# Patient Record
Sex: FEMALE | Race: WHITE | NOT HISPANIC OR LATINO | Employment: OTHER | URBAN - METROPOLITAN AREA
[De-identification: names, ages, dates, MRNs, and addresses within clinical notes are randomized per-mention and may not be internally consistent; named-entity substitution may affect disease eponyms.]

---

## 2017-01-06 ENCOUNTER — ALLSCRIPTS OFFICE VISIT (OUTPATIENT)
Dept: OTHER | Facility: OTHER | Age: 62
End: 2017-01-06

## 2017-01-07 ENCOUNTER — LAB CONVERSION - ENCOUNTER (OUTPATIENT)
Dept: OTHER | Facility: OTHER | Age: 62
End: 2017-01-07

## 2017-01-07 LAB — ERYTHROCYTE SEDIMENTATION RATE (HISTORICAL): 17 MM/H

## 2017-02-08 ENCOUNTER — APPOINTMENT (OUTPATIENT)
Dept: LAB | Facility: MEDICAL CENTER | Age: 62
End: 2017-02-08
Payer: COMMERCIAL

## 2017-02-08 DIAGNOSIS — M54.42 LOW BACK PAIN WITH LEFT-SIDED SCIATICA: ICD-10-CM

## 2017-02-08 DIAGNOSIS — R29.2 ABNORMAL REFLEX: ICD-10-CM

## 2017-02-08 DIAGNOSIS — M54.17 RADICULOPATHY OF LUMBOSACRAL REGION: ICD-10-CM

## 2017-02-08 DIAGNOSIS — M79.10 MYALGIA: ICD-10-CM

## 2017-02-08 DIAGNOSIS — K14.0 GLOSSITIS: ICD-10-CM

## 2017-02-08 DIAGNOSIS — J44.9 CHRONIC OBSTRUCTIVE PULMONARY DISEASE (HCC): ICD-10-CM

## 2017-02-08 DIAGNOSIS — I10 ESSENTIAL (PRIMARY) HYPERTENSION: ICD-10-CM

## 2017-02-08 DIAGNOSIS — M25.50 PAIN IN JOINT: ICD-10-CM

## 2017-02-08 DIAGNOSIS — R00.2 PALPITATIONS: ICD-10-CM

## 2017-02-08 DIAGNOSIS — R30.0 DYSURIA: ICD-10-CM

## 2017-02-08 LAB
ALBUMIN SERPL BCP-MCNC: 3.5 G/DL (ref 3.5–5)
ALP SERPL-CCNC: 80 U/L (ref 46–116)
ALT SERPL W P-5'-P-CCNC: 27 U/L (ref 12–78)
ANION GAP SERPL CALCULATED.3IONS-SCNC: 8 MMOL/L (ref 4–13)
AST SERPL W P-5'-P-CCNC: 16 U/L (ref 5–45)
BASOPHILS # BLD AUTO: 0.04 THOUSANDS/ΜL (ref 0–0.1)
BASOPHILS NFR BLD AUTO: 0 % (ref 0–1)
BILIRUB SERPL-MCNC: 0.27 MG/DL (ref 0.2–1)
BUN SERPL-MCNC: 11 MG/DL (ref 5–25)
CALCIUM SERPL-MCNC: 9.1 MG/DL (ref 8.3–10.1)
CHLORIDE SERPL-SCNC: 103 MMOL/L (ref 100–108)
CO2 SERPL-SCNC: 29 MMOL/L (ref 21–32)
CREAT SERPL-MCNC: 0.62 MG/DL (ref 0.6–1.3)
CRP SERPL QL: 4.8 MG/L
EOSINOPHIL # BLD AUTO: 0.26 THOUSAND/ΜL (ref 0–0.61)
EOSINOPHIL NFR BLD AUTO: 3 % (ref 0–6)
ERYTHROCYTE [DISTWIDTH] IN BLOOD BY AUTOMATED COUNT: 13.3 % (ref 11.6–15.1)
ERYTHROCYTE [SEDIMENTATION RATE] IN BLOOD: 38 MM/HOUR (ref 0–20)
GFR SERPL CREATININE-BSD FRML MDRD: >60 ML/MIN/1.73SQ M
GLUCOSE SERPL-MCNC: 93 MG/DL (ref 65–140)
HCT VFR BLD AUTO: 43.6 % (ref 34.8–46.1)
HGB BLD-MCNC: 14.5 G/DL (ref 11.5–15.4)
LYMPHOCYTES # BLD AUTO: 2.12 THOUSANDS/ΜL (ref 0.6–4.47)
LYMPHOCYTES NFR BLD AUTO: 23 % (ref 14–44)
MCH RBC QN AUTO: 30.5 PG (ref 26.8–34.3)
MCHC RBC AUTO-ENTMCNC: 33.3 G/DL (ref 31.4–37.4)
MCV RBC AUTO: 92 FL (ref 82–98)
MONOCYTES # BLD AUTO: 0.88 THOUSAND/ΜL (ref 0.17–1.22)
MONOCYTES NFR BLD AUTO: 10 % (ref 4–12)
NEUTROPHILS # BLD AUTO: 5.8 THOUSANDS/ΜL (ref 1.85–7.62)
NEUTS SEG NFR BLD AUTO: 64 % (ref 43–75)
NRBC BLD AUTO-RTO: 0 /100 WBCS
PLATELET # BLD AUTO: 286 THOUSANDS/UL (ref 149–390)
PMV BLD AUTO: 10 FL (ref 8.9–12.7)
POTASSIUM SERPL-SCNC: 4 MMOL/L (ref 3.5–5.3)
PROT SERPL-MCNC: 7.4 G/DL (ref 6.4–8.2)
RBC # BLD AUTO: 4.76 MILLION/UL (ref 3.81–5.12)
SODIUM SERPL-SCNC: 140 MMOL/L (ref 136–145)
TSH SERPL DL<=0.05 MIU/L-ACNC: 3.06 UIU/ML (ref 0.36–3.74)
WBC # BLD AUTO: 9.14 THOUSAND/UL (ref 4.31–10.16)

## 2017-02-08 PROCEDURE — 85652 RBC SED RATE AUTOMATED: CPT

## 2017-02-08 PROCEDURE — 80053 COMPREHEN METABOLIC PANEL: CPT

## 2017-02-08 PROCEDURE — 84443 ASSAY THYROID STIM HORMONE: CPT

## 2017-02-08 PROCEDURE — 86140 C-REACTIVE PROTEIN: CPT

## 2017-02-08 PROCEDURE — 85025 COMPLETE CBC W/AUTO DIFF WBC: CPT

## 2017-02-08 PROCEDURE — 36415 COLL VENOUS BLD VENIPUNCTURE: CPT

## 2017-02-08 PROCEDURE — 87086 URINE CULTURE/COLONY COUNT: CPT

## 2017-02-09 LAB — BACTERIA UR CULT: NORMAL

## 2017-03-21 ENCOUNTER — ALLSCRIPTS OFFICE VISIT (OUTPATIENT)
Dept: OTHER | Facility: OTHER | Age: 62
End: 2017-03-21

## 2017-03-29 ENCOUNTER — ALLSCRIPTS OFFICE VISIT (OUTPATIENT)
Dept: OTHER | Facility: OTHER | Age: 62
End: 2017-03-29

## 2017-05-08 ENCOUNTER — ANESTHESIA EVENT (OUTPATIENT)
Dept: GASTROENTEROLOGY | Facility: HOSPITAL | Age: 62
End: 2017-05-08
Payer: COMMERCIAL

## 2017-05-09 ENCOUNTER — HOSPITAL ENCOUNTER (OUTPATIENT)
Facility: HOSPITAL | Age: 62
Setting detail: OUTPATIENT SURGERY
Discharge: HOME | End: 2017-05-09
Attending: INTERNAL MEDICINE | Admitting: INTERNAL MEDICINE
Payer: COMMERCIAL

## 2017-05-09 ENCOUNTER — ANESTHESIA (OUTPATIENT)
Dept: GASTROENTEROLOGY | Facility: HOSPITAL | Age: 62
End: 2017-05-09
Payer: COMMERCIAL

## 2017-05-09 ENCOUNTER — GENERIC CONVERSION - ENCOUNTER (OUTPATIENT)
Dept: OTHER | Facility: OTHER | Age: 62
End: 2017-05-09

## 2017-05-09 VITALS
TEMPERATURE: 97.8 F | WEIGHT: 200.62 LBS | HEART RATE: 82 BPM | SYSTOLIC BLOOD PRESSURE: 130 MMHG | BODY MASS INDEX: 34.25 KG/M2 | RESPIRATION RATE: 20 BRPM | OXYGEN SATURATION: 96 % | DIASTOLIC BLOOD PRESSURE: 61 MMHG | HEIGHT: 64 IN

## 2017-05-09 DIAGNOSIS — R19.4 CHANGE IN BOWEL HABITS: ICD-10-CM

## 2017-05-09 DIAGNOSIS — R13.10 TROUBLE SWALLOWING: ICD-10-CM

## 2017-05-09 DIAGNOSIS — K21.9 ESOPHAGEAL REFLUX: ICD-10-CM

## 2017-05-09 DIAGNOSIS — K59.00 CONSTIPATION: ICD-10-CM

## 2017-05-09 PROCEDURE — 88305 TISSUE EXAM BY PATHOLOGIST: CPT | Performed by: INTERNAL MEDICINE

## 2017-05-09 PROCEDURE — 88313 SPECIAL STAINS GROUP 2: CPT | Performed by: INTERNAL MEDICINE

## 2017-05-09 PROCEDURE — 88342 IMHCHEM/IMCYTCHM 1ST ANTB: CPT | Performed by: INTERNAL MEDICINE

## 2017-05-09 RX ORDER — CETIRIZINE HYDROCHLORIDE 10 MG/1
10 TABLET, CHEWABLE ORAL DAILY
COMMUNITY
End: 2018-02-16

## 2017-05-09 RX ORDER — LISINOPRIL 20 MG/1
20 TABLET ORAL DAILY
COMMUNITY
End: 2018-02-16

## 2017-05-09 RX ORDER — PROPOFOL 10 MG/ML
INJECTION, EMULSION INTRAVENOUS AS NEEDED
Status: DISCONTINUED | OUTPATIENT
Start: 2017-05-09 | End: 2017-05-09 | Stop reason: SURG

## 2017-05-09 RX ORDER — SODIUM CHLORIDE, SODIUM LACTATE, POTASSIUM CHLORIDE, CALCIUM CHLORIDE 600; 310; 30; 20 MG/100ML; MG/100ML; MG/100ML; MG/100ML
100 INJECTION, SOLUTION INTRAVENOUS CONTINUOUS
Status: DISCONTINUED | OUTPATIENT
Start: 2017-05-09 | End: 2017-05-09 | Stop reason: HOSPADM

## 2017-05-09 RX ORDER — ESCITALOPRAM OXALATE 10 MG/1
10 TABLET ORAL DAILY
COMMUNITY
End: 2018-02-15 | Stop reason: SDUPTHER

## 2017-05-09 RX ORDER — GLYCOPYRROLATE 0.2 MG/ML
INJECTION INTRAMUSCULAR; INTRAVENOUS AS NEEDED
Status: DISCONTINUED | OUTPATIENT
Start: 2017-05-09 | End: 2017-05-09 | Stop reason: SURG

## 2017-05-09 RX ORDER — LIDOCAINE HYDROCHLORIDE 10 MG/ML
INJECTION, SOLUTION INFILTRATION; PERINEURAL AS NEEDED
Status: DISCONTINUED | OUTPATIENT
Start: 2017-05-09 | End: 2017-05-09 | Stop reason: SURG

## 2017-05-09 RX ORDER — PANTOPRAZOLE SODIUM 40 MG/1
40 TABLET, DELAYED RELEASE ORAL
Status: DISCONTINUED | OUTPATIENT
Start: 2017-05-09 | End: 2017-05-09 | Stop reason: HOSPADM

## 2017-05-09 RX ADMIN — PROPOFOL 100 MG: 10 INJECTION, EMULSION INTRAVENOUS at 08:25

## 2017-05-09 RX ADMIN — GLYCOPYRROLATE 0.2 MG: 0.2 INJECTION INTRAMUSCULAR; INTRAVENOUS at 08:23

## 2017-05-09 RX ADMIN — LIDOCAINE HYDROCHLORIDE 30 MG: 10 INJECTION, SOLUTION INFILTRATION; PERINEURAL at 08:25

## 2017-05-09 RX ADMIN — PROPOFOL 50 MG: 10 INJECTION, EMULSION INTRAVENOUS at 08:39

## 2017-05-09 RX ADMIN — PROPOFOL 30 MG: 10 INJECTION, EMULSION INTRAVENOUS at 08:28

## 2017-05-09 RX ADMIN — SODIUM CHLORIDE, SODIUM LACTATE, POTASSIUM CHLORIDE, AND CALCIUM CHLORIDE 100 ML/HR: .6; .31; .03; .02 INJECTION, SOLUTION INTRAVENOUS at 07:33

## 2017-05-09 RX ADMIN — PROPOFOL 30 MG: 10 INJECTION, EMULSION INTRAVENOUS at 08:33

## 2017-05-15 ENCOUNTER — GENERIC CONVERSION - ENCOUNTER (OUTPATIENT)
Dept: OTHER | Facility: OTHER | Age: 62
End: 2017-05-15

## 2017-06-08 ENCOUNTER — ALLSCRIPTS OFFICE VISIT (OUTPATIENT)
Dept: OTHER | Facility: OTHER | Age: 62
End: 2017-06-08

## 2017-07-11 ENCOUNTER — ALLSCRIPTS OFFICE VISIT (OUTPATIENT)
Dept: OTHER | Facility: OTHER | Age: 62
End: 2017-07-11

## 2017-07-21 DIAGNOSIS — R30.0 DYSURIA: ICD-10-CM

## 2017-07-27 ENCOUNTER — APPOINTMENT (OUTPATIENT)
Dept: LAB | Facility: MEDICAL CENTER | Age: 62
End: 2017-07-27
Payer: COMMERCIAL

## 2017-07-27 DIAGNOSIS — R30.0 DYSURIA: ICD-10-CM

## 2017-07-27 LAB
BILIRUB UR QL STRIP: NEGATIVE
CLARITY UR: CLEAR
COLOR UR: YELLOW
GLUCOSE UR STRIP-MCNC: NEGATIVE MG/DL
HGB UR QL STRIP.AUTO: NEGATIVE
KETONES UR STRIP-MCNC: NEGATIVE MG/DL
LEUKOCYTE ESTERASE UR QL STRIP: NEGATIVE
NITRITE UR QL STRIP: NEGATIVE
PH UR STRIP.AUTO: 7 [PH] (ref 4.5–8)
PROT UR STRIP-MCNC: NEGATIVE MG/DL
SP GR UR STRIP.AUTO: 1.01 (ref 1–1.03)
UROBILINOGEN UR QL STRIP.AUTO: 0.2 E.U./DL

## 2017-07-27 PROCEDURE — 81003 URINALYSIS AUTO W/O SCOPE: CPT

## 2017-08-18 ENCOUNTER — HOSPITAL ENCOUNTER (EMERGENCY)
Facility: HOSPITAL | Age: 62
Discharge: HOME | End: 2017-08-18
Attending: EMERGENCY MEDICINE
Payer: COMMERCIAL

## 2017-08-18 VITALS
DIASTOLIC BLOOD PRESSURE: 95 MMHG | WEIGHT: 206.1 LBS | HEART RATE: 87 BPM | OXYGEN SATURATION: 93 % | BODY MASS INDEX: 35.38 KG/M2 | SYSTOLIC BLOOD PRESSURE: 199 MMHG | TEMPERATURE: 98.1 F | RESPIRATION RATE: 20 BRPM

## 2017-08-18 DIAGNOSIS — S46.312A TRICEPS STRAIN, LEFT, INITIAL ENCOUNTER: Primary | ICD-10-CM

## 2017-08-18 LAB
ATRIAL RATE: 78 BPM
P AXIS: 76 DEGREES
PR INTERVAL: 158 MS
QRS AXIS: 79 DEGREES
QRSD INTERVAL: 82 MS
QT INTERVAL: 370 MS
QTC INTERVAL: 421 MS
T WAVE AXIS: 75 DEGREES
VENTRICULAR RATE: 78 BPM

## 2017-08-18 PROCEDURE — 99283 EMERGENCY DEPT VISIT LOW MDM: CPT

## 2017-08-18 PROCEDURE — 93005 ELECTROCARDIOGRAM TRACING: CPT | Performed by: PHYSICIAN ASSISTANT

## 2017-08-18 RX ORDER — DIAZEPAM 5 MG/1
5 TABLET ORAL ONCE
Status: COMPLETED | OUTPATIENT
Start: 2017-08-18 | End: 2017-08-18

## 2017-08-18 RX ORDER — KETOROLAC TROMETHAMINE 30 MG/ML
30 INJECTION, SOLUTION INTRAMUSCULAR; INTRAVENOUS ONCE
Status: DISCONTINUED | OUTPATIENT
Start: 2017-08-18 | End: 2017-08-18

## 2017-08-18 RX ORDER — PANTOPRAZOLE SODIUM 40 MG/1
40 TABLET, DELAYED RELEASE ORAL DAILY
COMMUNITY
End: 2018-02-16

## 2017-08-18 RX ORDER — METHOCARBAMOL 750 MG/1
750 TABLET, FILM COATED ORAL EVERY 6 HOURS PRN
Qty: 20 TABLET | Refills: 0 | Status: SHIPPED | OUTPATIENT
Start: 2017-08-18 | End: 2018-02-16

## 2017-08-18 RX ADMIN — DIAZEPAM 5 MG: 5 TABLET ORAL at 09:03

## 2017-08-22 ENCOUNTER — ALLSCRIPTS OFFICE VISIT (OUTPATIENT)
Dept: OTHER | Facility: OTHER | Age: 62
End: 2017-08-22

## 2017-09-25 ENCOUNTER — ALLSCRIPTS OFFICE VISIT (OUTPATIENT)
Dept: OTHER | Facility: OTHER | Age: 62
End: 2017-09-25

## 2017-09-26 DIAGNOSIS — Z12.31 ENCOUNTER FOR SCREENING MAMMOGRAM FOR MALIGNANT NEOPLASM OF BREAST: ICD-10-CM

## 2017-09-27 ENCOUNTER — LAB REQUISITION (OUTPATIENT)
Dept: LAB | Facility: HOSPITAL | Age: 62
End: 2017-09-27
Payer: COMMERCIAL

## 2017-09-27 DIAGNOSIS — L02.92 FURUNCLE: ICD-10-CM

## 2017-09-27 PROCEDURE — 87077 CULTURE AEROBIC IDENTIFY: CPT | Performed by: FAMILY MEDICINE

## 2017-09-27 PROCEDURE — 87205 SMEAR GRAM STAIN: CPT | Performed by: FAMILY MEDICINE

## 2017-09-27 PROCEDURE — 87186 SC STD MICRODIL/AGAR DIL: CPT | Performed by: FAMILY MEDICINE

## 2017-09-27 PROCEDURE — 87070 CULTURE OTHR SPECIMN AEROBIC: CPT | Performed by: FAMILY MEDICINE

## 2017-09-29 LAB
BACTERIA WND AEROBE CULT: NORMAL
GRAM STN SPEC: NORMAL
GRAM STN SPEC: NORMAL

## 2017-09-30 ENCOUNTER — GENERIC CONVERSION - ENCOUNTER (OUTPATIENT)
Dept: OTHER | Facility: OTHER | Age: 62
End: 2017-09-30

## 2017-10-24 ENCOUNTER — ALLSCRIPTS OFFICE VISIT (OUTPATIENT)
Dept: OTHER | Facility: OTHER | Age: 62
End: 2017-10-24

## 2017-10-24 LAB
BILIRUB UR QL STRIP: NEGATIVE
CLARITY UR: NORMAL
COLOR UR: YELLOW
GLUCOSE (HISTORICAL): NEGATIVE
HGB UR QL STRIP.AUTO: NEGATIVE
KETONES UR STRIP-MCNC: NEGATIVE MG/DL
LEUKOCYTE ESTERASE UR QL STRIP: NORMAL
NITRITE UR QL STRIP: NEGATIVE
PH UR STRIP.AUTO: 6.5 [PH]
PROT UR STRIP-MCNC: NEGATIVE MG/DL
SP GR UR STRIP.AUTO: 1
UROBILINOGEN UR QL STRIP.AUTO: 0.2

## 2017-10-24 NOTE — PROGRESS NOTES
Assessment  1  Rotator cuff tendinitis, left (463 10) (L71 94)    Plan  Anxiety    · ALPRAZolam 0 25 MG Oral Tablet; TAKE 1 TABLET 3 times daily  PMH: Otalgia, unspecified laterality    · From  Vicodin 5-300 MG Oral Tablet  To Vicodin 5-300 MG Oral Tablet TAKE 1  TABLET Every 6 hours    Discussion/Summary    #1 L shoulder pain - secondary to RTC tendonitis  I reviewed with pt and   Pt doing better nw and would like to watch for now  REcheck 2 weeks if not resolved  Consider PT if not resolvedelevated BP - in ER  ?secondary to pain? Now improved  Cont to monitor  as above Pt to call for problems or concerns in the interim  The patient, patient's family was counseled regarding instructions for management,-- risk factor reductions,-- prognosis,-- patient and family education,-- impressions,-- risks and benefits of treatment options,-- importance of compliance with treatment  Possible side effects of new medications were reviewed with the patient/guardian today  The treatment plan was reviewed with the patient/guardian  The patient/guardian understands and agrees with the treatment plan      Chief Complaint  ER follow up - pain and htn      History of Present Illness  as abovept had 5 day hx of L shoulder pain  It was horrible  Pt seen at [facility] ED and dx'd with tricep strain? Pt also with increased BP  Here for f/u pt states that her arm feels much beter today  No CP, palp,lightheadedness or other CV symptoms associated with arm pain  No distal weaknesspt denies increased NSAID use, stimulant use etc         Review of Systems    Constitutional: as noted in HPI  Eyes: No complaints of eye pain, no red eyes, no eyesight problems, no discharge, no dry eyes, no itching of eyes  ENT: no complaints of earache, no loss of hearing, no nose bleeds, no nasal discharge, no sore throat, no hoarseness     Cardiovascular: No complaints of slow heart rate, no fast heart rate, no chest pain, no palpitations, no leg claudication, no lower extremity edema  Respiratory: No complaints of shortness of breath, no wheezing, no cough, no SOB on exertion, no orthopnea, no PND  Musculoskeletal: as noted in HPI  Integumentary: No complaints of skin rash or lesions, no itching, no skin wounds, no breast pain or lump  Neurological: No complaints of headache, no confusion, no convulsions, no numbness, no dizziness or fainting, no tingling, no limb weakness, no difficulty walking  Active Problems  1  Abdominal pain (789 00) (R10 9)   2  Abnormal finding on mammography (793 80) (R92 8)   3  Abnormal weight loss (783 21) (R63 4)   4  Acute frontal sinusitis (461 1) (J01 10)   5  Acute left-sided low back pain with left-sided sciatica (724 2,724 3) (M54 42)   6  Acute maxillary sinusitis, recurrence not specified (461 0) (J01 00)   7  Acute nonintractable headache, unspecified headache type (784 0) (R51)   8  Acute URI (465 9) (J06 9)   9  Allergic rhinitis (477 9) (J30 9)   10  Anxiety (300 00) (F41 9)   11  Arthritis (716 90) (M19 90)   12  Breast cyst (610 0) (N60 09)   13  Candidiasis of mouth (112 0) (B37 0)   14  Cataract (366 9) (H26 9)   15  Cervical dysphagia (787 29) (R13 19)   16  Change in bowel habits (787 99) (R19 4)   17  Chest pain (786 50) (R07 9)   18  Chest pressure (786 59) (R07 89)   19  Chronic obstructive pulmonary disease (496) (J44 9)   20  Compression fracture of thoracic vertebra, non-traumatic (733 13) (M48 54XA)   21  Constipation (564 00) (K59 00)   22  Cough (786 2) (R05)   23  Depression (311) (F32 9)   24  Dermatitis (692 9) (L30 9)   25  Diarrhea (787 91) (R19 7)   26  Dysuria (788 1) (R30 0)   27  Edema (782 3) (R60 9)   28  Encounter for screening colonoscopy (V76 51) (Z12 11)   29  Encounter for screening mammogram for malignant neoplasm of breast (V76 12)    (Z12 31)   30  Epigastric pain (789 06) (R10 13)   31  Esophageal reflux (530 81) (K21 9)   32  Fatigue (780 79) (R53 83)   33   Fever (780 60) (R50 9)   34  Fibromyalgia (729 1) (M79 7)   35  Furuncle (680 9) (L02 92)   36  Glossitis (529 0) (K14 0)   37  Hernia (553 9) (K46 9)   38  Hyperlipidemia (272 4) (E78 5)   39  Hyperreflexic (796 1) (R29 2)   40  Hypertension (401 9) (I10)   41  Intervertebral Disc Degeneration (722 6)   42  Left-sided low back pain with left-sided sciatica (724 3) (M54 42)   43  Lumbosacral radiculopathy at L5 (724 4) (M54 17)   44  Malaise (780 79) (R53 81)   45  Myalgia (729 1) (M79 1)   46  Nausea (787 02) (R11 0)   47  Pain in joint of right shoulder region (719 41) (M25 511)   48  Pain in the joints (719 40) (M25 50)   49  Pain of upper extremity, unspecified laterality (729 5) (M79 603)   50  Palpitations (785 1) (R00 2)   51  Pelvic pain (R10 2)   52  Perineal irritation (709 9) (L98 9)   53  Pes anserine bursitis (726 61) (M70 50)   54  Postmenopausal atrophic vaginitis (627 3) (N95 2)   55  Preoperative clearance (V72 84) (Z01 818)   56  Pulmonary nodule seen on imaging study (793 11) (R91 1)   57  Quit smoking (V15 82) (Z87 891)   58  Right knee pain (719 46) (M25 561)   59  Screening for colon cancer (V76 51) (Z12 11)   60  Skin neoplasm (239 2) (D49 2)   61  Tobacco abuse counseling (V65 42,305 1) (Z71 6)   62  Trouble swallowing (787 20) (R13 10)   63  Ulnar neuropathy of left upper extremity (354 2) (G56 22)   64  Urinary tract infection (599 0) (N39 0)   65  Vaginal discharge (623 5) (N89 8)   66  Vaginitis (616 10) (N76 0)   67  Vision changes (368 9) (H53 9)   68  Weakness of right hand (728 87) (G45 940)    Past Medical History  1  History of Acid reflux (530 81) (K21 9)    The active problems and past medical history were reviewed and updated today  Surgical History  1  History of Eye Surgery   2  History of Knee Surgery   3  History of Knee Surgery    Family History  Mother    1  Denied: Family history of Colon cancer   2  Denied: Family history of colonic polyps   3   Denied: Family history of Crohn's disease   4  Denied: Family history of liver disease   5  Family history of Pancreatic Neoplasm  Father    6  Denied: Family history of Colon cancer   7  Family history of Coronary Artery Disease (V17 49)   8  Family history of Diabetes Mellitus (V18 0)   9  Family history of Essential Hypertension   10  Denied: Family history of colonic polyps   11  Denied: Family history of Crohn's disease   12  Denied: Family history of liver disease   15  Family history of Heart Disease (V17 49)   14  Family history of Hypertension (V17 49)  Sister    13  Family history of Lung Cancer (V16 1)  Brother    12  Family history of Pancreatic Neoplasm   17  Family history of Pancreatic Neoplasm  Paternal Grandmother    25  Family history of Diabetes Mellitus (V18 0)  Paternal Grandfather    23  Family history of Cancer    Social History   · Denied: History of Alcohol Use (History)   · Always uses seat belt   · Current Every Day Smoker (305 1)   · Daily Coffee Consumption (___ Cups/Day)   · Daily Tea Consumption (___ Cups/Day)   · Dental care, regularly   · Education Level: Less than high school   · Exercising Regularly   · Marital History - Currently    · Denied: History of Multiple organ donor   · No guns in the home   · No living will   · Personal Protective Equipment Seatbelts   · Denied: History of Pets / animals   · Denied: History of Power of  in existence   · Quit smoking (V15 82) (Z87 891)   · Three children   · Unemployed (V62 0) (Z56 0)  The social history was reviewed and updated today  Current Meds   1  ALPRAZolam 0 25 MG Oral Tablet; TAKE 1 TABLET 3 times daily; Last Rx:15Apr2016   Ordered   2  Cetirizine HCl - 10 MG Oral Tablet; take 1 tablet by mouth every day; Therapy: 01QOJ3000 to (Evaluate:30Nov2017)  Requested for: 81Aog0932; Last   Rx:50Lqw4610 Ordered   3  Escitalopram Oxalate 10 MG Oral Tablet; TAKE 1 TABLET BY MOUTH EVERY DAY;    Therapy: 78VXA5009 to (Evaluate:12Oct2016)  Requested for: 48YTJ6057; Last   Rx:66Dou7506 Ordered   4  Fluticasone Propionate 50 MCG/ACT Nasal Suspension; use 2 sprays in each nostril   once daily; Therapy: 21EUO7773 to (Evaluate:10Oct2017)  Requested for: 11Aug2017; Last   Rx:11Aug2017 Ordered   5  Lisinopril 20 MG Oral Tablet; take 1 tablet by mouth every day; Therapy: 87MZZ6804 to (21 )  Requested for: 24QTR5411; Last   Rx:55Ewb4977 Ordered   6  Nystatin 427813 UNIT/ML Mouth/Throat Suspension; SWISH, GARGLE THEN SPIT 1   TEASPOOONFUL 3-4 X A DAY; Therapy: 37CSK1394 to (Luke Navarrete)  Requested for: 21Aug2017; Last   Rx:21Aug2017 Ordered   7  Protonix 40 MG Oral Tablet Delayed Release; Take 1 tablet daily Recorded   8  Rogaine Womens 5 % External Foam; 1qd Recorded   9  Tylenol 8 Hour Arthritis Pain 650 MG Oral Tablet Extended Release Recorded    The medication list was reviewed and updated today  Allergies  1  Monistat 3 CREA    Vitals  Vital Signs    Recorded: 22Aug2017 03:42PM   Temperature 97 8 F   Heart Rate 74   Systolic 426   Diastolic 74   Height 5 ft 4 in   Weight 200 lb 4 oz   BMI Calculated 34 37   BSA Calculated 1 96     Physical Exam    Constitutional   General appearance: No acute distress, well appearing and well nourished  Eyes   Conjunctiva and lids: No swelling, erythema or discharge  Pupils and irises: Equal, round, reactive to light  Neck   Neck: Abnormal  -- mild discomfort with posterior flexion but no radiation to shoulder  Pulmonary   Respiratory effort: No increased work of breathing or signs of respiratory distress  Auscultation of lungs: Clear to auscultation  Cardiovascular   Auscultation of heart: Normal rate and rhythm, normal S1 and S2, no murmurs  Peripheral vascular exam: Normal     Examination of extremities for edema and/or varicosities: Normal     Lymphatic   Palpation of lymph nodes in neck: No lymphadenopathy  Palpation of lymph nodes in other areas: No lymphadenopathy  Musculoskeletal   Gait and station: Normal     Digits and nails: Normal without clubbing or cyanosis  Joints, bones, and muscles: Abnormal  -- L shoulder sl TTP over the supraspinatous insertion  Mild discomfort with abduction >90d (though pt has good ROM  Neg sulcus  Mild discomfort with empty can test    Muscle strength/tone: Normal  -- in hands  Skin   Skin and subcutaneous tissue: Normal without rashes or lesions  Neurologic   Reflexes: 2+ and symmetric  Sensation: No sensory loss  Future Appointments    Date/Time Provider Specialty Site   11/15/2017 10:15 AM AYLEEN Nicole   [address] [facility]     Signatures   Electronically signed by : AYLEEN Gale ; Aug 24 2017  6:19AM EST                       (Author)

## 2017-10-27 NOTE — PROGRESS NOTES
Assessment  1  Boil (680 9) (L02 92)   2  Acute gastroenteritis (558 9) (K52 9)   3  Sinus headache (784 0) (R51)    Plan  Boil, Sinus headache    · Cephalexin 500 MG Oral Capsule; TAKE 1 CAPSULE 3 TIMES DAILY    Discussion/Summary    #1 boil - I reviewed with patient  I labial area now draining  We'll have her continue her conservative care including warm compresses  We discussed proper hygiene to avoid infecting other members of the family  Start cephalexin 500 mg 3 times a day  Recheck Thursday if not improved?earlier if worseacute gastroenteritis ? I reviewed with patient  Suspect viral syndrome  Dark urine suggests mild dehydration though exam is unremarkable  Continue Imodium as directed  Increase fluids and other conservative measures advised  Avoid lactose and fructose  Recheck Thursday if not improving?earlier if worsesinus headache - probable viral syndrome though cannot rule out underlying sinusitis  Start cephalexin as noted above  Continue conservative measures  Recheck as above  follow-up as above  Patient to call for problems or concerns in the interim  The patient was counseled regarding instructions for management,-- risk factor reductions,-- prognosis,-- patient and family education,-- impressions,-- risks and benefits of treatment options,-- importance of compliance with treatment  Possible side effects of new medications were reviewed with the patient/guardian today  The treatment plan was reviewed with the patient/guardian  The patient/guardian understands and agrees with the treatment plan      Chief Complaint  boil on stomach and right side of groin      History of Present Illness  HPI: as abovept developed a cyst on R abd 5 days ago, and R groin x 2 days  Abd cyst broke Friday and has been healing since  R groin lesion just opened while she was on her way to the office  Patient has a history of boils but no history of MRSA4 day hx of HA and diarrhea with mild nausea   No melena or hematochezia  Patient states that she is trying to stay hydrated but notes that her urine has been a little dark lately  There is no dysuria or increased frequency  She denies any increased back or CVA area pain patient denies any fever or chills  No one else is sick at home  Review of Systems    Constitutional: as noted in HPI    ENT: as noted in HPI  Cardiovascular: no complaints of slow or fast heart rate, no chest pain, no palpitations, no leg claudication or lower extremity edema  Respiratory: no complaints of shortness of breath, no wheezing, no dyspnea on exertion, no orthopnea or PND  Gastrointestinal: as noted in HPI  Genitourinary: as noted in HPI  Musculoskeletal: as noted in HPI  Integumentary: no complaints of skin rash or lesion, no itching or dry skin, no skin wounds  Neurological: no complaints of headache, no confusion, no numbness or tingling, no dizziness or fainting  Active Problems  1  Abdominal pain (789 00) (R10 9)   2  Abnormal finding on mammography (793 80) (R92 8)   3  Abnormal weight loss (783 21) (R63 4)   4  Acute frontal sinusitis (461 1) (J01 10)   5  Acute left-sided low back pain with left-sided sciatica (724 2,724 3) (M54 42)   6  Acute maxillary sinusitis, recurrence not specified (461 0) (J01 00)   7  Acute nonintractable headache, unspecified headache type (784 0) (R51)   8  Acute URI (465 9) (J06 9)   9  Allergic rhinitis (477 9) (J30 9)   10  Anxiety (300 00) (F41 9)   11  Arthritis (716 90) (M19 90)   12  Breast cyst (610 0) (N60 09)   13  Candidiasis of mouth (112 0) (B37 0)   14  Cataract (366 9) (H26 9)   15  Cervical dysphagia (787 29) (R13 19)   16  Change in bowel habits (787 99) (R19 4)   17  Chest pain (786 50) (R07 9)   18  Chest pressure (786 59) (R07 89)   19  Chronic obstructive pulmonary disease (496) (J44 9)   20  Compression fracture of thoracic vertebra, non-traumatic (733 13) (M48 54XA)   21  Constipation (564 00) (K59 00)   22   Cough (786  2) (R05)   23  Depression (311) (F32 9)   24  Dermatitis (692 9) (L30 9)   25  Diarrhea (787 91) (R19 7)   26  Dysuria (788 1) (R30 0)   27  Edema (782 3) (R60 9)   28  Encounter for screening colonoscopy (V76 51) (Z12 11)   29  Encounter for screening mammogram for malignant neoplasm of breast (V76 12)    (Z12 31)   30  Epigastric pain (789 06) (R10 13)   31  Esophageal reflux (530 81) (K21 9)   32  Fatigue (780 79) (R53 83)   33  Fever (780 60) (R50 9)   34  Fibromyalgia (729 1) (M79 7)   35  Furuncle (680 9) (L02 92)   36  Glossitis (529 0) (K14 0)   37  Hernia (553 9) (K46 9)   38  Hyperlipidemia (272 4) (E78 5)   39  Hyperreflexic (796 1) (R29 2)   40  Hypertension (401 9) (I10)   41  Intervertebral Disc Degeneration (722 6)   42  Left-sided low back pain with left-sided sciatica (724 3) (M54 42)   43  Lumbosacral radiculopathy at L5 (724 4) (M54 17)   44  Malaise (780 79) (R53 81)   45  Myalgia (729 1) (M79 1)   46  Nausea (787 02) (R11 0)   47  Pain in joint of right shoulder region (719 41) (M25 511)   48  Pain in the joints (719 40) (M25 50)   49  Pain of upper extremity, unspecified laterality (729 5) (M79 603)   50  Palpitations (785 1) (R00 2)   51  Pelvic pain (R10 2)   52  Perineal irritation (709 9) (L98 9)   53  Pes anserine bursitis (726 61) (M70 50)   54  Postmenopausal atrophic vaginitis (627 3) (N95 2)   55  Preoperative clearance (V72 84) (Z01 818)   56  Pulmonary nodule seen on imaging study (793 11) (R91 1)   57  Quit smoking (V15 82) (Z87 891)   58  Right knee pain (719 46) (M25 561)   59  Rotator cuff tendinitis, left (726 10) (M75 82)   60  Screening for colon cancer (V76 51) (Z12 11)   61  Skin neoplasm (239 2) (D49 2)   62  Tobacco abuse counseling (V65 42,305 1) (Z71 6)   63  Trouble swallowing (787 20) (R13 10)   64  Ulnar neuropathy of left upper extremity (354 2) (G56 22)   65  Urinary tract infection (599 0) (N39 0)   66  Vaginal discharge (623 5) (N89 8)   67   Vaginitis (616 10) (N76 0)   68  Vision changes (368 9) (H53 9)   69  Weakness of right hand (728 87) (J20 527)    Past Medical History  1  History of Acid reflux (530 81) (K21 9)  Active Problems And Past Medical History Reviewed: The active problems and past medical history were reviewed and updated today  Family History  Mother    1  Denied: Family history of Colon cancer   2  Denied: Family history of colonic polyps   3  Denied: Family history of Crohn's disease   4  Denied: Family history of liver disease   5  Family history of Pancreatic Neoplasm  Father    6  Denied: Family history of Colon cancer   7  Family history of Coronary Artery Disease (V17 49)   8  Family history of Diabetes Mellitus (V18 0)   9  Family history of Essential Hypertension   10  Denied: Family history of colonic polyps   11  Denied: Family history of Crohn's disease   12  Denied: Family history of liver disease   15  Family history of Heart Disease (V17 49)   14  Family history of Hypertension (V17 49)  Sister    13  Family history of Lung Cancer (V16 1)  Brother    12  Family history of Pancreatic Neoplasm   17  Family history of Pancreatic Neoplasm  Paternal Grandmother    25  Family history of Diabetes Mellitus (V18 0)  Paternal Grandfather    23   Family history of Cancer    Social History   · Denied: History of Alcohol Use (History)   · Always uses seat belt   · Current Every Day Smoker (305 1)   · Daily Coffee Consumption (___ Cups/Day)   · Daily Tea Consumption (___ Cups/Day)   · Dental care, regularly   · Education Level: Less than high school   · Exercising Regularly   · Marital History - Currently    · Denied: History of Multiple organ donor   · No guns in the home   · No living will   · Personal Protective Equipment Seatbelts   · Denied: History of Pets / animals   · Denied: History of Power of  in existence   · Quit smoking (V15 82) (Z87 891)   · Three children   · Unemployed (V62 0) (Z56 0)  The social history was reviewed and updated today  Surgical History  1  History of Eye Surgery   2  History of Knee Surgery   3  History of Knee Surgery    Current Meds   1  ALPRAZolam 0 25 MG Oral Tablet; TAKE 1 TABLET 3 times daily; Last Rx:[id number]   Ordered   2  Cetirizine HCl - 10 MG Oral Tablet; take 1 tablet by mouth every day; Therapy: 66BSW7143 to (Evaluate:30Nov2017)  Requested for: 25Zky6415; Last   Rx:10Uek7725 Ordered   3  Escitalopram Oxalate 10 MG Oral Tablet; TAKE 1 TABLET BY MOUTH EVERY DAY; Therapy: 82PMA8909 to (Evaluate:12Oct2016)  Requested for: 83Hsm2507; Last   Rx:98Qdg3798 Ordered   4  Fluticasone Propionate 50 MCG/ACT Nasal Suspension; use 2 sprays in each nostril   once daily; Therapy: 53WTM8768 to (Evaluate:10Oct2017)  Requested for: 11Aug2017; Last   Rx:11Aug2017 Ordered   5  Lisinopril 20 MG Oral Tablet; take 1 tablet by mouth every day; Therapy: 82WYD3755 to (77 873 135)  Requested for: 98GTQ1835; Last   Rx:81Jkb4834 Ordered   6  Nystatin 633574 UNIT/ML Mouth/Throat Suspension; SWISH, GARGLE THEN SPIT 1   TEASPOOONFUL 3-4 X A DAY; Therapy: 78XRS9232 to (Simeon Murray)  Requested for: 84Zrl6514; Last   Rx:17Guf6842 Ordered   7  Promethazine-DM 6 25-15 MG/5ML Oral Syrup; Take 1 teaspoonful by mouth every 6   hours as needed for cough; Therapy: 06YJK2118 to (Evaluate:23Sep2017)  Requested for: 84Cye8639; Last   Rx:09Dvb1518 Ordered   8  Protonix 40 MG Oral Tablet Delayed Release; Take 1 tablet daily Recorded   9  Rogaine Womens 5 % External Foam; 1qd Recorded   10  Tylenol 8 Hour Arthritis Pain 650 MG Oral Tablet Extended Release Recorded   11  Vicodin 5-300 MG Oral Tablet; TAKE 1 TABLET Every 6 hours; Therapy: 56WMV8594 to (Evaluate:01Sep2017); Last Rx:90Qtc4646 Ordered    The medication list was reviewed and updated today  Allergies  1   Monistat 3 CREA    Vitals   Recorded: 71UZV8617 03:27PM   Temperature 98 1 F   Heart Rate 80   Systolic 812   Diastolic 80   Height 5 ft 4 in   Weight 202 lb    BMI Calculated 34 67   BSA Calculated 1 96     Physical Exam    Constitutional   General appearance: Abnormal  -- Mildly ill-appearing female in no apparent distress  Head and Face   Head and face: Normal     Palpation of the face and sinuses: Abnormal  -- Sinuses are slightly tender to palpation with negative head tilt  Eyes   Conjunctiva and lids: No swelling, erythema or discharge  Pupils and irises: Equal, round, reactive to light  Ophthalmoscopic examination: Normal fundi and optic discs  Ears, Nose, Mouth, and Throat   External inspection of ears and nose: Normal     Otoscopic examination: Tympanic membranes translucent with normal light reflex  Canals patent without erythema  Nasal mucosa, septum, and turbinates: Abnormal  -- Minimal congestion  Lips, teeth, and gums: Normal, good dentition  Oropharynx: Normal with no erythema, edema, exudate or lesions  -- Moist mucous membranes  Neck   Neck: Supple, symmetric, trachea midline, no masses  -- No rigidity  Pulmonary   Respiratory effort: No increased work of breathing or signs of respiratory distress  Auscultation of lungs: Clear to auscultation  Cardiovascular   Auscultation of heart: Normal rate and rhythm, normal S1 and S2, no murmurs  Peripheral vascular exam: Normal  -- Breasts capillary refill  Abdomen   Abdomen: Abnormal  -- Right lower abdomen with healing cyst area with crusting  No surrounding erythema or discharge  Liver and spleen: No hepatomegaly or splenomegaly  Genitourinary   External genitalia and vagina: Abnormal  -- Right  area with indurated area draining purulent material (culture taken) mild surrounding cellulitis but does not extend to the perineum or suprapubic area  Lymphatic   Palpation of lymph nodes in neck: No lymphadenopathy  Palpation of lymph nodes in groin: No lymphadenopathy  Palpation of lymph nodes in other areas: No lymphadenopathy      Skin   Skin and subcutaneous tissue: Abnormal  -- As above  Neurologic   Cranial nerves: Cranial nerves II-XII intact  Cortical function: Normal mental status  Future Appointments    Date/Time Provider Specialty Site   11/15/2017 10:15 AM AYLEEN Hammond   [address] [facility]     Signatures   Electronically signed by : AYLEEN Quiroz ; Sep 25 2017  8:09PM EST                       (Author)

## 2017-10-30 ENCOUNTER — GENERIC CONVERSION - ENCOUNTER (OUTPATIENT)
Dept: OTHER | Facility: OTHER | Age: 62
End: 2017-10-30

## 2017-10-30 ENCOUNTER — APPOINTMENT (OUTPATIENT)
Dept: LAB | Facility: CLINIC | Age: 62
End: 2017-10-30
Payer: COMMERCIAL

## 2017-10-30 DIAGNOSIS — R91.1 SOLITARY PULMONARY NODULE: ICD-10-CM

## 2017-10-30 DIAGNOSIS — R30.0 DYSURIA: ICD-10-CM

## 2017-10-30 DIAGNOSIS — M79.7 FIBROMYALGIA: ICD-10-CM

## 2017-10-30 DIAGNOSIS — J44.9 CHRONIC OBSTRUCTIVE PULMONARY DISEASE (HCC): ICD-10-CM

## 2017-10-30 DIAGNOSIS — N39.0 URINARY TRACT INFECTION: ICD-10-CM

## 2017-10-30 DIAGNOSIS — R13.19 OTHER DYSPHAGIA: ICD-10-CM

## 2017-10-30 DIAGNOSIS — E78.5 HYPERLIPIDEMIA: ICD-10-CM

## 2017-10-30 DIAGNOSIS — I10 ESSENTIAL (PRIMARY) HYPERTENSION: ICD-10-CM

## 2017-10-30 LAB
BILIRUB UR QL STRIP: NEGATIVE
CLARITY UR: CLEAR
COLOR UR: YELLOW
GLUCOSE UR STRIP-MCNC: NEGATIVE MG/DL
HGB UR QL STRIP.AUTO: NEGATIVE
KETONES UR STRIP-MCNC: NEGATIVE MG/DL
LEUKOCYTE ESTERASE UR QL STRIP: NEGATIVE
NITRITE UR QL STRIP: NEGATIVE
PH UR STRIP.AUTO: 6.5 [PH] (ref 4.5–8)
PROT UR STRIP-MCNC: NEGATIVE MG/DL
SP GR UR STRIP.AUTO: 1.01 (ref 1–1.03)
UROBILINOGEN UR QL STRIP.AUTO: 0.2 E.U./DL

## 2017-10-30 PROCEDURE — 81003 URINALYSIS AUTO W/O SCOPE: CPT

## 2017-11-15 ENCOUNTER — ALLSCRIPTS OFFICE VISIT (OUTPATIENT)
Dept: OTHER | Facility: OTHER | Age: 62
End: 2017-11-15

## 2017-11-15 ENCOUNTER — TRANSCRIBE ORDERS (OUTPATIENT)
Dept: ADMINISTRATIVE | Facility: HOSPITAL | Age: 62
End: 2017-11-15

## 2017-11-15 DIAGNOSIS — R91.1 PULMONARY NODULE: Primary | ICD-10-CM

## 2017-11-16 ENCOUNTER — APPOINTMENT (OUTPATIENT)
Dept: LAB | Facility: MEDICAL CENTER | Age: 62
End: 2017-11-16
Payer: COMMERCIAL

## 2017-11-16 DIAGNOSIS — E78.5 HYPERLIPIDEMIA: ICD-10-CM

## 2017-11-16 DIAGNOSIS — M79.7 FIBROMYALGIA: ICD-10-CM

## 2017-11-16 DIAGNOSIS — R30.0 DYSURIA: ICD-10-CM

## 2017-11-16 DIAGNOSIS — J44.9 CHRONIC OBSTRUCTIVE PULMONARY DISEASE (HCC): ICD-10-CM

## 2017-11-16 DIAGNOSIS — I10 ESSENTIAL (PRIMARY) HYPERTENSION: ICD-10-CM

## 2017-11-16 DIAGNOSIS — R13.19 OTHER DYSPHAGIA: ICD-10-CM

## 2017-11-16 LAB
ALBUMIN SERPL BCP-MCNC: 3.7 G/DL (ref 3.5–5)
ALP SERPL-CCNC: 74 U/L (ref 46–116)
ALT SERPL W P-5'-P-CCNC: 20 U/L (ref 12–78)
ANION GAP SERPL CALCULATED.3IONS-SCNC: 5 MMOL/L (ref 4–13)
AST SERPL W P-5'-P-CCNC: 13 U/L (ref 5–45)
BASOPHILS # BLD AUTO: 0.02 THOUSANDS/ΜL (ref 0–0.1)
BASOPHILS NFR BLD AUTO: 0 % (ref 0–1)
BILIRUB SERPL-MCNC: 0.34 MG/DL (ref 0.2–1)
BUN SERPL-MCNC: 13 MG/DL (ref 5–25)
CALCIUM SERPL-MCNC: 9.3 MG/DL (ref 8.3–10.1)
CHLORIDE SERPL-SCNC: 101 MMOL/L (ref 100–108)
CHOLEST SERPL-MCNC: 215 MG/DL (ref 50–200)
CO2 SERPL-SCNC: 29 MMOL/L (ref 21–32)
CREAT SERPL-MCNC: 0.57 MG/DL (ref 0.6–1.3)
EOSINOPHIL # BLD AUTO: 0.16 THOUSAND/ΜL (ref 0–0.61)
EOSINOPHIL NFR BLD AUTO: 2 % (ref 0–6)
ERYTHROCYTE [DISTWIDTH] IN BLOOD BY AUTOMATED COUNT: 13.1 % (ref 11.6–15.1)
GFR SERPL CREATININE-BSD FRML MDRD: 100 ML/MIN/1.73SQ M
GLUCOSE P FAST SERPL-MCNC: 88 MG/DL (ref 65–99)
HCT VFR BLD AUTO: 43.3 % (ref 34.8–46.1)
HDLC SERPL-MCNC: 53 MG/DL (ref 40–60)
HGB BLD-MCNC: 14.4 G/DL (ref 11.5–15.4)
LDLC SERPL CALC-MCNC: 128 MG/DL (ref 0–100)
LYMPHOCYTES # BLD AUTO: 2.11 THOUSANDS/ΜL (ref 0.6–4.47)
LYMPHOCYTES NFR BLD AUTO: 21 % (ref 14–44)
MCH RBC QN AUTO: 29.6 PG (ref 26.8–34.3)
MCHC RBC AUTO-ENTMCNC: 33.3 G/DL (ref 31.4–37.4)
MCV RBC AUTO: 89 FL (ref 82–98)
MONOCYTES # BLD AUTO: 0.93 THOUSAND/ΜL (ref 0.17–1.22)
MONOCYTES NFR BLD AUTO: 9 % (ref 4–12)
NEUTROPHILS # BLD AUTO: 6.64 THOUSANDS/ΜL (ref 1.85–7.62)
NEUTS SEG NFR BLD AUTO: 68 % (ref 43–75)
NRBC BLD AUTO-RTO: 0 /100 WBCS
NT-PROBNP SERPL-MCNC: 54 PG/ML
PLATELET # BLD AUTO: 283 THOUSANDS/UL (ref 149–390)
PMV BLD AUTO: 10.2 FL (ref 8.9–12.7)
POTASSIUM SERPL-SCNC: 4.4 MMOL/L (ref 3.5–5.3)
PROT SERPL-MCNC: 7.4 G/DL (ref 6.4–8.2)
RBC # BLD AUTO: 4.86 MILLION/UL (ref 3.81–5.12)
SODIUM SERPL-SCNC: 135 MMOL/L (ref 136–145)
TRIGL SERPL-MCNC: 171 MG/DL
TSH SERPL DL<=0.05 MIU/L-ACNC: 3.18 UIU/ML (ref 0.36–3.74)
WBC # BLD AUTO: 9.91 THOUSAND/UL (ref 4.31–10.16)

## 2017-11-16 PROCEDURE — 80053 COMPREHEN METABOLIC PANEL: CPT

## 2017-11-16 PROCEDURE — 80061 LIPID PANEL: CPT

## 2017-11-16 PROCEDURE — 85025 COMPLETE CBC W/AUTO DIFF WBC: CPT

## 2017-11-16 PROCEDURE — 36415 COLL VENOUS BLD VENIPUNCTURE: CPT

## 2017-11-16 PROCEDURE — 84443 ASSAY THYROID STIM HORMONE: CPT

## 2017-11-16 PROCEDURE — 83880 ASSAY OF NATRIURETIC PEPTIDE: CPT

## 2017-11-17 NOTE — PROGRESS NOTES
Assessment    1  Cervical dysphagia (787 29) (R13 19)   2  Chronic obstructive pulmonary disease (496) (J44 9)   3  Fibromyalgia (729 1) (M79 7)   4  Hyperlipidemia (272 4) (E78 5)   5  Hypertension (401 9) (I10)   6  Dysuria (788 1) (R30 0)    Plan  Cervical dysphagia, Chronic obstructive pulmonary disease, Dysuria, Fibromyalgia,Hyperlipidemia, Hypertension    · (1) CBC/PLT/DIFF; Status:Active; Requested for:66Chf2514;    · (1) COMPREHENSIVE METABOLIC PANEL; Status:Active; Requested for:21Uce1446;    · (1) LIPID PANEL, FASTING; Status:Active; Requested for:04Idj6996;    · (1) NT- BNP (PRO BRAIN NATRIURETIC PEPTIDE); Status:Active; Requestedfor:05Qog9406;    · (1) TSH; Status:Active; Requested for:27Mek3477;   Pulmonary nodule seen on imaging study    · * CT CHEST WO CONTRAST; Status:Need Information - Financial Authorization; Requested for:30Wwl5249;     Discussion/Summary    #1 R shoulder pain - ssuspect RTC tendonitis  I reviewed with pt  Consider PT  Heat-> ROM -> Ice as directed  Recheck 2-3 weeks if no changeelevated BP - persistent  I reviewed with pt  Cont lisinopral for now  Monitor home BPs  Recheck 2 weeks if not improved  COPD -I urged smoking cessation - reiewed  Check CT of Lunfs  Recheck as above - earlier if neededdysuria - ?secondary to peroneal irritation  I discussed barrier therapy  Recheck 1-2 weeks if no changefibromyalgia - I reviewed withpt  Cont present care  Check labscervical dysphagia - I reviewed with pt REC: avoid exacerbating positions and activities  Consider PT eval - MRI if symptoms worsen  Recheck 6mhyperlipidemia - check labsas above  Pt to call for problems or concerns in the interim  The patient, patient's family was counseled regarding diagnostic results,-- instructions for management,-- risk factor reductions,-- prognosis,-- patient and family education,-- impressions,-- risks and benefits of treatment options,-- importance of compliance with treatment     Possible side effects of new medications were reviewed with the patient/guardian today  The treatment plan was reviewed with the patient/guardian  The patient/guardian understands and agrees with the treatment plan      Chief Complaint  ER follow up - pain and htn      History of Present Illness  as abovept states that she is still with some urinary symptoms  UCx as negative  pt has some leakage and irritation at the external urethra  Has occ chillspt notes that her L shoulder feels better but now her R shoulder hurts  has some lateral upper arm pain radiating to her neck as well as lateral thigh pain  no CP, palp,lightheadedness or other CV symptoms associated with arm pain  No distal weakness  (+) increased edema over the last few days  choked on a piece of hamburger 2 weeks ago  Could not breath for a moment but it resolved  pt with several day hx of throat irritation after  No SOB or other symptoms now still smoking  Now increased SOB or wheezemother and 2 brothers passed away from pancreatic CA  Pt is unsure how to screen  Pt has been seen by GI and had neg EGD earlier this year  No weight loss or increased abd pain      Review of Systems   Constitutional: as noted in HPI  Eyes: No complaints of eye pain, no red eyes, no eyesight problems, no discharge, no dry eyes, no itching of eyes  ENT: as noted in HPI  Cardiovascular: No complaints of slow heart rate, no fast heart rate, no chest pain, no palpitations, no leg claudication, no lower extremity edema  Respiratory: No complaints of shortness of breath, no wheezing, no cough, no SOB on exertion, no orthopnea, no PND  Gastrointestinal: No complaints of abdominal pain, no constipation, no nausea or vomiting, no diarrhea, no bloody stools  Genitourinary: as noted in HPI  Musculoskeletal: as noted in HPI  Integumentary: No complaints of skin rash or lesions, no itching, no skin wounds, no breast pain or lump  Neurological: as noted in HPI    Psychiatric: Not suicidal, no sleep disturbance, no anxiety or depression, no change in personality, no emotional problems  Endocrine: No complaints of proptosis, no hot flashes, no muscle weakness, no deepening of the voice, no feelings of weakness  Hematologic/Lymphatic: No complaints of swollen glands, no swollen glands in the neck, does not bleed easily, does not bruise easily  Active Problems    1  Allergic rhinitis (477 9) (J30 9)   2  Anxiety (300 00) (F41 9)   3  Candidiasis of mouth (112 0) (B37 0)   4  Cataract (366 9) (H26 9)   5  Cervical dysphagia (787 29) (R13 19)   6  Chest pain (786 50) (R07 9)   7  Chronic obstructive pulmonary disease (496) (J44 9)   8  Compression fracture of thoracic vertebra, non-traumatic (733 13) (M48 54XA)   9  Constipation (564 00) (K59 00)   10  Depression (311) (F32 9)   11  Dermatitis (692 9) (L30 9)   12  Dysuria (788 1) (R30 0)   13  Edema (782 3) (R60 9)   14  Encounter for screening colonoscopy (V76 51) (Z12 11)   15  Encounter for screening mammogram for malignant neoplasm of breast (V76 12)  (Z12 31)   16  Epigastric pain (789 06) (R10 13)   17  Esophageal reflux (530 81) (K21 9)   18  Fibromyalgia (729 1) (M79 7)   19  Hernia (553 9) (K46 9)   20  Hyperlipidemia (272 4) (E78 5)   21  Hyperreflexic (796 1) (R29 2)   22  Hypertension (401 9) (I10)   23  Intervertebral Disc Degeneration (722 6)   24  Left-sided low back pain with left-sided sciatica (724 3) (M54 42)   25  Lumbosacral radiculopathy at L5 (724 4) (M54 17)   26  Palpitations (785 1) (R00 2)   27  Perineal irritation (709 9) (L98 9)   28  Postmenopausal atrophic vaginitis (627 3) (N95 2)   29  Preoperative clearance (V72 84) (Z01 818)   30  Pulmonary nodule seen on imaging study (793 11) (R91 1)   31  Quit smoking (V15 82) (Z87 891)   32  Right knee pain (719 46) (M25 561)   33  Rotator cuff tendinitis, left (726 10) (M76 82)   34  Screening for colon cancer (V76 51) (Z12 11)   35   Tobacco abuse counseling (V65 42,305 1) (Z71 6) 36  Ulnar neuropathy of left upper extremity (354 2) (G56 22)   37  Urinary tract infection (599 0) (N39 0)   38  Vision changes (368 9) (H53 9)    Past Medical History  1  History of Acid reflux (530 81) (K21 9)    The active problems and past medical history were reviewed and updated today  Surgical History  1  History of Eye Surgery   2  History of Knee Surgery   3  History of Knee Surgery    The surgical history was reviewed and updated today  Family History  Mother    1  Denied: Family history of Colon cancer   2  Denied: Family history of colonic polyps   3  Denied: Family history of Crohn's disease   4  Denied: Family history of liver disease   5  Family history of Pancreatic Neoplasm  Father    6  Denied: Family history of Colon cancer   7  Family history of Coronary Artery Disease (V17 49)   8  Family history of Diabetes Mellitus (V18 0)   9  Family history of Essential Hypertension   10  Denied: Family history of colonic polyps   11  Denied: Family history of Crohn's disease   12  Denied: Family history of liver disease   15  Family history of Heart Disease (V17 49)   14  Family history of Hypertension (V17 49)  Sister    13  Family history of Lung Cancer (V16 1)  Brother    12  Family history of Pancreatic Neoplasm   17  Family history of Pancreatic Neoplasm  Paternal Grandmother    25  Family history of Diabetes Mellitus (V18 0)  Paternal Grandfather    23   Family history of Cancer    Social History     · Denied: History of Alcohol Use (History)   · Always uses seat belt   · Current Every Day Smoker (305 1)   · Daily Coffee Consumption (___ Cups/Day)   · Daily Tea Consumption (___ Cups/Day)   · Dental care, regularly   · Education Level: Less than high school   · Exercising Regularly   · Marital History - Currently    · Denied: History of Multiple organ donor   · No guns in the home   · No living will   · Personal Protective Equipment Seatbelts   · Denied: History of Pets / animals   · Denied: History of Power of  in existence   · Quit smoking (V15 82) (V75 324)   · Three children   · Unemployed (V62 0) (Z56 0)  The social history was reviewed and updated today  Current Meds   1  ALPRAZolam 0 25 MG Oral Tablet; TAKE 1 TABLET 3 times daily; Last Rx:59Xqt6817 Ordered   2  Cetirizine HCl - 10 MG Oral Tablet; take 1 tablet by mouth every day; Therapy: 52ALA4542 to (Evaluate:30Nov2017)  Requested for: 02Aug2017; Last Rx:02Aug2017 Ordered   3  Escitalopram Oxalate 10 MG Oral Tablet; TAKE 1 TABLET BY MOUTH EVERY DAY; Therapy: 11MLB5731 to (Evaluate:12Oct2016)  Requested for: 15Apr2016; Last Rx:15Apr2016 Ordered   4  Fluticasone Propionate 50 MCG/ACT Nasal Suspension; use 2 sprays in each nostril once daily; Therapy: 19UQY6580 to (Evaluate:10Oct2017)  Requested for: 11Aug2017; Last Rx:11Aug2017 Ordered   5  Lisinopril 20 MG Oral Tablet; take 1 tablet by mouth every day; Therapy: 00RVK0444 to (Melchor Mauro)  Requested for: 29Oct2017; Last SR:51VIV7153 Ordered   6  Nystatin 157502 UNIT/ML Mouth/Throat Suspension; SWISH, GARGLE THEN SPIT 1 TEASPOOONFUL 3-4 X A DAY; Therapy: 15QVN2289 to (Diane Frost)  Requested for: 21Aug2017; Last Rx:65Woq8419 Ordered   7  Protonix 40 MG Oral Tablet Delayed Release; Take 1 tablet daily Recorded   8  Rogaine Womens 5 % External Foam; 1qd Recorded   9  Tylenol 8 Hour Arthritis Pain 650 MG Oral Tablet Extended Release Recorded   10  Vicodin 5-300 MG Oral Tablet; TAKE 1 TABLET Every 6 hours; Therapy: 90OMA6785 to (Evaluate:01Sep2017); Last Rx:52Gse6654 Ordered    The medication list was reviewed and updated today  Allergies  1   Monistat 3 CREA    Vitals  Vital Signs    Recorded: 61OTB4418 11:06AM Recorded: 26JNW9864 10:15AM   Temperature  97 1 F   Heart Rate  80   Systolic 597, LUE, Sitting 552   Diastolic 80, LUE, Sitting 90   Height  5 ft 4 in   Weight  204 lb    BMI Calculated  35 02   BSA Calculated  1 97       Physical Exam   Constitutional General appearance: No acute distress, well appearing and well nourished  Eyes  Conjunctiva and lids: No swelling, erythema or discharge  Pupils and irises: Equal, round, reactive to light  Ears, Nose, Mouth, and Throat  External inspection of ears and nose: Normal    Otoscopic examination: Tympanic membranes translucent with normal light reflex  Canals patent without erythema  Nasal mucosa, septum, and turbinates: Normal without edema or erythema  Lips, teeth, and gums: Normal, good dentition  Oropharynx: Normal with no erythema, edema, exudate or lesions  Neck  Neck: Abnormal  -- mild discomfort with posterior flexion but no radiation to shoulder  Good ROM on rotation  Thyroid: Normal, no thyromegaly  Pulmonary  Respiratory effort: No increased work of breathing or signs of respiratory distress  Auscultation of lungs: Clear to auscultation  Cardiovascular  Auscultation of heart: Normal rate and rhythm, normal S1 and S2, no murmurs  Carotid pulses: 2+ bilaterally  Abdominal aorta: Normal    Pedal pulses: 2+ bilaterally  Peripheral vascular exam: Normal    Examination of extremities for edema and/or varicosities: Normal    Abdomen  Abdomen: Non-tender, no masses  Liver and spleen: No hepatomegaly or splenomegaly  Lymphatic  Palpation of lymph nodes in neck: No lymphadenopathy  Palpation of lymph nodes in other areas: No lymphadenopathy  Musculoskeletal  Gait and station: Normal    Digits and nails: Normal without clubbing or cyanosis  Joints, bones, and muscles: Abnormal  -- R shoulder with good ROM  (+) sl TTP over the supraspinatous insertion  AC NT  Bicep tendons NT  Muscle strength/tone: Normal  -- in hands  Skin  Skin and subcutaneous tissue: Normal without rashes or lesions  Neurologic  Cranial nerves: Cranial nerves II-XII intact  Cortical function: Normal mental status  Reflexes: 2+ and symmetric  Sensation: No sensory loss     Psychiatric  Mood and affect: Normal        Future Appointments    Date/Time Provider Specialty Site   12/07/2017 02:15 PM AYLEEN Rueda   [address] [facility]       Signatures   Electronically signed by : AYLEEN Yuan ; Nov 16 2017 11:25AM EST                       (Author)

## 2017-11-22 ENCOUNTER — HOSPITAL ENCOUNTER (OUTPATIENT)
Dept: RADIOLOGY | Facility: MEDICAL CENTER | Age: 62
Discharge: HOME | End: 2017-11-22
Payer: COMMERCIAL

## 2017-11-22 DIAGNOSIS — R91.1 PULMONARY NODULE: ICD-10-CM

## 2017-11-22 PROCEDURE — 71250 CT THORAX DX C-: CPT

## 2017-11-28 ENCOUNTER — GENERIC CONVERSION - ENCOUNTER (OUTPATIENT)
Dept: OTHER | Facility: OTHER | Age: 62
End: 2017-11-28

## 2017-12-07 ENCOUNTER — GENERIC CONVERSION - ENCOUNTER (OUTPATIENT)
Dept: OTHER | Facility: OTHER | Age: 62
End: 2017-12-07

## 2017-12-10 ENCOUNTER — OFFICE VISIT (OUTPATIENT)
Dept: URGENT CARE | Facility: MEDICAL CENTER | Age: 62
End: 2017-12-10
Payer: COMMERCIAL

## 2017-12-10 PROCEDURE — G0382 LEV 3 HOSP TYPE B ED VISIT: HCPCS

## 2017-12-10 PROCEDURE — 99283 EMERGENCY DEPT VISIT LOW MDM: CPT

## 2017-12-12 NOTE — PROGRESS NOTES
Assessment    1  Viral syndrome (079 99) (B34 9)    Plan  Viral syndrome    · Benzonatate 200 MG Oral Capsule; TAKE 1 CAPSULE 2-3 TIMES DAILY    Discussion/Summary  Discussion Summary:   1  Push fluidsTylenol as needed fro feverUse Tessalon 1 every 8 hours as needed for cough/congestion4  Follow-up with PCP if symptoms persist    Medication Side Effects Reviewed: Possible side effects of new medications were reviewed with the patient/guardian today  Understands and agrees with treatment plan: The treatment plan was reviewed with the patient/guardian  The patient/guardian understands and agrees with the treatment plan      Chief Complaint    1  Cough  Chief Complaint Free Text Note Form: Pt with cough for one day and laryngitis      History of Present Illness  HPI: The patient is a 57-year-old female presents with a 1 day history of nasal congestion cough and laryngitis  She denies any fever or chills since the onset of her symptoms she denies any chest discomfort or difficulty breathing  Hospital Based Practices Required Assessment:  Pain Assessment  the patient states they do not have pain  Review of Systems  Focused-Female:  Constitutional: No fever, no chills, feels well, no tiredness, no recent weight gain or loss  ENT: nasal discharge, but-- no sore throat  Respiratory: cough-- and-- PND  Active Problems    1  Allergic rhinitis (477 9) (J30 9)   2  Anxiety (300 00) (F41 9)   3  Candidiasis of mouth (112 0) (B37 0)   4  Cataract (366 9) (H26 9)   5  Cervical dysphagia (787 29) (R13 19)   6  Chest pain (786 50) (R07 9)   7  Chronic obstructive pulmonary disease (496) (J44 9)   8  Compression fracture of thoracic vertebra, non-traumatic (733 13) (M48 54XA)   9  Constipation (564 00) (K59 00)   10  Depression (311) (F32 9)   11  Dermatitis (692 9) (L30 9)   12  Dysuria (788 1) (R30 0)   13  Edema (782 3) (R60 9)   14  Encounter for screening colonoscopy (V76 51) (Z12 11)   15   Encounter for screening mammogram for malignant neoplasm of breast (V76 12)  (Z12 31)   16  Epigastric pain (789 06) (R10 13)   17  Esophageal reflux (530 81) (K21 9)   18  Fibromyalgia (729 1) (M79 7)   19  Hernia (553 9) (K46 9)   20  Hyperlipidemia (272 4) (E78 5)   21  Hyperreflexic (796 1) (R29 2)   22  Hypertension (401 9) (I10)   23  Intervertebral Disc Degeneration (722 6)   24  Left-sided low back pain with left-sided sciatica (724 3) (M54 42)   25  Lumbosacral radiculopathy at L5 (724 4) (M54 17)   26  Palpitations (785 1) (R00 2)   27  Perineal irritation (709 9) (L98 9)   28  Postmenopausal atrophic vaginitis (627 3) (N95 2)   29  Preoperative clearance (V72 84) (Z01 818)   30  Pulmonary nodule seen on imaging study (793 11) (R91 1)   31  Quit smoking (V15 82) (Z87 891)   32  Right knee pain (719 46) (M25 561)   33  Screening for colon cancer (V76 51) (Z12 11)   34  Tendinitis of both rotator cuffs (726 10) (M75 81,M75 82)   35  Tobacco abuse counseling (V65 42,305 1) (Z71 6)   36  Ulnar neuropathy of left upper extremity (354 2) (G56 22)   37  Urinary tract infection (599 0) (N39 0)   38  Vaginitis (616 10) (N76 0)   39  Vision changes (368 9) (H53 9)    Past Medical History  1  History of Acid reflux (530 81) (K21 9)  Active Problems And Past Medical History Reviewed: The active problems and past medical history were reviewed and updated today  Family History  Mother    1  Denied: Family history of Colon cancer   2  Denied: Family history of colonic polyps   3  Denied: Family history of Crohn's disease   4  Denied: Family history of liver disease   5  Family history of Pancreatic Neoplasm  Father    6  Denied: Family history of Colon cancer   7  Family history of Coronary Artery Disease (V17 49)   8  Family history of Diabetes Mellitus (V18 0)   9  Family history of Essential Hypertension   10  Denied: Family history of colonic polyps   11  Denied: Family history of Crohn's disease   12   Denied: Family history of liver disease   13  Family history of Heart Disease (V17 49)   14  Family history of Hypertension (V17 49)  Sister    13  Family history of Lung Cancer (V16 1)  Brother    12  Family history of Pancreatic Neoplasm   17  Family history of Pancreatic Neoplasm  Paternal Grandmother    25  Family history of Diabetes Mellitus (V18 0)  Paternal Grandfather    23  Family history of Cancer  Family History Reviewed: The family history was reviewed and updated today  Social History   · Denied: History of Alcohol Use (History)   · Always uses seat belt   · Current Every Day Smoker (305 1)   · Daily Coffee Consumption (___ Cups/Day)   · Daily Tea Consumption (___ Cups/Day)   · Dental care, regularly   · Education Level: Less than high school   · Exercising Regularly   · Marital History - Currently    · Denied: History of Multiple organ donor   · No guns in the home   · No living will   · Personal Protective Equipment Seatbelts   · Denied: History of Pets / animals   · Denied: History of Power of  in existence   · Quit smoking (V15 82) (Z87 891)   · Three children   · Unemployed (V62 0) (Z56 0)  Social History Reviewed: The social history was reviewed and updated today  Surgical History    1  History of Eye Surgery   2  History of Knee Surgery   3  History of Knee Surgery  Surgical History Reviewed: The surgical history was reviewed and updated today  Current Meds   1  Aleve CAPS; Therapy: (Recorded:63Esy4713) to Recorded   2  ALPRAZolam 0 25 MG Oral Tablet; TAKE 1 TABLET 3 times daily; Last Rx:75Nro6842 Ordered   3  Cetirizine HCl - 10 MG Oral Tablet; take 1 tablet by mouth every day; Therapy: 58ANR5948 to (Evaluate:30Nov2017)  Requested for: 04Zln2326; Last Rx:02Aug2017 Ordered   4  Escitalopram Oxalate 10 MG Oral Tablet; TAKE 1 TABLET BY MOUTH EVERY DAY; Therapy: 06IIF2072 to (Evaluate:12Oct2016)  Requested for: 15Apr2016; Last Rx:15Apr2016 Ordered   5   Fluticasone Propionate 50 MCG/ACT Nasal Suspension; use 2 sprays in each nostril once daily; Therapy: 23ZVY6167 to (Evaluate:10Oct2017)  Requested for: 11Aug2017; Last Rx:11Aug2017 Ordered   6  Lisinopril 20 MG Oral Tablet; take 1 tablet by mouth every day; Therapy: 98NZS6175 to ([id number])  Requested for: 29Oct2017; Last LJ:65WHU0530 Ordered   7  MetroNIDAZOLE 0 75 % Vaginal Gel; INSERT 1 APPLICATORFUL INTRAVAGINALLY AT BEDTIME NIGHTLY  Requested for: 19YEO2663; Last Rx:50Oxu1197 Ordered   8  Nystatin 768360 UNIT/ML Mouth/Throat Suspension; SWISH, GARGLE THEN SPIT 1 TEASPOOONFUL 3-4 X A DAY; Therapy: 92UFO2939 to (Kimberley Moreno)  Requested for: 47Ysj2358; Last Rx:40Zji2469 Ordered   9  Protonix 40 MG Oral Tablet Delayed Release; Take 1 tablet daily Recorded   10  Rogaine Womens 5 % External Foam; 1qd Recorded  Medication List Reviewed: The medication list was reviewed and updated today  Allergies    1  Monistat 3 CREA    Vitals  Signs   Recorded: 72ZAN9034 02:51PM   Temperature: 97 8 F  Heart Rate: 98  Respiration: 20  Systolic: 735  Diastolic: 84  O2 Saturation: 96  Pain Scale: 0    Physical Exam   Constitutional  General appearance: No acute distress, well appearing and well nourished  Ears, Nose, Mouth, and Throat  External inspection of ears and nose: Normal    Otoscopic examination: Tympanic membranes translucent with normal light reflex  Canals patent without erythema  Nasal mucosa, septum, and turbinates: Abnormal  -- Thin, clear nasal drainage bilateral   Oropharynx: Normal with no erythema, edema, exudate or lesions  Pulmonary  Respiratory effort: No increased work of breathing or signs of respiratory distress  Auscultation of lungs: Clear to auscultation  Cardiovascular  Auscultation of heart: Normal rate and rhythm, normal S1 and S2, without murmurs  Future Appointments    Date/Time Provider Specialty Site   04/17/2018 09:00 AM AYLEEN Sherman   [address] [facility]       Signatures   Electronically signed by : Spring Montyoa, [facility]; Dec 10 2017  3:00PM EST                       (Author)    Electronically signed by : MIKEL Agee ; Dec 11 2017  5:31PM EST                       (Co-author)

## 2018-01-11 NOTE — RESULT NOTES
Verified Results  (1) WOUND CULTURE 60Obh8811 09:36AM Dale Grider [facility]     Test Name Result Flag Reference   CLINICAL REPORT (Report)     Test:        Wound culture and Gram stain  Specimen Type:    Wound  Specimen Date:   9/27/2017 9:36 AM  Result Date:    9/29/2017 11:30 AM  Result Status:   Final result  Resulting Lab:   [facility] [address] [address]            Tel: [phone]      CULTURE                                       ------------------                                   2+ Growth of Proteus mirabilis      STAIN                                        ------------------                                   No polys seen    2+ Gram negative rods      SUSCEPTIBILITY                                   ------------------                                                       Proteus mirabilis  METHOD                 MAREK  -------------------------------------  -------------------------  AMPICILLIN ($$)             <=8 00 ug/ml Susceptible  AZTREONAM ($$$)             <=8 ug/ml   Susceptible  CEFAZOLIN ($)              <=8 00 ug/ml Susceptible  CIPROFLOXACIN ($)            <=1 00 ug/ml Susceptible  GENTAMICIN ($$)             <=4 ug/ml   Susceptible  LEVOFLOXACIN ($)            <=2 00 ug/ml Susceptible  PIPERACILLIN + TAZOBACTAM ($$$)     <=16 ug/ml  Susceptible  TETRACYCLINE              >8 ug/ml   Resistant  TOBRAMYCIN ($)             <=4 ug/ml   Susceptible  TRIMETHOPRIM + SULFAMETHOXAZOLE ($$$)  <=2/38 ug/ml Susceptible

## 2018-01-11 NOTE — PROGRESS NOTES
Chief Complaint  PT IS HERE FOR A URINE CHECK  PT HAS COMPLAINTS OF BURNING, ODOR, FREQUENCY, AND LOWER BACK PAIN X 1 WEEK  PER DR PANIAGUA HE WILL CALL IN CIPRO 250MG BID # 10 - [facility]      Active Problems   1  Abdominal pain (789 00) (R10 9)  2  Abnormal finding on mammography (793 80) (R92 8)  3  Abnormal weight loss (783 21) (R63 4)  4  Acute frontal sinusitis (461 1) (J01 10)  5  Acute gastroenteritis (558 9) (K52 9)  6  Acute left-sided low back pain with left-sided sciatica (724 2,724 3) (M54 42)  7  Acute maxillary sinusitis, recurrence not specified (461 0) (J01 00)  8  Acute nonintractable headache, unspecified headache type (784 0) (R51)  9  Acute URI (465 9) (J06 9)  10  Allergic rhinitis (477 9) (J30 9)  11  Anxiety (300 00) (F41 9)  12  Arthritis (716 90) (M19 90)  13  Boil (680 9) (L02 92)  14  Breast cyst (610 0) (N60 09)  15  Candidiasis of mouth (112 0) (B37 0)  16  Cataract (366 9) (H26 9)  17  Cervical dysphagia (787 29) (R13 19)  18  Change in bowel habits (787 99) (R19 4)  19  Chest pain (786 50) (R07 9)  20  Chest pressure (786 59) (R07 89)  21  Chronic obstructive pulmonary disease (496) (J44 9)  22  Compression fracture of thoracic vertebra, non-traumatic (733 13) (M48 54XA)  23  Constipation (564 00) (K59 00)  24  Cough (786 2) (R05)  25  Depression (311) (F32 9)  26  Dermatitis (692 9) (L30 9)  27  Diarrhea (787 91) (R19 7)  28  Dysuria (788 1) (R30 0)  29  Edema (782 3) (R60 9)  30  Encounter for screening colonoscopy (V76 51) (Z12 11)  31  Encounter for screening mammogram for malignant neoplasm of breast (V76 12)    (Z12 31)  32  Epigastric pain (789 06) (R10 13)  33  Esophageal reflux (530 81) (K21 9)  34  Fatigue (780 79) (R53 83)  35  Fever (780 60) (R50 9)  36  Fibromyalgia (729 1) (M79 7)  37  Furuncle (680 9) (L02 92)  38  Glossitis (529 0) (K14 0)  39  Hernia (553 9) (K46 9)  40  Hyperlipidemia (272 4) (E78 5)  41  Hyperreflexic (796 1) (R29 2)  42   Hypertension (401 9) (I10)  43  Intervertebral Disc Degeneration (722 6)  44  Left-sided low back pain with left-sided sciatica (724 3) (M54 42)  45  Lumbosacral radiculopathy at L5 (724 4) (M54 17)  46  Malaise (780 79) (R53 81)  47  Myalgia (729 1) (M79 1)  48  Nausea (787 02) (R11 0)  49  Pain in joint of right shoulder region (719 41) (M25 511)  50  Pain in the joints (719 40) (M25 50)  51  Pain of upper extremity, unspecified laterality (729 5) (M79 603)  52  Palpitations (785 1) (R00 2)  53  Pelvic pain (R10 2)  54  Perineal irritation (709 9) (L98 9)  55  Pes anserine bursitis (726 61) (M70 50)  56  Postmenopausal atrophic vaginitis (627 3) (N95 2)  57  Preoperative clearance (V72 84) (Z01 818)  58  Pulmonary nodule seen on imaging study (793 11) (R91 1)  59  Quit smoking (V15 82) (Z87 891)  60  Right knee pain (719 46) (M25 561)  61  Rotator cuff tendinitis, left (726 10) (M75 82)  62  Screening for colon cancer (V76 51) (Z12 11)  63  Sinus headache (784 0) (R51)  64  Skin neoplasm (239 2) (D49 2)  65  Tobacco abuse counseling (V65 42,305 1) (Z71 6)  66  Trouble swallowing (787 20) (R13 10)  67  Ulnar neuropathy of left upper extremity (354 2) (G56 22)  68  Urinary tract infection (599 0) (N39 0)  69  Vaginal discharge (623 5) (N89 8)  70  Vaginitis (616 10) (N76 0)  71  Vision changes (368 9) (H53 9)  72  Weakness of right hand (728 87) (R29 898)    Current Meds  1  ALPRAZolam 0 25 MG Oral Tablet; TAKE 1 TABLET 3 times daily; Last Rx:98Qgn4051   Ordered  2  Cetirizine HCl - 10 MG Oral Tablet; take 1 tablet by mouth every day; Therapy: 77BCS4820 to (Evaluate:30Nov2017)  Requested for: 49Htt6449; Last   Rx:02Aug2017 Ordered  3  Escitalopram Oxalate 10 MG Oral Tablet; TAKE 1 TABLET BY MOUTH EVERY DAY; Therapy: 51ZOT3443 to (Evaluate:12Oct2016)  Requested for: 15Apr2016; Last   Rx:15Apr2016 Ordered  4  Fluticasone Propionate 50 MCG/ACT Nasal Suspension; use 2 sprays in each nostril   once daily;    Therapy: 91ZXR3530 to (Evaluate:10Oct2017)  Requested for: 11Aug2017; Last   Rx:11Aug2017 Ordered  5  Lisinopril 20 MG Oral Tablet; take 1 tablet by mouth every day; Therapy: 25DXB4876 to (77 873 135)  Requested for: 39YWU6536; Last   Rx:76Rld2457 Ordered  6  Nystatin 026962 UNIT/ML Mouth/Throat Suspension; SWISH, GARGLE THEN SPIT 1   TEASPOOONFUL 3-4 X A DAY; Therapy: 88SZP0233 to (Kamilah Click)  Requested for: 97Lvf9452; Last   Rx:30Iuv0063 Ordered  7  Promethazine-DM 6 25-15 MG/5ML Oral Syrup; Take 1 teaspoonful by mouth every 6   hours as needed for cough; Therapy: 41RBM3048 to (Evaluate:74Zlm7210)  Requested for: 98Pjk5172; Last   Rx:23Dfz0982 Ordered  8  Protonix 40 MG Oral Tablet Delayed Release; Take 1 tablet daily Recorded  9  Rogaine Womens 5 % External Foam; 1qd Recorded  10  Tylenol 8 Hour Arthritis Pain 650 MG Oral Tablet Extended Release Recorded  11  Vicodin 5-300 MG Oral Tablet; TAKE 1 TABLET Every 6 hours; Therapy: 21QVW6894 to (Evaluate:28Xrb7378); Last Rx:86Zpe0074 Ordered    Allergies   1  Monistat 3 CREA    Results/Data  Urine Dip Non-Automated- POC 50IYA0857 03:51PM Phil Grider     Test Name Result Flag Reference   Color Yellow     Clarity Cloudy     Leukocytes TRACE     Nitrite NEGATIVE     Blood NEGATIVE     Bilirubin NEGATIVE     Urobilinogen 0 2     Protein NEGATIVE     Ph 6 5     Specific Gravity 1 005     Ketone NEGATIVE     Glucose NEGATIVE         Plan  Urinary tract infection    · Start: Ciprofloxacin HCl - 250 MG Oral Tablet; TAKE 1 TABLET EVERY 12 HOURS DAILY   · Urine Dip Non-Automated- POC; Status:Complete;   Done: 87XEV2795 03:51PM    Future Appointments    Date/Time Provider Specialty Site   11/15/2017 10:15 AM AYLEEN Brown   [address]     Signatures   Electronically signed by : Robert Ribeiro, ; Oct 24 2017  3:52PM EST                       (Author)    Electronically signed by : AYLEEN Vides ; Oct 24 2017  4:50PM EST                       (Author)

## 2018-01-11 NOTE — RESULT NOTES
Verified Results  (1) URINALYSIS w URINE C/S REFLEX (will reflex a microscopy if leukocytes, occult blood, or nitrites are not within normal limits) 04Apr2016 02:01PM [employer] Order Number: [id number]     Order Number: [id number]     Test Name Result Flag Reference   COLOR Yellow     CLARITY Clear     PH UA 6 5  4 5-8 0   LEUKOCYTE ESTERASE UA Negative  Negative   NITRITE UA Negative  Negative   PROTEIN UA Negative mg/dl  Negative   GLUCOSE UA Negative mg/dl  Negative   KETONES UA Negative mg/dl  Negative   UROBILINOGEN UA 0 2 E U /dl  0 2, 1 0 E U /dl   BILIRUBIN UA Negative  Negative   BLOOD UA Negative  Negative   SPECIFIC GRAVITY UA 1 007  1 003-1 030

## 2018-01-12 VITALS
BODY MASS INDEX: 33.97 KG/M2 | WEIGHT: 199 LBS | HEART RATE: 76 BPM | TEMPERATURE: 97.1 F | DIASTOLIC BLOOD PRESSURE: 82 MMHG | SYSTOLIC BLOOD PRESSURE: 124 MMHG | HEIGHT: 64 IN

## 2018-01-12 NOTE — RESULT NOTES
Discussion/Summary    Gastric biopsies are benign and negative for H  pylori  Colon polyps removed came back as benign hyperplastic  Repeat colonoscopy in 10 years    DISCUSSED WITH PATIENT, REMINDER SET  CS         Verified Results  (1) TISSUE EXAM 23XNT1712 08:29AM Lequita Art     Test Name Result Flag Reference   LAB AP CASE REPORT (Report)     Surgical Pathology Report             Case: [id number]                   Authorizing Provider: Valentino Almodovar MD     Collected:      05/09/2017 0829        Ordering Location:   [facility]    Received:      05/09/2017 [facility]                               Pathologist:      Adriana Riggins MD                                 Specimens:  A) - Stomach, Cold Bx Gastric Body R/O H Pylori                            B) - Rectum, Cold Bx Polypectomy Rectum x2   LAB AP FINAL DIAGNOSIS (Report)     A  Stomach, body (biopsy):  - Oxyntic mucosa with no diagnostic abnormality  - No H pylori identified on immunohistochemistry stain  - No intestinal metaplasia (Alcian blue/PAS)    B  Rectum, polyp x2 (polypectomy):  - Fragments of hyperplastic polyp  - Negative for dysplasia     Interpretation performed at [facility], [facility]    Electronically signed by Adriana Riggins MD on 5/11/2017 at 2:39 PM   LAB AP SURGICAL ADDITIONAL INFORMATION (Report)     These tests were developed and their performance characteristics   determined by [facility] or [facility]  They may not be cleared or approved by the [place]  Food and   Drug Administration  The FDA has determined that such clearance or   approval is not necessary  These tests are used for clinical purposes  They should not be regarded as investigational or for research  This   laboratory has been approved by [facility], designated as a high-complexity   laboratory and is qualified to perform these tests     LAB AP GROSS DESCRIPTION (Report)     A  The specimen is received in formalin, labeled with the patient's name   and medical record number, and is designated gastric body biopsy  The   specimen consists of 2 tan-pink soft tissue fragments measuring 0 3 cm and   0 5 cm in greatest dimension  Entirely submitted  One cassette  B  The specimen is received in formalin, labeled with the patient's name   and medical record number, and is designated rectum polyp biopsy ? ?2  The specimen consists of 2 tan-pink soft tissue fragments measuring 0 2   cm, 0 3 cm, and 0 5 cm in greatest dimension  Entirely submitted  One   cassette  Note: The estimated total formalin fixation time based upon information   provided by the submitting clinician and the standard processing schedule   is 17 75 hours  MAS   LAB AP CLINICAL INFORMATION      EGD:  FINDINGS: Esophagus: Small sliding hiatal hernia with an erosion at the   GEJ  Stomach: Gastritis was found in the body of the stomach  A biopsy   was taken  The specimen was collected for pathology   Duodenum: The   duodenum appeared to be normal

## 2018-01-12 NOTE — RESULT NOTES
Verified Results  * CT CHEST WO CONTRAST 22Nov2017 10:22AM Isreal Sharonda Order Number: [id number]    - Patient Instructions: To schedule this appointment, please contact Central Scheduling at [phone]  Test Name Result Flag Reference   CT CHEST WO CONTRAST (Report)     CT CHEST WITHOUT IV CONTRAST     INDICATION: f/u nodule, epigastric pain,anterior upper lt side chest pain     COMPARISON: CT chest included with CT chest abdomen pelvis from 8/16/2016  CT lung screening chest 3/21/2013  TECHNIQUE: CT examination of the chest was performed without intravenous contrast  Reformatted images were created in axial, sagittal, and coronal planes  Radiation dose length product (DLP) for this visit: 178 mGy-cm   This examination, like all CT scans performed in the [facility], was performed utilizing techniques to minimize radiation dose exposure, including the use of iterative    reconstruction and automated exposure control  FINDINGS:     LUNGS: Stable 2 mm right upper lobe nodule now seen on series 3 image 28  Stable medial left lower lobe nodular scarring series 3 image 42  These are unchanged over 4 years and therefore benign  No new pulmonary nodules  No acute pulmonary findings  No endotracheal or endobronchial lesion  PLEURA: Unremarkable  HEART/GREAT VESSELS: Unremarkable for patient's age  MEDIASTINUM AND ISABEL: Unremarkable  CHEST WALL AND LOWER NECK:    Unremarkable  VISUALIZED STRUCTURES IN THE UPPER ABDOMEN: Stable partially imaged hepatic hypodensities are likely simple cysts  Please see the prior report for the dedicated imaging of the abdomen  OSSEOUS STRUCTURES: No acute fracture  No destructive osseous lesion  Mild chronic T11 compression deformity stable in appearance  IMPRESSION:     Known pulmonary nodules are stable over 4 years and therefore benign  No acute pulmonary findings               f/u nodule, epigastric pain,anterior upper lt side chest pain       Workstation performed: [id number]     Signed by:   Whit Hurst MD   11/28/17

## 2018-01-13 VITALS
DIASTOLIC BLOOD PRESSURE: 74 MMHG | HEART RATE: 74 BPM | WEIGHT: 200.25 LBS | BODY MASS INDEX: 34.19 KG/M2 | HEIGHT: 64 IN | TEMPERATURE: 97.8 F | SYSTOLIC BLOOD PRESSURE: 132 MMHG

## 2018-01-13 VITALS
SYSTOLIC BLOOD PRESSURE: 142 MMHG | WEIGHT: 202.13 LBS | HEART RATE: 78 BPM | TEMPERATURE: 97.7 F | DIASTOLIC BLOOD PRESSURE: 90 MMHG | BODY MASS INDEX: 34.51 KG/M2 | HEIGHT: 64 IN

## 2018-01-13 VITALS
BODY MASS INDEX: 34.57 KG/M2 | RESPIRATION RATE: 16 BRPM | HEART RATE: 76 BPM | WEIGHT: 202.5 LBS | TEMPERATURE: 98.2 F | HEIGHT: 64 IN | DIASTOLIC BLOOD PRESSURE: 84 MMHG | SYSTOLIC BLOOD PRESSURE: 138 MMHG

## 2018-01-13 NOTE — RESULT NOTES
Verified Results  * DXA BONE DENSITY SPINE HIP AND PELVIS 09Wbn1652 08:22AM Debbie Jennifer Order Number: [id number]    - Patient Instructions: To schedule this appointment, please contact Central Scheduling at [phone]  Test Name Result Flag Reference   DXA BONE DENSITY SPINE HIP AND PELVIS (Report)     CENTRAL DXA SCAN     CLINICAL HISTORY:  61year old post-menopausal  female  Family history of osteoporosis  TECHNIQUE: Bone densitometry was performed using a Horizon A bone densitometer  Regions of interest appear properly placed  There are no obvious fractures or other confounding variables which could limit the study  COMPARISON: None  RESULTS:    LUMBAR SPINE: L1-L4:   BMD 0 874 gm/cm2   T-score -1 6   Z-score -0 1     LEFT TOTAL HIP:   BMD 0 891 gm/cm2   T-score -0 4   Z-score 0 6     LEFT FEMORAL NECK:   BMD 0 748 gm/cm2   T-score -0 9   Z-score 0 4             IMPRESSION:   1  Based on the [facility] classification, the T-score of -1 6 is consistent with low bone mineral density  2  The 10 year risk of hip fracture is 1 0 %, with the 10 year risk of major osteoporotic fracture being 11 %, as calculated by the WHO fracture risk assessment tool (FRAX)  The current NOF guidelines recommend treating patients with FRAX 10 year risk    score of >3% for hip fracture and >20% for major osteoporotic fracture  3  Any secondary causes of low bone mineral density should be excluded prior to treatment, if clinically indicated  4  A daily intake of at least 1200 mg calcium and 800 - 1000 IU of Vitamin D, as well as weight bearing and muscle strengthening exercise, fall prevention and avoidance of tobacco and excessive alcohol intake as basic preventive measures are suggested  5  Repeat DXA scan in 18-24 months as clinically indicated           WHO CLASSIFICATION:   Normal (a T-score of -1 0 or higher)   Low bone mineral density (a T-score of less than -1 0 but higher than -2 5)   Osteoporosis (a T-score of -2 5 or less)   Severe osteoporosis (a T-score of -2 5 or less with a fragility fracture)             Workstation performed: [id number]     Signed by:   Juan Carlos Walker MD   9/23/16

## 2018-01-14 VITALS
DIASTOLIC BLOOD PRESSURE: 80 MMHG | TEMPERATURE: 97.1 F | WEIGHT: 204 LBS | HEART RATE: 80 BPM | HEIGHT: 64 IN | SYSTOLIC BLOOD PRESSURE: 160 MMHG | BODY MASS INDEX: 34.83 KG/M2

## 2018-01-14 VITALS
BODY MASS INDEX: 34.49 KG/M2 | DIASTOLIC BLOOD PRESSURE: 80 MMHG | SYSTOLIC BLOOD PRESSURE: 142 MMHG | TEMPERATURE: 98.1 F | HEART RATE: 80 BPM | HEIGHT: 64 IN | WEIGHT: 202 LBS

## 2018-01-14 VITALS
BODY MASS INDEX: 32.95 KG/M2 | TEMPERATURE: 97 F | WEIGHT: 193 LBS | HEIGHT: 64 IN | DIASTOLIC BLOOD PRESSURE: 84 MMHG | HEART RATE: 76 BPM | SYSTOLIC BLOOD PRESSURE: 138 MMHG

## 2018-01-15 VITALS
BODY MASS INDEX: 34.5 KG/M2 | DIASTOLIC BLOOD PRESSURE: 78 MMHG | HEART RATE: 106 BPM | OXYGEN SATURATION: 96 % | WEIGHT: 201 LBS | SYSTOLIC BLOOD PRESSURE: 162 MMHG | TEMPERATURE: 98.4 F

## 2018-01-15 NOTE — RESULT NOTES
Verified Results  (1) URINALYSIS w URINE C/S REFLEX (will reflex a microscopy if leukocytes, occult blood, or nitrites are not within normal limits) 30Oct2017 12:55PM Radha Prasad Order Number: [id number]     Test Name Result Flag Reference   COLOR Yellow     CLARITY Clear     PH UA 6 5  4 5-8 0   LEUKOCYTE ESTERASE UA Negative  Negative   NITRITE UA Negative  Negative   PROTEIN UA Negative mg/dl  Negative   GLUCOSE UA Negative mg/dl  Negative   KETONES UA Negative mg/dl  Negative   UROBILINOGEN UA 0 2 E U /dl  0 2, 1 0 E U /dl   BILIRUBIN UA Negative  Negative   BLOOD UA Negative  Negative   SPECIFIC GRAVITY UA 1 007  1 003-1 030

## 2018-01-16 NOTE — PROGRESS NOTES
Assessment    1  Fibromyalgia (729 1) (M79 7)   2  Compression fracture of thoracic vertebra, non-traumatic (733 13) (M48 54XA)    Plan  Compression fracture of thoracic vertebra, non-traumatic    · * DXA BONE DENSITY SPINE HIP AND PELVIS; Status:Active; Requested for:[id number];      Discussion/Summary    #1 fibromyalgia - we discussed treatment options  REC: cont stretching and keep active  Avoid overuse  Recheck prn  #2 T11 compression fx - I reviewed old studies and could not find documentation of previous T11 fx's  Pt with multiple trigger points but is not TTP over T11 spinous process  REC: refer for dexa scan  Recheck 3-4m  Pt to call for problems or concerns in the interim  The treatment plan was reviewed with the patient/guardian  The patient/guardian understands and agrees with the treatment plan   The patient was counseled regarding diagnostic results, instructions for management, risk factor reductions, prognosis, patient and family education, impressions, risks and benefits of treatment options, importance of compliance with treatment  Chief Complaint  3WK F/U OV      History of Present Illness  as above - f/u multiple med issues  - pt states that she still has diffuse body aches secondary to fibromyalgia  No joint swelling or rashes  Pt compliant with meds  "I try to work through it"  - pt notes that weight has stabilized since last visit  CT of chest/abd/pelvis showed chronic lung nodule that is unchanged as well as T11 compression fx (that radiology reports as unchanged though we do not have documentation of a prior compression fx)  Pt notes persistent mid/low back pain that has been going on "for years"  - no CP, palp, lightheadedness or other CV symptoms      Review of Systems    Constitutional: as noted in HPI  Eyes: No complaints of eye pain, no red eyes, no eyesight problems, no discharge, no dry eyes, no itching of eyes     ENT: no complaints of earache, no loss of hearing, no nose bleeds, no nasal discharge, no sore throat, no hoarseness  Cardiovascular: No complaints of slow heart rate, no fast heart rate, no chest pain, no palpitations, no leg claudication, no lower extremity edema  Respiratory: No complaints of shortness of breath, no wheezing, no cough, no SOB on exertion, no orthopnea, no PND  Gastrointestinal: No complaints of abdominal pain, no constipation, no nausea or vomiting, no diarrhea, no bloody stools  Genitourinary: No complaints of dysuria, no incontinence, no pelvic pain, no dysmenorrhea, no vaginal discharge or bleeding  Musculoskeletal: as noted in HPI  Integumentary: No complaints of skin rash or lesions, no itching, no skin wounds, no breast pain or lump  Neurological: as noted in HPI  Endocrine: No complaints of proptosis, no hot flashes, no muscle weakness, no deepening of the voice, no feelings of weakness  Hematologic/Lymphatic: No complaints of swollen glands, no swollen glands in the neck, does not bleed easily, does not bruise easily  Active Problems    1  Abdominal pain (789 00) (R10 9)   2  Abnormal finding on mammography (793 80) (R92 8)   3  Abnormal weight loss (783 21) (R63 4)   4  Acute frontal sinusitis (461 1) (J01 10)   5  Acute nonintractable headache, unspecified headache type (784 0) (R51)   6  Acute URI (465 9) (J06 9)   7  Allergic rhinitis (477 9) (J30 9)   8  Anxiety (300 00) (F41 9)   9  Arthritis (716 90) (M19 90)   10  Breast cyst (610 0) (N60 09)   11  Candidiasis of mouth (112 0) (B37 0)   12  Cataract (366 9) (H26 9)   13  Cervical dysphagia (787 29) (R13 19)   14  Change in bowel habits (787 99) (R19 4)   15  Chest pain (786 50) (R07 9)   16  Chest pressure (786 59) (R07 89)   17  Chronic obstructive pulmonary disease (496) (J44 9)   18  Cough (786 2) (R05)   19  Depression (311) (F32 9)   20  Dermatitis (692 9) (L30 9)   21  Dysuria (788 1) (R30 0)   22   Encounter for screening colonoscopy (V76 51) (Z12 11) 23  Encounter for screening mammogram for malignant neoplasm of breast (V76 12)    (Z12 31)   24  Epigastric pain (789 06) (R10 13)   25  Esophageal reflux (530 81) (K21 9)   26  Fatigue (780 79) (R53 83)   27  Fever (780 60) (R50 9)   28  Fibromyalgia (729 1) (M79 7)   29  Furuncle (680 9) (L02 92)   30  Hernia (553 9) (K46 9)   31  Hyperlipidemia (272 4) (E78 5)   32  Hyperreflexic (796 1) (R29 2)   33  Hypertension (401 9) (I10)   34  Intervertebral Disc Degeneration (722 6)   35  Malaise (780 79) (R53 81)   36  Multiple joint pain (719 49) (M25 50)   37  Pain in joint of right shoulder region (719 41) (M25 511)   38  Pain of upper extremity, unspecified laterality (729 5) (M79 603)   39  Palpitations (785 1) (R00 2)   40  Pelvic pain (R10 2)   41  Perineal irritation (709 9) (L98 9)   42  Pes anserine bursitis (726 61) (M70 50)   43  Postmenopausal atrophic vaginitis (627 3) (N95 2)   44  Preoperative clearance (V72 84) (Z01 818)   45  Pulmonary nodule seen on imaging study (793 11) (R91 1)   46  Quit smoking (V15 82) (Z87 898)   47  Right knee pain (719 46) (M25 561)   48  Skin neoplasm (239 2) (D49 2)   49  Tobacco abuse counseling (V65 42,305 1) (Z71 6)   50  Urinary tract infection (599 0) (N39 0)   51  Vision changes (368 9) (H53 9)   52  Weakness of right hand (728 87) (M62 81)    Past Medical History    1  History of Acid reflux (530 81) (K21 9)    The active problems and past medical history were reviewed and updated today  Surgical History    1  History of Eye Surgery   2  History of Knee Surgery   3  History of Knee Surgery    Family History  Mother    1  Family history of Pancreatic Neoplasm  Father    2  Family history of Coronary Artery Disease (V17 49)   3  Family history of Diabetes Mellitus (V18 0)   4  Family history of Essential Hypertension   5  Family history of Heart Disease (V17 49)   6  Family history of Hypertension (V17 49)  Sister    7   Family history of Lung Cancer (V16 1)  Brother    8  Family history of Pancreatic Neoplasm   9  Family history of Pancreatic Neoplasm  Paternal Grandmother    8  Family history of Diabetes Mellitus (V18 0)  Paternal Grandfather    6  Family history of Cancer    Social History    · Denied: History of Alcohol Use (History)   · Current Every Day Smoker (305 1)   · Daily Coffee Consumption (___ Cups/Day)   · Daily Tea Consumption (___ Cups/Day)   · Exercising Regularly   · Marital History - Currently    · Personal Protective Equipment Seatbelts   · Quit smoking (V15 82) (Z87 898)  The social history was reviewed and updated today  Current Meds   1  ALPRAZolam 0 25 MG Oral Tablet; TAKE 1 TABLET 3 times daily; Last Rx:[id number]   Ordered   2  Benadryl 25 MG Oral Tablet; TAKE 1 TABLET AT BEDTIME Recorded   3  Cetirizine HCl - 10 MG Oral Tablet; Take 1 tablet daily; Therapy: 40YIU5930 to (Evaluate:76Xcb3128)  Requested for: 59DLH4997; Last   Rx:[id number] Ordered   4  Escitalopram Oxalate 10 MG Oral Tablet; TAKE 1 TABLET BY MOUTH EVERY DAY; Therapy: 83OIS5188 to (Evaluate:12Oct2016)  Requested for: 74Man6731; Last   Rx:33Gma3413 Ordered   5  Lisinopril 20 MG Oral Tablet; take 1 tablet by mouth every day; Therapy: 50FEY9685 to (Andrew Torres)  Requested for: 84LXV1576; Last   Rx:76Ndg6140 Ordered   6  Multivitamins Oral Capsule; TAKE 1 CAPSULE Daily Recorded   7  Nystatin 028283 UNIT/ML Mouth/Throat Suspension; Swish, gargle then spit 1 tsp 3-4 x a   day; Therapy: 90FAG7505 to (Last Rx:15Apr2016)  Requested for: 21Gea6299 Ordered   8  Ranitidine HCl - 150 MG Oral Capsule; TAKE 1 CAPSULE BY MOUTH 2 TIMES DAILY; Therapy: 64NOK7969 to (Evaluate:24Jan2017)  Requested for: 46Usf8289; Last   Rx:[id number] Ordered   9  Rogaine Womens 5 % External Foam; 1qd Recorded   10  Tylenol Arthritis Pain 650 MG Oral Tablet Extended Release; 2qhs Recorded    The medication list was reviewed and updated today  Allergies    1   Monistat 3 CREA    Vitals  Vital Signs    Recorded: 67JDG5432 14:85XW   Systolic 703   Diastolic 82   Heart Rate 88   Temperature 98 4 F   Height 5 ft 4 in   Weight 190 lb    BMI Calculated 32 61   BSA Calculated 1 93     Physical Exam    Constitutional   General appearance: No acute distress, well appearing and well nourished  Head and Face   Head and face: Normal     Eyes   Conjunctiva and lids: No swelling, erythema or discharge  Pupils and irises: Equal, round, reactive to light  Ophthalmoscopic examination: Normal fundi and optic discs  Ears, Nose, Mouth, and Throat   External inspection of ears and nose: Normal     Otoscopic examination: Tympanic membranes translucent with normal light reflex  Canals patent without erythema  Nasal mucosa, septum, and turbinates: Normal without edema or erythema  Lips, teeth, and gums: Normal, good dentition  Oropharynx: Normal with no erythema, edema, exudate or lesions  Neck   Neck: Supple, symmetric, trachea midline, no masses  no arm pain with posterior flexion with or without rotation  Thyroid: Normal, no thyromegaly  Pulmonary   Respiratory effort: No increased work of breathing or signs of respiratory distress  Auscultation of lungs: Clear to auscultation  Cardiovascular   Auscultation of heart: Normal rate and rhythm, normal S1 and S2, no murmurs  Carotid pulses: 2+ bilaterally  Abdominal aorta: Normal     Pedal pulses: 2+ bilaterally  Peripheral vascular exam: Normal     Examination of extremities for edema and/or varicosities: Normal     Abdomen   Abdomen: Non-tender, no masses  Liver and spleen: No hepatomegaly or splenomegaly  Lymphatic   Palpation of lymph nodes in neck: No lymphadenopathy  Palpation of lymph nodes in other areas: No lymphadenopathy  Musculoskeletal   Gait and station: Normal     Digits and nails: Normal without clubbing or cyanosis      Joints, bones, and muscles: Abnormal   pt TTP over multiple trigger points  No palpable spasm T11 area NT over the spinous processes  Muscle strength/tone: Normal     Skin   Skin and subcutaneous tissue: Normal without rashes or lesions  Neurologic   Cranial nerves: Cranial nerves II-XII intact  Cortical function: Normal mental status  Sensation: No sensory loss  Psychiatric   Judgment and insight: Normal     Orientation to person, place, and time: Normal     Recent and remote memory: Intact  Mood and affect: Abnormal   as above  Future Appointments    Date/Time Provider Specialty Site   12/07/2016 09:45 AM AYLEEN Swanson   [address] "[employer]     Signatures   Electronically signed by : AYLEEN [place] ; Aug 31 2016  3:33PM EST                       (Author)

## 2018-01-18 NOTE — RESULT NOTES
Verified Results  ECHO STRESS TEST W CONTRAST IF INDICATED [id number] 05:38PM Zulay Pickett     Test Name Result Flag Reference   ECHO STRESS TEST W CONTRAST IF INDICATED (Report)     [address]   [phone]     Dobutamine Stress Echocardiography     Study date: 2016     Patient: Beryle London   MR number: [id number]   Account number: [de-identified]   : 1955   Age: 61 years   Gender: Female   Study date: 2016   Status: Outpatient   Location: Stress lab   Height: 64 in   Weight: 196 lb   BP: 166// 76 mmHg     Indications: Detection of coronary artery disease  Diagnosis: R07 89 - Other chest pain     Sonographer: MARTINA Alicea, CS   Interpreting Physician: Vianca Vargas MD   Primary Physician: Eliezer Bravo MD   Referring Physician: Eliezer Bravo MD   Group: [facility]   RN: Kristina Cherry RN BSN   Report Prepared By: Kristina Cherry RN BSN     IMPRESSIONS:   Normal study after pharmacologic stress  Left ventricular systolic function was   normal      SUMMARY     STRESS RESULTS:   Maximal heart rate during stress was 137 bpm ( 85 % of maximal predicted heart   rate)  Target heart rate was achieved  There was no chest pain during stress  ECG CONCLUSIONS:   The stress ECG was normal    Arrhythmia during stress: isolated premature ventricular beats  BASELINE:   There were no regional wall motion abnormalities  Estimated left ventricular ejection fraction was in the range of 55 % to 65 %  LOW STRESS:   There were no regional wall motion abnormalities  There was an appropriate augmentation in LV function  PEAK STRESS:   There were no regional wall motion abnormalities  There was an appropriate reduction in left ventricular size  There was an appropriate augmentation in LV function  RECOVERY:   There were no regional wall motion abnormalities     LV systolic function returned to baseline  HISTORY: The patient is a 61year old  female  Chest pain status:   chest pain  Coronary artery disease risk factors: dyslipidemia, hypertension,   smoking(1 1/2 ppd), family history of coronary artery disease, and   post-menopausal state  Cardiovascular history: none significant  Co-morbidity:   history of lung disease  Medications: an ACE inhibitor/ARB, aspirin, and a   lipid lowering agent  PHYSICAL EXAM: Baseline physical exam screening: no wheezes audible  REST ECG: Normal sinus rhythm  Poor R wave progression in the precordial leads  PROCEDURE: The study was performed in the stress lab  A dobutamine infusion   pharmacologic stress test was performed  Stress and rest echocardiographic   evaluation was performed from multiple acoustic windows for evaluation of   ventricular function  DOBUTAMINE PROTOCOL:   HR bpm SBP mmHg DBP mmHg Symptoms   Baseline 77 166 76 none   10 mcg/kg/min 83 219 95 none   20 mcg/kg/min 113 197 78 none   30 mcg/kg/min 134 160 72 palpitations   40 mcg/kg/min 137 159 65 same as above   Recovery I 121 159 71 subsiding   Recovery  170 70 subsiding   Recovery III 96 149 67 none     The patient also performed low level exercise with hand   MEDICATIONS GIVEN: IV normal saline ( 100 ml) was administered  STRESS RESULTS: 02 sat at rest 99%, at peak stress 96%, and post stress 96%   Duration of pharmacologic stress was 12 min  Functional capacity was normal    Maximal heart rate during stress was 137 bpm ( 85 % of maximal predicted heart   rate)  Target heart rate was achieved  The heart rate response to stress was   normal  Maximal systolic blood pressure during stress was 219 mmHg  There was   normal resting blood pressure with an appropriate response to stress  The   rate-pressure product for the peak heart rate and blood pressure was 94786  There was no chest pain during stress   The stress test was terminated due to   protocol completion and achievement of target heart rate  After the procedure,   the patient was discharged to home  ECG CONCLUSIONS: The stress ECG was normal  Arrhythmia during stress: isolated   premature ventricular beats  STRESS 2D ECHO RESULTS:     BASELINE: There were no regional wall motion abnormalities  Left ventricular   size was normal  Overall left ventricular systolic function was normal    Estimated left ventricular ejection fraction was in the range of 55 % to 65 %  LOW STRESS: There were no regional wall motion abnormalities  There was an   appropriate reduction in left ventricular size  There was an appropriate   augmentation in LV function  PEAK STRESS: There were no regional wall motion abnormalities  There was an   appropriate reduction in left ventricular size  There was an appropriate   augmentation in LV function  RECOVERY: There were no regional wall motion abnormalities  LV size returned to   baseline  LV systolic function returned to baseline       Prepared and electronically signed by     Teresa Epstein MD   Signed 18-Jan-2016 17:11:23

## 2018-01-22 ENCOUNTER — HOSPITAL ENCOUNTER (OUTPATIENT)
Dept: RADIOLOGY | Facility: MEDICAL CENTER | Age: 63
Discharge: HOME | End: 2018-01-22
Payer: COMMERCIAL

## 2018-01-22 DIAGNOSIS — Z12.31 ENCOUNTER FOR SCREENING MAMMOGRAM FOR MALIGNANT NEOPLASM OF BREAST: ICD-10-CM

## 2018-01-22 PROCEDURE — 77067 SCR MAMMO BI INCL CAD: CPT

## 2018-01-23 VITALS
RESPIRATION RATE: 20 BRPM | DIASTOLIC BLOOD PRESSURE: 84 MMHG | SYSTOLIC BLOOD PRESSURE: 142 MMHG | OXYGEN SATURATION: 96 % | TEMPERATURE: 97.8 F | HEART RATE: 98 BPM

## 2018-01-24 VITALS
DIASTOLIC BLOOD PRESSURE: 80 MMHG | SYSTOLIC BLOOD PRESSURE: 118 MMHG | TEMPERATURE: 98.5 F | HEIGHT: 64 IN | WEIGHT: 205.5 LBS | BODY MASS INDEX: 35.08 KG/M2 | HEART RATE: 80 BPM

## 2018-02-15 DIAGNOSIS — F32.A DEPRESSION, UNSPECIFIED DEPRESSION TYPE: Primary | ICD-10-CM

## 2018-02-15 RX ORDER — ESCITALOPRAM OXALATE 10 MG/1
10 TABLET ORAL DAILY
Qty: 90 TABLET | Refills: 0 | Status: SHIPPED | OUTPATIENT
Start: 2018-02-15 | End: 2018-02-16

## 2018-02-16 ENCOUNTER — OFFICE VISIT (OUTPATIENT)
Dept: FAMILY MEDICINE CLINIC | Facility: CLINIC | Age: 63
End: 2018-02-16
Payer: COMMERCIAL

## 2018-02-16 ENCOUNTER — TELEPHONE (OUTPATIENT)
Dept: FAMILY MEDICINE CLINIC | Facility: CLINIC | Age: 63
End: 2018-02-16

## 2018-02-16 VITALS
DIASTOLIC BLOOD PRESSURE: 74 MMHG | TEMPERATURE: 98 F | HEART RATE: 74 BPM | WEIGHT: 206 LBS | HEIGHT: 64 IN | SYSTOLIC BLOOD PRESSURE: 138 MMHG | BODY MASS INDEX: 35.17 KG/M2

## 2018-02-16 DIAGNOSIS — R53.83 FATIGUE, UNSPECIFIED TYPE: ICD-10-CM

## 2018-02-16 DIAGNOSIS — R94.31 ABNORMAL EKG: ICD-10-CM

## 2018-02-16 DIAGNOSIS — R06.00 DYSPNEA ON EXERTION: Primary | ICD-10-CM

## 2018-02-16 DIAGNOSIS — F33.0 DEPRESSION, MAJOR, RECURRENT, MILD (HCC): ICD-10-CM

## 2018-02-16 DIAGNOSIS — K21.9 GASTROESOPHAGEAL REFLUX DISEASE, ESOPHAGITIS PRESENCE NOT SPECIFIED: ICD-10-CM

## 2018-02-16 DIAGNOSIS — R00.2 HEART PALPITATIONS: ICD-10-CM

## 2018-02-16 DIAGNOSIS — R60.9 EDEMA, UNSPECIFIED TYPE: ICD-10-CM

## 2018-02-16 PROBLEM — M54.17 LUMBOSACRAL RADICULOPATHY AT L5: Status: ACTIVE | Noted: 2017-01-06

## 2018-02-16 PROCEDURE — 93000 ELECTROCARDIOGRAM COMPLETE: CPT | Performed by: FAMILY MEDICINE

## 2018-02-16 PROCEDURE — 99215 OFFICE O/P EST HI 40 MIN: CPT | Performed by: FAMILY MEDICINE

## 2018-02-16 RX ORDER — ALPRAZOLAM 0.25 MG/1
1 TABLET ORAL 3 TIMES DAILY
COMMUNITY
End: 2019-01-08 | Stop reason: ALTCHOICE

## 2018-02-16 RX ORDER — ESCITALOPRAM OXALATE 20 MG/1
20 TABLET ORAL DAILY
Qty: 30 TABLET | Refills: 5 | Status: SHIPPED | OUTPATIENT
Start: 2018-02-16 | End: 2018-03-30 | Stop reason: SDUPTHER

## 2018-02-16 RX ORDER — SENNOSIDES 8.6 MG
CAPSULE ORAL EVERY 8 HOURS PRN
COMMUNITY

## 2018-02-16 RX ORDER — RANITIDINE 150 MG/1
150 TABLET ORAL 2 TIMES DAILY
Refills: 5 | COMMUNITY
Start: 2018-01-08 | End: 2019-10-11

## 2018-02-16 RX ORDER — LISINOPRIL 20 MG/1
1 TABLET ORAL DAILY
COMMUNITY
Start: 2014-10-03 | End: 2018-05-17 | Stop reason: SDUPTHER

## 2018-02-16 RX ORDER — FLUTICASONE PROPIONATE 50 MCG
2 SPRAY, SUSPENSION (ML) NASAL DAILY
COMMUNITY
Start: 2017-06-08 | End: 2018-03-08 | Stop reason: SDUPTHER

## 2018-02-16 RX ORDER — PANTOPRAZOLE SODIUM 40 MG/1
40 TABLET, DELAYED RELEASE ORAL DAILY
Qty: 30 TABLET | Refills: 5 | Status: SHIPPED | OUTPATIENT
Start: 2018-02-16 | End: 2018-08-09 | Stop reason: SDUPTHER

## 2018-02-16 RX ORDER — ESCITALOPRAM OXALATE 10 MG/1
1 TABLET ORAL DAILY
COMMUNITY
Start: 2016-02-08 | End: 2018-02-16 | Stop reason: SDUPTHER

## 2018-02-16 RX ORDER — CETIRIZINE HYDROCHLORIDE 10 MG/1
10 TABLET ORAL DAILY
Refills: 3 | COMMUNITY
Start: 2018-01-12 | End: 2018-05-17 | Stop reason: SDUPTHER

## 2018-02-16 RX ORDER — PANTOPRAZOLE SODIUM 40 MG/1
1 TABLET, DELAYED RELEASE ORAL DAILY
COMMUNITY
End: 2018-02-16 | Stop reason: SDUPTHER

## 2018-02-16 NOTE — TELEPHONE ENCOUNTER
4 lb weight gain, Retaining a lot of fluid all over, cough up clear phlegm, tired, not feeling right    Wants appt

## 2018-02-16 NOTE — PROGRESS NOTES
Assessment/Plan:     Diagnoses and all orders for this visit:    Dyspnea on exertion  Comments:  Check chest x-ray  Check stress echo as well as labs  Start baby aspirin  Follow-up after stress echo  To ER if symptoms worsen  Orders:  -     Echo stress test w contrast if indicated; Future  -     XR chest pa & lateral; Future  -     CBC and differential; Future  -     Comprehensive metabolic panel; Future  -     Lipid panel; Future  -     TSH, 3rd generation with T4 reflex; Future  -     NT-BNP PRO; Future  -     POCT ECG    Heart palpitations  Comments:  Abnormal EKG  Check Holter monitor as well as labs  Recheck as above  Orders:  -     Holter monitor - 24 hour; Future  -     POCT ECG    Abnormal EKG  Comments:  as above  Orders:  -     Echo stress test w contrast if indicated; Future  -     NT-BNP PRO; Future    Edema, unspecified type  Comments:  Question cause  Check labs as above  Orders:  -     Comprehensive metabolic panel; Future  -     TSH, 3rd generation with T4 reflex; Future  -     NT-BNP PRO; Future    Fatigue, unspecified type  Comments:  Check labs  Increase Lexapro  Recheck 3-4 weeks    Depression, major, recurrent, mild (HCC)  Comments:  Increase Lexapro to 20 mg a day  Recheck 3-4 weeks  Orders:  -     escitalopram (LEXAPRO) 20 mg tablet; Take 1 tablet (20 mg total) by mouth daily    Gastroesophageal reflux disease, esophagitis presence not specified  Comments:  Refilled medication  Urged dietary control  Recheck as above  Orders:  -     pantoprazole (PROTONIX) 40 mg tablet; Take 1 tablet (40 mg total) by mouth daily    Other orders  -     ALPRAZolam (XANAX) 0 25 mg tablet; Take 1 tablet by mouth 3 (three) times a day  -     cetirizine (ZyrTEC) 10 mg tablet; Take 10 mg by mouth daily  -     fluticasone (FLONASE) 50 mcg/act nasal spray; 2 sprays into each nostril daily  -     minoxidil (ROGAINE) 2 % external solution;  Apply 1 application topically  -     nystatin (MYCOSTATIN) 100,000 units/mL suspension; Apply to the mouth or throat  -     acetaminophen (TYLENOL 8 HOUR ARTHRITIS PAIN) 650 mg CR tablet; Take by mouth  -     ranitidine (ZANTAC) 150 mg tablet; Take 150 mg by mouth 2 (two) times a day  -     Discontinue: escitalopram (LEXAPRO) 10 mg tablet; Take 1 tablet by mouth daily  -     lisinopril (ZESTRIL) 20 mg tablet; Take 1 tablet by mouth daily  -     Discontinue: pantoprazole (PROTONIX) 40 mg tablet; Take 1 tablet by mouth daily          Subjective:      Patient ID: Mahin Ricks is a 58 y o  female  - pt with increased fatigue since her  passed but now seems to be worse  Pt recently had her teeth removed but has been having issues with her dentures  Had lost some weight b/c of difficulty eating due to persistent mouth pain s/p extraction, but now has increased weight with "increased body swelling"  Pt was having some increased palpitations but no Cp  Some SOB with exertion but feels ok at rest  Still smoking 1ppd  - Pt also with persistent R shoulder pain that radiates to right upper arm  No change with tylenol and ibuprofen  - mood is labile since she lost her   Pt also notes increased family stress  PHQ-9 moseley  Pt is compliant with meds  Does not take xanax on a daily basis  - no GI or  complaints at present      Shortness of Breath   Associated symptoms include leg swelling and wheezing  Pertinent negatives include no abdominal pain, chest pain, ear pain, fever, headaches, rhinorrhea or vomiting  Fatigue   Associated symptoms include arthralgias, coughing, fatigue, myalgias and nausea  Pertinent negatives include no abdominal pain, chest pain, chills, congestion, fever, headaches, vomiting or weakness  The following portions of the patient's history were reviewed and updated as appropriate:   She  has a past medical history of Fibromyalgia; Hypertension; and Seasonal allergies  She  does not have a problem list on file    She  has a past surgical history that includes Knee surgery and pr colonoscopy flx dx w/collj spec when pfrmd (N/A, 5/9/2017)  She  reports that she has been smoking  She has a 100 00 pack-year smoking history  She does not have any smokeless tobacco history on file  She reports that she does not drink alcohol or use drugs  Current Outpatient Prescriptions   Medication Sig Dispense Refill    acetaminophen (TYLENOL 8 HOUR ARTHRITIS PAIN) 650 mg CR tablet Take by mouth      ALPRAZolam (XANAX) 0 25 mg tablet Take 1 tablet by mouth 3 (three) times a day      cetirizine (ZyrTEC) 10 mg tablet Take 10 mg by mouth daily  3    escitalopram (LEXAPRO) 20 mg tablet Take 1 tablet (20 mg total) by mouth daily 30 tablet 5    fluticasone (FLONASE) 50 mcg/act nasal spray 2 sprays into each nostril daily      lisinopril (ZESTRIL) 20 mg tablet Take 1 tablet by mouth daily      minoxidil (ROGAINE) 2 % external solution Apply 1 application topically      nystatin (MYCOSTATIN) 100,000 units/mL suspension Apply to the mouth or throat      pantoprazole (PROTONIX) 40 mg tablet Take 1 tablet (40 mg total) by mouth daily 30 tablet 5    ranitidine (ZANTAC) 150 mg tablet Take 150 mg by mouth 2 (two) times a day  5     No current facility-administered medications for this visit  She is allergic to miconazole       Review of Systems   Constitutional: Positive for activity change and fatigue  Negative for appetite change, chills and fever  HENT: Positive for dental problem and mouth sores  Negative for congestion, drooling, ear discharge, ear pain, nosebleeds, postnasal drip, rhinorrhea, sinus pressure, sneezing, trouble swallowing and voice change  Respiratory: Positive for cough, shortness of breath and wheezing  Negative for chest tightness  Cardiovascular: Positive for palpitations and leg swelling  Negative for chest pain  Gastrointestinal: Positive for abdominal distention, constipation (on/off), diarrhea (on/off) and nausea   Negative for abdominal pain and vomiting  Endocrine: Negative for cold intolerance, heat intolerance, polydipsia and polyuria  Genitourinary: Negative  Musculoskeletal: Positive for arthralgias and myalgias  Skin: Negative  Neurological: Positive for light-headedness  Negative for dizziness, tremors, syncope, facial asymmetry, speech difficulty, weakness and headaches  Psychiatric/Behavioral: Positive for dysphoric mood  Negative for agitation, behavioral problems, hallucinations, self-injury, sleep disturbance and suicidal ideas  The patient is nervous/anxious  The patient is not hyperactive  Objective:      /74   Pulse 74   Temp 98 °F (36 7 °C)   Ht 5' 4" (1 626 m)   Wt 93 4 kg (206 lb)   BMI 35 36 kg/m²          Physical Exam   Constitutional: She is oriented to person, place, and time  She appears well-developed and well-nourished  No distress  HENT:   Head: Normocephalic and atraumatic  Right Ear: External ear normal    Left Ear: External ear normal    Nose: Nose normal    Mouth/Throat: Oropharynx is clear and moist    Eyes: Conjunctivae and EOM are normal  Pupils are equal, round, and reactive to light  Neck: Normal range of motion  Neck supple  No JVD present  Cardiovascular: Normal rate, regular rhythm, normal heart sounds and intact distal pulses  Exam reveals no gallop and no friction rub  No murmur heard  Pulmonary/Chest: Effort normal  No respiratory distress  She has no wheezes  She has no rales  Abdominal: Soft  Bowel sounds are normal  She exhibits no distension and no mass  There is no guarding  Musculoskeletal: She exhibits edema (trace in lower extremities)  She exhibits no deformity  Lymphadenopathy:     She has no cervical adenopathy  Neurological: She is alert and oriented to person, place, and time  No cranial nerve deficit  She exhibits normal muscle tone  Coordination normal    Skin: Skin is warm     Psychiatric: Her behavior is normal  Judgment and thought content normal    PHQ-9 = 8 (mild depression)   Vitals reviewed

## 2018-02-17 ENCOUNTER — APPOINTMENT (OUTPATIENT)
Dept: LAB | Facility: MEDICAL CENTER | Age: 63
End: 2018-02-17
Payer: COMMERCIAL

## 2018-02-17 ENCOUNTER — APPOINTMENT (OUTPATIENT)
Dept: RADIOLOGY | Facility: MEDICAL CENTER | Age: 63
End: 2018-02-17
Payer: COMMERCIAL

## 2018-02-17 DIAGNOSIS — R06.00 DYSPNEA ON EXERTION: ICD-10-CM

## 2018-02-17 DIAGNOSIS — R94.31 ABNORMAL EKG: ICD-10-CM

## 2018-02-17 DIAGNOSIS — R60.9 EDEMA, UNSPECIFIED TYPE: ICD-10-CM

## 2018-02-17 LAB
ALBUMIN SERPL BCP-MCNC: 3.6 G/DL (ref 3.5–5)
ALP SERPL-CCNC: 72 U/L (ref 46–116)
ALT SERPL W P-5'-P-CCNC: 21 U/L (ref 12–78)
ANION GAP SERPL CALCULATED.3IONS-SCNC: 7 MMOL/L (ref 4–13)
AST SERPL W P-5'-P-CCNC: 17 U/L (ref 5–45)
BASOPHILS # BLD AUTO: 0.03 THOUSANDS/ΜL (ref 0–0.1)
BASOPHILS NFR BLD AUTO: 0 % (ref 0–1)
BILIRUB SERPL-MCNC: 0.23 MG/DL (ref 0.2–1)
BUN SERPL-MCNC: 17 MG/DL (ref 5–25)
CALCIUM SERPL-MCNC: 9.2 MG/DL (ref 8.3–10.1)
CHLORIDE SERPL-SCNC: 103 MMOL/L (ref 100–108)
CHOLEST SERPL-MCNC: 225 MG/DL (ref 50–200)
CO2 SERPL-SCNC: 29 MMOL/L (ref 21–32)
CREAT SERPL-MCNC: 0.62 MG/DL (ref 0.6–1.3)
EOSINOPHIL # BLD AUTO: 0.19 THOUSAND/ΜL (ref 0–0.61)
EOSINOPHIL NFR BLD AUTO: 2 % (ref 0–6)
ERYTHROCYTE [DISTWIDTH] IN BLOOD BY AUTOMATED COUNT: 12.9 % (ref 11.6–15.1)
GFR SERPL CREATININE-BSD FRML MDRD: 97 ML/MIN/1.73SQ M
GLUCOSE P FAST SERPL-MCNC: 92 MG/DL (ref 65–99)
HCT VFR BLD AUTO: 42.4 % (ref 34.8–46.1)
HDLC SERPL-MCNC: 51 MG/DL (ref 40–60)
HGB BLD-MCNC: 13.9 G/DL (ref 11.5–15.4)
LDLC SERPL CALC-MCNC: 135 MG/DL (ref 0–100)
LYMPHOCYTES # BLD AUTO: 2.11 THOUSANDS/ΜL (ref 0.6–4.47)
LYMPHOCYTES NFR BLD AUTO: 24 % (ref 14–44)
MCH RBC QN AUTO: 29.1 PG (ref 26.8–34.3)
MCHC RBC AUTO-ENTMCNC: 32.8 G/DL (ref 31.4–37.4)
MCV RBC AUTO: 89 FL (ref 82–98)
MONOCYTES # BLD AUTO: 0.82 THOUSAND/ΜL (ref 0.17–1.22)
MONOCYTES NFR BLD AUTO: 9 % (ref 4–12)
NEUTROPHILS # BLD AUTO: 5.53 THOUSANDS/ΜL (ref 1.85–7.62)
NEUTS SEG NFR BLD AUTO: 65 % (ref 43–75)
NRBC BLD AUTO-RTO: 0 /100 WBCS
NT-PROBNP SERPL-MCNC: 54 PG/ML
PLATELET # BLD AUTO: 286 THOUSANDS/UL (ref 149–390)
PMV BLD AUTO: 9.8 FL (ref 8.9–12.7)
POTASSIUM SERPL-SCNC: 4.3 MMOL/L (ref 3.5–5.3)
PROT SERPL-MCNC: 7.5 G/DL (ref 6.4–8.2)
RBC # BLD AUTO: 4.77 MILLION/UL (ref 3.81–5.12)
SODIUM SERPL-SCNC: 139 MMOL/L (ref 136–145)
TRIGL SERPL-MCNC: 197 MG/DL
TSH SERPL DL<=0.05 MIU/L-ACNC: 3.17 UIU/ML (ref 0.36–3.74)
WBC # BLD AUTO: 8.71 THOUSAND/UL (ref 4.31–10.16)

## 2018-02-17 PROCEDURE — 80053 COMPREHEN METABOLIC PANEL: CPT

## 2018-02-17 PROCEDURE — 83880 ASSAY OF NATRIURETIC PEPTIDE: CPT

## 2018-02-17 PROCEDURE — 85025 COMPLETE CBC W/AUTO DIFF WBC: CPT

## 2018-02-17 PROCEDURE — 36415 COLL VENOUS BLD VENIPUNCTURE: CPT

## 2018-02-17 PROCEDURE — 71046 X-RAY EXAM CHEST 2 VIEWS: CPT

## 2018-02-17 PROCEDURE — 80061 LIPID PANEL: CPT

## 2018-02-17 PROCEDURE — 84443 ASSAY THYROID STIM HORMONE: CPT

## 2018-02-19 DIAGNOSIS — I15.9 SECONDARY HYPERTENSION: ICD-10-CM

## 2018-02-19 DIAGNOSIS — E78.5 HYPERLIPIDEMIA, UNSPECIFIED HYPERLIPIDEMIA TYPE: Primary | ICD-10-CM

## 2018-02-22 ENCOUNTER — TELEPHONE (OUTPATIENT)
Dept: FAMILY MEDICINE CLINIC | Facility: CLINIC | Age: 63
End: 2018-02-22

## 2018-02-23 ENCOUNTER — HOSPITAL ENCOUNTER (OUTPATIENT)
Dept: NON INVASIVE DIAGNOSTICS | Facility: HOSPITAL | Age: 63
Discharge: HOME | End: 2018-02-23
Payer: COMMERCIAL

## 2018-02-23 DIAGNOSIS — R94.31 ABNORMAL EKG: ICD-10-CM

## 2018-02-23 DIAGNOSIS — R06.00 DYSPNEA ON EXERTION: ICD-10-CM

## 2018-02-23 DIAGNOSIS — R00.2 HEART PALPITATIONS: ICD-10-CM

## 2018-02-23 PROCEDURE — 93350 STRESS TTE ONLY: CPT

## 2018-02-23 PROCEDURE — 93018 CV STRESS TEST I&R ONLY: CPT | Performed by: INTERNAL MEDICINE

## 2018-02-23 PROCEDURE — 93016 CV STRESS TEST SUPVJ ONLY: CPT | Performed by: INTERNAL MEDICINE

## 2018-02-23 PROCEDURE — 93225 XTRNL ECG REC<48 HRS REC: CPT

## 2018-02-23 PROCEDURE — 93226 XTRNL ECG REC<48 HR SCAN A/R: CPT

## 2018-02-23 RX ADMIN — PERFLUTREN 1.2 ML/MIN: 6.52 INJECTION, SUSPENSION INTRAVENOUS at 10:17

## 2018-02-26 LAB
MAX DIASTOLIC BP: 80 MMHG
MAX HEART RATE: 137 BPM
MAX PREDICTED HEART RATE: 158 BPM
MAX. SYSTOLIC BP: 196 MMHG
PROTOCOL NAME: NORMAL
TARGET HR FORMULA: NORMAL
TIME IN EXERCISE PHASE: NORMAL

## 2018-03-02 PROCEDURE — 93227 XTRNL ECG REC<48 HR R&I: CPT | Performed by: INTERNAL MEDICINE

## 2018-03-08 DIAGNOSIS — J30.9 ALLERGIC RHINITIS, UNSPECIFIED CHRONICITY, UNSPECIFIED SEASONALITY, UNSPECIFIED TRIGGER: Primary | ICD-10-CM

## 2018-03-08 RX ORDER — FLUTICASONE PROPIONATE 50 MCG
2 SPRAY, SUSPENSION (ML) NASAL DAILY
Qty: 16 G | Refills: 3 | Status: SHIPPED | OUTPATIENT
Start: 2018-03-08 | End: 2019-07-09 | Stop reason: SDUPTHER

## 2018-03-16 ENCOUNTER — OFFICE VISIT (OUTPATIENT)
Dept: FAMILY MEDICINE CLINIC | Facility: CLINIC | Age: 63
End: 2018-03-16
Payer: COMMERCIAL

## 2018-03-16 VITALS
SYSTOLIC BLOOD PRESSURE: 132 MMHG | HEIGHT: 64 IN | DIASTOLIC BLOOD PRESSURE: 82 MMHG | RESPIRATION RATE: 16 BRPM | WEIGHT: 209.4 LBS | TEMPERATURE: 98.1 F | BODY MASS INDEX: 35.75 KG/M2 | HEART RATE: 76 BPM

## 2018-03-16 DIAGNOSIS — I10 ESSENTIAL HYPERTENSION: ICD-10-CM

## 2018-03-16 DIAGNOSIS — J30.9 CHRONIC ALLERGIC RHINITIS, UNSPECIFIED SEASONALITY, UNSPECIFIED TRIGGER: ICD-10-CM

## 2018-03-16 DIAGNOSIS — F33.0 MILD EPISODE OF RECURRENT MAJOR DEPRESSIVE DISORDER (HCC): ICD-10-CM

## 2018-03-16 DIAGNOSIS — R07.9 CHEST PAIN, UNSPECIFIED TYPE: Primary | ICD-10-CM

## 2018-03-16 DIAGNOSIS — K21.9 GASTROESOPHAGEAL REFLUX DISEASE, ESOPHAGITIS PRESENCE NOT SPECIFIED: ICD-10-CM

## 2018-03-16 DIAGNOSIS — R00.2 PALPITATIONS: ICD-10-CM

## 2018-03-16 PROCEDURE — 99214 OFFICE O/P EST MOD 30 MIN: CPT | Performed by: FAMILY MEDICINE

## 2018-03-16 NOTE — ASSESSMENT & PLAN NOTE
Possible cause of chest discomfort as well as nighttime cough  Continue pantoprazole  Recommend raising the head of the bed 5-6 inches    Recheck 3-4 weeks if not improved

## 2018-03-16 NOTE — ASSESSMENT & PLAN NOTE
No significant dysrhythmias appreciated on Holter  Continue to observe    Recheck 3 months-earlier if needed

## 2018-03-16 NOTE — ASSESSMENT & PLAN NOTE
Some worsening springtime symptoms  Change Zyrtec dosing to q h s     Patient will call in 2-3 weeks with follow-up

## 2018-03-16 NOTE — ASSESSMENT & PLAN NOTE
Persistent symptoms of slightly improved compared to previous exam   Continue present treatment  Continue to monitor    Recheck 3 months

## 2018-03-30 ENCOUNTER — TELEPHONE (OUTPATIENT)
Dept: FAMILY MEDICINE CLINIC | Facility: CLINIC | Age: 63
End: 2018-03-30

## 2018-03-30 DIAGNOSIS — F33.0 DEPRESSION, MAJOR, RECURRENT, MILD (HCC): ICD-10-CM

## 2018-03-30 RX ORDER — ESCITALOPRAM OXALATE 10 MG/1
10 TABLET ORAL DAILY
Qty: 30 TABLET | Refills: 5 | Status: SHIPPED | OUTPATIENT
Start: 2018-03-30 | End: 2018-06-25 | Stop reason: SDUPTHER

## 2018-03-30 NOTE — TELEPHONE ENCOUNTER
You increased her Lexapro to 20 mg last month, at first not bad, now having strange dreams, very sleepy and more depressed than before  No self harm thoughts  Should she go back to the 10mg ?

## 2018-05-03 ENCOUNTER — TELEPHONE (OUTPATIENT)
Dept: FAMILY MEDICINE CLINIC | Facility: CLINIC | Age: 63
End: 2018-05-03

## 2018-05-03 NOTE — TELEPHONE ENCOUNTER
SHES BEEN HAVING SOME SHORTNESS OF BREATH OVER THE LAST FEW WEEKS     SHE HAS BREO 100/25 AT HOME FROM [employer]     CAN YOU USE THIS??     OR DO YOU WANT TO SEE HER     HER O

## 2018-05-04 ENCOUNTER — OFFICE VISIT (OUTPATIENT)
Dept: FAMILY MEDICINE CLINIC | Facility: CLINIC | Age: 63
End: 2018-05-04
Payer: COMMERCIAL

## 2018-05-04 VITALS
TEMPERATURE: 98.5 F | DIASTOLIC BLOOD PRESSURE: 84 MMHG | WEIGHT: 205.4 LBS | OXYGEN SATURATION: 93 % | BODY MASS INDEX: 35.07 KG/M2 | HEIGHT: 64 IN | HEART RATE: 76 BPM | SYSTOLIC BLOOD PRESSURE: 132 MMHG

## 2018-05-04 DIAGNOSIS — J30.2 SEASONAL ALLERGIC RHINITIS, UNSPECIFIED TRIGGER: ICD-10-CM

## 2018-05-04 DIAGNOSIS — R06.00 DYSPNEA ON EXERTION: Primary | ICD-10-CM

## 2018-05-04 DIAGNOSIS — J42 CHRONIC BRONCHITIS, UNSPECIFIED CHRONIC BRONCHITIS TYPE (HCC): ICD-10-CM

## 2018-05-04 PROBLEM — R06.09 DYSPNEA ON EXERTION: Status: ACTIVE | Noted: 2018-05-04

## 2018-05-04 PROCEDURE — 99214 OFFICE O/P EST MOD 30 MIN: CPT | Performed by: FAMILY MEDICINE

## 2018-05-04 PROCEDURE — 94010 BREATHING CAPACITY TEST: CPT | Performed by: FAMILY MEDICINE

## 2018-05-04 RX ORDER — MONTELUKAST SODIUM 10 MG/1
10 TABLET ORAL
Qty: 30 TABLET | Refills: 5 | Status: SHIPPED | OUTPATIENT
Start: 2018-05-04 | End: 2018-10-21 | Stop reason: SDUPTHER

## 2018-05-04 RX ORDER — FLUTICASONE FUROATE AND VILANTEROL 200; 25 UG/1; UG/1
1 POWDER RESPIRATORY (INHALATION) DAILY
Qty: 1 EACH | Refills: 0 | Status: SHIPPED | COMMUNITY
Start: 2018-05-04 | End: 2018-12-18 | Stop reason: SDUPTHER

## 2018-05-04 NOTE — ASSESSMENT & PLAN NOTE
Possibly making COPD worse  Start Singulair 10 mg a day  Follow-up 1 week by phone    Recheck 1 month

## 2018-05-04 NOTE — PROGRESS NOTES
Assessment/Plan:    Chronic obstructive pulmonary disease (HCC)  FVC 50% predicted (1 63 L), FEV1 1 1 L (44% predicted) and FEV1/FVC is 68%  -shows monitor to severe COPD with low vital capacity  Patient will be started on Breo, and Incruse, 1 inhalation each daily  Urged smoking cessation  Patient will call 1 week with [id number] if worse    Seasonal allergic rhinitis  Possibly making COPD worse  Start Singulair 10 mg a day  Follow-up 1 week by phone  Recheck 1 month    Dyspnea on exertion  Secondary to COPD  Treat as above  Diagnoses and all orders for this visit:    Dyspnea on exertion  -     POCT spirometry  -     fluticasone furoate-vilanterol (BREO ELLIPTA) 200-25 MCG/INH inhaler; Inhale 1 puff daily  -     Umeclidinium Bromide (INCRUSE ELLIPTA) 62 5 MCG/INH AEPB; Inhale 1 puff daily    Seasonal allergic rhinitis, unspecified trigger  -     montelukast (SINGULAIR) 10 mg tablet; Take 1 tablet (10 mg total) by mouth daily at bedtime    Chronic bronchitis, unspecified chronic bronchitis type ([facility] )  -     POCT spirometry  -     fluticasone furoate-vilanterol (BREO ELLIPTA) 200-25 MCG/INH inhaler; Inhale 1 puff daily  -     Umeclidinium Bromide (INCRUSE ELLIPTA) 62 5 MCG/INH AEPB; Inhale 1 puff daily  -     montelukast (SINGULAIR) 10 mg tablet; Take 1 tablet (10 mg total) by mouth daily at bedtime    Other orders  -     Naproxen Sodium (ALEVE PO); Take 2 tablets by mouth daily          Subjective:      Patient ID: Yayo John is a 58 y o  female  Pt with hx of COPD here for worsening SOB over the last month or so  Pt notes that her breathing worsened when the weather got warmer and she started working more outdoors (though she notes some worsening since have her dental implants in January)  Still smoking 1 1/2 ppd  Recent stress test was negative  CT of the lungs in November showed unchanged pulmonary nodules and no other significant findings  Pt does not use any inhalers    She does have slightly increased congestion   - pt felt worse on the higher dose of Lexapro ("I did not care about anything")  Doing better since restarting the lower dose  The following portions of the patient's history were reviewed and updated as appropriate:   She  has a past medical history of Acid reflux; Fibromyalgia; Hypertension; and Seasonal allergies  She   Patient Active Problem List    Diagnosis Date Noted    Dyspnea on exertion 05/04/2018    Edema 07/13/2017    Lumbosacral radiculopathy at L5 01/06/2017    Compression fracture of thoracic vertebra, non-traumatic ([facility] ) 08/30/2016    Hyperlipidemia 12/08/2015    Cataract 08/22/2014    Palpitations 08/05/2014    Chronic obstructive pulmonary disease ([facility] ) 04/17/2013    Esophageal reflux 04/17/2013    Pulmonary nodule seen on imaging study 04/17/2013    Hypertension 03/22/2013    Mild episode of recurrent major depressive disorder ([facility] ) 03/14/2013    Fibromyalgia 03/14/2013    Degeneration of intervertebral disc 03/14/2013    Anxiety 02/21/2013    Seasonal allergic rhinitis 12/20/2012     She  has a past surgical history that includes Knee surgery (1998); pr colonoscopy flx dx w/collj spec when pfrmd (N/A, 5/9/2017); and Eye surgery  She  reports that she has been smoking  She has a 100 00 pack-year smoking history  She does not have any smokeless tobacco history on file  She reports that she does not drink alcohol or use drugs    Current Outpatient Prescriptions   Medication Sig Dispense Refill    acetaminophen (TYLENOL 8 HOUR ARTHRITIS PAIN) 650 mg CR tablet Take by mouth      ALPRAZolam (XANAX) 0 25 mg tablet Take 1 tablet by mouth 3 (three) times a day      cetirizine (ZyrTEC) 10 mg tablet Take 10 mg by mouth daily  3    escitalopram (LEXAPRO) 10 mg tablet Take 1 tablet (10 mg total) by mouth daily 30 tablet 5    fluticasone (FLONASE) 50 mcg/act nasal spray 2 sprays into each nostril daily 16 g 3    lisinopril (ZESTRIL) 20 mg tablet Take 1 tablet by mouth daily      minoxidil (ROGAINE) 2 % external solution Apply 1 application topically      Naproxen Sodium (ALEVE PO) Take 2 tablets by mouth daily      pantoprazole (PROTONIX) 40 mg tablet Take 1 tablet (40 mg total) by mouth daily 30 tablet 5    fluticasone furoate-vilanterol (BREO ELLIPTA) 200-25 MCG/INH inhaler Inhale 1 puff daily 1 each 0    montelukast (SINGULAIR) 10 mg tablet Take 1 tablet (10 mg total) by mouth daily at bedtime 30 tablet 5    nystatin (MYCOSTATIN) 100,000 units/mL suspension Apply to the mouth or throat      ranitidine (ZANTAC) 150 mg tablet Take 150 mg by mouth 2 (two) times a day  5    Umeclidinium Bromide (INCRUSE ELLIPTA) 62 5 MCG/INH AEPB Inhale 1 puff daily 2 each 0     No current facility-administered medications for this visit  She is allergic to miconazole       Review of Systems   Constitutional: Negative for chills, fatigue and fever  HENT: Positive for congestion  Negative for ear discharge, ear pain, postnasal drip, rhinorrhea, sinus pain, sinus pressure and sore throat  Eyes: Negative  Respiratory: Positive for cough and shortness of breath  Negative for choking and wheezing  Cardiovascular: Negative  Gastrointestinal: Negative  Endocrine: Negative  Psychiatric/Behavioral: Negative  Negative for dysphoric mood, self-injury, sleep disturbance and suicidal ideas  The patient is not nervous/anxious  Objective:      /84   Pulse 76   Temp 98 5 °F (36 9 °C)   Ht 5' 4" (1 626 m)   Wt 93 2 kg (205 lb 6 4 oz)   SpO2 93%   BMI 35 26 kg/m²          Physical Exam   Constitutional: She is oriented to person, place, and time  She appears well-developed and well-nourished  HENT:   Head: Normocephalic and atraumatic  Mouth/Throat: Oropharynx is clear and moist    Sl nasal congestion   Eyes: Conjunctivae and EOM are normal  Pupils are equal, round, and reactive to light  Neck: Normal range of motion  Neck supple   No JVD present  No thyromegaly present  Cardiovascular: Normal rate, regular rhythm and intact distal pulses  Pulmonary/Chest: Effort normal  She has no wheezes  She has no rales  Poor air movement throughout   Abdominal: Soft  Lymphadenopathy:     She has no cervical adenopathy  Neurological: She is alert and oriented to person, place, and time  Psychiatric: She has a normal mood and affect   Her behavior is normal  Judgment and thought content normal

## 2018-05-04 NOTE — ASSESSMENT & PLAN NOTE
FVC 50% predicted (1 63 L), FEV1 1 1 L (44% predicted) and FEV1/FVC is 68%  -shows monitor to severe COPD with low vital capacity  Patient will be started on Breo, and Incruse, 1 inhalation each daily  Urged smoking cessation    Patient will call 1 week with [id number] if worse

## 2018-05-14 ENCOUNTER — TELEPHONE (OUTPATIENT)
Dept: FAMILY MEDICINE CLINIC | Facility: CLINIC | Age: 63
End: 2018-05-14

## 2018-05-14 DIAGNOSIS — J42 CHRONIC BRONCHITIS, UNSPECIFIED CHRONIC BRONCHITIS TYPE (HCC): ICD-10-CM

## 2018-05-14 DIAGNOSIS — R06.00 DYSPNEA ON EXERTION: ICD-10-CM

## 2018-05-14 NOTE — TELEPHONE ENCOUNTER
Pt called and aware of result/information  -pt instructed to call back later this wk if HA/ pain behind eye continues

## 2018-05-16 ENCOUNTER — TELEPHONE (OUTPATIENT)
Dept: FAMILY MEDICINE CLINIC | Facility: CLINIC | Age: 63
End: 2018-05-16

## 2018-05-17 ENCOUNTER — TELEPHONE (OUTPATIENT)
Dept: FAMILY MEDICINE CLINIC | Facility: CLINIC | Age: 63
End: 2018-05-17

## 2018-05-17 DIAGNOSIS — J30.9 ALLERGIC RHINITIS, UNSPECIFIED SEASONALITY, UNSPECIFIED TRIGGER: Primary | ICD-10-CM

## 2018-05-17 DIAGNOSIS — I10 ESSENTIAL HYPERTENSION: ICD-10-CM

## 2018-05-17 RX ORDER — CETIRIZINE HYDROCHLORIDE 10 MG/1
TABLET ORAL
Qty: 30 TABLET | Refills: 3 | Status: SHIPPED | OUTPATIENT
Start: 2018-05-17 | End: 2018-09-08 | Stop reason: SDUPTHER

## 2018-05-17 RX ORDER — LISINOPRIL 20 MG/1
TABLET ORAL
Qty: 30 TABLET | Refills: 5 | Status: SHIPPED | OUTPATIENT
Start: 2018-05-17 | End: 2018-11-05 | Stop reason: SDUPTHER

## 2018-05-17 NOTE — TELEPHONE ENCOUNTER
Incruse is an anticholinergic without long acting beta agonist  Will her insurance cover Spiriva or Tudora?

## 2018-05-17 NOTE — TELEPHONE ENCOUNTER
THERE IS ANOTHER SIMILAR MESSAGE ON THIS PT AND WE WILL BE GIVING HER SPIRIVA RESPIMAT ,AND SHE IS AWARE

## 2018-05-17 NOTE — TELEPHONE ENCOUNTER
HER INSURANCE WILL COVER THE FOLLOWING INHALERS     ANORO   GURDEEPO   DULERA   COMBIVENT   SEREVENT   STIOLTO   FLUTICASONE     PLEASE PICK ONE AND LET HER KNOW

## 2018-05-24 ENCOUNTER — TELEPHONE (OUTPATIENT)
Dept: FAMILY MEDICINE CLINIC | Facility: CLINIC | Age: 63
End: 2018-05-24

## 2018-05-24 NOTE — TELEPHONE ENCOUNTER
PT CALLED HAS HAD CHEST PAIN X'S 2 DAYS ON AND OFF ,SEEMS TO HAVE STARTED WHEN SHE STARTED USING THE SPIRIVA ,SHE'S BEEN COUGHING A LOT ,AND WORKING IN THE YARD ,NO SOB OR RADIATING PAIN ,ALSO QUESTIONING IF IT MAY BE A PULLED MUSCLE ,AS PER POGO STANDARD PROCEDURE WITH CHEST PAIN IS FOR PT TO GO TO ER, PT AWARE BUT SHE IS NOT GOING TO GO TO ER ,SHE WAS JUST WONDERING IF IT COULD BE THE SPIRIVA ,IF SHE FELT IT WAS HER HEART SHE WOULD GO

## 2018-05-27 DIAGNOSIS — F32.A DEPRESSION, UNSPECIFIED DEPRESSION TYPE: ICD-10-CM

## 2018-05-28 RX ORDER — ESCITALOPRAM OXALATE 10 MG/1
10 TABLET ORAL DAILY
Qty: 90 TABLET | Refills: 0 | Status: SHIPPED | OUTPATIENT
Start: 2018-05-28 | End: 2018-08-24 | Stop reason: SDUPTHER

## 2018-06-06 ENCOUNTER — TELEPHONE (OUTPATIENT)
Dept: FAMILY MEDICINE CLINIC | Facility: CLINIC | Age: 63
End: 2018-06-06

## 2018-06-06 NOTE — TELEPHONE ENCOUNTER
You put on Singular and Breo about 1 mo ago,    For 1 wk now her tongue is a little swollen and irritated,    No distress, no troub  Breathing    Pl adv

## 2018-06-25 ENCOUNTER — OFFICE VISIT (OUTPATIENT)
Dept: FAMILY MEDICINE CLINIC | Facility: CLINIC | Age: 63
End: 2018-06-25
Payer: COMMERCIAL

## 2018-06-25 VITALS
TEMPERATURE: 98.2 F | WEIGHT: 204.8 LBS | BODY MASS INDEX: 34.97 KG/M2 | SYSTOLIC BLOOD PRESSURE: 124 MMHG | HEART RATE: 76 BPM | HEIGHT: 64 IN | DIASTOLIC BLOOD PRESSURE: 80 MMHG

## 2018-06-25 DIAGNOSIS — F41.9 ANXIETY: ICD-10-CM

## 2018-06-25 DIAGNOSIS — K21.9 GASTROESOPHAGEAL REFLUX DISEASE, ESOPHAGITIS PRESENCE NOT SPECIFIED: ICD-10-CM

## 2018-06-25 DIAGNOSIS — J42 CHRONIC BRONCHITIS, UNSPECIFIED CHRONIC BRONCHITIS TYPE (HCC): ICD-10-CM

## 2018-06-25 DIAGNOSIS — I10 ESSENTIAL HYPERTENSION: ICD-10-CM

## 2018-06-25 DIAGNOSIS — Z12.4 SCREENING FOR CERVICAL CANCER: Primary | ICD-10-CM

## 2018-06-25 DIAGNOSIS — E78.2 MIXED HYPERLIPIDEMIA: ICD-10-CM

## 2018-06-25 PROCEDURE — 99214 OFFICE O/P EST MOD 30 MIN: CPT | Performed by: FAMILY MEDICINE

## 2018-06-25 PROCEDURE — 3079F DIAST BP 80-89 MM HG: CPT | Performed by: FAMILY MEDICINE

## 2018-06-25 PROCEDURE — 3074F SYST BP LT 130 MM HG: CPT | Performed by: FAMILY MEDICINE

## 2018-06-25 NOTE — PROGRESS NOTES
Assessment/Plan:    Esophageal reflux  Improved since stopping Spiriva  Continue pantoprazole  Continue to monitor  Recheck 6 months    Chronic obstructive pulmonary disease ([address] )  Was doing well with Breo and Incruse but insurance will not cover the latter  Sample of [place] given to be used 1 inhalation b i d   Patient will call in 2 weeks with follow-up  Continue all other present medications  Hypertension  Stable  Cont present meds  Recheck 6m    Anxiety  Stable  Cont present meds       Diagnoses and all orders for this visit:    Screening for cervical cancer  -     Ambulatory referral to Gynecology; Future    Gastroesophageal reflux disease, esophagitis presence not specified  -     CBC and differential; Future    Chronic bronchitis, unspecified chronic bronchitis type (HCC)  -     aclidinium (TUDORZA PRESSAIR) 400 MCG/ACT inhaler; Inhale 1 puff (400 mcg total) 2 (two) times a day    Essential hypertension  -     Lipid panel; Future  -     Comprehensive metabolic panel; Future    Anxiety  -     TSH, 3rd generation with Free T4 reflex; Future    Mixed hyperlipidemia          Subjective:      Patient ID: Tyler Rodgers is a 58 y o  female  f/u multiple med issues  - pt states that her breathing was better on Breo but her tongue improved once she stopped it  Singulair has helped  Home pulse ox 95-96% on Breo, 90-92% off  Did well on Incruse but when we changed her to [address] [facility] [address], her GERD worsened and she developed some stomach upset  - still with occasional palpitations  No CP, SOB or other symptoms with palpitations  Did have CP a month ago that resolved since stopping Spiriva  - no new GI or  complaints  - still smoking  1 1/2 - 2ppd  Not ready to stop  The following portions of the patient's history were reviewed and updated as appropriate:   She  has a past medical history of Acid reflux; Fibromyalgia; Hypertension; and Seasonal allergies    She   Patient Active Problem List Diagnosis Date Noted    Dyspnea on exertion 05/04/2018    Edema 07/13/2017    Lumbosacral radiculopathy at L5 01/06/2017    Compression fracture of thoracic vertebra, non-traumatic (HCC) 08/30/2016    Hyperlipidemia 12/08/2015    Cataract 08/22/2014    Palpitations 08/05/2014    Chronic obstructive pulmonary disease ([facility] ) 04/17/2013    Esophageal reflux 04/17/2013    Pulmonary nodule seen on imaging study 04/17/2013    Hypertension 03/22/2013    Mild episode of recurrent major depressive disorder ([facility] ) 03/14/2013    Fibromyalgia 03/14/2013    Degeneration of intervertebral disc 03/14/2013    Anxiety 02/21/2013    Seasonal allergic rhinitis 12/20/2012     She  has a past surgical history that includes Knee surgery (1998); pr colonoscopy flx dx w/collj spec when pfrmd (N/A, 5/9/2017); and Eye surgery  She  reports that she has been smoking  She has a 100 00 pack-year smoking history  She does not have any smokeless tobacco history on file  She reports that she does not drink alcohol or use drugs    Current Outpatient Prescriptions   Medication Sig Dispense Refill    acetaminophen (TYLENOL 8 HOUR ARTHRITIS PAIN) 650 mg CR tablet Take by mouth      ALPRAZolam (XANAX) 0 25 mg tablet Take 1 tablet by mouth 3 (three) times a day      cetirizine (ZyrTEC) 10 mg tablet TAKE 1 TABLET BY MOUTH EVERY DAY 30 tablet 3    escitalopram (LEXAPRO) 10 mg tablet TAKE 1 TABLET (10 MG TOTAL) BY MOUTH DAILY 90 tablet 0    fluticasone (FLONASE) 50 mcg/act nasal spray 2 sprays into each nostril daily 16 g 3    lisinopril (ZESTRIL) 20 mg tablet TAKE 1 TABLET BY MOUTH EVERY DAY 30 tablet 5    minoxidil (ROGAINE) 2 % external solution Apply 1 application topically      montelukast (SINGULAIR) 10 mg tablet Take 1 tablet (10 mg total) by mouth daily at bedtime 30 tablet 5    Naproxen Sodium (ALEVE PO) Take 2 tablets by mouth daily      nystatin (MYCOSTATIN) 100,000 units/mL suspension Apply to the mouth or throat      pantoprazole (PROTONIX) 40 mg tablet Take 1 tablet (40 mg total) by mouth daily 30 tablet 5    ranitidine (ZANTAC) 150 mg tablet Take 150 mg by mouth 2 (two) times a day  5    aclidinium (TUDORZA PRESSAIR) 400 MCG/ACT inhaler Inhale 1 puff (400 mcg total) 2 (two) times a day 2 Inhaler 0    fluticasone furoate-vilanterol (BREO ELLIPTA) 200-25 MCG/INH inhaler Inhale 1 puff daily 1 each 0     No current facility-administered medications for this visit  She is allergic to miconazole       Review of Systems   Constitutional: Negative  HENT: Positive for congestion  Eyes: Negative  Respiratory: Positive for cough, shortness of breath and wheezing  Cardiovascular: Negative for chest pain, palpitations and leg swelling  Gastrointestinal: Negative  Endocrine: Negative  Genitourinary: Negative  Musculoskeletal: Positive for arthralgias, back pain and myalgias  Neurological: Negative  Psychiatric/Behavioral: Negative  Objective:      /80   Pulse 76   Temp 98 2 °F (36 8 °C)   Ht 5' 4" (1 626 m)   Wt 92 9 kg (204 lb 12 8 oz)   BMI 35 15 kg/m²          Physical Exam   Constitutional: She is oriented to person, place, and time  She appears well-developed and well-nourished  HENT:   Head: Normocephalic and atraumatic  Right Ear: External ear normal    Left Ear: External ear normal    Nose: Nose normal    Mouth/Throat: Oropharynx is clear and moist  No oropharyngeal exudate  Eyes: Conjunctivae and EOM are normal  Pupils are equal, round, and reactive to light  Neck: Normal range of motion  Neck supple  No thyromegaly present  Cardiovascular: Normal rate, regular rhythm and intact distal pulses  No murmur heard  Pulmonary/Chest: Effort normal and breath sounds normal    Abdominal: Soft  She exhibits no distension  There is no tenderness  Musculoskeletal: She exhibits no edema  Lymphadenopathy:     She has no cervical adenopathy     Neurological: She is alert and oriented to person, place, and time  She has normal reflexes  She exhibits normal muscle tone  Coordination normal    Skin: Skin is warm and dry  She is not diaphoretic  Psychiatric: She has a normal mood and affect  Vitals reviewed

## 2018-06-26 NOTE — ASSESSMENT & PLAN NOTE
Was doing well with Breo and Incruse but insurance will not cover the latter  Sample of [place] given to be used 1 inhalation b i d   Patient will call in 2 weeks with follow-up  Continue all other present medications

## 2018-07-25 ENCOUNTER — TELEPHONE (OUTPATIENT)
Dept: FAMILY MEDICINE CLINIC | Facility: CLINIC | Age: 63
End: 2018-07-25

## 2018-07-25 DIAGNOSIS — M75.81 TENDINITIS OF RIGHT ROTATOR CUFF: Primary | ICD-10-CM

## 2018-07-25 RX ORDER — DICLOFENAC SODIUM 75 MG/1
75 TABLET, DELAYED RELEASE ORAL 2 TIMES DAILY
Qty: 60 TABLET | Refills: 1 | Status: SHIPPED | OUTPATIENT
Start: 2018-07-25 | End: 2018-09-24 | Stop reason: SDUPTHER

## 2018-07-25 NOTE — TELEPHONE ENCOUNTER
Her rotator cuff acting up again, her daughter gave her Diclofenac 75 mg BID, she also takes Tylenol Arthritis and these are helping, can you prescribe this for her  [facility] [place] [facility]  [place]    She will visit if need be, she also has diarrhea on and off

## 2018-07-31 ENCOUNTER — TELEPHONE (OUTPATIENT)
Dept: FAMILY MEDICINE CLINIC | Facility: CLINIC | Age: 63
End: 2018-07-31

## 2018-07-31 DIAGNOSIS — J01.90 ACUTE NON-RECURRENT SINUSITIS, UNSPECIFIED LOCATION: Primary | ICD-10-CM

## 2018-07-31 RX ORDER — AMOXICILLIN 875 MG/1
875 TABLET, COATED ORAL 2 TIMES DAILY
Qty: 20 TABLET | Refills: 0 | Status: SHIPPED | OUTPATIENT
Start: 2018-07-31 | End: 2018-08-10

## 2018-07-31 NOTE — TELEPHONE ENCOUNTER
What can she take? Has pnd, sinus infect, uri,  blowing clear through nose, coughing up thick yellow mucus, watery eyes, runny nose, sorethroat,    She thinks its from walking through 2 houses on Sunday, one with mold, one with cat urine  [facility], will [facility]k with pharm   If no cb

## 2018-08-06 ENCOUNTER — TELEPHONE (OUTPATIENT)
Dept: FAMILY MEDICINE CLINIC | Facility: CLINIC | Age: 63
End: 2018-08-06

## 2018-08-07 DIAGNOSIS — R05.9 COUGH: Primary | ICD-10-CM

## 2018-08-07 RX ORDER — DEXTROMETHORPHAN HYDROBROMIDE AND PROMETHAZINE HYDROCHLORIDE 15; 6.25 MG/5ML; MG/5ML
5 SYRUP ORAL 4 TIMES DAILY PRN
Qty: 120 ML | Refills: 3 | Status: SHIPPED | OUTPATIENT
Start: 2018-08-07 | End: 2019-07-11

## 2018-08-09 DIAGNOSIS — K21.9 GASTROESOPHAGEAL REFLUX DISEASE, ESOPHAGITIS PRESENCE NOT SPECIFIED: ICD-10-CM

## 2018-08-09 RX ORDER — PANTOPRAZOLE SODIUM 40 MG/1
TABLET, DELAYED RELEASE ORAL
Qty: 30 TABLET | Refills: 5 | Status: SHIPPED | OUTPATIENT
Start: 2018-08-09 | End: 2019-02-04 | Stop reason: SDUPTHER

## 2018-08-17 ENCOUNTER — APPOINTMENT (OUTPATIENT)
Dept: LAB | Facility: MEDICAL CENTER | Age: 63
End: 2018-08-17
Payer: COMMERCIAL

## 2018-08-17 DIAGNOSIS — K21.9 GASTROESOPHAGEAL REFLUX DISEASE, ESOPHAGITIS PRESENCE NOT SPECIFIED: ICD-10-CM

## 2018-08-17 DIAGNOSIS — F41.9 ANXIETY: ICD-10-CM

## 2018-08-17 DIAGNOSIS — I10 ESSENTIAL HYPERTENSION: ICD-10-CM

## 2018-08-17 LAB
ALBUMIN SERPL BCP-MCNC: 3.5 G/DL (ref 3.5–5)
ALP SERPL-CCNC: 71 U/L (ref 46–116)
ALT SERPL W P-5'-P-CCNC: 29 U/L (ref 12–78)
ANION GAP SERPL CALCULATED.3IONS-SCNC: 11 MMOL/L (ref 4–13)
AST SERPL W P-5'-P-CCNC: 24 U/L (ref 5–45)
BASOPHILS # BLD AUTO: 0.07 THOUSANDS/ΜL (ref 0–0.1)
BASOPHILS NFR BLD AUTO: 1 % (ref 0–1)
BILIRUB SERPL-MCNC: 0.29 MG/DL (ref 0.2–1)
BUN SERPL-MCNC: 10 MG/DL (ref 5–25)
CALCIUM SERPL-MCNC: 9.4 MG/DL (ref 8.3–10.1)
CHLORIDE SERPL-SCNC: 101 MMOL/L (ref 100–108)
CHOLEST SERPL-MCNC: 218 MG/DL (ref 50–200)
CO2 SERPL-SCNC: 26 MMOL/L (ref 21–32)
CREAT SERPL-MCNC: 0.62 MG/DL (ref 0.6–1.3)
EOSINOPHIL # BLD AUTO: 0.26 THOUSAND/ΜL (ref 0–0.61)
EOSINOPHIL NFR BLD AUTO: 3 % (ref 0–6)
ERYTHROCYTE [DISTWIDTH] IN BLOOD BY AUTOMATED COUNT: 13.2 % (ref 11.6–15.1)
GFR SERPL CREATININE-BSD FRML MDRD: 97 ML/MIN/1.73SQ M
GLUCOSE P FAST SERPL-MCNC: 91 MG/DL (ref 65–99)
HCT VFR BLD AUTO: 45.7 % (ref 34.8–46.1)
HDLC SERPL-MCNC: 49 MG/DL (ref 40–60)
HGB BLD-MCNC: 14.1 G/DL (ref 11.5–15.4)
IMM GRANULOCYTES # BLD AUTO: 0.04 THOUSAND/UL (ref 0–0.2)
IMM GRANULOCYTES NFR BLD AUTO: 1 % (ref 0–2)
LDLC SERPL CALC-MCNC: 123 MG/DL (ref 0–100)
LYMPHOCYTES # BLD AUTO: 2.11 THOUSANDS/ΜL (ref 0.6–4.47)
LYMPHOCYTES NFR BLD AUTO: 25 % (ref 14–44)
MCH RBC QN AUTO: 27.6 PG (ref 26.8–34.3)
MCHC RBC AUTO-ENTMCNC: 30.9 G/DL (ref 31.4–37.4)
MCV RBC AUTO: 89 FL (ref 82–98)
MONOCYTES # BLD AUTO: 0.81 THOUSAND/ΜL (ref 0.17–1.22)
MONOCYTES NFR BLD AUTO: 10 % (ref 4–12)
NEUTROPHILS # BLD AUTO: 5.11 THOUSANDS/ΜL (ref 1.85–7.62)
NEUTS SEG NFR BLD AUTO: 60 % (ref 43–75)
NONHDLC SERPL-MCNC: 169 MG/DL
NRBC BLD AUTO-RTO: 0 /100 WBCS
PLATELET # BLD AUTO: 294 THOUSANDS/UL (ref 149–390)
PMV BLD AUTO: 9.9 FL (ref 8.9–12.7)
POTASSIUM SERPL-SCNC: 4.7 MMOL/L (ref 3.5–5.3)
PROT SERPL-MCNC: 7.7 G/DL (ref 6.4–8.2)
RBC # BLD AUTO: 5.11 MILLION/UL (ref 3.81–5.12)
SODIUM SERPL-SCNC: 138 MMOL/L (ref 136–145)
TRIGL SERPL-MCNC: 230 MG/DL
TSH SERPL DL<=0.05 MIU/L-ACNC: 2.75 UIU/ML (ref 0.36–3.74)
WBC # BLD AUTO: 8.4 THOUSAND/UL (ref 4.31–10.16)

## 2018-08-17 PROCEDURE — 80061 LIPID PANEL: CPT

## 2018-08-17 PROCEDURE — 84443 ASSAY THYROID STIM HORMONE: CPT

## 2018-08-17 PROCEDURE — 80053 COMPREHEN METABOLIC PANEL: CPT

## 2018-08-17 PROCEDURE — 85025 COMPLETE CBC W/AUTO DIFF WBC: CPT

## 2018-08-17 PROCEDURE — 36415 COLL VENOUS BLD VENIPUNCTURE: CPT

## 2018-08-22 ENCOUNTER — TELEPHONE (OUTPATIENT)
Dept: FAMILY MEDICINE CLINIC | Facility: CLINIC | Age: 63
End: 2018-08-22

## 2018-08-24 DIAGNOSIS — F32.A DEPRESSION, UNSPECIFIED DEPRESSION TYPE: ICD-10-CM

## 2018-08-24 RX ORDER — ESCITALOPRAM OXALATE 10 MG/1
TABLET ORAL
Qty: 90 TABLET | Refills: 0 | Status: SHIPPED | OUTPATIENT
Start: 2018-08-24 | End: 2019-07-11

## 2018-08-28 ENCOUNTER — TELEPHONE (OUTPATIENT)
Dept: GASTROENTEROLOGY | Facility: AMBULARY SURGERY CENTER | Age: 63
End: 2018-08-28

## 2018-08-28 ENCOUNTER — OFFICE VISIT (OUTPATIENT)
Dept: GASTROENTEROLOGY | Facility: AMBULARY SURGERY CENTER | Age: 63
End: 2018-08-28
Payer: COMMERCIAL

## 2018-08-28 VITALS
HEART RATE: 98 BPM | DIASTOLIC BLOOD PRESSURE: 80 MMHG | BODY MASS INDEX: 34.86 KG/M2 | TEMPERATURE: 98.1 F | SYSTOLIC BLOOD PRESSURE: 160 MMHG | WEIGHT: 204.2 LBS | RESPIRATION RATE: 18 BRPM | HEIGHT: 64 IN

## 2018-08-28 DIAGNOSIS — K21.9 GASTROESOPHAGEAL REFLUX DISEASE, ESOPHAGITIS PRESENCE NOT SPECIFIED: ICD-10-CM

## 2018-08-28 DIAGNOSIS — R10.10 PAIN OF UPPER ABDOMEN: ICD-10-CM

## 2018-08-28 DIAGNOSIS — Z80.0 FAMILY HISTORY OF PANCREATIC CANCER: ICD-10-CM

## 2018-08-28 DIAGNOSIS — R68.81 EARLY SATIETY: ICD-10-CM

## 2018-08-28 DIAGNOSIS — R14.0 ABDOMINAL DISTENSION: Primary | ICD-10-CM

## 2018-08-28 PROCEDURE — 99214 OFFICE O/P EST MOD 30 MIN: CPT | Performed by: PHYSICIAN ASSISTANT

## 2018-08-28 RX ORDER — FLUTICASONE FUROATE AND VILANTEROL 100; 25 UG/1; UG/1
1 POWDER RESPIRATORY (INHALATION) DAILY
COMMUNITY
End: 2018-12-18 | Stop reason: SDUPTHER

## 2018-08-28 NOTE — LETTER
August 28, 2018     Paige Hand MD  [address]    Patient: Albina Araujo   YOB: 1955   Date of Visit: 8/28/2018       Dear Dr Gretchen Coyne:    Thank you for referring Albina Araujo to me for evaluation  Below are my notes for this consultation  If you have questions, please do not hesitate to call me  I look forward to following your patient along with you  Sincerely,        Darius Roblero PA-C        CC: No Recipients  Darius Roblero PA-C  8/28/2018 10:49 AM  Sign at close encounter  [facility] - Outpatient Follow-up Note  Albina Araujo 58 y o  female MRN: [id number]  Encounter: [id number]    ASSESSMENT AND PLAN:      Abdominal distension  Early satiety  -Symptoms may be secondary to delayed gastric emptying, gastritis, likely exacerbated by constipation  -recent CBC CMP TSH unremarkable  -recommend gastric emptying scan to evaluate for gastroparesis  -Will order CT scan given family history of pancreatic cancer in mother and 2 brothers  -she will continue pantoprazole  -she will eat small meals  -bowel regimen as below    Chronic Constipation  -She will trial miralax starting with once daily, can increase to twice daily if needed  -She will call us if this is ineffective  -colonoscopy last year was normal    GERD  -continue pantoprazole  -continue antireflux diet  ______________________________________________________________________    SUBJECTIVE:  Albina Araujo is a 57 y/o female with a history of GERD and constipation who presents for followup  She states that over the last couple of years but getting progressively worse she has had abdominal distension following eating along with epigastric tightness without pain  She at times gets nauseated and has had a few incidences of vomiting  She does state she is chronically constipated but generally has a bm every day though it is typically small and she has straining   She states her GERD symptoms are well controlled on pantoprazole  She had an EGD 5/2017 that showed an erosion at the GEJ and mild gastritis pathology negative for hpylori  She had a colonoscopy at that time which was generally unremarkable with exception hyperplastic rectal polyps  She denies black or bloody stools  She denies a history of diabetes, no chronic narcotic use  She has a strong family history of pancreatic cancer in her mother and 2 brothers  She denies unintentional weight loss, she has actually gained weight  REVIEW OF SYSTEMS IS OTHERWISE NEGATIVE  Historical Information   Past Medical History:   Diagnosis Date    Acid reflux     Fibromyalgia     Hypertension     Seasonal allergies      Past Surgical History:   Procedure Laterality Date    EYE SURGERY      KNEE SURGERY  1998    FL COLONOSCOPY FLX DX W/COLLJ SPEC WHEN PFRMD N/A 5/9/2017    Procedure: EGD AND COLONOSCOPY;  Surgeon: Fabio Lynn MD;  Location: [facility]; Service: Gastroenterology     Social History   History   Alcohol Use No     History   Drug Use No     History   Smoking Status    Current Every Day Smoker    Packs/day: 2 00    Years: 50 00   Smokeless Tobacco    Never Used     Comment: Per [employer] pt quit smoking       Family History   Problem Relation Age of Onset    Cancer Mother         pancreatic    Lung cancer Sister     Cancer Brother         pancreatic    Coronary artery disease Father     Diabetes Father     Hypertension Father     Heart disease Father     Diabetes Paternal Grandmother     Cancer Paternal Grandfather      Meds/Allergies     Current Outpatient Prescriptions:     acetaminophen (TYLENOL 8 HOUR ARTHRITIS PAIN) 650 mg CR tablet    ALPRAZolam (XANAX) 0 25 mg tablet    cetirizine (ZyrTEC) 10 mg tablet    diclofenac (VOLTAREN) 75 mg EC tablet    escitalopram (LEXAPRO) 10 mg tablet    fluticasone-vilanterol (BREO ELLIPTA) 100-25 mcg/inh inhaler    lisinopril (ZESTRIL) 20 mg tablet    montelukast (SINGULAIR) 10 mg tablet    pantoprazole (PROTONIX) 40 mg tablet    ranitidine (ZANTAC) 150 mg tablet    aclidinium (TUDORZA PRESSAIR) 400 MCG/ACT inhaler    fluticasone (FLONASE) 50 mcg/act nasal spray    fluticasone furoate-vilanterol (BREO ELLIPTA) 200-25 MCG/INH inhaler    minoxidil (ROGAINE) 2 % external solution    Naproxen Sodium (ALEVE PO)    nystatin (MYCOSTATIN) 100,000 units/mL suspension    promethazine-dextromethorphan (PHENERGAN-DM) 6 25-15 mg/5 mL oral syrup    Allergies   Allergen Reactions    Miconazole Itching and Swelling           Objective     Blood pressure 160/80, pulse 98, temperature 98 1 °F (36 7 °C), temperature source Tympanic, resp  rate 18, height 5' 4" (1 626 m), weight 92 6 kg (204 lb 3 2 oz)  Body mass index is 35 05 kg/m²  PHYSICAL EXAM:    General Appearance:   Obese, Alert, cooperative, no distress   HEENT:   Normocephalic, atraumatic, anicteric      Neck:  Supple, symmetrical, trachea midline   Lungs:   Clear to auscultation bilaterally   Heart[de-identified]   Regular rate and rhythm; no murmur, rub, or gallop  Abdomen:   Soft, non-tender, non-distended; normal bowel sounds   Genitalia:   Deferred    Rectal:   Deferred    Extremities:  No cyanosis, clubbing or edema    Pulses:  2+ and symmetric    Skin:  No jaundice, rashes, or lesions    Lymph nodes:  No palpable cervical lymphadenopathy        Lab Results:   No visits with results within 1 Day(s) from this visit     Latest known visit with results is:   Appointment on 08/17/2018   Component Date Value    WBC 08/17/2018 8 40     RBC 08/17/2018 5 11     Hemoglobin 08/17/2018 14 1     Hematocrit 08/17/2018 45 7     MCV 08/17/2018 89     MCH 08/17/2018 27 6     MCHC 08/17/2018 30 9*    RDW 08/17/2018 13 2     MPV 08/17/2018 9 9     Platelets 72/70/3480 294     nRBC 08/17/2018 0     Neutrophils Relative 08/17/2018 60     Immat GRANS % 08/17/2018 1     Lymphocytes Relative 08/17/2018 25     Monocytes Relative 08/17/2018 10     Eosinophils Relative 08/17/2018 3     Basophils Relative 08/17/2018 1     Neutrophils Absolute 08/17/2018 5 11     Immature Grans Absolute 08/17/2018 0 04     Lymphocytes Absolute 08/17/2018 2 11     Monocytes Absolute 08/17/2018 0 81     Eosinophils Absolute 08/17/2018 0 26     Basophils Absolute 08/17/2018 0 07     Cholesterol 08/17/2018 218*    Triglycerides 08/17/2018 230*    HDL, Direct 08/17/2018 49     LDL Calculated 08/17/2018 123*    Non-HDL-Chol (CHOL-HDL) 08/17/2018 169     Sodium 08/17/2018 138     Potassium 08/17/2018 4 7     Chloride 08/17/2018 101     CO2 08/17/2018 26     ANION GAP 08/17/2018 11     BUN 08/17/2018 10     Creatinine 08/17/2018 0 62     Glucose, Fasting 08/17/2018 91     Calcium 08/17/2018 9 4     AST 08/17/2018 24     ALT 08/17/2018 29     Alkaline Phosphatase 08/17/2018 71     Total Protein 08/17/2018 7 7     Albumin 08/17/2018 3 5     Total Bilirubin 08/17/2018 0 29     eGFR 08/17/2018 97     TSH 3RD GENERATON 08/17/2018 2 750          Radiology Results:   No results found

## 2018-08-28 NOTE — TELEPHONE ENCOUNTER
Patient is scheduled for her gastric empty study on 10/1/2018, patient may need an [address]

## 2018-08-28 NOTE — PROGRESS NOTES
[facility] - Outpatient Follow-up Note  Albina Araujo 58 y o  female MRN: [id number]  Encounter: [id number]    ASSESSMENT AND PLAN:      Abdominal distension  Early satiety  -Symptoms may be secondary to delayed gastric emptying, gastritis, likely exacerbated by constipation  -recent CBC CMP TSH unremarkable  -recommend gastric emptying scan to evaluate for gastroparesis  -Will order CT scan given family history of pancreatic cancer in mother and 2 brothers  -she will continue pantoprazole  -she will eat small meals  -bowel regimen as below    Chronic Constipation  -She will trial miralax starting with once daily, can increase to twice daily if needed  -She will call us if this is ineffective  -colonoscopy last year was normal    GERD  -continue pantoprazole  -continue antireflux diet  ______________________________________________________________________    SUBJECTIVE:  Albina Araujo is a 59 y/o female with a history of GERD and constipation who presents for followup  She states that over the last couple of years but getting progressively worse she has had abdominal distension following eating along with epigastric tightness without pain  She at times gets nauseated and has had a few incidences of vomiting  She does state she is chronically constipated but generally has a bm every day though it is typically small and she has straining  She states her GERD symptoms are well controlled on pantoprazole  She had an EGD 5/2017 that showed an erosion at the GEJ and mild gastritis pathology negative for hpylori  She had a colonoscopy at that time which was generally unremarkable with exception hyperplastic rectal polyps  She denies black or bloody stools  She denies a history of diabetes, no chronic narcotic use  She has a strong family history of pancreatic cancer in her mother and 2 brothers  She denies unintentional weight loss, she has actually gained weight         REVIEW OF SYSTEMS IS OTHERWISE NEGATIVE  Historical Information   Past Medical History:   Diagnosis Date    Acid reflux     Fibromyalgia     Hypertension     Seasonal allergies      Past Surgical History:   Procedure Laterality Date    EYE SURGERY      KNEE SURGERY  1998    OH COLONOSCOPY FLX DX W/COLLJ SPEC WHEN PFRMD N/A 5/9/2017    Procedure: EGD AND COLONOSCOPY;  Surgeon: Franky Champagne MD;  [facility]; Service: Gastroenterology     Social History   History   Alcohol Use No     History   Drug Use No     History   Smoking Status    Current Every Day Smoker    Packs/day: 2 00    Years: 50 00   Smokeless Tobacco    Never Used     Comment: Per [employer] pt quit smoking       Family History   Problem Relation Age of Onset    Cancer Mother         pancreatic    Lung cancer Sister     Cancer Brother         pancreatic    Coronary artery disease Father     Diabetes Father     Hypertension Father     Heart disease Father     Diabetes Paternal Grandmother     Cancer Paternal Grandfather      Meds/Allergies     Current Outpatient Prescriptions:     acetaminophen (TYLENOL 8 HOUR ARTHRITIS PAIN) 650 mg CR tablet    ALPRAZolam (XANAX) 0 25 mg tablet    cetirizine (ZyrTEC) 10 mg tablet    diclofenac (VOLTAREN) 75 mg EC tablet    escitalopram (LEXAPRO) 10 mg tablet    fluticasone-vilanterol (BREO ELLIPTA) 100-25 mcg/inh inhaler    lisinopril (ZESTRIL) 20 mg tablet    montelukast (SINGULAIR) 10 mg tablet    pantoprazole (PROTONIX) 40 mg tablet    ranitidine (ZANTAC) 150 mg tablet    aclidinium (TUDORZA PRESSAIR) 400 MCG/ACT inhaler    fluticasone (FLONASE) 50 mcg/act nasal spray    fluticasone furoate-vilanterol (BREO ELLIPTA) 200-25 MCG/INH inhaler    minoxidil (ROGAINE) 2 % external solution    Naproxen Sodium (ALEVE PO)    nystatin (MYCOSTATIN) 100,000 units/mL suspension    promethazine-dextromethorphan (PHENERGAN-DM) 6 25-15 mg/5 mL oral syrup    Allergies   Allergen Reactions    Miconazole Itching and Swelling           Objective     Blood pressure 160/80, pulse 98, temperature 98 1 °F (36 7 °C), temperature source Tympanic, resp  rate 18, height 5' 4" (1 626 m), weight 92 6 kg (204 lb 3 2 oz)  Body mass index is 35 05 kg/m²  PHYSICAL EXAM:    General Appearance:   Obese, Alert, cooperative, no distress   HEENT:   Normocephalic, atraumatic, anicteric      Neck:  Supple, symmetrical, trachea midline   Lungs:   Clear to auscultation bilaterally   Heart[de-identified]   Regular rate and rhythm; no murmur, rub, or gallop  Abdomen:   Soft, non-tender, non-distended; normal bowel sounds   Genitalia:   Deferred    Rectal:   Deferred    Extremities:  No cyanosis, clubbing or edema    Pulses:  2+ and symmetric    Skin:  No jaundice, rashes, or lesions    Lymph nodes:  No palpable cervical lymphadenopathy        Lab Results:   No visits with results within 1 Day(s) from this visit     Latest known visit with results is:   Appointment on 08/17/2018   Component Date Value    WBC 08/17/2018 8 40     RBC 08/17/2018 5 11     Hemoglobin 08/17/2018 14 1     Hematocrit 08/17/2018 45 7     MCV 08/17/2018 89     MCH 08/17/2018 27 6     MCHC 08/17/2018 30 9*    RDW 08/17/2018 13 2     MPV 08/17/2018 9 9     Platelets 85/81/1834 294     nRBC 08/17/2018 0     Neutrophils Relative 08/17/2018 60     Immat GRANS % 08/17/2018 1     Lymphocytes Relative 08/17/2018 25     Monocytes Relative 08/17/2018 10     Eosinophils Relative 08/17/2018 3     Basophils Relative 08/17/2018 1     Neutrophils Absolute 08/17/2018 5 11     Immature Grans Absolute 08/17/2018 0 04     Lymphocytes Absolute 08/17/2018 2 11     Monocytes Absolute 08/17/2018 0 81     Eosinophils Absolute 08/17/2018 0 26     Basophils Absolute 08/17/2018 0 07     Cholesterol 08/17/2018 218*    Triglycerides 08/17/2018 230*    HDL, Direct 08/17/2018 49     LDL Calculated 08/17/2018 123*    Non-HDL-Chol (CHOL-HDL) 08/17/2018 169     Sodium 08/17/2018 138     Potassium 08/17/2018 4 7     Chloride 08/17/2018 101     CO2 08/17/2018 26     ANION GAP 08/17/2018 11     BUN 08/17/2018 10     Creatinine 08/17/2018 0 62     Glucose, Fasting 08/17/2018 91     Calcium 08/17/2018 9 4     AST 08/17/2018 24     ALT 08/17/2018 29     Alkaline Phosphatase 08/17/2018 71     Total Protein 08/17/2018 7 7     Albumin 08/17/2018 3 5     Total Bilirubin 08/17/2018 0 29     eGFR 08/17/2018 97     TSH 3RD GENERATON 08/17/2018 2 750          Radiology Results:   No results found

## 2018-08-30 ENCOUNTER — TELEPHONE (OUTPATIENT)
Dept: FAMILY MEDICINE CLINIC | Facility: CLINIC | Age: 63
End: 2018-08-30

## 2018-08-30 NOTE — TELEPHONE ENCOUNTER
She is on her second refill of Diclofenac sodium, she is feeling good on it  She is reading the side effects, about being on bp med, depression meds, can cause heart failure, stroke, etc   Should she cont it? Pl adv

## 2018-08-30 NOTE — TELEPHONE ENCOUNTER
These are warnings - all NSAIDs could increase sodium resorption and make BP go up or increase risk for CHF in a person with a weak heart  But-  They are warnings  As long as she takes it with food, and continues to have her BP and labs monitored, she can continue the med   There are risks but the benefits are pretty good

## 2018-08-31 NOTE — TELEPHONE ENCOUNTER
Spoke with Sofia Real at [facility], since provider and facility are participating and OP, no Kwadwo Castillo is required

## 2018-09-08 DIAGNOSIS — J30.9 ALLERGIC RHINITIS, UNSPECIFIED SEASONALITY, UNSPECIFIED TRIGGER: ICD-10-CM

## 2018-09-09 RX ORDER — LORATADINE 10 MG
TABLET ORAL
Qty: 30 TABLET | Refills: 3 | Status: SHIPPED | OUTPATIENT
Start: 2018-09-09 | End: 2019-08-01 | Stop reason: SDUPTHER

## 2018-09-12 DIAGNOSIS — B37.0 THRUSH: Primary | ICD-10-CM

## 2018-09-24 DIAGNOSIS — M75.81 TENDINITIS OF RIGHT ROTATOR CUFF: ICD-10-CM

## 2018-09-24 RX ORDER — DICLOFENAC SODIUM 75 MG/1
75 TABLET, DELAYED RELEASE ORAL 2 TIMES DAILY
Qty: 60 TABLET | Refills: 1 | Status: SHIPPED | OUTPATIENT
Start: 2018-09-24 | End: 2019-04-29 | Stop reason: SDUPTHER

## 2018-10-01 ENCOUNTER — HOSPITAL ENCOUNTER (OUTPATIENT)
Dept: NUCLEAR MEDICINE | Facility: HOSPITAL | Age: 63
Discharge: HOME | End: 2018-10-01
Payer: COMMERCIAL

## 2018-10-01 DIAGNOSIS — R14.0 ABDOMINAL DISTENSION: ICD-10-CM

## 2018-10-01 PROCEDURE — A9541 TC99M SULFUR COLLOID: HCPCS

## 2018-10-01 PROCEDURE — 78264 GASTRIC EMPTYING IMG STUDY: CPT

## 2018-10-02 ENCOUNTER — DOCUMENTATION (OUTPATIENT)
Dept: GASTROENTEROLOGY | Facility: MEDICAL CENTER | Age: 63
End: 2018-10-02

## 2018-10-02 ENCOUNTER — TELEPHONE (OUTPATIENT)
Dept: GASTROENTEROLOGY | Facility: CLINIC | Age: 63
End: 2018-10-02

## 2018-10-02 DIAGNOSIS — R68.81 EARLY SATIETY: ICD-10-CM

## 2018-10-02 DIAGNOSIS — R14.0 ABDOMINAL DISTENSION: Primary | ICD-10-CM

## 2018-10-02 NOTE — TELEPHONE ENCOUNTER
Dr Yasmeen Serrano pt    Pt will be having a CT on 10/12   she will need lab work for bun/creatinine ordered before her test  Pts call back # is [phone]

## 2018-10-05 ENCOUNTER — APPOINTMENT (OUTPATIENT)
Dept: LAB | Facility: MEDICAL CENTER | Age: 63
End: 2018-10-05
Payer: COMMERCIAL

## 2018-10-05 DIAGNOSIS — R68.81 EARLY SATIETY: ICD-10-CM

## 2018-10-05 DIAGNOSIS — R14.0 ABDOMINAL DISTENSION: ICD-10-CM

## 2018-10-05 LAB
BUN SERPL-MCNC: 11 MG/DL (ref 5–25)
CREAT SERPL-MCNC: 0.61 MG/DL (ref 0.6–1.3)
GFR SERPL CREATININE-BSD FRML MDRD: 97 ML/MIN/1.73SQ M

## 2018-10-05 PROCEDURE — 82565 ASSAY OF CREATININE: CPT

## 2018-10-05 PROCEDURE — 36415 COLL VENOUS BLD VENIPUNCTURE: CPT

## 2018-10-05 PROCEDURE — 84520 ASSAY OF UREA NITROGEN: CPT

## 2018-10-10 ENCOUNTER — TELEPHONE (OUTPATIENT)
Dept: GASTROENTEROLOGY | Facility: CLINIC | Age: 63
End: 2018-10-10

## 2018-10-10 DIAGNOSIS — R68.81 EARLY SATIETY: Primary | ICD-10-CM

## 2018-10-10 NOTE — TELEPHONE ENCOUNTER
called pt to inform herabout denied CT and a ulrasound was order instead  Also central scheduling and canceled CT     ----- Message from Mónica Shetty PA-C sent at 10/10/2018 10:25 AM EDT -----  Regarding: RE: denied radiology study  Order placed for RUQ US  Please let patient know and mail script  If this is normal, we will then attempt to do CT again  ----- Message -----  From: Daisha Ruiz MA  Sent: 10/10/2018   7:24 AM  To: [facility] Provider  Subject: denied radiology study                           Case has been denied, ultrasound must be performed prior to CT study   Denial letter is in chart under media for reference

## 2018-10-16 ENCOUNTER — HOSPITAL ENCOUNTER (OUTPATIENT)
Dept: ULTRASOUND IMAGING | Facility: HOSPITAL | Age: 63
Discharge: HOME | End: 2018-10-16
Payer: COMMERCIAL

## 2018-10-16 DIAGNOSIS — R68.81 EARLY SATIETY: ICD-10-CM

## 2018-10-16 PROCEDURE — 76705 ECHO EXAM OF ABDOMEN: CPT

## 2018-10-19 ENCOUNTER — TELEPHONE (OUTPATIENT)
Dept: GASTROENTEROLOGY | Facility: AMBULARY SURGERY CENTER | Age: 63
End: 2018-10-19

## 2018-10-19 NOTE — TELEPHONE ENCOUNTER
----- Message from Caleb Cohen PA-C sent at 10/17/2018  1:19 PM EDT -----  Please inform patient that the US was normal aside from fatty liver which is when fat deposits in the liver  The treatment for this is a low fat diet, exercise, and weight loss to reduce risk of developing scarring  Her gallbladder is normal  The pancreas was only partially visualized  The US did not show any reason for her symptoms and only partially visualized her pancreas  Can we see if her insurance will cover the CT scan now that the [address] was done?

## 2018-10-19 NOTE — TELEPHONE ENCOUNTER
Called [facility]; they transferred me to [address] with Amanda Barnard Back is pending per clin doc  Tracking number [id number]

## 2018-10-21 DIAGNOSIS — J42 CHRONIC BRONCHITIS, UNSPECIFIED CHRONIC BRONCHITIS TYPE (HCC): ICD-10-CM

## 2018-10-21 DIAGNOSIS — J30.2 SEASONAL ALLERGIC RHINITIS, UNSPECIFIED TRIGGER: ICD-10-CM

## 2018-10-21 RX ORDER — MONTELUKAST SODIUM 10 MG/1
TABLET ORAL
Qty: 30 TABLET | Refills: 5 | Status: SHIPPED | OUTPATIENT
Start: 2018-10-21 | End: 2019-04-28 | Stop reason: SDUPTHER

## 2018-10-25 ENCOUNTER — TELEPHONE (OUTPATIENT)
Dept: GASTROENTEROLOGY | Facility: AMBULARY SURGERY CENTER | Age: 63
End: 2018-10-25

## 2018-10-25 NOTE — TELEPHONE ENCOUNTER
After discussion with radiologist, he states he can approve a CT of the abdomen only but he needs the ultrasound result in order to do this  Please fax over the ultrasound results that was performed on 10/16 to [facility]  Once a received this, they should be able to approved the CT scan  He reports that we should receive an approval once they have the ultrasound and/or 1 of the staff members there will reach out if they need any more information

## 2018-10-29 ENCOUNTER — TELEPHONE (OUTPATIENT)
Dept: GASTROENTEROLOGY | Facility: CLINIC | Age: 63
End: 2018-10-29

## 2018-10-29 DIAGNOSIS — Z80.0 FAMILY HISTORY OF PANCREATIC CANCER: ICD-10-CM

## 2018-10-29 DIAGNOSIS — R68.81 EARLY SATIETY: Primary | ICD-10-CM

## 2018-10-29 NOTE — TELEPHONE ENCOUNTER
Order for ct abdomen and pelvis was denied    I started a new order for a ct abdomen    Can you please put in a new order for     CT abdomen    Tracking number for this new order is [id number]    Clinicals were pulled from the previous ct abdomen and pelvis

## 2018-10-29 NOTE — TELEPHONE ENCOUNTER
Thank you emily,     however I only needed an order for a CT abdomen  Per the conversation on 10/25/2018 between david and griselda only the abdomen is going to be approved  griselda faxed over the ultrasound as requested      I called to follow up today and they told me to start a new Prior auth for the ct abdomen since that was the only image that they would consider

## 2018-11-05 DIAGNOSIS — I10 ESSENTIAL HYPERTENSION: ICD-10-CM

## 2018-11-05 RX ORDER — LISINOPRIL 20 MG/1
TABLET ORAL
Qty: 30 TABLET | Refills: 5 | Status: SHIPPED | OUTPATIENT
Start: 2018-11-05 | End: 2019-05-07 | Stop reason: SDUPTHER

## 2018-11-08 ENCOUNTER — HOSPITAL ENCOUNTER (OUTPATIENT)
Dept: CT IMAGING | Facility: HOSPITAL | Age: 63
Discharge: HOME | End: 2018-11-08
Payer: COMMERCIAL

## 2018-11-08 DIAGNOSIS — Z80.0 FAMILY HISTORY OF PANCREATIC CANCER: ICD-10-CM

## 2018-11-08 DIAGNOSIS — R68.81 EARLY SATIETY: ICD-10-CM

## 2018-11-08 PROCEDURE — 74160 CT ABDOMEN W/CONTRAST: CPT

## 2018-11-08 RX ADMIN — IOHEXOL 100 ML: 350 INJECTION, SOLUTION INTRAVENOUS at 08:20

## 2018-11-13 ENCOUNTER — TELEPHONE (OUTPATIENT)
Dept: GASTROENTEROLOGY | Facility: MEDICAL CENTER | Age: 63
End: 2018-11-13

## 2018-11-13 ENCOUNTER — TELEPHONE (OUTPATIENT)
Dept: FAMILY MEDICINE CLINIC | Facility: CLINIC | Age: 63
End: 2018-11-13

## 2018-11-13 DIAGNOSIS — B37.3 CANDIDAL VAGINITIS: Primary | ICD-10-CM

## 2018-11-13 RX ORDER — FLUCONAZOLE 150 MG/1
150 TABLET ORAL ONCE
Qty: 1 TABLET | Refills: 0 | Status: SHIPPED | OUTPATIENT
Start: 2018-11-13 | End: 2018-11-13

## 2018-11-13 NOTE — TELEPHONE ENCOUNTER
Dr Melita Smith pt called looking for her results from her CT ABD/Pelvis of 11/08/18   Pt can be reached at [phone]

## 2018-11-13 NOTE — TELEPHONE ENCOUNTER
She has burning with urination  She feels its reoccurring yeast infectiion  Can you call script to [facility]  See Allergies  Will ck pharmacy later

## 2018-11-13 NOTE — TELEPHONE ENCOUNTER
I see an allergy for miconazole - what was the reaction? Has she ever taken Diflucan (fluconazole) pills?

## 2018-12-18 DIAGNOSIS — F41.9 ANXIETY: ICD-10-CM

## 2018-12-18 DIAGNOSIS — J44.9 CHRONIC OBSTRUCTIVE PULMONARY DISEASE, UNSPECIFIED COPD TYPE (HCC): Primary | ICD-10-CM

## 2018-12-18 RX ORDER — ALPRAZOLAM 0.25 MG/1
0.25 TABLET ORAL 3 TIMES DAILY PRN
Qty: 90 TABLET | Refills: 0 | Status: SHIPPED | OUTPATIENT
Start: 2018-12-18 | End: 2019-04-26 | Stop reason: SDUPTHER

## 2018-12-18 RX ORDER — FLUTICASONE FUROATE AND VILANTEROL 100; 25 UG/1; UG/1
1 POWDER RESPIRATORY (INHALATION) DAILY
Qty: 28 EACH | Refills: 3 | Status: SHIPPED | OUTPATIENT
Start: 2018-12-18 | End: 2019-07-06 | Stop reason: SDUPTHER

## 2019-01-08 ENCOUNTER — OFFICE VISIT (OUTPATIENT)
Dept: FAMILY MEDICINE CLINIC | Facility: CLINIC | Age: 64
End: 2019-01-08
Payer: COMMERCIAL

## 2019-01-08 VITALS
RESPIRATION RATE: 16 BRPM | HEIGHT: 64 IN | BODY MASS INDEX: 34.97 KG/M2 | HEART RATE: 86 BPM | TEMPERATURE: 98.4 F | DIASTOLIC BLOOD PRESSURE: 82 MMHG | SYSTOLIC BLOOD PRESSURE: 162 MMHG | WEIGHT: 204.8 LBS

## 2019-01-08 DIAGNOSIS — M79.2 NEUROPATHIC PAIN: ICD-10-CM

## 2019-01-08 DIAGNOSIS — M54.17 LUMBOSACRAL RADICULOPATHY AT L5: ICD-10-CM

## 2019-01-08 DIAGNOSIS — I10 ESSENTIAL HYPERTENSION: ICD-10-CM

## 2019-01-08 DIAGNOSIS — J42 CHRONIC BRONCHITIS, UNSPECIFIED CHRONIC BRONCHITIS TYPE (HCC): Primary | ICD-10-CM

## 2019-01-08 PROCEDURE — 3008F BODY MASS INDEX DOCD: CPT | Performed by: FAMILY MEDICINE

## 2019-01-08 PROCEDURE — 99214 OFFICE O/P EST MOD 30 MIN: CPT | Performed by: FAMILY MEDICINE

## 2019-01-08 RX ORDER — AMLODIPINE BESYLATE 5 MG/1
5 TABLET ORAL DAILY
Qty: 30 TABLET | Refills: 1 | Status: SHIPPED | OUTPATIENT
Start: 2019-01-08 | End: 2019-03-04 | Stop reason: SDUPTHER

## 2019-01-08 RX ORDER — GABAPENTIN 300 MG/1
300 CAPSULE ORAL 2 TIMES DAILY
Qty: 60 CAPSULE | Refills: 4 | Status: SHIPPED | OUTPATIENT
Start: 2019-01-08 | End: 2019-05-16 | Stop reason: ALTCHOICE

## 2019-01-08 NOTE — PROGRESS NOTES
Assessment/Plan:    No problem-specific Assessment & Plan notes found for this encounter  There are no diagnoses linked to this encounter  Subjective:      Patient ID: Keith Snyder is a 61 y o  female  f/u multiple med issues  - couple week hx of L low back pain radiating to L leg  Worse if she sleep on her L side  Was previously in her L thigh, now with a "cold burning sensation" in her L lower leg  Feels like arms and legs are weaker symmetrically  - pt with 2 week hx of L 3rd and 4th finger pain in the fingertips  Pain is "burning" in nature and constant  No trauma or rash  Pt with chronic neck pain that she attributes to her fibro  - pt states that her breathing is stable, though dtr states that she seems more SOB  Admits to SOB with exertion  No CP, palp or lightheadedness  Still amoking 1 1/2 ppd  Compliant with Breo  Tongue is ok as long as she rinses well after use  Does note some dry mouth  Does not want to stop smoking at present - pt understands risks  - no new GI or  complaints  Did see GI who confirmed her gastroparesis and fatty liver  - has a couple of moles she would like checked  The following portions of the patient's history were reviewed and updated as appropriate:   She  has a past medical history of Acid reflux; Fibromyalgia; Hypertension; and Seasonal allergies    She   Patient Active Problem List    Diagnosis Date Noted    Early satiety 08/28/2018    Abdominal distension 08/28/2018    Family history of pancreatic cancer 08/28/2018    Pain of upper abdomen 08/28/2018    Dyspnea on exertion 05/04/2018    Edema 07/13/2017    Lumbosacral radiculopathy at L5 01/06/2017    Compression fracture of thoracic vertebra, non-traumatic ([facility] [address] ) 08/30/2016    Hyperlipidemia 12/08/2015    Cataract 08/22/2014    Palpitations 08/05/2014    Chronic obstructive pulmonary disease ([facility] [address] ) 04/17/2013    Esophageal reflux 04/17/2013    Pulmonary nodule seen on imaging study 04/17/2013    Hypertension 03/22/2013    Mild episode of recurrent major depressive disorder ([facility] [address] ) 03/14/2013    Fibromyalgia 03/14/2013    Degeneration of intervertebral disc 03/14/2013    Anxiety 02/21/2013    Seasonal allergic rhinitis 12/20/2012     She  has a past surgical history that includes Knee surgery (1998); pr colonoscopy flx dx w/collj spec when pfrmd (N/A, 5/9/2017); and Eye surgery  She  reports that she has been smoking  She has a 100 00 pack-year smoking history  She has never used smokeless tobacco  She reports that she does not drink alcohol or use drugs  Current Outpatient Prescriptions   Medication Sig Dispense Refill    acetaminophen (TYLENOL 8 HOUR ARTHRITIS PAIN) 650 mg CR tablet Take by mouth      ALPRAZolam (XANAX) 0 25 mg tablet Take 1 tablet (0 25 mg total) by mouth 3 (three) times a day as needed for anxiety 90 tablet 0    [facility] INDOOR/OUTDOOR ALLERGY RLF 10 MG tablet TAKE 1 TABLET BY MOUTH EVERY DAY 30 tablet 3    diclofenac (VOLTAREN) 75 mg EC tablet TAKE 1 TABLET (75 MG TOTAL) BY MOUTH 2 (TWO) TIMES A DAY AS NEEDED WITH FOOD 60 tablet 1    escitalopram (LEXAPRO) 10 mg tablet TAKE 1 TABLET BY MOUTH EVERY DAY 90 tablet 0    fluticasone (FLONASE) 50 mcg/act nasal spray 2 sprays into each nostril daily 16 g 3    fluticasone-vilanterol (BREO ELLIPTA) 100-25 mcg/inh inhaler Inhale 1 puff daily Rinse mouth after use   28 each 3    lisinopril (ZESTRIL) 20 mg tablet TAKE 1 TABLET BY MOUTH EVERY DAY 30 tablet 5    MINOXIDIL, TOPICAL, 5 % SOLN Apply 1 application topically      montelukast (SINGULAIR) 10 mg tablet TAKE 1 TABLET BY MOUTH DAILY AT BEDTIME 30 tablet 5    nystatin (MYCOSTATIN) 100,000 units/mL suspension SWISH, GARGLE THEN SPIT 1 TEASPOOONFUL 3-4 X A  mL 3    pantoprazole (PROTONIX) 40 mg tablet TAKE 1 TABLET BY MOUTH EVERY DAY 30 tablet 5    ranitidine (ZANTAC) 150 mg tablet Take 150 mg by mouth 2 (two) times a day  5    promethazine-dextromethorphan (PHENERGAN-DM) 6 25-15 mg/5 mL oral syrup Take 5 mL by mouth 4 (four) times a day as needed for cough (Patient not taking: Reported on 8/28/2018 ) 120 mL 3     No current facility-administered medications for this visit  She is allergic to miconazole       Review of Systems   Constitutional: Negative  HENT: Negative  Eyes: Negative  Respiratory: Positive for cough, shortness of breath (mild with exertion) and wheezing  Cardiovascular: Negative for chest pain, palpitations and leg swelling  Gastrointestinal: Negative  Endocrine: Negative  Genitourinary: Negative  Musculoskeletal: Positive for arthralgias, back pain and myalgias  Skin: Negative  Neurological: Negative  Hematological: Negative  Psychiatric/Behavioral: Negative  Objective:      /82   Pulse 86   Temp 98 4 °F (36 9 °C)   Resp 16   Ht 5' 4" (1 626 m)   Wt 92 9 kg (204 lb 12 8 oz)   BMI 35 15 kg/m²          Physical Exam   Constitutional: She is oriented to person, place, and time  She appears well-developed and well-nourished  HENT:   Head: Normocephalic and atraumatic  Right Ear: External ear normal    Left Ear: External ear normal    Nose: Nose normal    Mouth/Throat: Oropharynx is clear and moist  No oropharyngeal exudate  Eyes: Pupils are equal, round, and reactive to light  Conjunctivae and EOM are normal    Neck: Normal range of motion  Neck supple  Cardiovascular: Normal rate, regular rhythm and intact distal pulses  No murmur heard  Pulmonary/Chest: Effort normal  She has wheezes (rare wheeze)  She has no rales  Abdominal: Soft  She exhibits mass  She exhibits no distension  There is no tenderness  Musculoskeletal: She exhibits no edema  Lumbar back: She exhibits tenderness and bony tenderness  She exhibits no edema, no deformity and no spasm  Back:    Lymphadenopathy:     She has no cervical adenopathy     Neurological: She is alert and oriented to person, place, and time  She has normal strength and normal reflexes  No sensory deficit  She exhibits normal muscle tone  Coordination normal    ?sl increased sensitivity on the L 2nd and 3rd fingertips  (+) tinnel and phalen signs on L  Tests did not make finger tip symptoms worse   Skin: Skin is warm and dry  She is not diaphoretic  SK on R upper chest and L posterior shoulder   Psychiatric: She has a normal mood and affect  Vitals reviewed

## 2019-01-09 PROBLEM — M79.2 NEUROPATHIC PAIN: Status: ACTIVE | Noted: 2019-01-09

## 2019-01-09 NOTE — ASSESSMENT & PLAN NOTE
Sl wheeze today  I discussed with pt  Discussed smoking cessation - pt not interested at present  Cont present inhalers   Recheck 6m

## 2019-01-09 NOTE — ASSESSMENT & PLAN NOTE
In finger tips and L ankle? Pt with hx of fibro, but these symptoms are inconsistent this this dx  REC; check labs  Trial of gabapentin 300mg qHS  Increase to bid in 1 week if tolerating well   Recheck 2-3 weeks if not improving

## 2019-01-09 NOTE — ASSESSMENT & PLAN NOTE
Still with L low back pain  Strength intact in legs symmetrically  Discussed with pt  Cont conservative care  Consider PT   Recheck 1m if not imporving

## 2019-01-11 ENCOUNTER — APPOINTMENT (OUTPATIENT)
Dept: LAB | Facility: MEDICAL CENTER | Age: 64
End: 2019-01-11
Payer: COMMERCIAL

## 2019-01-11 DIAGNOSIS — I10 ESSENTIAL HYPERTENSION: ICD-10-CM

## 2019-01-11 DIAGNOSIS — M79.2 NEUROPATHIC PAIN: ICD-10-CM

## 2019-01-11 LAB
ALBUMIN SERPL BCP-MCNC: 3.8 G/DL (ref 3.5–5)
ALP SERPL-CCNC: 75 U/L (ref 46–116)
ALT SERPL W P-5'-P-CCNC: 23 U/L (ref 12–78)
ANION GAP SERPL CALCULATED.3IONS-SCNC: 6 MMOL/L (ref 4–13)
AST SERPL W P-5'-P-CCNC: 16 U/L (ref 5–45)
BASOPHILS # BLD AUTO: 0.05 THOUSANDS/ΜL (ref 0–0.1)
BASOPHILS NFR BLD AUTO: 1 % (ref 0–1)
BILIRUB SERPL-MCNC: 0.23 MG/DL (ref 0.2–1)
BUN SERPL-MCNC: 15 MG/DL (ref 5–25)
CALCIUM SERPL-MCNC: 9.1 MG/DL (ref 8.3–10.1)
CHLORIDE SERPL-SCNC: 99 MMOL/L (ref 100–108)
CHOLEST SERPL-MCNC: 207 MG/DL (ref 50–200)
CO2 SERPL-SCNC: 30 MMOL/L (ref 21–32)
CREAT SERPL-MCNC: 0.63 MG/DL (ref 0.6–1.3)
EOSINOPHIL # BLD AUTO: 0.18 THOUSAND/ΜL (ref 0–0.61)
EOSINOPHIL NFR BLD AUTO: 2 % (ref 0–6)
ERYTHROCYTE [DISTWIDTH] IN BLOOD BY AUTOMATED COUNT: 13.1 % (ref 11.6–15.1)
GFR SERPL CREATININE-BSD FRML MDRD: 96 ML/MIN/1.73SQ M
GLUCOSE P FAST SERPL-MCNC: 95 MG/DL (ref 65–99)
HCT VFR BLD AUTO: 44.7 % (ref 34.8–46.1)
HDLC SERPL-MCNC: 54 MG/DL (ref 40–60)
HGB BLD-MCNC: 13.9 G/DL (ref 11.5–15.4)
IMM GRANULOCYTES # BLD AUTO: 0.06 THOUSAND/UL (ref 0–0.2)
IMM GRANULOCYTES NFR BLD AUTO: 1 % (ref 0–2)
LDLC SERPL CALC-MCNC: 114 MG/DL (ref 0–100)
LYMPHOCYTES # BLD AUTO: 2.17 THOUSANDS/ΜL (ref 0.6–4.47)
LYMPHOCYTES NFR BLD AUTO: 23 % (ref 14–44)
MCH RBC QN AUTO: 28.5 PG (ref 26.8–34.3)
MCHC RBC AUTO-ENTMCNC: 31.1 G/DL (ref 31.4–37.4)
MCV RBC AUTO: 92 FL (ref 82–98)
MONOCYTES # BLD AUTO: 0.88 THOUSAND/ΜL (ref 0.17–1.22)
MONOCYTES NFR BLD AUTO: 9 % (ref 4–12)
NEUTROPHILS # BLD AUTO: 6 THOUSANDS/ΜL (ref 1.85–7.62)
NEUTS SEG NFR BLD AUTO: 64 % (ref 43–75)
NONHDLC SERPL-MCNC: 153 MG/DL
NRBC BLD AUTO-RTO: 0 /100 WBCS
PLATELET # BLD AUTO: 288 THOUSANDS/UL (ref 149–390)
PMV BLD AUTO: 9.9 FL (ref 8.9–12.7)
POTASSIUM SERPL-SCNC: 4.7 MMOL/L (ref 3.5–5.3)
PROT SERPL-MCNC: 7.7 G/DL (ref 6.4–8.2)
RBC # BLD AUTO: 4.88 MILLION/UL (ref 3.81–5.12)
SODIUM SERPL-SCNC: 135 MMOL/L (ref 136–145)
TRIGL SERPL-MCNC: 197 MG/DL
TSH SERPL DL<=0.05 MIU/L-ACNC: 3 UIU/ML (ref 0.36–3.74)
VIT B12 SERPL-MCNC: 413 PG/ML (ref 100–900)
WBC # BLD AUTO: 9.34 THOUSAND/UL (ref 4.31–10.16)

## 2019-01-11 PROCEDURE — 82607 VITAMIN B-12: CPT

## 2019-01-11 PROCEDURE — 80061 LIPID PANEL: CPT

## 2019-01-11 PROCEDURE — 36415 COLL VENOUS BLD VENIPUNCTURE: CPT

## 2019-01-11 PROCEDURE — 80053 COMPREHEN METABOLIC PANEL: CPT

## 2019-01-11 PROCEDURE — 84443 ASSAY THYROID STIM HORMONE: CPT

## 2019-01-11 PROCEDURE — 85025 COMPLETE CBC W/AUTO DIFF WBC: CPT

## 2019-01-18 ENCOUNTER — TELEPHONE (OUTPATIENT)
Dept: FAMILY MEDICINE CLINIC | Facility: CLINIC | Age: 64
End: 2019-01-18

## 2019-01-23 ENCOUNTER — TELEPHONE (OUTPATIENT)
Dept: FAMILY MEDICINE CLINIC | Facility: CLINIC | Age: 64
End: 2019-01-23

## 2019-01-23 DIAGNOSIS — L08.9 INFECTED CYST OF SKIN: Primary | ICD-10-CM

## 2019-01-23 DIAGNOSIS — L72.9 INFECTED CYST OF SKIN: Primary | ICD-10-CM

## 2019-01-23 RX ORDER — CEPHALEXIN 500 MG/1
500 CAPSULE ORAL EVERY 8 HOURS SCHEDULED
Qty: 30 CAPSULE | Refills: 0 | Status: SHIPPED | OUTPATIENT
Start: 2019-01-23 | End: 2019-02-02

## 2019-01-23 NOTE — TELEPHONE ENCOUNTER
Spoke with Pt and she thinks the Norvasc was helping her feel better , She has been on the Norvasc for over 1 week and is wondering why she would be feeling like this now, she already took her dose for today, she is not in any more pain than usual ,and she thought the Norvasc was to decrease her BP ,However she has 2 other issues ,she has another boil in her groin area and you usually give her an antibiotic when she gets them ,also she has an area on the side of her RT breast whish looks like a brush burn ,and it just burns when she washes the area

## 2019-01-23 NOTE — TELEPHONE ENCOUNTER
I thought the message said that the HA, lightheadedness and dizziness started after she started the amlodipine (which can be a side effects)  Pleas get more info re: these symptoms    I will call in an abx for her groin  She needs to put warm compresses on it as well    The breast rash can be from a lot of things, but we need to make sure that she gets her mammogram this month   Otherwise, we can see if it imporves with the abx

## 2019-01-23 NOTE — TELEPHONE ENCOUNTER
The elevation may be from the pain   Hold the Norvasc - if her symptoms worsen, she will need to go to the ED for eval  Otchari, pt to call tomorrow with f/u

## 2019-01-23 NOTE — TELEPHONE ENCOUNTER
Pt aware of the instructions ,and I'm sorry she wasn't clear on the symptoms with the Norvasc she made it sound like they just started ,however it was a day or two after starting it ,she will hold med tomorrow and call on Fri with update ,and is aware if symptoms worsen she will go to ER ,however after speaking with her earlier she took a [employer] and a Xanax and is feeling better

## 2019-01-23 NOTE — TELEPHONE ENCOUNTER
Patient called stating she started taking Norvasc a week ago and is very lightheaded, dizzy and a slight headache on the left side of her head  Her BP is 173/93 as of this morning

## 2019-01-24 ENCOUNTER — TRANSCRIBE ORDERS (OUTPATIENT)
Dept: ADMINISTRATIVE | Facility: HOSPITAL | Age: 64
End: 2019-01-24

## 2019-01-24 DIAGNOSIS — Z12.39 SCREENING BREAST EXAMINATION: Primary | ICD-10-CM

## 2019-01-25 ENCOUNTER — TELEPHONE (OUTPATIENT)
Dept: FAMILY MEDICINE CLINIC | Facility: CLINIC | Age: 64
End: 2019-01-25

## 2019-01-25 DIAGNOSIS — Z12.39 SCREENING FOR BREAST CANCER: Primary | ICD-10-CM

## 2019-01-25 NOTE — TELEPHONE ENCOUNTER
Spoke with Pt and she held her Norvasc yesterday and her BP's  were 150/85 and 153/88 , todyt her BP's are 177/95 at 9am and at 10:30 am 167/88 and she is feeling a little better today her symptoms seem to be going away ,should she hold Norvasc over the weekend or what do you recommend

## 2019-01-28 ENCOUNTER — HOSPITAL ENCOUNTER (OUTPATIENT)
Dept: RADIOLOGY | Facility: MEDICAL CENTER | Age: 64
Discharge: HOME | End: 2019-01-28
Payer: COMMERCIAL

## 2019-01-28 VITALS — BODY MASS INDEX: 34.83 KG/M2 | HEIGHT: 64 IN | WEIGHT: 204 LBS

## 2019-01-28 DIAGNOSIS — Z12.39 SCREENING BREAST EXAMINATION: ICD-10-CM

## 2019-01-28 PROCEDURE — 77067 SCR MAMMO BI INCL CAD: CPT

## 2019-02-01 ENCOUNTER — CLINICAL SUPPORT (OUTPATIENT)
Dept: FAMILY MEDICINE CLINIC | Facility: CLINIC | Age: 64
End: 2019-02-01
Payer: COMMERCIAL

## 2019-02-01 VITALS
WEIGHT: 204 LBS | DIASTOLIC BLOOD PRESSURE: 80 MMHG | HEIGHT: 64 IN | BODY MASS INDEX: 34.83 KG/M2 | SYSTOLIC BLOOD PRESSURE: 136 MMHG

## 2019-02-01 DIAGNOSIS — I10 ESSENTIAL HYPERTENSION: Primary | ICD-10-CM

## 2019-02-01 PROCEDURE — 99211 OFF/OP EST MAY X REQ PHY/QHP: CPT

## 2019-02-01 NOTE — PROGRESS NOTES
Pt here today for a BP check after starting new medication , Amlodipine 5 mg , Pt's BP was good ,and Pt  Said she felt that she was skipping a beat every now and then however she has no other symptoms and as per Dr Christopher Mejia as longer as she is not experiencing ant other symptoms it is normal and nothing needs to be done ,and pt to F/U for her regular appoint with Dr Christopher Mejia

## 2019-02-04 DIAGNOSIS — K21.9 GASTROESOPHAGEAL REFLUX DISEASE, ESOPHAGITIS PRESENCE NOT SPECIFIED: ICD-10-CM

## 2019-02-04 RX ORDER — PANTOPRAZOLE SODIUM 40 MG/1
TABLET, DELAYED RELEASE ORAL
Qty: 30 TABLET | Refills: 5 | Status: SHIPPED | OUTPATIENT
Start: 2019-02-04 | End: 2019-07-28 | Stop reason: SDUPTHER

## 2019-02-08 DIAGNOSIS — B37.3 VAGINAL YEAST INFECTION: Primary | ICD-10-CM

## 2019-02-08 RX ORDER — FLUCONAZOLE 150 MG/1
150 TABLET ORAL ONCE
Qty: 1 TABLET | Refills: 0 | Status: SHIPPED | OUTPATIENT
Start: 2019-02-08 | End: 2019-02-08

## 2019-03-04 DIAGNOSIS — I10 ESSENTIAL HYPERTENSION: ICD-10-CM

## 2019-03-04 RX ORDER — AMLODIPINE BESYLATE 5 MG/1
TABLET ORAL
Qty: 30 TABLET | Refills: 1 | Status: SHIPPED | OUTPATIENT
Start: 2019-03-04 | End: 2019-05-07 | Stop reason: SDUPTHER

## 2019-04-26 ENCOUNTER — OFFICE VISIT (OUTPATIENT)
Dept: FAMILY MEDICINE CLINIC | Facility: CLINIC | Age: 64
End: 2019-04-26
Payer: COMMERCIAL

## 2019-04-26 ENCOUNTER — APPOINTMENT (OUTPATIENT)
Dept: LAB | Facility: MEDICAL CENTER | Age: 64
End: 2019-04-26
Payer: COMMERCIAL

## 2019-04-26 VITALS
HEART RATE: 94 BPM | OXYGEN SATURATION: 95 % | TEMPERATURE: 99.3 F | SYSTOLIC BLOOD PRESSURE: 128 MMHG | BODY MASS INDEX: 34.38 KG/M2 | WEIGHT: 201.4 LBS | DIASTOLIC BLOOD PRESSURE: 80 MMHG | RESPIRATION RATE: 18 BRPM | HEIGHT: 64 IN

## 2019-04-26 DIAGNOSIS — R63.4 ABNORMAL WEIGHT LOSS: Primary | ICD-10-CM

## 2019-04-26 DIAGNOSIS — R53.83 FATIGUE, UNSPECIFIED TYPE: ICD-10-CM

## 2019-04-26 DIAGNOSIS — R30.0 DYSURIA: ICD-10-CM

## 2019-04-26 DIAGNOSIS — F41.9 ANXIETY: ICD-10-CM

## 2019-04-26 DIAGNOSIS — R63.4 ABNORMAL WEIGHT LOSS: ICD-10-CM

## 2019-04-26 DIAGNOSIS — M79.2 NEURALGIA: ICD-10-CM

## 2019-04-26 LAB
ALBUMIN SERPL BCP-MCNC: 4 G/DL (ref 3.5–5)
ALP SERPL-CCNC: 77 U/L (ref 46–116)
ALT SERPL W P-5'-P-CCNC: 30 U/L (ref 12–78)
ANION GAP SERPL CALCULATED.3IONS-SCNC: 7 MMOL/L (ref 4–13)
AST SERPL W P-5'-P-CCNC: 20 U/L (ref 5–45)
BASOPHILS # BLD AUTO: 0.06 THOUSANDS/ΜL (ref 0–0.1)
BASOPHILS NFR BLD AUTO: 1 % (ref 0–1)
BILIRUB SERPL-MCNC: 0.41 MG/DL (ref 0.2–1)
BUN SERPL-MCNC: 10 MG/DL (ref 5–25)
CALCIUM SERPL-MCNC: 9.7 MG/DL (ref 8.3–10.1)
CHLORIDE SERPL-SCNC: 98 MMOL/L (ref 100–108)
CO2 SERPL-SCNC: 30 MMOL/L (ref 21–32)
CREAT SERPL-MCNC: 0.59 MG/DL (ref 0.6–1.3)
CRP SERPL QL: 6.5 MG/L
EOSINOPHIL # BLD AUTO: 0.17 THOUSAND/ΜL (ref 0–0.61)
EOSINOPHIL NFR BLD AUTO: 1 % (ref 0–6)
ERYTHROCYTE [DISTWIDTH] IN BLOOD BY AUTOMATED COUNT: 13.3 % (ref 11.6–15.1)
ERYTHROCYTE [SEDIMENTATION RATE] IN BLOOD: 38 MM/HOUR (ref 0–20)
GFR SERPL CREATININE-BSD FRML MDRD: 98 ML/MIN/1.73SQ M
GLUCOSE SERPL-MCNC: 74 MG/DL (ref 65–140)
HCT VFR BLD AUTO: 44.9 % (ref 34.8–46.1)
HGB BLD-MCNC: 14.5 G/DL (ref 11.5–15.4)
IMM GRANULOCYTES # BLD AUTO: 0.09 THOUSAND/UL (ref 0–0.2)
IMM GRANULOCYTES NFR BLD AUTO: 1 % (ref 0–2)
LYMPHOCYTES # BLD AUTO: 2.76 THOUSANDS/ΜL (ref 0.6–4.47)
LYMPHOCYTES NFR BLD AUTO: 23 % (ref 14–44)
MCH RBC QN AUTO: 28.7 PG (ref 26.8–34.3)
MCHC RBC AUTO-ENTMCNC: 32.3 G/DL (ref 31.4–37.4)
MCV RBC AUTO: 89 FL (ref 82–98)
MONOCYTES # BLD AUTO: 1.12 THOUSAND/ΜL (ref 0.17–1.22)
MONOCYTES NFR BLD AUTO: 9 % (ref 4–12)
NEUTROPHILS # BLD AUTO: 7.75 THOUSANDS/ΜL (ref 1.85–7.62)
NEUTS SEG NFR BLD AUTO: 65 % (ref 43–75)
NRBC BLD AUTO-RTO: 0 /100 WBCS
PLATELET # BLD AUTO: 308 THOUSANDS/UL (ref 149–390)
PMV BLD AUTO: 9.8 FL (ref 8.9–12.7)
POTASSIUM SERPL-SCNC: 3.9 MMOL/L (ref 3.5–5.3)
PROT SERPL-MCNC: 8.2 G/DL (ref 6.4–8.2)
RBC # BLD AUTO: 5.05 MILLION/UL (ref 3.81–5.12)
SL AMB  POCT GLUCOSE, UA: NORMAL
SL AMB LEUKOCYTE ESTERASE,UA: NORMAL
SL AMB POCT BILIRUBIN,UA: NORMAL
SL AMB POCT BLOOD,UA: NORMAL
SL AMB POCT CLARITY,UA: CLEAR
SL AMB POCT COLOR,UA: YELLOW
SL AMB POCT KETONES,UA: NORMAL
SL AMB POCT NITRITE,UA: NORMAL
SL AMB POCT PH,UA: 6.5
SL AMB POCT SPECIFIC GRAVITY,UA: 1.01
SL AMB POCT URINE PROTEIN: NORMAL
SL AMB POCT UROBILINOGEN: NORMAL
SODIUM SERPL-SCNC: 135 MMOL/L (ref 136–145)
TSH SERPL DL<=0.05 MIU/L-ACNC: 2.26 UIU/ML (ref 0.36–3.74)
WBC # BLD AUTO: 11.95 THOUSAND/UL (ref 4.31–10.16)

## 2019-04-26 PROCEDURE — 85652 RBC SED RATE AUTOMATED: CPT

## 2019-04-26 PROCEDURE — 84443 ASSAY THYROID STIM HORMONE: CPT

## 2019-04-26 PROCEDURE — 86140 C-REACTIVE PROTEIN: CPT

## 2019-04-26 PROCEDURE — 80053 COMPREHEN METABOLIC PANEL: CPT

## 2019-04-26 PROCEDURE — 81002 URINALYSIS NONAUTO W/O SCOPE: CPT | Performed by: FAMILY MEDICINE

## 2019-04-26 PROCEDURE — 85025 COMPLETE CBC W/AUTO DIFF WBC: CPT

## 2019-04-26 PROCEDURE — 36415 COLL VENOUS BLD VENIPUNCTURE: CPT

## 2019-04-26 PROCEDURE — 99214 OFFICE O/P EST MOD 30 MIN: CPT | Performed by: FAMILY MEDICINE

## 2019-04-26 RX ORDER — ALPRAZOLAM 0.25 MG/1
0.25 TABLET ORAL 3 TIMES DAILY PRN
Qty: 90 TABLET | Refills: 0 | Status: SHIPPED | OUTPATIENT
Start: 2019-04-26 | End: 2019-11-26 | Stop reason: SDUPTHER

## 2019-04-26 RX ORDER — LIDOCAINE 50 MG/G
1 PATCH TOPICAL DAILY
Qty: 30 PATCH | Refills: 0 | Status: SHIPPED | OUTPATIENT
Start: 2019-04-26 | End: 2019-10-11

## 2019-04-28 DIAGNOSIS — J30.2 SEASONAL ALLERGIC RHINITIS, UNSPECIFIED TRIGGER: ICD-10-CM

## 2019-04-28 DIAGNOSIS — J42 CHRONIC BRONCHITIS, UNSPECIFIED CHRONIC BRONCHITIS TYPE (HCC): ICD-10-CM

## 2019-04-28 RX ORDER — MONTELUKAST SODIUM 10 MG/1
TABLET ORAL
Qty: 30 TABLET | Refills: 5 | Status: SHIPPED | OUTPATIENT
Start: 2019-04-28 | End: 2019-10-07 | Stop reason: SDUPTHER

## 2019-04-29 ENCOUNTER — TELEPHONE (OUTPATIENT)
Dept: FAMILY MEDICINE CLINIC | Facility: CLINIC | Age: 64
End: 2019-04-29

## 2019-04-29 DIAGNOSIS — M75.81 TENDINITIS OF RIGHT ROTATOR CUFF: ICD-10-CM

## 2019-04-29 RX ORDER — DICLOFENAC SODIUM 75 MG/1
TABLET, DELAYED RELEASE ORAL
Qty: 60 TABLET | Refills: 1 | Status: SHIPPED | OUTPATIENT
Start: 2019-04-29 | End: 2019-07-11

## 2019-04-30 DIAGNOSIS — M79.10 MYALGIA: Primary | ICD-10-CM

## 2019-04-30 RX ORDER — PREDNISONE 20 MG/1
20 TABLET ORAL DAILY
Qty: 6 TABLET | Refills: 0 | Status: SHIPPED | OUTPATIENT
Start: 2019-04-30 | End: 2019-05-16 | Stop reason: ALTCHOICE

## 2019-05-07 DIAGNOSIS — I10 ESSENTIAL HYPERTENSION: ICD-10-CM

## 2019-05-07 RX ORDER — AMLODIPINE BESYLATE 5 MG/1
TABLET ORAL
Qty: 30 TABLET | Refills: 1 | Status: SHIPPED | OUTPATIENT
Start: 2019-05-07 | End: 2019-07-29 | Stop reason: SDUPTHER

## 2019-05-07 RX ORDER — LISINOPRIL 20 MG/1
TABLET ORAL
Qty: 30 TABLET | Refills: 5 | Status: SHIPPED | OUTPATIENT
Start: 2019-05-07 | End: 2019-10-23 | Stop reason: SDUPTHER

## 2019-05-16 ENCOUNTER — OFFICE VISIT (OUTPATIENT)
Dept: FAMILY MEDICINE CLINIC | Facility: CLINIC | Age: 64
End: 2019-05-16
Payer: COMMERCIAL

## 2019-05-16 ENCOUNTER — TELEPHONE (OUTPATIENT)
Dept: FAMILY MEDICINE CLINIC | Facility: CLINIC | Age: 64
End: 2019-05-16

## 2019-05-16 VITALS
TEMPERATURE: 98.7 F | HEART RATE: 92 BPM | BODY MASS INDEX: 34.69 KG/M2 | HEIGHT: 64 IN | DIASTOLIC BLOOD PRESSURE: 82 MMHG | SYSTOLIC BLOOD PRESSURE: 126 MMHG | WEIGHT: 203.2 LBS

## 2019-05-16 DIAGNOSIS — B37.0 THRUSH: ICD-10-CM

## 2019-05-16 DIAGNOSIS — H69.82 ETD (EUSTACHIAN TUBE DYSFUNCTION), LEFT: ICD-10-CM

## 2019-05-16 DIAGNOSIS — J30.2 SEASONAL ALLERGIC RHINITIS, UNSPECIFIED TRIGGER: Primary | ICD-10-CM

## 2019-05-16 DIAGNOSIS — J01.10 ACUTE NON-RECURRENT FRONTAL SINUSITIS: ICD-10-CM

## 2019-05-16 PROCEDURE — 99213 OFFICE O/P EST LOW 20 MIN: CPT | Performed by: FAMILY MEDICINE

## 2019-05-16 PROCEDURE — 3008F BODY MASS INDEX DOCD: CPT | Performed by: FAMILY MEDICINE

## 2019-05-16 RX ORDER — AMOXICILLIN 875 MG/1
875 TABLET, COATED ORAL 2 TIMES DAILY
Qty: 20 TABLET | Refills: 0 | Status: SHIPPED | OUTPATIENT
Start: 2019-05-16 | End: 2019-05-26

## 2019-05-22 DIAGNOSIS — F33.0 DEPRESSION, MAJOR, RECURRENT, MILD (HCC): ICD-10-CM

## 2019-05-22 RX ORDER — ESCITALOPRAM OXALATE 10 MG/1
10 TABLET ORAL DAILY
Qty: 30 TABLET | Refills: 5 | Status: SHIPPED | OUTPATIENT
Start: 2019-05-22 | End: 2019-11-13 | Stop reason: SDUPTHER

## 2019-07-06 DIAGNOSIS — J44.9 CHRONIC OBSTRUCTIVE PULMONARY DISEASE, UNSPECIFIED COPD TYPE (HCC): ICD-10-CM

## 2019-07-07 RX ORDER — FLUTICASONE FUROATE AND VILANTEROL TRIFENATATE 100; 25 UG/1; UG/1
POWDER RESPIRATORY (INHALATION)
Qty: 1 INHALER | Refills: 3 | Status: SHIPPED | OUTPATIENT
Start: 2019-07-07 | End: 2019-07-11

## 2019-07-09 DIAGNOSIS — J44.9 CHRONIC OBSTRUCTIVE PULMONARY DISEASE, UNSPECIFIED COPD TYPE (HCC): ICD-10-CM

## 2019-07-09 DIAGNOSIS — J30.9 ALLERGIC RHINITIS: ICD-10-CM

## 2019-07-09 RX ORDER — FLUTICASONE FUROATE AND VILANTEROL TRIFENATATE 100; 25 UG/1; UG/1
POWDER RESPIRATORY (INHALATION)
Qty: 1 INHALER | Refills: 3 | Status: SHIPPED | OUTPATIENT
Start: 2019-07-09 | End: 2019-07-11 | Stop reason: ALTCHOICE

## 2019-07-09 RX ORDER — FLUTICASONE PROPIONATE 50 MCG
SPRAY, SUSPENSION (ML) NASAL
Qty: 16 ML | Refills: 3 | Status: SHIPPED | OUTPATIENT
Start: 2019-07-09 | End: 2020-05-11

## 2019-07-10 ENCOUNTER — HOSPITAL ENCOUNTER (EMERGENCY)
Facility: HOSPITAL | Age: 64
Discharge: HOME | End: 2019-07-10
Attending: EMERGENCY MEDICINE | Admitting: EMERGENCY MEDICINE
Payer: COMMERCIAL

## 2019-07-10 ENCOUNTER — APPOINTMENT (EMERGENCY)
Dept: CT IMAGING | Facility: HOSPITAL | Age: 64
End: 2019-07-10
Payer: COMMERCIAL

## 2019-07-10 VITALS
HEART RATE: 78 BPM | DIASTOLIC BLOOD PRESSURE: 82 MMHG | OXYGEN SATURATION: 94 % | SYSTOLIC BLOOD PRESSURE: 165 MMHG | WEIGHT: 205.03 LBS | TEMPERATURE: 98.6 F | BODY MASS INDEX: 35.19 KG/M2 | RESPIRATION RATE: 21 BRPM

## 2019-07-10 DIAGNOSIS — I10 HIGH BLOOD PRESSURE: ICD-10-CM

## 2019-07-10 DIAGNOSIS — M54.9 UPPER BACK PAIN ON LEFT SIDE: ICD-10-CM

## 2019-07-10 DIAGNOSIS — R07.9 LEFT-SIDED CHEST PAIN: Primary | ICD-10-CM

## 2019-07-10 LAB
ANION GAP SERPL CALCULATED.3IONS-SCNC: 11 MMOL/L (ref 4–13)
APTT PPP: 31 SECONDS (ref 23–37)
BASOPHILS # BLD AUTO: 0.07 THOUSANDS/ΜL (ref 0–0.1)
BASOPHILS NFR BLD AUTO: 1 % (ref 0–1)
BUN SERPL-MCNC: 7 MG/DL (ref 5–25)
CALCIUM SERPL-MCNC: 9.8 MG/DL (ref 8.3–10.1)
CHLORIDE SERPL-SCNC: 99 MMOL/L (ref 100–108)
CO2 SERPL-SCNC: 26 MMOL/L (ref 21–32)
CREAT SERPL-MCNC: 0.56 MG/DL (ref 0.6–1.3)
DEPRECATED D DIMER PPP: 416 NG/ML (FEU)
EOSINOPHIL # BLD AUTO: 0.17 THOUSAND/ΜL (ref 0–0.61)
EOSINOPHIL NFR BLD AUTO: 1 % (ref 0–6)
ERYTHROCYTE [DISTWIDTH] IN BLOOD BY AUTOMATED COUNT: 13.2 % (ref 11.6–15.1)
GFR SERPL CREATININE-BSD FRML MDRD: 100 ML/MIN/1.73SQ M
GLUCOSE SERPL-MCNC: 99 MG/DL (ref 65–140)
HCT VFR BLD AUTO: 42.6 % (ref 34.8–46.1)
HGB BLD-MCNC: 13.9 G/DL (ref 11.5–15.4)
IMM GRANULOCYTES # BLD AUTO: 0.08 THOUSAND/UL (ref 0–0.2)
IMM GRANULOCYTES NFR BLD AUTO: 1 % (ref 0–2)
INR PPP: 1.01 (ref 0.84–1.19)
LYMPHOCYTES # BLD AUTO: 2.26 THOUSANDS/ΜL (ref 0.6–4.47)
LYMPHOCYTES NFR BLD AUTO: 17 % (ref 14–44)
MCH RBC QN AUTO: 28 PG (ref 26.8–34.3)
MCHC RBC AUTO-ENTMCNC: 32.6 G/DL (ref 31.4–37.4)
MCV RBC AUTO: 86 FL (ref 82–98)
MONOCYTES # BLD AUTO: 1.22 THOUSAND/ΜL (ref 0.17–1.22)
MONOCYTES NFR BLD AUTO: 9 % (ref 4–12)
NEUTROPHILS # BLD AUTO: 9.32 THOUSANDS/ΜL (ref 1.85–7.62)
NEUTS SEG NFR BLD AUTO: 71 % (ref 43–75)
NRBC BLD AUTO-RTO: 0 /100 WBCS
PLATELET # BLD AUTO: 250 THOUSANDS/UL (ref 149–390)
PMV BLD AUTO: 9.6 FL (ref 8.9–12.7)
POTASSIUM SERPL-SCNC: 3.7 MMOL/L (ref 3.5–5.3)
PROTHROMBIN TIME: 13.3 SECONDS (ref 11.6–14.5)
RBC # BLD AUTO: 4.96 MILLION/UL (ref 3.81–5.12)
SODIUM SERPL-SCNC: 136 MMOL/L (ref 136–145)
TROPONIN I SERPL-MCNC: <0.02 NG/ML
WBC # BLD AUTO: 13.12 THOUSAND/UL (ref 4.31–10.16)

## 2019-07-10 PROCEDURE — 85379 FIBRIN DEGRADATION QUANT: CPT | Performed by: EMERGENCY MEDICINE

## 2019-07-10 PROCEDURE — 36415 COLL VENOUS BLD VENIPUNCTURE: CPT | Performed by: EMERGENCY MEDICINE

## 2019-07-10 PROCEDURE — 99283 EMERGENCY DEPT VISIT LOW MDM: CPT | Performed by: EMERGENCY MEDICINE

## 2019-07-10 PROCEDURE — 80048 BASIC METABOLIC PNL TOTAL CA: CPT | Performed by: EMERGENCY MEDICINE

## 2019-07-10 PROCEDURE — 96374 THER/PROPH/DIAG INJ IV PUSH: CPT

## 2019-07-10 PROCEDURE — 99284 EMERGENCY DEPT VISIT MOD MDM: CPT

## 2019-07-10 PROCEDURE — 93005 ELECTROCARDIOGRAM TRACING: CPT

## 2019-07-10 PROCEDURE — 84484 ASSAY OF TROPONIN QUANT: CPT | Performed by: EMERGENCY MEDICINE

## 2019-07-10 PROCEDURE — 85025 COMPLETE CBC W/AUTO DIFF WBC: CPT | Performed by: EMERGENCY MEDICINE

## 2019-07-10 PROCEDURE — 71275 CT ANGIOGRAPHY CHEST: CPT

## 2019-07-10 PROCEDURE — 85730 THROMBOPLASTIN TIME PARTIAL: CPT | Performed by: EMERGENCY MEDICINE

## 2019-07-10 PROCEDURE — 85610 PROTHROMBIN TIME: CPT | Performed by: EMERGENCY MEDICINE

## 2019-07-10 RX ORDER — LIDOCAINE 50 MG/G
2 PATCH TOPICAL ONCE
Status: DISCONTINUED | OUTPATIENT
Start: 2019-07-10 | End: 2019-07-11 | Stop reason: HOSPADM

## 2019-07-10 RX ORDER — MORPHINE SULFATE 10 MG/ML
6 INJECTION, SOLUTION INTRAMUSCULAR; INTRAVENOUS ONCE
Status: COMPLETED | OUTPATIENT
Start: 2019-07-10 | End: 2019-07-10

## 2019-07-10 RX ORDER — TRAMADOL HYDROCHLORIDE 50 MG/1
50 TABLET ORAL EVERY 6 HOURS PRN
Qty: 12 TABLET | Refills: 0 | Status: SHIPPED | OUTPATIENT
Start: 2019-07-10 | End: 2019-07-14

## 2019-07-10 RX ADMIN — LIDOCAINE 2 PATCH: 50 PATCH TOPICAL at 21:24

## 2019-07-10 RX ADMIN — IOHEXOL 85 ML: 350 INJECTION, SOLUTION INTRAVENOUS at 20:46

## 2019-07-10 RX ADMIN — MORPHINE SULFATE 6 MG: 10 INJECTION INTRAVENOUS at 19:22

## 2019-07-10 NOTE — ED PROVIDER NOTES
History  Chief Complaint   Patient presents with    Neck Pain     c/o neck pain that started in the sternum on thursday and has since spread to left shoulder   felt it pull after opening the door     HPI  80-year-old female with history of fibromyalgia, hypertension, and GERD presents to the emergency department with chief complaint of chest pain  Patient states that she has had pain at her sternoclavicular joint since last Thursday  Over the past few days, pain has spread laterally and then into her back just above her scapula  Pain has felt like a burning sensation that has been worse with standing and direct palpation of the areas, slightly better with hot water pressure  She states she has been taking NSAIDs and took a Vicodin ('s old pill) without any improvement  She recalls having had similar pain in the past, though states that it is never started at that same spot on her chest before  On review of systems, patient complains of very mild headache and very mild dizziness  She denies recent fever, chills, shortness of breath, abdominal pain, nausea, vomiting, weakness, numbness, paresthesias, or acute complaints otherwise  Patient does she chronic left lower extremity pain, which she is concerned may be due to a blood clot  Prior to Admission Medications   Prescriptions Last Dose Informant Patient Reported? Taking?    ALPRAZolam (XANAX) 0 25 mg tablet   No No   Sig: Take 1 tablet (0 25 mg total) by mouth 3 (three) times a day as needed for anxiety   [facility] INDOOR/OUTDOOR ALLERGY RLF 10 MG tablet  Self No No   Sig: TAKE 1 TABLET BY MOUTH EVERY DAY   MINOXIDIL, TOPICAL, 5 % SOLN  Self Yes No   Sig: Apply 1 application topically   acetaminophen (TYLENOL 8 HOUR ARTHRITIS PAIN) 650 mg CR tablet  Self Yes No   Sig: Take by mouth   amLODIPine (NORVASC) 5 mg tablet   No No   Sig: TAKE 1 TABLET BY MOUTH EVERY DAY   escitalopram (LEXAPRO) 10 mg tablet   No No   Sig: TAKE 1 TABLET (10 MG TOTAL) BY MOUTH DAILY   fluticasone (FLONASE) 50 mcg/act nasal spray   No No   Sig: INSTILL 2 SPRAYS INTO EACH NOSTRIL DAILY   lidocaine (LIDODERM) 5 %   No No   Sig: Apply 1 patch topically daily Remove & Discard patch within 12 hours or as directed by MD   lisinopril (ZESTRIL) 20 mg tablet   No No   Sig: TAKE 1 TABLET BY MOUTH EVERY DAY   montelukast (SINGULAIR) 10 mg tablet   No No   Sig: TAKE 1 TABLET BY MOUTH EVERYDAY AT BEDTIME   nystatin (MYCOSTATIN) 100,000 units/mL suspension  Self No No   Sig: SWISH, GARGLE THEN SPIT 1 TEASPOOONFUL 3-4 X A DAY   pantoprazole (PROTONIX) 40 mg tablet  Self No No   Sig: TAKE 1 TABLET BY MOUTH EVERY DAY   ranitidine (ZANTAC) 150 mg tablet  Self Yes No   Sig: Take 150 mg by mouth 2 (two) times a day      Facility-Administered Medications: None       Past Medical History:   Diagnosis Date    Acid reflux     Fibromyalgia     Hypertension     Seasonal allergies        Past Surgical History:   Procedure Laterality Date    EYE SURGERY      KNEE SURGERY  1998    IA COLONOSCOPY FLX DX W/COLLJ SPEC WHEN PFRMD N/A 5/9/2017    Procedure: EGD AND COLONOSCOPY;  Surgeon: Ester Woodard MD;  Location: [facility]; Service: Gastroenterology       Family History   Problem Relation Age of Onset    Cancer Mother         pancreatic    Lung cancer Sister     Cancer Brother         pancreatic    Coronary artery disease Father     Diabetes Father     Hypertension Father     Heart disease Father     Diabetes Paternal Grandmother     Cancer Paternal Grandfather      I have reviewed and agree with the history as documented  Social History     Tobacco Use    Smoking status: Current Every Day Smoker     Packs/day: 2 00     Years: 50 00     Pack years: 100 00    Smokeless tobacco: Never Used    Tobacco comment: Per [employer] pt quit smoking  Substance Use Topics    Alcohol use: No    Drug use: No        Review of Systems   Constitutional: Negative for chills and fever     Respiratory: Negative for shortness of breath  Cardiovascular: Positive for chest pain  Negative for leg swelling  Gastrointestinal: Negative for abdominal pain, nausea and vomiting  Musculoskeletal: Positive for back pain and neck pain  Skin: Negative for rash and wound  Allergic/Immunologic: Negative for immunocompromised state  Neurological: Positive for dizziness and headaches  Psychiatric/Behavioral: The patient is not nervous/anxious  All other systems reviewed and are negative  Physical Exam  Physical Exam   Constitutional: She is oriented to person, place, and time  She appears well-nourished  No distress  HENT:   Head: Normocephalic and atraumatic  Eyes: EOM are normal    Neck: Normal range of motion  Neck supple  Cardiovascular: Normal rate and regular rhythm  Pulmonary/Chest: Effort normal and breath sounds normal  No respiratory distress  She exhibits tenderness  Abdominal: Soft  She exhibits no distension  There is no tenderness  Musculoskeletal: Normal range of motion  Neurological: She is alert and oriented to person, place, and time  Skin: Skin is warm and dry  She is not diaphoretic  Psychiatric: She has a normal mood and affect  Her behavior is normal    Nursing note and vitals reviewed        Vital Signs  ED Triage Vitals [07/10/19 1830]   Temperature Pulse Respirations Blood Pressure SpO2   98 6 °F (37 °C) 87 18 (!) 225/103 93 %      Temp Source Heart Rate Source Patient Position - Orthostatic VS BP Location FiO2 (%)   Oral Monitor Sitting Right arm --      Pain Score       6           Vitals:    07/10/19 1924 07/10/19 2000 07/10/19 2030 07/10/19 2115   BP: (!) 183/78 167/73 156/71 165/82   Pulse: 83 83 82 78   Patient Position - Orthostatic VS:  Sitting           Visual Acuity      ED Medications  Medications   morphine (PF) 10 mg/mL injection 6 mg (6 mg Intravenous Given 7/10/19 1922)   iohexol (OMNIPAQUE) 350 MG/ML injection (MULTI-DOSE) 85 mL (85 mL Intravenous Given 7/10/19 2046)       Diagnostic Studies  Results Reviewed     Procedure Component Value Units Date/Time    Troponin I [[id number]]  (Normal) Collected:  07/10/19 1859    Lab Status:  Final result Specimen:  Blood from Arm, Right Updated:  07/10/19 1928     Troponin I <0 02 ng/mL     D-dimer, quantitative [[id number]]  (Normal) Collected:  07/10/19 1859    Lab Status:  Final result Specimen:  Blood from Arm, Right Updated:  07/10/19 1919     D-Dimer, Quant 416 ng/ml (FEU)     APTT [[id number]]  (Normal) Collected:  07/10/19 1859    Lab Status:  Final result Specimen:  Blood from Arm, Right Updated:  07/10/19 1918     PTT 31 seconds     Protime-INR [[id number]]  (Normal) Collected:  07/10/19 1859    Lab Status:  Final result Specimen:  Blood from Arm, Right Updated:  07/10/19 1918     Protime 13 3 seconds      INR 5 82    Basic metabolic panel [[id number]]  (Abnormal) Collected:  07/10/19 1859    Lab Status:  Final result Specimen:  Blood from Arm, Right Updated:  07/10/19 1917     Sodium 136 mmol/L      Potassium 3 7 mmol/L      Chloride 99 mmol/L      CO2 26 mmol/L      ANION GAP 11 mmol/L      BUN 7 mg/dL      Creatinine 0 56 mg/dL      Glucose 99 mg/dL      Calcium 9 8 mg/dL      eGFR 100 ml/min/1 73sq m     Narrative:       Isabela guidelines for Chronic Kidney Disease (CKD):     Stage 1 with normal or high GFR (GFR > 90 mL/min/1 73 square meters)    Stage 2 Mild CKD (GFR = 60-89 mL/min/1 73 square meters)    Stage 3A Moderate CKD (GFR = 45-59 mL/min/1 73 square meters)    Stage 3B Moderate CKD (GFR = 30-44 mL/min/1 73 square meters)    Stage 4 Severe CKD (GFR = 15-29 mL/min/1 73 square meters)    Stage 5 End Stage CKD (GFR <15 mL/min/1 73 square meters)  Note: GFR calculation is accurate only with a steady state creatinine    CBC and differential [[id number]]  (Abnormal) Collected:  07/10/19 1859    Lab Status:  Final result Specimen:  Blood from Arm, Right Updated:  07/10/19 1907 WBC 13 12 Thousand/uL      RBC 4 96 Million/uL      Hemoglobin 13 9 g/dL      Hematocrit 42 6 %      MCV 86 fL      MCH 28 0 pg      MCHC 32 6 g/dL      RDW 13 2 %      MPV 9 6 fL      Platelets 711 Thousands/uL      nRBC 0 /100 WBCs      Neutrophils Relative 71 %      Immat GRANS % 1 %      Lymphocytes Relative 17 %      Monocytes Relative 9 %      Eosinophils Relative 1 %      Basophils Relative 1 %      Neutrophils Absolute 9 32 Thousands/µL      Immature Grans Absolute 0 08 Thousand/uL      Lymphocytes Absolute 2 26 Thousands/µL      Monocytes Absolute 1 22 Thousand/µL      Eosinophils Absolute 0 17 Thousand/µL      Basophils Absolute 0 07 Thousands/µL                  CTA chest wo w contrast   Final Result by Merced Vega MD (07/10 2111)      No aortic dissection  No CT or CT angiographic abnormality in the chest to account for the patient's symptoms                 Workstation performed: [id number]                    Procedures  Procedures       ED Course  ED Course as of Jul 13 2358   Wed Jul 10, 2019   1858 EKG: NSR @ 85 BPM, normal axis, normal interval, normal EKG      1919 D-DIMER QUANTITATIVE: 416         HEART Risk Score      Most Recent Value   History  1 Filed at: 07/10/2019 1928   ECG  0 Filed at: 07/10/2019 1928   Age  1 Filed at: 07/10/2019 1928   Risk Factors  1 Filed at: 07/10/2019 1928   Troponin  0 Filed at: 07/10/2019 1928   Heart Score Risk Calculator   History  1 Filed at: 07/10/2019 1928   ECG  0 Filed at: 07/10/2019 1928   Age  1 Filed at: 07/10/2019 1928   Risk Factors  1 Filed at: 07/10/2019 1928   Troponin  0 Filed at: 07/10/2019 1928   HEART Score  3 Filed at: 07/10/2019 1928   HEART Score  3 Filed at: 07/10/2019 1928                            MDM    Disposition  Final diagnoses:   Left-sided chest pain   Upper back pain on left side   High blood pressure     Time reflects when diagnosis was documented in both MDM as applicable and the Disposition within this note     Time User Action Codes Description Comment    7/10/2019  9:13 PM Radha Devine Add [R07 9] Constricting chest pain often radiating down left upper extremity     7/10/2019  9:13 PM Trisha Dent Remove [R07 9] Constricting chest pain often radiating down left upper extremity     7/10/2019  9:13 PM Trisha Dent Add [R07 9] Left-sided chest pain     7/10/2019  9:13 PM [address] [M54 9] Upper back pain on left side     7/13/2019 11:56 PM Trisha Dent Add [I10] High blood pressure       ED Disposition     ED Disposition Condition Date/Time Comment    Discharge Stable Wed Jul 10, 2019  9:13 PM Rae Arredondo discharge to home/self care  Follow-up Information     Follow up With Specialties Details Why Contact Info Additional Information    Paulo Alonso MD Family Medicine Go in 1 day  [address] Emergency Department Emergency Medicine  If symptoms worsen, As needed [facility] [address] [phone] [place] ED, [address], [address]          Discharge Medication List as of 7/10/2019  9:14 PM      START taking these medications    Details   traMADol (ULTRAM) 50 mg tablet Take 1 tablet (50 mg total) by mouth every 6 (six) hours as needed for moderate pain or severe pain for up to 4 days, Starting Wed 7/10/2019, Until Sun 7/14/2019, Print         CONTINUE these medications which have NOT CHANGED    Details   acetaminophen (TYLENOL 8 HOUR ARTHRITIS PAIN) 650 mg CR tablet Take by mouth, Historical Med      ALPRAZolam (XANAX) 0 25 mg tablet Take 1 tablet (0 25 mg total) by mouth 3 (three) times a day as needed for anxiety, Starting Fri 4/26/2019, Normal      amLODIPine (NORVASC) 5 mg tablet TAKE 1 TABLET BY MOUTH EVERY DAY, Normal      [facility] INDOOR/OUTDOOR ALLERGY RLF 10 MG tablet TAKE 1 TABLET BY MOUTH EVERY DAY, Normal      !! escitalopram (LEXAPRO) 10 mg tablet TAKE 1 TABLET (10 MG TOTAL) BY MOUTH DAILY, Starting Wed 5/22/2019, Normal      fluticasone (FLONASE) 50 mcg/act nasal spray INSTILL 2 SPRAYS INTO EACH NOSTRIL DAILY, Normal      lidocaine (LIDODERM) 5 % Apply 1 patch topically daily Remove & Discard patch within 12 hours or as directed by MD, Starting Fri 4/26/2019, Normal      lisinopril (ZESTRIL) 20 mg tablet TAKE 1 TABLET BY MOUTH EVERY DAY, Normal      MINOXIDIL, TOPICAL, 5 % SOLN Apply 1 application topically, Historical Med      montelukast (SINGULAIR) 10 mg tablet TAKE 1 TABLET BY MOUTH EVERYDAY AT BEDTIME, Normal      nystatin (MYCOSTATIN) 100,000 units/mL suspension SWISH, GARGLE THEN SPIT 1 TEASPOOONFUL 3-4 X A DAY, Normal      pantoprazole (PROTONIX) 40 mg tablet TAKE 1 TABLET BY MOUTH EVERY DAY, Normal      ranitidine (ZANTAC) 150 mg tablet Take 150 mg by mouth 2 (two) times a day, Starting Mon 1/8/2018, Historical Med      !! BREO ELLIPTA 100-25 MCG/INH inhaler INHALE 1 PUFF DAILY  RINSE MOUTH AFTER USE , Normal      !! BREO ELLIPTA 100-25 MCG/INH inhaler INHALE 1 PUFF DAILY  RINSE MOUTH AFTER USE , Normal      diclofenac (VOLTAREN) 75 mg EC tablet TAKE 1 TABLET BY MOUTH 2 TIMES A DAY AS NEEDED WITH FOOD, Normal      !! escitalopram (LEXAPRO) 10 mg tablet TAKE 1 TABLET BY MOUTH EVERY DAY, Normal      promethazine-dextromethorphan (PHENERGAN-DM) 6 25-15 mg/5 mL oral syrup Take 5 mL by mouth 4 (four) times a day as needed for cough, Starting Tue 8/7/2018, Normal       !! - Potential duplicate medications found  Please discuss with provider  No discharge procedures on file      ED Provider  Electronically Signed by           Dennis Gould MD  07/13/19 2115

## 2019-07-11 ENCOUNTER — TELEPHONE (OUTPATIENT)
Dept: FAMILY MEDICINE CLINIC | Facility: CLINIC | Age: 64
End: 2019-07-11

## 2019-07-11 ENCOUNTER — OFFICE VISIT (OUTPATIENT)
Dept: FAMILY MEDICINE CLINIC | Facility: CLINIC | Age: 64
End: 2019-07-11
Payer: COMMERCIAL

## 2019-07-11 VITALS
HEIGHT: 64 IN | HEART RATE: 82 BPM | SYSTOLIC BLOOD PRESSURE: 124 MMHG | TEMPERATURE: 98.8 F | DIASTOLIC BLOOD PRESSURE: 84 MMHG | WEIGHT: 200.8 LBS | BODY MASS INDEX: 34.28 KG/M2

## 2019-07-11 DIAGNOSIS — M62.838 MUSCLE SPASM: ICD-10-CM

## 2019-07-11 DIAGNOSIS — J42 CHRONIC BRONCHITIS, UNSPECIFIED CHRONIC BRONCHITIS TYPE (HCC): ICD-10-CM

## 2019-07-11 DIAGNOSIS — M54.2 CERVICALGIA: Primary | ICD-10-CM

## 2019-07-11 LAB
ATRIAL RATE: 85 BPM
P AXIS: 64 DEGREES
PR INTERVAL: 158 MS
QRS AXIS: 62 DEGREES
QRSD INTERVAL: 80 MS
QT INTERVAL: 358 MS
QTC INTERVAL: 426 MS
T WAVE AXIS: 32 DEGREES
VENTRICULAR RATE: 85 BPM

## 2019-07-11 PROCEDURE — 3008F BODY MASS INDEX DOCD: CPT | Performed by: FAMILY MEDICINE

## 2019-07-11 PROCEDURE — 99213 OFFICE O/P EST LOW 20 MIN: CPT | Performed by: FAMILY MEDICINE

## 2019-07-11 PROCEDURE — 96372 THER/PROPH/DIAG INJ SC/IM: CPT | Performed by: FAMILY MEDICINE

## 2019-07-11 PROCEDURE — 93010 ELECTROCARDIOGRAM REPORT: CPT | Performed by: INTERNAL MEDICINE

## 2019-07-11 RX ORDER — METHOCARBAMOL 500 MG/1
500 TABLET, FILM COATED ORAL 4 TIMES DAILY
Qty: 40 TABLET | Refills: 0 | Status: SHIPPED | OUTPATIENT
Start: 2019-07-11 | End: 2019-10-11

## 2019-07-11 RX ORDER — METHYLPREDNISOLONE ACETATE 80 MG/ML
80 INJECTION, SUSPENSION INTRA-ARTICULAR; INTRALESIONAL; INTRAMUSCULAR; SOFT TISSUE ONCE
Status: COMPLETED | OUTPATIENT
Start: 2019-07-11 | End: 2019-07-11

## 2019-07-11 RX ORDER — PREDNISONE 20 MG/1
TABLET ORAL
Qty: 18 TABLET | Refills: 0 | Status: SHIPPED | OUTPATIENT
Start: 2019-07-11 | End: 2019-09-20 | Stop reason: ALTCHOICE

## 2019-07-11 RX ORDER — KETOROLAC TROMETHAMINE 30 MG/ML
60 INJECTION, SOLUTION INTRAMUSCULAR; INTRAVENOUS ONCE
Status: COMPLETED | OUTPATIENT
Start: 2019-07-11 | End: 2019-07-11

## 2019-07-11 RX ORDER — TRAMADOL HYDROCHLORIDE 50 MG/1
50 TABLET ORAL EVERY 6 HOURS PRN
Qty: 30 TABLET | Refills: 0 | Status: SHIPPED | OUTPATIENT
Start: 2019-07-11 | End: 2019-10-11

## 2019-07-11 RX ADMIN — METHYLPREDNISOLONE ACETATE 80 MG: 80 INJECTION, SUSPENSION INTRA-ARTICULAR; INTRALESIONAL; INTRAMUSCULAR; SOFT TISSUE at 14:45

## 2019-07-11 RX ADMIN — KETOROLAC TROMETHAMINE 60 MG: 30 INJECTION, SOLUTION INTRAMUSCULAR; INTRAVENOUS at 14:45

## 2019-07-11 NOTE — PROGRESS NOTES
Assessment/Plan:  1) Cervicalgia  2) muscle spasm  - I reviewed with pt  D80 and Toradol 60mg IM given  Start robaxin 500mg q6h prn for muscle spasm and toradol 50mg q6h prn pain  Start pred taper tomorrow  Pt to call Monday with f/u - earlier if worse  Pt to call for problems or concerns in the interim        Subjective:      Patient ID: Shannon De La Rosa is a 61 y o  female  - 58 yo female presents with one week hx of worsening neck pain  Pain started 1 week ago in her left sternoclavicular area  Over the next air to pain and then started radiating around both sides of her neck towards the C-spine  Now pain also extends just below her ears left greater than right as well as up to the base of her occiput  Increased pain is noted with movement of the neck  There is no radiation to her arms or hands  Patient was seen at the emergency room yesterday  CT of the chest was negative for dissection or other findings  Patient has tried some of her 's old naproxen as well as Vicodin without improvement  She received morphine in the emergency room yesterday which helped for about an hour on   Patient denies any trauma  At present, patient's pain is 8/10  The following portions of the patient's history were reviewed and updated as appropriate: She  has a past medical history of Acid reflux, Fibromyalgia, Hypertension, and Seasonal allergies    She   Patient Active Problem List    Diagnosis Date Noted    Cervicalgia 07/11/2019    Neuropathic pain 01/09/2019    Early satiety 08/28/2018    Abdominal distension 08/28/2018    Family history of pancreatic cancer 08/28/2018    Pain of upper abdomen 08/28/2018    Dyspnea on exertion 05/04/2018    Edema 07/13/2017    Lumbosacral radiculopathy at L5 01/06/2017    Compression fracture of thoracic vertebra, non-traumatic (HCC) 08/30/2016    Hyperlipidemia 12/08/2015    Cataract 08/22/2014    Palpitations 08/05/2014    Chronic obstructive pulmonary disease ([facility] [address] ) 04/17/2013    Esophageal reflux 04/17/2013    Pulmonary nodule seen on imaging study 04/17/2013    Hypertension 03/22/2013    Recurrent major depressive disorder, in partial remission ([facility] ) 03/14/2013    Fibromyalgia 03/14/2013    Degeneration of intervertebral disc 03/14/2013    Anxiety 02/21/2013    Seasonal allergic rhinitis 12/20/2012     She  has a past surgical history that includes Knee surgery (1998); pr colonoscopy flx dx w/collj spec when pfrmd (N/A, 5/9/2017); and Eye surgery  She  reports that she has been smoking  She has a 100 00 pack-year smoking history  She has never used smokeless tobacco  She reports that she does not drink alcohol or use drugs  Current Outpatient Medications   Medication Sig Dispense Refill    acetaminophen (TYLENOL 8 HOUR ARTHRITIS PAIN) 650 mg CR tablet Take by mouth      ALPRAZolam (XANAX) 0 25 mg tablet Take 1 tablet (0 25 mg total) by mouth 3 (three) times a day as needed for anxiety 90 tablet 0    amLODIPine (NORVASC) 5 mg tablet TAKE 1 TABLET BY MOUTH EVERY DAY 30 tablet 1    [employer] INDOOR/OUTDOOR ALLERGY RLF 10 MG tablet TAKE 1 TABLET BY MOUTH EVERY DAY 30 tablet 3    escitalopram (LEXAPRO) 10 mg tablet TAKE 1 TABLET (10 MG TOTAL) BY MOUTH DAILY 30 tablet 5    fluticasone (FLONASE) 50 mcg/act nasal spray INSTILL 2 SPRAYS INTO EACH NOSTRIL DAILY 16 mL 3    fluticasone-salmeterol (ADVAIR DISKUS, WIXELA INHUB) 500-50 mcg/dose inhaler Inhale 1 puff 2 (two) times a day Rinse mouth after use   1 Inhaler 5    lidocaine (LIDODERM) 5 % Apply 1 patch topically daily Remove & Discard patch within 12 hours or as directed by MD 30 patch 0    lisinopril (ZESTRIL) 20 mg tablet TAKE 1 TABLET BY MOUTH EVERY DAY 30 tablet 5    methocarbamol (ROBAXIN) 500 mg tablet Take 1 tablet (500 mg total) by mouth 4 (four) times a day 40 tablet 0    MINOXIDIL, TOPICAL, 5 % SOLN Apply 1 application topically      montelukast (SINGULAIR) 10 mg tablet TAKE 1 TABLET BY MOUTH EVERYDAY AT BEDTIME 30 tablet 5    nystatin (MYCOSTATIN) 100,000 units/mL suspension SWISH, GARGLE THEN SPIT 1 TEASPOOONFUL 3-4 X A  mL 3    pantoprazole (PROTONIX) 40 mg tablet TAKE 1 TABLET BY MOUTH EVERY DAY 30 tablet 5    predniSONE 20 mg tablet 3 tab po qd x 3d then 2 tab po qd x 3d then 1 tab po qd x 3d 18 tablet 0    ranitidine (ZANTAC) 150 mg tablet Take 150 mg by mouth 2 (two) times a day  5    traMADol (ULTRAM) 50 mg tablet Take 1 tablet (50 mg total) by mouth every 6 (six) hours as needed for moderate pain or severe pain for up to 4 days 12 tablet 0    traMADol (ULTRAM) 50 mg tablet Take 1 tablet (50 mg total) by mouth every 6 (six) hours as needed for moderate pain 30 tablet 0     No current facility-administered medications for this visit  She is allergic to miconazole       Review of Systems   Constitutional: Negative  HENT: Negative  Eyes: Negative  Respiratory: Negative  Cardiovascular: Negative  Musculoskeletal: Positive for neck pain and neck stiffness  Negative for arthralgias and myalgias  Skin: Negative  Neurological: Negative for weakness and numbness  Objective:      /84 (BP Location: Left arm, Patient Position: Sitting, Cuff Size: Standard)   Pulse 82   Temp 98 8 °F (37 1 °C)   Ht 5' 4" (1 626 m)   Wt 91 1 kg (200 lb 12 8 oz)   BMI 34 47 kg/m²          Physical Exam   Constitutional:   Uncomfortable appearing female in NAD   HENT:   Right Ear: External ear normal    Left Ear: External ear normal    Mouth/Throat: Oropharynx is clear and moist    Eyes: Pupils are equal, round, and reactive to light  Conjunctivae and EOM are normal    Neck: Trachea normal  Muscular tenderness (along the paracervical muscles with spasm from C4-C7 , L>R) present  No tracheal tenderness and no spinous process tenderness present  Decreased range of motion present  No edema and no erythema present  Cardiovascular: Normal rate and intact distal pulses  Pulmonary/Chest: Effort normal and breath sounds normal    Musculoskeletal: She exhibits no edema  Neurological: She displays normal reflexes  No sensory deficit  She exhibits normal muscle tone  Skin: Skin is warm

## 2019-07-11 NOTE — TELEPHONE ENCOUNTER
Pa was requested for the [facility], [facility]  Her insurance denied this because she hasnt tried any formulary  Meds covered are generic advair and Kori Said   Can she try 1 of these or should the decision be appealed

## 2019-07-12 ENCOUNTER — TELEPHONE (OUTPATIENT)
Dept: FAMILY MEDICINE CLINIC | Facility: CLINIC | Age: 64
End: 2019-07-12

## 2019-07-12 NOTE — TELEPHONE ENCOUNTER
She started in the middle of the night with pain/spasm on her right side,  She will start Prednisone today

## 2019-07-16 NOTE — TELEPHONE ENCOUNTER
Update: The pain is less,    Since she started the steroid she has rapid heart beat, sob, dizzy, constipation, chest pain once in a while but says not really,  Body swelling, she gained 5 lb in 2 days then lost 2lbs  Pl adv  She is in no distress

## 2019-07-28 DIAGNOSIS — K21.9 GASTROESOPHAGEAL REFLUX DISEASE, ESOPHAGITIS PRESENCE NOT SPECIFIED: ICD-10-CM

## 2019-07-28 RX ORDER — PANTOPRAZOLE SODIUM 40 MG/1
TABLET, DELAYED RELEASE ORAL
Qty: 30 TABLET | Refills: 5 | Status: SHIPPED | OUTPATIENT
Start: 2019-07-28 | End: 2020-01-22

## 2019-07-29 DIAGNOSIS — I10 ESSENTIAL HYPERTENSION: ICD-10-CM

## 2019-07-29 RX ORDER — AMLODIPINE BESYLATE 5 MG/1
TABLET ORAL
Qty: 30 TABLET | Refills: 1 | Status: SHIPPED | OUTPATIENT
Start: 2019-07-29 | End: 2019-10-11

## 2019-07-30 ENCOUNTER — TELEPHONE (OUTPATIENT)
Dept: FAMILY MEDICINE CLINIC | Facility: CLINIC | Age: 64
End: 2019-07-30

## 2019-07-30 NOTE — TELEPHONE ENCOUNTER
Wants to start taking   [address] and   Biotin     Can she take this with the other meds she is on?

## 2019-08-01 DIAGNOSIS — J30.9 ALLERGIC RHINITIS, UNSPECIFIED SEASONALITY, UNSPECIFIED TRIGGER: ICD-10-CM

## 2019-08-01 RX ORDER — CETIRIZINE HYDROCHLORIDE 10 MG/1
TABLET ORAL
Qty: 30 TABLET | Refills: 3 | Status: SHIPPED | OUTPATIENT
Start: 2019-08-01 | End: 2020-01-23

## 2019-08-14 DIAGNOSIS — B37.0 THRUSH: ICD-10-CM

## 2019-09-13 ENCOUNTER — TELEPHONE (OUTPATIENT)
Dept: FAMILY MEDICINE CLINIC | Facility: CLINIC | Age: 64
End: 2019-09-13

## 2019-09-13 DIAGNOSIS — J32.9 SINUSITIS, UNSPECIFIED CHRONICITY, UNSPECIFIED LOCATION: Primary | ICD-10-CM

## 2019-09-13 RX ORDER — AZITHROMYCIN 250 MG/1
TABLET, FILM COATED ORAL
Qty: 6 TABLET | Refills: 0 | Status: SHIPPED | OUTPATIENT
Start: 2019-09-13 | End: 2019-09-17

## 2019-09-13 NOTE — TELEPHONE ENCOUNTER
Patient called stating she believes she has an upper respiratory  infection  She has coughing, headache, tired, hot and cold, nose stuffy and runny, tongue is bothering her  wheezing, mucus is yellow when she brings it up  No fevers, no vomiting, no nausea, no diarrhea  She is using the medication for her tongue due to getting thrush often       [facility]   Aware to check with pharmacy

## 2019-09-19 ENCOUNTER — TELEPHONE (OUTPATIENT)
Dept: FAMILY MEDICINE CLINIC | Facility: CLINIC | Age: 64
End: 2019-09-19

## 2019-09-19 NOTE — TELEPHONE ENCOUNTER
Last week you called in meds for her tongue swelling and painful   Feels like its on fire and dry mouth       The meds you called in are not helping     She would like to be seen tomorrow     Please advise

## 2019-09-20 ENCOUNTER — OFFICE VISIT (OUTPATIENT)
Dept: FAMILY MEDICINE CLINIC | Facility: CLINIC | Age: 64
End: 2019-09-20
Payer: COMMERCIAL

## 2019-09-20 VITALS
WEIGHT: 203.8 LBS | SYSTOLIC BLOOD PRESSURE: 130 MMHG | HEART RATE: 80 BPM | TEMPERATURE: 97.8 F | BODY MASS INDEX: 34.79 KG/M2 | DIASTOLIC BLOOD PRESSURE: 84 MMHG | HEIGHT: 64 IN

## 2019-09-20 DIAGNOSIS — R68.2 DRY MOUTH: Primary | ICD-10-CM

## 2019-09-20 PROCEDURE — 99213 OFFICE O/P EST LOW 20 MIN: CPT | Performed by: FAMILY MEDICINE

## 2019-09-20 NOTE — PROGRESS NOTES
Assessment/Plan:     Diagnoses and all orders for this visit:    Dry mouth  Comments:  Exam unremarkable  Symptoms better with OTC product  Cont conservative care  Recheck if symptoms return  Subjective:      Patient ID: Loraine Olsen is a 61 y o  female  Pt with 2 week hx of dry mouth and tongue irritation   Pt has been on azithro, uses her inhalers regularly but denies seeing any oropharyngeal plaques  No improvement with nystatin  Pt is a little better today after using an OTC product last night  Has chronic body/joint aches  No rashes  The following portions of the patient's history were reviewed and updated as appropriate: She  has a past medical history of Acid reflux, Fibromyalgia, Hypertension, and Seasonal allergies  She   Patient Active Problem List    Diagnosis Date Noted    Cervicalgia 07/11/2019    Neuropathic pain 01/09/2019    Early satiety 08/28/2018    Abdominal distension 08/28/2018    Family history of pancreatic cancer 08/28/2018    Pain of upper abdomen 08/28/2018    Dyspnea on exertion 05/04/2018    Edema 07/13/2017    Lumbosacral radiculopathy at L5 01/06/2017    Compression fracture of thoracic vertebra, non-traumatic (HCC) 08/30/2016    Hyperlipidemia 12/08/2015    Cataract 08/22/2014    Palpitations 08/05/2014    Chronic obstructive pulmonary disease ([facility] ) 04/17/2013    Esophageal reflux 04/17/2013    Pulmonary nodule seen on imaging study 04/17/2013    Hypertension 03/22/2013    Recurrent major depressive disorder, in partial remission ([facility] ) 03/14/2013    Fibromyalgia 03/14/2013    Degeneration of intervertebral disc 03/14/2013    Anxiety 02/21/2013    Seasonal allergic rhinitis 12/20/2012     She  has a past surgical history that includes Knee surgery (1998); pr colonoscopy flx dx w/collj spec when pfrmd (N/A, 5/9/2017); and Eye surgery  She  reports that she has been smoking  She has a 100 00 pack-year smoking history   She has never used smokeless tobacco  She reports that she does not drink alcohol or use drugs  Current Outpatient Medications   Medication Sig Dispense Refill    acetaminophen (TYLENOL 8 HOUR ARTHRITIS PAIN) 650 mg CR tablet Take by mouth      ALPRAZolam (XANAX) 0 25 mg tablet Take 1 tablet (0 25 mg total) by mouth 3 (three) times a day as needed for anxiety 90 tablet 0    amLODIPine (NORVASC) 5 mg tablet TAKE 1 TABLET BY MOUTH EVERY DAY 30 tablet 1    cetirizine (ZyrTEC) 10 mg tablet TAKE 1 TABLET BY MOUTH EVERY DAY 30 tablet 3    escitalopram (LEXAPRO) 10 mg tablet TAKE 1 TABLET (10 MG TOTAL) BY MOUTH DAILY 30 tablet 5    fluticasone (FLONASE) 50 mcg/act nasal spray INSTILL 2 SPRAYS INTO EACH NOSTRIL DAILY 16 mL 3    fluticasone-salmeterol (ADVAIR DISKUS, WIXELA INHUB) 500-50 mcg/dose inhaler Inhale 1 puff 2 (two) times a day Rinse mouth after use  1 Inhaler 5    lidocaine (LIDODERM) 5 % Apply 1 patch topically daily Remove & Discard patch within 12 hours or as directed by MD 30 patch 0    lisinopril (ZESTRIL) 20 mg tablet TAKE 1 TABLET BY MOUTH EVERY DAY 30 tablet 5    methocarbamol (ROBAXIN) 500 mg tablet Take 1 tablet (500 mg total) by mouth 4 (four) times a day 40 tablet 0    MINOXIDIL, TOPICAL, 5 % SOLN Apply 1 application topically      montelukast (SINGULAIR) 10 mg tablet TAKE 1 TABLET BY MOUTH EVERYDAY AT BEDTIME 30 tablet 5    pantoprazole (PROTONIX) 40 mg tablet TAKE 1 TABLET BY MOUTH EVERY DAY 30 tablet 5    ranitidine (ZANTAC) 150 mg tablet Take 150 mg by mouth 2 (two) times a day  5    traMADol (ULTRAM) 50 mg tablet Take 1 tablet (50 mg total) by mouth every 6 (six) hours as needed for moderate pain 30 tablet 0     No current facility-administered medications for this visit  She is allergic to miconazole       Review of Systems   Constitutional: Negative  HENT: Positive for sore throat (and sore tongue) and voice change   Negative for congestion, dental problem, ear discharge, mouth sores, postnasal drip, sinus pressure and sinus pain  Eyes: Negative  Respiratory: Positive for cough (occasional) and wheezing (occasional)  Objective:      /84 (BP Location: Left arm, Patient Position: Sitting, Cuff Size: Standard)   Pulse 80   Temp 97 8 °F (36 6 °C)   Ht 5' 4" (1 626 m)   Wt 92 4 kg (203 lb 12 8 oz)   BMI 34 98 kg/m²          Physical Exam   Constitutional: She appears well-developed and well-nourished  HENT:   Head: Normocephalic and atraumatic  Right Ear: External ear normal    Left Ear: External ear normal    Nose: Nose normal    Mouth/Throat: Oropharynx is clear and moist    Eyes: Pupils are equal, round, and reactive to light  Conjunctivae and EOM are normal    Lymphadenopathy:     She has no cervical adenopathy

## 2019-09-20 NOTE — PROGRESS NOTES
BMI Counseling: Body mass index is 34 98 kg/m²  The BMI is above normal  Exercise recommendations include exercising 3-5 times per week

## 2019-09-25 ENCOUNTER — TELEPHONE (OUTPATIENT)
Dept: FAMILY MEDICINE CLINIC | Facility: CLINIC | Age: 64
End: 2019-09-25

## 2019-09-25 DIAGNOSIS — J44.9 CHRONIC OBSTRUCTIVE PULMONARY DISEASE, UNSPECIFIED COPD TYPE (HCC): Primary | ICD-10-CM

## 2019-09-25 RX ORDER — FLUTICASONE FUROATE AND VILANTEROL 200; 25 UG/1; UG/1
1 POWDER RESPIRATORY (INHALATION) DAILY
Qty: 1 INHALER | Refills: 5 | Status: SHIPPED | OUTPATIENT
Start: 2019-09-25 | End: 2019-10-11

## 2019-09-25 NOTE — TELEPHONE ENCOUNTER
Recently started taking Wixela inhaler   And its causing her to wheeze more,  SOB and very  jittery     Please advise

## 2019-09-30 ENCOUNTER — TELEPHONE (OUTPATIENT)
Dept: FAMILY MEDICINE CLINIC | Facility: CLINIC | Age: 64
End: 2019-09-30

## 2019-09-30 NOTE — TELEPHONE ENCOUNTER
Patient called back, insurance just faxed pre auth for the [facility], if you could do asap so she could have tomorrow

## 2019-09-30 NOTE — TELEPHONE ENCOUNTER
Patient called stating her insurance will not cover the inhaler BREO, she is asking if we have samples of BREO or if we could call something else in?      JESSICA-Alyssa Motnes   Aware to check with pharmacy if no samples

## 2019-10-02 ENCOUNTER — TELEPHONE (OUTPATIENT)
Dept: FAMILY MEDICINE CLINIC | Facility: CLINIC | Age: 64
End: 2019-10-02

## 2019-10-02 ENCOUNTER — OFFICE VISIT (OUTPATIENT)
Dept: FAMILY MEDICINE CLINIC | Facility: CLINIC | Age: 64
End: 2019-10-02
Payer: COMMERCIAL

## 2019-10-02 VITALS
WEIGHT: 203.4 LBS | TEMPERATURE: 97.8 F | HEIGHT: 64 IN | BODY MASS INDEX: 34.72 KG/M2 | HEART RATE: 82 BPM | SYSTOLIC BLOOD PRESSURE: 122 MMHG | DIASTOLIC BLOOD PRESSURE: 82 MMHG

## 2019-10-02 DIAGNOSIS — F17.200 TOBACCO DEPENDENCE: ICD-10-CM

## 2019-10-02 DIAGNOSIS — R91.1 PULMONARY NODULE SEEN ON IMAGING STUDY: ICD-10-CM

## 2019-10-02 DIAGNOSIS — J44.1 CHRONIC OBSTRUCTIVE PULMONARY DISEASE WITH ACUTE EXACERBATION (HCC): Primary | ICD-10-CM

## 2019-10-02 DIAGNOSIS — Z12.2 ENCOUNTER FOR SCREENING FOR LUNG CANCER: ICD-10-CM

## 2019-10-02 PROCEDURE — 99214 OFFICE O/P EST MOD 30 MIN: CPT | Performed by: FAMILY MEDICINE

## 2019-10-02 PROCEDURE — 99406 BEHAV CHNG SMOKING 3-10 MIN: CPT | Performed by: FAMILY MEDICINE

## 2019-10-02 RX ORDER — CEFUROXIME AXETIL 500 MG/1
500 TABLET ORAL EVERY 12 HOURS SCHEDULED
Qty: 10 TABLET | Refills: 0 | Status: SHIPPED | OUTPATIENT
Start: 2019-10-02 | End: 2019-10-07

## 2019-10-02 RX ORDER — AZITHROMYCIN 250 MG/1
500 TABLET, FILM COATED ORAL EVERY 24 HOURS
Qty: 10 TABLET | Refills: 0 | Status: SHIPPED | OUTPATIENT
Start: 2019-10-02 | End: 2019-10-07

## 2019-10-02 RX ORDER — PREDNISONE 20 MG/1
40 TABLET ORAL DAILY
Qty: 10 TABLET | Refills: 0 | Status: SHIPPED | OUTPATIENT
Start: 2019-10-02 | End: 2019-10-07

## 2019-10-02 NOTE — ASSESSMENT & PLAN NOTE
Previously well controlled on Breo, however due to formulary change unable to continue this medication  Start Stiolto QD & Qvar BID  Also treat acute exacerbation with azithromycin and steroid burst  ED precautions reviewed  Patient declined tobacco cessation

## 2019-10-02 NOTE — TELEPHONE ENCOUNTER
Patient was told the Zithromax you called for her has to be changed because   She was on it too recently     The inhalers were approved     Please call

## 2019-10-02 NOTE — ASSESSMENT & PLAN NOTE
Counseled patient 5 minutes on tobacco cessation, offered nicotine replacement patient declined  Pre-contemplative at this time

## 2019-10-02 NOTE — TELEPHONE ENCOUNTER
Call pt - we can try changing to Q stephany and Serevent - 2 separate inhalers  Is she willing to do this?

## 2019-10-02 NOTE — PROGRESS NOTES
Mary Wheeler 1955 female MRN: [id number]    Acute Visit    Assessment/Plan   Tobacco dependence  Counseled patient 5 minutes on tobacco cessation, offered nicotine replacement patient declined  Pre-contemplative at this time    Chronic obstructive pulmonary disease ([address] )  Previously well controlled on Breo, however due to formulary change unable to continue this medication  Start Stiolto QD & Qvar BID  Also treat acute exacerbation with azithromycin and steroid burst  ED precautions reviewed  Patient declined tobacco cessation    Niesha Anand was seen today for shortness of breath and cold like symptoms  Diagnoses and all orders for this visit:    Chronic obstructive pulmonary disease with acute exacerbation (HCC)  -     beclomethasone (QVAR) 80 MCG/ACT inhaler; Inhale 2 puffs 2 (two) times a day Rinse mouth after use  -     tiotropium-olodaterol (STIOLTO RESPIMAT) 2 5-2 5 MCG/ACT inhaler; Inhale 2 puffs daily  -     azithromycin (ZITHROMAX) 250 mg tablet; Take 2 tablets (500 mg total) by mouth every 24 hours for 5 days  -     predniSONE 20 mg tablet; Take 2 tablets (40 mg total) by mouth daily for 5 days    Pulmonary nodule seen on imaging study  -     CT lung screening program; Future    Encounter for screening for lung cancer  -     CT lung screening program; Future    Tobacco dependence        Margarita Mendieta MD  [address]  10/2/2019      Please be aware that this note contains text that was dictated and there may be errors pertaining to "sound-alike "words during the dictation process  SUBJECTIVE    CC: Shortness of Breath and Cold Like Symptoms    HPI:  Mary Wheeler is a 61 y o  female who presented for an acute visit complaining of ongoing SOB on and off for the last several weeks  Unable to obtain Breo due to formulary change  Reports exertional SOB and cough, rhinorrhea  Denies fever, rash, nausea  Compliant with meds she does have  Current daily smoker       Review of Systems Constitutional: Positive for activity change  Negative for chills, diaphoresis, fatigue and fever  HENT: Positive for congestion and rhinorrhea  Respiratory: Positive for cough, chest tightness, shortness of breath and wheezing  Cardiovascular: Negative for chest pain, palpitations and leg swelling  Gastrointestinal: Negative for abdominal pain and nausea  Skin: Negative for color change and rash  Medications:   Meds/Allergies   Current Outpatient Medications   Medication Sig Dispense Refill    acetaminophen (TYLENOL 8 HOUR ARTHRITIS PAIN) 650 mg CR tablet Take by mouth      ALPRAZolam (XANAX) 0 25 mg tablet Take 1 tablet (0 25 mg total) by mouth 3 (three) times a day as needed for anxiety 90 tablet 0    amLODIPine (NORVASC) 5 mg tablet TAKE 1 TABLET BY MOUTH EVERY DAY 30 tablet 1    azithromycin (ZITHROMAX) 250 mg tablet Take 2 tablets (500 mg total) by mouth every 24 hours for 5 days 10 tablet 0    beclomethasone (QVAR) 80 MCG/ACT inhaler Inhale 2 puffs 2 (two) times a day Rinse mouth after use  1 Inhaler 2    cetirizine (ZyrTEC) 10 mg tablet TAKE 1 TABLET BY MOUTH EVERY DAY 30 tablet 3    escitalopram (LEXAPRO) 10 mg tablet TAKE 1 TABLET (10 MG TOTAL) BY MOUTH DAILY 30 tablet 5    fluticasone (FLONASE) 50 mcg/act nasal spray INSTILL 2 SPRAYS INTO EACH NOSTRIL DAILY 16 mL 3    fluticasone-vilanterol (BREO ELLIPTA) 200-25 MCG/INH inhaler Inhale 1 puff daily Rinse mouth after use   1 Inhaler 5    lidocaine (LIDODERM) 5 % Apply 1 patch topically daily Remove & Discard patch within 12 hours or as directed by MD 30 patch 0    lisinopril (ZESTRIL) 20 mg tablet TAKE 1 TABLET BY MOUTH EVERY DAY 30 tablet 5    methocarbamol (ROBAXIN) 500 mg tablet Take 1 tablet (500 mg total) by mouth 4 (four) times a day 40 tablet 0    MINOXIDIL, TOPICAL, 5 % SOLN Apply 1 application topically      montelukast (SINGULAIR) 10 mg tablet TAKE 1 TABLET BY MOUTH EVERYDAY AT BEDTIME 30 tablet 5    pantoprazole (PROTONIX) 40 mg tablet TAKE 1 TABLET BY MOUTH EVERY DAY 30 tablet 5    predniSONE 20 mg tablet Take 2 tablets (40 mg total) by mouth daily for 5 days 10 tablet 0    ranitidine (ZANTAC) 150 mg tablet Take 150 mg by mouth 2 (two) times a day  5    tiotropium-olodaterol (STIOLTO RESPIMAT) 2 5-2 5 MCG/ACT inhaler Inhale 2 puffs daily 1 Inhaler 2    traMADol (ULTRAM) 50 mg tablet Take 1 tablet (50 mg total) by mouth every 6 (six) hours as needed for moderate pain 30 tablet 0     No current facility-administered medications for this visit  Allergies   Allergen Reactions    Miconazole Itching and Swelling    Wixela Inhub [Fluticasone-Salmeterol] Palpitations       OBJECTIVE    Vitals:   Vitals:    10/02/19 1334   BP: 122/82   BP Location: Left arm   Patient Position: Sitting   Cuff Size: Standard   Pulse: 82   Temp: 97 8 °F (36 6 °C)   Weight: 92 3 kg (203 lb 6 4 oz)   Height: 5' 4" (1 626 m)       Physical Exam   Constitutional: Vital signs are normal  She appears well-developed and well-nourished  Non-toxic appearance  She does not appear ill  No distress  HENT:   Head: Normocephalic and atraumatic  Right Ear: External ear normal    Left Ear: External ear normal    Nose: Nose normal    Mouth/Throat: No oropharyngeal exudate or posterior oropharyngeal edema  Eyes: Conjunctivae, EOM and lids are normal    Neck: No JVD present  No tracheal deviation present  Cardiovascular: Normal rate, regular rhythm, normal heart sounds and intact distal pulses  Pulmonary/Chest: No accessory muscle usage  No respiratory distress  She has decreased breath sounds  She has wheezes  She has no rhonchi  She has rales in the right lower field and the left lower field  SpO2 96% in RA   Abdominal: Soft  Normal appearance and bowel sounds are normal    Lymphadenopathy:     She has cervical adenopathy  Neurological: She is alert  Skin: Capillary refill takes less than 2 seconds  No rash noted  She is not diaphoretic  Psychiatric: She has a normal mood and affect  Her speech is normal and behavior is normal  She expresses no suicidal ideation  Nursing note and vitals reviewed

## 2019-10-07 DIAGNOSIS — J42 CHRONIC BRONCHITIS, UNSPECIFIED CHRONIC BRONCHITIS TYPE (HCC): ICD-10-CM

## 2019-10-07 DIAGNOSIS — J30.2 SEASONAL ALLERGIC RHINITIS, UNSPECIFIED TRIGGER: ICD-10-CM

## 2019-10-07 RX ORDER — MONTELUKAST SODIUM 10 MG/1
TABLET ORAL
Qty: 30 TABLET | Refills: 5 | Status: SHIPPED | OUTPATIENT
Start: 2019-10-07 | End: 2020-04-14

## 2019-10-10 ENCOUNTER — TELEPHONE (OUTPATIENT)
Dept: FAMILY MEDICINE CLINIC | Facility: CLINIC | Age: 64
End: 2019-10-10

## 2019-10-10 NOTE — TELEPHONE ENCOUNTER
Patient was seen on 10/2/2019 and was given Montelukast 10mg, Qvar 80mg and Stiolto Respimat 2 5-2 5mg  Patient states she is feeling better but still does not feel 100%  She is asking if she should go on a round of antibiotics? Also the inhaler Stiolto Respimat 2 5-2 5 does not work properly, she can't fully push down on the inhaler for it to go to the back of her mouth it goes on her tongue       [facility]  Aware to check with pharmacy

## 2019-10-10 NOTE — TELEPHONE ENCOUNTER
Please call patient and see if she can come for appointment tomorrow, if so please have her bring inhalers so we can check them

## 2019-10-11 ENCOUNTER — OFFICE VISIT (OUTPATIENT)
Dept: FAMILY MEDICINE CLINIC | Facility: CLINIC | Age: 64
End: 2019-10-11
Payer: COMMERCIAL

## 2019-10-11 ENCOUNTER — HOSPITAL ENCOUNTER (INPATIENT)
Facility: HOSPITAL | Age: 64
LOS: 2 days | Discharge: HOME | DRG: 248 | End: 2019-10-13
Attending: INTERNAL MEDICINE | Admitting: INTERNAL MEDICINE
Payer: COMMERCIAL

## 2019-10-11 ENCOUNTER — APPOINTMENT (EMERGENCY)
Dept: CT IMAGING | Facility: HOSPITAL | Age: 64
DRG: 248 | End: 2019-10-11
Payer: COMMERCIAL

## 2019-10-11 VITALS
OXYGEN SATURATION: 95 % | RESPIRATION RATE: 16 BRPM | WEIGHT: 209 LBS | BODY MASS INDEX: 35.68 KG/M2 | SYSTOLIC BLOOD PRESSURE: 124 MMHG | HEIGHT: 64 IN | DIASTOLIC BLOOD PRESSURE: 78 MMHG | TEMPERATURE: 97.3 F | HEART RATE: 68 BPM

## 2019-10-11 DIAGNOSIS — J01.00 ACUTE NON-RECURRENT MAXILLARY SINUSITIS: ICD-10-CM

## 2019-10-11 DIAGNOSIS — B37.0 ORAL THRUSH: ICD-10-CM

## 2019-10-11 DIAGNOSIS — J42 CHRONIC BRONCHITIS, UNSPECIFIED CHRONIC BRONCHITIS TYPE (HCC): Primary | ICD-10-CM

## 2019-10-11 DIAGNOSIS — F17.200 TOBACCO DEPENDENCE: ICD-10-CM

## 2019-10-11 DIAGNOSIS — K52.9 COLITIS: Primary | ICD-10-CM

## 2019-10-11 LAB
ALBUMIN SERPL BCP-MCNC: 3.8 G/DL (ref 3.5–5)
ALP SERPL-CCNC: 96 U/L (ref 46–116)
ALT SERPL W P-5'-P-CCNC: 29 U/L (ref 12–78)
ANION GAP SERPL CALCULATED.3IONS-SCNC: 14 MMOL/L (ref 4–13)
AST SERPL W P-5'-P-CCNC: 19 U/L (ref 5–45)
BASOPHILS # BLD AUTO: 0.11 THOUSANDS/ΜL (ref 0–0.1)
BASOPHILS NFR BLD AUTO: 1 % (ref 0–1)
BILIRUB SERPL-MCNC: 0.5 MG/DL (ref 0.2–1)
BUN SERPL-MCNC: 14 MG/DL (ref 5–25)
CALCIUM SERPL-MCNC: 9.3 MG/DL (ref 8.3–10.1)
CHLORIDE SERPL-SCNC: 94 MMOL/L (ref 100–108)
CO2 SERPL-SCNC: 21 MMOL/L (ref 21–32)
CREAT SERPL-MCNC: 0.77 MG/DL (ref 0.6–1.3)
EOSINOPHIL # BLD AUTO: 0.06 THOUSAND/ΜL (ref 0–0.61)
EOSINOPHIL NFR BLD AUTO: 0 % (ref 0–6)
ERYTHROCYTE [DISTWIDTH] IN BLOOD BY AUTOMATED COUNT: 13.7 % (ref 11.6–15.1)
GFR SERPL CREATININE-BSD FRML MDRD: 82 ML/MIN/1.73SQ M
GLUCOSE SERPL-MCNC: 148 MG/DL (ref 65–140)
HCT VFR BLD AUTO: 48.3 % (ref 34.8–46.1)
HGB BLD-MCNC: 15.6 G/DL (ref 11.5–15.4)
HOLD SPECIMEN: NORMAL
IMM GRANULOCYTES # BLD AUTO: 0.23 THOUSAND/UL (ref 0–0.2)
IMM GRANULOCYTES NFR BLD AUTO: 1 % (ref 0–2)
LACTATE SERPL-SCNC: 1.6 MMOL/L (ref 0.5–2)
LIPASE SERPL-CCNC: 174 U/L (ref 73–393)
LYMPHOCYTES # BLD AUTO: 1.14 THOUSANDS/ΜL (ref 0.6–4.47)
LYMPHOCYTES NFR BLD AUTO: 5 % (ref 14–44)
MCH RBC QN AUTO: 28.1 PG (ref 26.8–34.3)
MCHC RBC AUTO-ENTMCNC: 32.3 G/DL (ref 31.4–37.4)
MCV RBC AUTO: 87 FL (ref 82–98)
MONOCYTES # BLD AUTO: 1.07 THOUSAND/ΜL (ref 0.17–1.22)
MONOCYTES NFR BLD AUTO: 4 % (ref 4–12)
NEUTROPHILS # BLD AUTO: 21.49 THOUSANDS/ΜL (ref 1.85–7.62)
NEUTS SEG NFR BLD AUTO: 89 % (ref 43–75)
NRBC BLD AUTO-RTO: 0 /100 WBCS
PLATELET # BLD AUTO: 323 THOUSANDS/UL (ref 149–390)
PMV BLD AUTO: 9 FL (ref 8.9–12.7)
POTASSIUM SERPL-SCNC: 3.3 MMOL/L (ref 3.5–5.3)
PROT SERPL-MCNC: 8.2 G/DL (ref 6.4–8.2)
RBC # BLD AUTO: 5.55 MILLION/UL (ref 3.81–5.12)
SODIUM SERPL-SCNC: 129 MMOL/L (ref 136–145)
TROPONIN I SERPL-MCNC: <0.02 NG/ML
WBC # BLD AUTO: 24.1 THOUSAND/UL (ref 4.31–10.16)

## 2019-10-11 PROCEDURE — 93005 ELECTROCARDIOGRAM TRACING: CPT

## 2019-10-11 PROCEDURE — 83690 ASSAY OF LIPASE: CPT | Performed by: INTERNAL MEDICINE

## 2019-10-11 PROCEDURE — 99285 EMERGENCY DEPT VISIT HI MDM: CPT

## 2019-10-11 PROCEDURE — 87505 NFCT AGENT DETECTION GI: CPT | Performed by: PHYSICIAN ASSISTANT

## 2019-10-11 PROCEDURE — 36415 COLL VENOUS BLD VENIPUNCTURE: CPT

## 2019-10-11 PROCEDURE — 85025 COMPLETE CBC W/AUTO DIFF WBC: CPT | Performed by: INTERNAL MEDICINE

## 2019-10-11 PROCEDURE — 87493 C DIFF AMPLIFIED PROBE: CPT | Performed by: PHYSICIAN ASSISTANT

## 2019-10-11 PROCEDURE — 84484 ASSAY OF TROPONIN QUANT: CPT | Performed by: PHYSICIAN ASSISTANT

## 2019-10-11 PROCEDURE — 80053 COMPREHEN METABOLIC PANEL: CPT | Performed by: INTERNAL MEDICINE

## 2019-10-11 PROCEDURE — 96361 HYDRATE IV INFUSION ADD-ON: CPT

## 2019-10-11 PROCEDURE — 99214 OFFICE O/P EST MOD 30 MIN: CPT | Performed by: FAMILY MEDICINE

## 2019-10-11 PROCEDURE — 74177 CT ABD & PELVIS W/CONTRAST: CPT

## 2019-10-11 PROCEDURE — 99285 EMERGENCY DEPT VISIT HI MDM: CPT | Performed by: EMERGENCY MEDICINE

## 2019-10-11 PROCEDURE — 96374 THER/PROPH/DIAG INJ IV PUSH: CPT

## 2019-10-11 PROCEDURE — 83605 ASSAY OF LACTIC ACID: CPT | Performed by: PHYSICIAN ASSISTANT

## 2019-10-11 RX ORDER — SODIUM CHLORIDE 9 MG/ML
100 INJECTION, SOLUTION INTRAVENOUS CONTINUOUS
Status: DISCONTINUED | OUTPATIENT
Start: 2019-10-12 | End: 2019-10-13 | Stop reason: HOSPADM

## 2019-10-11 RX ORDER — ONDANSETRON 2 MG/ML
4 INJECTION INTRAMUSCULAR; INTRAVENOUS ONCE
Status: DISCONTINUED | OUTPATIENT
Start: 2019-10-11 | End: 2019-10-11

## 2019-10-11 RX ORDER — LORAZEPAM 2 MG/ML
0.5 INJECTION INTRAMUSCULAR ONCE
Status: COMPLETED | OUTPATIENT
Start: 2019-10-11 | End: 2019-10-11

## 2019-10-11 RX ORDER — FLUCONAZOLE 150 MG/1
150 TABLET ORAL ONCE
Qty: 1 TABLET | Refills: 0 | Status: SHIPPED | OUTPATIENT
Start: 2019-10-11 | End: 2019-10-13 | Stop reason: HOSPADM

## 2019-10-11 RX ORDER — AMOXICILLIN AND CLAVULANATE POTASSIUM 875; 125 MG/1; MG/1
1 TABLET, FILM COATED ORAL EVERY 12 HOURS SCHEDULED
Qty: 14 TABLET | Refills: 0 | Status: SHIPPED | OUTPATIENT
Start: 2019-10-11 | End: 2019-10-13 | Stop reason: HOSPADM

## 2019-10-11 RX ORDER — FLUCONAZOLE 100 MG/1
100 TABLET ORAL
Status: DISCONTINUED | OUTPATIENT
Start: 2019-10-12 | End: 2019-10-12 | Stop reason: DRUGHIGH

## 2019-10-11 RX ADMIN — VANCOMYCIN HYDROCHLORIDE 125 MG: 500 INJECTION, POWDER, LYOPHILIZED, FOR SOLUTION INTRAVENOUS at 23:56

## 2019-10-11 RX ADMIN — SODIUM CHLORIDE 1000 ML: 0.9 INJECTION, SOLUTION INTRAVENOUS at 21:42

## 2019-10-11 RX ADMIN — IOHEXOL 100 ML: 350 INJECTION, SOLUTION INTRAVENOUS at 22:09

## 2019-10-11 RX ADMIN — LORAZEPAM 0.5 MG: 2 INJECTION INTRAMUSCULAR; INTRAVENOUS at 21:41

## 2019-10-11 NOTE — ASSESSMENT & PLAN NOTE
Chronic, improving  Patient feels much better with Qvar, but was having difficulty with Stiolto administration (felt like it was making her cough right away, she was taking it correctly but felt like she didn't get the medication in)  We will trial Anoro Ellipta and see if this works better for her  Recommended tobacco cessation, pt declines  CT lung screening pending

## 2019-10-11 NOTE — ASSESSMENT & PLAN NOTE
Continue nystatin rinse  Take diflucan x1  Magic mouthwash for discomfort  Make sure to rinse mouth after inhaler use

## 2019-10-11 NOTE — PROGRESS NOTES
Katlyn Nicholson 1955 female MRN: [id number]    Acute Visit    Assessment/Plan   Chronic obstructive pulmonary disease ([facility] )  Chronic, improving  Patient feels much better with Qvar, but was having difficulty with Stiolto administration (felt like it was making her cough right away, she was taking it correctly but felt like she didn't get the medication in)  We will trial Anoro Ellipta and see if this works better for her  Recommended tobacco cessation, pt declines  CT lung screening pending    Acute non-recurrent maxillary sinusitis  Start Augmentin  Continue Flonase  Nasal saline rinse if tolerated     Oral thrush  Continue nystatin rinse  Take diflucan x1  Magic mouthwash for discomfort  Make sure to rinse mouth after inhaler use  Hectorvika Whitney was seen today for follow-up  Diagnoses and all orders for this visit:    Chronic bronchitis, unspecified chronic bronchitis type ([facility] )  -     umeclidinium-vilanterol (ANORO ELLIPTA) 62 5-25 MCG/INH inhaler; Inhale 1 puff daily    Oral thrush  -     fluconazole (DIFLUCAN) 150 mg tablet; Take 1 tablet (150 mg total) by mouth once for 1 dose  -     al mag oxide-diphenhydramine-lidocaine viscous (MAGIC MOUTHWASH) 1:1:1 suspension; Swish and spit 10 mL every 4 (four) hours as needed for mouth pain or discomfort    Acute non-recurrent maxillary sinusitis  -     amoxicillin-clavulanate (AUGMENTIN) 875-125 mg per tablet; Take 1 tablet by mouth every 12 (twelve) hours for 7 days      Sofia Dejesus MD  [address]  10/11/2019      Please be aware that this note contains text that was dictated and there may be errors pertaining to "sound-alike "words during the dictation process  SUBJECTIVE    CC: Follow-up (Patient is feeling better but not 100% wondering if she needs another round of antibiotics)    HPI:  Katlyn Nicholson is a 61 y o  female who presented for an acute visit complaining of:     Tongue pain - she's had recurrent thrush in the past  SHe is using nystatin gargle without improvement    COPD - improved on QVar, difficulty administering STiolto, makes her cough, requests alternate inhaler  She does have CT lung screening scheduled  Continues to smoke  Pre-contemplative  Sinus pressure, rhinorrhea, mucus brown/yellow  Feels malaise  No fevers  Breathing is better but sinuses hurt  Review of Systems   Constitutional: Negative for activity change, chills and fever  HENT: Positive for congestion, postnasal drip, rhinorrhea, sinus pressure, sinus pain and sore throat  Negative for mouth sores, trouble swallowing and voice change  Eyes: Negative for visual disturbance  Respiratory: Negative for cough, shortness of breath and wheezing  Cardiovascular: Negative for chest pain and palpitations  Gastrointestinal: Negative for abdominal pain, blood in stool, constipation, diarrhea, nausea and vomiting  Genitourinary: Negative for dysuria  Musculoskeletal: Negative for arthralgias and myalgias  Skin: Negative for rash  Neurological: Negative for dizziness, weakness and headaches  All other systems reviewed and are negative  Medications:   Meds/Allergies   Current Outpatient Medications   Medication Sig Dispense Refill    acetaminophen (TYLENOL 8 HOUR ARTHRITIS PAIN) 650 mg CR tablet Take by mouth      ALPRAZolam (XANAX) 0 25 mg tablet Take 1 tablet (0 25 mg total) by mouth 3 (three) times a day as needed for anxiety 90 tablet 0    beclomethasone (QVAR) 80 MCG/ACT inhaler Inhale 2 puffs 2 (two) times a day Rinse mouth after use   1 Inhaler 2    cetirizine (ZyrTEC) 10 mg tablet TAKE 1 TABLET BY MOUTH EVERY DAY 30 tablet 3    escitalopram (LEXAPRO) 10 mg tablet TAKE 1 TABLET (10 MG TOTAL) BY MOUTH DAILY 30 tablet 5    fluticasone (FLONASE) 50 mcg/act nasal spray INSTILL 2 SPRAYS INTO EACH NOSTRIL DAILY 16 mL 3    lisinopril (ZESTRIL) 20 mg tablet TAKE 1 TABLET BY MOUTH EVERY DAY 30 tablet 5    MINOXIDIL, TOPICAL, 5 % SOLN Apply 1 application topically      montelukast (SINGULAIR) 10 mg tablet TAKE 1 TABLET BY MOUTH EVERYDAY AT BEDTIME 30 tablet 5    pantoprazole (PROTONIX) 40 mg tablet TAKE 1 TABLET BY MOUTH EVERY DAY 30 tablet 5    al mag oxide-diphenhydramine-lidocaine viscous (MAGIC MOUTHWASH) 1:1:1 suspension Swish and spit 10 mL every 4 (four) hours as needed for mouth pain or discomfort 1 Bottle 1    amoxicillin-clavulanate (AUGMENTIN) 875-125 mg per tablet Take 1 tablet by mouth every 12 (twelve) hours for 7 days 14 tablet 0    fluconazole (DIFLUCAN) 150 mg tablet Take 1 tablet (150 mg total) by mouth once for 1 dose 1 tablet 0    umeclidinium-vilanterol (ANORO ELLIPTA) 62 5-25 MCG/INH inhaler Inhale 1 puff daily 3 Inhaler 3     No current facility-administered medications for this visit  Allergies   Allergen Reactions    Miconazole Itching and Swelling    Wixela Inhub [Fluticasone-Salmeterol] Palpitations       OBJECTIVE    Vitals:   Vitals:    10/11/19 1013   BP: 124/78   Pulse: 68   Resp: 16   Temp: (!) 97 3 °F (36 3 °C)   TempSrc: Tympanic   SpO2: 95%   Weight: 94 8 kg (209 lb)   Height: 5' 4" (1 626 m)       Physical Exam   Constitutional: Vital signs are normal  She appears well-developed and well-nourished  She does not appear ill  No distress  HENT:   Head: Normocephalic and atraumatic  Right Ear: Tympanic membrane and external ear normal    Left Ear: Tympanic membrane and external ear normal    Nose: Nose normal    Mouth/Throat: Mucous membranes are normal  She has dentures  No tonsillar exudate  Riana Pope noted on soft palate and tongue   Eyes: Conjunctivae, EOM and lids are normal    Neck: No JVD present  No tracheal deviation present  Cardiovascular: Intact distal pulses  Pulmonary/Chest: No accessory muscle usage  No respiratory distress  She has no decreased breath sounds  She has no wheezes  She has no rhonchi  Significant improvement in aeration from prior visit   Abdominal: Normal appearance  Neurological: She is alert  Skin: No rash noted  She is not diaphoretic  Psychiatric: She has a normal mood and affect  Her speech is normal and behavior is normal  She expresses no suicidal ideation  Nursing note and vitals reviewed

## 2019-10-12 PROBLEM — K52.9 COLITIS: Status: ACTIVE | Noted: 2019-10-12

## 2019-10-12 LAB
ALBUMIN SERPL BCP-MCNC: 3 G/DL (ref 3.5–5)
ALP SERPL-CCNC: 69 U/L (ref 46–116)
ALT SERPL W P-5'-P-CCNC: 25 U/L (ref 12–78)
ANION GAP SERPL CALCULATED.3IONS-SCNC: 12 MMOL/L (ref 4–13)
AST SERPL W P-5'-P-CCNC: 16 U/L (ref 5–45)
BASOPHILS # BLD MANUAL: 0.2 THOUSAND/UL (ref 0–0.1)
BASOPHILS NFR MAR MANUAL: 1 % (ref 0–1)
BILIRUB SERPL-MCNC: 0.6 MG/DL (ref 0.2–1)
BUN SERPL-MCNC: 14 MG/DL (ref 5–25)
CALCIUM SERPL-MCNC: 8.3 MG/DL (ref 8.3–10.1)
CHLORIDE SERPL-SCNC: 100 MMOL/L (ref 100–108)
CO2 SERPL-SCNC: 22 MMOL/L (ref 21–32)
CREAT SERPL-MCNC: 0.65 MG/DL (ref 0.6–1.3)
EOSINOPHIL # BLD MANUAL: 0 THOUSAND/UL (ref 0–0.4)
EOSINOPHIL NFR BLD MANUAL: 0 % (ref 0–6)
ERYTHROCYTE [DISTWIDTH] IN BLOOD BY AUTOMATED COUNT: 13.9 % (ref 11.6–15.1)
GFR SERPL CREATININE-BSD FRML MDRD: 95 ML/MIN/1.73SQ M
GLUCOSE SERPL-MCNC: 129 MG/DL (ref 65–140)
HCT VFR BLD AUTO: 43.8 % (ref 34.8–46.1)
HGB BLD-MCNC: 14.4 G/DL (ref 11.5–15.4)
LYMPHOCYTES # BLD AUTO: 0.61 THOUSAND/UL (ref 0.6–4.47)
LYMPHOCYTES # BLD AUTO: 3 % (ref 14–44)
MAGNESIUM SERPL-MCNC: 1.6 MG/DL (ref 1.6–2.6)
MCH RBC QN AUTO: 28.5 PG (ref 26.8–34.3)
MCHC RBC AUTO-ENTMCNC: 32.9 G/DL (ref 31.4–37.4)
MCV RBC AUTO: 87 FL (ref 82–98)
MONOCYTES # BLD AUTO: 1.02 THOUSAND/UL (ref 0–1.22)
MONOCYTES NFR BLD: 5 % (ref 4–12)
NEUTROPHILS # BLD MANUAL: 18.35 THOUSAND/UL (ref 1.85–7.62)
NEUTS BAND NFR BLD MANUAL: 4 % (ref 0–8)
NEUTS SEG NFR BLD AUTO: 86 % (ref 43–75)
NRBC BLD AUTO-RTO: 0 /100 WBCS
PLATELET # BLD AUTO: 272 THOUSANDS/UL (ref 149–390)
PLATELET BLD QL SMEAR: ADEQUATE
PMV BLD AUTO: 9.2 FL (ref 8.9–12.7)
POTASSIUM SERPL-SCNC: 4.4 MMOL/L (ref 3.5–5.3)
PROT SERPL-MCNC: 6.9 G/DL (ref 6.4–8.2)
RBC # BLD AUTO: 5.05 MILLION/UL (ref 3.81–5.12)
SODIUM SERPL-SCNC: 134 MMOL/L (ref 136–145)
TOTAL CELLS COUNTED SPEC: 100
VARIANT LYMPHS # BLD AUTO: 1 %
WBC # BLD AUTO: 20.39 THOUSAND/UL (ref 4.31–10.16)

## 2019-10-12 PROCEDURE — 85027 COMPLETE CBC AUTOMATED: CPT | Performed by: INTERNAL MEDICINE

## 2019-10-12 PROCEDURE — 83735 ASSAY OF MAGNESIUM: CPT | Performed by: INTERNAL MEDICINE

## 2019-10-12 PROCEDURE — 94760 N-INVAS EAR/PLS OXIMETRY 1: CPT

## 2019-10-12 PROCEDURE — 99223 1ST HOSP IP/OBS HIGH 75: CPT | Performed by: INTERNAL MEDICINE

## 2019-10-12 PROCEDURE — 80053 COMPREHEN METABOLIC PANEL: CPT | Performed by: INTERNAL MEDICINE

## 2019-10-12 PROCEDURE — 85007 BL SMEAR W/DIFF WBC COUNT: CPT | Performed by: INTERNAL MEDICINE

## 2019-10-12 PROCEDURE — 99222 1ST HOSP IP/OBS MODERATE 55: CPT | Performed by: INTERNAL MEDICINE

## 2019-10-12 RX ORDER — NICOTINE 21 MG/24HR
21 PATCH, TRANSDERMAL 24 HOURS TRANSDERMAL DAILY
Status: DISCONTINUED | OUTPATIENT
Start: 2019-10-12 | End: 2019-10-13 | Stop reason: HOSPADM

## 2019-10-12 RX ORDER — ALBUTEROL SULFATE 90 UG/1
2 AEROSOL, METERED RESPIRATORY (INHALATION) EVERY 4 HOURS PRN
Status: DISCONTINUED | OUTPATIENT
Start: 2019-10-12 | End: 2019-10-13 | Stop reason: HOSPADM

## 2019-10-12 RX ORDER — ALBUTEROL SULFATE 2.5 MG/3ML
2.5 SOLUTION RESPIRATORY (INHALATION) EVERY 4 HOURS PRN
Status: DISCONTINUED | OUTPATIENT
Start: 2019-10-12 | End: 2019-10-12

## 2019-10-12 RX ORDER — MONTELUKAST SODIUM 10 MG/1
10 TABLET ORAL
Status: DISCONTINUED | OUTPATIENT
Start: 2019-10-12 | End: 2019-10-13 | Stop reason: HOSPADM

## 2019-10-12 RX ORDER — PANTOPRAZOLE SODIUM 40 MG/1
40 TABLET, DELAYED RELEASE ORAL
Status: DISCONTINUED | OUTPATIENT
Start: 2019-10-12 | End: 2019-10-13 | Stop reason: HOSPADM

## 2019-10-12 RX ORDER — LISINOPRIL 20 MG/1
20 TABLET ORAL DAILY
Status: DISCONTINUED | OUTPATIENT
Start: 2019-10-12 | End: 2019-10-13 | Stop reason: HOSPADM

## 2019-10-12 RX ORDER — FLUCONAZOLE 100 MG/1
150 TABLET ORAL ONCE
Status: COMPLETED | OUTPATIENT
Start: 2019-10-12 | End: 2019-10-12

## 2019-10-12 RX ORDER — IPRATROPIUM BROMIDE AND ALBUTEROL SULFATE 2.5; .5 MG/3ML; MG/3ML
3 SOLUTION RESPIRATORY (INHALATION)
Status: DISCONTINUED | OUTPATIENT
Start: 2019-10-12 | End: 2019-10-12

## 2019-10-12 RX ORDER — SACCHAROMYCES BOULARDII 250 MG
250 CAPSULE ORAL 2 TIMES DAILY
Status: DISCONTINUED | OUTPATIENT
Start: 2019-10-12 | End: 2019-10-13 | Stop reason: HOSPADM

## 2019-10-12 RX ORDER — FLUCONAZOLE 100 MG/1
100 TABLET ORAL
Status: DISCONTINUED | OUTPATIENT
Start: 2019-10-13 | End: 2019-10-13 | Stop reason: HOSPADM

## 2019-10-12 RX ORDER — HEPARIN SODIUM 5000 [USP'U]/ML
5000 INJECTION, SOLUTION INTRAVENOUS; SUBCUTANEOUS EVERY 8 HOURS SCHEDULED
Status: DISCONTINUED | OUTPATIENT
Start: 2019-10-12 | End: 2019-10-13 | Stop reason: HOSPADM

## 2019-10-12 RX ORDER — ESCITALOPRAM OXALATE 10 MG/1
10 TABLET ORAL DAILY
Status: DISCONTINUED | OUTPATIENT
Start: 2019-10-12 | End: 2019-10-13 | Stop reason: HOSPADM

## 2019-10-12 RX ORDER — ONDANSETRON 2 MG/ML
4 INJECTION INTRAMUSCULAR; INTRAVENOUS EVERY 6 HOURS PRN
Status: DISCONTINUED | OUTPATIENT
Start: 2019-10-12 | End: 2019-10-13 | Stop reason: HOSPADM

## 2019-10-12 RX ORDER — LORATADINE 10 MG/1
10 TABLET ORAL DAILY
Status: DISCONTINUED | OUTPATIENT
Start: 2019-10-12 | End: 2019-10-13 | Stop reason: HOSPADM

## 2019-10-12 RX ORDER — ALPRAZOLAM 0.25 MG/1
0.25 TABLET ORAL 3 TIMES DAILY PRN
Status: DISCONTINUED | OUTPATIENT
Start: 2019-10-12 | End: 2019-10-13 | Stop reason: HOSPADM

## 2019-10-12 RX ADMIN — ONDANSETRON 4 MG: 2 INJECTION INTRAMUSCULAR; INTRAVENOUS at 06:52

## 2019-10-12 RX ADMIN — LORATADINE 10 MG: 10 TABLET ORAL at 10:00

## 2019-10-12 RX ADMIN — ESCITALOPRAM OXALATE 10 MG: 10 TABLET ORAL at 10:00

## 2019-10-12 RX ADMIN — HEPARIN SODIUM 5000 UNITS: 5000 INJECTION INTRAVENOUS; SUBCUTANEOUS at 22:31

## 2019-10-12 RX ADMIN — HEPARIN SODIUM 5000 UNITS: 5000 INJECTION INTRAVENOUS; SUBCUTANEOUS at 15:03

## 2019-10-12 RX ADMIN — SODIUM CHLORIDE 100 ML/HR: 0.9 INJECTION, SOLUTION INTRAVENOUS at 11:30

## 2019-10-12 RX ADMIN — Medication 250 MG: at 12:30

## 2019-10-12 RX ADMIN — FLUCONAZOLE 150 MG: 100 TABLET ORAL at 01:25

## 2019-10-12 RX ADMIN — Medication 250 MG: at 18:11

## 2019-10-12 RX ADMIN — HEPARIN SODIUM 5000 UNITS: 5000 INJECTION INTRAVENOUS; SUBCUTANEOUS at 05:50

## 2019-10-12 RX ADMIN — NICOTINE 21 MG: 21 PATCH TRANSDERMAL at 10:01

## 2019-10-12 RX ADMIN — SODIUM CHLORIDE 100 ML/HR: 0.9 INJECTION, SOLUTION INTRAVENOUS at 22:31

## 2019-10-12 RX ADMIN — LISINOPRIL 20 MG: 20 TABLET ORAL at 10:01

## 2019-10-12 RX ADMIN — SODIUM CHLORIDE 100 ML/HR: 0.9 INJECTION, SOLUTION INTRAVENOUS at 01:17

## 2019-10-12 RX ADMIN — NICOTINE 21 MG: 21 PATCH TRANSDERMAL at 01:43

## 2019-10-12 RX ADMIN — VANCOMYCIN HYDROCHLORIDE 125 MG: 500 INJECTION, POWDER, LYOPHILIZED, FOR SOLUTION INTRAVENOUS at 05:50

## 2019-10-12 RX ADMIN — MONTELUKAST 10 MG: 10 TABLET, FILM COATED ORAL at 01:25

## 2019-10-12 RX ADMIN — HEPARIN SODIUM 5000 UNITS: 5000 INJECTION INTRAVENOUS; SUBCUTANEOUS at 01:25

## 2019-10-12 RX ADMIN — MONTELUKAST 10 MG: 10 TABLET, FILM COATED ORAL at 22:31

## 2019-10-12 RX ADMIN — PANTOPRAZOLE SODIUM 40 MG: 40 TABLET, DELAYED RELEASE ORAL at 05:50

## 2019-10-12 NOTE — H&P
H&P- Ana Brown 1955, 61 y o  female MRN: [id number]    Unit/Bed#: ED 10 Encounter: [id number]    Primary Care Provider: Teresia Kussmaul, MD   Date and time admitted to hospital: 10/11/2019  8:17 PM        * Colitis  Assessment & Plan  Nonspecific CT findings but history suggestive of C diff colitis with recent antibiotic use sudden onset and significantly elevated white count  Gastroenterology consultation  Stool studies including stool for C diff  Discontinue patient's outpatient antibiotics for her bronchitis  P o  Vancomycin 125 mg q 6 hours  P r n  Antiemetics  P r n  Pain control  NPO  IV fluids  CMP in a m  Contact precautions    Oral thrush  Assessment & Plan  Fluconazole p o  Magic mouthwash p r n  Odynophagia  Supportive care    Tobacco dependence  Assessment & Plan  Cessation counseled  Replacement offered    Hypertension  Assessment & Plan  Continue lisinopril at home dosing  Monitor blood pressure with routine vitals    Chronic obstructive pulmonary disease ([facility] [address] )  Assessment & Plan  Not in exacerbation  We do not carry the patient's inhalers, and with her allergies we will instead change the patient duo nebs q 6 hours with albuterol nebs p r n  Q 4 hours shortness of breath or wheezing  O2 sat monitoring with supplemental O2 p r n  Continue Singulair at home dosing    Anxiety  Assessment & Plan  Continue Xanax at home dosing      VTE Prophylaxis: Heparin  / sequential compression device   Code Status:  Full code  POLST: There is no POLST form on file for this patient (pre-hospital)  Discussion with family:     Anticipated Length of Stay:  Patient will be admitted on an Inpatient basis with an anticipated length of stay of  more than 2 midnights  Justification for Hospital Stay:  Colitis    Total Time for Visit, including Counseling / Coordination of Care: 30 minutes  Greater than 50% of this total time spent on direct patient counseling and coordination of care      Chief Complaint:   Diarrhea nausea vomiting    History of Present Illness: Mary Wheeler is a 61 y o  female past medical history significant for tobacco abuse and COPD who had developed bronchitis approximately 2 weeks ago  Patient was placed on p o  Antibiotic regimen recently changed to Augmentin  Patient took her Augmentin today, few hours later she developed crampy abdominal pain to the right left quadrants without radiation with associated fevers chills nausea nonbloody vomiting and diarrheal stools  Patient has had multiple watery diarrheal stools since  Patient has no history of C diff prior to this episode  She currently reports that her abdominal pain is improved and her nausea is currently resolved  She denies chest pain shortness of breath  Of note the patient is also currently being treated for oral thrush, she denies odynophagia  Review of Systems:    Review of Systems   Constitutional: Positive for appetite change (Poor appetite since nausea vomiting and diarrheal stool started)  Negative for activity change, chills, diaphoresis, fatigue, fever and unexpected weight change  HENT: Positive for rhinorrhea  Negative for congestion, hearing loss, sinus pressure, sinus pain and sore throat  Eyes: Negative  Negative for photophobia, pain, discharge and visual disturbance  Respiratory: Negative  Negative for cough, chest tightness, shortness of breath, wheezing and stridor  Cardiovascular: Negative  Negative for chest pain, palpitations and leg swelling  Gastrointestinal: Positive for abdominal pain ( right left lower quadrant cramping abdominal pain is noted HPI), diarrhea, nausea and vomiting  Negative for abdominal distention and constipation  Endocrine: Negative  Negative for cold intolerance, heat intolerance and polyuria  Genitourinary: Negative  Negative for difficulty urinating, dysuria, flank pain, frequency, hematuria and urgency  Musculoskeletal: Negative    Negative for arthralgias, gait problem, joint swelling, myalgias and neck stiffness  Skin: Negative  Negative for color change, pallor, rash and wound  Allergic/Immunologic: Negative  Negative for immunocompromised state  Neurological: Negative  Negative for dizziness, seizures, syncope, weakness, light-headedness, numbness and headaches  Hematological: Negative  Negative for adenopathy  Does not bruise/bleed easily  Psychiatric/Behavioral: Negative  Negative for confusion and hallucinations  The patient is not nervous/anxious  Past Medical and Surgical History:     Past Medical History:   Diagnosis Date    Acid reflux     Fibromyalgia     Hypertension     Seasonal allergies        Past Surgical History:   Procedure Laterality Date    EYE SURGERY      KNEE SURGERY  1998    NC COLONOSCOPY FLX DX W/COLLJ SPEC WHEN PFRMD N/A 5/9/2017    Procedure: EGD AND COLONOSCOPY;  Surgeon: Dale Kenny MD;  Location: [facility]; Service: Gastroenterology       Meds/Allergies:    Prior to Admission medications    Medication Sig Start Date End Date Taking? Authorizing Provider   acetaminophen (TYLENOL 8 HOUR ARTHRITIS PAIN) 650 mg CR tablet Take by mouth    Historical Provider, MD andrade Romilda Lot oxide-diphenhydramine-lidocaine viscous (MAGIC MOUTHWASH) 1:1:1 suspension Swish and spit 10 mL every 4 (four) hours as needed for mouth pain or discomfort 10/11/19   Brandon Burleson MD   ALPRAZolam Birtha Love) 0 25 mg tablet Take 1 tablet (0 25 mg total) by mouth 3 (three) times a day as needed for anxiety 4/26/19   Mirza Nevarez MD   amoxicillin-clavulanate (AUGMENTIN) 875-125 mg per tablet Take 1 tablet by mouth every 12 (twelve) hours for 7 days 10/11/19 10/18/19  Brandon Burleson MD   beclomethasone (QVAR) 80 MCG/ACT inhaler Inhale 2 puffs 2 (two) times a day Rinse mouth after use   10/2/19   Brandon Burleson MD   cetirizine (ZyrTEC) 10 mg tablet TAKE 1 TABLET BY MOUTH EVERY DAY 8/1/19   Mirza Nevarez MD escitalopram (LEXAPRO) 10 mg tablet TAKE 1 TABLET (10 MG TOTAL) BY MOUTH DAILY 5/22/19   Shreya Simmons MD   fluconazole (DIFLUCAN) 150 mg tablet Take 1 tablet (150 mg total) by mouth once for 1 dose 10/11/19 10/11/19  Sofia Dejesus MD   fluticasone (FLONASE) 50 mcg/act nasal spray INSTILL 2 SPRAYS INTO EACH NOSTRIL DAILY 7/9/19   Shreya Simmons MD   lisinopril (ZESTRIL) 20 mg tablet TAKE 1 TABLET BY MOUTH EVERY DAY 5/7/19   Shreya Simmons MD   MINOXIDIL, TOPICAL, 5 % SOLN Apply 1 application topically    Historical Provider, MD   montelukast (SINGULAIR) 10 mg tablet TAKE 1 TABLET BY MOUTH EVERYDAY AT BEDTIME 10/7/19   Shreya Simmons MD   pantoprazole (PROTONIX) 40 mg tablet TAKE 1 TABLET BY MOUTH EVERY DAY 7/28/19   Shreya Simmons MD   umeclidinium-vilanterol [facility] ELLIPTA) 62 5-25 MCG/INH inhaler Inhale 1 puff daily 10/11/19   Sofia Dejesus MD     I have reviewed home medications using allscripts  Allergies: Allergies   Allergen Reactions    Miconazole Itching and Swelling    Wixela Inhub [Fluticasone-Salmeterol] Palpitations       Social History:     Marital Status: /Civil Union   Occupation:    Patient Pre-hospital Living Situation:  Independent  Patient Pre-hospital Level of Mobility:  Ambulatory  Patient Pre-hospital Diet Restrictions:  None  Substance Use History:   Social History     Substance and Sexual Activity   Alcohol Use No     Social History     Tobacco Use   Smoking Status Current Every Day Smoker    Packs/day: 2 00    Years: 50 00    Pack years: 100 00   Smokeless Tobacco Never Used   Tobacco Comment    Per Allscripts pt quit smoking       Social History     Substance and Sexual Activity   Drug Use No       Family History:    non-contributory    Physical Exam:     Vitals:   Blood Pressure: 122/73 (10/11/19 2330)  Pulse: 82 (10/11/19 2330)  Temperature: 98 1 °F (36 7 °C) (10/11/19 1946)  Temp Source: Oral (10/11/19 1946)  Respirations: 19 (10/11/19 2330)  SpO2: 98 % (10/11/19 2330)    Physical Exam   Constitutional: She is oriented to person, place, and time  She appears well-developed and well-nourished  No distress  HENT:   Head: Normocephalic and atraumatic  Right Ear: External ear normal    Left Ear: External ear normal    Nose: Nose normal    Mouth/Throat: Oropharynx is clear and moist  No oropharyngeal exudate  Eyes: Conjunctivae are normal  Right eye exhibits no discharge  Left eye exhibits no discharge  No scleral icterus  Neck: Normal range of motion  No JVD present  No tracheal deviation present  No thyromegaly present  Cardiovascular: Normal rate, regular rhythm, normal heart sounds and intact distal pulses  Exam reveals no gallop and no friction rub  No murmur heard  Pulmonary/Chest: Effort normal and breath sounds normal  No stridor  No respiratory distress  She has no wheezes  She has no rales  Abdominal: Soft  She exhibits no distension  There is no tenderness  There is no rebound and no guarding  Hyper active bowel sounds   Musculoskeletal: Normal range of motion  She exhibits no edema, tenderness or deformity  Neurological: She is alert and oriented to person, place, and time  She has normal reflexes  She exhibits normal muscle tone  Coordination normal    Skin: Skin is warm and dry  No rash noted  She is not diaphoretic  No erythema  No pallor  Psychiatric: She has a normal mood and affect  Her behavior is normal  Judgment and thought content normal    Nursing note and vitals reviewed  Additional Data:     Lab Results: I have personally reviewed pertinent reports        Results from last 7 days   Lab Units 10/11/19  2051   WBC Thousand/uL 24 10*   HEMOGLOBIN g/dL 15 6*   HEMATOCRIT % 48 3*   PLATELETS Thousands/uL 323   NEUTROS PCT % 89*   LYMPHS PCT % 5*   MONOS PCT % 4   EOS PCT % 0     Results from last 7 days   Lab Units 10/11/19  2051   SODIUM mmol/L 129*   POTASSIUM mmol/L 3 3*   CHLORIDE mmol/L 94*   CO2 mmol/L 21   BUN mg/dL 14   CREATININE mg/dL 0 77   ANION GAP mmol/L 14*   CALCIUM mg/dL 9 3   ALBUMIN g/dL 3 8   TOTAL BILIRUBIN mg/dL 0 50   ALK PHOS U/L 96   ALT U/L 29   AST U/L 19   GLUCOSE RANDOM mg/dL 148*                 Results from last 7 days   Lab Units 10/11/19  2138   LACTIC ACID mmol/L 1 6       Imaging: I have personally reviewed pertinent reports  CT abdomen pelvis with contrast   Final Result by Juan José Cintron MD (10/11 3153)      There is some bowel wall thickening and hyperemia involving the sigmoid and distal left colon, without significant inflammatory change of the adjacent fat  This is nonspecific and may be related to colitis or diverticulitis  Follow-up after treatment    is recommended in order to exclude the possibility of underlying abnormality, such as neoplasm  There is fluid within the colon, consistent with the history of diarrhea  There is no evidence of bowel obstruction  There is a small to moderate-sized hiatal hernia  Mild hepatomegaly with mild fatty infiltration of the liver  Small hepatic cyst unchanged  Atherosclerosis  Other findings as described above, please see discussion  The study was marked in [facility] for immediate notification  Workstation performed: [id number]             EKG, Pathology, and Other Studies Reviewed on Admission:   · EKG:      Allscripts / Epic Records Reviewed: Yes     ** Please Note: This note has been constructed using a voice recognition system   **

## 2019-10-12 NOTE — QUICK NOTE
Post admission check    61-year-old female with past medical history of tobacco abuse, Chronic obstructive pulmonary disease admitted for colitis  C diff and stool cultures ordered and pending  Discussed with GI, vancomycin discontinued for now    Patient was started on clear liquid diet diet will be advanced as tolerated  Follow-up serial exams

## 2019-10-12 NOTE — ED PROVIDER NOTES
History  Chief Complaint   Patient presents with    Vomiting     Pt presents to ED with vomiting x 2 hours and lower abdominal pain  Pt states she just started abx today for a sinus infection      26-year-old female patient here for evaluation nausea vomiting diarrhea  This started today  States again within a few hours after taking her 1st dose of Augmentin  Has had intractable vomiting, nonbloody nonbilious  No coffee-ground emesis  Also having diarrhea  She has been on azithromycin and today now on Augmentin  Also was given Diflucan for oral thrush  She complained of lower abdominal pain  His pain is constant  No hematochezia or melena  History provided by:  Patient   used: No    Vomiting   Severity:  Severe  Duration:  6 hours  Timing:  Constant  Number of daily episodes:  Multiple  Quality:  Stomach contents  Progression:  Worsening  Chronicity:  New  Recent urination:  Normal  Context: not post-tussive and not self-induced    Relieved by:  Nothing  Worsened by:  Nothing  Ineffective treatments:  None tried  Associated symptoms: abdominal pain, diarrhea and URI (Sinus congestion for 2 weeks)    Associated symptoms: no arthralgias, no chills, no cough, no fever, no headaches, no myalgias and no sore throat        Prior to Admission Medications   Prescriptions Last Dose Informant Patient Reported? Taking? ALPRAZolam (XANAX) 0 25 mg tablet  Self No No   Sig: Take 1 tablet (0 25 mg total) by mouth 3 (three) times a day as needed for anxiety   MINOXIDIL, TOPICAL, 5 % SOLN  Self Yes No   Sig: Apply 1 application topically   acetaminophen (TYLENOL 8 HOUR ARTHRITIS PAIN) 650 mg CR tablet  Self Yes No   Sig: Take by mouth   amoxicillin-clavulanate (AUGMENTIN) 875-125 mg per tablet   No No   Sig: Take 1 tablet by mouth every 12 (twelve) hours for 7 days   beclomethasone (QVAR) 80 MCG/ACT inhaler  Self No No   Sig: Inhale 2 puffs 2 (two) times a day Rinse mouth after use     cetirizine (ZyrTEC) 10 mg tablet  Self No No   Sig: TAKE 1 TABLET BY MOUTH EVERY DAY   escitalopram (LEXAPRO) 10 mg tablet  Self No No   Sig: TAKE 1 TABLET (10 MG TOTAL) BY MOUTH DAILY   fluconazole (DIFLUCAN) 150 mg tablet   No No   Sig: Take 1 tablet (150 mg total) by mouth once for 1 dose   fluticasone (FLONASE) 50 mcg/act nasal spray  Self No No   Sig: INSTILL 2 SPRAYS INTO EACH NOSTRIL DAILY   lisinopril (ZESTRIL) 20 mg tablet  Self No No   Sig: TAKE 1 TABLET BY MOUTH EVERY DAY   montelukast (SINGULAIR) 10 mg tablet  Self No No   Sig: TAKE 1 TABLET BY MOUTH EVERYDAY AT BEDTIME   pantoprazole (PROTONIX) 40 mg tablet  Self No No   Sig: TAKE 1 TABLET BY MOUTH EVERY DAY   umeclidinium-vilanterol (ANORO ELLIPTA) 62 5-25 MCG/INH inhaler  Self No No   Sig: Inhale 1 puff daily      Facility-Administered Medications: None       Past Medical History:   Diagnosis Date    Acid reflux     Fibromyalgia     Hypertension     Seasonal allergies        Past Surgical History:   Procedure Laterality Date    EYE SURGERY      KNEE SURGERY  1998    SD COLONOSCOPY FLX DX W/COLLJ SPEC WHEN PFRMD N/A 5/9/2017    Procedure: EGD AND COLONOSCOPY;  Surgeon: Phyllis Dotson MD;  Location: [facility]; Service: Gastroenterology       Family History   Problem Relation Age of Onset    Cancer Mother         pancreatic    Lung cancer Sister     Cancer Brother         pancreatic    Coronary artery disease Father     Diabetes Father     Hypertension Father     Heart disease Father     Diabetes Paternal Grandmother     Cancer Paternal Grandfather      I have reviewed and agree with the history as documented  Social History     Tobacco Use    Smoking status: Current Every Day Smoker     Packs/day: 2 00     Years: 50 00     Pack years: 100 00    Smokeless tobacco: Never Used    Tobacco comment: Per [employer] pt quit smoking  Substance Use Topics    Alcohol use: Never     Frequency: Never     Binge frequency: Never    Drug use:  No Review of Systems   Constitutional: Negative for activity change, appetite change, chills, diaphoresis, fatigue, fever and unexpected weight change  HENT: Positive for sinus pressure  Negative for congestion, rhinorrhea, sinus pain, sore throat and trouble swallowing  Eyes: Negative for photophobia and visual disturbance  Respiratory: Negative for apnea, cough, choking, chest tightness, shortness of breath, wheezing and stridor  Cardiovascular: Negative for chest pain, palpitations and leg swelling  Gastrointestinal: Positive for abdominal pain, diarrhea and vomiting  Negative for abdominal distention, blood in stool, constipation and nausea  Genitourinary: Negative for decreased urine volume, difficulty urinating, dysuria, enuresis, flank pain, frequency, hematuria and urgency  Musculoskeletal: Negative for arthralgias, myalgias, neck pain and neck stiffness  Skin: Negative for color change, pallor, rash and wound  Allergic/Immunologic: Negative  Neurological: Negative for dizziness, tremors, syncope, weakness, light-headedness, numbness and headaches  Hematological: Negative  Psychiatric/Behavioral: Negative  All other systems reviewed and are negative  Physical Exam  Physical Exam   Constitutional: She is oriented to person, place, and time  She appears well-developed and well-nourished  Non-toxic appearance  She has a sickly appearance  She appears ill  No distress  Diaphoretic   HENT:   Head: Normocephalic and atraumatic  Eyes: Pupils are equal, round, and reactive to light  EOM and lids are normal    Neck: Normal range of motion  Neck supple  Cardiovascular: Normal rate, regular rhythm, S1 normal, S2 normal, normal heart sounds, intact distal pulses and normal pulses  Exam reveals no gallop, no distant heart sounds, no friction rub and no decreased pulses  No murmur heard  Pulses:       Radial pulses are 2+ on the right side, and 2+ on the left side  Pulmonary/Chest: Effort normal and breath sounds normal  No accessory muscle usage  No apnea, no tachypnea and no bradypnea  No respiratory distress  She has no decreased breath sounds  She has no wheezes  She has no rhonchi  She has no rales  Abdominal: Soft  Normal appearance  She exhibits no distension  There is no tenderness  There is no rigidity, no rebound and no guarding  Musculoskeletal: Normal range of motion  She exhibits no edema, tenderness or deformity  Neurological: She is alert and oriented to person, place, and time  No cranial nerve deficit  GCS eye subscore is 4  GCS verbal subscore is 5  GCS motor subscore is 6  Skin: Skin is warm, dry and intact  No rash noted  She is not diaphoretic  No erythema  No pallor  Psychiatric: Her speech is normal    Nursing note and vitals reviewed        Vital Signs  ED Triage Vitals   Temperature Pulse Respirations Blood Pressure SpO2   10/11/19 1946 10/11/19 1946 10/11/19 1946 10/11/19 1946 10/11/19 1946   98 1 °F (36 7 °C) 84 22 (!) 174/72 94 %      Temp Source Heart Rate Source Patient Position - Orthostatic VS BP Location FiO2 (%)   10/11/19 1946 10/11/19 1946 10/11/19 1946 10/11/19 1946 --   Oral Monitor Sitting Left arm       Pain Score       10/12/19 0200       No Pain           Vitals:    10/12/19 0732 10/12/19 1530 10/12/19 2343 10/13/19 0817   BP: 133/64 105/57 132/66 135/68   Pulse: 88 101 85 78   Patient Position - Orthostatic VS:             Visual Acuity  Visual Acuity      Most Recent Value   L Pupil Size (mm)  3   R Pupil Size (mm)  3   L Pupil Shape  Round   R Pupil Shape  Round          ED Medications  Medications   sodium chloride 0 9 % bolus 1,000 mL (1,000 mL Intravenous New Bag 10/11/19 2142)   LORazepam (ATIVAN) 2 mg/mL injection 0 5 mg (0 5 mg Intravenous Given 10/11/19 2141)   iohexol (OMNIPAQUE) 350 MG/ML injection (MULTI-DOSE) 100 mL (100 mL Intravenous Given 10/11/19 2209)   vancomycin (VANCOCIN) oral solution 125 mg (125 mg Oral Given 10/11/19 7736)   fluconazole (DIFLUCAN) tablet 150 mg (150 mg Oral Given 10/12/19 0125)       Diagnostic Studies  Results Reviewed     Procedure Component Value Units Date/Time    Stool Enteric Bacterial Panel by PCR [[id number]]  (Normal) Collected:  10/11/19 2231    Lab Status:  Final result Specimen:  Stool from Per Stoma Updated:  10/13/19 1437     Salmonella sp PCR None Detected     Shigella sp/Enteroinvasive E  coli (EIEC) PCR None Detected     Campylobacter sp (jejuni and coli) PCR None Detected     Shiga toxin 1/Shiga toxin 2 genes PCR None Detected    Clostridium difficile toxin by PCR [[id number]]  (Normal) Collected:  10/11/19 2231    Lab Status:  Final result Specimen:  Stool from Per Stoma Updated:  10/13/19 1141     C difficile toxin by PCR NEGATIVE for C difficle toxin by PCR       CBC and differential [[id number]]  (Abnormal) Collected:  10/12/19 0525    Lab Status:  Final result Specimen:  Blood from Arm, Left Updated:  10/12/19 0631     WBC 20 39 Thousand/uL      RBC 5 05 Million/uL      Hemoglobin 14 4 g/dL      Hematocrit 43 8 %      MCV 87 fL      MCH 28 5 pg      MCHC 32 9 g/dL      RDW 13 9 %      MPV 9 2 fL      Platelets 941 Thousands/uL      nRBC 0 /100 WBCs     Comprehensive metabolic panel [[id number]]  (Abnormal) Collected:  10/12/19 0525    Lab Status:  Final result Specimen:  Blood from Arm, Left Updated:  10/12/19 0555     Sodium 134 mmol/L      Potassium 4 4 mmol/L      Chloride 100 mmol/L      CO2 22 mmol/L      ANION GAP 12 mmol/L      BUN 14 mg/dL      Creatinine 0 65 mg/dL      Glucose 129 mg/dL      Calcium 8 3 mg/dL      AST 16 U/L      ALT 25 U/L      Alkaline Phosphatase 69 U/L      Total Protein 6 9 g/dL      Albumin 3 0 g/dL      Total Bilirubin 0 60 mg/dL      eGFR 95 ml/min/1 73sq m     Narrative:       [place] guidelines for Chronic Kidney Disease (CKD):     Stage 1 with normal or high GFR (GFR > 90 mL/min/1 73 square meters)    Stage 2 Mild CKD (GFR = 60-89 mL/min/1 73 square meters)    Stage 3A Moderate CKD (GFR = 45-59 mL/min/1 73 square meters)    Stage 3B Moderate CKD (GFR = 30-44 mL/min/1 73 square meters)    Stage 4 Severe CKD (GFR = 15-29 mL/min/1 73 square meters)    Stage 5 End Stage CKD (GFR <15 mL/min/1 73 square meters)  Note: GFR calculation is accurate only with a steady state creatinine    Magnesium [[id number]]  (Normal) Collected:  10/12/19 0525    Lab Status:  Final result Specimen:  Blood from Arm, Left Updated:  10/12/19 0553     Magnesium 1 6 mg/dL     Lactic acid, plasma [[id number]]  (Normal) Collected:  10/11/19 2138    Lab Status:  Final result Specimen:  Blood from Arm, Right Updated:  10/11/19 2205     LACTIC ACID 1 6 mmol/L     Narrative:       Result may be elevated if tourniquet was used during collection      Troponin I [[id number]]  (Normal) Collected:  10/11/19 2138    Lab Status:  Final result Specimen:  Blood from Arm, Right Updated:  10/11/19 2204     Troponin I <0 02 ng/mL     Comprehensive metabolic panel [[id number]]  (Abnormal) Collected:  10/11/19 2051    Lab Status:  Final result Specimen:  Blood from Arm, Right Updated:  10/11/19 2116     Sodium 129 mmol/L      Potassium 3 3 mmol/L      Chloride 94 mmol/L      CO2 21 mmol/L      ANION GAP 14 mmol/L      BUN 14 mg/dL      Creatinine 0 77 mg/dL      Glucose 148 mg/dL      Calcium 9 3 mg/dL      AST 19 U/L      ALT 29 U/L      Alkaline Phosphatase 96 U/L      Total Protein 8 2 g/dL      Albumin 3 8 g/dL      Total Bilirubin 0 50 mg/dL      eGFR 82 ml/min/1 73sq m     Narrative:       [place] guidelines for Chronic Kidney Disease (CKD):     Stage 1 with normal or high GFR (GFR > 90 mL/min/1 73 square meters)    Stage 2 Mild CKD (GFR = 60-89 mL/min/1 73 square meters)    Stage 3A Moderate CKD (GFR = 45-59 mL/min/1 73 square meters)    Stage 3B Moderate CKD (GFR = 30-44 mL/min/1 73 square meters)    Stage 4 Severe CKD (GFR = 15-29 mL/min/1 73 square meters)    Stage 5 End Stage CKD (GFR <15 mL/min/1 73 square meters)  Note: GFR calculation is accurate only with a steady state creatinine    Lipase [[id number]]  (Normal) Collected:  10/11/19 2051    Lab Status:  Final result Specimen:  Blood from Arm, Right Updated:  10/11/19 2116     Lipase 174 u/L     CBC and differential [[id number]]  (Abnormal) Collected:  10/11/19 2051    Lab Status:  Final result Specimen:  Blood from Arm, Right Updated:  10/11/19 2058     WBC 24 10 Thousand/uL      RBC 5 55 Million/uL      Hemoglobin 15 6 g/dL      Hematocrit 48 3 %      MCV 87 fL      MCH 28 1 pg      MCHC 32 3 g/dL      RDW 13 7 %      MPV 9 0 fL      Platelets 508 Thousands/uL      nRBC 0 /100 WBCs      Neutrophils Relative 89 %      Immat GRANS % 1 %      Lymphocytes Relative 5 %      Monocytes Relative 4 %      Eosinophils Relative 0 %      Basophils Relative 1 %      Neutrophils Absolute 21 49 Thousands/µL      Immature Grans Absolute 0 23 Thousand/uL      Lymphocytes Absolute 1 14 Thousands/µL      Monocytes Absolute 1 07 Thousand/µL      Eosinophils Absolute 0 06 Thousand/µL      Basophils Absolute 0 11 Thousands/µL                  CT abdomen pelvis with contrast   Final Result by Goldy Agustin MD (10/11 2248)      There is some bowel wall thickening and hyperemia involving the sigmoid and distal left colon, without significant inflammatory change of the adjacent fat  This is nonspecific and may be related to colitis or diverticulitis  Follow-up after treatment    is recommended in order to exclude the possibility of underlying abnormality, such as neoplasm  There is fluid within the colon, consistent with the history of diarrhea  There is no evidence of bowel obstruction  There is a small to moderate-sized hiatal hernia  Mild hepatomegaly with mild fatty infiltration of the liver  Small hepatic cyst unchanged  Atherosclerosis        Other findings as described above, please see discussion  The study was marked in [facility] for immediate notification  Workstation performed: [id number]                    Procedures  Procedures       ED Course                   Initial Sepsis Screening     Row Name 10/11/19 2422                Is the patient's history suggestive of a new or worsening infection?  --        Suspected source of infection  acute abdominal infection  -CE        Are two or more of the following signs & symptoms of infection both present and new to the patient? No  -CE        Indicate SIRS criteria  --        If the answer is yes to both questions, suspicion of sepsis is present  --        If severe sepsis is present AND tissue hypoperfusion perists in the hour after fluid resuscitation or lactate > 4, the patient meets criteria for SEPTIC SHOCK  --        Are any of the following organ dysfunction criteria present within 6 hours of suspected infection and SIRS criteria that are NOT considered to be chronic conditions? --        Organ dysfunction  --        Date of presentation of severe sepsis  --        Time of presentation of severe sepsis  --        Tissue hypoperfusion persists in the hour after crystalloid fluid administration, evidenced, by either:  --        Was hypotension present within one hour of the conclusion of crystalloid fluid administration?  --        Date of presentation of septic shock  --        Time of presentation of septic shock  --          User Key  (r) = Recorded By, (t) = Taken By, (c) = Cosigned By    [address] Name Provider Maulik Faulkner MD Physician                  MDM  Number of Diagnoses or Management Options  Colitis: new and requires workup  Diagnosis management comments: DDx including but not limited to: food poisoning, viral illness, metabolic abnormality, dehydration, intracranial process, GI etiology, UTI, adverse reaction; doubt acute surgical intraabdominal process, Boorhave's syndrome, mediastinitis  Plan:  CT on pelvis, labs fluids antiemetics  She does have prolonged QT on EKG, will defer Zofran or Reglan  Will give her low-dose Ativan for her nausea as to avoid potentially QT prolonging medications  Amount and/or Complexity of Data Reviewed  Clinical lab tests: ordered and reviewed  Tests in the radiology section of CPT®: ordered and reviewed  Independent visualization of images, tracings, or specimens: yes    Risk of Complications, Morbidity, and/or Mortality  Presenting problems: moderate  Management options: low  General comments: 59-year-old female nausea and vomiting  Workup with significant leukocytosis, signs of dehydration, CT scan with evidence of colitis  Given recent antibiotic as well as current symptoms, leukocytosis and CT scan there is concern for possible C diff colitis  C diff has been sent off  Will begin treatment as such  Otherwise defer any additional antibiotics  Admit for further evaluation, antiemetic, fluid hydration  Patient and family understand and agree with this plan  Patient Progress  Patient progress: stable      Disposition  Final diagnoses:   Colitis     Time reflects when diagnosis was documented in both MDM as applicable and the Disposition within this note     Time User Action Codes Description Comment    10/11/2019 11:45 PM Analia Herrera [K52 9] Colitis     10/13/2019 11:39 AM Rosette Randhawa Add [F17 200] Tobacco dependence       ED Disposition     ED Disposition Condition Date/Time Comment    Admit Stable Fri Oct 11, 2019 11:45 PM Case was discussed with Dr Mirlande Garcia and the patient's admission status was agreed to be Admission Status: inpatient status to the service of Dr Mirlande Garcia   Follow-up Information     Follow up With Specialties Details Why Contact Info    Sweta Menendez MD Family Medicine Follow up in 1 week(s)  [address]    7 Rachele Armenta MD Gastroenterology Follow up in 2 week(s)  [address]   [phone]            Discharge Medication List as of 10/13/2019 12:23 PM      START taking these medications    Details   nicotine (NICODERM CQ) 21 mg/24 hr TD 24 hr patch Place 1 patch on the skin daily, Starting Mon 10/14/2019, Normal         CONTINUE these medications which have NOT CHANGED    Details   acetaminophen (TYLENOL 8 HOUR ARTHRITIS PAIN) 650 mg CR tablet Take by mouth, Historical Med      ALPRAZolam (XANAX) 0 25 mg tablet Take 1 tablet (0 25 mg total) by mouth 3 (three) times a day as needed for anxiety, Starting Fri 4/26/2019, Normal      beclomethasone (QVAR) 80 MCG/ACT inhaler Inhale 2 puffs 2 (two) times a day Rinse mouth after use , Starting Wed 10/2/2019, Normal      cetirizine (ZyrTEC) 10 mg tablet TAKE 1 TABLET BY MOUTH EVERY DAY, Normal      escitalopram (LEXAPRO) 10 mg tablet TAKE 1 TABLET (10 MG TOTAL) BY MOUTH DAILY, Starting Wed 5/22/2019, Normal      fluticasone (FLONASE) 50 mcg/act nasal spray INSTILL 2 SPRAYS INTO EACH NOSTRIL DAILY, Normal      lisinopril (ZESTRIL) 20 mg tablet TAKE 1 TABLET BY MOUTH EVERY DAY, Normal      MINOXIDIL, TOPICAL, 5 % SOLN Apply 1 application topically, Historical Med      montelukast (SINGULAIR) 10 mg tablet TAKE 1 TABLET BY MOUTH EVERYDAY AT BEDTIME, Normal      pantoprazole (PROTONIX) 40 mg tablet TAKE 1 TABLET BY MOUTH EVERY DAY, Normal      umeclidinium-vilanterol (ANORO ELLIPTA) 62 5-25 MCG/INH inhaler Inhale 1 puff daily, Starting Fri 10/11/2019, Normal      al mag oxide-diphenhydramine-lidocaine viscous (MAGIC MOUTHWASH) 1:1:1 suspension Swish and spit 10 mL every 4 (four) hours as needed for mouth pain or discomfort, Starting Fri 10/11/2019, Normal         STOP taking these medications       amoxicillin-clavulanate (AUGMENTIN) 875-125 mg per tablet Comments:   Reason for Stopping:         fluconazole (DIFLUCAN) 150 mg tablet Comments:   Reason for Stopping: Outpatient Discharge Orders   Ambulatory referral to Gastroenterology   Standing Status: Future Standing Exp   Date: 10/13/20      Discharge Diet     Activity as tolerated     Call provider for:  severe uncontrolled pain     Call provider for:  persistent nausea or vomiting       ED Provider  Electronically Signed by           Migdalia Bravo PA-C  10/20/19 8841

## 2019-10-12 NOTE — CONSULTS
Consultation - [facility]  Annamaria Castrejon 61 y o  female MRN: [id number]  Unit/Bed#: -01 Encounter: [id number]      Inpatient consult to gastroenterology  Consult performed by: Aashish Saldivar PA-C  Consult ordered by: Suhas Najera MD       "Click on Consult Button"     Reason for Consult / Principal Problem:  Colitis    HPI: Annamaria Castrejon is a 61y o  year old female with a past medical history significant for COPD, hypertension, tobacco dependence, and anxiety  Patient presented to the emergency department yesterday after an acute onset of nausea vomiting abdominal pain and diarrhea  Patient reports that for the past week to 10 days she has been suffering with upper respiratory symptoms  She was started on antibiotic a week ago as well as a Medrol Dosepak by her primary care doctor  She reports that she improved but not 100%  She reports that yesterday she went back to her primary care doctor and she was given 1 dose of Diflucan as well as Augmentin  Patient reports that around 2:00 a m  Yesterday afternoon she took the Diflucan as well as a dose of Augmentin  She reports that she actually felt significantly improved after doing this  She reports that around 630 last evening she was making dinner and she had acute onset of nausea and abdominal pain  She reports that the pain had worsened to the point that she came to the emergency room for evaluation  She does report an episode of diarrhea yesterday as well as a small amount of stool this morning  She denies any melena or rectal bleeding  Since admission to the hospital patient is reporting improvement  She reports less abdominal pain  She is still reporting bloating  She denies nausea or vomiting today  She would like to have something to eat  She also reports that she has had a reaction to Augmentin in the past   Patient does see outpatient GI  She follows with Dr Khadijah Goznalez  Her last EGD and colonoscopy were in May of 2017  EGD showed a hiatal hernia as well as retained food in the stomach  Colonoscopy showed diverticular disease as well as 2 small polyps  The polyps were consistent with hyperplastic polyps  Patient also had a gastric emptying study done in 2018 that showed a T half life of 159 minutes  REVIEW OF SYSTEMS:     CONSTITUTIONAL: Denies any fever, chills, or rigors  Good appetite, and no recent weight loss  HEENT: No earache or tinnitus  Denies hearing loss or visual disturbances  CARDIOVASCULAR: No chest pain or palpitations  RESPIRATORY: Denies any cough, hemoptysis, shortness of breath or dyspnea on exertion  GASTROINTESTINAL: As noted in the History of Present Illness  GENITOURINARY: No problems with urination  Denies any hematuria or dysuria  NEUROLOGIC: No dizziness or vertigo, denies headaches  MUSCULOSKELETAL: Denies any muscle or joint pain  SKIN: Denies skin rashes or itching  ENDOCRINE: Denies excessive thirst  Denies intolerance to heat or cold  PSYCHOSOCIAL: Denies depression or anxiety  Denies any recent memory loss  Historical Information   Past Medical History:   Diagnosis Date    Acid reflux     Fibromyalgia     Hypertension     Seasonal allergies      Past Surgical History:   Procedure Laterality Date    EYE SURGERY      KNEE SURGERY  1998    AK COLONOSCOPY FLX DX W/COLLJ SPEC WHEN PFRMD N/A 5/9/2017    Procedure: EGD AND COLONOSCOPY;  Surgeon: Jazmin Peralta MD;  Location: [facility]; Service: Gastroenterology     Social History   Social History     Substance and Sexual Activity   Alcohol Use Never    Frequency: Never    Binge frequency: Never     Social History     Substance and Sexual Activity   Drug Use No     Social History     Tobacco Use   Smoking Status Current Every Day Smoker    Packs/day: 2 00    Years: 50 00    Pack years: 100 00   Smokeless Tobacco Never Used   Tobacco Comment    Per [employer] pt quit smoking       Family History   Problem Relation Age of Onset    Cancer Mother         pancreatic    Lung cancer Sister     Cancer Brother         pancreatic    Coronary artery disease Father     Diabetes Father     Hypertension Father     Heart disease Father     Diabetes Paternal Grandmother     Cancer Paternal Grandfather        Meds/Allergies     Medications Prior to Admission   Medication    acetaminophen (TYLENOL 8 HOUR ARTHRITIS PAIN) 650 mg CR tablet    al mag oxide-diphenhydramine-lidocaine viscous (MAGIC MOUTHWASH) 1:1:1 suspension    ALPRAZolam (XANAX) 0 25 mg tablet    amoxicillin-clavulanate (AUGMENTIN) 875-125 mg per tablet    beclomethasone (QVAR) 80 MCG/ACT inhaler    cetirizine (ZyrTEC) 10 mg tablet    escitalopram (LEXAPRO) 10 mg tablet    [] fluconazole (DIFLUCAN) 150 mg tablet    fluticasone (FLONASE) 50 mcg/act nasal spray    lisinopril (ZESTRIL) 20 mg tablet    MINOXIDIL, TOPICAL, 5 % SOLN    montelukast (SINGULAIR) 10 mg tablet    pantoprazole (PROTONIX) 40 mg tablet    umeclidinium-vilanterol (ANORO ELLIPTA) 62 5-25 MCG/INH inhaler     Current Facility-Administered Medications   Medication Dose Route Frequency    al mag oxide-diphenhydramine-lidocaine viscous (MAGIC MOUTHWASH) suspension 10 mL  10 mL Swish & Spit Q4H PRN    albuterol (PROVENTIL HFA,VENTOLIN HFA) inhaler 2 puff  2 puff Inhalation Q4H PRN    ALPRAZolam (XANAX) tablet 0 25 mg  0 25 mg Oral TID PRN    escitalopram (LEXAPRO) tablet 10 mg  10 mg Oral Daily    [START ON 10/13/2019] fluconazole (DIFLUCAN) tablet 100 mg  100 mg Oral HS    heparin (porcine) subcutaneous injection 5,000 Units  5,000 Units Subcutaneous Q8H [address]    lisinopril (ZESTRIL) tablet 20 mg  20 mg Oral Daily    loratadine (CLARITIN) tablet 10 mg  10 mg Oral Daily    montelukast (SINGULAIR) tablet 10 mg  10 mg Oral HS    morphine injection 2 mg  2 mg Intravenous Q3H PRN    nicotine (NICODERM CQ) 21 mg/24 hr TD 24 hr patch 21 mg  21 mg Transdermal Daily    ondansetron (ZOFRAN) injection 4 mg  4 mg Intravenous Q6H PRN    pantoprazole (PROTONIX) EC tablet 40 mg  40 mg Oral Early Morning    sodium chloride 0 9 % infusion  100 mL/hr Intravenous Continuous    vancomycin (VANCOCIN) oral solution 125 mg  125 mg Oral Q6H [address]       Allergies   Allergen Reactions    Miconazole Itching and Swelling    Wixela Inhub [Fluticasone-Salmeterol] Palpitations       Objective   Blood pressure 133/64, pulse 88, temperature 98 2 °F (36 8 °C), resp  rate 18, height 5' 4" (1 626 m), weight 94 8 kg (208 lb 15 9 oz), SpO2 92 %  Intake/Output Summary (Last 24 hours) at 10/12/2019 0949  Last data filed at 10/12/2019 0851  Gross per 24 hour   Intake 980 ml   Output 900 ml   Net 80 ml         PHYSICAL EXAM:      General Appearance:   Alert, cooperative, no distress, appears stated age    HEENT:   Normocephalic, atraumatic, anicteric      Neck:  Supple, symmetrical, trachea midline, no adenopathy;    thyroid: no enlargement/tenderness/nodules; no carotid  bruit or JVD    Lungs:   Clear to auscultation bilaterally; no rales, rhonchi or wheezing; respirations unlabored    Heart[de-identified]   S1 and S2 normal; regular rate and rhythm; no murmur, rub, or gallop     Abdomen:   Soft, non-tender, non-distended; normal bowel sounds; no masses, no organomegaly    Genitalia:   Deferred    Rectal:   Deferred    Extremities:  No cyanosis, clubbing or edema    Pulses:  2+ and symmetric all extremities    Skin:  Skin color, texture, turgor normal, no rashes or lesions    Lymph nodes:  No palpable cervical, axillary or inguinal lymphadenopathy        Lab Results:   Admission on 10/11/2019   Component Date Value    WBC 10/11/2019 24 10*    RBC 10/11/2019 5 55*    Hemoglobin 10/11/2019 15 6*    Hematocrit 10/11/2019 48 3*    MCV 10/11/2019 87     [facility] 10/11/2019 28 1     MCHC 10/11/2019 32 3     RDW 10/11/2019 13 7     MPV 10/11/2019 9 0     Platelets 51/05/0875 323     nRBC 10/11/2019 0     Neutrophils Relative 10/11/2019 89*  Immat GRANS % 10/11/2019 1     Lymphocytes Relative 10/11/2019 5*    Monocytes Relative 10/11/2019 4     Eosinophils Relative 10/11/2019 0     Basophils Relative 10/11/2019 1     Neutrophils Absolute 10/11/2019 21 49*    Immature Grans Absolute 10/11/2019 0 23*    Lymphocytes Absolute 10/11/2019 1 14     Monocytes Absolute 10/11/2019 1 07     Eosinophils Absolute 10/11/2019 0 06     Basophils Absolute 10/11/2019 0 11*    Sodium 10/11/2019 129*    Potassium 10/11/2019 3 3*    Chloride 10/11/2019 94*    CO2 10/11/2019 21     ANION GAP 10/11/2019 14*    BUN 10/11/2019 14     Creatinine 10/11/2019 0 77     Glucose 10/11/2019 148*    Calcium 10/11/2019 9 3     AST 10/11/2019 19     ALT 10/11/2019 29     Alkaline Phosphatase 10/11/2019 96     Total Protein 10/11/2019 8 2     Albumin 10/11/2019 3 8     Total Bilirubin 10/11/2019 0 50     eGFR 10/11/2019 82     Lipase 10/11/2019 174     Extra Tube 10/11/2019 Hold for add-ons       LACTIC ACID 10/11/2019 1 6     Troponin I 10/11/2019 <0 02     Sodium 10/12/2019 134*    Potassium 10/12/2019 4 4     Chloride 10/12/2019 100     CO2 10/12/2019 22     ANION GAP 10/12/2019 12     BUN 10/12/2019 14     Creatinine 10/12/2019 0 65     Glucose 10/12/2019 129     Calcium 10/12/2019 8 3     AST 10/12/2019 16     ALT 10/12/2019 25     Alkaline Phosphatase 10/12/2019 69     Total Protein 10/12/2019 6 9     Albumin 10/12/2019 3 0*    Total Bilirubin 10/12/2019 0 60     eGFR 10/12/2019 95     WBC 10/12/2019 20 39*    RBC 10/12/2019 5 05     Hemoglobin 10/12/2019 14 4     Hematocrit 10/12/2019 43 8     MCV 10/12/2019 87     MCH 10/12/2019 28 5     MCHC 10/12/2019 32 9     RDW 10/12/2019 13 9     MPV 10/12/2019 9 2     Platelets 33/70/5114 272     nRBC 10/12/2019 0     Magnesium 10/12/2019 1 6     Segmented % 10/12/2019 86*    Bands % 10/12/2019 4     Lymphocytes % 10/12/2019 3*    Monocytes % 10/12/2019 5     Eosinophils, % 10/12/2019 0     Basophils % 10/12/2019 1     Atypical Lymphocytes % 10/12/2019 1*    Absolute Neutrophils 10/12/2019 18 35*    Lymphocytes Absolute 10/12/2019 0 61     Monocytes Absolute 10/12/2019 1 02     Eosinophils Absolute 10/12/2019 0 00     Basophils Absolute 10/12/2019 0 20*    Total Counted 10/12/2019 100     Platelet Estimate 54/69/3366 Adequate        Imaging Studies: I have personally reviewed pertinent imaging studies  ASSESSMENT & PLAN:  1  Acute onset of abdominal pain, nausea, vomiting, and diarrhea; now improved  Patient's CT scan in the emergency department did show bowel wall thickening involving the sigmoid and distal left colon without significant inflammatory changes of the adjacent fat  Patient's white count on admission was 24,000 this morning is down to 20,000  Stool for enteric pathogens as well as C diff was ordered but unfortunately nothing has been able to be collected as of yet  Will start patient on clear liquid diet to advance as tolerated  Will start probiotic  Will continue to monitor CBC  If patient has return of diarrhea Will send for C diff testing  No plans for repeat endoscopic evaluation at this time  I have encouraged patient to make sure she follows up with Dr Trisha Duckworth as an outpatient  Would hold on vancomycin therapy until C diff testing is completed  Patient will be seen and examined by Dr Jenni Evans PA-C  10/12/2019,9:49 AM

## 2019-10-12 NOTE — ED NOTES
1  CC: Colitis    2  Admission related to injury?: No    3  Orientation status: alert and oriented x4    4  Abnormal labs/ focused assessment/ vitals:  WBC: 24 10  Potassium: 3 3  Sodium: 129  Pt c/o diarrhea/ vomiting  Pt of c-diff precautions  5  Medications/ drips:  Normal Saline: 1,000 mL bolus    6  Narcotic time/ pain medications: n/a  See MAR    7  IV lines/drains/ etc :   18g R AC    8  Isolation status: contact precautions/ wash hands    9  Skin: clean/dry/intact    10  Ambulation status: assist x1    11   ED phone number: 40 Kym Cornelius RN  10/12/19 9169

## 2019-10-12 NOTE — ASSESSMENT & PLAN NOTE
Nonspecific CT findings but history suggestive of C diff colitis with recent antibiotic use sudden onset and significantly elevated white count  Gastroenterology consultation  Stool studies including stool for C diff  Discontinue patient's outpatient antibiotics for her bronchitis  P o  Vancomycin 125 mg q 6 hours  P r n  Antiemetics  P r n  Pain control  NPO  IV fluids  CMP in a m    Contact precautions

## 2019-10-12 NOTE — ED NOTES
Pt's family member is yelling at this time, security called  Pt swearing due to pt not being seen by provider   Charge, RN made aware     Leatha Sacks, RN  10/11/19 2034

## 2019-10-12 NOTE — PLAN OF CARE
Problem: Potential for Falls  Goal: Patient will remain free of falls  Description  INTERVENTIONS:  - Assess patient frequently for physical needs  -  Identify cognitive and physical deficits and behaviors that affect risk of falls    -  [place] fall precautions as indicated by assessment   - Educate patient/family on patient safety including physical limitations  - Instruct patient to call for assistance with activity based on assessment  - Modify environment to reduce risk of injury  - Consider OT/PT consult to assist with strengthening/mobility  Outcome: Progressing     Problem: PAIN - ADULT  Goal: Verbalizes/displays adequate comfort level or baseline comfort level  Description  Interventions:  - Encourage patient to monitor pain and request assistance  - Assess pain using appropriate pain scale  - Administer analgesics based on type and severity of pain and evaluate response  - Implement non-pharmacological measures as appropriate and evaluate response  - Consider cultural and social influences on pain and pain management  - Notify physician/advanced practitioner if interventions unsuccessful or patient reports new pain  Outcome: Progressing     Problem: INFECTION - ADULT  Goal: Absence or prevention of progression during hospitalization  Description  INTERVENTIONS:  - Assess and monitor for signs and symptoms of infection  - Monitor lab/diagnostic results  - Monitor all insertion sites, i e  indwelling lines, tubes, and drains  - Monitor endotracheal if appropriate and nasal secretions for changes in amount and color  - [place] appropriate cooling/warming therapies per order  - Administer medications as ordered  - Instruct and encourage patient and family to use good hand hygiene technique  - Identify and instruct in appropriate isolation precautions for identified infection/condition  Outcome: Progressing  Goal: Absence of fever/infection during neutropenic period  Description  INTERVENTIONS:  - Monitor WBC    Outcome: Progressing     Problem: SAFETY ADULT  Goal: Maintain or return to baseline ADL function  Description  INTERVENTIONS:  -  Assess patient's ability to carry out ADLs; assess patient's baseline for ADL function and identify physical deficits which impact ability to perform ADLs (bathing, care of mouth/teeth, toileting, grooming, dressing, etc )  - Assess/evaluate cause of self-care deficits   - Assess range of motion  - Assess patient's mobility; develop plan if impaired  - Assess patient's need for assistive devices and provide as appropriate  - Encourage maximum independence but intervene and supervise when necessary  - Involve family in performance of ADLs  - Assess for home care needs following discharge   - Consider OT consult to assist with ADL evaluation and planning for discharge  - Provide patient education as appropriate  Outcome: Progressing  Goal: Maintain or return mobility status to optimal level  Description  INTERVENTIONS:  - Assess patient's baseline mobility status (ambulation, transfers, stairs, etc )    - Identify cognitive and physical deficits and behaviors that affect mobility  - Identify mobility aids required to assist with transfers and/or ambulation (gait belt, sit-to-stand, lift, walker, cane, etc )  - [place] fall precautions as indicated by assessment  - Record patient progress and toleration of activity level on Mobility SBAR; progress patient to next Phase/Stage  - Instruct patient to call for assistance with activity based on assessment  - Consider rehabilitation consult to assist with strengthening/weightbearing, etc   Outcome: Progressing     Problem: DISCHARGE PLANNING  Goal: Discharge to home or other facility with appropriate resources  Description  INTERVENTIONS:  - Identify barriers to discharge w/patient and caregiver  - Arrange for needed discharge resources and transportation as appropriate  - Identify discharge learning needs (meds, wound care, etc )  - Arrange for interpretive services to assist at discharge as needed  - Refer to Case Management Department for coordinating discharge planning if the patient needs post-hospital services based on physician/advanced practitioner order or complex needs related to functional status, cognitive ability, or social support system  Outcome: Progressing     Problem: Knowledge Deficit  Goal: Patient/family/caregiver demonstrates understanding of disease process, treatment plan, medications, and discharge instructions  Description  Complete learning assessment and assess knowledge base    Interventions:  - Provide teaching at level of understanding  - Provide teaching via preferred learning methods  Outcome: Progressing     Problem: GASTROINTESTINAL - ADULT  Goal: Minimal or absence of nausea and/or vomiting  Description  INTERVENTIONS:  - Administer IV fluids if ordered to ensure adequate hydration  - Maintain NPO status until nausea and vomiting are resolved  - Nasogastric tube if ordered  - Administer ordered antiemetic medications as needed  - Provide nonpharmacologic comfort measures as appropriate  - Advance diet as tolerated, if ordered  - Consider nutrition services referral to assist patient with adequate nutrition and appropriate food choices  Outcome: Progressing  Goal: Maintains or returns to baseline bowel function  Description  INTERVENTIONS:  - Assess bowel function  - Encourage oral fluids to ensure adequate hydration  - Administer IV fluids if ordered to ensure adequate hydration  - Administer ordered medications as needed  - Encourage mobilization and activity  - Consider nutritional services referral to assist patient with adequate nutrition and appropriate food choices  Outcome: Progressing

## 2019-10-12 NOTE — RESPIRATORY THERAPY NOTE
RT Protocol Note  Brittney Zeynep 61 y o  female MRN: [id number]  Unit/Bed#: -01 Encounter: [id number]    Assessment    Principal Problem:    Colitis  Active Problems:    Anxiety    Chronic obstructive pulmonary disease (HCC)    Hypertension    Tobacco dependence    Oral thrush      Home Pulmonary Medications:    Home Devices/Therapy: Other (Comment)(QVAR MDI BID, BReo QD, one more unsure, no other resp therap)    Past Medical History:   Diagnosis Date    Acid reflux     Fibromyalgia     Hypertension     Seasonal allergies      Social History     Socioeconomic History    Marital status: /Civil Union     Spouse name: None    Number of children: 3    Years of education: less than high school    Highest education level: None   Occupational History    Occupation: unemployed   Social Needs    Financial resource strain: None    Food insecurity:     Worry: None     Inability: None    Transportation needs:     Medical: None     Non-medical: None   Tobacco Use    Smoking status: Current Every Day Smoker     Packs/day: 2 00     Years: 50 00     Pack years: 100 00    Smokeless tobacco: Never Used    Tobacco comment: Per [employer] pt quit smoking     Substance and Sexual Activity    Alcohol use: Never     Frequency: Never     Binge frequency: Never    Drug use: No    Sexual activity: None   Lifestyle    Physical activity:     Days per week: None     Minutes per session: None    Stress: None   Relationships    Social connections:     Talks on phone: None     Gets together: None     Attends Orthodox service: None     Active member of club or organization: None     Attends meetings of clubs or organizations: None     Relationship status: None    Intimate partner violence:     Fear of current or ex partner: None     Emotionally abused: None     Physically abused: None     Forced sexual activity: None   Other Topics Concern    None   Social History Narrative    Always uses seatbelt    Daily coffee consumption    Daily tea consumption    Dental care regularly    Exercises regularly    Multiple organ donor    No guns in the home    No living will    Denies pets/ animals in the home    Power of  in existence- denied           Subjective         Objective    Physical Exam:   Assessment Type: Assess only  General Appearance: Awake, Alert  Respiratory Pattern: Normal  Chest Assessment: Chest expansion symmetrical  Bilateral Breath Sounds: Diminished, Clear  Cough: Strong, Non-productive, Dry  O2 Device: RA    Vitals:  Blood pressure 114/56, pulse 86, temperature 98 °F (36 7 °C), resp  rate 16, height 5' 4" (1 626 m), weight 94 8 kg (208 lb 15 9 oz), SpO2 92 %  Imaging and other studies: I have personally reviewed pertinent reports        O2 Device: RA     Plan             Resp Comments: pt does noty require MDI at [facility] time, aware to call RN, pt assessed for prn txs will change to0 Albuterol MDI prn

## 2019-10-13 VITALS
WEIGHT: 209 LBS | TEMPERATURE: 98.4 F | DIASTOLIC BLOOD PRESSURE: 68 MMHG | RESPIRATION RATE: 18 BRPM | OXYGEN SATURATION: 96 % | HEART RATE: 78 BPM | SYSTOLIC BLOOD PRESSURE: 135 MMHG | BODY MASS INDEX: 35.68 KG/M2 | HEIGHT: 64 IN

## 2019-10-13 PROBLEM — B37.0 ORAL THRUSH: Status: RESOLVED | Noted: 2019-10-11 | Resolved: 2019-10-13

## 2019-10-13 LAB
ANION GAP SERPL CALCULATED.3IONS-SCNC: 10 MMOL/L (ref 4–13)
BUN SERPL-MCNC: 4 MG/DL (ref 5–25)
C DIFF TOX GENS STL QL NAA+PROBE: NORMAL
CALCIUM SERPL-MCNC: 8.3 MG/DL (ref 8.3–10.1)
CAMPYLOBACTER DNA SPEC NAA+PROBE: NORMAL
CHLORIDE SERPL-SCNC: 102 MMOL/L (ref 100–108)
CO2 SERPL-SCNC: 24 MMOL/L (ref 21–32)
CREAT SERPL-MCNC: 0.52 MG/DL (ref 0.6–1.3)
ERYTHROCYTE [DISTWIDTH] IN BLOOD BY AUTOMATED COUNT: 14 % (ref 11.6–15.1)
GFR SERPL CREATININE-BSD FRML MDRD: 102 ML/MIN/1.73SQ M
GLUCOSE SERPL-MCNC: 101 MG/DL (ref 65–140)
HCT VFR BLD AUTO: 39.2 % (ref 34.8–46.1)
HGB BLD-MCNC: 12.5 G/DL (ref 11.5–15.4)
MCH RBC QN AUTO: 28.3 PG (ref 26.8–34.3)
MCHC RBC AUTO-ENTMCNC: 31.9 G/DL (ref 31.4–37.4)
MCV RBC AUTO: 89 FL (ref 82–98)
PLATELET # BLD AUTO: 220 THOUSANDS/UL (ref 149–390)
PMV BLD AUTO: 9.4 FL (ref 8.9–12.7)
POTASSIUM SERPL-SCNC: 3.9 MMOL/L (ref 3.5–5.3)
RBC # BLD AUTO: 4.42 MILLION/UL (ref 3.81–5.12)
SALMONELLA DNA SPEC QL NAA+PROBE: NORMAL
SHIGA TOXIN STX GENE SPEC NAA+PROBE: NORMAL
SHIGELLA DNA SPEC QL NAA+PROBE: NORMAL
SODIUM SERPL-SCNC: 136 MMOL/L (ref 136–145)
WBC # BLD AUTO: 9.99 THOUSAND/UL (ref 4.31–10.16)

## 2019-10-13 PROCEDURE — 80048 BASIC METABOLIC PNL TOTAL CA: CPT | Performed by: INTERNAL MEDICINE

## 2019-10-13 PROCEDURE — 99239 HOSP IP/OBS DSCHRG MGMT >30: CPT | Performed by: INTERNAL MEDICINE

## 2019-10-13 PROCEDURE — 99232 SBSQ HOSP IP/OBS MODERATE 35: CPT | Performed by: INTERNAL MEDICINE

## 2019-10-13 PROCEDURE — 85027 COMPLETE CBC AUTOMATED: CPT | Performed by: INTERNAL MEDICINE

## 2019-10-13 RX ORDER — NICOTINE 21 MG/24HR
1 PATCH, TRANSDERMAL 24 HOURS TRANSDERMAL DAILY
Qty: 28 PATCH | Refills: 0 | Status: SHIPPED | OUTPATIENT
Start: 2019-10-14 | End: 2019-11-21 | Stop reason: SDDI

## 2019-10-13 RX ADMIN — LORATADINE 10 MG: 10 TABLET ORAL at 08:46

## 2019-10-13 RX ADMIN — Medication 250 MG: at 08:46

## 2019-10-13 RX ADMIN — SODIUM CHLORIDE 100 ML/HR: 0.9 INJECTION, SOLUTION INTRAVENOUS at 08:44

## 2019-10-13 RX ADMIN — HEPARIN SODIUM 5000 UNITS: 5000 INJECTION INTRAVENOUS; SUBCUTANEOUS at 06:10

## 2019-10-13 RX ADMIN — NICOTINE 21 MG: 21 PATCH TRANSDERMAL at 08:45

## 2019-10-13 RX ADMIN — PANTOPRAZOLE SODIUM 40 MG: 40 TABLET, DELAYED RELEASE ORAL at 06:10

## 2019-10-13 RX ADMIN — LISINOPRIL 20 MG: 20 TABLET ORAL at 08:47

## 2019-10-13 RX ADMIN — ESCITALOPRAM OXALATE 10 MG: 10 TABLET ORAL at 08:47

## 2019-10-13 NOTE — ASSESSMENT & PLAN NOTE
Not in exacerbation  We do not carry the patient's inhalers, and with her allergies we will instead change the patient duo nebs q 6 hours with albuterol nebs p r n  Q 4 hours shortness of breath or wheezing  O2 sat monitoring with supplemental O2 p r n    Continue Singulair at home dosing

## 2019-10-13 NOTE — UTILIZATION REVIEW
Initial Clinical Review    Admission: Date/Time/Statement: Inpatient Admission Orders (From admission, onward)     Ordered        10/11/19 2351  Inpatient Admission  Once                   Orders Placed This Encounter   Procedures    Inpatient Admission     Standing Status:   Standing     Number of Occurrences:   1     Order Specific Question:   Admitting Physician     Answer:   Rose William [[id number]]     Order Specific Question:   Level of Care     Answer:   Med Surg [[id number]]     Order Specific Question:   Estimated length of stay     Answer:   More than 2 Midnights     Order Specific Question:   Certification     Answer:   I certify that inpatient services are medically necessary for this patient for a duration of greater than two midnights  See H&P and MD Progress Notes for additional information about the patient's course of treatment  ED Arrival Information     Expected Arrival Acuity Means of Arrival Escorted By Service Admission Type    - 10/11/2019 19:34 Urgent Walk-In Family Member Hospitalist Urgent    Arrival Complaint    vomiting        Chief Complaint   Patient presents with    Vomiting     Pt presents to ED with vomiting x 2 hours and lower abdominal pain  Pt states she just started abx today for a sinus infection      Assessment/Plan:    61  Y O female  Presents to ED  From  home  With   crampy  Abdominal pain, RLQ, with chills, fever  And nausea which started the day  Of adm  Has been on po antibiotics  For bronchitis  Had  Multiple  Episodes  Of watery diarrhea  The day of adm  PMH  Is  COPD, anxiety, HTN, tobacco dependence and oral thrush  ADMIT  IP MED SURG  LOC  WITH  Colitis  And  plan is    IVF, NPO, contact precautions, monitor  Labs and GI consult  Per   Gi Consult:      1  Acute onset of abdominal pain, nausea, vomiting, and diarrhea; now improved    Patient's CT scan in the emergency department did show bowel wall thickening involving the sigmoid and distal left colon without significant inflammatory changes of the adjacent fat  Patient's white count on admission was 24,000 this morning is down to 20,000  Stool for enteric pathogens as well as C diff was ordered but unfortunately nothing has been able to be collected as of yet  Will start patient on clear liquid diet to advance as tolerated  Will start probiotic  Will continue to monitor CBC  If patient has return of diarrhea Will send for C diff testing  No plans for repeat endoscopic evaluation at this time  I have encouraged patient to make sure she follows up with Dr Helena Thomason as an outpatient    Would hold on vancomycin therapy until C diff testing is completed          ED Triage Vitals   Temperature Pulse Respirations Blood Pressure SpO2   10/11/19 1946 10/11/19 1946 10/11/19 1946 10/11/19 1946 10/11/19 1946   98 1 °F (36 7 °C) 84 22 (!) 174/72 94 %      Temp Source Heart Rate Source Patient Position - Orthostatic VS BP Location FiO2 (%)   10/11/19 1946 10/11/19 1946 10/11/19 1946 10/11/19 1946 --   Oral Monitor Sitting Left arm       Pain Score       10/12/19 0200       No Pain        Wt Readings from Last 1 Encounters:   10/11/19 94 8 kg (208 lb 15 9 oz)     Additional Vital Signs:   10/13/19 08:17:48  98 4 °F (36 9 °C)  78  18  135/68  90  96 %  --  --   10/12/19 23:43:35  99 2 °F (37 3 °C)  85  --  132/66  88  95 %  --  --   10/12/19 1916  --  --  --  --  --  --  None (Room air)  --   10/12/19 15:30:53  99 4 °F (37 4 °C)  101  --  105/57  73  93 %  --  --   10/12/19 0900  --  --  --  --  --  --  None (Room air)  --   10/12/19 07:32:19  98 2 °F (36 8 °C)  88  18  133/64  87  92 %  --  --   10/12/19 0626  --  86  16  --  --  92 %  None (Room air)  --   10/12/19 0250  --  --  --  --  --  94 %  Nasal cannula  --   10/12/19 0146  --  --  --  --  --  --  None (Room air)  --   10/12/19 01:02:13  98 °F (36 7 °C)  --  16  114/56  75  87 %Abnormal   --  --   10/11/19 2330  --  82  19  122/73  --  98 %  None (Room air)  Lying   10/11/19 2300  --  80  18  149/63  --  98 %  Nasal cannula  Lying   10/11/19 2150  --  68  17  118/58  --  92 %  Nasal cannula  Lying   10/11/19 1946  98 1 °F (36 7 °C)  84  22  174/72Abnormal   --  94 %  None (Room air)  Sitting         Pertinent Labs/Diagnostic Test Results:   Results from last 7 days   Lab Units 10/13/19  0534 10/12/19  0525 10/11/19  2051   WBC Thousand/uL 9 99 20 39* 24 10*   HEMOGLOBIN g/dL 12 5 14 4 15 6*   HEMATOCRIT % 39 2 43 8 48 3*   PLATELETS Thousands/uL 220 272 323   NEUTROS ABS Thousands/µL  --   --  21 49*   BANDS PCT %  --  4  --          Results from last 7 days   Lab Units 10/13/19  0534 10/12/19  0525 10/11/19  2051   SODIUM mmol/L 136 134* 129*   POTASSIUM mmol/L 3 9 4 4 3 3*   CHLORIDE mmol/L 102 100 94*   CO2 mmol/L 24 22 21   ANION GAP mmol/L 10 12 14*   BUN mg/dL 4* 14 14   CREATININE mg/dL 0 52* 0 65 0 77   EGFR ml/min/1 73sq m 102 95 82   CALCIUM mg/dL 8 3 8 3 9 3   MAGNESIUM mg/dL  --  1 6  --      Results from last 7 days   Lab Units 10/12/19  0525 10/11/19  2051   AST U/L 16 19   ALT U/L 25 29   ALK PHOS U/L 69 96   TOTAL PROTEIN g/dL 6 9 8 2   ALBUMIN g/dL 3 0* 3 8   TOTAL BILIRUBIN mg/dL 0 60 0 50         Results from last 7 days   Lab Units 10/13/19  0534 10/12/19  0525 10/11/19  2051   GLUCOSE RANDOM mg/dL 101 129 148*           Results from last 7 days   Lab Units 10/11/19  2138   TROPONIN I ng/mL <0 02           Results from last 7 days   Lab Units 10/11/19  2138   LACTIC ACID mmol/L 1 6               Results from last 7 days   Lab Units 10/11/19  2051   LIPASE u/L 174           Results from last 7 days   Lab Units 10/11/19  2231   C DIFF TOXIN B  NEGATIVE for C difficle toxin by PCR                    Results from last 7 days   Lab Units 10/12/19  0525   TOTAL COUNTED  100       Ct  Abd/pelvis  ( 10/11)    There is some bowel wall thickening and hyperemia involving the sigmoid and distal left colon, without significant inflammatory change of the adjacent fat   This is nonspecific and may be related to colitis or diverticulitis   Follow-up after treatment   is recommended in order to exclude the possibility of underlying abnormality, such as neoplasm  There is fluid within the colon, consistent with the history of diarrhea  Lonnie Edmondson is no evidence of bowel obstruction  There is a small to moderate-sized hiatal hernia  Mild hepatomegaly with mild fatty infiltration of the liver   Small hepatic cyst unchanged  Atherosclerosis      ED Treatment:   Medication Administration from 10/11/2019 1934 to 10/12/2019 0051       Date/Time Order Dose Route Action Comments     10/11/2019 2051 ondansetron (ZOFRAN) injection 4 mg 0 mg Intravenous Hold      10/11/2019 2142 sodium chloride 0 9 % bolus 1,000 mL 1,000 mL Intravenous New Bag      10/11/2019 2141 LORazepam (ATIVAN) 2 mg/mL injection 0 5 mg 0 5 mg Intravenous Given      10/11/2019 2209 iohexol (OMNIPAQUE) 350 MG/ML injection (MULTI-DOSE) 100 mL 100 mL Intravenous Given      10/11/2019 6466 vancomycin (VANCOCIN) oral solution 125 mg 125 mg Oral Given         Present on Admission:   Tobacco dependence   (Resolved) Oral thrush   Hypertension   Chronic obstructive pulmonary disease (HCC)   Anxiety   Colitis      Admitting Diagnosis: Colitis [K52 9]  Vomiting [R11 10]  Age/Sex: 61 y o  female  Admission Orders:    Medications:  escitalopram 10 mg Oral Daily   fluconazole 100 mg Oral HS   heparin (porcine) 5,000 Units Subcutaneous Q8H [address]   lisinopril 20 mg Oral Daily   loratadine 10 mg Oral Daily   montelukast 10 mg Oral HS   nicotine 21 mg Transdermal Daily   pantoprazole 40 mg Oral Early Morning   saccharomyces boulardii 250 mg Oral BID       sodium chloride 100 mL/hr Intravenous Continuous       al mag oxide-diphenhydramine-lidocaine viscous 10 mL Swish & Spit Q4H PRN   albuterol 2 puff Inhalation Q4H PRN   ALPRAZolam 0 25 mg Oral TID PRN   morphine injection 1 mg Intravenous Q3H PRN   ondansetron 4 mg Intravenous Q6H PRN X1    10/12       IP CONSULT TO GASTROENTEROLOGY    Network Utilization Review Department  Phone: [phone]; Fax [phone]  [email or web address]  ATTENTION: Please call with any questions or concerns to [phone]  and carefully listen to the prompts so that you are directed to the right person  Send all requests for admission clinical reviews, approved or denied determinations and any other requests to fax [phone]   All voicemails are confidential

## 2019-10-13 NOTE — DISCHARGE SUMMARY
Discharge- Annamaria Castrejon 1955, 61 y o  female MRN: [id number]    Unit/Bed#: -01 Encounter: [id number]    Primary Care Provider: Marty Licona MD   Date and time admitted to hospital: 10/11/2019  8:17 PM        Oral thrush  Assessment & Plan  Fluconazole p o  Magic mouthwash p r n  Odynophagia  Supportive care    Tobacco dependence  Assessment & Plan  Cessation counseled  Replacement offered    Hypertension  Assessment & Plan  Continue lisinopril at home dosing  Monitor blood pressure with routine vitals    Chronic obstructive pulmonary disease ([facility] [address] )  Assessment & Plan  Not in exacerbation  We do not carry the patient's inhalers, and with her allergies we will instead change the patient duo nebs q 6 hours with albuterol nebs p r n  Q 4 hours shortness of breath or wheezing  O2 sat monitoring with supplemental O2 p r n  Continue Singulair at home dosing    Anxiety  Assessment & Plan  Continue Xanax at home dosing    * Colitis  Assessment & Plan  Nonspecific CT findings but history suggestive of C diff colitis with recent antibiotic use sudden onset and significantly elevated white count  Gastroenterology consultation appreciated  Stool studies for C diff is negative  Vancomycin was discontinued by GI  P r n  Antiemetics  P r n  Pain control  Patient tolerating diet  No further episode of emesis or diarrhea  As per GI patient can be discharged with outpatient follow-up with them  Patient does not need antibiotics      Hospital Course: Annamaria Castrejon is a 61 y o  female patient who originally presented to the hospital on   Admission Orders (From admission, onward)     Ordered        10/11/19 2351  Inpatient Admission  Once                  due to abdominal pain, vomiting and diarrhea  Patient had stool for C diff which was negative  Results were conformed the microbiology department by this writer  Patient was started on oral vancomycin and was seen by Gastroenterology    Gastroenterology discontinued oral vancomycin  Patient WBC came back to normal   No episodes of abdominal pain, vomiting or diarrhea  GI recommended patient can be discharged home with outpatient follow-up with them for outpatient colonoscopy  Patient remained hemodynamically stable and will be discharged home  Follow-up with PCP in 1 week  Follow-up with GI as outpatient for outpatient colonoscopy  Return to ER with any worsening fever, chills, abdominal pain, vomiting or diarrhea  Please see above list of diagnoses and related plan for additional information  Condition at Discharge:  good      Discharge instructions/Information to patient and family:   See after visit summary for information provided to patient and family  Provisions for Follow-Up Care:  See after visit summary for information related to follow-up care and any pertinent home health orders  Disposition:     Home       Discharge Statement:  I spent 40 minutes discharging the patient  This time was spent on the day of discharge  I had direct contact with the patient on the day of discharge  Greater than 50% of the total time was spent examining patient, answering all patient questions, arranging and discussing plan of care with patient as well as directly providing post-discharge instructions  Additional time then spent on discharge activities  Discharge Medications:  See after visit summary for reconciled discharge medications provided to patient and family        ** Please Note: This note has been constructed using a voice recognition system **

## 2019-10-13 NOTE — PROGRESS NOTES
Progress Note  Kat Anderson 61 y o  female MRN: [id number]  Unit/Bed#: -01 Encounter: [id number]    Subjective:  Patient reporting significant improvement  Patient with no nausea vomiting or abdominal pain  Patient with no melena or rectal bleeding  Patient reporting 1 small stool  Patient wishes to be going home  Objective:     Vitals:   Vitals:    10/13/19 0817   BP: 135/68   Pulse: 78   Resp: 18   Temp: 98 4 °F (36 9 °C)   SpO2: 96%   ,Body mass index is 35 87 kg/m²        Intake/Output Summary (Last 24 hours) at 10/13/2019 1019  Last data filed at 10/13/2019 0600  Gross per 24 hour   Intake 840 ml   Output --   Net 840 ml       Physical Exam:    General Appearance: Alert, appears stated age and cooperative  Lungs: Clear to auscultation bilaterally, no rales or rhonchi, no labored breathing/accessory muscle use  Heart: Regular rate and rhythm, S1, S2 normal, no murmur, click, rub or gallop  Abdomen: Soft, non-tender, non-distended; bowel sounds normal; no masses or no organomegaly  Extremities: No cyanosis, edema    Invasive Devices     Peripheral Intravenous Line            Peripheral IV 10/11/19 Right Antecubital 1 day                Lab Results:  Admission on 10/11/2019   Component Date Value    WBC 10/11/2019 24 10*    RBC 10/11/2019 5 55*    Hemoglobin 10/11/2019 15 6*    Hematocrit 10/11/2019 48 3*    MCV 10/11/2019 87     MCH 10/11/2019 28 1     MCHC 10/11/2019 32 3     RDW 10/11/2019 13 7     MPV 10/11/2019 9 0     Platelets 80/78/1825 323     nRBC 10/11/2019 0     Neutrophils Relative 10/11/2019 89*    Immat GRANS % 10/11/2019 1     Lymphocytes Relative 10/11/2019 5*    Monocytes Relative 10/11/2019 4     Eosinophils Relative 10/11/2019 0     Basophils Relative 10/11/2019 1     Neutrophils Absolute 10/11/2019 21 49*    Immature Grans Absolute 10/11/2019 0 23*    Lymphocytes Absolute 10/11/2019 1 14     Monocytes Absolute 10/11/2019 1 07     Eosinophils Absolute 10/11/2019 0 06     Basophils Absolute 10/11/2019 0 11*    Sodium 10/11/2019 129*    Potassium 10/11/2019 3 3*    Chloride 10/11/2019 94*    CO2 10/11/2019 21     ANION GAP 10/11/2019 14*    BUN 10/11/2019 14     Creatinine 10/11/2019 0 77     Glucose 10/11/2019 148*    Calcium 10/11/2019 9 3     AST 10/11/2019 19     ALT 10/11/2019 29     Alkaline Phosphatase 10/11/2019 96     Total Protein 10/11/2019 8 2     Albumin 10/11/2019 3 8     Total Bilirubin 10/11/2019 0 50     eGFR 10/11/2019 82     Lipase 10/11/2019 174     Extra Tube 10/11/2019 Hold for add-ons       LACTIC ACID 10/11/2019 1 6     Troponin I 10/11/2019 <0 02     Sodium 10/12/2019 134*    Potassium 10/12/2019 4 4     Chloride 10/12/2019 100     CO2 10/12/2019 22     ANION GAP 10/12/2019 12     BUN 10/12/2019 14     Creatinine 10/12/2019 0 65     Glucose 10/12/2019 129     Calcium 10/12/2019 8 3     AST 10/12/2019 16     ALT 10/12/2019 25     Alkaline Phosphatase 10/12/2019 69     Total Protein 10/12/2019 6 9     Albumin 10/12/2019 3 0*    Total Bilirubin 10/12/2019 0 60     eGFR 10/12/2019 95     WBC 10/12/2019 20 39*    RBC 10/12/2019 5 05     Hemoglobin 10/12/2019 14 4     Hematocrit 10/12/2019 43 8     MCV 10/12/2019 87     MCH 10/12/2019 28 5     MCHC 10/12/2019 32 9     RDW 10/12/2019 13 9     MPV 10/12/2019 9 2     Platelets 79/80/5840 272     nRBC 10/12/2019 0     Magnesium 10/12/2019 1 6     Segmented % 10/12/2019 86*    Bands % 10/12/2019 4     Lymphocytes % 10/12/2019 3*    Monocytes % 10/12/2019 5     Eosinophils, % 10/12/2019 0     Basophils % 10/12/2019 1     Atypical Lymphocytes % 10/12/2019 1*    Absolute Neutrophils 10/12/2019 18 35*    Lymphocytes Absolute 10/12/2019 0 61     Monocytes Absolute 10/12/2019 1 02     Eosinophils Absolute 10/12/2019 0 00     Basophils Absolute 10/12/2019 0 20*    Total Counted 10/12/2019 100     Platelet Estimate 38/80/6802 Adequate     WBC 10/13/2019 9 99  RBC 10/13/2019 4 42     Hemoglobin 10/13/2019 12 5     Hematocrit 10/13/2019 39 2     MCV 10/13/2019 89     MCH 10/13/2019 28 3     MCHC 10/13/2019 31 9     RDW 10/13/2019 14 0     Platelets 95/89/4632 220     MPV 10/13/2019 9 4     Sodium 10/13/2019 136     Potassium 10/13/2019 3 9     Chloride 10/13/2019 102     CO2 10/13/2019 24     ANION GAP 10/13/2019 10     BUN 10/13/2019 4*    Creatinine 10/13/2019 0 52*    Glucose 10/13/2019 101     Calcium 10/13/2019 8 3     eGFR 10/13/2019 102        Imaging Studies:   I have personally reviewed pertinent imaging studies  Ct Abdomen Pelvis With Contrast    Result Date: 10/11/2019  Narrative: CT ABDOMEN AND PELVIS WITH IV CONTRAST INDICATION:   abd, n/v/d  Lower abdominal pain, nausea, vomiting, diarrhea  Elevated white blood cell count  Patient states she just started antibiotics today for a sinus infection  History of GERD, fibromyalgia and hypertension  History of tobacco use  COMPARISON:  November 8, 2018 and August 16, 2016  TECHNIQUE:  CT examination of the abdomen and pelvis was performed  Axial, sagittal, and coronal 2D reformatted images were created from the source data and submitted for interpretation  Radiation dose length product (DLP) for this visit:  621 mGy-cm   This examination, like all CT scans performed in the [facility], was performed utilizing techniques to minimize radiation dose exposure, including the use of iterative reconstruction and automated exposure control  IV Contrast:  100 mL of iohexol (OMNIPAQUE) Enteric Contrast:  Enteric contrast was not administered  FINDINGS: ABDOMEN LOWER CHEST:  No clinically significant abnormality identified in the visualized lower chest  LIVER/BILIARY TREE:  Liver is mildly enlarged, measuring approximately 19 cm craniocaudal dimension  There is mild fatty infiltration of the liver    A 1 2 cm cyst in the left lobe of the liver on series 2 image 20 appears similar to the November 8, 2018  Hepatic contours are within normal limits  No biliary dilatation  GALLBLADDER:  No calcified gallstones  No pericholecystic inflammatory change  SPLEEN:  Unremarkable  PANCREAS:  Unremarkable  ADRENAL GLANDS:  Unremarkable  KIDNEYS/URETERS:  There is a 6 mm nonobstructing calculus near the junction of the upper pole and interpolar region right kidney  There is no hydronephrosis bilaterally  STOMACH AND BOWEL:  There is a small to moderate-sized hiatal hernia  There is no evidence of bowel obstruction  There is fluid in the colon, consistent with the history of diarrhea  There is mild colonic diverticulosis  There is some bowel wall thickening and hyperemia involving the sigmoid and distal left colon, without significant inflammatory change in the adjacent fat  This is nonspecific and could be related to colitis or diverticulitis  Follow-up after treatment is recommended in order to exclude the possibility of underlying abnormality, such as neoplasm  APPENDIX:  No findings to suggest appendicitis  ABDOMINOPELVIC CAVITY:  No ascites or free intraperitoneal air  No lymphadenopathy  VESSELS:  There is atherosclerosis  There is no abdominal aortic aneurysm  PELVIS REPRODUCTIVE ORGANS:  Unremarkable for patient's age  URINARY BLADDER:  The urinary bladder is nearly empty, limiting its evaluation  ABDOMINAL WALL/INGUINAL REGIONS:  Unremarkable  OSSEOUS STRUCTURES:  No acute fracture or destructive osseous lesion  Chronic mild compression deformity of T11 is unchanged  Impression: There is some bowel wall thickening and hyperemia involving the sigmoid and distal left colon, without significant inflammatory change of the adjacent fat  This is nonspecific and may be related to colitis or diverticulitis  Follow-up after treatment is recommended in order to exclude the possibility of underlying abnormality, such as neoplasm   There is fluid within the colon, consistent with the history of diarrhea  There is no evidence of bowel obstruction  There is a small to moderate-sized hiatal hernia  Mild hepatomegaly with mild fatty infiltration of the liver  Small hepatic cyst unchanged  Atherosclerosis  Other findings as described above, please see discussion  The study was marked in [facility] for immediate notification  Workstation performed: [id number]         Assessment & Plan  1  Acute onset of abdominal pain, nausea, vomiting, and diarrhea; now improved  Patient's CT scan in the emergency department did show bowel wall thickening involving the sigmoid and distal left colon without significant inflammatory changes of the adjacent fat  Patient's white count on admission was 24,000 today WBC is normal  Stool for enteric pathogens as well as C diff was ordered but unfortunately nothing has been able to be collected as of yet  Will start patient on regular diet  Will start probiotic  Will continue to monitor CBC  No plans for repeat endoscopic evaluation at this time  I have encouraged patient to make sure she follows up with Dr Asha Saavedra as an outpatient  Patient will be seen by Dr Caleb Conrad PA-C  10/13/2019,10:19 AM

## 2019-10-13 NOTE — ASSESSMENT & PLAN NOTE
Nonspecific CT findings but history suggestive of C diff colitis with recent antibiotic use sudden onset and significantly elevated white count  Gastroenterology consultation appreciated  Stool studies for C diff is negative  Vancomycin was discontinued by GI  P r n  Antiemetics  P r n  Pain control  Patient tolerating diet  No further episode of emesis or diarrhea  As per GI patient can be discharged with outpatient follow-up with them    Patient does not need antibiotics

## 2019-10-13 NOTE — PLAN OF CARE
Problem: GASTROINTESTINAL - ADULT  Goal: Minimal or absence of nausea and/or vomiting  Description  INTERVENTIONS:  - Administer ordered antiemetic medications as needed  - Provide nonpharmacologic comfort measures as appropriate  - Advance diet as tolerated, if ordered  - Consider nutrition services referral to assist patient with adequate nutrition and appropriate food choices  Outcome: Adequate for Discharge   Note: Patient denies nausea and/or vomiting  Diet advanced

## 2019-10-13 NOTE — DISCHARGE INSTRUCTIONS
Follow-up with PCP in 1 week  Follow-up with GI as outpatient for outpatient colonoscopy  Return to ER with any worsening fever, chills, abdominal pain, vomiting or diarrhea

## 2019-10-14 ENCOUNTER — TELEPHONE (OUTPATIENT)
Dept: FAMILY MEDICINE CLINIC | Facility: CLINIC | Age: 64
End: 2019-10-14

## 2019-10-14 ENCOUNTER — TRANSITIONAL CARE MANAGEMENT (OUTPATIENT)
Dept: FAMILY MEDICINE CLINIC | Facility: CLINIC | Age: 64
End: 2019-10-14

## 2019-10-14 DIAGNOSIS — B37.0 ORAL THRUSH: ICD-10-CM

## 2019-10-14 LAB
ATRIAL RATE: 83 BPM
P AXIS: 71 DEGREES
PR INTERVAL: 154 MS
QRS AXIS: 71 DEGREES
QRSD INTERVAL: 80 MS
QT INTERVAL: 434 MS
QTC INTERVAL: 509 MS
T WAVE AXIS: 29 DEGREES
VENTRICULAR RATE: 83 BPM

## 2019-10-14 PROCEDURE — 93010 ELECTROCARDIOGRAM REPORT: CPT | Performed by: INTERNAL MEDICINE

## 2019-10-14 NOTE — TELEPHONE ENCOUNTER
The mouth was you prescribed doesn't have a quantity, the pharm won't fill it,  Can someone call [facility]

## 2019-10-16 LAB
ATRIAL RATE: 88 BPM
P AXIS: 115 DEGREES
PR INTERVAL: 146 MS
QRS AXIS: 117 DEGREES
QRSD INTERVAL: 78 MS
QT INTERVAL: 340 MS
QTC INTERVAL: 411 MS
T WAVE AXIS: 126 DEGREES
VENTRICULAR RATE: 88 BPM

## 2019-10-16 PROCEDURE — 93010 ELECTROCARDIOGRAM REPORT: CPT | Performed by: INTERNAL MEDICINE

## 2019-10-17 ENCOUNTER — OFFICE VISIT (OUTPATIENT)
Dept: FAMILY MEDICINE CLINIC | Facility: CLINIC | Age: 64
End: 2019-10-17
Payer: COMMERCIAL

## 2019-10-17 VITALS
SYSTOLIC BLOOD PRESSURE: 124 MMHG | DIASTOLIC BLOOD PRESSURE: 82 MMHG | HEART RATE: 76 BPM | BODY MASS INDEX: 35 KG/M2 | TEMPERATURE: 98.2 F | WEIGHT: 205 LBS | HEIGHT: 64 IN

## 2019-10-17 DIAGNOSIS — IMO0001 TRANSITION OF CARE PERFORMED WITH SHARING OF CLINICAL SUMMARY: Primary | ICD-10-CM

## 2019-10-17 DIAGNOSIS — K52.9 ACUTE GASTROENTERITIS: ICD-10-CM

## 2019-10-17 DIAGNOSIS — D72.829 LEUKOCYTOSIS, UNSPECIFIED TYPE: ICD-10-CM

## 2019-10-17 DIAGNOSIS — E86.0 DEHYDRATION: ICD-10-CM

## 2019-10-17 PROCEDURE — 1111F DSCHRG MED/CURRENT MED MERGE: CPT | Performed by: FAMILY MEDICINE

## 2019-10-17 PROCEDURE — 99495 TRANSJ CARE MGMT MOD F2F 14D: CPT | Performed by: FAMILY MEDICINE

## 2019-10-17 NOTE — PROGRESS NOTES
Assessment/Plan:     Diagnoses and all orders for this visit:    Transition of care performed with sharing of clinical summary    Acute gastroenteritis    Dehydration    Leukocytosis, unspecified type        Discussion: I reviewed with pt  Episode appears to have been viral though toxin induced (e g staph) is also possible  I do not think that this was due to the abx (augmentin)  Symptoms have resolved  Continue present meds/treatment  Monitor lung function  Recheck 2-3m - earlier if worse  Subjective:     TCM Call (since 9/17/2019)     Date and time call was made  10/14/2019  3:00 PM    Patient was hospitialized at  [facility]    Date of Admission  10/11/19    Date of discharge  10/13/19    Diagnosis  Colitis ? diarrhea / vomiting     Disposition  Home    Were the patients medications reviewed and updated  No    Current Symptoms  None      TCM Call (since 9/17/2019)     Scheduled for follow up? Yes    Patients specialists  Other (comment)    Other specialists names  Dr Kelvin Dupree (gastroenterology )2 weeks     Did you obtain your prescribed medications  Yes    Do you need help managing your prescriptions or medications  No    Is transportation to your appointment needed  No    I have advised the patient to call PCP with any new or worsening symptoms  M  [address]    Are you recieving any outpatient services  No    Are you recieving home care services  No    Are you using any community resources  No    Interperter language line needed  No           Patient ID: Slava Mcclure is a 61 y o  female  Here for TCM  - Pt seen 10/11 for COPD exacerbation and started on Augmentin  Pt took first dose around noon  Was feeling better until  That evening when she developed N/V  Vomiting became uncontrollable and pt was taken to [facility] ED  PT states that she became fatigued and "out of it" while on the way to the ED  Pt was determined to be dehydrated and found to have leukocytosis   Pt admitted and started on oral vanco for presumed C  Diff  Pt improved with IV fluids and WBC returned to normal by the next day  C Diff and stool cx were neg - pt was taken off of vanco  Pt continued to improve and was able to be discharged to home on 10/13/19  Pt here for TCM visit  - since discharge, pt states that her GI symptoms have resolved  Pt not taking any abx and is only taking Q Ari for her COPD  She denies fever/chills, increased fatigue, N/V/D, change in urination, increased body aches or other symptoms    - breathing stable at present  Glossitis she experienced prior to admission has resolved  - I reviewed available hospital records, lab results and study reports with pt        The following portions of the patient's history were reviewed and updated as appropriate:   She  has a past medical history of Acid reflux, Fibromyalgia, Hypertension, and Seasonal allergies    She   Patient Active Problem List    Diagnosis Date Noted    Colitis 10/12/2019    Acute non-recurrent maxillary sinusitis 10/11/2019    Tobacco dependence 10/02/2019    Cervicalgia 07/11/2019    Neuropathic pain 01/09/2019    Early satiety 08/28/2018    Abdominal distension 08/28/2018    Family history of pancreatic cancer 08/28/2018    Pain of upper abdomen 08/28/2018    Dyspnea on exertion 05/04/2018    Edema 07/13/2017    Lumbosacral radiculopathy at L5 01/06/2017    Compression fracture of thoracic vertebra, non-traumatic (HCC) 08/30/2016    Hyperlipidemia 12/08/2015    Cataract 08/22/2014    Palpitations 08/05/2014    Chronic obstructive pulmonary disease ([facility] ) 04/17/2013    Esophageal reflux 04/17/2013    Pulmonary nodule seen on imaging study 04/17/2013    Hypertension 03/22/2013    Recurrent major depressive disorder, in partial remission ([facility] ) 03/14/2013    Fibromyalgia 03/14/2013    Degeneration of intervertebral disc 03/14/2013    Anxiety 02/21/2013    Seasonal allergic rhinitis 12/20/2012     She  has a past surgical history that includes Knee surgery (1998); pr colonoscopy flx dx w/collj spec when pfrmd (N/A, 5/9/2017); and Eye surgery  She  reports that she has been smoking  She has a 100 00 pack-year smoking history  She has never used smokeless tobacco  She reports that she does not drink alcohol or use drugs  Current Outpatient Medications   Medication Sig Dispense Refill    acetaminophen (TYLENOL 8 HOUR ARTHRITIS PAIN) 650 mg CR tablet Take by mouth      al mag oxide-diphenhydramine-lidocaine viscous (MAGIC MOUTHWASH) 1:1:1 suspension Swish and spit 10 mL every 4 (four) hours as needed for mouth pain or discomfort 90 mL 1    ALPRAZolam (XANAX) 0 25 mg tablet Take 1 tablet (0 25 mg total) by mouth 3 (three) times a day as needed for anxiety 90 tablet 0    beclomethasone (QVAR) 80 MCG/ACT inhaler Inhale 2 puffs 2 (two) times a day Rinse mouth after use  1 Inhaler 2    cetirizine (ZyrTEC) 10 mg tablet TAKE 1 TABLET BY MOUTH EVERY DAY 30 tablet 3    escitalopram (LEXAPRO) 10 mg tablet TAKE 1 TABLET (10 MG TOTAL) BY MOUTH DAILY 30 tablet 5    fluticasone (FLONASE) 50 mcg/act nasal spray INSTILL 2 SPRAYS INTO EACH NOSTRIL DAILY 16 mL 3    lisinopril (ZESTRIL) 20 mg tablet TAKE 1 TABLET BY MOUTH EVERY DAY 30 tablet 5    MINOXIDIL, TOPICAL, 5 % SOLN Apply 1 application topically      montelukast (SINGULAIR) 10 mg tablet TAKE 1 TABLET BY MOUTH EVERYDAY AT BEDTIME 30 tablet 5    nicotine (NICODERM CQ) 21 mg/24 hr TD 24 hr patch Place 1 patch on the skin daily 28 patch 0    pantoprazole (PROTONIX) 40 mg tablet TAKE 1 TABLET BY MOUTH EVERY DAY 30 tablet 5    umeclidinium-vilanterol (ANORO ELLIPTA) 62 5-25 MCG/INH inhaler Inhale 1 puff daily 3 Inhaler 3     No current facility-administered medications for this visit  She is allergic to miconazole and wixela inhub [fluticasone-salmeterol]       Review of Systems   Constitutional: Negative  HENT: Negative  Eyes: Negative  Respiratory: Negative  Negative for cough and wheezing  Cardiovascular: Negative  Negative for chest pain, palpitations and leg swelling  Gastrointestinal: Negative  Endocrine: Negative  Genitourinary: Negative  Musculoskeletal: Positive for arthralgias, back pain and myalgias  Skin: Negative  Neurological: Negative  Hematological: Negative  Psychiatric/Behavioral: Negative  Objective:      /82   Pulse 76   Temp 98 2 °F (36 8 °C)   Ht 5' 4" (1 626 m)   Wt 93 kg (205 lb)   LMP  (LMP Unknown)   BMI 35 19 kg/m²          Physical Exam   Constitutional: She is oriented to person, place, and time  She appears well-developed and well-nourished  HENT:   Head: Normocephalic and atraumatic  Nose: Nose normal    Mouth/Throat: Oropharynx is clear and moist    Eyes: Pupils are equal, round, and reactive to light  Conjunctivae and EOM are normal    Neck: Normal range of motion  Neck supple  Cardiovascular: Normal rate, regular rhythm, normal heart sounds and intact distal pulses  Pulmonary/Chest: Effort normal and breath sounds normal    Abdominal: Soft  Bowel sounds are normal  She exhibits no distension  There is no tenderness  Musculoskeletal: She exhibits no edema  Lymphadenopathy:     She has no cervical adenopathy  Neurological: She is alert and oriented to person, place, and time  Skin: Skin is warm  Capillary refill takes less than 2 seconds

## 2019-10-23 DIAGNOSIS — I10 ESSENTIAL HYPERTENSION: ICD-10-CM

## 2019-10-23 RX ORDER — LISINOPRIL 20 MG/1
TABLET ORAL
Qty: 30 TABLET | Refills: 5 | Status: SHIPPED | OUTPATIENT
Start: 2019-10-23 | End: 2020-04-12

## 2019-10-28 ENCOUNTER — HOSPITAL ENCOUNTER (OUTPATIENT)
Dept: CT IMAGING | Facility: HOSPITAL | Age: 64
Discharge: HOME | End: 2019-10-28
Payer: COMMERCIAL

## 2019-10-28 DIAGNOSIS — R91.1 PULMONARY NODULE SEEN ON IMAGING STUDY: ICD-10-CM

## 2019-10-28 DIAGNOSIS — Z12.2 ENCOUNTER FOR SCREENING FOR LUNG CANCER: ICD-10-CM

## 2019-10-28 PROCEDURE — G0297 LDCT FOR LUNG CA SCREEN: HCPCS

## 2019-11-05 ENCOUNTER — OFFICE VISIT (OUTPATIENT)
Dept: FAMILY MEDICINE CLINIC | Facility: CLINIC | Age: 64
End: 2019-11-05
Payer: COMMERCIAL

## 2019-11-05 ENCOUNTER — TELEPHONE (OUTPATIENT)
Dept: FAMILY MEDICINE CLINIC | Facility: CLINIC | Age: 64
End: 2019-11-05

## 2019-11-05 VITALS
HEIGHT: 64 IN | DIASTOLIC BLOOD PRESSURE: 80 MMHG | WEIGHT: 206 LBS | HEART RATE: 78 BPM | BODY MASS INDEX: 35.17 KG/M2 | SYSTOLIC BLOOD PRESSURE: 142 MMHG | TEMPERATURE: 97.9 F

## 2019-11-05 DIAGNOSIS — J42 CHRONIC BRONCHITIS, UNSPECIFIED CHRONIC BRONCHITIS TYPE (HCC): Primary | ICD-10-CM

## 2019-11-05 PROCEDURE — 99213 OFFICE O/P EST LOW 20 MIN: CPT | Performed by: FAMILY MEDICINE

## 2019-11-05 PROCEDURE — 3008F BODY MASS INDEX DOCD: CPT | Performed by: FAMILY MEDICINE

## 2019-11-05 NOTE — TELEPHONE ENCOUNTER
Saw Dr Etienne Valentine last week   She put her on 2 different inhalers   And she stopped them 2 days ago   They made her feel shaky and dizzy and pain in her chest palps   Neck pain and feels like there is something in her throat      She wants to see Dr Taylor today

## 2019-11-05 NOTE — PROGRESS NOTES
Assessment/Plan:    Chronic obstructive pulmonary disease ([facility] [address] )  Still with exertional symptoms and poor tolerance of multiple inhalers  I gave pt samples of Symbicort 160/4 5 to be used 2 puff bid  I reviewed side effects with pt  Rinse mouth thoroughly after each use  Recheck 2-3 weeks if not improving - earlier if worse       Diagnoses and all orders for this visit:    Chronic bronchitis, unspecified chronic bronchitis type ([facility] )          Subjective:      Patient ID: Kam Darling is a 61 y o  female  Here for f/u COPD  - pt with moderate to sever COPD was doing well until her [place] was no longer covered by her insurance  Since then, pt has been on Advair/Wixela which was ineffective and caused thrush  Recently, pt was changed to Qvar and Anoro by Dr Cierra Madrigal  Pt noted that she became constipated, developed severe dry mouth and posterior throat pain on this combination  She stopped both meds 2 days ago and notes that her throat is feeling a better  She denies fever/chills  She still has some SOB with exertion  Here to discuss      The following portions of the patient's history were reviewed and updated as appropriate:   She  has a past medical history of Acid reflux, Fibromyalgia, Hypertension, and Seasonal allergies    She   Patient Active Problem List    Diagnosis Date Noted    Colitis 10/12/2019    Acute non-recurrent maxillary sinusitis 10/11/2019    Tobacco dependence 10/02/2019    Cervicalgia 07/11/2019    Neuropathic pain 01/09/2019    Early satiety 08/28/2018    Abdominal distension 08/28/2018    Family history of pancreatic cancer 08/28/2018    Pain of upper abdomen 08/28/2018    Dyspnea on exertion 05/04/2018    Edema 07/13/2017    Lumbosacral radiculopathy at L5 01/06/2017    Compression fracture of thoracic vertebra, non-traumatic (HCC) 08/30/2016    Hyperlipidemia 12/08/2015    Cataract 08/22/2014    Palpitations 08/05/2014    Chronic obstructive pulmonary disease ([facility] [facility] ) 04/17/2013    Esophageal reflux 04/17/2013    Pulmonary nodule seen on imaging study 04/17/2013    Hypertension 03/22/2013    Recurrent major depressive disorder, in partial remission ([facility] [address] ) 03/14/2013    Fibromyalgia 03/14/2013    Degeneration of intervertebral disc 03/14/2013    Anxiety 02/21/2013    Seasonal allergic rhinitis 12/20/2012     She  has a past surgical history that includes Knee surgery (1998); pr colonoscopy flx dx w/collj spec when pfrmd (N/A, 5/9/2017); and Eye surgery  She  reports that she has been smoking  She has a 100 00 pack-year smoking history  She has never used smokeless tobacco  She reports that she does not drink alcohol or use drugs  Current Outpatient Medications   Medication Sig Dispense Refill    acetaminophen (TYLENOL 8 HOUR ARTHRITIS PAIN) 650 mg CR tablet Take by mouth      al mag oxide-diphenhydramine-lidocaine viscous (MAGIC MOUTHWASH) 1:1:1 suspension Swish and spit 10 mL every 4 (four) hours as needed for mouth pain or discomfort 90 mL 1    ALPRAZolam (XANAX) 0 25 mg tablet Take 1 tablet (0 25 mg total) by mouth 3 (three) times a day as needed for anxiety 90 tablet 0    beclomethasone (QVAR) 80 MCG/ACT inhaler Inhale 2 puffs 2 (two) times a day Rinse mouth after use   1 Inhaler 2    cetirizine (ZyrTEC) 10 mg tablet TAKE 1 TABLET BY MOUTH EVERY DAY 30 tablet 3    escitalopram (LEXAPRO) 10 mg tablet TAKE 1 TABLET (10 MG TOTAL) BY MOUTH DAILY 30 tablet 5    fluticasone (FLONASE) 50 mcg/act nasal spray INSTILL 2 SPRAYS INTO EACH NOSTRIL DAILY 16 mL 3    lisinopril (ZESTRIL) 20 mg tablet TAKE 1 TABLET BY MOUTH EVERY DAY 30 tablet 5    MINOXIDIL, TOPICAL, 5 % SOLN Apply 1 application topically      montelukast (SINGULAIR) 10 mg tablet TAKE 1 TABLET BY MOUTH EVERYDAY AT BEDTIME 30 tablet 5    nicotine (NICODERM CQ) 21 mg/24 hr TD 24 hr patch Place 1 patch on the skin daily 28 patch 0    pantoprazole (PROTONIX) 40 mg tablet TAKE 1 TABLET BY MOUTH EVERY DAY 30 tablet 5    umeclidinium-vilanterol (ANORO ELLIPTA) 62 5-25 MCG/INH inhaler Inhale 1 puff daily 3 Inhaler 3     No current facility-administered medications for this visit  She is allergic to miconazole and wixela inhub [fluticasone-salmeterol]       Review of Systems   Constitutional: Negative  HENT: Positive for sore throat and trouble swallowing  Eyes: Negative  Respiratory: Positive for cough and shortness of breath  Cardiovascular: Negative  Objective:      /80 (BP Location: Left arm, Patient Position: Sitting, Cuff Size: Standard)   Pulse 78   Temp 97 9 °F (36 6 °C)   Ht 5' 4" (1 626 m)   Wt 93 4 kg (206 lb)   LMP  (LMP Unknown)   BMI 35 36 kg/m²          Physical Exam   Constitutional: She appears well-developed and well-nourished  HENT:   Head: Normocephalic and atraumatic  Right Ear: External ear normal    Left Ear: External ear normal    Nose: Nose normal    Mouth/Throat: Oropharynx is clear and moist    Eyes: Pupils are equal, round, and reactive to light  Conjunctivae and EOM are normal    Neck: Normal range of motion  Cardiovascular: Normal rate, regular rhythm, normal heart sounds and intact distal pulses  Pulmonary/Chest: Effort normal  She has no rales  Poor air movement throughout   Lymphadenopathy:     She has no cervical adenopathy  Skin: Skin is warm

## 2019-11-06 NOTE — ASSESSMENT & PLAN NOTE
Still with exertional symptoms and poor tolerance of multiple inhalers  I gave pt samples of Symbicort 160/4 5 to be used 2 puff bid  I reviewed side effects with pt  Rinse mouth thoroughly after each use   Recheck 2-3 weeks if not improving - earlier if worse

## 2019-11-11 ENCOUNTER — TELEPHONE (OUTPATIENT)
Dept: FAMILY MEDICINE CLINIC | Facility: CLINIC | Age: 64
End: 2019-11-11

## 2019-11-11 NOTE — TELEPHONE ENCOUNTER
You started her on a new Inhaler last week samples of Symbicort    She's still having blurred vision and is constipation  And her stomach is still bloated in her upper stomach   Could all of the different inhalers be causing all these stomach issues? should she stop this     She is taking is 2 puffs 2x a day       Please advise

## 2019-11-13 DIAGNOSIS — F33.0 DEPRESSION, MAJOR, RECURRENT, MILD (HCC): ICD-10-CM

## 2019-11-13 RX ORDER — ESCITALOPRAM OXALATE 10 MG/1
TABLET ORAL
Qty: 90 TABLET | Refills: 1 | Status: SHIPPED | OUTPATIENT
Start: 2019-11-13 | End: 2020-05-06

## 2019-11-21 ENCOUNTER — TELEPHONE (OUTPATIENT)
Dept: FAMILY MEDICINE CLINIC | Facility: CLINIC | Age: 64
End: 2019-11-21

## 2019-11-21 ENCOUNTER — OFFICE VISIT (OUTPATIENT)
Dept: FAMILY MEDICINE CLINIC | Facility: CLINIC | Age: 64
End: 2019-11-21
Payer: COMMERCIAL

## 2019-11-21 VITALS
HEIGHT: 64 IN | DIASTOLIC BLOOD PRESSURE: 94 MMHG | WEIGHT: 205.8 LBS | OXYGEN SATURATION: 95 % | SYSTOLIC BLOOD PRESSURE: 142 MMHG | BODY MASS INDEX: 35.13 KG/M2 | HEART RATE: 84 BPM | TEMPERATURE: 97.9 F

## 2019-11-21 DIAGNOSIS — M62.838 MUSCLE SPASM: ICD-10-CM

## 2019-11-21 DIAGNOSIS — R60.0 LOWER EXTREMITY EDEMA: ICD-10-CM

## 2019-11-21 DIAGNOSIS — F17.200 TOBACCO DEPENDENCE: ICD-10-CM

## 2019-11-21 DIAGNOSIS — R06.83 SNORING: ICD-10-CM

## 2019-11-21 DIAGNOSIS — J42 CHRONIC BRONCHITIS, UNSPECIFIED CHRONIC BRONCHITIS TYPE (HCC): ICD-10-CM

## 2019-11-21 DIAGNOSIS — Z11.59 NEED FOR HEPATITIS C SCREENING TEST: ICD-10-CM

## 2019-11-21 DIAGNOSIS — R07.89 BURNING CHEST PAIN: ICD-10-CM

## 2019-11-21 DIAGNOSIS — J44.9 CHRONIC BRONCHITIS WITH COPD (CHRONIC OBSTRUCTIVE PULMONARY DISEASE) (HCC): Primary | ICD-10-CM

## 2019-11-21 DIAGNOSIS — E66.01 SEVERE OBESITY (BMI 35.0-35.9 WITH COMORBIDITY) (HCC): ICD-10-CM

## 2019-11-21 DIAGNOSIS — M75.81 TENDINITIS OF RIGHT ROTATOR CUFF: ICD-10-CM

## 2019-11-21 DIAGNOSIS — Z11.4 SCREENING FOR HIV (HUMAN IMMUNODEFICIENCY VIRUS): ICD-10-CM

## 2019-11-21 PROBLEM — J44.89 CHRONIC BRONCHITIS WITH COPD (CHRONIC OBSTRUCTIVE PULMONARY DISEASE): Status: ACTIVE | Noted: 2019-11-21

## 2019-11-21 PROCEDURE — 93000 ELECTROCARDIOGRAM COMPLETE: CPT | Performed by: FAMILY MEDICINE

## 2019-11-21 PROCEDURE — 4004F PT TOBACCO SCREEN RCVD TLK: CPT | Performed by: FAMILY MEDICINE

## 2019-11-21 PROCEDURE — 99214 OFFICE O/P EST MOD 30 MIN: CPT | Performed by: FAMILY MEDICINE

## 2019-11-21 RX ORDER — BACLOFEN 10 MG/1
10 TABLET ORAL 3 TIMES DAILY
Qty: 30 TABLET | Refills: 0 | Status: SHIPPED | OUTPATIENT
Start: 2019-11-21 | End: 2020-04-10 | Stop reason: ALTCHOICE

## 2019-11-21 RX ORDER — DICLOFENAC SODIUM 75 MG/1
TABLET, DELAYED RELEASE ORAL
Qty: 60 TABLET | Refills: 1 | OUTPATIENT
Start: 2019-11-21

## 2019-11-21 NOTE — TELEPHONE ENCOUNTER
Patient called stating you had told her to stop taking the Symbicort  She stopped taking it for about a week and a half and last night she was having a hard time breathing so she took her inhaler  She stated after she took her inhaler she got a sharp burning pain across her chest  She would like to see you

## 2019-11-21 NOTE — PROGRESS NOTES
Melida Bruce 1955 female MRN: [id number]    Acute Visit    Assessment/Plan   Tobacco dependence  Discussed cessation at length  She wants to keep smoking but recognizes the ill effects  She is planning to try vape instead  I advised her that cigarette and vape use are both addictive and have negative health consequences, but I encouraged any form of overall reduction in nicotine  Nicotine replacement offered and declined; medication offered and declined by patient  Counseling for 3 minutes with patient and daughter who also smokes     Chronic obstructive pulmonary disease ([place] [address] )  Sent in 2 inhalers to see what might be covered by insurance; she was instructed to only take one of the inhalers  Referred to pulmonology, refractory COPD and continues to smoke  Strongly encouraged smoking cessation and she declines nicotine replacement, medications  Counseled for 3 minutes with patient and daughter    Lower extremity edema  Obtain labs and echo    Severe obesity (BMI 35 0-35 9 with comorbidity) ([email or web address] )  Encouraged improved diet and to become active  Sedentary lifestyle    Snoring  Home sleep study  Discussed the risks of untreated NEGRA    Sandie Dos Santos was seen today for shortness of breath, chest pain, cough, earache and neck pain  Diagnoses and all orders for this visit:    Chronic bronchitis with COPD (chronic obstructive pulmonary disease) ([email or web address] )  -     fluticasone-umeclidinium-vilanterol (TRELEGY) 100-62 5-25 MCG/INH inhaler; Inhale 1 puff daily Rinse mouth after use  -     fluticasone-salmeterol (ADVAIR, WIXELA) 250-50 mcg/dose inhaler; Inhale 1 puff 2 (two) times a day Rinse mouth after use  Severe obesity (BMI 35 0-35 9 with comorbidity) ([facility])  -     Lipid Panel with Direct LDL reflex; Future  -     Hemoglobin A1C; Future  -     Comprehensive metabolic panel; Future  -     Microalbumin / creatinine urine ratio; Future  -     UA (URINE) with reflex to Scope;  Future  -     TSH, 3rd generation with Free T4 reflex; Future  -     NT-BNP PRO; Future  -     CBC and differential; Future  -     Anti-neutrophilic cytoplasmic antibody; Future  -     C-reactive protein; Future  -     ASPEN Screen w/ Reflex to Titer/Pattern; Future    Lower extremity edema  -     Echo complete with contrast if indicated; Future  -     Lipid Panel with Direct LDL reflex; Future  -     Hemoglobin A1C; Future  -     Comprehensive metabolic panel; Future  -     Microalbumin / creatinine urine ratio; Future  -     UA (URINE) with reflex to Scope; Future  -     TSH, 3rd generation with Free T4 reflex; Future  -     NT-BNP PRO; Future  -     CBC and differential; Future  -     Anti-neutrophilic cytoplasmic antibody; Future  -     C-reactive protein; Future  -     ASPEN Screen w/ Reflex to Titer/Pattern; Future    Snoring  -     Home Study; Future    Chronic bronchitis, unspecified chronic bronchitis type ([facility] )  -     Ambulatory referral to Pulmonology; Future  -     fluticasone-salmeterol (ADVAIR, WIXELA) 250-50 mcg/dose inhaler; Inhale 1 puff 2 (two) times a day Rinse mouth after use  Burning chest pain  -     POCT ECG  -     Lipid Panel with Direct LDL reflex; Future  -     Hemoglobin A1C; Future  -     Comprehensive metabolic panel; Future  -     Microalbumin / creatinine urine ratio; Future  -     UA (URINE) with reflex to Scope; Future  -     TSH, 3rd generation with Free T4 reflex; Future  -     NT-BNP PRO; Future  -     CBC and differential; Future  -     Anti-neutrophilic cytoplasmic antibody; Future  -     C-reactive protein; Future  -     ASPEN Screen w/ Reflex to Titer/Pattern; Future    Need for hepatitis C screening test  -     Hepatitis C antibody; Future    Screening for HIV (human immunodeficiency virus)  -     Human Immunodeficiency Virus 1/2 Antigen / Antibody ( Fourth Generation) with Reflex Testing; Future    Muscle spasm  -     baclofen 10 mg tablet;  Take 1 tablet (10 mg total) by mouth 3 (three) times a day    Tobacco dependence      Priyanka Alcantara MD  [address]  11/21/2019      Please be aware that this note contains text that was dictated and there may be errors pertaining to "sound-alike "words during the dictation process  SUBJECTIVE    CC: Shortness of Breath; Chest Pain (for one month); Cough (productive clear); Earache (feels hot); and Neck Pain (left side )    HPI:  Desean Barber is a 61 y o  female who presented for an acute visit complaining of multiple  Chronic symptoms  COPD - she cannot tolerate multiple inhalers  She states her SOB is worse and has a cough for 2 days  Non-productive, no fever  Neck pain, left sided, with associate numbness of 3 ulnar digits and burning chest pain  She has been using a different pillow than usual     Leg edema and orthopnea - partially difficulty laying down due to GERD  She usually sleeps with 2 pillows now mostly sitting up to sleep  Leg edema for the last 2 weeks, bilaterally, better with elevation  Reports heavy snoring, doesn't sleep well, feels daily sleepiness  Review of Systems   Constitutional: Negative for chills, diaphoresis, fatigue and fever  HENT: Positive for ear pain and rhinorrhea  Respiratory: Positive for cough and shortness of breath  Cardiovascular: Positive for chest pain, palpitations and leg swelling  Gastrointestinal: Negative for nausea  Musculoskeletal: Positive for arthralgias, back pain, myalgias and neck pain  Skin: Negative for rash  Psychiatric/Behavioral: The patient is nervous/anxious  All other systems reviewed and are negative      Medications:   Meds/Allergies   Current Outpatient Medications   Medication Sig Dispense Refill    acetaminophen (TYLENOL 8 HOUR ARTHRITIS PAIN) 650 mg CR tablet Take by mouth      ALPRAZolam (XANAX) 0 25 mg tablet Take 1 tablet (0 25 mg total) by mouth 3 (three) times a day as needed for anxiety 90 tablet 0    cetirizine (ZyrTEC) 10 mg tablet TAKE 1 TABLET BY MOUTH EVERY DAY 30 tablet 3    escitalopram (LEXAPRO) 10 mg tablet TAKE 1 TABLET BY MOUTH EVERY DAY 90 tablet 1    fluticasone (FLONASE) 50 mcg/act nasal spray INSTILL 2 SPRAYS INTO EACH NOSTRIL DAILY 16 mL 3    lisinopril (ZESTRIL) 20 mg tablet TAKE 1 TABLET BY MOUTH EVERY DAY 30 tablet 5    MINOXIDIL, TOPICAL, 5 % SOLN Apply 1 application topically      montelukast (SINGULAIR) 10 mg tablet TAKE 1 TABLET BY MOUTH EVERYDAY AT BEDTIME 30 tablet 5    pantoprazole (PROTONIX) 40 mg tablet TAKE 1 TABLET BY MOUTH EVERY DAY 30 tablet 5    baclofen 10 mg tablet Take 1 tablet (10 mg total) by mouth 3 (three) times a day 30 tablet 0    fluticasone-salmeterol (ADVAIR, WIXELA) 250-50 mcg/dose inhaler Inhale 1 puff 2 (two) times a day Rinse mouth after use  1 Inhaler 5    fluticasone-umeclidinium-vilanterol (TRELEGY) 100-62 5-25 MCG/INH inhaler Inhale 1 puff daily Rinse mouth after use  1 Inhaler 0     No current facility-administered medications for this visit  Allergies   Allergen Reactions    Augmentin [Amoxicillin-Pot Clavulanate] Vomiting    Miconazole Itching and Swelling    Wixela Inhub [Fluticasone-Salmeterol] Palpitations     OBJECTIVE    Vitals:   Vitals:    11/21/19 1302   BP: 142/94   Pulse: 84   Temp: 97 9 °F (36 6 °C)   SpO2: 95%   Weight: 93 4 kg (205 lb 12 8 oz)   Height: 5' 4" (1 626 m)     Physical Exam   Constitutional: She is oriented to person, place, and time  She appears well-developed and well-nourished  She does not appear ill  No distress  HENT:   Head: Normocephalic and atraumatic  Right Ear: Tympanic membrane, external ear and ear canal normal    Left Ear: Tympanic membrane, external ear and ear canal normal    Nose: Nose normal    Mouth/Throat: Uvula is midline, oropharynx is clear and moist and mucous membranes are normal  No oropharyngeal exudate  No tonsillar exudate  Eyes: Conjunctivae and EOM are normal    Neck: No thyromegaly present     Cardiovascular: Normal rate, regular rhythm, S1 normal, S2 normal and normal heart sounds  Pulmonary/Chest: Effort normal  No respiratory distress  She has no decreased breath sounds  She has no wheezes  She has no rhonchi  She has rales in the right lower field and the left lower field  She exhibits no tenderness and no crepitus  Abdominal: Soft  Bowel sounds are normal  She exhibits no distension  There is no tenderness  Musculoskeletal:        Right lower leg: She exhibits edema  Left lower leg: She exhibits edema  Lymphadenopathy:     She has no cervical adenopathy  Neurological: She is alert and oriented to person, place, and time  She displays normal reflexes  No sensory deficit  She exhibits normal muscle tone  Skin: Skin is warm and dry  Capillary refill takes less than 2 seconds  Nursing note and vitals reviewed  Results for orders placed or performed in visit on 11/21/19   POCT ECG    Impression    NSR no ST changes noted

## 2019-11-21 NOTE — ASSESSMENT & PLAN NOTE
Discussed cessation at length  She wants to keep smoking but recognizes the ill effects  She is planning to try vape instead  I advised her that cigarette and vape use are both addictive and have negative health consequences, but I encouraged any form of overall reduction in nicotine  Nicotine replacement offered and declined; medication offered and declined by patient  Counseling for 3 minutes with patient and daughter who also smokes

## 2019-11-22 ENCOUNTER — TELEPHONE (OUTPATIENT)
Dept: FAMILY MEDICINE CLINIC | Facility: CLINIC | Age: 64
End: 2019-11-22

## 2019-11-22 ENCOUNTER — APPOINTMENT (OUTPATIENT)
Dept: LAB | Facility: MEDICAL CENTER | Age: 64
End: 2019-11-22
Payer: COMMERCIAL

## 2019-11-22 ENCOUNTER — TRANSCRIBE ORDERS (OUTPATIENT)
Dept: ADMINISTRATIVE | Facility: HOSPITAL | Age: 64
End: 2019-11-22

## 2019-11-22 DIAGNOSIS — Z11.59 NEED FOR HEPATITIS C SCREENING TEST: ICD-10-CM

## 2019-11-22 DIAGNOSIS — J42 CHRONIC BRONCHITIS, UNSPECIFIED CHRONIC BRONCHITIS TYPE (HCC): ICD-10-CM

## 2019-11-22 DIAGNOSIS — Z11.4 SCREENING FOR HIV (HUMAN IMMUNODEFICIENCY VIRUS): ICD-10-CM

## 2019-11-22 DIAGNOSIS — Z11.4 SCREENING FOR HIV (HUMAN IMMUNODEFICIENCY VIRUS): Primary | ICD-10-CM

## 2019-11-22 DIAGNOSIS — E66.01 SEVERE OBESITY (BMI 35.0-35.9 WITH COMORBIDITY) (HCC): ICD-10-CM

## 2019-11-22 DIAGNOSIS — R60.0 LOWER EXTREMITY EDEMA: ICD-10-CM

## 2019-11-22 DIAGNOSIS — R07.89 BURNING CHEST PAIN: ICD-10-CM

## 2019-11-22 DIAGNOSIS — J44.9 CHRONIC BRONCHITIS WITH COPD (CHRONIC OBSTRUCTIVE PULMONARY DISEASE) (HCC): ICD-10-CM

## 2019-11-22 LAB
ALBUMIN SERPL BCP-MCNC: 4 G/DL (ref 3.5–5)
ALP SERPL-CCNC: 66 U/L (ref 46–116)
ALT SERPL W P-5'-P-CCNC: 21 U/L (ref 12–78)
ANION GAP SERPL CALCULATED.3IONS-SCNC: 7 MMOL/L (ref 4–13)
AST SERPL W P-5'-P-CCNC: 16 U/L (ref 5–45)
BASOPHILS # BLD AUTO: 0.06 THOUSANDS/ΜL (ref 0–0.1)
BASOPHILS NFR BLD AUTO: 1 % (ref 0–1)
BILIRUB SERPL-MCNC: 0.41 MG/DL (ref 0.2–1)
BILIRUB UR QL STRIP: NEGATIVE
BUN SERPL-MCNC: 11 MG/DL (ref 5–25)
CALCIUM SERPL-MCNC: 9.5 MG/DL (ref 8.3–10.1)
CHLORIDE SERPL-SCNC: 107 MMOL/L (ref 100–108)
CHOLEST SERPL-MCNC: 241 MG/DL (ref 50–200)
CLARITY UR: CLEAR
CO2 SERPL-SCNC: 27 MMOL/L (ref 21–32)
COLOR UR: YELLOW
CREAT SERPL-MCNC: 0.65 MG/DL (ref 0.6–1.3)
CREAT UR-MCNC: 38.4 MG/DL
CRP SERPL QL: 6.3 MG/L
EOSINOPHIL # BLD AUTO: 0.17 THOUSAND/ΜL (ref 0–0.61)
EOSINOPHIL NFR BLD AUTO: 2 % (ref 0–6)
ERYTHROCYTE [DISTWIDTH] IN BLOOD BY AUTOMATED COUNT: 13.2 % (ref 11.6–15.1)
EST. AVERAGE GLUCOSE BLD GHB EST-MCNC: 123 MG/DL
GFR SERPL CREATININE-BSD FRML MDRD: 95 ML/MIN/1.73SQ M
GLUCOSE P FAST SERPL-MCNC: 104 MG/DL (ref 65–99)
GLUCOSE UR STRIP-MCNC: NEGATIVE MG/DL
HBA1C MFR BLD: 5.9 % (ref 4.2–6.3)
HCT VFR BLD AUTO: 45.6 % (ref 34.8–46.1)
HCV AB SER QL: NORMAL
HDLC SERPL-MCNC: 49 MG/DL
HGB BLD-MCNC: 14.3 G/DL (ref 11.5–15.4)
HGB UR QL STRIP.AUTO: NEGATIVE
IMM GRANULOCYTES # BLD AUTO: 0.04 THOUSAND/UL (ref 0–0.2)
IMM GRANULOCYTES NFR BLD AUTO: 1 % (ref 0–2)
KETONES UR STRIP-MCNC: NEGATIVE MG/DL
LDLC SERPL CALC-MCNC: 153 MG/DL (ref 0–100)
LEUKOCYTE ESTERASE UR QL STRIP: NEGATIVE
LYMPHOCYTES # BLD AUTO: 1.63 THOUSANDS/ΜL (ref 0.6–4.47)
LYMPHOCYTES NFR BLD AUTO: 21 % (ref 14–44)
MCH RBC QN AUTO: 28 PG (ref 26.8–34.3)
MCHC RBC AUTO-ENTMCNC: 31.4 G/DL (ref 31.4–37.4)
MCV RBC AUTO: 89 FL (ref 82–98)
MICROALBUMIN UR-MCNC: <5 MG/L (ref 0–20)
MICROALBUMIN/CREAT 24H UR: <13 MG/G CREATININE (ref 0–30)
MONOCYTES # BLD AUTO: 0.76 THOUSAND/ΜL (ref 0.17–1.22)
MONOCYTES NFR BLD AUTO: 10 % (ref 4–12)
NEUTROPHILS # BLD AUTO: 5.05 THOUSANDS/ΜL (ref 1.85–7.62)
NEUTS SEG NFR BLD AUTO: 65 % (ref 43–75)
NITRITE UR QL STRIP: NEGATIVE
NRBC BLD AUTO-RTO: 0 /100 WBCS
NT-PROBNP SERPL-MCNC: 30 PG/ML
PH UR STRIP.AUTO: 6.5 [PH]
PLATELET # BLD AUTO: 260 THOUSANDS/UL (ref 149–390)
PMV BLD AUTO: 10.1 FL (ref 8.9–12.7)
POTASSIUM SERPL-SCNC: 4.3 MMOL/L (ref 3.5–5.3)
PROT SERPL-MCNC: 7.6 G/DL (ref 6.4–8.2)
PROT UR STRIP-MCNC: NEGATIVE MG/DL
RBC # BLD AUTO: 5.11 MILLION/UL (ref 3.81–5.12)
SODIUM SERPL-SCNC: 141 MMOL/L (ref 136–145)
SP GR UR STRIP.AUTO: 1.01 (ref 1–1.03)
TRIGL SERPL-MCNC: 197 MG/DL
TSH SERPL DL<=0.05 MIU/L-ACNC: 2.89 UIU/ML (ref 0.36–3.74)
UROBILINOGEN UR QL STRIP.AUTO: 0.2 E.U./DL
WBC # BLD AUTO: 7.71 THOUSAND/UL (ref 4.31–10.16)

## 2019-11-22 PROCEDURE — 83036 HEMOGLOBIN GLYCOSYLATED A1C: CPT

## 2019-11-22 PROCEDURE — 86803 HEPATITIS C AB TEST: CPT

## 2019-11-22 PROCEDURE — 82043 UR ALBUMIN QUANTITATIVE: CPT

## 2019-11-22 PROCEDURE — 86140 C-REACTIVE PROTEIN: CPT

## 2019-11-22 PROCEDURE — 82570 ASSAY OF URINE CREATININE: CPT

## 2019-11-22 PROCEDURE — 83880 ASSAY OF NATRIURETIC PEPTIDE: CPT

## 2019-11-22 PROCEDURE — 36415 COLL VENOUS BLD VENIPUNCTURE: CPT

## 2019-11-22 PROCEDURE — 87389 HIV-1 AG W/HIV-1&-2 AB AG IA: CPT

## 2019-11-22 PROCEDURE — 86255 FLUORESCENT ANTIBODY SCREEN: CPT

## 2019-11-22 PROCEDURE — 80053 COMPREHEN METABOLIC PANEL: CPT

## 2019-11-22 PROCEDURE — 85025 COMPLETE CBC W/AUTO DIFF WBC: CPT

## 2019-11-22 PROCEDURE — 81003 URINALYSIS AUTO W/O SCOPE: CPT

## 2019-11-22 PROCEDURE — 86038 ANTINUCLEAR ANTIBODIES: CPT

## 2019-11-22 PROCEDURE — 80061 LIPID PANEL: CPT

## 2019-11-22 PROCEDURE — 84443 ASSAY THYROID STIM HORMONE: CPT

## 2019-11-24 LAB — HIV 1+2 AB+HIV1 P24 AG SERPL QL IA: NORMAL

## 2019-11-25 LAB
C-ANCA TITR SER IF: NORMAL TITER
MYELOPEROXIDASE AB SER IA-ACNC: <9 U/ML (ref 0–9)
P-ANCA ATYPICAL TITR SER IF: NORMAL TITER
P-ANCA TITR SER IF: NORMAL TITER
PROTEINASE3 AB SER IA-ACNC: <3.5 U/ML (ref 0–3.5)
RYE IGE QN: NEGATIVE

## 2019-11-26 ENCOUNTER — TELEPHONE (OUTPATIENT)
Dept: SLEEP CENTER | Facility: CLINIC | Age: 64
End: 2019-11-26

## 2019-11-26 ENCOUNTER — TELEPHONE (OUTPATIENT)
Dept: FAMILY MEDICINE CLINIC | Facility: CLINIC | Age: 64
End: 2019-11-26

## 2019-11-26 ENCOUNTER — HOSPITAL ENCOUNTER (OUTPATIENT)
Dept: NON INVASIVE DIAGNOSTICS | Facility: CLINIC | Age: 64
Discharge: HOME | End: 2019-11-26
Payer: COMMERCIAL

## 2019-11-26 DIAGNOSIS — R60.0 LOWER EXTREMITY EDEMA: ICD-10-CM

## 2019-11-26 DIAGNOSIS — F41.9 ANXIETY: ICD-10-CM

## 2019-11-26 PROCEDURE — 93306 TTE W/DOPPLER COMPLETE: CPT

## 2019-11-26 PROCEDURE — 93306 TTE W/DOPPLER COMPLETE: CPT | Performed by: INTERNAL MEDICINE

## 2019-11-26 RX ORDER — ALPRAZOLAM 0.25 MG/1
0.25 TABLET ORAL 3 TIMES DAILY PRN
Qty: 90 TABLET | Refills: 0 | Status: SHIPPED | OUTPATIENT
Start: 2019-11-26 | End: 2020-10-01 | Stop reason: SDUPTHER

## 2019-11-26 NOTE — TELEPHONE ENCOUNTER
Patient cannot have a home sleep study due to the insurance  Insurance will allow an in lab study   Please order an in lab diagnostic sleep study

## 2019-11-27 DIAGNOSIS — R06.83 SNORING: Primary | ICD-10-CM

## 2019-11-27 DIAGNOSIS — R53.83 FATIGUE, UNSPECIFIED TYPE: ICD-10-CM

## 2019-11-27 DIAGNOSIS — G47.8 UNREFRESHED BY SLEEP: ICD-10-CM

## 2019-11-27 DIAGNOSIS — E66.01 SEVERE OBESITY (BMI 35.0-35.9 WITH COMORBIDITY) (HCC): ICD-10-CM

## 2019-11-27 NOTE — TELEPHONE ENCOUNTER
I will call the patient to schedule the in lab sleep study, please discontinue the home sleep study order   Thank you

## 2019-12-03 ENCOUNTER — TELEPHONE (OUTPATIENT)
Dept: SLEEP CENTER | Facility: CLINIC | Age: 64
End: 2019-12-03

## 2019-12-03 NOTE — TELEPHONE ENCOUNTER
----- Message from Anya Tomlinson DO sent at 11/29/2019  5:54 PM EST -----  Chart reviewed  Study approved   ----- Message -----  From: Benita Funez MA  Sent: 11/29/2019   4:21 PM EST  To: Sleep Medicine Prakash Provider    This sleep study needs approval      If approved please sign and return to clerical pool  If denied please include reasons why  Also provide alternative testing if warranted  Please sign and return to clerical pool

## 2019-12-05 ENCOUNTER — TELEPHONE (OUTPATIENT)
Dept: FAMILY MEDICINE CLINIC | Facility: CLINIC | Age: 64
End: 2019-12-05

## 2019-12-06 ENCOUNTER — TELEPHONE (OUTPATIENT)
Dept: FAMILY MEDICINE CLINIC | Facility: CLINIC | Age: 64
End: 2019-12-06

## 2019-12-06 DIAGNOSIS — J44.9 CHRONIC BRONCHITIS WITH COPD (CHRONIC OBSTRUCTIVE PULMONARY DISEASE) (HCC): Primary | ICD-10-CM

## 2019-12-06 DIAGNOSIS — R05.9 COUGH: ICD-10-CM

## 2019-12-06 NOTE — TELEPHONE ENCOUNTER
Pt aware - wants to know if you know why the crp is elevated and where the inflammation may be coming from

## 2019-12-06 NOTE — TELEPHONE ENCOUNTER
Patient is suppose to see a pulmonologist and the pulmonologist is asking for an xray to be done first  They would like patient to have a chest xray  Can you please place order?      Please call patient once order is in

## 2019-12-06 NOTE — TELEPHONE ENCOUNTER
CXR ordered  Labs looked OK except for sl elevated CRP which is unchanged from previous CRPs, and elevated LDL (153) which is much higher than previous   Needs to watch diet, decrease fats/chol in diet, etc

## 2019-12-09 ENCOUNTER — APPOINTMENT (OUTPATIENT)
Dept: RADIOLOGY | Facility: MEDICAL CENTER | Age: 64
End: 2019-12-09
Payer: COMMERCIAL

## 2019-12-09 DIAGNOSIS — J44.9 CHRONIC BRONCHITIS WITH COPD (CHRONIC OBSTRUCTIVE PULMONARY DISEASE) (HCC): ICD-10-CM

## 2019-12-09 DIAGNOSIS — R05.9 COUGH: ICD-10-CM

## 2019-12-09 PROCEDURE — 71046 X-RAY EXAM CHEST 2 VIEWS: CPT

## 2019-12-16 ENCOUNTER — OFFICE VISIT (OUTPATIENT)
Dept: GASTROENTEROLOGY | Facility: AMBULARY SURGERY CENTER | Age: 64
End: 2019-12-16
Payer: COMMERCIAL

## 2019-12-16 VITALS
SYSTOLIC BLOOD PRESSURE: 150 MMHG | RESPIRATION RATE: 18 BRPM | HEART RATE: 88 BPM | DIASTOLIC BLOOD PRESSURE: 90 MMHG | WEIGHT: 206.8 LBS | BODY MASS INDEX: 35.3 KG/M2 | TEMPERATURE: 98.9 F | HEIGHT: 64 IN

## 2019-12-16 DIAGNOSIS — K76.89 LIVER CYST: ICD-10-CM

## 2019-12-16 DIAGNOSIS — K63.9 COLONIC THICKENING: ICD-10-CM

## 2019-12-16 DIAGNOSIS — K59.04 CHRONIC IDIOPATHIC CONSTIPATION: Primary | ICD-10-CM

## 2019-12-16 DIAGNOSIS — K31.84 GASTROPARESIS: ICD-10-CM

## 2019-12-16 PROCEDURE — 99214 OFFICE O/P EST MOD 30 MIN: CPT | Performed by: PHYSICIAN ASSISTANT

## 2019-12-16 RX ORDER — POLYETHYLENE GLYCOL 3350 17 G/17G
17 POWDER, FOR SOLUTION ORAL 2 TIMES DAILY
Qty: 60 EACH | Refills: 11 | Status: SHIPPED | OUTPATIENT
Start: 2019-12-16 | End: 2020-07-31 | Stop reason: ALTCHOICE

## 2019-12-16 NOTE — PROGRESS NOTES
[facility] - Outpatient Follow-up Note  Desean Barber 59 y o  female MRN: [id number]  Encounter: [id number]    ASSESSMENT AND PLAN:      Colonic thickening  -admission 10/2019, CT showed left sided thickening, differential include infectious versus ischemic colitis given she had several episodes of vomiting with near syncope  Rule out underlying lesion  Stool studies were negative  Last colonoscopy about 2 5 years ago  -we will plan for repeat colonoscopy     Chronic constipation  -advised she trial miralax BID  -if ineffective will trial linzess or amitiza    Gastroparesis  -idiopathic  -relatively controlled with low residue diet and small meals  -Discussed appropriate diet    Liver cyst  -simple liver cyst seen on recent CT scan, it is unchanged in size since 2018  Plan for repeat U/S in 1 year  ______________________________________________________________________    SUBJECTIVE:  Patient presents for follow up  She has a history of gastroparesis, GERD, chronic constipation  She had a recent admission 10/2019 for vomiting  She states she had an URI, was started on augmentin, she then developed intractable nausea and vomiting  She states this had happened for her in the past on augmentin  She felt very lightheaded and almost passed out and went to the ED  When she went to the ED CT showed  Sigmoid and distal left colon thickening  She then had episodes of diarrhea, no rectal bleeding  This resolved  She reports being at her baseline for constipation, sometimes going 2-3 day without having a bm sometimes going daily or having urgent stools  She has a history of delayed gastric emptying and finds when she eats large portions she gets very bloated  No dysphagia or odynophagia  She also has a history of a simple liver cyst that has been stable  REVIEW OF SYSTEMS IS OTHERWISE NEGATIVE      Historical Information   Past Medical History:   Diagnosis Date    Acid reflux     Fibromyalgia     Hypertension     Seasonal allergies      Past Surgical History:   Procedure Laterality Date    EYE SURGERY      KNEE SURGERY  1998    NC COLONOSCOPY FLX DX W/COLLJ SPEC WHEN PFRMD N/A 5/9/2017    Procedure: EGD AND COLONOSCOPY;  Surgeon: Ericka Mcallister MD;  [facility]; Service: Gastroenterology     Social History   Social History     Substance and Sexual Activity   Alcohol Use Never    Frequency: Never    Binge frequency: Never     Social History     Substance and Sexual Activity   Drug Use No     Social History     Tobacco Use   Smoking Status Current Every Day Smoker    Packs/day: 2 00    Years: 50 00    Pack years: 100 00   Smokeless Tobacco Never Used   Tobacco Comment    Per [employer] pt quit smoking       Family History   Problem Relation Age of Onset    Cancer Mother         pancreatic    Lung cancer Sister     Cancer Brother         pancreatic    Coronary artery disease Father     Diabetes Father     Hypertension Father     Heart disease Father     Diabetes Paternal Grandmother     Cancer Paternal Grandfather        Meds/Allergies       Current Outpatient Medications:     acetaminophen (TYLENOL 8 HOUR ARTHRITIS PAIN) 650 mg CR tablet    ALPRAZolam (XANAX) 0 25 mg tablet    cetirizine (ZyrTEC) 10 mg tablet    escitalopram (LEXAPRO) 10 mg tablet    fluticasone (FLONASE) 50 mcg/act nasal spray    lisinopril (ZESTRIL) 20 mg tablet    MINOXIDIL, TOPICAL, 5 % SOLN    montelukast (SINGULAIR) 10 mg tablet    pantoprazole (PROTONIX) 40 mg tablet    Probiotic Product (PROBIOTIC-10) CAPS    baclofen 10 mg tablet    fluticasone-salmeterol (ADVAIR, WIXELA) 250-50 mcg/dose inhaler    fluticasone-umeclidinium-vilanterol (TRELEGY) 100-62 5-25 MCG/INH inhaler    Allergies   Allergen Reactions    Augmentin [Amoxicillin-Pot Clavulanate] Vomiting    Miconazole Itching and Swelling    Wixela Inhub [Fluticasone-Salmeterol] Palpitations     Objective     Blood pressure 150/90, pulse 88, temperature 98 9 °F (37 2 °C), temperature source Tympanic, resp  rate 18, height 5' 4" (1 626 m), weight 93 8 kg (206 lb 12 8 oz)  Body mass index is 35 5 kg/m²  PHYSICAL EXAM:    General Appearance:   Alert, cooperative, no distress   HEENT:   Normocephalic, atraumatic, anicteric      Neck:  Supple, symmetrical, trachea midline   Lungs:   Clear to auscultation bilaterally; no rales, rhonchi or wheezing; respirations unlabored    Heart[de-identified]   Regular rate and rhythm; no murmur, rub, or gallop  Abdomen:   Soft, non-tender, non-distended; normal bowel sounds; no masses, no organomegaly    Genitalia:   Deferred    Rectal:   Deferred    Extremities:  No cyanosis, clubbing or edema    Pulses:  2+ and symmetric    Skin:  No jaundice, rashes, or lesions    Lymph nodes:  No palpable cervical lymphadenopathy        Lab Results:   No visits with results within 1 Day(s) from this visit  Latest known visit with results is:   Appointment on 11/22/2019   Component Date Value    Hepatitis C Ab 11/22/2019 Non-reactive     Cholesterol 11/22/2019 241*    Triglycerides 11/22/2019 197*    HDL, Direct 11/22/2019 49     LDL Calculated 11/22/2019 153*    Hemoglobin A1C 11/22/2019 5 9     EAG 11/22/2019 123     Sodium 11/22/2019 141     Potassium 11/22/2019 4 3     Chloride 11/22/2019 107     CO2 11/22/2019 27     ANION GAP 11/22/2019 7     BUN 11/22/2019 11     Creatinine 11/22/2019 0 65     Glucose, Fasting 11/22/2019 104*    Calcium 11/22/2019 9 5     AST 11/22/2019 16     ALT 11/22/2019 21     Alkaline Phosphatase 11/22/2019 66     Total Protein 11/22/2019 7 6     Albumin 11/22/2019 4 0     Total Bilirubin 11/22/2019 0 41     eGFR 11/22/2019 95     Creatinine, Ur 11/22/2019 38 4     Microalbum  ,U,Random 11/22/2019 <5 0     Microalb Creat Ratio 11/22/2019 <13     Color, UA 11/22/2019 Yellow     Clarity, UA 11/22/2019 Clear     Specific Gravity, UA 11/22/2019 1 008     pH, UA 11/22/2019 6 5     Leukocytes, UA 11/22/2019 Negative     Nitrite, UA 11/22/2019 Negative     Protein, UA 11/22/2019 Negative     Glucose, UA 11/22/2019 Negative     Ketones, UA 11/22/2019 Negative     Urobilinogen, UA 11/22/2019 0 2     Bilirubin, UA 11/22/2019 Negative     Blood, UA 11/22/2019 Negative     TSH 3RD GENERATON 11/22/2019 2 890     NT-proBNP 11/22/2019 30     WBC 11/22/2019 7 71     RBC 11/22/2019 5 11     Hemoglobin 11/22/2019 14 3     Hematocrit 11/22/2019 45 6     MCV 11/22/2019 89     MCH 11/22/2019 28 0     MCHC 11/22/2019 31 4     RDW 11/22/2019 13 2     MPV 11/22/2019 10 1     Platelets 46/65/7095 260     nRBC 11/22/2019 0     Neutrophils Relative 11/22/2019 65     Immat GRANS % 11/22/2019 1     Lymphocytes Relative 11/22/2019 21     Monocytes Relative 11/22/2019 10     Eosinophils Relative 11/22/2019 2     Basophils Relative 11/22/2019 1     Neutrophils Absolute 11/22/2019 5 05     Immature Grans Absolute 11/22/2019 0 04     Lymphocytes Absolute 11/22/2019 1 63     Monocytes Absolute 11/22/2019 0 76     Eosinophils Absolute 11/22/2019 0 17     Basophils Absolute 11/22/2019 0 06     C-ANCA 11/22/2019 <1:20     Atypical pANCA 11/22/2019 <1:20     MPO AB 11/22/2019 <9 0     NJ-3 AB 11/22/2019 <3 5     P-ANCA 11/22/2019 <1:20     CRP 11/22/2019 6 3*    ASPEN 11/22/2019 Negative     HIV-1/HIV-2 Ab 11/22/2019 Non-Reactive          Radiology Results:   Xr Chest Pa & Lateral    Result Date: 12/12/2019  Narrative: CHEST INDICATION:   J44 9: Chronic obstructive pulmonary disease, unspecified R05: Cough  COMPARISON: Chest radiograph from 2/17/2018 and chest CT from 10/28/2019  EXAM PERFORMED/VIEWS:  XR CHEST PA & LATERAL  FINDINGS: Cardiomediastinal silhouette is normal  Lungs are clear  No effusion or pneumothorax  Mild degenerative disease in the spine  Impression: No acute cardiopulmonary disease   Workstation performed: [id number]

## 2020-01-02 DIAGNOSIS — J44.9 CHRONIC BRONCHITIS WITH COPD (CHRONIC OBSTRUCTIVE PULMONARY DISEASE) (HCC): Primary | ICD-10-CM

## 2020-01-02 RX ORDER — BUDESONIDE AND FORMOTEROL FUMARATE DIHYDRATE 160; 4.5 UG/1; UG/1
2 AEROSOL RESPIRATORY (INHALATION) 2 TIMES DAILY
Qty: 1 INHALER | Refills: 1 | Status: SHIPPED | OUTPATIENT
Start: 2020-01-02 | End: 2020-01-29

## 2020-01-15 ENCOUNTER — TELEPHONE (OUTPATIENT)
Dept: FAMILY MEDICINE CLINIC | Facility: CLINIC | Age: 65
End: 2020-01-15

## 2020-01-15 DIAGNOSIS — R05.9 COUGH: Primary | ICD-10-CM

## 2020-01-15 RX ORDER — DEXTROMETHORPHAN HYDROBROMIDE AND PROMETHAZINE HYDROCHLORIDE 15; 6.25 MG/5ML; MG/5ML
5 SOLUTION ORAL 4 TIMES DAILY PRN
Qty: 200 ML | Refills: 1 | Status: SHIPPED | OUTPATIENT
Start: 2020-01-15 | End: 2020-07-31 | Stop reason: ALTCHOICE

## 2020-01-21 ENCOUNTER — HOSPITAL ENCOUNTER (OUTPATIENT)
Dept: RADIOLOGY | Facility: MEDICAL CENTER | Age: 65
Discharge: HOME | End: 2020-01-21
Payer: COMMERCIAL

## 2020-01-21 DIAGNOSIS — K76.89 LIVER CYST: ICD-10-CM

## 2020-01-21 PROCEDURE — 76705 ECHO EXAM OF ABDOMEN: CPT

## 2020-01-22 DIAGNOSIS — K21.9 GASTROESOPHAGEAL REFLUX DISEASE, ESOPHAGITIS PRESENCE NOT SPECIFIED: ICD-10-CM

## 2020-01-22 RX ORDER — PANTOPRAZOLE SODIUM 40 MG/1
TABLET, DELAYED RELEASE ORAL
Qty: 30 TABLET | Refills: 0 | Status: SHIPPED | OUTPATIENT
Start: 2020-01-22 | End: 2020-02-16

## 2020-01-23 DIAGNOSIS — J30.9 ALLERGIC RHINITIS, UNSPECIFIED SEASONALITY, UNSPECIFIED TRIGGER: ICD-10-CM

## 2020-01-23 RX ORDER — CETIRIZINE HYDROCHLORIDE 10 MG/1
TABLET ORAL
Qty: 30 TABLET | Refills: 0 | Status: SHIPPED | OUTPATIENT
Start: 2020-01-23 | End: 2020-02-16

## 2020-01-29 ENCOUNTER — TELEPHONE (OUTPATIENT)
Dept: PULMONOLOGY | Facility: CLINIC | Age: 65
End: 2020-01-29

## 2020-01-29 ENCOUNTER — OFFICE VISIT (OUTPATIENT)
Dept: PULMONOLOGY | Facility: CLINIC | Age: 65
End: 2020-01-29
Payer: COMMERCIAL

## 2020-01-29 VITALS
SYSTOLIC BLOOD PRESSURE: 160 MMHG | WEIGHT: 209 LBS | TEMPERATURE: 97.6 F | HEIGHT: 64 IN | BODY MASS INDEX: 35.68 KG/M2 | HEART RATE: 82 BPM | DIASTOLIC BLOOD PRESSURE: 94 MMHG | OXYGEN SATURATION: 99 %

## 2020-01-29 DIAGNOSIS — J42 CHRONIC BRONCHITIS, UNSPECIFIED CHRONIC BRONCHITIS TYPE (HCC): ICD-10-CM

## 2020-01-29 DIAGNOSIS — F17.200 TOBACCO DEPENDENCE: Primary | ICD-10-CM

## 2020-01-29 DIAGNOSIS — R06.83 SNORING: ICD-10-CM

## 2020-01-29 DIAGNOSIS — R91.1 PULMONARY NODULE SEEN ON IMAGING STUDY: ICD-10-CM

## 2020-01-29 PROCEDURE — 99244 OFF/OP CNSLTJ NEW/EST MOD 40: CPT | Performed by: INTERNAL MEDICINE

## 2020-01-29 PROCEDURE — 94618 PULMONARY STRESS TESTING: CPT | Performed by: INTERNAL MEDICINE

## 2020-01-29 RX ORDER — ALBUTEROL SULFATE 90 UG/1
2 AEROSOL, METERED RESPIRATORY (INHALATION) EVERY 4 HOURS PRN
Qty: 18 G | Refills: 11 | Status: SHIPPED | OUTPATIENT
Start: 2020-01-29 | End: 2021-02-03 | Stop reason: ALTCHOICE

## 2020-01-29 NOTE — ASSESSMENT & PLAN NOTE
She is scheduled for a sleep test but freely admits she is unlikely to attend it due to them not allowing her to smoke during the test   I encouraged her to try gum/lozenges to get through the night

## 2020-01-29 NOTE — PROGRESS NOTES
Pulmonary Consultation   Ana Brown 59 y o  female MRN: [id number]  1/29/2020      Assessment:    Chronic obstructive pulmonary disease ([place] [address] )  Patient is likely group B, stage III disease  She has symbicort which she takes only daily due to concerns over it causing lower extremity swelling, mental fogginess, dizziness  She did well on Breo but this was discontinued by her insurance due to coverage changes  I ordered Trelegy for her  She also did not have a rescue inhaler so I ordered one and instructed her on how to use it  We will get baseline PFTs  She passed her 6MWT without desaturation  She is not interested in vaccinations and has already undergone LDCT through her PCP with stable nodules being monitored annually  Tobacco dependence  She smokes 2 ppd and is not interested in quitting at this time  Snoring  She is scheduled for a sleep test but freely admits she is unlikely to attend it due to them not allowing her to smoke during the test   I encouraged her to try gum/lozenges to get through the night  Pulmonary nodule seen on imaging study  This will be followed through her PCP  Plan:    Diagnoses and all orders for this visit:    Tobacco dependence    Chronic bronchitis, unspecified chronic bronchitis type ([place] [address] )  -     Ambulatory referral to Pulmonology  -     POCT 6 minute walk  -     Complete PFT with post bronchodilator; Future  -     albuterol (VENTOLIN HFA) 90 mcg/act inhaler; Inhale 2 puffs every 4 (four) hours as needed for wheezing  -     fluticasone-umeclidinium-vilanterol (TRELEGY) 100-62 5-25 MCG/INH inhaler; Inhale 1 puff daily Rinse mouth after use  Pulmonary nodule seen on imaging study    Snoring   - Follow up sleep study  Return in about 3 months (around 4/29/2020)  History of Present Illness   HPI:  Ana Brown is a 59 y o  female who presents for establishment of care for COPD    She is an active 2 ppd smoker with a 50 year history of smoking who carries a diagnosis of COPD based on in-office spirometry  She has symptoms of breathlessness with exertion and a chronic daily cough productive of large amounts of sputum, usually green  She does not seem to have progressive symptoms but does note that her breathing has not been as good since that she has been switched from Randall Baller to Symbicort  This change was made due to insurance issues  She also notes a history of progressive snoring that has gotten worse with weight gain for which she is scheduled for a sleep test which she is openly admitting she is on likely go to due to the fact that they will not let her smoke there  She notes that she was prescribed Trelegy previously, but never filled either due to insurance issues are due to his communication with her pharmacy  Other medications she has tried include John Sauger and Breo  She does not currently have a rescue inhaler  She does not suffer from recurrent exacerbations  Review of Systems   Respiratory: Positive for cough, shortness of breath and wheezing  Cardiovascular: Positive for leg swelling  Negative for chest pain  All other systems reviewed and are negative  Historical Information   Past Medical History:   Diagnosis Date    Acid reflux     Fibromyalgia     Hypertension     Seasonal allergies      Past Surgical History:   Procedure Laterality Date    EYE SURGERY      KNEE SURGERY  1998    PA COLONOSCOPY FLX DX W/COLLJ SPEC WHEN PFRMD N/A 5/9/2017    Procedure: EGD AND COLONOSCOPY;  Surgeon: Cortes Hebert MD;  Location[facility];   Service: Gastroenterology     Family History   Problem Relation Age of Onset    Cancer Mother         pancreatic    Lung cancer Sister     Cancer Brother         pancreatic    Coronary artery disease Father     Diabetes Father     Hypertension Father     Heart disease Father     Diabetes Paternal Grandmother     Cancer Paternal Grandfather      Meds/Allergies     Current Outpatient Medications:    acetaminophen (TYLENOL 8 HOUR ARTHRITIS PAIN) 650 mg CR tablet, Take by mouth, Disp: , Rfl:     ALPRAZolam (XANAX) 0 25 mg tablet, Take 1 tablet (0 25 mg total) by mouth 3 (three) times a day as needed for anxiety, Disp: 90 tablet, Rfl: 0    cetirizine (ZyrTEC) 10 mg tablet, TAKE 1 TABLET BY MOUTH EVERY DAY, Disp: 30 tablet, Rfl: 0    escitalopram (LEXAPRO) 10 mg tablet, TAKE 1 TABLET BY MOUTH EVERY DAY, Disp: 90 tablet, Rfl: 1    fluticasone (FLONASE) 50 mcg/act nasal spray, INSTILL 2 SPRAYS INTO EACH NOSTRIL DAILY, Disp: 16 mL, Rfl: 3    lisinopril (ZESTRIL) 20 mg tablet, TAKE 1 TABLET BY MOUTH EVERY DAY, Disp: 30 tablet, Rfl: 5    MINOXIDIL, TOPICAL, 5 % SOLN, Apply 1 application topically, Disp: , Rfl:     montelukast (SINGULAIR) 10 mg tablet, TAKE 1 TABLET BY MOUTH EVERYDAY AT BEDTIME, Disp: 30 tablet, Rfl: 5    pantoprazole (PROTONIX) 40 mg tablet, TAKE 1 TABLET BY MOUTH EVERY DAY, Disp: 30 tablet, Rfl: 0    polyethylene glycol (MIRALAX) 17 g packet, Take 17 g by mouth 2 (two) times a day, Disp: 60 each, Rfl: 11    Probiotic Product (PROBIOTIC-10) CAPS, Take by mouth 2 (two) times a day, Disp: , Rfl:     Promethazine-DM (PHENERGAN-DM) 6 25-15 mg/5 mL oral syrup, Take 5 mL by mouth 4 (four) times a day as needed for cough, Disp: 200 mL, Rfl: 1    albuterol (VENTOLIN HFA) 90 mcg/act inhaler, Inhale 2 puffs every 4 (four) hours as needed for wheezing, Disp: 18 g, Rfl: 11    baclofen 10 mg tablet, Take 1 tablet (10 mg total) by mouth 3 (three) times a day (Patient not taking: Reported on 12/16/2019), Disp: 30 tablet, Rfl: 0    bisacodyl (DULCOLAX) 5 mg EC tablet, Take 2 tablets (10 mg total) by mouth once for 1 dose With colonoscopy prep per instructions, Disp: 2 tablet, Rfl: 0    fluticasone-umeclidinium-vilanterol (TRELEGY) 100-62 5-25 MCG/INH inhaler, Inhale 1 puff daily Rinse mouth after use , Disp: 1 Inhaler, Rfl: 11  Allergies   Allergen Reactions    Augmentin [Amoxicillin-Pot Clavulanate] Vomiting    Miconazole Itching and Swelling    Wixela Inhub [Fluticasone-Salmeterol] Palpitations       Vitals: Blood pressure 160/94, pulse 82, temperature 97 6 °F (36 4 °C), height 5' 4" (1 626 m), weight 94 8 kg (209 lb), SpO2 99 %  Body mass index is 35 87 kg/m²  Oxygen Therapy  SpO2: 99 %  Oxygen Therapy: None (Room air)      Physical Exam  Physical Exam   Constitutional: She is oriented to person, place, and time  She appears well-developed and well-nourished  No distress  HENT:   Head: Normocephalic and atraumatic  Mouth/Throat: No oropharyngeal exudate  Neck: No JVD present  Cardiovascular: Normal rate, regular rhythm and normal heart sounds  Exam reveals no gallop and no friction rub  No murmur heard  Pulmonary/Chest: Effort normal  No respiratory distress  She has wheezes  She has no rales  Diffuse end-expiratory wheezing in all lung fields  Musculoskeletal: She exhibits edema  Lymphadenopathy:     She has no cervical adenopathy  Neurological: She is alert and oriented to person, place, and time  Skin: Skin is warm and dry  No rash noted  Vitals reviewed  Labs: I have personally reviewed pertinent lab results  Lab Results   Component Value Date    WBC 7 71 11/22/2019    HGB 14 3 11/22/2019    HCT 45 6 11/22/2019    MCV 89 11/22/2019     11/22/2019     Lab Results   Component Value Date    GLUCOSE 86 10/08/2015    CALCIUM 9 5 11/22/2019     10/08/2015    K 4 3 11/22/2019    CO2 27 11/22/2019     11/22/2019    BUN 11 11/22/2019    CREATININE 0 65 11/22/2019     No results found for: IGE  Lab Results   Component Value Date    ALT 21 11/22/2019    AST 16 11/22/2019    ALKPHOS 66 11/22/2019    BILITOT 0 39 10/08/2015       Imaging and other studies: I have personally reviewed pertinent films in PACS    Pulmonary function testing:   Point of care spirometry 05/04/2018 showed FVC 1 63 L, FEV1 1 1 L, 44% predicted, FEV1 to FVC ratio 68%    Point of care 6 minutes walk testing revealed no desaturation with activity  See associated note for details  [facility]

## 2020-01-29 NOTE — ASSESSMENT & PLAN NOTE
Patient is likely group B, stage III disease  She has symbicort which she takes only daily due to concerns over it causing lower extremity swelling, mental fogginess, dizziness  She did well on Breo but this was discontinued by her insurance due to coverage changes  I ordered Trelegy for her  She also did not have a rescue inhaler so I ordered one and instructed her on how to use it  We will get baseline PFTs  She passed her 6MWT without desaturation  She is not interested in vaccinations and has already undergone LDCT through her PCP with stable nodules being monitored annually

## 2020-02-03 ENCOUNTER — OFFICE VISIT (OUTPATIENT)
Dept: FAMILY MEDICINE CLINIC | Facility: CLINIC | Age: 65
End: 2020-02-03
Payer: COMMERCIAL

## 2020-02-03 VITALS
SYSTOLIC BLOOD PRESSURE: 130 MMHG | HEIGHT: 64 IN | TEMPERATURE: 100 F | BODY MASS INDEX: 35.41 KG/M2 | HEART RATE: 80 BPM | DIASTOLIC BLOOD PRESSURE: 84 MMHG | WEIGHT: 207.4 LBS

## 2020-02-03 DIAGNOSIS — R68.89 FLU-LIKE SYMPTOMS: ICD-10-CM

## 2020-02-03 DIAGNOSIS — J44.1 COPD EXACERBATION (HCC): Primary | ICD-10-CM

## 2020-02-03 PROCEDURE — 3075F SYST BP GE 130 - 139MM HG: CPT | Performed by: FAMILY MEDICINE

## 2020-02-03 PROCEDURE — 3008F BODY MASS INDEX DOCD: CPT | Performed by: FAMILY MEDICINE

## 2020-02-03 PROCEDURE — 99213 OFFICE O/P EST LOW 20 MIN: CPT | Performed by: FAMILY MEDICINE

## 2020-02-03 PROCEDURE — 3079F DIAST BP 80-89 MM HG: CPT | Performed by: FAMILY MEDICINE

## 2020-02-03 RX ORDER — AZITHROMYCIN 250 MG/1
TABLET, FILM COATED ORAL
Qty: 6 TABLET | Refills: 0 | Status: SHIPPED | OUTPATIENT
Start: 2020-02-03 | End: 2020-02-08

## 2020-02-03 NOTE — PROGRESS NOTES
Assessment/Plan:      Diagnoses and all orders for this visit:    COPD exacerbation ([facility] [address] )  Comments:  Continue inhaler  Start z-pack  Recheck 3-4 days if not improved - earlier if worse  Orders:  -     azithromycin (ZITHROMAX) 250 mg tablet; Take 2 tablets (500 mg total) by mouth daily for 1 day, THEN 1 tablet (250 mg total) daily for 4 days  Flu-like symptoms  Comments:  Outside window of treatment with Tamiflu  Cont conservative care  Recheck 3-4 d if not improved - earlier if worse          Subjective:     Patient ID: Royer Acuna is a 59 y o  female  60-year-old female presents with a 4 day history of fever/chills, congestion, cough and body aches  Patient did not have a flu shot and was exposed to her grandchildren who have all been sick  Cough has been productive of thick green phlegm  She denies any sinus pain but does have ear congestion  Patient does have some shortness of breath with exertion  Review of Systems   Constitutional: Positive for chills, fatigue and fever  HENT: Positive for congestion and ear pain  Negative for ear discharge, sinus pressure, sinus pain and sore throat  Eyes: Negative  Respiratory: Positive for cough and shortness of breath (mild)  Cardiovascular: Negative  Objective:    Vitals:    02/03/20 0946   BP: 130/84   Pulse: 80   Temp: 100 °F (37 8 °C)        Physical Exam   Constitutional: She appears well-nourished  Mildly ill appearing female in NAD   HENT:   Head: Normocephalic and atraumatic  Right Ear: External ear normal    Left Ear: External ear normal    Mouth/Throat: Oropharynx is clear and moist    Nose congested  Sinuses NT   Eyes: Pupils are equal, round, and reactive to light  Conjunctivae and EOM are normal    Neck: Normal range of motion  Neck supple  Cardiovascular: Normal rate, regular rhythm, normal heart sounds and intact distal pulses  Pulmonary/Chest: Effort normal  No respiratory distress  She has wheezes  She has no rales  Lymphadenopathy:     She has no cervical adenopathy  Skin: Capillary refill takes less than 2 seconds  Vitals reviewed

## 2020-02-10 NOTE — TELEPHONE ENCOUNTER
Lmovm to inform pt Trelegy medication was approved and should be ready at pharmacy by the end of day  Spoke with pt about approval, pt was happy with

## 2020-02-15 DIAGNOSIS — J30.9 ALLERGIC RHINITIS, UNSPECIFIED SEASONALITY, UNSPECIFIED TRIGGER: ICD-10-CM

## 2020-02-15 DIAGNOSIS — K21.9 GASTROESOPHAGEAL REFLUX DISEASE, ESOPHAGITIS PRESENCE NOT SPECIFIED: ICD-10-CM

## 2020-02-16 RX ORDER — PANTOPRAZOLE SODIUM 40 MG/1
TABLET, DELAYED RELEASE ORAL
Qty: 30 TABLET | Refills: 0 | Status: SHIPPED | OUTPATIENT
Start: 2020-02-16 | End: 2020-03-14

## 2020-02-16 RX ORDER — CETIRIZINE HYDROCHLORIDE 10 MG/1
TABLET ORAL
Qty: 30 TABLET | Refills: 0 | Status: SHIPPED | OUTPATIENT
Start: 2020-02-16 | End: 2020-03-14

## 2020-03-05 ENCOUNTER — HOSPITAL ENCOUNTER (OUTPATIENT)
Dept: PULMONOLOGY | Facility: HOSPITAL | Age: 65
Discharge: HOME | End: 2020-03-05
Payer: COMMERCIAL

## 2020-03-05 DIAGNOSIS — J42 CHRONIC BRONCHITIS, UNSPECIFIED CHRONIC BRONCHITIS TYPE (HCC): ICD-10-CM

## 2020-03-05 PROCEDURE — 94729 DIFFUSING CAPACITY: CPT | Performed by: INTERNAL MEDICINE

## 2020-03-05 PROCEDURE — 94727 GAS DIL/WSHOT DETER LNG VOL: CPT

## 2020-03-05 PROCEDURE — 94760 N-INVAS EAR/PLS OXIMETRY 1: CPT

## 2020-03-05 PROCEDURE — 94060 EVALUATION OF WHEEZING: CPT | Performed by: INTERNAL MEDICINE

## 2020-03-05 PROCEDURE — 94060 EVALUATION OF WHEEZING: CPT

## 2020-03-05 PROCEDURE — 94729 DIFFUSING CAPACITY: CPT

## 2020-03-05 PROCEDURE — 94727 GAS DIL/WSHOT DETER LNG VOL: CPT | Performed by: INTERNAL MEDICINE

## 2020-03-05 RX ORDER — ALBUTEROL SULFATE 2.5 MG/3ML
2.5 SOLUTION RESPIRATORY (INHALATION) ONCE
Status: COMPLETED | OUTPATIENT
Start: 2020-03-05 | End: 2020-03-05

## 2020-03-05 RX ADMIN — ALBUTEROL SULFATE 2.5 MG: 2.5 SOLUTION RESPIRATORY (INHALATION) at 14:07

## 2020-03-14 DIAGNOSIS — K21.9 GASTROESOPHAGEAL REFLUX DISEASE, ESOPHAGITIS PRESENCE NOT SPECIFIED: ICD-10-CM

## 2020-03-14 DIAGNOSIS — J30.9 ALLERGIC RHINITIS, UNSPECIFIED SEASONALITY, UNSPECIFIED TRIGGER: ICD-10-CM

## 2020-03-14 RX ORDER — CETIRIZINE HYDROCHLORIDE 10 MG/1
TABLET ORAL
Qty: 30 TABLET | Refills: 0 | Status: SHIPPED | OUTPATIENT
Start: 2020-03-14 | End: 2020-04-12

## 2020-03-14 RX ORDER — PANTOPRAZOLE SODIUM 40 MG/1
TABLET, DELAYED RELEASE ORAL
Qty: 30 TABLET | Refills: 0 | Status: SHIPPED | OUTPATIENT
Start: 2020-03-14 | End: 2020-04-08

## 2020-04-08 DIAGNOSIS — K21.9 GASTROESOPHAGEAL REFLUX DISEASE, ESOPHAGITIS PRESENCE NOT SPECIFIED: ICD-10-CM

## 2020-04-08 RX ORDER — PANTOPRAZOLE SODIUM 40 MG/1
TABLET, DELAYED RELEASE ORAL
Qty: 30 TABLET | Refills: 0 | Status: SHIPPED | OUTPATIENT
Start: 2020-04-08 | End: 2020-05-01

## 2020-04-10 ENCOUNTER — OFFICE VISIT (OUTPATIENT)
Dept: FAMILY MEDICINE CLINIC | Facility: CLINIC | Age: 65
End: 2020-04-10
Payer: COMMERCIAL

## 2020-04-10 VITALS
TEMPERATURE: 97.5 F | SYSTOLIC BLOOD PRESSURE: 162 MMHG | HEART RATE: 82 BPM | BODY MASS INDEX: 35.68 KG/M2 | RESPIRATION RATE: 16 BRPM | WEIGHT: 209 LBS | DIASTOLIC BLOOD PRESSURE: 88 MMHG | HEIGHT: 64 IN

## 2020-04-10 DIAGNOSIS — R39.198 DIFFICULTY URINATING: Primary | ICD-10-CM

## 2020-04-10 DIAGNOSIS — Z12.31 ENCOUNTER FOR SCREENING MAMMOGRAM FOR BREAST CANCER: ICD-10-CM

## 2020-04-10 DIAGNOSIS — R73.03 PREDIABETES: ICD-10-CM

## 2020-04-10 LAB
SL AMB  POCT GLUCOSE, UA: ABNORMAL
SL AMB LEUKOCYTE ESTERASE,UA: ABNORMAL
SL AMB POCT BILIRUBIN,UA: ABNORMAL
SL AMB POCT BLOOD,UA: ABNORMAL
SL AMB POCT CLARITY,UA: CLEAR
SL AMB POCT COLOR,UA: YELLOW
SL AMB POCT HEMOGLOBIN AIC: 6 (ref ?–6.5)
SL AMB POCT KETONES,UA: ABNORMAL
SL AMB POCT NITRITE,UA: ABNORMAL
SL AMB POCT PH,UA: 6
SL AMB POCT SPECIFIC GRAVITY,UA: 1.01
SL AMB POCT URINE PROTEIN: ABNORMAL
SL AMB POCT UROBILINOGEN: 0.2

## 2020-04-10 PROCEDURE — 87086 URINE CULTURE/COLONY COUNT: CPT | Performed by: FAMILY MEDICINE

## 2020-04-10 PROCEDURE — 99213 OFFICE O/P EST LOW 20 MIN: CPT | Performed by: FAMILY MEDICINE

## 2020-04-10 PROCEDURE — 83036 HEMOGLOBIN GLYCOSYLATED A1C: CPT | Performed by: FAMILY MEDICINE

## 2020-04-10 PROCEDURE — 81002 URINALYSIS NONAUTO W/O SCOPE: CPT | Performed by: FAMILY MEDICINE

## 2020-04-10 PROCEDURE — 3077F SYST BP >= 140 MM HG: CPT | Performed by: FAMILY MEDICINE

## 2020-04-10 PROCEDURE — 3008F BODY MASS INDEX DOCD: CPT | Performed by: FAMILY MEDICINE

## 2020-04-10 PROCEDURE — 3079F DIAST BP 80-89 MM HG: CPT | Performed by: FAMILY MEDICINE

## 2020-04-10 RX ORDER — CIPROFLOXACIN 500 MG/1
500 TABLET, FILM COATED ORAL EVERY 12 HOURS SCHEDULED
Qty: 14 TABLET | Refills: 0 | Status: SHIPPED | OUTPATIENT
Start: 2020-04-10 | End: 2020-04-17

## 2020-04-11 DIAGNOSIS — I10 ESSENTIAL HYPERTENSION: ICD-10-CM

## 2020-04-11 DIAGNOSIS — J30.9 ALLERGIC RHINITIS, UNSPECIFIED SEASONALITY, UNSPECIFIED TRIGGER: ICD-10-CM

## 2020-04-11 LAB — BACTERIA UR CULT: NORMAL

## 2020-04-12 RX ORDER — CETIRIZINE HYDROCHLORIDE 10 MG/1
TABLET ORAL
Qty: 30 TABLET | Refills: 0 | Status: SHIPPED | OUTPATIENT
Start: 2020-04-12 | End: 2020-05-07

## 2020-04-12 RX ORDER — LISINOPRIL 20 MG/1
TABLET ORAL
Qty: 30 TABLET | Refills: 5 | Status: SHIPPED | OUTPATIENT
Start: 2020-04-12 | End: 2020-10-04

## 2020-04-14 DIAGNOSIS — J42 CHRONIC BRONCHITIS, UNSPECIFIED CHRONIC BRONCHITIS TYPE (HCC): ICD-10-CM

## 2020-04-14 DIAGNOSIS — J30.2 SEASONAL ALLERGIC RHINITIS, UNSPECIFIED TRIGGER: ICD-10-CM

## 2020-04-14 RX ORDER — MONTELUKAST SODIUM 10 MG/1
TABLET ORAL
Qty: 30 TABLET | Refills: 5 | Status: SHIPPED | OUTPATIENT
Start: 2020-04-14 | End: 2020-10-16

## 2020-04-28 ENCOUNTER — OFFICE VISIT (OUTPATIENT)
Dept: FAMILY MEDICINE CLINIC | Facility: CLINIC | Age: 65
End: 2020-04-28
Payer: COMMERCIAL

## 2020-04-28 VITALS
DIASTOLIC BLOOD PRESSURE: 82 MMHG | WEIGHT: 210 LBS | HEART RATE: 80 BPM | HEIGHT: 64 IN | SYSTOLIC BLOOD PRESSURE: 130 MMHG | TEMPERATURE: 98.3 F | BODY MASS INDEX: 35.85 KG/M2

## 2020-04-28 DIAGNOSIS — N39.0 URINARY TRACT INFECTION WITHOUT HEMATURIA, SITE UNSPECIFIED: Primary | ICD-10-CM

## 2020-04-28 LAB
SL AMB  POCT GLUCOSE, UA: NORMAL
SL AMB LEUKOCYTE ESTERASE,UA: NORMAL
SL AMB POCT BILIRUBIN,UA: NORMAL
SL AMB POCT BLOOD,UA: NORMAL
SL AMB POCT CLARITY,UA: CLEAR
SL AMB POCT COLOR,UA: NORMAL
SL AMB POCT KETONES,UA: NORMAL
SL AMB POCT NITRITE,UA: NORMAL
SL AMB POCT PH,UA: 5
SL AMB POCT SPECIFIC GRAVITY,UA: 1.01
SL AMB POCT URINE PROTEIN: NORMAL
SL AMB POCT UROBILINOGEN: NORMAL

## 2020-04-28 PROCEDURE — 3079F DIAST BP 80-89 MM HG: CPT | Performed by: NURSE PRACTITIONER

## 2020-04-28 PROCEDURE — 81002 URINALYSIS NONAUTO W/O SCOPE: CPT | Performed by: NURSE PRACTITIONER

## 2020-04-28 PROCEDURE — 87086 URINE CULTURE/COLONY COUNT: CPT | Performed by: NURSE PRACTITIONER

## 2020-04-28 PROCEDURE — 3075F SYST BP GE 130 - 139MM HG: CPT | Performed by: NURSE PRACTITIONER

## 2020-04-28 PROCEDURE — 99214 OFFICE O/P EST MOD 30 MIN: CPT | Performed by: NURSE PRACTITIONER

## 2020-04-28 PROCEDURE — 3008F BODY MASS INDEX DOCD: CPT | Performed by: NURSE PRACTITIONER

## 2020-04-28 RX ORDER — PHENAZOPYRIDINE HYDROCHLORIDE 95 MG/1
95 TABLET ORAL 3 TIMES DAILY PRN
Qty: 10 TABLET | Refills: 0 | Status: SHIPPED | OUTPATIENT
Start: 2020-04-28 | End: 2020-05-03

## 2020-04-28 RX ORDER — CIPROFLOXACIN 250 MG/1
250 TABLET, FILM COATED ORAL EVERY 12 HOURS SCHEDULED
Qty: 14 TABLET | Refills: 0 | Status: SHIPPED | OUTPATIENT
Start: 2020-04-28 | End: 2020-05-05

## 2020-04-29 ENCOUNTER — OFFICE VISIT (OUTPATIENT)
Dept: PULMONOLOGY | Facility: CLINIC | Age: 65
End: 2020-04-29
Payer: COMMERCIAL

## 2020-04-29 VITALS
BODY MASS INDEX: 35.85 KG/M2 | DIASTOLIC BLOOD PRESSURE: 90 MMHG | TEMPERATURE: 97.5 F | SYSTOLIC BLOOD PRESSURE: 160 MMHG | OXYGEN SATURATION: 99 % | RESPIRATION RATE: 16 BRPM | HEART RATE: 83 BPM | WEIGHT: 210 LBS | HEIGHT: 64 IN

## 2020-04-29 DIAGNOSIS — R06.83 SNORING: ICD-10-CM

## 2020-04-29 DIAGNOSIS — R91.1 PULMONARY NODULE SEEN ON IMAGING STUDY: ICD-10-CM

## 2020-04-29 DIAGNOSIS — F17.200 TOBACCO DEPENDENCE: ICD-10-CM

## 2020-04-29 DIAGNOSIS — J42 CHRONIC BRONCHITIS, UNSPECIFIED CHRONIC BRONCHITIS TYPE (HCC): Primary | ICD-10-CM

## 2020-04-29 DIAGNOSIS — J30.2 SEASONAL ALLERGIC RHINITIS, UNSPECIFIED TRIGGER: ICD-10-CM

## 2020-04-29 LAB — BACTERIA UR CULT: NORMAL

## 2020-04-29 PROCEDURE — 3008F BODY MASS INDEX DOCD: CPT | Performed by: NURSE PRACTITIONER

## 2020-04-29 PROCEDURE — 3077F SYST BP >= 140 MM HG: CPT | Performed by: NURSE PRACTITIONER

## 2020-04-29 PROCEDURE — 3080F DIAST BP >= 90 MM HG: CPT | Performed by: NURSE PRACTITIONER

## 2020-04-29 PROCEDURE — 99214 OFFICE O/P EST MOD 30 MIN: CPT | Performed by: NURSE PRACTITIONER

## 2020-04-29 RX ORDER — FLUTICASONE FUROATE AND VILANTEROL 100; 25 UG/1; UG/1
1 POWDER RESPIRATORY (INHALATION) DAILY
Qty: 1 INHALER | Refills: 0 | Status: SHIPPED | COMMUNITY
Start: 2020-04-29 | End: 2020-07-23 | Stop reason: ALTCHOICE

## 2020-04-29 RX ORDER — FLUTICASONE FUROATE AND VILANTEROL 100; 25 UG/1; UG/1
1 POWDER RESPIRATORY (INHALATION) DAILY
Qty: 30 EACH | Refills: 5 | Status: SHIPPED | OUTPATIENT
Start: 2020-04-29 | End: 2020-04-29 | Stop reason: SDUPTHER

## 2020-05-01 ENCOUNTER — TELEPHONE (OUTPATIENT)
Dept: FAMILY MEDICINE CLINIC | Facility: CLINIC | Age: 65
End: 2020-05-01

## 2020-05-01 DIAGNOSIS — K21.9 GASTROESOPHAGEAL REFLUX DISEASE, ESOPHAGITIS PRESENCE NOT SPECIFIED: ICD-10-CM

## 2020-05-01 RX ORDER — PANTOPRAZOLE SODIUM 40 MG/1
TABLET, DELAYED RELEASE ORAL
Qty: 30 TABLET | Refills: 0 | Status: SHIPPED | OUTPATIENT
Start: 2020-05-01 | End: 2020-05-28

## 2020-05-05 ENCOUNTER — HOSPITAL ENCOUNTER (OUTPATIENT)
Dept: ULTRASOUND IMAGING | Facility: HOSPITAL | Age: 65
Discharge: HOME | End: 2020-05-05
Payer: COMMERCIAL

## 2020-05-05 DIAGNOSIS — N39.0 URINARY TRACT INFECTION WITHOUT HEMATURIA, SITE UNSPECIFIED: ICD-10-CM

## 2020-05-05 PROCEDURE — 76770 US EXAM ABDO BACK WALL COMP: CPT

## 2020-05-06 DIAGNOSIS — F33.0 DEPRESSION, MAJOR, RECURRENT, MILD (HCC): ICD-10-CM

## 2020-05-06 RX ORDER — ESCITALOPRAM OXALATE 10 MG/1
TABLET ORAL
Qty: 90 TABLET | Refills: 1 | Status: SHIPPED | OUTPATIENT
Start: 2020-05-06 | End: 2020-10-25

## 2020-05-07 DIAGNOSIS — J30.9 ALLERGIC RHINITIS, UNSPECIFIED SEASONALITY, UNSPECIFIED TRIGGER: ICD-10-CM

## 2020-05-07 RX ORDER — CETIRIZINE HYDROCHLORIDE 10 MG/1
TABLET ORAL
Qty: 30 TABLET | Refills: 0 | Status: SHIPPED | OUTPATIENT
Start: 2020-05-07 | End: 2020-05-30

## 2020-05-11 DIAGNOSIS — J30.9 ALLERGIC RHINITIS: ICD-10-CM

## 2020-05-11 RX ORDER — FLUTICASONE PROPIONATE 50 MCG
SPRAY, SUSPENSION (ML) NASAL
Qty: 16 ML | Refills: 3 | Status: SHIPPED | OUTPATIENT
Start: 2020-05-11 | End: 2022-01-25

## 2020-05-28 DIAGNOSIS — K21.9 GASTROESOPHAGEAL REFLUX DISEASE, ESOPHAGITIS PRESENCE NOT SPECIFIED: ICD-10-CM

## 2020-05-28 RX ORDER — PANTOPRAZOLE SODIUM 40 MG/1
TABLET, DELAYED RELEASE ORAL
Qty: 30 TABLET | Refills: 0 | Status: SHIPPED | OUTPATIENT
Start: 2020-05-28 | End: 2020-10-01 | Stop reason: SDUPTHER

## 2020-05-30 DIAGNOSIS — J30.9 ALLERGIC RHINITIS, UNSPECIFIED SEASONALITY, UNSPECIFIED TRIGGER: ICD-10-CM

## 2020-05-30 RX ORDER — CETIRIZINE HYDROCHLORIDE 10 MG/1
TABLET ORAL
Qty: 30 TABLET | Refills: 0 | Status: SHIPPED | OUTPATIENT
Start: 2020-05-30 | End: 2020-07-17

## 2020-07-17 DIAGNOSIS — J30.9 ALLERGIC RHINITIS, UNSPECIFIED SEASONALITY, UNSPECIFIED TRIGGER: ICD-10-CM

## 2020-07-17 RX ORDER — CETIRIZINE HYDROCHLORIDE 10 MG/1
TABLET ORAL
Qty: 30 TABLET | Refills: 0 | Status: SHIPPED | OUTPATIENT
Start: 2020-07-17 | End: 2020-08-14

## 2020-07-23 ENCOUNTER — OFFICE VISIT (OUTPATIENT)
Dept: FAMILY MEDICINE CLINIC | Facility: CLINIC | Age: 65
End: 2020-07-23
Payer: COMMERCIAL

## 2020-07-23 VITALS
HEART RATE: 78 BPM | SYSTOLIC BLOOD PRESSURE: 160 MMHG | WEIGHT: 204.4 LBS | DIASTOLIC BLOOD PRESSURE: 80 MMHG | BODY MASS INDEX: 34.89 KG/M2 | HEIGHT: 64 IN | TEMPERATURE: 98.4 F | RESPIRATION RATE: 16 BRPM

## 2020-07-23 DIAGNOSIS — J01.00 ACUTE NON-RECURRENT MAXILLARY SINUSITIS: ICD-10-CM

## 2020-07-23 DIAGNOSIS — M54.2 CERVICALGIA: Primary | ICD-10-CM

## 2020-07-23 PROCEDURE — 96372 THER/PROPH/DIAG INJ SC/IM: CPT

## 2020-07-23 PROCEDURE — 3008F BODY MASS INDEX DOCD: CPT | Performed by: FAMILY MEDICINE

## 2020-07-23 PROCEDURE — 3079F DIAST BP 80-89 MM HG: CPT | Performed by: FAMILY MEDICINE

## 2020-07-23 PROCEDURE — 99213 OFFICE O/P EST LOW 20 MIN: CPT | Performed by: FAMILY MEDICINE

## 2020-07-23 PROCEDURE — 3077F SYST BP >= 140 MM HG: CPT | Performed by: FAMILY MEDICINE

## 2020-07-23 RX ORDER — AZITHROMYCIN 250 MG/1
TABLET, FILM COATED ORAL
Qty: 6 TABLET | Refills: 0 | Status: SHIPPED | OUTPATIENT
Start: 2020-07-23 | End: 2020-07-28

## 2020-07-23 RX ORDER — METHOCARBAMOL 500 MG/1
500 TABLET, FILM COATED ORAL 4 TIMES DAILY
Qty: 45 TABLET | Refills: 0 | Status: SHIPPED | OUTPATIENT
Start: 2020-07-23 | End: 2020-08-07 | Stop reason: SDUPTHER

## 2020-07-23 RX ORDER — METHYLPREDNISOLONE ACETATE 80 MG/ML
80 INJECTION, SUSPENSION INTRA-ARTICULAR; INTRALESIONAL; INTRAMUSCULAR; SOFT TISSUE ONCE
Status: COMPLETED | OUTPATIENT
Start: 2020-07-23 | End: 2020-07-23

## 2020-07-23 RX ORDER — KETOROLAC TROMETHAMINE 30 MG/ML
60 INJECTION, SOLUTION INTRAMUSCULAR; INTRAVENOUS ONCE
Status: COMPLETED | OUTPATIENT
Start: 2020-07-23 | End: 2020-07-23

## 2020-07-23 RX ADMIN — METHYLPREDNISOLONE ACETATE 80 MG: 80 INJECTION, SUSPENSION INTRA-ARTICULAR; INTRALESIONAL; INTRAMUSCULAR; SOFT TISSUE at 15:38

## 2020-07-23 RX ADMIN — KETOROLAC TROMETHAMINE 60 MG: 30 INJECTION, SOLUTION INTRAMUSCULAR; INTRAVENOUS at 15:38

## 2020-07-23 NOTE — PROGRESS NOTES
Keith Snyder 1955 female MRN: [id number]    Acute Visit    Assessment/Plan   Corey Eason was seen today for neck pain  Diagnoses and all orders for this visit:    Cervicalgia  -     methocarbamol (ROBAXIN) 500 mg tablet; Take 1 tablet (500 mg total) by mouth 4 (four) times a day  -     methylPREDNISolone acetate (DEPO-MEDROL) injection 80 mg  -     ketorolac (TORADOL) 60 mg/2 mL IM injection 60 mg    Acute non-recurrent maxillary sinusitis  -     azithromycin (ZITHROMAX) 250 mg tablet; Take 2 tablets (500 mg total) by mouth every 24 hours for 1 day, THEN 1 tablet (250 mg total) every 24 hours for 4 days  continue RICE  Start Robaxin  Return if not improving    Vin Banegas MD  [address]  7/23/2020      Please be aware that this note contains text that was dictated and there may be errors pertaining to "sound-alike "words during the dictation process  SUBJECTIVE    CC: Neck Pain      HPI:  Keith Snyder is a 59 y o  female who presented for an acute visit complaining of acute left sided neck pain x 4 days  Started after she woke from nap  No acute incident or injury  Has been under a lot of stress lately  Denies radiation into arm, although once yesterday had some numbness/tingling in fingers that self- resolved  She does have pain with movement, but not restricted ROM  She has tried ice, heat, Tylenol, NSAIDs without relief  This happened before about 1 year ago  Also notes that for the last few weeks she's been blowing her nose with thick green discharge  Denies cough (more than baseline)  Denies sinus pressure, headache, fever  Review of Systems   Constitutional: Negative for activity change, appetite change, fatigue, fever and unexpected weight change  HENT: Negative for congestion and trouble swallowing  Eyes: Negative for visual disturbance  Respiratory: Negative for chest tightness and shortness of breath  Cardiovascular: Negative for palpitations  Gastrointestinal: Negative for diarrhea and nausea  Genitourinary: Negative for difficulty urinating  Musculoskeletal: Positive for neck pain  Skin: Negative for rash  Neurological: Positive for numbness  Negative for weakness, light-headedness and headaches  Psychiatric/Behavioral: Negative for decreased concentration, dysphoric mood, self-injury, sleep disturbance and suicidal ideas  The patient is not nervous/anxious  All other systems reviewed and are negative  Medications:   Meds/Allergies   Current Outpatient Medications   Medication Sig Dispense Refill    acetaminophen (TYLENOL 8 HOUR ARTHRITIS PAIN) 650 mg CR tablet Take by mouth      albuterol (VENTOLIN HFA) 90 mcg/act inhaler Inhale 2 puffs every 4 (four) hours as needed for wheezing (Patient taking differently: Inhale 2 puffs as needed for wheezing ) 18 g 11    ALPRAZolam (XANAX) 0 25 mg tablet Take 1 tablet (0 25 mg total) by mouth 3 (three) times a day as needed for anxiety 90 tablet 0    cetirizine (ZyrTEC) 10 mg tablet TAKE 1 TABLET BY MOUTH EVERY DAY 30 tablet 0    escitalopram (LEXAPRO) 10 mg tablet TAKE 1 TABLET BY MOUTH EVERY DAY 90 tablet 1    fluticasone (FLONASE) 50 mcg/act nasal spray SPRAY 2 SPRAYS INTO EACH NOSTRIL EVERY DAY 16 mL 3    fluticasone-umeclidinium-vilanterol (TRELEGY) 100-62 5-25 MCG/INH inhaler Inhale 1 puff daily Rinse mouth after use   1 Inhaler 11    lisinopril (ZESTRIL) 20 mg tablet TAKE 1 TABLET BY MOUTH EVERY DAY 30 tablet 5    MINOXIDIL, TOPICAL, 5 % SOLN Apply 1 application topically      montelukast (SINGULAIR) 10 mg tablet TAKE 1 TABLET BY MOUTH EVERYDAY AT BEDTIME 30 tablet 5    pantoprazole (PROTONIX) 40 mg tablet TAKE 1 TABLET BY MOUTH EVERY DAY 30 tablet 0    Probiotic Product (PROBIOTIC-10) CAPS Take by mouth 2 (two) times a day      Promethazine-DM (PHENERGAN-DM) 6 25-15 mg/5 mL oral syrup Take 5 mL by mouth 4 (four) times a day as needed for cough 200 mL 1    azithromycin (ZITHROMAX) 250 mg tablet Take 2 tablets (500 mg total) by mouth every 24 hours for 1 day, THEN 1 tablet (250 mg total) every 24 hours for 4 days  6 tablet 0    bisacodyl (DULCOLAX) 5 mg EC tablet Take 2 tablets (10 mg total) by mouth once for 1 dose With colonoscopy prep per instructions 2 tablet 0    methocarbamol (ROBAXIN) 500 mg tablet Take 1 tablet (500 mg total) by mouth 4 (four) times a day 45 tablet 0    polyethylene glycol (MIRALAX) 17 g packet Take 17 g by mouth 2 (two) times a day (Patient not taking: Reported on 7/23/2020) 60 each 11     Current Facility-Administered Medications   Medication Dose Route Frequency Provider Last Rate Last Dose    ketorolac (TORADOL) 60 mg/2 mL IM injection 60 mg  60 mg Intramuscular Once She Syed MD        methylPREDNISolone acetate (DEPO-MEDROL) injection 80 mg  80 mg Intramuscular Once She Syed MD           Allergies   Allergen Reactions    Augmentin [Amoxicillin-Pot Clavulanate] Vomiting    Miconazole Itching and Swelling    Wixela Inhub [Fluticasone-Salmeterol] Palpitations       OBJECTIVE    Vitals:   Vitals:    07/23/20 1507   BP: 160/80   Pulse: 78   Resp: 16   Temp: 98 4 °F (36 9 °C)   Weight: 92 7 kg (204 lb 6 4 oz)   Height: 5' 4" (1 626 m)     Physical Exam   Constitutional: Vital signs are normal  She appears well-developed and well-nourished  She does not appear ill  No distress  HENT:   Head: Normocephalic and atraumatic  Right Ear: External ear normal    Left Ear: External ear normal    Nose: Nose normal    Eyes: Conjunctivae, EOM and lids are normal    Neck: No JVD present  No tracheal deviation present  Cardiovascular: Intact distal pulses  Pulmonary/Chest: No accessory muscle usage  No respiratory distress  Abdominal: Normal appearance  Musculoskeletal:   Tender to palpation of left posterior neck muscles into trap  Full ROM  Intact neurovascular exam     Neurological: She is alert  Skin: No rash noted   She is not diaphoretic  Psychiatric: She has a normal mood and affect  Nursing note and vitals reviewed

## 2020-07-28 ENCOUNTER — TELEPHONE (OUTPATIENT)
Dept: FAMILY MEDICINE CLINIC | Facility: CLINIC | Age: 65
End: 2020-07-28

## 2020-07-28 NOTE — TELEPHONE ENCOUNTER
Patient called stating she was seen by Miki Lopez last week for neck pain  Patient states she is not feeling any better   Wants to know if something else could be called in?

## 2020-07-29 DIAGNOSIS — M54.2 CERVICALGIA: Primary | ICD-10-CM

## 2020-07-29 RX ORDER — PREDNISONE 20 MG/1
TABLET ORAL
Qty: 18 TABLET | Refills: 0 | Status: SHIPPED | OUTPATIENT
Start: 2020-07-29 | End: 2020-08-07

## 2020-07-30 ENCOUNTER — TELEPHONE (OUTPATIENT)
Dept: FAMILY MEDICINE CLINIC | Facility: CLINIC | Age: 65
End: 2020-07-30

## 2020-07-30 NOTE — TELEPHONE ENCOUNTER
Patient called stating she is having major anxiety and wants to see you   She had a death in the family and feels like she is having heart papulations and you had prescribed her prednisone yesterday but she is afraid to take it due to her anxiety

## 2020-07-30 NOTE — TELEPHONE ENCOUNTER
Can she come in at 1:30 tomorrow afternoon?    She can start the pred today and we will further eval tomorrow

## 2020-07-31 ENCOUNTER — OFFICE VISIT (OUTPATIENT)
Dept: FAMILY MEDICINE CLINIC | Facility: CLINIC | Age: 65
End: 2020-07-31
Payer: COMMERCIAL

## 2020-07-31 VITALS
HEIGHT: 64 IN | WEIGHT: 203 LBS | HEART RATE: 78 BPM | SYSTOLIC BLOOD PRESSURE: 122 MMHG | BODY MASS INDEX: 34.66 KG/M2 | TEMPERATURE: 99 F | DIASTOLIC BLOOD PRESSURE: 80 MMHG

## 2020-07-31 DIAGNOSIS — F41.9 ANXIETY: ICD-10-CM

## 2020-07-31 DIAGNOSIS — M54.2 CERVICALGIA: ICD-10-CM

## 2020-07-31 DIAGNOSIS — F43.29 GRIEF REACTION WITH PROLONGED BEREAVEMENT: Primary | ICD-10-CM

## 2020-07-31 DIAGNOSIS — F33.0 MILD EPISODE OF RECURRENT MAJOR DEPRESSIVE DISORDER (HCC): ICD-10-CM

## 2020-07-31 PROCEDURE — 3079F DIAST BP 80-89 MM HG: CPT | Performed by: FAMILY MEDICINE

## 2020-07-31 PROCEDURE — 4004F PT TOBACCO SCREEN RCVD TLK: CPT | Performed by: FAMILY MEDICINE

## 2020-07-31 PROCEDURE — 3074F SYST BP LT 130 MM HG: CPT | Performed by: FAMILY MEDICINE

## 2020-07-31 PROCEDURE — 99214 OFFICE O/P EST MOD 30 MIN: CPT | Performed by: FAMILY MEDICINE

## 2020-07-31 NOTE — PROGRESS NOTES
Tobacco Cessation Counseling: {*VB Tobacco Cessation Counselin}  The patient is sincerely urged to quit consumption of tobacco  She is {ready/not ready:2104} to quit tobacco  The numerous health risks of tobacco consumption were discussed   {Tobacco cessation; plan:}

## 2020-07-31 NOTE — PROGRESS NOTES
Assessment/Plan:         Diagnoses and all orders for this visit:    Grief reaction with prolonged bereavement    Mild episode of recurrent major depressive disorder ([address] )    Anxiety    Cervicalgia        Discussion: I reviewed with pt  Neck pain and mood may be a little better  Discussed adjusting meds or obtaining counseling - pt wishes to wait  Discussed home therapy for neck (heat -> ROM exercises -> ice)  Continue Lexapro and alprazolam prn  Recheck 2 weeks if not improved  Pt to call for problems or concerns in the interim    Subjective:      Patient ID: Eber Thomas is a 59 y o  female  60 yo female with hx of anxiety and depression here for worsening mood  Pt lost her granddaughter to drowning accident approx 1m ago  Pt notes increased palpitations and neck pain  Last night is her first "good night's sleep" since the accident  (+) increased depression  PHQ done  - pt has noted increased L>R neck pain over the last few weeks  May be a little better this weekend  Pain in posterior and radiates to trapezius area  No distal weakness/numbness  - pt denies CP, lightheadedness or worsening SOB with or without exertion  Palpitations only occur when anxiety is high - no lightheadedness, CP or other CV symptoms associated with these episodes  - appetite labile, otherwise no other Gi symptoms      The following portions of the patient's history were reviewed and updated as appropriate:   She  has a past medical history of Acid reflux, Fibromyalgia, Hypertension, and Seasonal allergies    She   Patient Active Problem List    Diagnosis Date Noted    Prediabetes 04/10/2020    Chronic idiopathic constipation 12/16/2019    Gastroparesis 12/16/2019    Severe obesity (BMI 35 0-35 9 with comorbidity) ([facility] [address] ) 11/21/2019    Lower extremity edema 11/21/2019    Snoring 11/21/2019    Colonic thickening 10/12/2019    Tobacco dependence 10/02/2019    Cervicalgia 07/11/2019    Neuropathic pain 01/09/2019    Early satiety 08/28/2018    Abdominal distension 08/28/2018    Family history of pancreatic cancer 08/28/2018    Pain of upper abdomen 08/28/2018    Dyspnea on exertion 05/04/2018    Edema 07/13/2017    Lumbosacral radiculopathy at L5 01/06/2017    Compression fracture of thoracic vertebra, non-traumatic (HCC) 08/30/2016    Hyperlipidemia 12/08/2015    Cataract 08/22/2014    Palpitations 08/05/2014    [facility] 04/17/2013    Esophageal reflux 04/17/2013    Pulmonary nodule seen on imaging study 04/17/2013    Hypertension 03/22/2013    Mild episode of recurrent major depressive disorder [facility] ) 03/14/2013    Fibromyalgia 03/14/2013    Degeneration of intervertebral disc 03/14/2013    Anxiety 02/21/2013    Seasonal allergic rhinitis 12/20/2012     She  has a past surgical history that includes Knee surgery (1998); pr colonoscopy flx dx w/collj spec when pfrmd (N/A, 5/9/2017); and Eye surgery  She  reports that she has been smoking  She has a 100 00 pack-year smoking history  She has never used smokeless tobacco  She reports that she does not drink alcohol or use drugs    Current Outpatient Medications   Medication Sig Dispense Refill    acetaminophen (TYLENOL 8 HOUR ARTHRITIS PAIN) 650 mg CR tablet Take by mouth      albuterol (VENTOLIN HFA) 90 mcg/act inhaler Inhale 2 puffs every 4 (four) hours as needed for wheezing (Patient taking differently: Inhale 2 puffs as needed for wheezing ) 18 g 11    ALPRAZolam (XANAX) 0 25 mg tablet Take 1 tablet (0 25 mg total) by mouth 3 (three) times a day as needed for anxiety 90 tablet 0    bisacodyl (DULCOLAX) 5 mg EC tablet Take 2 tablets (10 mg total) by mouth once for 1 dose With colonoscopy prep per instructions 2 tablet 0    cetirizine (ZyrTEC) 10 mg tablet TAKE 1 TABLET BY MOUTH EVERY DAY 30 tablet 0    escitalopram (LEXAPRO) 10 mg tablet TAKE 1 TABLET BY MOUTH EVERY DAY 90 tablet 1    fluticasone (FLONASE) 50 mcg/act nasal spray SPRAY 2 SPRAYS INTO EACH NOSTRIL EVERY DAY 16 mL 3    fluticasone-umeclidinium-vilanterol (TRELEGY) 100-62 5-25 MCG/INH inhaler Inhale 1 puff daily Rinse mouth after use  1 Inhaler 11    lisinopril (ZESTRIL) 20 mg tablet TAKE 1 TABLET BY MOUTH EVERY DAY 30 tablet 5    methocarbamol (ROBAXIN) 500 mg tablet Take 1 tablet (500 mg total) by mouth 4 (four) times a day 45 tablet 0    MINOXIDIL, TOPICAL, 5 % SOLN Apply 1 application topically      montelukast (SINGULAIR) 10 mg tablet TAKE 1 TABLET BY MOUTH EVERYDAY AT BEDTIME 30 tablet 5    pantoprazole (PROTONIX) 40 mg tablet TAKE 1 TABLET BY MOUTH EVERY DAY 30 tablet 0    predniSONE 20 mg tablet 3 tab po qd x 3d then 2 tab po qd x 3d then 1 tab po qd x 3d then stop 18 tablet 0    Probiotic Product (PROBIOTIC-10) CAPS Take by mouth 2 (two) times a day       No current facility-administered medications for this visit  She is allergic to augmentin [amoxicillin-pot clavulanate]; miconazole; and wixela inhub [fluticasone-salmeterol]       Review of Systems   Constitutional: Positive for fatigue  Negative for diaphoresis and fever  HENT: Negative  Eyes: Negative  Respiratory: Negative  Cardiovascular: Positive for palpitations (occasional, when upset/anxious)  Negative for chest pain and leg swelling  Gastrointestinal: Negative  Genitourinary: Negative  Musculoskeletal: Positive for arthralgias, myalgias, neck pain and neck stiffness  Skin: Negative  Neurological: Positive for headaches  Negative for dizziness, weakness, light-headedness and numbness  Psychiatric/Behavioral: Positive for decreased concentration, dysphoric mood and sleep disturbance  Negative for self-injury and suicidal ideas  The patient is nervous/anxious            Objective:      /80   Pulse 78   Temp 99 °F (37 2 °C)   Ht 5' 4"   Wt 92 1 kg (203 lb)   LMP  (LMP Unknown)   BMI 34 84 kg/m²          Physical Exam   Constitutional: She is oriented to person, place, and time  She appears well-developed  HENT:   Head: Normocephalic and atraumatic  Right Ear: External ear normal    Left Ear: External ear normal    Mouth/Throat: No oropharyngeal exudate  Eyes: Pupils are equal, round, and reactive to light  Conjunctivae are normal    Neck: Neck supple  No JVD present  Muscular tenderness present  No neck rigidity  No thyromegaly present  Mildly TTP over the paracervical muscles,L>R  Sl TTP over the trapezious, L>R  Rotation 60d bilat  No arm pain with posterior flexion    Cardiovascular: Normal rate and regular rhythm  No murmur heard  Pulmonary/Chest: Effort normal and breath sounds normal    Abdominal: Soft  She exhibits no distension and no mass  There is no abdominal tenderness  Musculoskeletal: Normal range of motion  General: No deformity  Right lower leg: No edema  Left lower leg: No edema  Lymphadenopathy:     She has no cervical adenopathy  Neurological: She is alert and oriented to person, place, and time  She has normal reflexes  She displays normal reflexes  She exhibits normal muscle tone  Coordination normal    Skin: Skin is warm and dry  Capillary refill takes less than 2 seconds  She is not diaphoretic     Psychiatric:   PHQ-9 = 10

## 2020-08-07 ENCOUNTER — OFFICE VISIT (OUTPATIENT)
Dept: FAMILY MEDICINE CLINIC | Facility: CLINIC | Age: 65
End: 2020-08-07
Payer: COMMERCIAL

## 2020-08-07 VITALS
SYSTOLIC BLOOD PRESSURE: 130 MMHG | TEMPERATURE: 97.9 F | BODY MASS INDEX: 34.66 KG/M2 | HEIGHT: 64 IN | WEIGHT: 203 LBS | DIASTOLIC BLOOD PRESSURE: 84 MMHG | HEART RATE: 74 BPM

## 2020-08-07 DIAGNOSIS — M54.2 NECK PAIN: ICD-10-CM

## 2020-08-07 DIAGNOSIS — F33.41 RECURRENT MAJOR DEPRESSIVE DISORDER, IN PARTIAL REMISSION (HCC): ICD-10-CM

## 2020-08-07 DIAGNOSIS — I10 ESSENTIAL HYPERTENSION: ICD-10-CM

## 2020-08-07 DIAGNOSIS — Z00.00 ANNUAL PHYSICAL EXAM: Primary | ICD-10-CM

## 2020-08-07 DIAGNOSIS — R73.03 PREDIABETES: ICD-10-CM

## 2020-08-07 DIAGNOSIS — Z12.31 ENCOUNTER FOR SCREENING MAMMOGRAM FOR BREAST CANCER: ICD-10-CM

## 2020-08-07 DIAGNOSIS — Z78.0 ASYMPTOMATIC POSTMENOPAUSAL STATE: ICD-10-CM

## 2020-08-07 DIAGNOSIS — M54.2 CERVICALGIA: ICD-10-CM

## 2020-08-07 DIAGNOSIS — J42 CHRONIC BRONCHITIS, UNSPECIFIED CHRONIC BRONCHITIS TYPE (HCC): ICD-10-CM

## 2020-08-07 PROCEDURE — 4004F PT TOBACCO SCREEN RCVD TLK: CPT | Performed by: FAMILY MEDICINE

## 2020-08-07 PROCEDURE — 99396 PREV VISIT EST AGE 40-64: CPT | Performed by: FAMILY MEDICINE

## 2020-08-07 PROCEDURE — 3008F BODY MASS INDEX DOCD: CPT | Performed by: FAMILY MEDICINE

## 2020-08-07 RX ORDER — METHOCARBAMOL 500 MG/1
500 TABLET, FILM COATED ORAL 4 TIMES DAILY
Qty: 45 TABLET | Refills: 0 | Status: SHIPPED | OUTPATIENT
Start: 2020-08-07 | End: 2021-02-08

## 2020-08-07 NOTE — PATIENT INSTRUCTIONS

## 2020-08-07 NOTE — PROGRESS NOTES
[address]    NAME: Gary Graham  AGE: 59 y o  SEX: female  : 1955     DATE: 8/10/2020     Assessment and Plan:     Problem List Items Addressed This Visit        Respiratory    Chronic obstructive pulmonary disease ([address] )     Urged smoking cessation - counseled  Continue present inhalers  Recheck 6m            Cardiovascular and Mediastinum    Hypertension     Well controlled  Cont present treatment  Monitor labs  Recheck 6m           Relevant Orders    CBC and differential    Comprehensive metabolic panel    Lipid panel       Other    Neck pain     ?secondary to stress  ?trigger for HA? Continue present care  Treat stress/grief  Recheck 6m - earlier if not improving         Relevant Medications    methocarbamol (ROBAXIN) 500 mg tablet    Prediabetes     Check labs  Counseled re: diet/exercise  Recheck 6m         Recurrent major depressive disorder, in partial remission ([address] )     Improved  Continue Lexapro  Consider counseling  Recheck 6m         Relevant Orders    TSH, 3rd generation with Free T4 reflex      Other Visit Diagnoses     Annual physical exam    -  Primary    Cervicalgia        Relevant Medications    methocarbamol (ROBAXIN) 500 mg tablet    Encounter for screening mammogram for breast cancer        Relevant Orders    Mammo screening bilateral w cad    Asymptomatic postmenopausal state        Relevant Orders    DXA bone density spine hip and pelvis          Immunizations and preventive care screenings were discussed with patient today  Appropriate education was printed on patient's after visit summary  Counseling:  · Exercise: the importance of regular exercise/physical activity was discussed  Recommend exercise 3-5 times per week for at least 30 minutes  · BMI Counseling: Body mass index is 34 84 kg/m²   The BMI is above normal  Nutrition recommendations include reducing portion sizes, decreasing overall calorie intake, consuming healthier snacks, moderation in carbohydrate intake, increasing intake of lean protein, reducing intake of saturated fat and trans fat and reducing intake of cholesterol  Return in about 6 months (around 2/7/2021)  Chief Complaint:     Chief Complaint   Patient presents with    Physical Exam      History of Present Illness:     Adult Annual Physical   Patient here for a comprehensive physical exam  The patient reports problems - as below  - neck is better but has not resolved  - has been having some increased migraine HA activity  No asymmetric weakness/numbness or other neurologic issues associated with HA   - still smoking  Presently 1 1/2 - 2 ppd  Not ready to stop due to stress    Diet and Physical Activity  · Diet/Nutrition: decrased appetite recently due to stress  · Exercise: no formal exercise  Depression Screening  PHQ-9 Depression Screening    PHQ-9:    Frequency of the following problems over the past two weeks:       Little interest or pleasure in doing things:  1 - several days  Feeling down, depressed, or hopeless:  1 - several days  PHQ-2 Score:  2       General Health  · Sleep: gets 4-6 hours of sleep on average and does not sleep "straight through" - has frequent awakening  · Hearing: normal - bilateral   · Vision: no vision problems and overdue for eye exam    · Dental: no dental visits for >1 year and has false teeth  /GYN Health  · Patient is: postmenopausal  · Last menstrual period: age 54  · Contraceptive method: n/a  Review of Systems:     Review of Systems   Constitutional: Positive for fatigue (mild - improved)  HENT: Negative  Eyes: Negative  Respiratory: Negative  Cardiovascular: Negative  Gastrointestinal: Negative  Endocrine: Negative  Genitourinary: Negative  Musculoskeletal: Positive for myalgias (scattered, chronic), neck pain (improved) and neck stiffness  Skin: Negative      Allergic/Immunologic: Negative  Neurological: Negative  Hematological: Negative  Psychiatric/Behavioral: Positive for dysphoric mood (secondary to grief - improving)  Negative for self-injury and suicidal ideas  Past Medical History:     Past Medical History:   Diagnosis Date    Acid reflux     Fibromyalgia     Hypertension     Seasonal allergies       Past Surgical History:     Past Surgical History:   Procedure Laterality Date    EYE SURGERY      KNEE SURGERY  1998    OH COLONOSCOPY FLX DX W/COLLJ SPEC WHEN PFRMD N/A 5/9/2017    Procedure: EGD AND COLONOSCOPY;  Surgeon: Nkechi Xiao MD;  Location: [facility];   Service: Gastroenterology      Social History:        Social History     Socioeconomic History    Marital status: /Civil Union     Spouse name: None    Number of children: 3    Years of education: less than high school    Highest education level: None   Occupational History    Occupation: unemployed   Social Needs    Financial resource strain: None    Food insecurity     Worry: None     Inability: None    Transportation needs     Medical: None     Non-medical: None   Tobacco Use    Smoking status: Current Every Day Smoker     Packs/day: 2 00     Years: 50 00     Pack years: 100 00    Smokeless tobacco: Never Used   Substance and Sexual Activity    Alcohol use: Never     Frequency: Never     Binge frequency: Never    Drug use: No    Sexual activity: None   Lifestyle    Physical activity     Days per week: None     Minutes per session: None    Stress: None   Relationships    Social connections     Talks on phone: None     Gets together: None     Attends [employer] service: None     Active member of club or organization: None     Attends meetings of clubs or organizations: None     Relationship status: None    Intimate partner violence     Fear of current or ex partner: None     Emotionally abused: None     Physically abused: None     Forced sexual activity: None   Other Topics Concern  None   Social History Narrative    Always uses seatbelt    Daily coffee consumption    Daily tea consumption    Dental care regularly    Exercises regularly    Multiple organ donor    No guns in the home    No living will    Denies pets/ animals in the home    Power of  in existence- denied          Family History:     Family History   Problem Relation Age of Onset    Cancer Mother         pancreatic    Lung cancer Sister     Cancer Brother         pancreatic    Coronary artery disease Father     Diabetes Father     Hypertension Father     Heart disease Father     Diabetes Paternal Grandmother     Cancer Paternal Grandfather       Current Medications:     Current Outpatient Medications   Medication Sig Dispense Refill    acetaminophen (TYLENOL 8 HOUR ARTHRITIS PAIN) 650 mg CR tablet Take by mouth      albuterol (VENTOLIN HFA) 90 mcg/act inhaler Inhale 2 puffs every 4 (four) hours as needed for wheezing (Patient taking differently: Inhale 2 puffs as needed for wheezing ) 18 g 11    ALPRAZolam (XANAX) 0 25 mg tablet Take 1 tablet (0 25 mg total) by mouth 3 (three) times a day as needed for anxiety 90 tablet 0    bisacodyl (DULCOLAX) 5 mg EC tablet Take 2 tablets (10 mg total) by mouth once for 1 dose With colonoscopy prep per instructions 2 tablet 0    cetirizine (ZyrTEC) 10 mg tablet TAKE 1 TABLET BY MOUTH EVERY DAY 30 tablet 0    escitalopram (LEXAPRO) 10 mg tablet TAKE 1 TABLET BY MOUTH EVERY DAY 90 tablet 1    fluticasone (FLONASE) 50 mcg/act nasal spray SPRAY 2 SPRAYS INTO EACH NOSTRIL EVERY DAY 16 mL 3    fluticasone-umeclidinium-vilanterol (TRELEGY) 100-62 5-25 MCG/INH inhaler Inhale 1 puff daily Rinse mouth after use   1 Inhaler 11    lisinopril (ZESTRIL) 20 mg tablet TAKE 1 TABLET BY MOUTH EVERY DAY 30 tablet 5    methocarbamol (ROBAXIN) 500 mg tablet Take 1 tablet (500 mg total) by mouth 4 (four) times a day 45 tablet 0    MINOXIDIL, TOPICAL, 5 % SOLN Apply 1 application topically      montelukast (SINGULAIR) 10 mg tablet TAKE 1 TABLET BY MOUTH EVERYDAY AT BEDTIME 30 tablet 5    pantoprazole (PROTONIX) 40 mg tablet TAKE 1 TABLET BY MOUTH EVERY DAY 30 tablet 0    Probiotic Product (PROBIOTIC-10) CAPS Take by mouth 2 (two) times a day       No current facility-administered medications for this visit  Allergies: Allergies   Allergen Reactions    Augmentin [Amoxicillin-Pot Clavulanate] Vomiting    Miconazole Itching and Swelling    Wixela Inhub [Fluticasone-Salmeterol] Palpitations      Physical Exam:     /84   Pulse 74   Temp 97 9 °F (36 6 °C)   Ht 5' 4" (1 626 m)   Wt 92 1 kg (203 lb)   LMP  (LMP Unknown)   BMI 34 84 kg/m²     Physical Exam  Vitals signs reviewed  Constitutional:       Appearance: She is well-developed  HENT:      Head: Normocephalic and atraumatic  Comments: Pt masked     Right Ear: Tympanic membrane, ear canal and external ear normal       Left Ear: Tympanic membrane, ear canal and external ear normal    Eyes:      Conjunctiva/sclera: Conjunctivae normal       Pupils: Pupils are equal, round, and reactive to light  Neck:      Musculoskeletal: Neck supple  Muscular tenderness (over the L>R paracervical muscles and trapezius) present  Thyroid: No thyromegaly  Vascular: No JVD  Cardiovascular:      Rate and Rhythm: Normal rate and regular rhythm  Heart sounds: Normal heart sounds  No murmur  Gallop: sl decrease ROM on rotation, L>R  Pulmonary:      Effort: Pulmonary effort is normal       Breath sounds: Normal breath sounds  No wheezing or rales  Abdominal:      General: Bowel sounds are normal  There is no distension  Palpations: Abdomen is soft  There is no mass  Tenderness: There is no abdominal tenderness  Musculoskeletal: Normal range of motion  General: Tenderness (over neck as above) present  No deformity  Lymphadenopathy:      Cervical: No cervical adenopathy     Skin: General: Skin is warm  Capillary Refill: Capillary refill takes less than 2 seconds  Neurological:      Mental Status: She is alert and oriented to person, place, and time  Cranial Nerves: No cranial nerve deficit  Sensory: No sensory deficit  Motor: No weakness or abnormal muscle tone  Coordination: Coordination normal       Gait: Gait normal       Deep Tendon Reflexes: Reflexes normal    Psychiatric:         Behavior: Behavior normal          Thought Content:  Thought content normal          Judgment: Judgment normal       Comments: PHQ-2 = 2          [facility]

## 2020-08-10 NOTE — ASSESSMENT & PLAN NOTE
? secondary to stress  ?trigger for HA? Continue present care  Treat stress/grief   Recheck 6m - earlier if not improving

## 2020-08-14 DIAGNOSIS — J30.9 ALLERGIC RHINITIS, UNSPECIFIED SEASONALITY, UNSPECIFIED TRIGGER: ICD-10-CM

## 2020-08-14 RX ORDER — CETIRIZINE HYDROCHLORIDE 10 MG/1
TABLET ORAL
Qty: 30 TABLET | Refills: 0 | Status: SHIPPED | OUTPATIENT
Start: 2020-08-14 | End: 2020-09-11

## 2020-08-19 ENCOUNTER — TELEPHONE (OUTPATIENT)
Dept: FAMILY MEDICINE CLINIC | Facility: CLINIC | Age: 65
End: 2020-08-19

## 2020-08-19 NOTE — TELEPHONE ENCOUNTER
Patient left a message on refill line for fluconazole 150 mg tablet  This is an acute problem medication  Is she having a problem?  Please get more details and send a message to her pcp

## 2020-08-21 DIAGNOSIS — B37.3 CANDIDA VAGINITIS: Primary | ICD-10-CM

## 2020-08-21 RX ORDER — FLUCONAZOLE 150 MG/1
150 TABLET ORAL ONCE
Qty: 1 TABLET | Refills: 0 | Status: SHIPPED | OUTPATIENT
Start: 2020-08-21 | End: 2020-08-21

## 2020-08-21 NOTE — TELEPHONE ENCOUNTER
Patient is having itching and discharge she gets yeast infections a lot      She would it sent to [facility] [place]

## 2020-09-04 ENCOUNTER — APPOINTMENT (OUTPATIENT)
Dept: LAB | Facility: MEDICAL CENTER | Age: 65
End: 2020-09-04
Payer: COMMERCIAL

## 2020-09-04 DIAGNOSIS — F33.41 RECURRENT MAJOR DEPRESSIVE DISORDER, IN PARTIAL REMISSION (HCC): ICD-10-CM

## 2020-09-04 DIAGNOSIS — D72.829 LEUKOCYTOSIS, UNSPECIFIED TYPE: Primary | ICD-10-CM

## 2020-09-04 DIAGNOSIS — I10 ESSENTIAL HYPERTENSION: ICD-10-CM

## 2020-09-04 LAB
ALBUMIN SERPL BCP-MCNC: 3.7 G/DL (ref 3.5–5)
ALP SERPL-CCNC: 79 U/L (ref 46–116)
ALT SERPL W P-5'-P-CCNC: 25 U/L (ref 12–78)
ANION GAP SERPL CALCULATED.3IONS-SCNC: 5 MMOL/L (ref 4–13)
AST SERPL W P-5'-P-CCNC: 12 U/L (ref 5–45)
BASOPHILS # BLD AUTO: 0.07 THOUSANDS/ΜL (ref 0–0.1)
BASOPHILS NFR BLD AUTO: 1 % (ref 0–1)
BILIRUB SERPL-MCNC: 0.34 MG/DL (ref 0.2–1)
BUN SERPL-MCNC: 13 MG/DL (ref 5–25)
CALCIUM SERPL-MCNC: 9.4 MG/DL (ref 8.3–10.1)
CHLORIDE SERPL-SCNC: 103 MMOL/L (ref 100–108)
CHOLEST SERPL-MCNC: 226 MG/DL (ref 50–200)
CO2 SERPL-SCNC: 29 MMOL/L (ref 21–32)
CREAT SERPL-MCNC: 0.59 MG/DL (ref 0.6–1.3)
EOSINOPHIL # BLD AUTO: 0.25 THOUSAND/ΜL (ref 0–0.61)
EOSINOPHIL NFR BLD AUTO: 2 % (ref 0–6)
ERYTHROCYTE [DISTWIDTH] IN BLOOD BY AUTOMATED COUNT: 13.8 % (ref 11.6–15.1)
GFR SERPL CREATININE-BSD FRML MDRD: 97 ML/MIN/1.73SQ M
GLUCOSE P FAST SERPL-MCNC: 103 MG/DL (ref 65–99)
HCT VFR BLD AUTO: 44.9 % (ref 34.8–46.1)
HDLC SERPL-MCNC: 54 MG/DL
HGB BLD-MCNC: 14.3 G/DL (ref 11.5–15.4)
IMM GRANULOCYTES # BLD AUTO: 0.08 THOUSAND/UL (ref 0–0.2)
IMM GRANULOCYTES NFR BLD AUTO: 1 % (ref 0–2)
LDLC SERPL CALC-MCNC: 143 MG/DL (ref 0–100)
LYMPHOCYTES # BLD AUTO: 2.43 THOUSANDS/ΜL (ref 0.6–4.47)
LYMPHOCYTES NFR BLD AUTO: 21 % (ref 14–44)
MCH RBC QN AUTO: 28.4 PG (ref 26.8–34.3)
MCHC RBC AUTO-ENTMCNC: 31.8 G/DL (ref 31.4–37.4)
MCV RBC AUTO: 89 FL (ref 82–98)
MONOCYTES # BLD AUTO: 1.08 THOUSAND/ΜL (ref 0.17–1.22)
MONOCYTES NFR BLD AUTO: 9 % (ref 4–12)
NEUTROPHILS # BLD AUTO: 7.62 THOUSANDS/ΜL (ref 1.85–7.62)
NEUTS SEG NFR BLD AUTO: 66 % (ref 43–75)
NONHDLC SERPL-MCNC: 172 MG/DL
NRBC BLD AUTO-RTO: 0 /100 WBCS
PLATELET # BLD AUTO: 297 THOUSANDS/UL (ref 149–390)
PMV BLD AUTO: 10.1 FL (ref 8.9–12.7)
POTASSIUM SERPL-SCNC: 4.4 MMOL/L (ref 3.5–5.3)
PROT SERPL-MCNC: 7.5 G/DL (ref 6.4–8.2)
RBC # BLD AUTO: 5.03 MILLION/UL (ref 3.81–5.12)
SODIUM SERPL-SCNC: 137 MMOL/L (ref 136–145)
T4 FREE SERPL-MCNC: 0.95 NG/DL (ref 0.76–1.46)
TRIGL SERPL-MCNC: 143 MG/DL
TSH SERPL DL<=0.05 MIU/L-ACNC: 3.79 UIU/ML (ref 0.36–3.74)
WBC # BLD AUTO: 11.53 THOUSAND/UL (ref 4.31–10.16)

## 2020-09-04 PROCEDURE — 84443 ASSAY THYROID STIM HORMONE: CPT

## 2020-09-04 PROCEDURE — 85025 COMPLETE CBC W/AUTO DIFF WBC: CPT

## 2020-09-04 PROCEDURE — 80061 LIPID PANEL: CPT

## 2020-09-04 PROCEDURE — 84439 ASSAY OF FREE THYROXINE: CPT

## 2020-09-04 PROCEDURE — 36415 COLL VENOUS BLD VENIPUNCTURE: CPT

## 2020-09-04 PROCEDURE — 80053 COMPREHEN METABOLIC PANEL: CPT

## 2020-09-09 NOTE — TELEPHONE ENCOUNTER
Patient called back, her yeast infection never cleared up after taking the fluconazole 150 mg  She went to the Gyn and she was informed it is an outer skin yeast infection  She wants her to start taking fluconazole 100 mg x 1 week, but she would have to stop her lexapro 10 mg while on this med   She wants to make sure this is ok w/ you

## 2020-09-10 ENCOUNTER — HOSPITAL ENCOUNTER (OUTPATIENT)
Dept: RADIOLOGY | Facility: MEDICAL CENTER | Age: 65
Discharge: HOME | End: 2020-09-10
Payer: COMMERCIAL

## 2020-09-10 VITALS — BODY MASS INDEX: 34.66 KG/M2 | HEIGHT: 64 IN | WEIGHT: 203 LBS

## 2020-09-10 DIAGNOSIS — Z13.820 SCREENING FOR OSTEOPOROSIS: ICD-10-CM

## 2020-09-10 DIAGNOSIS — Z78.0 ASYMPTOMATIC POSTMENOPAUSAL STATE: ICD-10-CM

## 2020-09-10 DIAGNOSIS — Z12.31 ENCOUNTER FOR SCREENING MAMMOGRAM FOR BREAST CANCER: ICD-10-CM

## 2020-09-10 PROCEDURE — 77067 SCR MAMMO BI INCL CAD: CPT

## 2020-09-10 PROCEDURE — 77063 BREAST TOMOSYNTHESIS BI: CPT

## 2020-09-10 PROCEDURE — 77080 DXA BONE DENSITY AXIAL: CPT

## 2020-09-11 ENCOUNTER — TELEPHONE (OUTPATIENT)
Dept: FAMILY MEDICINE CLINIC | Facility: CLINIC | Age: 65
End: 2020-09-11

## 2020-09-11 DIAGNOSIS — J30.9 ALLERGIC RHINITIS, UNSPECIFIED SEASONALITY, UNSPECIFIED TRIGGER: ICD-10-CM

## 2020-09-11 RX ORDER — CETIRIZINE HYDROCHLORIDE 10 MG/1
TABLET ORAL
Qty: 30 TABLET | Refills: 0 | Status: SHIPPED | OUTPATIENT
Start: 2020-09-11 | End: 2020-10-06

## 2020-09-11 NOTE — TELEPHONE ENCOUNTER
Still having real issues  Went to gyno and told it wasn't in interior yeast infection but an exterior  Given another pill that 100mg  And a cream  Nothing is working and she has to keep ice on it  Unable to wear underwear due to the irritation  Cream is causing more of an issue, she is unable to use creams   Please help, what else can she use/take

## 2020-09-13 ENCOUNTER — NURSE TRIAGE (OUTPATIENT)
Dept: OTHER | Facility: OTHER | Age: 65
End: 2020-09-13

## 2020-09-13 ENCOUNTER — TELEPHONE (OUTPATIENT)
Dept: OTHER | Facility: OTHER | Age: 65
End: 2020-09-13

## 2020-09-13 ENCOUNTER — DOCUMENTATION (OUTPATIENT)
Dept: FAMILY MEDICINE CLINIC | Facility: CLINIC | Age: 65
End: 2020-09-13

## 2020-09-13 NOTE — TELEPHONE ENCOUNTER
ANTONIOI: Pt called back was very upset because she was told to go to [facility] and [facility] told her they do not treat vaginal infections and told her to go to the ED  Pt was very frustrated and hung up

## 2020-09-13 NOTE — TELEPHONE ENCOUNTER
Regarding: ALERGIC REACTION TO RX FOR YEAST INFECTION  ----- Message from Kendy Olmos sent at 9/13/2020  9:59 AM EDT -----  Patient calling because she's had an "exterior skin yeast infection" problem going on since about Labor Day  Patient says her Dr Henderson Olga her an RX and ointment to take but she feels like the ointment made it worse for her   Patient wants to know if something else can be given and if something else is given can she get back to taking her other medication that she was told to stop by her

## 2020-09-13 NOTE — TELEPHONE ENCOUNTER
Reason for Disposition   [1] SEVERE pain AND [2] not improved 2 hours after pain medicine    Answer Assessment - Initial Assessment Questions  1  SYMPTOM: "What's the main symptom you're concerned about?" (e g , pain, itching, dryness)      Itching  2  LOCATION: "Where is the itching located?" (e g , inside/outside, left/right)      Labia  3  ONSET: "When did the symptoms start?"      "Before Labor Day "  4  PAIN: "Is there any pain?" If so, ask: "How bad is it?" (Scale: 1-10; mild, moderate, severe)      Yes, 7  5  ITCHING: "Is there any itching?" If so, ask: "How bad is it?" (Scale: 1-10; mild, moderate, severe)      Severe  6  CAUSE: "What do you think is causing the discharge?" "Have you had the same problem before? What happened then?"      "I thought it was a yeast infection "  7   OTHER SYMPTOMS: "Do you have any other symptoms?" (e g , fever, itching, vaginal bleeding, pain with urination, injury to genital area, vaginal foreign body)      Burning, swelling of labia, pain    Protocols used: [facility]

## 2020-09-13 NOTE — TELEPHONE ENCOUNTER
Spoke with patient  Patient was extremely upset  Appt was scheduled this morning for her at [facility] in [address] [place] [address]  Patient waited an additional 45 minutes to be told by  that a provider would not see her  Patient stated that she was advised to go to an ER  I sent TT to Dr Mcclelland to see if there was anything further we could do to assist her  Dr Yisel Santana called back and we discussed  Dr Yisel Santana recommended that patient can either call her GYN or go to the ER  Called patient back and we discussed  Patient unsure if she will go to the ER but did state that she would call her Gyn

## 2020-09-13 NOTE — PROGRESS NOTES
Return call to [place] at [facility]  Patient is having a lot of external vaginal pain  [facility] refused to examine her  Patient is wondering what to do  Epic chart reviewed  Patient has been on Diflucan and as well as steroid cream previously  PCP previously advised Derm referral, which patient declined as she wished to monitor her symptoms  She has also been seen by her gynecologist for this issue  If patients symptoms are intolerable, I advise ER evaluation today  If not, I advise patient to call her gynecologist to be evaluated tomorrow  Nurse to relay advice to patient

## 2020-09-14 ENCOUNTER — HOSPITAL ENCOUNTER (EMERGENCY)
Facility: HOSPITAL | Age: 65
Discharge: HOME | End: 2020-09-14
Attending: EMERGENCY MEDICINE | Admitting: EMERGENCY MEDICINE
Payer: COMMERCIAL

## 2020-09-14 VITALS
WEIGHT: 204 LBS | DIASTOLIC BLOOD PRESSURE: 84 MMHG | SYSTOLIC BLOOD PRESSURE: 192 MMHG | HEART RATE: 97 BPM | HEIGHT: 63 IN | BODY MASS INDEX: 36.14 KG/M2 | RESPIRATION RATE: 22 BRPM | TEMPERATURE: 98.4 F | OXYGEN SATURATION: 98 %

## 2020-09-14 DIAGNOSIS — L30.9 DERMATITIS: Primary | ICD-10-CM

## 2020-09-14 PROCEDURE — 99283 EMERGENCY DEPT VISIT LOW MDM: CPT

## 2020-09-14 PROCEDURE — 99284 EMERGENCY DEPT VISIT MOD MDM: CPT | Performed by: PHYSICIAN ASSISTANT

## 2020-09-14 RX ADMIN — DEXAMETHASONE SODIUM PHOSPHATE 10 MG: 10 INJECTION, SOLUTION INTRAMUSCULAR; INTRAVENOUS at 01:16

## 2020-09-14 NOTE — ED PROVIDER NOTES
History  Chief Complaint   Patient presents with    Vaginal Itching     c/o vaginal itching since the begining of the month and her PCP gave her antibiotics but nothing has been working  states"i have been sitting on ice all weekend because it help"     Patient is a 70-year-old female presents emergency department with complaints of vaginal itching  Patient states that 2 weeks ago she had a yeast infection she states that she took Diflucan 150 mg x 1  She states that her recent section resolved  States that about a week later it returned significantly worse  States that she took Diflucan 100 mg for several days with no alleviation  She then use topical antifungal cream with no improvement  She states that she is having persistent itching and inflammation  She states that she took 60 mg of prednisone today and the swelling and inflammation improved  She denies any fevers, chills  Prior to Admission Medications   Prescriptions Last Dose Informant Patient Reported? Taking?    ALPRAZolam (XANAX) 0 25 mg tablet  Self No No   Sig: Take 1 tablet (0 25 mg total) by mouth 3 (three) times a day as needed for anxiety   MINOXIDIL, TOPICAL, 5 % SOLN  Self Yes No   Sig: Apply 1 application topically   Probiotic Product (PROBIOTIC-10) CAPS  Self Yes No   Sig: Take by mouth 2 (two) times a day   acetaminophen (TYLENOL 8 HOUR ARTHRITIS PAIN) 650 mg CR tablet  Self Yes No   Sig: Take by mouth   albuterol (VENTOLIN HFA) 90 mcg/act inhaler  Self No No   Sig: Inhale 2 puffs every 4 (four) hours as needed for wheezing   Patient taking differently: Inhale 2 puffs as needed for wheezing    bisacodyl (DULCOLAX) 5 mg EC tablet  Self No No   Sig: Take 2 tablets (10 mg total) by mouth once for 1 dose With colonoscopy prep per instructions   cetirizine (ZyrTEC) 10 mg tablet   No No   Sig: TAKE 1 TABLET BY MOUTH EVERY DAY   escitalopram (LEXAPRO) 10 mg tablet  Self No No   Sig: TAKE 1 TABLET BY MOUTH EVERY DAY   fluticasone (FLONASE) 50 mcg/act nasal spray  Self No No   Sig: SPRAY 2 SPRAYS INTO EACH NOSTRIL EVERY DAY   fluticasone-umeclidinium-vilanterol (TRELEGY) 100-62 5-25 MCG/INH inhaler  Self No No   Sig: Inhale 1 puff daily Rinse mouth after use  lisinopril (ZESTRIL) 20 mg tablet  Self No No   Sig: TAKE 1 TABLET BY MOUTH EVERY DAY   methocarbamol (ROBAXIN) 500 mg tablet   No No   Sig: Take 1 tablet (500 mg total) by mouth 4 (four) times a day   montelukast (SINGULAIR) 10 mg tablet  Self No No   Sig: TAKE 1 TABLET BY MOUTH EVERYDAY AT BEDTIME   pantoprazole (PROTONIX) 40 mg tablet  Self No No   Sig: TAKE 1 TABLET BY MOUTH EVERY DAY      Facility-Administered Medications: None       Past Medical History:   Diagnosis Date    Acid reflux     Fibromyalgia     Hypertension     Seasonal allergies        Past Surgical History:   Procedure Laterality Date    EYE SURGERY      KNEE SURGERY  1998    MI COLONOSCOPY FLX DX W/COLLJ SPEC WHEN PFRMD N/A 5/9/2017    Procedure: EGD AND COLONOSCOPY;  Surgeon: Micki Mock MD;  [facility]; Service: Gastroenterology       Family History   Problem Relation Age of Onset    Pancreatic cancer Mother 67    Lung cancer Sister 40    Cancer Brother         pancreatic    Coronary artery disease Father     Diabetes Father     Hypertension Father     Heart disease Father     Diabetes Paternal Grandmother     Lung cancer Maternal Grandfather 68     I have reviewed and agree with the history as documented  E-Cigarette/Vaping    E-Cigarette Use Never User      E-Cigarette/Vaping Substances     Social History     Tobacco Use    Smoking status: Current Every Day Smoker     Packs/day: 2 00     Years: 50 00     Pack years: 100 00     Types: Cigarettes    Smokeless tobacco: Never Used   Substance Use Topics    Alcohol use: Never     Frequency: Never     Binge frequency: Never    Drug use: No       Review of Systems   Constitutional: Negative for fever     Respiratory: Negative for shortness of breath  Cardiovascular: Negative for chest pain  Skin: Positive for rash  All other systems reviewed and are negative  Physical Exam  Physical Exam  Vitals signs reviewed  Constitutional:       Appearance: Normal appearance  HENT:      Head: Normocephalic and atraumatic  Nose: Nose normal       Mouth/Throat:      Mouth: Mucous membranes are moist    Eyes:      Extraocular Movements: Extraocular movements intact  Conjunctiva/sclera: Conjunctivae normal       Pupils: Pupils are equal, round, and reactive to light  Neck:      Musculoskeletal: Normal range of motion  Cardiovascular:      Rate and Rhythm: Normal rate and regular rhythm  Pulses: Normal pulses  Pulmonary:      Effort: Pulmonary effort is normal    Abdominal:      General: Abdomen is flat  Bowel sounds are normal       Tenderness: There is no abdominal tenderness  Skin:     General: Skin is warm  Capillary Refill: Capillary refill takes less than 2 seconds  Neurological:      General: No focal deficit present  Mental Status: She is alert  Psychiatric:         Mood and Affect: Mood normal          Behavior: Behavior normal          Thought Content:  Thought content normal          Judgment: Judgment normal          Vital Signs  ED Triage Vitals   Temperature Pulse Respirations Blood Pressure SpO2   09/14/20 0051 09/14/20 0044 09/14/20 0044 09/14/20 0044 09/14/20 0044   98 4 °F (36 9 °C) 97 22 (!) 215/98 98 %      Temp Source Heart Rate Source Patient Position - Orthostatic VS BP Location FiO2 (%)   09/14/20 0051 09/14/20 0044 09/14/20 0044 09/14/20 0044 --   Oral Monitor Sitting Right arm       Pain Score       09/14/20 0044       No Pain           Vitals:    09/14/20 0044 09/14/20 0113   BP: (!) 215/98 (!) 192/84   Pulse: 97    Patient Position - Orthostatic VS: Sitting          Visual Acuity      ED Medications  Medications   dexamethasone 10 mg/mL oral liquid 10 mg 1 mL (10 mg Oral Given 9/14/20 0116)       Diagnostic Studies  Results Reviewed     None                 No orders to display              Procedures  Procedures         ED Course                                       MDM  Number of Diagnoses or Management Options  Dermatitis:   Diagnosis management comments: Patient is a 60-year-old female presents emergency department with complaints of vaginal itching  Patient states that 2 weeks ago she had a yeast infection she states that she took Diflucan 150 mg x 1  She states that her recent section resolved  States that about a week later it returned significantly worse  States that she took Diflucan 100 mg for several days with no alleviation  She then use topical antifungal cream with no improvement  She states that she is having persistent itching and inflammation  She states that she took 60 mg of prednisone today and the swelling and inflammation improved  She denies any fevers, chills  On examination, patient does have evidence of dermatitis along the external portion of her vagina/groin area  There does not appear to be a bacterial infectious cause to this  Patient took 60 mg of prednisone today with improvement  She is given oral dose of Decadron emergency department  She is to continue the prednisone at home  She is to follow-up with dermatology  Return parameters were discussed  Patient stable for discharge      Risk of Complications, Morbidity, and/or Mortality  Presenting problems: moderate  Diagnostic procedures: low  Management options: moderate    Patient Progress  Patient progress: stable      Disposition  Final diagnoses:   Dermatitis     Time reflects when diagnosis was documented in both MDM as applicable and the Disposition within this note     Time User Action Codes Description Comment    9/14/2020  1:14 AM Enrique Connelly Add [L30 9] Dermatitis       ED Disposition     ED Disposition Condition Date/Time Comment    Discharge Good Mon Sep 14, 2020  1:14 AM Guero Bar discharge to home/self care  Follow-up Information     Follow up With Specialties Details Why Contact Info    Cierra Layne MD [facility]   [address]    7 Rachele Henriquez MD Dermatology Schedule an appointment as soon as possible for a visit   [address] [place] [address]  [phone]            Discharge Medication List as of 9/14/2020  1:15 AM      CONTINUE these medications which have NOT CHANGED    Details   acetaminophen (TYLENOL 8 HOUR ARTHRITIS PAIN) 650 mg CR tablet Take by mouth, Historical Med      albuterol (VENTOLIN HFA) 90 mcg/act inhaler Inhale 2 puffs every 4 (four) hours as needed for wheezing, Starting Wed 1/29/2020, Normal      ALPRAZolam (XANAX) 0 25 mg tablet Take 1 tablet (0 25 mg total) by mouth 3 (three) times a day as needed for anxiety, Starting Tue 11/26/2019, Normal      bisacodyl (DULCOLAX) 5 mg EC tablet Take 2 tablets (10 mg total) by mouth once for 1 dose With colonoscopy prep per instructions, Starting Mon 12/16/2019, Normal      cetirizine (ZyrTEC) 10 mg tablet TAKE 1 TABLET BY MOUTH EVERY DAY, Normal      escitalopram (LEXAPRO) 10 mg tablet TAKE 1 TABLET BY MOUTH EVERY DAY, Normal      fluticasone (FLONASE) 50 mcg/act nasal spray SPRAY 2 SPRAYS INTO EACH NOSTRIL EVERY DAY, Normal      fluticasone-umeclidinium-vilanterol (TRELEGY) 100-62 5-25 MCG/INH inhaler Inhale 1 puff daily Rinse mouth after use , Starting Wed 1/29/2020, Normal      lisinopril (ZESTRIL) 20 mg tablet TAKE 1 TABLET BY MOUTH EVERY DAY, Normal      methocarbamol (ROBAXIN) 500 mg tablet Take 1 tablet (500 mg total) by mouth 4 (four) times a day, Starting Fri 8/7/2020, Normal      MINOXIDIL, TOPICAL, 5 % SOLN Apply 1 application topically, Historical Med      montelukast (SINGULAIR) 10 mg tablet TAKE 1 TABLET BY MOUTH EVERYDAY AT BEDTIME, Normal      pantoprazole (PROTONIX) 40 mg tablet TAKE 1 TABLET BY MOUTH EVERY DAY, Normal      Probiotic Product (PROBIOTIC-10) CAPS Take by mouth 2 (two) times a day, Historical Med           No discharge procedures on file      PDMP Review       Value Time User    PDMP Reviewed  Yes 11/26/2019 12:02 PM Danny Shearer MD          ED Provider  Electronically Signed by           Mayco Buck PA-C  09/14/20 0250

## 2020-09-22 ENCOUNTER — APPOINTMENT (OUTPATIENT)
Dept: LAB | Facility: MEDICAL CENTER | Age: 65
End: 2020-09-22
Payer: COMMERCIAL

## 2020-09-22 ENCOUNTER — OFFICE VISIT (OUTPATIENT)
Dept: DERMATOLOGY | Facility: CLINIC | Age: 65
End: 2020-09-22
Payer: COMMERCIAL

## 2020-09-22 ENCOUNTER — TELEPHONE (OUTPATIENT)
Dept: DERMATOLOGY | Facility: CLINIC | Age: 65
End: 2020-09-22

## 2020-09-22 VITALS — TEMPERATURE: 98.8 F

## 2020-09-22 DIAGNOSIS — L25.8 CONTACT DERMATITIS DUE TO OTHER AGENT, UNSPECIFIED CONTACT DERMATITIS TYPE: Primary | ICD-10-CM

## 2020-09-22 DIAGNOSIS — D72.829 LEUKOCYTOSIS, UNSPECIFIED TYPE: ICD-10-CM

## 2020-09-22 LAB — WBC # BLD AUTO: 15 THOUSAND/UL (ref 4.31–10.16)

## 2020-09-22 PROCEDURE — 36415 COLL VENOUS BLD VENIPUNCTURE: CPT

## 2020-09-22 PROCEDURE — 85048 AUTOMATED LEUKOCYTE COUNT: CPT

## 2020-09-22 PROCEDURE — 99204 OFFICE O/P NEW MOD 45 MIN: CPT | Performed by: DERMATOLOGY

## 2020-09-22 RX ORDER — PREDNISOLONE SODIUM PHOSPHATE 10 MG/1
TABLET, ORALLY DISINTEGRATING ORAL
Qty: 70 TABLET | Refills: 0 | Status: SHIPPED | OUTPATIENT
Start: 2020-09-22 | End: 2020-09-24 | Stop reason: CLARIF

## 2020-09-22 NOTE — TELEPHONE ENCOUNTER
Called patient, No answer, left a Vm to let her know that a verbal order for Prednisolone disintegrating tablet was given to Jerel Floyd, Pharmacist at [facility] on [address] in [employer]  Recommended to call the pharmacy before heading there to make sure they have th eprescription ready for her  Advised to call us back with any questions

## 2020-09-22 NOTE — PATIENT INSTRUCTIONS
Assessment and Plan:  Diagnosis:  Contact dermatitis  Based on a thorough discussion of this condition and the management approach to it (including a comprehensive discussion of the known risks, side effects and potential benefits of treatment), the patient (family) agrees to implement the following specific plan:   * Start Prednisone 10 mg, take 4 tablets daily week one, take 3 tablets daily week two, take 2 tablets daily week three, take 1 tablet daily week four     What is contact dermatitis? Contact dermatitis is a type of eczema that results from something coming in contact with the skin  There are 2 types:  irritant and allergic  The majority of cases are from irritation  Usually that is from contact with strong soaps, repeated exposure to water, contact with cleaning agents or food, or friction  The minority are an actual allergy  In these cases something is coming in contact with the skin and causing an allergic reaction, similar to what happens with poison ivy  This usually occurs unexpectedly after using something for many years  Even very tiny amounts of the substance on the skin can cause a reaction  Some common causes are fragrances, preservatives and metals  Sometimes this can occur when an allergic substance is eaten, as well, but most often it is from direct contact with the skin  To determine the cause of allergy a patch test is often done  Some general rules to follow for both types of contact dermatitis are:   Wear gloves when using strong cleansers or before prolonged contact with water (like washing dishes)   Use gentle cleansers and avoid strong soaps   Apply moisturizer to entire body after bathing    Avoid products with fragrance    Most often contact dermatitis is treated with topical medicines like topical steroids, eucrisa, pimecrolimus or tacrolimus     Some times oral steroids, ie prednisone, methylprednisone and prednisolone, are needed    In chronic cases, treatment options include:  light therapy, methotrexate, cyclosporin

## 2020-09-22 NOTE — PROGRESS NOTES
[facility] Note     Patient Name: Todd Mesa  Encounter Date: 09/22/2020     Have you been cared for by a [facility] in the last 3 years and, if so, which one? No    · Have you traveled outside of the [address] [place] [address] in the past 3 months or outside of the [facility] area in the last 2 weeks? No     May we call your Preferred Phone number to discuss your specific medical information? Yes     May we leave a detailed message that includes your specific medical information? Yes      Today's Chief Concerns:   Concern #1:  Rash in groin area     Past Medical History:  Have you personally ever had or currently have any of the following? · Skin cancer (such as Melanoma, Basal Cell Carcinoma, Squamous Cell Carcinoma? (If Yes, please provide more detail)- No  · Eczema: No  · Psoriasis: No  · HIV/AIDS: No  · Hepatitis B or C: No  · Tuberculosis: No  · Systemic Immunosuppression such as Diabetes, Biologic or Immunotherapy, Chemotherapy, Organ Transplantation, Bone Marrow Transplantation (If YES, please provide more detail): No  · Radiation Treatment (If YES, please provide more detail): No  · Any other major medical conditions/concerns? (If Yes, which types)- No    Social History:     What is/was your primary occupation? Denies      What are your hobbies/past-times? Denies     Family History:  Have any of your "first degree relatives" (parent, brother, sister, or child) had any of the following       · Skin cancer such as Melanoma or Merkel Cell Carcinoma or Pancreatic Cancer? No  · Eczema, Asthma, Hay Fever or Seasonal Allergies: No  · Psoriasis or Psoriatic Arthritis: No  · Do any other medical conditions seem to run in your family? If Yes, what condition and which relatives?   No    Current Medications:       Current Outpatient Medications:     acetaminophen (TYLENOL 8 HOUR ARTHRITIS PAIN) 650 mg CR tablet, Take by mouth, Disp: , Rfl:     albuterol (VENTOLIN HFA) 90 mcg/act inhaler, Inhale 2 puffs every 4 (four) hours as needed for wheezing (Patient taking differently: Inhale 2 puffs as needed for wheezing ), Disp: 18 g, Rfl: 11    ALPRAZolam (XANAX) 0 25 mg tablet, Take 1 tablet (0 25 mg total) by mouth 3 (three) times a day as needed for anxiety, Disp: 90 tablet, Rfl: 0    cetirizine (ZyrTEC) 10 mg tablet, TAKE 1 TABLET BY MOUTH EVERY DAY, Disp: 30 tablet, Rfl: 0    fluticasone-umeclidinium-vilanterol (TRELEGY) 100-62 5-25 MCG/INH inhaler, Inhale 1 puff daily Rinse mouth after use , Disp: 1 Inhaler, Rfl: 11    lisinopril (ZESTRIL) 20 mg tablet, TAKE 1 TABLET BY MOUTH EVERY DAY, Disp: 30 tablet, Rfl: 5    montelukast (SINGULAIR) 10 mg tablet, TAKE 1 TABLET BY MOUTH EVERYDAY AT BEDTIME, Disp: 30 tablet, Rfl: 5    pantoprazole (PROTONIX) 40 mg tablet, TAKE 1 TABLET BY MOUTH EVERY DAY, Disp: 30 tablet, Rfl: 0    Probiotic Product (PROBIOTIC-10) CAPS, Take by mouth 2 (two) times a day, Disp: , Rfl:     bisacodyl (DULCOLAX) 5 mg EC tablet, Take 2 tablets (10 mg total) by mouth once for 1 dose With colonoscopy prep per instructions (Patient not taking: Reported on 9/22/2020), Disp: 2 tablet, Rfl: 0    escitalopram (LEXAPRO) 10 mg tablet, TAKE 1 TABLET BY MOUTH EVERY DAY (Patient not taking: Reported on 9/22/2020), Disp: 90 tablet, Rfl: 1    fluticasone (FLONASE) 50 mcg/act nasal spray, SPRAY 2 SPRAYS INTO EACH NOSTRIL EVERY DAY (Patient not taking: Reported on 9/22/2020), Disp: 16 mL, Rfl: 3    methocarbamol (ROBAXIN) 500 mg tablet, Take 1 tablet (500 mg total) by mouth 4 (four) times a day (Patient not taking: Reported on 9/22/2020), Disp: 45 tablet, Rfl: 0    MINOXIDIL, TOPICAL, 5 % SOLN, Apply 1 application topically, Disp: , Rfl:       Review of Systems:  Have you recently had or currently have any of the following? If YES, what are you doing for the problem?     · Fever, chills or unintended weight loss: No  · Sudden loss or change in your vision: No  · Nausea, vomiting or blood in your stool: No  · Painful or swollen joints: No  · Wheezing or cough: No  · Changing mole or non-healing wound: No  · Nosebleeds: No  · Excessive sweating: No  · Easy or prolonged bleeding? No  · Over the last 2 weeks, how often have you been bothered by the following problems? · Taking little interest or pleasure in doing things: 1 - Not at All  · Feeling down, depressed, or hopeless: 1 - Not at All  · Rapid heartbeat with epinephrine:  No    · FEMALES ONLY:    · Are you pregnant or planning to become pregnant? No  · Are you currently or planning to be nursing or breast feeding? No    · Any known allergies? Allergies   Allergen Reactions    Augmentin [Amoxicillin-Pot Clavulanate] Vomiting    Miconazole Itching and Swelling    Wixela Inhub [Fluticasone-Salmeterol] Palpitations   ·       Physical Exam:     Was a chaperone (Derm Clinical Assistant) present throughout the entire Physical Exam? Yes     Did the Dermatology Team specifically  the patient on the importance of a Full Skin Exam to be sure that nothing is missed clinically?  Yes}  o Did the patient ultimately request or accept a Full Skin Exam?  Yes  o Did the patient specifically refuse to have the areas "under-the-bra" examined by the Dermatologist? Quoc Pompa  o Did the patient specifically refuse to have the areas "under-the-underwear" examined by the Dermatologist? YES    CONSTITUTIONAL:   Vitals:    09/22/20 1228   Temp: 98 8 °F (37 1 °C)   TempSrc: Tympanic       PSYCH: Normal mood and affect  EYES: Normal conjunctiva  ENT: Normal lips and oral mucosa  CARDIOVASCULAR: No edema  RESPIRATORY: Normal respirations  HEME/LYMPH/IMMUNO:  No regional lymphadenopathy except as noted below in "ASSESSMENT AND PLAN BY DIAGNOSIS"    SKIN:  FULL ORGAN SYSTEM EXAM   Hair, Scalp, Ears, Face Normal except as noted below in Assessment   Neck, Cervical Chain Nodes Normal except as noted below in Assessment   Right Arm/Hand/Fingers Normal except as noted below in Assessment   Left Arm/Hand/Fingers Normal except as noted below in Assessment   Chest/Breasts/Axillae Viewed areas Normal except as noted below in Assessment   Abdomen, Umbilicus Normal except as noted below in Assessment   Back/Spine Normal except as noted below in Assessment   Groin/Genitalia/Buttocks Normal except as noted below in Assessment   Right Leg, Foot, Toes Normal except as noted below in Assessment   Left Leg, Foot, Toes Normal except as noted below in Assessment        Assessment and Plan by Diagnosis:    History of Present Condition:     Duration:  How long has this been an issue for you?    o  month    Location Affected:  Where on the body is this affecting you?    o  groin area    Quality:  Is there any bleeding, pain, itch, burning/irritation, or redness associated with the skin lesion? o  itch, irritation, and redness    Severity:  Describe any bleeding, pain, itch, burning/irritation, or redness on a scale of 1 to 10 (with 10 being the worst)  o  1   Timing:  Does this condition seem to be there pretty constantly or do you notice it more at specific times throughout the day? o  constant    Context:  Have you ever noticed that this condition seems to be associated with specific activities you do?    o  denies    Modifying Factors:    o Anything that seems to make the condition worse?    -  denies   o What have you tried to do to make the condition better?    -  fluconazole 100 mg tablet and triamcinolone 0 1% ointment    Associated Signs and Symptoms:  Does this skin lesion seem to be associated with any of the following:  o  SL AMB DERM SIGNS AND SYMPTOMS: Redness and Itching and Scratching       1  CONTACT DERMATITIS    Physical Exam:   Anatomic Location Affected:  Labial inner thighs    Morphological Description:  Edema erythema and lichenification    Pertinent Positives:   Pertinent Negatives:     Additional History of Present Condition:  Located in the groin area  Patient states has been present for a month  Patient states itch and redness  Assessment and Plan:  Diagnosis:  Contact dermatitis  Patient with acute severe contact dermatitis in conjunction with a yeast infection treated with oral fluconazole and topical triamcinolone  Possibly to triamcinolone  Also h/o of probable nickel sensitivity but unlikely source  Recommend prednisone taper and then f/u patch testing if recurrent or persistent  Based on a thorough discussion of this condition and the management approach to it (including a comprehensive discussion of the known risks, side effects and potential benefits of treatment), the patient (family) agrees to implement the following specific plan:   * Start Prednisone 10 mg, take 4 tablets daily week one, take 3 tablets daily week two, take 2 tablets daily week three, take 1 tablet daily week four     What is contact dermatitis? Contact dermatitis is a type of eczema that results from something coming in contact with the skin  There are 2 types:  irritant and allergic  The majority of cases are from irritation  Usually that is from contact with strong soaps, repeated exposure to water, contact with cleaning agents or food, or friction  The minority are an actual allergy  In these cases something is coming in contact with the skin and causing an allergic reaction, similar to what happens with poison ivy  This usually occurs unexpectedly after using something for many years  Even very tiny amounts of the substance on the skin can cause a reaction  Some common causes are fragrances, preservatives and metals  Sometimes this can occur when an allergic substance is eaten, as well, but most often it is from direct contact with the skin  To determine the cause of allergy a patch test is often done      Some general rules to follow for both types of contact dermatitis are:   Wear gloves when using strong cleansers or before prolonged contact with water (like washing dishes)   Use gentle cleansers and avoid strong soaps   Apply moisturizer to entire body after bathing    Avoid products with fragrance    Most often contact dermatitis is treated with topical medicines like topical steroids, eucrisa, pimecrolimus or tacrolimus     Some times oral steroids, ie prednisone, methylprednisone and prednisolone, are needed  In chronic cases, treatment options include:  light therapy, methotrexate, cyclosporin    Scribe Attestation    I,:   Martin Wilks MA am acting as a scribe while in the presence of the attending physician :        I,:   Bushra Garay MD personally performed the services described in this documentation    as scribed in my presence :

## 2020-09-23 DIAGNOSIS — D72.829 LEUKOCYTOSIS, UNSPECIFIED TYPE: Primary | ICD-10-CM

## 2020-09-23 DIAGNOSIS — R30.0 DYSURIA: ICD-10-CM

## 2020-09-24 ENCOUNTER — TELEPHONE (OUTPATIENT)
Dept: DERMATOLOGY | Facility: CLINIC | Age: 65
End: 2020-09-24

## 2020-09-24 DIAGNOSIS — L25.8 CONTACT DERMATITIS DUE TO OTHER AGENT, UNSPECIFIED CONTACT DERMATITIS TYPE: Primary | ICD-10-CM

## 2020-09-24 RX ORDER — PREDNISONE 20 MG/1
TABLET ORAL
Qty: 28 TABLET | Refills: 1 | Status: SHIPPED | OUTPATIENT
Start: 2020-09-24 | End: 2021-02-08

## 2020-09-24 NOTE — TELEPHONE ENCOUNTER
Patient called and stated the pharmacy does not have any prednisone and it is on back order she would like to know what she is suppose to do   She said her last day was today

## 2020-09-24 NOTE — PROGRESS NOTES
Pharmacy only has enough prednisolone for 3 days, will change to prednisone 20 with taper for 3 weeks

## 2020-09-29 ENCOUNTER — TELEPHONE (OUTPATIENT)
Dept: DERMATOLOGY | Facility: CLINIC | Age: 65
End: 2020-09-29

## 2020-09-30 ENCOUNTER — APPOINTMENT (OUTPATIENT)
Dept: LAB | Facility: MEDICAL CENTER | Age: 65
End: 2020-09-30
Payer: COMMERCIAL

## 2020-09-30 DIAGNOSIS — D72.829 LEUKOCYTOSIS, UNSPECIFIED TYPE: ICD-10-CM

## 2020-09-30 LAB
BACTERIA UR QL AUTO: NORMAL /HPF
BASOPHILS # BLD AUTO: 0.06 THOUSANDS/ΜL (ref 0–0.1)
BASOPHILS NFR BLD AUTO: 0 % (ref 0–1)
BILIRUB UR QL STRIP: NEGATIVE
CLARITY UR: CLEAR
COLOR UR: YELLOW
EOSINOPHIL # BLD AUTO: 0.24 THOUSAND/ΜL (ref 0–0.61)
EOSINOPHIL NFR BLD AUTO: 2 % (ref 0–6)
ERYTHROCYTE [DISTWIDTH] IN BLOOD BY AUTOMATED COUNT: 14.3 % (ref 11.6–15.1)
GLUCOSE UR STRIP-MCNC: NEGATIVE MG/DL
HCT VFR BLD AUTO: 43.9 % (ref 34.8–46.1)
HGB BLD-MCNC: 14 G/DL (ref 11.5–15.4)
HGB UR QL STRIP.AUTO: NEGATIVE
HYALINE CASTS #/AREA URNS LPF: NORMAL /LPF
IMM GRANULOCYTES # BLD AUTO: 0.25 THOUSAND/UL (ref 0–0.2)
IMM GRANULOCYTES NFR BLD AUTO: 2 % (ref 0–2)
KETONES UR STRIP-MCNC: NEGATIVE MG/DL
LEUKOCYTE ESTERASE UR QL STRIP: ABNORMAL
LYMPHOCYTES # BLD AUTO: 4.69 THOUSANDS/ΜL (ref 0.6–4.47)
LYMPHOCYTES NFR BLD AUTO: 29 % (ref 14–44)
MCH RBC QN AUTO: 28.5 PG (ref 26.8–34.3)
MCHC RBC AUTO-ENTMCNC: 31.9 G/DL (ref 31.4–37.4)
MCV RBC AUTO: 89 FL (ref 82–98)
MONOCYTES # BLD AUTO: 1.34 THOUSAND/ΜL (ref 0.17–1.22)
MONOCYTES NFR BLD AUTO: 8 % (ref 4–12)
NEUTROPHILS # BLD AUTO: 9.84 THOUSANDS/ΜL (ref 1.85–7.62)
NEUTS SEG NFR BLD AUTO: 59 % (ref 43–75)
NITRITE UR QL STRIP: NEGATIVE
NON-SQ EPI CELLS URNS QL MICRO: NORMAL /HPF
NRBC BLD AUTO-RTO: 0 /100 WBCS
PH UR STRIP.AUTO: 6.5 [PH]
PLATELET # BLD AUTO: 311 THOUSANDS/UL (ref 149–390)
PMV BLD AUTO: 9.6 FL (ref 8.9–12.7)
PROT UR STRIP-MCNC: NEGATIVE MG/DL
RBC # BLD AUTO: 4.91 MILLION/UL (ref 3.81–5.12)
RBC #/AREA URNS AUTO: NORMAL /HPF
SP GR UR STRIP.AUTO: 1.01 (ref 1–1.03)
UROBILINOGEN UR QL STRIP.AUTO: 0.2 E.U./DL
WBC # BLD AUTO: 16.42 THOUSAND/UL (ref 4.31–10.16)
WBC #/AREA URNS AUTO: NORMAL /HPF

## 2020-09-30 PROCEDURE — 85025 COMPLETE CBC W/AUTO DIFF WBC: CPT

## 2020-09-30 PROCEDURE — 81001 URINALYSIS AUTO W/SCOPE: CPT | Performed by: FAMILY MEDICINE

## 2020-09-30 PROCEDURE — 87086 URINE CULTURE/COLONY COUNT: CPT | Performed by: FAMILY MEDICINE

## 2020-09-30 PROCEDURE — 36415 COLL VENOUS BLD VENIPUNCTURE: CPT

## 2020-10-01 DIAGNOSIS — F41.9 ANXIETY: ICD-10-CM

## 2020-10-01 DIAGNOSIS — K21.9 GASTROESOPHAGEAL REFLUX DISEASE: ICD-10-CM

## 2020-10-01 LAB — BACTERIA UR CULT: NORMAL

## 2020-10-01 RX ORDER — PANTOPRAZOLE SODIUM 40 MG/1
40 TABLET, DELAYED RELEASE ORAL DAILY
Qty: 30 TABLET | Refills: 0 | Status: SHIPPED | OUTPATIENT
Start: 2020-10-01 | End: 2020-10-25

## 2020-10-01 RX ORDER — ALPRAZOLAM 0.25 MG/1
0.25 TABLET ORAL 3 TIMES DAILY PRN
Qty: 90 TABLET | Refills: 0 | Status: SHIPPED | OUTPATIENT
Start: 2020-10-01 | End: 2022-01-24 | Stop reason: SDUPTHER

## 2020-10-04 DIAGNOSIS — I10 ESSENTIAL HYPERTENSION: ICD-10-CM

## 2020-10-04 RX ORDER — LISINOPRIL 20 MG/1
TABLET ORAL
Qty: 30 TABLET | Refills: 5 | Status: SHIPPED | OUTPATIENT
Start: 2020-10-04 | End: 2021-02-21

## 2020-10-06 DIAGNOSIS — J30.9 ALLERGIC RHINITIS, UNSPECIFIED SEASONALITY, UNSPECIFIED TRIGGER: ICD-10-CM

## 2020-10-06 RX ORDER — CETIRIZINE HYDROCHLORIDE 10 MG/1
TABLET ORAL
Qty: 30 TABLET | Refills: 0 | Status: SHIPPED | OUTPATIENT
Start: 2020-10-06 | End: 2020-11-03

## 2020-10-12 ENCOUNTER — TELEPHONE (OUTPATIENT)
Dept: DERMATOLOGY | Facility: CLINIC | Age: 65
End: 2020-10-12

## 2020-10-13 DIAGNOSIS — K14.8 TONGUE THRUST: Primary | ICD-10-CM

## 2020-10-13 RX ORDER — FLUCONAZOLE 150 MG/1
150 TABLET ORAL ONCE
Qty: 1 TABLET | Refills: 0 | Status: SHIPPED | OUTPATIENT
Start: 2020-10-13 | End: 2020-10-13

## 2020-10-15 ENCOUNTER — TELEPHONE (OUTPATIENT)
Dept: DERMATOLOGY | Facility: CLINIC | Age: 65
End: 2020-10-15

## 2020-10-16 ENCOUNTER — OFFICE VISIT (OUTPATIENT)
Dept: FAMILY MEDICINE CLINIC | Facility: CLINIC | Age: 65
End: 2020-10-16
Payer: COMMERCIAL

## 2020-10-16 VITALS
HEIGHT: 63 IN | BODY MASS INDEX: 35.97 KG/M2 | WEIGHT: 203 LBS | SYSTOLIC BLOOD PRESSURE: 130 MMHG | TEMPERATURE: 98.3 F | DIASTOLIC BLOOD PRESSURE: 84 MMHG | HEART RATE: 86 BPM

## 2020-10-16 DIAGNOSIS — K59.04 CHRONIC IDIOPATHIC CONSTIPATION: ICD-10-CM

## 2020-10-16 DIAGNOSIS — B37.0 THRUSH: ICD-10-CM

## 2020-10-16 DIAGNOSIS — N39.0 URINARY TRACT INFECTION WITHOUT HEMATURIA, SITE UNSPECIFIED: Primary | ICD-10-CM

## 2020-10-16 DIAGNOSIS — J42 CHRONIC BRONCHITIS, UNSPECIFIED CHRONIC BRONCHITIS TYPE (HCC): ICD-10-CM

## 2020-10-16 DIAGNOSIS — J30.2 SEASONAL ALLERGIC RHINITIS, UNSPECIFIED TRIGGER: ICD-10-CM

## 2020-10-16 LAB
SL AMB  POCT GLUCOSE, UA: NORMAL
SL AMB LEUKOCYTE ESTERASE,UA: NORMAL
SL AMB POCT BILIRUBIN,UA: NORMAL
SL AMB POCT BLOOD,UA: NORMAL
SL AMB POCT CLARITY,UA: CLEAR
SL AMB POCT COLOR,UA: YELLOW
SL AMB POCT KETONES,UA: NORMAL
SL AMB POCT NITRITE,UA: NORMAL
SL AMB POCT PH,UA: 6
SL AMB POCT SPECIFIC GRAVITY,UA: 1.01
SL AMB POCT URINE PROTEIN: NORMAL
SL AMB POCT UROBILINOGEN: NORMAL

## 2020-10-16 PROCEDURE — 3075F SYST BP GE 130 - 139MM HG: CPT | Performed by: FAMILY MEDICINE

## 2020-10-16 PROCEDURE — 3079F DIAST BP 80-89 MM HG: CPT | Performed by: FAMILY MEDICINE

## 2020-10-16 PROCEDURE — 99213 OFFICE O/P EST LOW 20 MIN: CPT | Performed by: FAMILY MEDICINE

## 2020-10-16 PROCEDURE — 4004F PT TOBACCO SCREEN RCVD TLK: CPT | Performed by: FAMILY MEDICINE

## 2020-10-16 PROCEDURE — 81002 URINALYSIS NONAUTO W/O SCOPE: CPT | Performed by: FAMILY MEDICINE

## 2020-10-16 RX ORDER — PHENAZOPYRIDINE HYDROCHLORIDE 100 MG/1
100 TABLET, FILM COATED ORAL 3 TIMES DAILY PRN
Qty: 30 TABLET | Refills: 0 | Status: SHIPPED | OUTPATIENT
Start: 2020-10-16 | End: 2021-02-08 | Stop reason: ALTCHOICE

## 2020-10-16 RX ORDER — SENNA PLUS 8.6 MG/1
1 TABLET ORAL DAILY
Qty: 90 TABLET | Refills: 0 | Status: SHIPPED | OUTPATIENT
Start: 2020-10-16 | End: 2021-12-07 | Stop reason: ALTCHOICE

## 2020-10-16 RX ORDER — MONTELUKAST SODIUM 10 MG/1
TABLET ORAL
Qty: 30 TABLET | Refills: 5 | Status: SHIPPED | OUTPATIENT
Start: 2020-10-16 | End: 2021-04-15

## 2020-10-19 DIAGNOSIS — D72.829 LEUKOCYTOSIS, UNSPECIFIED TYPE: Primary | ICD-10-CM

## 2020-10-25 DIAGNOSIS — K21.9 GASTROESOPHAGEAL REFLUX DISEASE: ICD-10-CM

## 2020-10-25 DIAGNOSIS — F33.0 DEPRESSION, MAJOR, RECURRENT, MILD (HCC): ICD-10-CM

## 2020-10-25 RX ORDER — PANTOPRAZOLE SODIUM 40 MG/1
TABLET, DELAYED RELEASE ORAL
Qty: 30 TABLET | Refills: 0 | Status: SHIPPED | OUTPATIENT
Start: 2020-10-25 | End: 2020-12-08

## 2020-10-25 RX ORDER — ESCITALOPRAM OXALATE 10 MG/1
TABLET ORAL
Qty: 90 TABLET | Refills: 1 | Status: SHIPPED | OUTPATIENT
Start: 2020-10-25 | End: 2021-04-15

## 2020-10-26 ENCOUNTER — APPOINTMENT (OUTPATIENT)
Dept: LAB | Facility: MEDICAL CENTER | Age: 65
End: 2020-10-26
Payer: COMMERCIAL

## 2020-10-26 DIAGNOSIS — D72.829 LEUKOCYTOSIS, UNSPECIFIED TYPE: ICD-10-CM

## 2020-10-26 LAB
BASOPHILS # BLD AUTO: 0.07 THOUSANDS/ΜL (ref 0–0.1)
BASOPHILS NFR BLD AUTO: 1 % (ref 0–1)
EOSINOPHIL # BLD AUTO: 0.13 THOUSAND/ΜL (ref 0–0.61)
EOSINOPHIL NFR BLD AUTO: 1 % (ref 0–6)
ERYTHROCYTE [DISTWIDTH] IN BLOOD BY AUTOMATED COUNT: 13.5 % (ref 11.6–15.1)
HCT VFR BLD AUTO: 44.2 % (ref 34.8–46.1)
HGB BLD-MCNC: 14.3 G/DL (ref 11.5–15.4)
IMM GRANULOCYTES # BLD AUTO: 0.15 THOUSAND/UL (ref 0–0.2)
IMM GRANULOCYTES NFR BLD AUTO: 1 % (ref 0–2)
LYMPHOCYTES # BLD AUTO: 2.14 THOUSANDS/ΜL (ref 0.6–4.47)
LYMPHOCYTES NFR BLD AUTO: 20 % (ref 14–44)
MCH RBC QN AUTO: 29 PG (ref 26.8–34.3)
MCHC RBC AUTO-ENTMCNC: 32.4 G/DL (ref 31.4–37.4)
MCV RBC AUTO: 90 FL (ref 82–98)
MONOCYTES # BLD AUTO: 0.81 THOUSAND/ΜL (ref 0.17–1.22)
MONOCYTES NFR BLD AUTO: 8 % (ref 4–12)
NEUTROPHILS # BLD AUTO: 7.43 THOUSANDS/ΜL (ref 1.85–7.62)
NEUTS SEG NFR BLD AUTO: 69 % (ref 43–75)
NRBC BLD AUTO-RTO: 0 /100 WBCS
PLATELET # BLD AUTO: 274 THOUSANDS/UL (ref 149–390)
PMV BLD AUTO: 9.5 FL (ref 8.9–12.7)
RBC # BLD AUTO: 4.93 MILLION/UL (ref 3.81–5.12)
WBC # BLD AUTO: 10.73 THOUSAND/UL (ref 4.31–10.16)

## 2020-10-26 PROCEDURE — 85025 COMPLETE CBC W/AUTO DIFF WBC: CPT

## 2020-10-26 PROCEDURE — 36415 COLL VENOUS BLD VENIPUNCTURE: CPT

## 2020-10-30 ENCOUNTER — TELEPHONE (OUTPATIENT)
Dept: FAMILY MEDICINE CLINIC | Facility: CLINIC | Age: 65
End: 2020-10-30

## 2020-10-30 DIAGNOSIS — J42 CHRONIC BRONCHITIS, UNSPECIFIED CHRONIC BRONCHITIS TYPE (HCC): Primary | ICD-10-CM

## 2020-10-30 RX ORDER — DEXTROMETHORPHAN HYDROBROMIDE AND PROMETHAZINE HYDROCHLORIDE 15; 6.25 MG/5ML; MG/5ML
5 SOLUTION ORAL 4 TIMES DAILY PRN
Qty: 150 ML | Refills: 0 | Status: SHIPPED | OUTPATIENT
Start: 2020-10-30 | End: 2021-02-08 | Stop reason: SDUPTHER

## 2020-11-02 ENCOUNTER — TELEPHONE (OUTPATIENT)
Dept: FAMILY MEDICINE CLINIC | Facility: CLINIC | Age: 65
End: 2020-11-02

## 2020-11-02 DIAGNOSIS — J01.00 ACUTE NON-RECURRENT MAXILLARY SINUSITIS: Primary | ICD-10-CM

## 2020-11-02 RX ORDER — AZITHROMYCIN 250 MG/1
TABLET, FILM COATED ORAL
Qty: 6 TABLET | Refills: 0 | Status: SHIPPED | OUTPATIENT
Start: 2020-11-02 | End: 2020-11-06

## 2020-11-03 DIAGNOSIS — J30.9 ALLERGIC RHINITIS, UNSPECIFIED SEASONALITY, UNSPECIFIED TRIGGER: ICD-10-CM

## 2020-11-03 RX ORDER — CETIRIZINE HYDROCHLORIDE 10 MG/1
TABLET ORAL
Qty: 30 TABLET | Refills: 0 | Status: SHIPPED | OUTPATIENT
Start: 2020-11-03 | End: 2020-12-19

## 2020-11-05 ENCOUNTER — HOSPITAL ENCOUNTER (OUTPATIENT)
Dept: RADIOLOGY | Facility: MEDICAL CENTER | Age: 65
Discharge: HOME | End: 2020-11-05
Payer: COMMERCIAL

## 2020-11-05 DIAGNOSIS — R91.1 PULMONARY NODULE SEEN ON IMAGING STUDY: ICD-10-CM

## 2020-11-05 PROCEDURE — 71250 CT THORAX DX C-: CPT

## 2020-11-09 DIAGNOSIS — F17.200 TOBACCO DEPENDENCE: Primary | ICD-10-CM

## 2020-11-10 ENCOUNTER — TELEPHONE (OUTPATIENT)
Dept: PULMONOLOGY | Facility: CLINIC | Age: 65
End: 2020-11-10

## 2020-11-11 NOTE — ASSESSMENT & PLAN NOTE
Sent in 2 inhalers to see what might be covered by insurance; she was instructed to only take one of the inhalers  Referred to pulmonology, refractory COPD and continues to smoke  Strongly encouraged smoking cessation and she declines nicotine replacement, medications   Counseled for 3 minutes with patient and daughter "I'm having left elbow surgery"

## 2020-12-07 DIAGNOSIS — K21.9 GASTROESOPHAGEAL REFLUX DISEASE: ICD-10-CM

## 2020-12-08 RX ORDER — PANTOPRAZOLE SODIUM 40 MG/1
TABLET, DELAYED RELEASE ORAL
Qty: 30 TABLET | Refills: 0 | Status: SHIPPED | OUTPATIENT
Start: 2020-12-08 | End: 2021-01-03

## 2020-12-19 DIAGNOSIS — J30.9 ALLERGIC RHINITIS, UNSPECIFIED SEASONALITY, UNSPECIFIED TRIGGER: ICD-10-CM

## 2020-12-19 RX ORDER — CETIRIZINE HYDROCHLORIDE 10 MG/1
TABLET ORAL
Qty: 30 TABLET | Refills: 0 | Status: SHIPPED | OUTPATIENT
Start: 2020-12-19 | End: 2021-03-15

## 2020-12-31 ENCOUNTER — DOCUMENTATION (OUTPATIENT)
Dept: PULMONOLOGY | Facility: CLINIC | Age: 65
End: 2020-12-31

## 2020-12-31 DIAGNOSIS — J42 CHRONIC BRONCHITIS, UNSPECIFIED CHRONIC BRONCHITIS TYPE (HCC): Primary | ICD-10-CM

## 2020-12-31 RX ORDER — ALBUTEROL SULFATE 90 UG/1
2 AEROSOL, METERED RESPIRATORY (INHALATION) EVERY 6 HOURS PRN
Qty: 8.5 G | Refills: 0 | Status: SHIPPED | OUTPATIENT
Start: 2020-12-31 | End: 2021-02-03 | Stop reason: ALTCHOICE

## 2021-01-03 DIAGNOSIS — K21.9 GASTROESOPHAGEAL REFLUX DISEASE: ICD-10-CM

## 2021-01-03 RX ORDER — PANTOPRAZOLE SODIUM 40 MG/1
TABLET, DELAYED RELEASE ORAL
Qty: 30 TABLET | Refills: 0 | Status: SHIPPED | OUTPATIENT
Start: 2021-01-03 | End: 2021-01-30

## 2021-01-30 DIAGNOSIS — K21.9 GASTROESOPHAGEAL REFLUX DISEASE: ICD-10-CM

## 2021-01-30 RX ORDER — PANTOPRAZOLE SODIUM 40 MG/1
TABLET, DELAYED RELEASE ORAL
Qty: 30 TABLET | Refills: 0 | Status: SHIPPED | OUTPATIENT
Start: 2021-01-30 | End: 2021-02-28

## 2021-02-03 DIAGNOSIS — J42 CHRONIC BRONCHITIS, UNSPECIFIED CHRONIC BRONCHITIS TYPE (HCC): ICD-10-CM

## 2021-02-03 RX ORDER — ALBUTEROL SULFATE 90 UG/1
2 AEROSOL, METERED RESPIRATORY (INHALATION) EVERY 6 HOURS PRN
Qty: 8.5 G | Refills: 1 | Status: SHIPPED | OUTPATIENT
Start: 2021-02-03 | End: 2021-09-04

## 2021-02-08 ENCOUNTER — OFFICE VISIT (OUTPATIENT)
Dept: FAMILY MEDICINE CLINIC | Facility: CLINIC | Age: 66
End: 2021-02-08
Payer: MEDICARE

## 2021-02-08 VITALS
HEART RATE: 82 BPM | TEMPERATURE: 98.5 F | SYSTOLIC BLOOD PRESSURE: 120 MMHG | BODY MASS INDEX: 36.14 KG/M2 | WEIGHT: 204 LBS | DIASTOLIC BLOOD PRESSURE: 82 MMHG | HEIGHT: 63 IN

## 2021-02-08 DIAGNOSIS — E66.01 SEVERE OBESITY (BMI 35.0-35.9 WITH COMORBIDITY) (HCC): ICD-10-CM

## 2021-02-08 DIAGNOSIS — F33.0 MILD EPISODE OF RECURRENT MAJOR DEPRESSIVE DISORDER (HCC): ICD-10-CM

## 2021-02-08 DIAGNOSIS — R82.90 FOUL SMELLING URINE: ICD-10-CM

## 2021-02-08 DIAGNOSIS — F41.9 ANXIETY: ICD-10-CM

## 2021-02-08 DIAGNOSIS — I10 ESSENTIAL HYPERTENSION: ICD-10-CM

## 2021-02-08 DIAGNOSIS — R07.9 CHEST PAIN, UNSPECIFIED TYPE: Primary | ICD-10-CM

## 2021-02-08 DIAGNOSIS — E55.9 VITAMIN D DEFICIENCY: ICD-10-CM

## 2021-02-08 DIAGNOSIS — J42 CHRONIC BRONCHITIS, UNSPECIFIED CHRONIC BRONCHITIS TYPE (HCC): ICD-10-CM

## 2021-02-08 DIAGNOSIS — R73.03 PREDIABETES: ICD-10-CM

## 2021-02-08 DIAGNOSIS — Z11.59 ENCOUNTER FOR HEPATITIS C SCREENING TEST FOR LOW RISK PATIENT: ICD-10-CM

## 2021-02-08 PROBLEM — K63.9 COLONIC THICKENING: Status: RESOLVED | Noted: 2019-10-12 | Resolved: 2021-02-08

## 2021-02-08 PROCEDURE — 99215 OFFICE O/P EST HI 40 MIN: CPT | Performed by: FAMILY MEDICINE

## 2021-02-08 PROCEDURE — 81002 URINALYSIS NONAUTO W/O SCOPE: CPT | Performed by: FAMILY MEDICINE

## 2021-02-08 PROCEDURE — 93000 ELECTROCARDIOGRAM COMPLETE: CPT | Performed by: FAMILY MEDICINE

## 2021-02-08 RX ORDER — DEXTROMETHORPHAN HYDROBROMIDE AND PROMETHAZINE HYDROCHLORIDE 15; 6.25 MG/5ML; MG/5ML
5 SOLUTION ORAL 4 TIMES DAILY PRN
Qty: 150 ML | Refills: 0 | Status: SHIPPED | OUTPATIENT
Start: 2021-02-08 | End: 2021-09-07

## 2021-02-08 RX ORDER — LANOLIN ALCOHOL/MO/W.PET/CERES
1 CREAM (GRAM) TOPICAL 3 TIMES DAILY
COMMUNITY
End: 2021-09-03 | Stop reason: ALTCHOICE

## 2021-02-08 NOTE — ASSESSMENT & PLAN NOTE
I reviewed with pt  Depression Screening Follow-up Plan: Patient's depression screening was positive with a PHQ-2 score of 2  Their PHQ-9 score was 6  Patient assessed for underlying major depression  They have no active suicidal ideations  Brief counseling provided and recommend additional follow-up/re-evaluation next office visit  Continue present meds   Recheck 6m

## 2021-02-08 NOTE — PROGRESS NOTES
Assessment/Plan:    Chronic obstructive pulmonary disease ([place] [address] )  Urged smoking cessation  Continue Trelegy  Repeat CT of the lungs in November  Recheck 6m    Hypertension  Well controlled  Cont present treatment  Monitor labs  Recheck 6m      Anxiety  Stable  Check labns  Continue present meds  Recheck 6m    Mild episode of recurrent major depressive disorder ([place] [address] )  I reviewed with pt  Depression Screening Follow-up Plan: Patient's depression screening was positive with a PHQ-2 score of 2  Their PHQ-9 score was 6  Patient assessed for underlying major depression  They have no active suicidal ideations  Brief counseling provided and recommend additional follow-up/re-evaluation next office visit  Continue present meds  Recheck 6m      Chest pain  Unusual - no symptoms with exertion  I reviewed with pt  ECG WNL  Urged smoking cessation  Check labs  Recheck 6m - earlier if symptoms return  To ED if symptoms worsen    Prediabetes  Urged diet compliance  Check labs  Recheck 6m    Severe obesity (BMI 35 0-35 9 with comorbidity) ([facility])  BMI Counseling: Body mass index is 36 14 kg/m²  The BMI is above normal  Nutrition recommendations include reducing portion sizes, consuming healthier snacks, moderation in carbohydrate intake, increasing intake of lean protein, reducing intake of saturated fat and trans fat and reducing intake of cholesterol  Exercise recommendations include exercising 3-5 times per week  Recheck 6m       Diagnoses and all orders for this visit:    Chest pain, unspecified type  -     POCT ECG    Foul smelling urine  Comments:  ua unremarkable  Increase water intake  Recheck if symptoms worsen or if symptoms of infection occur   Orders:  -     POCT urine dip    Chronic bronchitis, unspecified chronic bronchitis type ([place] [address] )  -     Promethazine-DM (PHENERGAN-DM) 6 25-15 mg/5 mL oral syrup;  Take 5 mL by mouth 4 (four) times a day as needed for cough    Essential hypertension  -     CBC and differential; Future  -     Comprehensive metabolic panel; Future  -     Lipid panel; Future    Prediabetes    Anxiety  -     TSH, 3rd generation with Free T4 reflex; Future    Vitamin D deficiency  -     Vitamin D 25 hydroxy; Future    Encounter for hepatitis C screening test for low risk patient  -     Hepatitis C antibody; Future    Mild episode of recurrent major depressive disorder (HCC)    Severe obesity (BMI 35 0-35 9 with comorbidity) ([facility] )    Other orders  -     Misc Natural Products (TART CHERRY ADVANCED PO); Take 1,000 mg/day by mouth  -     glucosamine-chondroitin 500-400 MG tablet; Take 1 tablet by mouth 3 (three) times a day          Subjective:      Patient ID: Talon Rodrigues is a 72 y o  female  f/u multiple med issues  - mood better than last visit but still has some bad days  - pt has had 3 brief episodes of brief CP with SOB lasting approx 30sec after laying flat  Pt has not noted any symptoms with exertion  Resolves without sequelae  - pt notes strong smelling urine over the last 2m  No dysuria or hematuria  No     - appetite labile, otherwise no other Gi symptoms  - pt getting L lower leg pain that radiates up her leg when laying on her L side  No similar pain with laying on her back, standing or ambulating  - still smoking  1 1/2 - 2ppd  Breathing well on Trelegy  Due for repeat CT of the lungs in November      The following portions of the patient's history were reviewed and updated as appropriate:   She  has a past medical history of Acid reflux, Fibromyalgia, Hypertension, and Seasonal allergies    She   Patient Active Problem List    Diagnosis Date Noted    Chest pain 02/08/2021    Dermatitis 09/14/2020    Prediabetes 04/10/2020    Chronic idiopathic constipation 12/16/2019    Gastroparesis 12/16/2019    Severe obesity (BMI 35 0-35 9 with comorbidity) ([facility] [address] ) 11/21/2019    Lower extremity edema 11/21/2019    Snoring 11/21/2019    Tobacco dependence 10/02/2019    Neck pain 07/11/2019    Neuropathic pain 01/09/2019    Early satiety 08/28/2018    Abdominal distension 08/28/2018    Family history of pancreatic cancer 08/28/2018    Pain of upper abdomen 08/28/2018    Dyspnea on exertion 05/04/2018    Edema 07/13/2017    Lumbosacral radiculopathy at L5 01/06/2017    Compression fracture of thoracic vertebra, non-traumatic (HCC) 08/30/2016    Hyperlipidemia 12/08/2015    Cataract 08/22/2014    Palpitations 08/05/2014    Chronic obstructive pulmonary disease ([facility] [address] ) 04/17/2013    Esophageal reflux 04/17/2013    Pulmonary nodule seen on imaging study 04/17/2013    Hypertension 03/22/2013    Mild episode of recurrent major depressive disorder ([facility] [address] ) 03/14/2013    Fibromyalgia 03/14/2013    Degeneration of intervertebral disc 03/14/2013    Anxiety 02/21/2013    Seasonal allergic rhinitis 12/20/2012     She  has a past surgical history that includes Knee surgery (1998); pr colonoscopy flx dx w/collj spec when pfrmd (N/A, 5/9/2017); and Eye surgery  She  reports that she has been smoking cigarettes  She has a 100 00 pack-year smoking history  She has never used smokeless tobacco  She reports that she does not drink alcohol or use drugs    Current Outpatient Medications   Medication Sig Dispense Refill    glucosamine-chondroitin 500-400 MG tablet Take 1 tablet by mouth 3 (three) times a day      Misc Natural Products (TART CHERRY ADVANCED PO) Take 1,000 mg/day by mouth      acetaminophen (TYLENOL 8 HOUR ARTHRITIS PAIN) 650 mg CR tablet Take by mouth      albuterol (ProAir HFA) 90 mcg/act inhaler Inhale 2 puffs every 6 (six) hours as needed for wheezing 8 5 g 1    ALPRAZolam (XANAX) 0 25 mg tablet Take 1 tablet (0 25 mg total) by mouth 3 (three) times a day as needed for anxiety 90 tablet 0    bisacodyl (DULCOLAX) 5 mg EC tablet Take 1 tablet (5 mg total) by mouth daily at bedtime With colonoscopy prep per instructions 30 tablet 0    cetirizine (ZyrTEC) 10 mg tablet TAKE 1 TABLET BY MOUTH EVERY DAY 30 tablet 0    escitalopram (LEXAPRO) 10 mg tablet TAKE 1 TABLET BY MOUTH EVERY DAY 90 tablet 1    fluticasone (FLONASE) 50 mcg/act nasal spray SPRAY 2 SPRAYS INTO EACH NOSTRIL EVERY DAY (Patient not taking: Reported on 9/22/2020) 16 mL 3    fluticasone-umeclidinium-vilanterol (TRELEGY) 100-62 5-25 MCG/INH inhaler Inhale 1 puff daily Rinse mouth after use  1 Inhaler 1    lisinopril (ZESTRIL) 20 mg tablet TAKE 1 TABLET BY MOUTH EVERY DAY 30 tablet 5    MINOXIDIL, TOPICAL, 5 % SOLN Apply 1 application topically      montelukast (SINGULAIR) 10 mg tablet TAKE 1 TABLET BY MOUTH EVERYDAY AT BEDTIME 30 tablet 5    pantoprazole (PROTONIX) 40 mg tablet TAKE 1 TABLET BY MOUTH EVERY DAY 30 tablet 0    Probiotic Product (PROBIOTIC-10) CAPS Take by mouth 2 (two) times a day      Promethazine-DM (PHENERGAN-DM) 6 25-15 mg/5 mL oral syrup Take 5 mL by mouth 4 (four) times a day as needed for cough 150 mL 0    senna (Senokot) 8 6 MG tablet Take 1 tablet (8 6 mg total) by mouth daily 90 tablet 0     No current facility-administered medications for this visit  She is allergic to augmentin [amoxicillin-pot clavulanate]; miconazole; and wixela inhub [fluticasone-salmeterol]       Review of Systems   Constitutional: Positive for fatigue  Negative for diaphoresis, fever and unexpected weight change  HENT: Positive for congestion (occasional)  Eyes: Negative  Respiratory: Negative  Cardiovascular: Negative  Gastrointestinal: Negative  Endocrine: Negative  Genitourinary: Negative for decreased urine volume, difficulty urinating, dysuria, flank pain, frequency, hematuria and urgency  Strong smelling urine   Musculoskeletal: Positive for arthralgias and myalgias  Negative for gait problem  Neurological: Negative for dizziness, weakness, light-headedness, numbness and headaches  Hematological: Negative      Psychiatric/Behavioral: Positive for decreased concentration, dysphoric mood and sleep disturbance  Negative for self-injury and suicidal ideas  Objective:      /82   Pulse 82   Temp 98 5 °F (36 9 °C)   Ht 5' 3" (1 6 m)   Wt 92 5 kg (204 lb)   LMP  (LMP Unknown)   BMI 36 14 kg/m²          Physical Exam  Vitals signs reviewed  Constitutional:       Appearance: She is well-developed  She is not diaphoretic  HENT:      Head: Normocephalic and atraumatic  Right Ear: Tympanic membrane, ear canal and external ear normal       Left Ear: Tympanic membrane, ear canal and external ear normal    Eyes:      Extraocular Movements: Extraocular movements intact  Conjunctiva/sclera: Conjunctivae normal       Pupils: Pupils are equal, round, and reactive to light  Neck:      Musculoskeletal: Normal range of motion and neck supple  No muscular tenderness  Thyroid: No thyromegaly  Vascular: No JVD  Cardiovascular:      Rate and Rhythm: Normal rate and regular rhythm  Pulses: Normal pulses  Heart sounds: No murmur  Pulmonary:      Effort: Pulmonary effort is normal       Breath sounds: Normal breath sounds  Abdominal:      General: There is no distension  Palpations: Abdomen is soft  There is no mass  Tenderness: There is no abdominal tenderness  There is no right CVA tenderness or left CVA tenderness  Musculoskeletal: Normal range of motion  General: Tenderness (over the lower paralumbar spine and SI joints  L lateral hip with sl tenderness over cyst like mass at the greater trochanter) present  No swelling  Lymphadenopathy:      Cervical: No cervical adenopathy  Skin:     General: Skin is warm and dry  Capillary Refill: Capillary refill takes less than 2 seconds  Neurological:      Mental Status: She is alert and oriented to person, place, and time  Cranial Nerves: No cranial nerve deficit  Sensory: No sensory deficit  Motor: No weakness or abnormal muscle tone        Coordination: Coordination normal  Deep Tendon Reflexes: Reflexes are normal and symmetric  Reflexes normal    Psychiatric:         Mood and Affect: Mood normal          Behavior: Behavior normal          Thought Content: Thought content normal          Judgment: Judgment normal       Comments: PHQ-9 Depression Screening    PHQ-9:   Frequency of the following problems over the past two weeks:      Little interest or pleasure in doing things: 1 - several days  Feeling down, depressed, or hopeless: 1 - several days  Trouble falling or staying asleep, or sleeping too much: 1 - several days  Feeling tired or having little energy: 2 - more than half the days  Poor appetite or overeatin - several days  Feeling bad about yourself - or that you are a failure or have let   yourself or your family down: 0 - not at all  Trouble concentrating on things, such as reading the newspaper or watching   television: 0 - not at all  Moving or speaking so slowly that other people could have noticed   Or the   opposite - being so fidgety or restless that you have been moving around a   lot more than usual: 0 - not at all  Thoughts that you would be better off dead, or of hurting yourself in some   way: 0 - not at all  PHQ-2 Score: 2  PHQ-9 Score: 6

## 2021-02-08 NOTE — ASSESSMENT & PLAN NOTE
Unusual - no symptoms with exertion  I reviewed with pt  ECG WNL  Urged smoking cessation  Check labs  Recheck 6m - earlier if symptoms return   To ED if symptoms worsen

## 2021-02-08 NOTE — ASSESSMENT & PLAN NOTE
BMI Counseling: Body mass index is 36 14 kg/m²  The BMI is above normal  Nutrition recommendations include reducing portion sizes, consuming healthier snacks, moderation in carbohydrate intake, increasing intake of lean protein, reducing intake of saturated fat and trans fat and reducing intake of cholesterol  Exercise recommendations include exercising 3-5 times per week    Recheck 6m

## 2021-02-20 DIAGNOSIS — I10 ESSENTIAL HYPERTENSION: ICD-10-CM

## 2021-02-21 RX ORDER — LISINOPRIL 20 MG/1
TABLET ORAL
Qty: 30 TABLET | Refills: 5 | Status: SHIPPED | OUTPATIENT
Start: 2021-02-21 | End: 2021-06-21

## 2021-02-27 DIAGNOSIS — K21.9 GASTROESOPHAGEAL REFLUX DISEASE: ICD-10-CM

## 2021-02-28 RX ORDER — PANTOPRAZOLE SODIUM 40 MG/1
TABLET, DELAYED RELEASE ORAL
Qty: 30 TABLET | Refills: 0 | Status: SHIPPED | OUTPATIENT
Start: 2021-02-28 | End: 2021-03-24

## 2021-03-15 DIAGNOSIS — J30.9 ALLERGIC RHINITIS, UNSPECIFIED SEASONALITY, UNSPECIFIED TRIGGER: ICD-10-CM

## 2021-03-15 RX ORDER — CETIRIZINE HYDROCHLORIDE 10 MG/1
TABLET ORAL
Qty: 30 TABLET | Refills: 0 | Status: SHIPPED | OUTPATIENT
Start: 2021-03-15 | End: 2021-04-07

## 2021-03-23 ENCOUNTER — TELEPHONE (OUTPATIENT)
Dept: FAMILY MEDICINE CLINIC | Facility: CLINIC | Age: 66
End: 2021-03-23

## 2021-03-23 NOTE — TELEPHONE ENCOUNTER
Patient left a message on the refill line requesting nystatin oral suspension  This is no longer on her med list  Last prescribed in October for [address]   Uses [facility] [place]

## 2021-03-24 DIAGNOSIS — K21.9 GASTROESOPHAGEAL REFLUX DISEASE: ICD-10-CM

## 2021-03-24 DIAGNOSIS — B37.0 THRUSH: Primary | ICD-10-CM

## 2021-03-24 RX ORDER — PANTOPRAZOLE SODIUM 40 MG/1
TABLET, DELAYED RELEASE ORAL
Qty: 30 TABLET | Refills: 0 | Status: SHIPPED | OUTPATIENT
Start: 2021-03-24 | End: 2021-04-19

## 2021-03-29 ENCOUNTER — OFFICE VISIT (OUTPATIENT)
Dept: PULMONOLOGY | Facility: CLINIC | Age: 66
End: 2021-03-29
Payer: MEDICARE

## 2021-03-29 ENCOUNTER — TELEPHONE (OUTPATIENT)
Dept: DERMATOLOGY | Facility: CLINIC | Age: 66
End: 2021-03-29

## 2021-03-29 VITALS
RESPIRATION RATE: 16 BRPM | HEIGHT: 64 IN | TEMPERATURE: 98 F | BODY MASS INDEX: 34.83 KG/M2 | SYSTOLIC BLOOD PRESSURE: 150 MMHG | DIASTOLIC BLOOD PRESSURE: 96 MMHG | HEART RATE: 74 BPM | OXYGEN SATURATION: 97 % | WEIGHT: 204 LBS

## 2021-03-29 DIAGNOSIS — F17.200 TOBACCO DEPENDENCE: ICD-10-CM

## 2021-03-29 DIAGNOSIS — J42 CHRONIC BRONCHITIS, UNSPECIFIED CHRONIC BRONCHITIS TYPE (HCC): Primary | ICD-10-CM

## 2021-03-29 PROCEDURE — 99214 OFFICE O/P EST MOD 30 MIN: CPT | Performed by: INTERNAL MEDICINE

## 2021-03-29 NOTE — PROGRESS NOTES
Pulmonary Follow Up Note   Laurita Carmen 72 y o  female MRN: [id number]  3/29/2021      Assessment:    Chronic obstructive pulmonary disease ([facility] )  Symptoms are very well controlled on Trelegy, rescue inhaler use is infrequent  Continue Trelegy  She declines COVID-19 vaccination and declines lung cancer screening CT scans  Tobacco dependence  Continues to smoke 1 5 PPD  She was again advised we have options available to help should she decide she is ready to quit, but she remains pre-contemplative  Plan:    Diagnoses and all orders for this visit:    Chronic bronchitis, unspecified chronic bronchitis type ([facility] )   - Continue Trelegy with rescue albuterol PRN  Tobacco dependence    No follow-ups on file  History of Present Illness   HPI:  Laurita Carmen is a 72 y o  female who returns for follow up  She reports feeling very well, the Trelegy has resulted in her needing almost no rescue inhaler usage  She has improvements in her dyspnea, she is able to perform a moderate level of exertion before noticing any shortness of breath  She declines CT screening for lung cancer, declines COVID vaccination, and declines assistance with smoking cessation  S he continues to smoke 1 5 PPD and says it is "just part of her life" now  She had no other acute complaints and no other concerns to address today  Review of Systems   Respiratory: Negative for cough, shortness of breath and wheezing  Cardiovascular: Positive for leg swelling  All other systems reviewed and are negative  Historical Information   Past Medical History:   Diagnosis Date    Acid reflux     Fibromyalgia     Hypertension     Seasonal allergies      Past Surgical History:   Procedure Laterality Date    EYE SURGERY      KNEE SURGERY  1998    KS COLONOSCOPY FLX DX W/COLLJ SPEC WHEN PFRMD N/A 5/9/2017    Procedure: EGD AND COLONOSCOPY;  Surgeon: Dale Kenny MD;  [facility];   Service: Gastroenterology     Family History Problem Relation Age of Onset    Pancreatic cancer Mother 67    Lung cancer Sister 40    Cancer Brother         pancreatic    Coronary artery disease Father     Diabetes Father     Hypertension Father     Heart disease Father     Diabetes Paternal Grandmother     Lung cancer Maternal Grandfather 68         Meds/Allergies     Current Outpatient Medications:     acetaminophen (TYLENOL 8 HOUR ARTHRITIS PAIN) 650 mg CR tablet, Take by mouth, Disp: , Rfl:     albuterol (ProAir HFA) 90 mcg/act inhaler, Inhale 2 puffs every 6 (six) hours as needed for wheezing, Disp: 8 5 g, Rfl: 1    ALPRAZolam (XANAX) 0 25 mg tablet, Take 1 tablet (0 25 mg total) by mouth 3 (three) times a day as needed for anxiety, Disp: 90 tablet, Rfl: 0    bisacodyl (DULCOLAX) 5 mg EC tablet, Take 1 tablet (5 mg total) by mouth daily at bedtime With colonoscopy prep per instructions, Disp: 30 tablet, Rfl: 0    cetirizine (ZyrTEC) 10 mg tablet, TAKE 1 TABLET BY MOUTH EVERY DAY, Disp: 30 tablet, Rfl: 0    escitalopram (LEXAPRO) 10 mg tablet, TAKE 1 TABLET BY MOUTH EVERY DAY, Disp: 90 tablet, Rfl: 1    fluticasone (FLONASE) 50 mcg/act nasal spray, SPRAY 2 SPRAYS INTO EACH NOSTRIL EVERY DAY, Disp: 16 mL, Rfl: 3    fluticasone-umeclidinium-vilanterol (TRELEGY) 100-62 5-25 MCG/INH inhaler, Inhale 1 puff daily Rinse mouth after use , Disp: 1 Inhaler, Rfl: 1    glucosamine-chondroitin 500-400 MG tablet, Take 1 tablet by mouth 3 (three) times a day, Disp: , Rfl:     lisinopril (ZESTRIL) 20 mg tablet, TAKE 1 TABLET BY MOUTH EVERY DAY, Disp: 30 tablet, Rfl: 5    MINOXIDIL, TOPICAL, 5 % SOLN, Apply 1 application topically, Disp: , Rfl:     Misc Natural Products (TART CHERRY ADVANCED PO), Take 1,000 mg/day by mouth, Disp: , Rfl:     montelukast (SINGULAIR) 10 mg tablet, TAKE 1 TABLET BY MOUTH EVERYDAY AT BEDTIME, Disp: 30 tablet, Rfl: 5    nystatin (MYCOSTATIN) 500,000 units/5 mL suspension, Apply 5 mL (500,000 Units total) to the mouth or throat 4 (four) times a day, Disp: 200 mL, Rfl: 3    pantoprazole (PROTONIX) 40 mg tablet, TAKE 1 TABLET BY MOUTH EVERY DAY, Disp: 30 tablet, Rfl: 0    Promethazine-DM (PHENERGAN-DM) 6 25-15 mg/5 mL oral syrup, Take 5 mL by mouth 4 (four) times a day as needed for cough, Disp: 150 mL, Rfl: 0    senna (Senokot) 8 6 MG tablet, Take 1 tablet (8 6 mg total) by mouth daily, Disp: 90 tablet, Rfl: 0    Probiotic Product (PROBIOTIC-10) CAPS, Take by mouth 2 (two) times a day, Disp: , Rfl:   Allergies   Allergen Reactions    Augmentin [Amoxicillin-Pot Clavulanate] Vomiting    Miconazole Itching and Swelling    Wixela Inhub [Fluticasone-Salmeterol] Palpitations       Vitals: Blood pressure 150/96, pulse 74, temperature 98 °F (36 7 °C), temperature source Tympanic, resp  rate 16, height 5' 4" (1 626 m), weight 92 5 kg (204 lb), SpO2 97 %, not currently breastfeeding  Body mass index is 35 02 kg/m²  Oxygen Therapy  SpO2: 97 %      Physical Exam  Physical Exam  Vitals signs reviewed  Constitutional:       General: She is not in acute distress  Appearance: Normal appearance  She is well-developed  She is not ill-appearing  HENT:      Head: Normocephalic and atraumatic  Eyes:      General: No scleral icterus  Conjunctiva/sclera: Conjunctivae normal    Neck:      Musculoskeletal: Neck supple  Vascular: No JVD  Cardiovascular:      Rate and Rhythm: Normal rate and regular rhythm  Heart sounds: Normal heart sounds  No murmur  No friction rub  No gallop  Pulmonary:      Effort: Pulmonary effort is normal  No respiratory distress  Breath sounds: Normal breath sounds  No wheezing or rales  Musculoskeletal:      Right lower leg: No edema  Left lower leg: No edema  Skin:     General: Skin is warm and dry  Findings: No rash  Neurological:      General: No focal deficit present  Mental Status: She is alert and oriented to person, place, and time  Mental status is at baseline  Psychiatric:         Mood and Affect: Mood normal          Behavior: Behavior normal        Labs: I have personally reviewed pertinent lab results  Lab Results   Component Value Date    WBC 10 73 (H) 10/26/2020    HGB 14 3 10/26/2020    HCT 44 2 10/26/2020    MCV 90 10/26/2020     10/26/2020     Lab Results   Component Value Date    GLUCOSE 86 10/08/2015    CALCIUM 9 4 09/04/2020     10/08/2015    K 4 4 09/04/2020    CO2 29 09/04/2020     09/04/2020    BUN 13 09/04/2020    CREATININE 0 59 (L) 09/04/2020     No results found for: IGE  Lab Results   Component Value Date    ALT 25 09/04/2020    AST 12 09/04/2020    ALKPHOS 79 09/04/2020    BILITOT 0 39 10/08/2015     Imaging and other studies: I have personally reviewed pertinent films in PACS    Pulmonary function testing:   FEV1/FVC ratio 78%    FEV1 59% predicted  FVC 60% predicted  Positive response to bronchodilators  TLC 90 % predicted   % predicted  DLCO corrected for hemoglobin 70 % predicted  Consistent with probable moderate COPD, ratio likely artificially reduced due to artificially low FVC  EKG, Pathology, and Other Studies: I have personally reviewed pertinent reports  [facility]

## 2021-03-29 NOTE — TELEPHONE ENCOUNTER
Patient is listed on the recall list  Attempt to reach patient to schedule f/u  Left message asking pt to call if she needs to follow up

## 2021-03-29 NOTE — ASSESSMENT & PLAN NOTE
Continues to smoke 1 5 PPD  She was again advised we have options available to help should she decide she is ready to quit, but she remains pre-contemplative

## 2021-03-29 NOTE — ASSESSMENT & PLAN NOTE
Symptoms are very well controlled on Trelegy, rescue inhaler use is infrequent  Continue Trelegy  She declines COVID-19 vaccination and declines lung cancer screening CT scans

## 2021-03-30 DIAGNOSIS — J42 CHRONIC BRONCHITIS, UNSPECIFIED CHRONIC BRONCHITIS TYPE (HCC): ICD-10-CM

## 2021-03-30 RX ORDER — FLUTICASONE FUROATE, UMECLIDINIUM BROMIDE AND VILANTEROL TRIFENATATE 100; 62.5; 25 UG/1; UG/1; UG/1
POWDER RESPIRATORY (INHALATION)
Qty: 1 INHALER | Refills: 5 | Status: SHIPPED | OUTPATIENT
Start: 2021-03-30 | End: 2021-09-03 | Stop reason: ALTCHOICE

## 2021-04-06 DIAGNOSIS — J30.9 ALLERGIC RHINITIS, UNSPECIFIED SEASONALITY, UNSPECIFIED TRIGGER: ICD-10-CM

## 2021-04-07 RX ORDER — CETIRIZINE HYDROCHLORIDE 10 MG/1
TABLET ORAL
Qty: 30 TABLET | Refills: 0 | Status: SHIPPED | OUTPATIENT
Start: 2021-04-07 | End: 2021-04-15 | Stop reason: SDUPTHER

## 2021-04-14 DIAGNOSIS — J42 CHRONIC BRONCHITIS, UNSPECIFIED CHRONIC BRONCHITIS TYPE (HCC): ICD-10-CM

## 2021-04-14 DIAGNOSIS — J30.2 SEASONAL ALLERGIC RHINITIS, UNSPECIFIED TRIGGER: ICD-10-CM

## 2021-04-14 DIAGNOSIS — F33.0 DEPRESSION, MAJOR, RECURRENT, MILD (HCC): ICD-10-CM

## 2021-04-15 DIAGNOSIS — J30.9 ALLERGIC RHINITIS, UNSPECIFIED SEASONALITY, UNSPECIFIED TRIGGER: ICD-10-CM

## 2021-04-15 RX ORDER — ESCITALOPRAM OXALATE 10 MG/1
TABLET ORAL
Qty: 90 TABLET | Refills: 1 | Status: SHIPPED | OUTPATIENT
Start: 2021-04-15 | End: 2021-08-27

## 2021-04-15 RX ORDER — MONTELUKAST SODIUM 10 MG/1
TABLET ORAL
Qty: 90 TABLET | Refills: 1 | Status: SHIPPED | OUTPATIENT
Start: 2021-04-15 | End: 2021-08-27

## 2021-04-16 RX ORDER — CETIRIZINE HYDROCHLORIDE 10 MG/1
10 TABLET ORAL DAILY
Qty: 90 TABLET | Refills: 0 | Status: SHIPPED | OUTPATIENT
Start: 2021-04-16 | End: 2021-06-22

## 2021-04-18 DIAGNOSIS — K21.9 GASTROESOPHAGEAL REFLUX DISEASE: ICD-10-CM

## 2021-04-19 DIAGNOSIS — B34.9 VIRAL INFECTION, UNSPECIFIED: ICD-10-CM

## 2021-04-19 DIAGNOSIS — Z03.818 ENCOUNTER FOR OBSERVATION FOR SUSPECTED EXPOSURE TO OTHER BIOLOGICAL AGENTS RULED OUT: ICD-10-CM

## 2021-04-19 DIAGNOSIS — J01.00 ACUTE NON-RECURRENT MAXILLARY SINUSITIS: Primary | ICD-10-CM

## 2021-04-19 RX ORDER — AZITHROMYCIN 250 MG/1
TABLET, FILM COATED ORAL
Qty: 6 TABLET | Refills: 0 | Status: SHIPPED | OUTPATIENT
Start: 2021-04-19 | End: 2021-04-23

## 2021-04-19 RX ORDER — PANTOPRAZOLE SODIUM 40 MG/1
TABLET, DELAYED RELEASE ORAL
Qty: 30 TABLET | Refills: 0 | Status: SHIPPED | OUTPATIENT
Start: 2021-04-19 | End: 2021-05-11

## 2021-04-20 DIAGNOSIS — Z03.818 ENCOUNTER FOR OBSERVATION FOR SUSPECTED EXPOSURE TO OTHER BIOLOGICAL AGENTS RULED OUT: ICD-10-CM

## 2021-04-20 DIAGNOSIS — B34.9 VIRAL INFECTION, UNSPECIFIED: ICD-10-CM

## 2021-04-20 PROCEDURE — U0005 INFEC AGEN DETEC AMPLI PROBE: HCPCS | Performed by: FAMILY MEDICINE

## 2021-04-20 PROCEDURE — U0003 INFECTIOUS AGENT DETECTION BY NUCLEIC ACID (DNA OR RNA); SEVERE ACUTE RESPIRATORY SYNDROME CORONAVIRUS 2 (SARS-COV-2) (CORONAVIRUS DISEASE [COVID-19]), AMPLIFIED PROBE TECHNIQUE, MAKING USE OF HIGH THROUGHPUT TECHNOLOGIES AS DESCRIBED BY CMS-2020-01-R: HCPCS | Performed by: FAMILY MEDICINE

## 2021-04-21 LAB — SARS-COV-2 RNA RESP QL NAA+PROBE: NEGATIVE

## 2021-05-01 ENCOUNTER — APPOINTMENT (OUTPATIENT)
Dept: LAB | Facility: MEDICAL CENTER | Age: 66
End: 2021-05-01
Payer: MEDICARE

## 2021-05-01 DIAGNOSIS — Z11.59 ENCOUNTER FOR HEPATITIS C SCREENING TEST FOR LOW RISK PATIENT: ICD-10-CM

## 2021-05-01 DIAGNOSIS — I10 ESSENTIAL HYPERTENSION: ICD-10-CM

## 2021-05-01 DIAGNOSIS — E55.9 VITAMIN D DEFICIENCY: ICD-10-CM

## 2021-05-01 DIAGNOSIS — F41.9 ANXIETY: ICD-10-CM

## 2021-05-01 LAB
25(OH)D3 SERPL-MCNC: 19.1 NG/ML (ref 30–100)
ALBUMIN SERPL BCP-MCNC: 3.7 G/DL (ref 3.5–5)
ALP SERPL-CCNC: 77 U/L (ref 46–116)
ALT SERPL W P-5'-P-CCNC: 24 U/L (ref 12–78)
ANION GAP SERPL CALCULATED.3IONS-SCNC: 5 MMOL/L (ref 4–13)
AST SERPL W P-5'-P-CCNC: 18 U/L (ref 5–45)
BASOPHILS # BLD AUTO: 0.07 THOUSANDS/ΜL (ref 0–0.1)
BASOPHILS NFR BLD AUTO: 1 % (ref 0–1)
BILIRUB SERPL-MCNC: 0.41 MG/DL (ref 0.2–1)
BUN SERPL-MCNC: 8 MG/DL (ref 5–25)
CALCIUM SERPL-MCNC: 9.5 MG/DL (ref 8.3–10.1)
CHLORIDE SERPL-SCNC: 104 MMOL/L (ref 100–108)
CHOLEST SERPL-MCNC: 236 MG/DL (ref 50–200)
CO2 SERPL-SCNC: 29 MMOL/L (ref 21–32)
CREAT SERPL-MCNC: 0.61 MG/DL (ref 0.6–1.3)
EOSINOPHIL # BLD AUTO: 0.22 THOUSAND/ΜL (ref 0–0.61)
EOSINOPHIL NFR BLD AUTO: 2 % (ref 0–6)
ERYTHROCYTE [DISTWIDTH] IN BLOOD BY AUTOMATED COUNT: 13.2 % (ref 11.6–15.1)
GFR SERPL CREATININE-BSD FRML MDRD: 95 ML/MIN/1.73SQ M
GLUCOSE P FAST SERPL-MCNC: 101 MG/DL (ref 65–99)
HCT VFR BLD AUTO: 46.2 % (ref 34.8–46.1)
HCV AB SER QL: NORMAL
HDLC SERPL-MCNC: 49 MG/DL
HGB BLD-MCNC: 14.6 G/DL (ref 11.5–15.4)
IMM GRANULOCYTES # BLD AUTO: 0.08 THOUSAND/UL (ref 0–0.2)
IMM GRANULOCYTES NFR BLD AUTO: 1 % (ref 0–2)
LDLC SERPL CALC-MCNC: 148 MG/DL (ref 0–100)
LYMPHOCYTES # BLD AUTO: 1.82 THOUSANDS/ΜL (ref 0.6–4.47)
LYMPHOCYTES NFR BLD AUTO: 17 % (ref 14–44)
MCH RBC QN AUTO: 28.2 PG (ref 26.8–34.3)
MCHC RBC AUTO-ENTMCNC: 31.6 G/DL (ref 31.4–37.4)
MCV RBC AUTO: 89 FL (ref 82–98)
MONOCYTES # BLD AUTO: 0.85 THOUSAND/ΜL (ref 0.17–1.22)
MONOCYTES NFR BLD AUTO: 8 % (ref 4–12)
NEUTROPHILS # BLD AUTO: 7.43 THOUSANDS/ΜL (ref 1.85–7.62)
NEUTS SEG NFR BLD AUTO: 71 % (ref 43–75)
NONHDLC SERPL-MCNC: 187 MG/DL
NRBC BLD AUTO-RTO: 0 /100 WBCS
PLATELET # BLD AUTO: 281 THOUSANDS/UL (ref 149–390)
PMV BLD AUTO: 10.3 FL (ref 8.9–12.7)
POTASSIUM SERPL-SCNC: 4.5 MMOL/L (ref 3.5–5.3)
PROT SERPL-MCNC: 7.8 G/DL (ref 6.4–8.2)
RBC # BLD AUTO: 5.18 MILLION/UL (ref 3.81–5.12)
SODIUM SERPL-SCNC: 138 MMOL/L (ref 136–145)
TRIGL SERPL-MCNC: 196 MG/DL
TSH SERPL DL<=0.05 MIU/L-ACNC: 3.02 UIU/ML (ref 0.36–3.74)
WBC # BLD AUTO: 10.47 THOUSAND/UL (ref 4.31–10.16)

## 2021-05-01 PROCEDURE — 82306 VITAMIN D 25 HYDROXY: CPT

## 2021-05-01 PROCEDURE — 80053 COMPREHEN METABOLIC PANEL: CPT

## 2021-05-01 PROCEDURE — 86803 HEPATITIS C AB TEST: CPT

## 2021-05-01 PROCEDURE — 36415 COLL VENOUS BLD VENIPUNCTURE: CPT

## 2021-05-01 PROCEDURE — 85025 COMPLETE CBC W/AUTO DIFF WBC: CPT

## 2021-05-01 PROCEDURE — 80061 LIPID PANEL: CPT

## 2021-05-01 PROCEDURE — 84443 ASSAY THYROID STIM HORMONE: CPT

## 2021-05-04 DIAGNOSIS — E55.9 VITAMIN D DEFICIENCY: Primary | ICD-10-CM

## 2021-05-04 RX ORDER — ERGOCALCIFEROL 1.25 MG/1
50000 CAPSULE ORAL WEEKLY
Qty: 4 CAPSULE | Refills: 0 | Status: SHIPPED | OUTPATIENT
Start: 2021-05-04 | End: 2021-05-24

## 2021-05-10 ENCOUNTER — OFFICE VISIT (OUTPATIENT)
Dept: FAMILY MEDICINE CLINIC | Facility: CLINIC | Age: 66
End: 2021-05-10
Payer: MEDICARE

## 2021-05-10 VITALS
TEMPERATURE: 97.9 F | HEIGHT: 64 IN | DIASTOLIC BLOOD PRESSURE: 82 MMHG | HEART RATE: 76 BPM | WEIGHT: 203 LBS | SYSTOLIC BLOOD PRESSURE: 128 MMHG | BODY MASS INDEX: 34.66 KG/M2

## 2021-05-10 DIAGNOSIS — E66.01 SEVERE OBESITY (BMI 35.0-35.9 WITH COMORBIDITY) (HCC): ICD-10-CM

## 2021-05-10 DIAGNOSIS — Z13.6 SCREENING FOR CARDIOVASCULAR, RESPIRATORY, AND GENITOURINARY DISEASES: ICD-10-CM

## 2021-05-10 DIAGNOSIS — K31.84 GASTROPARESIS: ICD-10-CM

## 2021-05-10 DIAGNOSIS — Z13.89 SCREENING FOR CARDIOVASCULAR, RESPIRATORY, AND GENITOURINARY DISEASES: ICD-10-CM

## 2021-05-10 DIAGNOSIS — J42 CHRONIC BRONCHITIS, UNSPECIFIED CHRONIC BRONCHITIS TYPE (HCC): ICD-10-CM

## 2021-05-10 DIAGNOSIS — I10 ESSENTIAL HYPERTENSION: ICD-10-CM

## 2021-05-10 DIAGNOSIS — F33.41 RECURRENT MAJOR DEPRESSIVE DISORDER, IN PARTIAL REMISSION (HCC): ICD-10-CM

## 2021-05-10 DIAGNOSIS — Z00.00 WELCOME TO MEDICARE PREVENTIVE VISIT: Primary | ICD-10-CM

## 2021-05-10 DIAGNOSIS — Z13.83 SCREENING FOR CARDIOVASCULAR, RESPIRATORY, AND GENITOURINARY DISEASES: ICD-10-CM

## 2021-05-10 PROCEDURE — G0402 INITIAL PREVENTIVE EXAM: HCPCS | Performed by: FAMILY MEDICINE

## 2021-05-10 PROCEDURE — 1123F ACP DISCUSS/DSCN MKR DOCD: CPT | Performed by: FAMILY MEDICINE

## 2021-05-10 PROCEDURE — G0403 EKG FOR INITIAL PREVENT EXAM: HCPCS | Performed by: FAMILY MEDICINE

## 2021-05-10 PROCEDURE — 99214 OFFICE O/P EST MOD 30 MIN: CPT | Performed by: FAMILY MEDICINE

## 2021-05-10 NOTE — PATIENT INSTRUCTIONS
Medicare Preventive Visit Patient Instructions  Thank you for completing your Welcome to Medicare Visit or Medicare Annual Wellness Visit today  Your next wellness visit will be due in one year (5/11/2022)  The screening/preventive services that you may require over the next 5-10 years are detailed below  Some tests may not apply to you based off risk factors and/or age  Screening tests ordered at today's visit but not completed yet may show as past due  Also, please note that scanned in results may not display below  Preventive Screenings:  Service Recommendations Previous Testing/Comments   Colorectal Cancer Screening  * Colonoscopy    * Fecal Occult Blood Test (FOBT)/Fecal Immunochemical Test (FIT)  * Fecal DNA/Cologuard Test  * Flexible Sigmoidoscopy Age: 54-65 years old   Colonoscopy: every 10 years (may be performed more frequently if at higher risk)  OR  FOBT/FIT: every 1 year  OR  Cologuard: every 3 years  OR  Sigmoidoscopy: every 5 years  Screening may be recommended earlier than age 48 if at higher risk for colorectal cancer  Also, an individualized decision between you and your healthcare provider will decide whether screening between the ages of 74-80 would be appropriate  Colonoscopy: 05/09/2017  FOBT/FIT: Not on file  Cologuard: Not on file  Sigmoidoscopy: Not on file    Screening Current     Breast Cancer Screening Age: 36 years old  Frequency: every 1-2 years  Not required if history of left and right mastectomy Mammogram: 09/10/2020    Screening Current   Cervical Cancer Screening Between the ages of 21-29, pap smear recommended once every 3 years  Between the ages of 33-67, can perform pap smear with HPV co-testing every 5 years     Recommendations may differ for women with a history of total hysterectomy, cervical cancer, or abnormal pap smears in past  Pap Smear: 02/11/2019    Screening Not Indicated   Hepatitis C Screening Once for adults born between 1945 and 1965  More frequently in patients at high risk for Hepatitis C Hep C Antibody: 05/01/2021    Screening Current   Diabetes Screening 1-2 times per year if you're at risk for diabetes or have pre-diabetes Fasting glucose: 101 mg/dL   A1C: 6 0    Screening Current   Cholesterol Screening Once every 5 years if you don't have a lipid disorder  May order more often based on risk factors  Lipid panel: 05/01/2021    Screening Not Indicated  History Lipid Disorder     Other Preventive Screenings Covered by Medicare:  1  Abdominal Aortic Aneurysm (AAA) Screening: covered once if your at risk  You're considered to be at risk if you have a family history of AAA  2  Lung Cancer Screening: covers low dose CT scan once per year if you meet all of the following conditions: (1) Age 50-69; (2) No signs or symptoms of lung cancer; (3) Current smoker or have quit smoking within the last 15 years; (4) You have a tobacco smoking history of at least 30 pack years (packs per day multiplied by number of years you smoked); (5) You get a written order from a healthcare provider  3  Glaucoma Screening: covered annually if you're considered high risk: (1) You have diabetes OR (2) Family history of glaucoma OR (3)  aged 48 and older OR (3)  American aged 72 and older  3  Osteoporosis Screening: covered every 2 years if you meet one of the following conditions: (1) You're estrogen deficient and at risk for osteoporosis based off medical history and other findings; (2) Have a vertebral abnormality; (3) On glucocorticoid therapy for more than 3 months; (4) Have primary hyperparathyroidism; (5) On osteoporosis medications and need to assess response to drug therapy  · Last bone density test (DXA Scan): 09/10/2020   5  HIV Screening: covered annually if you're between the age of 15-65  Also covered annually if you are younger than 13 and older than 72 with risk factors for HIV infection   For pregnant patients, it is covered up to 3 times per pregnancy  Immunizations:  Immunization Recommendations   Influenza Vaccine Annual influenza vaccination during flu season is recommended for all persons aged >= 6 months who do not have contraindications   Pneumococcal Vaccine (Prevnar and Pneumovax)  * Prevnar = PCV13  * Pneumovax = PPSV23   Adults 25-60 years old: 1-3 doses may be recommended based on certain risk factors  Adults 72 years old: Prevnar (PCV13) vaccine recommended followed by Pneumovax (PPSV23) vaccine  If already received PPSV23 since turning 65, then PCV13 recommended at least one year after PPSV23 dose  Hepatitis B Vaccine 3 dose series if at intermediate or high risk (ex: diabetes, end stage renal disease, liver disease)   Tetanus (Td) Vaccine - COST NOT COVERED BY MEDICARE PART B Following completion of primary series, a booster dose should be given every 10 years to maintain immunity against tetanus  Td may also be given as tetanus wound prophylaxis  Tdap Vaccine - COST NOT COVERED BY MEDICARE PART B Recommended at least once for all adults  For pregnant patients, recommended with each pregnancy  Shingles Vaccine (Shingrix) - COST NOT COVERED BY MEDICARE PART B  2 shot series recommended in those aged 48 and above     Health Maintenance Due:      Topic Date Due    Lung Cancer Screening  10/28/2020    MAMMOGRAM  09/10/2021    Cervical Cancer Screening  02/11/2022    DXA SCAN  09/10/2023    Colorectal Cancer Screening  05/09/2027    HIV Screening  Completed    Hepatitis C Screening  Completed     Immunizations Due:      Topic Date Due    COVID-19 Vaccine (1) Never done    DTaP,Tdap,and Td Vaccines (2 - Td) 02/28/2018    Pneumococcal Vaccine: 65+ Years (1 of 1 - PPSV23) Never done     Advance Directives   What are advance directives? Advance directives are legal documents that state your wishes and plans for medical care  These plans are made ahead of time in case you lose your ability to make decisions for yourself   Advance directives can apply to any medical decision, such as the treatments you want, and if you want to donate organs  What are the types of advance directives? There are many types of advance directives, and each state has rules about how to use them  You may choose a combination of any of the following:  · Living will: This is a written record of the treatment you want  You can also choose which treatments you do not want, which to limit, and which to stop at a certain time  This includes surgery, medicine, IV fluid, and tube feedings  · Durable power of  for healthcare [place] [facility]): This is a written record that states who you want to make healthcare choices for you when you are unable to make them for yourself  This person, called a proxy, is usually a family member or a friend  You may choose more than 1 proxy  · Do not resuscitate (DNR) order:  A DNR order is used in case your heart stops beating or you stop breathing  It is a request not to have certain forms of treatment, such as CPR  A DNR order may be included in other types of advance directives  · Medical directive: This covers the care that you want if you are in a coma, near death, or unable to make decisions for yourself  You can list the treatments you want for each condition  Treatment may include pain medicine, surgery, blood transfusions, dialysis, IV or tube feedings, and a ventilator (breathing machine)  · Values history: This document has questions about your views, beliefs, and how you feel and think about life  This information can help others choose the care that you would choose  Why are advance directives important? An advance directive helps you control your care  Although spoken wishes may be used, it is better to have your wishes written down  Spoken wishes can be misunderstood, or not followed  Treatments may be given even if you do not want them   An advance directive may make it easier for your family to make difficult choices about your care  Urinary Incontinence   Urinary incontinence (UI)  is when you lose control of your bladder  UI develops because your bladder cannot store or empty urine properly  The 3 most common types of UI are stress incontinence, urge incontinence, or both  Medicines:   · May be given to help strengthen your bladder control  Report any side effects of medication to your healthcare provider  Do pelvic muscle exercises often:  Your pelvic muscles help you stop urinating  Squeeze these muscles tight for 5 seconds, then relax for 5 seconds  Gradually work up to squeezing for 10 seconds  Do 3 sets of 15 repetitions a day, or as directed  This will help strengthen your pelvic muscles and improve bladder control  Train your bladder:  Go to the bathroom at set times, such as every 2 hours, even if you do not feel the urge to go  You can also try to hold your urine when you feel the urge to go  For example, hold your urine for 5 minutes when you feel the urge to go  As that becomes easier, hold your urine for 10 minutes  Self-care:   · Keep a UI record  Write down how often you leak urine and how much you leak  Make a note of what you were doing when you leaked urine  · Drink liquids as directed  You may need to limit the amount of liquid you drink to help control your urine leakage  Do not drink any liquid right before you go to bed  Limit or do not have drinks that contain caffeine or alcohol  · Prevent constipation  Eat a variety of high-fiber foods  Good examples are high-fiber cereals, beans, vegetables, and whole-grain breads  Walking is the best way to trigger your intestines to have a bowel movement  · Exercise regularly and maintain a healthy weight  Weight loss and exercise will decrease pressure on your bladder and help you control your leakage  · Use a catheter as directed  to help empty your bladder   A catheter is a tiny, plastic tube that is put into your bladder to drain your urine    · Go to behavior therapy as directed  Behavior therapy may be used to help you learn to control your urge to urinate  Cigarette Smoking and Your Health   Risks to your health if you smoke:  Nicotine and other chemicals found in tobacco damage every cell in your body  Even if you are a light smoker, you have an increased risk for cancer, heart disease, and lung disease  If you are pregnant or have diabetes, smoking increases your risk for complications  Benefits to your health if you stop smoking:   · You decrease respiratory symptoms such as coughing, wheezing, and shortness of breath  · You reduce your risk for cancers of the lung, mouth, throat, kidney, bladder, pancreas, stomach, and cervix  If you already have cancer, you increase the benefits of chemotherapy  You also reduce your risk for cancer returning or a second cancer from developing  · You reduce your risk for heart disease, blood clots, heart attack, and stroke  · You reduce your risk for lung infections, and diseases such as pneumonia, asthma, chronic bronchitis, and emphysema  · Your circulation improves  More oxygen can be delivered to your body  If you have diabetes, you lower your risk for complications, such as kidney, artery, and eye diseases  You also lower your risk for nerve damage  Nerve damage can lead to amputations, poor vision, and blindness  · You improve your body's ability to heal and to fight infections  For more information and support to stop smoking:   · [employer]  Phone: 3- 010 - 541-7326  Web Address: www [employer]  Weight Management   Why it is important to manage your weight:  Being overweight increases your risk of health conditions such as heart disease, high blood pressure, type 2 diabetes, and certain types of cancer  It can also increase your risk for osteoarthritis, sleep apnea, and other respiratory problems  Aim for a slow, steady weight loss   Even a small amount of weight loss can lower your risk of health problems  How to lose weight safely:  A safe and healthy way to lose weight is to eat fewer calories and get regular exercise  You can lose up about 1 pound a week by decreasing the number of calories you eat by 500 calories each day  Healthy meal plan for weight management:  A healthy meal plan includes a variety of foods, contains fewer calories, and helps you stay healthy  A healthy meal plan includes the following:  · Eat whole-grain foods more often  A healthy meal plan should contain fiber  Fiber is the part of grains, fruits, and vegetables that is not broken down by your body  Whole-grain foods are healthy and provide extra fiber in your diet  Some examples of whole-grain foods are whole-wheat breads and pastas, oatmeal, brown rice, and bulgur  · Eat a variety of vegetables every day  Include dark, leafy greens such as spinach, kale, tanmay greens, and mustard greens  Eat yellow and orange vegetables such as carrots, sweet potatoes, and winter squash  · Eat a variety of fruits every day  Choose fresh or canned fruit (canned in its own juice or light syrup) instead of juice  Fruit juice has very little or no fiber  · Eat low-fat dairy foods  Drink fat-free (skim) milk or 1% milk  Eat fat-free yogurt and low-fat cottage cheese  Try low-fat cheeses such as mozzarella and other reduced-fat cheeses  · Choose meat and other protein foods that are low in fat  Choose beans or other legumes such as split peas or lentils  Choose fish, skinless poultry (chicken or turkey), or lean cuts of red meat (beef or pork)  Before you cook meat or poultry, cut off any visible fat  · Use less fat and oil  Try baking foods instead of frying them  Add less fat, such as margarine, sour cream, regular salad dressing and mayonnaise to foods  Eat fewer high-fat foods  Some examples of high-fat foods include french fries, doughnuts, ice cream, and cakes  · Eat fewer sweets    Limit foods and drinks that are high in sugar  This includes candy, cookies, regular soda, and sweetened drinks  Exercise:  Exercise at least 30 minutes per day on most days of the week  Some examples of exercise include walking, biking, dancing, and swimming  You can also fit in more physical activity by taking the stairs instead of the elevator or parking farther away from stores  Ask your healthcare provider about the best exercise plan for you  Narcotic (Opioid) Safety    Use narcotics safely:  · Take prescribed narcotics exactly as directed  · Do not give narcotics to others or take narcotics that belong to someone else  · Do not mix narcotics without medicines or alcohol  · Do not drive or operate heavy machinery after you take the narcotic  · Monitor for side effects and notify your healthcare provider if you experienced side effects such as nausea, sleepiness, itching, or trouble thinking clearly  Manage constipation:    Constipation is the most common side effect of narcotic medicine  Constipation is when you have hard, dry bowel movements, or you go longer than usual between bowel movements  Tell your healthcare provider about all changes in your bowel movements while you are taking narcotics  He or she may recommend laxative medicine to help you have a bowel movement  He or she may also change the kind of narcotic you are taking, or change when you take it  The following are more ways you can prevent or relieve constipation:    · Drink liquids as directed  You may need to drink extra liquids to help soften and move your bowels  Ask how much liquid to drink each day and which liquids are best for you  · Eat high-fiber foods  This may help decrease constipation by adding bulk to your bowel movements  High-fiber foods include fruits, vegetables, whole-grain breads and cereals, and beans  Your healthcare provider or dietitian can help you create a high-fiber meal plan   Your provider may also recommend a fiber supplement if you cannot get enough fiber from food  · Exercise regularly  Regular physical activity can help stimulate your intestines  Walking is a good exercise to prevent or relieve constipation  Ask which exercises are best for you  · Schedule a time each day to have a bowel movement  This may help train your body to have regular bowel movements  Bend forward while you are on the toilet to help move the bowel movement out  Sit on the toilet for at least 10 minutes, even if you do not have a bowel movement  Store narcotics safely:   · Store narcotics where others cannot easily get them  Keep them in a locked cabinet or secure area  Do not  keep them in a purse or other bag you carry with you  A person may be looking for something else and find the narcotics  · Make sure narcotics are stored out of the reach of children  A child can easily overdose on narcotics  Narcotics may look like candy to a small child  The best way to dispose of narcotics: The laws vary by country and area  In the [place], the best way is to return the narcotics through a take-back program  This program is offered by the [employer]  The following are options for using the program:  · Take the narcotics to a MARGARITA collection site  The site is often a law enforcement center  Call your local law enforcement center for scheduled take-back days in your area  You will be given information on where to go if the collection site is in a different location  · Take the narcotics to an approved pharmacy or hospital   A pharmacy or hospital may be set up as a collection site  You will need to ask if it is a MARGARIAT collection site if you were not directed there  A pharmacy or doctor's office may not be able to take back narcotics unless it is a MARGARITA site  · Use a mail-back system  This means you are given containers to put the narcotics into  You will then mail them in the containers  · Use a take-back drop box    This is a place to leave the narcotics at any time  People and animals will not be able to get into the box  Your local law enforcement agency can tell you where to find a drop box in your area  Other ways to manage pain:   · Ask your healthcare provider about non-narcotic medicines to control pain  Nonprescription medicines include NSAIDs (such as ibuprofen) and acetaminophen  Prescription medicines include muscle relaxers, antidepressants, and steroids  · Pain may be managed without any medicines  Some ways to relieve pain include massage, aromatherapy, or meditation  Physical or occupational therapy may also help  For more information:   · Drug Enforcement Administration  [address]  Phone: 3- 626 - 853-6997  Web Address: [facility] [employer]/drug_disposal/    ·   [place]  and Drug Administration  [place] [address] , [address]  Phone: 2- 785 - 174-8378  Web Address: http://[employer]/     © Copyright [employer] 2018 Information is for End User's use only and may not be sold, redistributed or otherwise used for commercial purposes  All illustrations and images included in CareNotes® are the copyrighted property of [employer]  or [facility] Preventive Visit Patient Instructions  Thank you for completing your Welcome to Medicare Visit or Medicare Annual Wellness Visit today  Your next wellness visit will be due in one year (5/11/2022)  The screening/preventive services that you may require over the next 5-10 years are detailed below  Some tests may not apply to you based off risk factors and/or age  Screening tests ordered at today's visit but not completed yet may show as past due  Also, please note that scanned in results may not display below    Preventive Screenings:  Service Recommendations Previous Testing/Comments   Colorectal Cancer Screening  * Colonoscopy    * Fecal Occult Blood Test (FOBT)/Fecal Immunochemical Test (FIT)  * Fecal DNA/Cologuard Test  * Flexible Sigmoidoscopy Age: 54-65 years old   Colonoscopy: every 10 years (may be performed more frequently if at higher risk)  OR  FOBT/FIT: every 1 year  OR  Cologuard: every 3 years  OR  Sigmoidoscopy: every 5 years  Screening may be recommended earlier than age 48 if at higher risk for colorectal cancer  Also, an individualized decision between you and your healthcare provider will decide whether screening between the ages of 74-80 would be appropriate  Colonoscopy: 05/09/2017  FOBT/FIT: Not on file  Cologuard: Not on file  Sigmoidoscopy: Not on file    Screening Current     Breast Cancer Screening Age: 36 years old  Frequency: every 1-2 years  Not required if history of left and right mastectomy Mammogram: 09/10/2020    Screening Current   Cervical Cancer Screening Between the ages of 21-29, pap smear recommended once every 3 years  Between the ages of 33-67, can perform pap smear with HPV co-testing every 5 years  Recommendations may differ for women with a history of total hysterectomy, cervical cancer, or abnormal pap smears in past  Pap Smear: 02/11/2019    Screening Not Indicated   Hepatitis C Screening Once for adults born between 1945 and 1965  More frequently in patients at high risk for Hepatitis C Hep C Antibody: 05/01/2021    Screening Current   Diabetes Screening 1-2 times per year if you're at risk for diabetes or have pre-diabetes Fasting glucose: 101 mg/dL   A1C: 6 0    Screening Current   Cholesterol Screening Once every 5 years if you don't have a lipid disorder  May order more often based on risk factors  Lipid panel: 05/01/2021    Screening Not Indicated  History Lipid Disorder     Other Preventive Screenings Covered by Medicare:  6  Abdominal Aortic Aneurysm (AAA) Screening: covered once if your at risk  You're considered to be at risk if you have a family history of AAA    7  Lung Cancer Screening: covers low dose CT scan once per year if you meet all of the following conditions: (1) Age 50-69; (2) No signs or symptoms of lung cancer; (3) Current smoker or have quit smoking within the last 15 years; (4) You have a tobacco smoking history of at least 30 pack years (packs per day multiplied by number of years you smoked); (5) You get a written order from a healthcare provider  8  Glaucoma Screening: covered annually if you're considered high risk: (1) You have diabetes OR (2) Family history of glaucoma OR (3)  aged 48 and older OR (3)  American aged 72 and older  5  Osteoporosis Screening: covered every 2 years if you meet one of the following conditions: (1) You're estrogen deficient and at risk for osteoporosis based off medical history and other findings; (2) Have a vertebral abnormality; (3) On glucocorticoid therapy for more than 3 months; (4) Have primary hyperparathyroidism; (5) On osteoporosis medications and need to assess response to drug therapy  · Last bone density test (DXA Scan): 09/10/2020  10  HIV Screening: covered annually if you're between the age of 12-76  Also covered annually if you are younger than 13 and older than 72 with risk factors for HIV infection  For pregnant patients, it is covered up to 3 times per pregnancy  Immunizations:  Immunization Recommendations   Influenza Vaccine Annual influenza vaccination during flu season is recommended for all persons aged >= 6 months who do not have contraindications   Pneumococcal Vaccine (Prevnar and Pneumovax)  * Prevnar = PCV13  * Pneumovax = PPSV23   Adults 25-60 years old: 1-3 doses may be recommended based on certain risk factors  Adults 72 years old: Prevnar (PCV13) vaccine recommended followed by Pneumovax (PPSV23) vaccine  If already received PPSV23 since turning 65, then PCV13 recommended at least one year after PPSV23 dose     Hepatitis B Vaccine 3 dose series if at intermediate or high risk (ex: diabetes, end stage renal disease, liver disease) Tetanus (Td) Vaccine - COST NOT COVERED BY MEDICARE PART B Following completion of primary series, a booster dose should be given every 10 years to maintain immunity against tetanus  Td may also be given as tetanus wound prophylaxis  Tdap Vaccine - COST NOT COVERED BY MEDICARE PART B Recommended at least once for all adults  For pregnant patients, recommended with each pregnancy  Shingles Vaccine (Shingrix) - COST NOT COVERED BY MEDICARE PART B  2 shot series recommended in those aged 48 and above     Health Maintenance Due:      Topic Date Due    Lung Cancer Screening  10/28/2020    MAMMOGRAM  09/10/2021    Cervical Cancer Screening  02/11/2022    DXA SCAN  09/10/2023    Colorectal Cancer Screening  05/09/2027    HIV Screening  Completed    Hepatitis C Screening  Completed     Immunizations Due:      Topic Date Due    COVID-19 Vaccine (1) Never done    DTaP,Tdap,and Td Vaccines (2 - Td) 02/28/2018    Pneumococcal Vaccine: 65+ Years (1 of 1 - PPSV23) Never done     Advance Directives   What are advance directives? Advance directives are legal documents that state your wishes and plans for medical care  These plans are made ahead of time in case you lose your ability to make decisions for yourself  Advance directives can apply to any medical decision, such as the treatments you want, and if you want to donate organs  What are the types of advance directives? There are many types of advance directives, and each state has rules about how to use them  You may choose a combination of any of the following:  · Living will: This is a written record of the treatment you want  You can also choose which treatments you do not want, which to limit, and which to stop at a certain time  This includes surgery, medicine, IV fluid, and tube feedings  · Durable power of  for healthcare [place] [facility]):   This is a written record that states who you want to make healthcare choices for you when you are unable to make them for yourself  This person, called a proxy, is usually a family member or a friend  You may choose more than 1 proxy  · Do not resuscitate (DNR) order:  A DNR order is used in case your heart stops beating or you stop breathing  It is a request not to have certain forms of treatment, such as CPR  A DNR order may be included in other types of advance directives  · Medical directive: This covers the care that you want if you are in a coma, near death, or unable to make decisions for yourself  You can list the treatments you want for each condition  Treatment may include pain medicine, surgery, blood transfusions, dialysis, IV or tube feedings, and a ventilator (breathing machine)  · Values history: This document has questions about your views, beliefs, and how you feel and think about life  This information can help others choose the care that you would choose  Why are advance directives important? An advance directive helps you control your care  Although spoken wishes may be used, it is better to have your wishes written down  Spoken wishes can be misunderstood, or not followed  Treatments may be given even if you do not want them  An advance directive may make it easier for your family to make difficult choices about your care  Urinary Incontinence   Urinary incontinence (UI)  is when you lose control of your bladder  UI develops because your bladder cannot store or empty urine properly  The 3 most common types of UI are stress incontinence, urge incontinence, or both  Medicines:   · May be given to help strengthen your bladder control  Report any side effects of medication to your healthcare provider  Do pelvic muscle exercises often:  Your pelvic muscles help you stop urinating  Squeeze these muscles tight for 5 seconds, then relax for 5 seconds  Gradually work up to squeezing for 10 seconds  Do 3 sets of 15 repetitions a day, or as directed   This will help strengthen your pelvic muscles and improve bladder control  Train your bladder:  Go to the bathroom at set times, such as every 2 hours, even if you do not feel the urge to go  You can also try to hold your urine when you feel the urge to go  For example, hold your urine for 5 minutes when you feel the urge to go  As that becomes easier, hold your urine for 10 minutes  Self-care:   · Keep a UI record  Write down how often you leak urine and how much you leak  Make a note of what you were doing when you leaked urine  · Drink liquids as directed  You may need to limit the amount of liquid you drink to help control your urine leakage  Do not drink any liquid right before you go to bed  Limit or do not have drinks that contain caffeine or alcohol  · Prevent constipation  Eat a variety of high-fiber foods  Good examples are high-fiber cereals, beans, vegetables, and whole-grain breads  Walking is the best way to trigger your intestines to have a bowel movement  · Exercise regularly and maintain a healthy weight  Weight loss and exercise will decrease pressure on your bladder and help you control your leakage  · Use a catheter as directed  to help empty your bladder  A catheter is a tiny, plastic tube that is put into your bladder to drain your urine  · Go to behavior therapy as directed  Behavior therapy may be used to help you learn to control your urge to urinate  Cigarette Smoking and Your Health   Risks to your health if you smoke:  Nicotine and other chemicals found in tobacco damage every cell in your body  Even if you are a light smoker, you have an increased risk for cancer, heart disease, and lung disease  If you are pregnant or have diabetes, smoking increases your risk for complications  Benefits to your health if you stop smoking:   · You decrease respiratory symptoms such as coughing, wheezing, and shortness of breath     · You reduce your risk for cancers of the lung, mouth, throat, kidney, bladder, pancreas, stomach, and cervix  If you already have cancer, you increase the benefits of chemotherapy  You also reduce your risk for cancer returning or a second cancer from developing  · You reduce your risk for heart disease, blood clots, heart attack, and stroke  · You reduce your risk for lung infections, and diseases such as pneumonia, asthma, chronic bronchitis, and emphysema  · Your circulation improves  More oxygen can be delivered to your body  If you have diabetes, you lower your risk for complications, such as kidney, artery, and eye diseases  You also lower your risk for nerve damage  Nerve damage can lead to amputations, poor vision, and blindness  · You improve your body's ability to heal and to fight infections  For more information and support to stop smoking:   · [employer]  Phone: 0- 325 - 753-4168  Web Address: [employer]  Weight Management   Why it is important to manage your weight:  Being overweight increases your risk of health conditions such as heart disease, high blood pressure, type 2 diabetes, and certain types of cancer  It can also increase your risk for osteoarthritis, sleep apnea, and other respiratory problems  Aim for a slow, steady weight loss  Even a small amount of weight loss can lower your risk of health problems  How to lose weight safely:  A safe and healthy way to lose weight is to eat fewer calories and get regular exercise  You can lose up about 1 pound a week by decreasing the number of calories you eat by 500 calories each day  Healthy meal plan for weight management:  A healthy meal plan includes a variety of foods, contains fewer calories, and helps you stay healthy  A healthy meal plan includes the following:  · Eat whole-grain foods more often  A healthy meal plan should contain fiber  Fiber is the part of grains, fruits, and vegetables that is not broken down by your body  Whole-grain foods are healthy and provide extra fiber in your diet   Some examples of whole-grain foods are whole-wheat breads and pastas, oatmeal, brown rice, and bulgur  · Eat a variety of vegetables every day  Include dark, leafy greens such as spinach, kale, tanmay greens, and mustard greens  Eat yellow and orange vegetables such as carrots, sweet potatoes, and winter squash  · Eat a variety of fruits every day  Choose fresh or canned fruit (canned in its own juice or light syrup) instead of juice  Fruit juice has very little or no fiber  · Eat low-fat dairy foods  Drink fat-free (skim) milk or 1% milk  Eat fat-free yogurt and low-fat cottage cheese  Try low-fat cheeses such as mozzarella and other reduced-fat cheeses  · Choose meat and other protein foods that are low in fat  Choose beans or other legumes such as split peas or lentils  Choose fish, skinless poultry (chicken or turkey), or lean cuts of red meat (beef or pork)  Before you cook meat or poultry, cut off any visible fat  · Use less fat and oil  Try baking foods instead of frying them  Add less fat, such as margarine, sour cream, regular salad dressing and mayonnaise to foods  Eat fewer high-fat foods  Some examples of high-fat foods include french fries, doughnuts, ice cream, and cakes  · Eat fewer sweets  Limit foods and drinks that are high in sugar  This includes candy, cookies, regular soda, and sweetened drinks  Exercise:  Exercise at least 30 minutes per day on most days of the week  Some examples of exercise include walking, biking, dancing, and swimming  You can also fit in more physical activity by taking the stairs instead of the elevator or parking farther away from stores  Ask your healthcare provider about the best exercise plan for you     Narcotic (Opioid) Safety    Use narcotics safely:  · Take prescribed narcotics exactly as directed  · Do not give narcotics to others or take narcotics that belong to someone else  · Do not mix narcotics without medicines or alcohol  · Do not drive or operate heavy machinery after you take the narcotic  · Monitor for side effects and notify your healthcare provider if you experienced side effects such as nausea, sleepiness, itching, or trouble thinking clearly  Manage constipation:    Constipation is the most common side effect of narcotic medicine  Constipation is when you have hard, dry bowel movements, or you go longer than usual between bowel movements  Tell your healthcare provider about all changes in your bowel movements while you are taking narcotics  He or she may recommend laxative medicine to help you have a bowel movement  He or she may also change the kind of narcotic you are taking, or change when you take it  The following are more ways you can prevent or relieve constipation:    · Drink liquids as directed  You may need to drink extra liquids to help soften and move your bowels  Ask how much liquid to drink each day and which liquids are best for you  · Eat high-fiber foods  This may help decrease constipation by adding bulk to your bowel movements  High-fiber foods include fruits, vegetables, whole-grain breads and cereals, and beans  Your healthcare provider or dietitian can help you create a high-fiber meal plan  Your provider may also recommend a fiber supplement if you cannot get enough fiber from food  · Exercise regularly  Regular physical activity can help stimulate your intestines  Walking is a good exercise to prevent or relieve constipation  Ask which exercises are best for you  · Schedule a time each day to have a bowel movement  This may help train your body to have regular bowel movements  Bend forward while you are on the toilet to help move the bowel movement out  Sit on the toilet for at least 10 minutes, even if you do not have a bowel movement  Store narcotics safely:   · Store narcotics where others cannot easily get them  Keep them in a locked cabinet or secure area  Do not  keep them in a purse or other bag you carry with you   A person may be looking for something else and find the narcotics  · Make sure narcotics are stored out of the reach of children  A child can easily overdose on narcotics  Narcotics may look like candy to a small child  The best way to dispose of narcotics: The laws vary by country and area  In the [place], the best way is to return the narcotics through a take-back program  This program is offered by the [employer] ([employer])  The following are options for using the program:  · Take the narcotics to a MARGARITA collection site  The site is often a law enforcement center  Call your local law enforcement center for scheduled take-back days in your area  You will be given information on where to go if the collection site is in a different location  · Take the narcotics to an approved pharmacy or hospital   A pharmacy or hospital may be set up as a collection site  You will need to ask if it is a MARGARITA collection site if you were not directed there  A pharmacy or doctor's office may not be able to take back narcotics unless it is a MARGARITA site  · Use a mail-back system  This means you are given containers to put the narcotics into  You will then mail them in the containers  · Use a take-back drop box  This is a place to leave the narcotics at any time  People and animals will not be able to get into the box  Your local law enforcement agency can tell you where to find a drop box in your area  Other ways to manage pain:   · Ask your healthcare provider about non-narcotic medicines to control pain  Nonprescription medicines include NSAIDs (such as ibuprofen) and acetaminophen  Prescription medicines include muscle relaxers, antidepressants, and steroids  · Pain may be managed without any medicines  Some ways to relieve pain include massage, aromatherapy, or meditation  Physical or occupational therapy may also help      For more information:   · Drug Enforcement Administration  [address]  Phone: 7- 309 - 938-5805  Web Address: [employer]   usdoj gov/drug_disposal/    · [address] and Drug Administration  [place] Hartman , [address]  Phone: 4- 189 - 566-2464  Web Address: http://[employer]/     © Copyright [employer] 2018 Information is for End User's use only and may not be sold, redistributed or otherwise used for commercial purposes  All illustrations and images included in CareNotes® are the copyrighted property of [employer]  or [facility] Preventive Visit Patient Instructions  Thank you for completing your Welcome to Medicare Visit or Medicare Annual Wellness Visit today  Your next wellness visit will be due in one year (5/11/2022)  The screening/preventive services that you may require over the next 5-10 years are detailed below  Some tests may not apply to you based off risk factors and/or age  Screening tests ordered at today's visit but not completed yet may show as past due  Also, please note that scanned in results may not display below  Preventive Screenings:  Service Recommendations Previous Testing/Comments   Colorectal Cancer Screening  * Colonoscopy    * Fecal Occult Blood Test (FOBT)/Fecal Immunochemical Test (FIT)  * Fecal DNA/Cologuard Test  * Flexible Sigmoidoscopy Age: 54-65 years old   Colonoscopy: every 10 years (may be performed more frequently if at higher risk)  OR  FOBT/FIT: every 1 year  OR  Cologuard: every 3 years  OR  Sigmoidoscopy: every 5 years  Screening may be recommended earlier than age 48 if at higher risk for colorectal cancer  Also, an individualized decision between you and your healthcare provider will decide whether screening between the ages of 74-80 would be appropriate   Colonoscopy: 05/09/2017  FOBT/FIT: Not on file  Cologuard: Not on file  Sigmoidoscopy: Not on file    Screening Current     Breast Cancer Screening Age: 36 years old  Frequency: every 1-2 years  Not required if history of left and right mastectomy Mammogram: 09/10/2020    Screening Current   Cervical Cancer Screening Between the ages of 21-29, pap smear recommended once every 3 years  Between the ages of 33-67, can perform pap smear with HPV co-testing every 5 years  Recommendations may differ for women with a history of total hysterectomy, cervical cancer, or abnormal pap smears in past  Pap Smear: 02/11/2019    Screening Not Indicated   Hepatitis C Screening Once for adults born between 1945 and 1965  More frequently in patients at high risk for Hepatitis C Hep C Antibody: 05/01/2021    Screening Current   Diabetes Screening 1-2 times per year if you're at risk for diabetes or have pre-diabetes Fasting glucose: 101 mg/dL   A1C: 6 0    Screening Current   Cholesterol Screening Once every 5 years if you don't have a lipid disorder  May order more often based on risk factors  Lipid panel: 05/01/2021    Screening Not Indicated  History Lipid Disorder     Other Preventive Screenings Covered by Medicare:  11  Abdominal Aortic Aneurysm (AAA) Screening: covered once if your at risk  You're considered to be at risk if you have a family history of AAA  12  Lung Cancer Screening: covers low dose CT scan once per year if you meet all of the following conditions: (1) Age 50-69; (2) No signs or symptoms of lung cancer; (3) Current smoker or have quit smoking within the last 15 years; (4) You have a tobacco smoking history of at least 30 pack years (packs per day multiplied by number of years you smoked); (5) You get a written order from a healthcare provider  15  Glaucoma Screening: covered annually if you're considered high risk: (1) You have diabetes OR (2) Family history of glaucoma OR (3)  aged 48 and older OR (3)  American aged 72 and older  15   Osteoporosis Screening: covered every 2 years if you meet one of the following conditions: (1) You're estrogen deficient and at risk for osteoporosis based off medical history and other findings; (2) Have a vertebral abnormality; (3) On glucocorticoid therapy for more than 3 months; (4) Have primary hyperparathyroidism; (5) On osteoporosis medications and need to assess response to drug therapy  · Last bone density test (DXA Scan): 09/10/2020  15  HIV Screening: covered annually if you're between the age of 12-76  Also covered annually if you are younger than 13 and older than 72 with risk factors for HIV infection  For pregnant patients, it is covered up to 3 times per pregnancy  Immunizations:  Immunization Recommendations   Influenza Vaccine Annual influenza vaccination during flu season is recommended for all persons aged >= 6 months who do not have contraindications   Pneumococcal Vaccine (Prevnar and Pneumovax)  * Prevnar = PCV13  * Pneumovax = PPSV23   Adults 25-60 years old: 1-3 doses may be recommended based on certain risk factors  Adults 72 years old: Prevnar (PCV13) vaccine recommended followed by Pneumovax (PPSV23) vaccine  If already received PPSV23 since turning 65, then PCV13 recommended at least one year after PPSV23 dose  Hepatitis B Vaccine 3 dose series if at intermediate or high risk (ex: diabetes, end stage renal disease, liver disease)   Tetanus (Td) Vaccine - COST NOT COVERED BY MEDICARE PART B Following completion of primary series, a booster dose should be given every 10 years to maintain immunity against tetanus  Td may also be given as tetanus wound prophylaxis  Tdap Vaccine - COST NOT COVERED BY MEDICARE PART B Recommended at least once for all adults  For pregnant patients, recommended with each pregnancy     Shingles Vaccine (Shingrix) - COST NOT COVERED BY MEDICARE PART B  2 shot series recommended in those aged 48 and above     Health Maintenance Due:      Topic Date Due    Lung Cancer Screening  10/28/2020    MAMMOGRAM  09/10/2021    Cervical Cancer Screening  02/11/2022    DXA SCAN  09/10/2023    Colorectal Cancer Screening  05/09/2027    HIV Screening  Completed    Hepatitis C Screening  Completed     Immunizations Due:      Topic Date Due    COVID-19 Vaccine (1) Never done    DTaP,Tdap,and Td Vaccines (2 - Td) 02/28/2018    Pneumococcal Vaccine: 65+ Years (1 of 1 - PPSV23) Never done     Advance Directives   What are advance directives? Advance directives are legal documents that state your wishes and plans for medical care  These plans are made ahead of time in case you lose your ability to make decisions for yourself  Advance directives can apply to any medical decision, such as the treatments you want, and if you want to donate organs  What are the types of advance directives? There are many types of advance directives, and each state has rules about how to use them  You may choose a combination of any of the following:  · Living will: This is a written record of the treatment you want  You can also choose which treatments you do not want, which to limit, and which to stop at a certain time  This includes surgery, medicine, IV fluid, and tube feedings  · Durable power of  for healthcare [place] [facility]): This is a written record that states who you want to make healthcare choices for you when you are unable to make them for yourself  This person, called a proxy, is usually a family member or a friend  You may choose more than 1 proxy  · Do not resuscitate (DNR) order:  A DNR order is used in case your heart stops beating or you stop breathing  It is a request not to have certain forms of treatment, such as CPR  A DNR order may be included in other types of advance directives  · Medical directive: This covers the care that you want if you are in a coma, near death, or unable to make decisions for yourself  You can list the treatments you want for each condition   Treatment may include pain medicine, surgery, blood transfusions, dialysis, IV or tube feedings, and a ventilator (breathing machine)  · Values history: This document has questions about your views, beliefs, and how you feel and think about life  This information can help others choose the care that you would choose  Why are advance directives important? An advance directive helps you control your care  Although spoken wishes may be used, it is better to have your wishes written down  Spoken wishes can be misunderstood, or not followed  Treatments may be given even if you do not want them  An advance directive may make it easier for your family to make difficult choices about your care  Urinary Incontinence   Urinary incontinence (UI)  is when you lose control of your bladder  UI develops because your bladder cannot store or empty urine properly  The 3 most common types of UI are stress incontinence, urge incontinence, or both  Medicines:   · May be given to help strengthen your bladder control  Report any side effects of medication to your healthcare provider  Do pelvic muscle exercises often:  Your pelvic muscles help you stop urinating  Squeeze these muscles tight for 5 seconds, then relax for 5 seconds  Gradually work up to squeezing for 10 seconds  Do 3 sets of 15 repetitions a day, or as directed  This will help strengthen your pelvic muscles and improve bladder control  Train your bladder:  Go to the bathroom at set times, such as every 2 hours, even if you do not feel the urge to go  You can also try to hold your urine when you feel the urge to go  For example, hold your urine for 5 minutes when you feel the urge to go  As that becomes easier, hold your urine for 10 minutes  Self-care:   · Keep a UI record  Write down how often you leak urine and how much you leak  Make a note of what you were doing when you leaked urine  · Drink liquids as directed  You may need to limit the amount of liquid you drink to help control your urine leakage  Do not drink any liquid right before you go to bed   Limit or do not have drinks that contain caffeine or alcohol  · Prevent constipation  Eat a variety of high-fiber foods  Good examples are high-fiber cereals, beans, vegetables, and whole-grain breads  Walking is the best way to trigger your intestines to have a bowel movement  · Exercise regularly and maintain a healthy weight  Weight loss and exercise will decrease pressure on your bladder and help you control your leakage  · Use a catheter as directed  to help empty your bladder  A catheter is a tiny, plastic tube that is put into your bladder to drain your urine  · Go to behavior therapy as directed  Behavior therapy may be used to help you learn to control your urge to urinate  Cigarette Smoking and Your Health   Risks to your health if you smoke:  Nicotine and other chemicals found in tobacco damage every cell in your body  Even if you are a light smoker, you have an increased risk for cancer, heart disease, and lung disease  If you are pregnant or have diabetes, smoking increases your risk for complications  Benefits to your health if you stop smoking:   · You decrease respiratory symptoms such as coughing, wheezing, and shortness of breath  · You reduce your risk for cancers of the lung, mouth, throat, kidney, bladder, pancreas, stomach, and cervix  If you already have cancer, you increase the benefits of chemotherapy  You also reduce your risk for cancer returning or a second cancer from developing  · You reduce your risk for heart disease, blood clots, heart attack, and stroke  · You reduce your risk for lung infections, and diseases such as pneumonia, asthma, chronic bronchitis, and emphysema  · Your circulation improves  More oxygen can be delivered to your body  If you have diabetes, you lower your risk for complications, such as kidney, artery, and eye diseases  You also lower your risk for nerve damage  Nerve damage can lead to amputations, poor vision, and blindness    · You improve your body's ability to heal and to fight infections  For more information and support to stop smoking:   · [employer]  Phone: 6- 031 - [phone]  Web Address: www [employer]  Weight Management   Why it is important to manage your weight:  Being overweight increases your risk of health conditions such as heart disease, high blood pressure, type 2 diabetes, and certain types of cancer  It can also increase your risk for osteoarthritis, sleep apnea, and other respiratory problems  Aim for a slow, steady weight loss  Even a small amount of weight loss can lower your risk of health problems  How to lose weight safely:  A safe and healthy way to lose weight is to eat fewer calories and get regular exercise  You can lose up about 1 pound a week by decreasing the number of calories you eat by 500 calories each day  Healthy meal plan for weight management:  A healthy meal plan includes a variety of foods, contains fewer calories, and helps you stay healthy  A healthy meal plan includes the following:  · Eat whole-grain foods more often  A healthy meal plan should contain fiber  Fiber is the part of grains, fruits, and vegetables that is not broken down by your body  Whole-grain foods are healthy and provide extra fiber in your diet  Some examples of whole-grain foods are whole-wheat breads and pastas, oatmeal, brown rice, and bulgur  · Eat a variety of vegetables every day  Include dark, leafy greens such as spinach, kale, tanmay greens, and mustard greens  Eat yellow and orange vegetables such as carrots, sweet potatoes, and winter squash  · Eat a variety of fruits every day  Choose fresh or canned fruit (canned in its own juice or light syrup) instead of juice  Fruit juice has very little or no fiber  · Eat low-fat dairy foods  Drink fat-free (skim) milk or 1% milk  Eat fat-free yogurt and low-fat cottage cheese  Try low-fat cheeses such as mozzarella and other reduced-fat cheeses    · Choose meat and other protein foods that are low in fat  Choose beans or other legumes such as split peas or lentils  Choose fish, skinless poultry (chicken or turkey), or lean cuts of red meat (beef or pork)  Before you cook meat or poultry, cut off any visible fat  · Use less fat and oil  Try baking foods instead of frying them  Add less fat, such as margarine, sour cream, regular salad dressing and mayonnaise to foods  Eat fewer high-fat foods  Some examples of high-fat foods include french fries, doughnuts, ice cream, and cakes  · Eat fewer sweets  Limit foods and drinks that are high in sugar  This includes candy, cookies, regular soda, and sweetened drinks  Exercise:  Exercise at least 30 minutes per day on most days of the week  Some examples of exercise include walking, biking, dancing, and swimming  You can also fit in more physical activity by taking the stairs instead of the elevator or parking farther away from stores  Ask your healthcare provider about the best exercise plan for you  Narcotic (Opioid) Safety    Use narcotics safely:  · Take prescribed narcotics exactly as directed  · Do not give narcotics to others or take narcotics that belong to someone else  · Do not mix narcotics without medicines or alcohol  · Do not drive or operate heavy machinery after you take the narcotic  · Monitor for side effects and notify your healthcare provider if you experienced side effects such as nausea, sleepiness, itching, or trouble thinking clearly  Manage constipation:    Constipation is the most common side effect of narcotic medicine  Constipation is when you have hard, dry bowel movements, or you go longer than usual between bowel movements  Tell your healthcare provider about all changes in your bowel movements while you are taking narcotics  He or she may recommend laxative medicine to help you have a bowel movement  He or she may also change the kind of narcotic you are taking, or change when you take it   The following are more ways you can prevent or relieve constipation:    · Drink liquids as directed  You may need to drink extra liquids to help soften and move your bowels  Ask how much liquid to drink each day and which liquids are best for you  · Eat high-fiber foods  This may help decrease constipation by adding bulk to your bowel movements  High-fiber foods include fruits, vegetables, whole-grain breads and cereals, and beans  Your healthcare provider or dietitian can help you create a high-fiber meal plan  Your provider may also recommend a fiber supplement if you cannot get enough fiber from food  · Exercise regularly  Regular physical activity can help stimulate your intestines  Walking is a good exercise to prevent or relieve constipation  Ask which exercises are best for you  · Schedule a time each day to have a bowel movement  This may help train your body to have regular bowel movements  Bend forward while you are on the toilet to help move the bowel movement out  Sit on the toilet for at least 10 minutes, even if you do not have a bowel movement  Store narcotics safely:   · Store narcotics where others cannot easily get them  Keep them in a locked cabinet or secure area  Do not  keep them in a purse or other bag you carry with you  A person may be looking for something else and find the narcotics  · Make sure narcotics are stored out of the reach of children  A child can easily overdose on narcotics  Narcotics may look like candy to a small child  The best way to dispose of narcotics: The laws vary by country and area  In the [place], the best way is to return the narcotics through a take-back program  This program is offered by the [employer] ([employer])  The following are options for using the program:  · Take the narcotics to a MARGARITA collection site  The site is often a law enforcement center  Call your local law enforcement center for scheduled take-back days in your area   You will be given information on where to go if the collection site is in a different location  · Take the narcotics to an approved pharmacy or hospital   A pharmacy or hospital may be set up as a collection site  You will need to ask if it is a MARGARITA collection site if you were not directed there  A pharmacy or doctor's office may not be able to take back narcotics unless it is a MARGARITA site  · Use a mail-back system  This means you are given containers to put the narcotics into  You will then mail them in the containers  · Use a take-back drop box  This is a place to leave the narcotics at any time  People and animals will not be able to get into the box  Your local law enforcement agency can tell you where to find a drop box in your area  Other ways to manage pain:   · Ask your healthcare provider about non-narcotic medicines to control pain  Nonprescription medicines include NSAIDs (such as ibuprofen) and acetaminophen  Prescription medicines include muscle relaxers, antidepressants, and steroids  · Pain may be managed without any medicines  Some ways to relieve pain include massage, aromatherapy, or meditation  Physical or occupational therapy may also help  For more information:   · Drug Enforcement Administration  [address] [place] [address] [address]  Phone: 2- 587 - [phone]  Web Address: [facility]/drug_disposal/    · [address]  and Drug Administration  [facility] , [address] [facility]  Phone: 9- 315 - [phone]  Web Address: http://Zip[employer]/     © Copyright [employer] 2018 Information is for End User's use only and may not be sold, redistributed or otherwise used for commercial purposes   All illustrations and images included in CareNotes® are the copyrighted property of [employer]  or [facility] [employer]

## 2021-05-10 NOTE — PROGRESS NOTES
Assessment/Plan:    Gastroparesis  ? As cause of LUQ pain  Appears to be related to high fat meal  Urged diet control  Monitor  Recheck if pain returns  F/u with GI    Chronic obstructive pulmonary disease (HCC)  Stable on Trelegy  Continue present care  Recheck 6m    Hypertension  Well controlled  Cont present treatment  Monitor labs  Recheck 6m      Recurrent major depressive disorder, in partial remission ([facility] )  Depression Screening Follow-up Plan: Patient's depression screening was positive with a PHQ-2 score of 0  Their PHQ-9 score was 0  Patient assessed for underlying major depression  They have no active suicidal ideations  Brief counseling provided and recommend additional follow-up/re-evaluation next office visit  Continue present care  Recheck 6m      Severe obesity (BMI 35 0-35 9 with comorbidity) ([facility])  BMI Counseling: Body mass index is 34 84 kg/m²  The BMI is above normal  Nutrition recommendations include consuming healthier snacks, moderation in carbohydrate intake, increasing intake of lean protein, reducing intake of saturated fat and trans fat and reducing intake of cholesterol  Exercise recommendations include exercising 3-5 times per week  Recheck 6m         Diagnoses and all orders for this visit:    Welcome to Medicare preventive visit    Gastroparesis    Chronic bronchitis, unspecified chronic bronchitis type ([facility] )    Essential hypertension    Recurrent major depressive disorder, in partial remission ([facility] )    Severe obesity (BMI 35 0-35 9 with comorbidity) ([place] [id number] )    Screening for cardiovascular, respiratory, and genitourinary diseases  -     POCT ECG          Subjective:      Patient ID: Haydee Rubalcava is a 72 y o  female  f/u multiple med issues and Welcome to Medicare exam  - mood stable  Still with family stress which can have adverse effects  - pt still with occ brief CP episodes  Previous Cardio w/u unremarkable  Pt has not noted any symptoms with exertion   Still resolves without sequelae  - had episode of LUQ pain recently  Occurred after a high fat meal  Has hx of hiatal hernia  Otherwise no other Gi symptoms  - still smoking  1 1/2 - 2ppd  Breathing well on Trelegy  Due for repeat CT of the lungs in November  - still with on and off bodyaches  May be a little better since starting glucosamine with tumeric  - Welcome to Medicare done      The following portions of the patient's history were reviewed and updated as appropriate:   She  has a past medical history of Acid reflux, Fibromyalgia, Hypertension, and Seasonal allergies    She   Patient Active Problem List    Diagnosis Date Noted    Chest pain 02/08/2021    Dermatitis 09/14/2020    Prediabetes 04/10/2020    Chronic idiopathic constipation 12/16/2019    Gastroparesis 12/16/2019    Severe obesity (BMI 35 0-35 9 with comorbidity) ([facility] ) 11/21/2019    Lower extremity edema 11/21/2019    Snoring 11/21/2019    Tobacco dependence 10/02/2019    Neck pain 07/11/2019    Neuropathic pain 01/09/2019    Early satiety 08/28/2018    Abdominal distension 08/28/2018    Family history of pancreatic cancer 08/28/2018    Pain of upper abdomen 08/28/2018    Dyspnea on exertion 05/04/2018    Edema 07/13/2017    Lumbosacral radiculopathy at L5 01/06/2017    Compression fracture of thoracic vertebra, non-traumatic (HCC) 08/30/2016    Hyperlipidemia 12/08/2015    Cataract 08/22/2014    Palpitations 08/05/2014    Chronic obstructive pulmonary disease ([facility] ) 04/17/2013    Esophageal reflux 04/17/2013    Pulmonary nodule seen on imaging study 04/17/2013    Hypertension 03/22/2013    Recurrent major depressive disorder, in partial remission ([facility] ) 03/14/2013    Fibromyalgia 03/14/2013    Degeneration of intervertebral disc 03/14/2013    Anxiety 02/21/2013    Seasonal allergic rhinitis 12/20/2012     She  has a past surgical history that includes Knee surgery (1998); pr colonoscopy flx dx w/collj spec when pfrmd (N/A, 5/9/2017); and Eye surgery  She  reports that she has been smoking cigarettes  She has a 100 00 pack-year smoking history  She has never used smokeless tobacco  She reports that she does not drink alcohol or use drugs    Current Outpatient Medications   Medication Sig Dispense Refill    acetaminophen (TYLENOL 8 HOUR ARTHRITIS PAIN) 650 mg CR tablet Take by mouth      albuterol (ProAir HFA) 90 mcg/act inhaler Inhale 2 puffs every 6 (six) hours as needed for wheezing 8 5 g 1    ALPRAZolam (XANAX) 0 25 mg tablet Take 1 tablet (0 25 mg total) by mouth 3 (three) times a day as needed for anxiety 90 tablet 0    bisacodyl (DULCOLAX) 5 mg EC tablet Take 1 tablet (5 mg total) by mouth daily at bedtime With colonoscopy prep per instructions 30 tablet 0    cetirizine (ZyrTEC) 10 mg tablet Take 1 tablet (10 mg total) by mouth daily 90 tablet 0    ergocalciferol (VITAMIN D2) 50,000 units Take 1 capsule (50,000 Units total) by mouth once a week 4 capsule 0    escitalopram (LEXAPRO) 10 mg tablet TAKE 1 TABLET BY MOUTH EVERY DAY 90 tablet 1    fluticasone (FLONASE) 50 mcg/act nasal spray SPRAY 2 SPRAYS INTO EACH NOSTRIL EVERY DAY 16 mL 3    glucosamine-chondroitin 500-400 MG tablet Take 1 tablet by mouth 3 (three) times a day      lisinopril (ZESTRIL) 20 mg tablet TAKE 1 TABLET BY MOUTH EVERY DAY 30 tablet 5    MINOXIDIL, TOPICAL, 5 % SOLN Apply 1 application topically      Misc Natural Products (TART CHERRY ADVANCED PO) Take 1,000 mg/day by mouth      montelukast (SINGULAIR) 10 mg tablet TAKE 1 TABLET BY MOUTH EVERYDAY AT BEDTIME 90 tablet 1    nystatin (MYCOSTATIN) 500,000 units/5 mL suspension Apply 5 mL (500,000 Units total) to the mouth or throat 4 (four) times a day 200 mL 3    pantoprazole (PROTONIX) 40 mg tablet TAKE 1 TABLET BY MOUTH EVERY DAY 30 tablet 0    Probiotic Product (PROBIOTIC-10) CAPS Take by mouth 2 (two) times a day      Promethazine-DM (PHENERGAN-DM) 6 25-15 mg/5 mL oral syrup Take 5 mL by mouth 4 (four) times a day as needed for cough 150 mL 0    senna (Senokot) 8 6 MG tablet Take 1 tablet (8 6 mg total) by mouth daily 90 tablet 0    Trelegy Ellipta 100-62 5-25 MCG/INH inhaler INHALE 1 PUFF DAILY RINSE MOUTH AFTER USE  1 Inhaler 5     No current facility-administered medications for this visit  She is allergic to augmentin [amoxicillin-pot clavulanate]; miconazole; and wixela inhub [fluticasone-salmeterol]       Review of Systems   Constitutional: Positive for fatigue (intermittent)  Negative for activity change, chills, fever and unexpected weight change  HENT: Negative  Eyes: Negative  Respiratory: Positive for shortness of breath (with exertion - better with Trelegy)  Cardiovascular: Positive for chest pain (brief, intermittent, not associated with exertion)  Gastrointestinal: Positive for abdominal pain (recent LUQ)  Endocrine: Negative  Genitourinary: Negative  Musculoskeletal: Positive for arthralgias and myalgias  Negative for back pain and joint swelling  Skin: Negative  Allergic/Immunologic: Negative  Neurological: Negative  Hematological: Negative  Psychiatric/Behavioral: Negative  Objective:      /82   Pulse 76   Temp 97 9 °F (36 6 °C)   Ht 5' 4" (1 626 m)   Wt 92 1 kg (203 lb)   LMP  (LMP Unknown)   BMI 34 84 kg/m²          Physical Exam  Vitals signs reviewed  Constitutional:       Appearance: She is well-developed  She is not diaphoretic  HENT:      Head: Normocephalic and atraumatic  Right Ear: Tympanic membrane, ear canal and external ear normal       Left Ear: Tympanic membrane, ear canal and external ear normal    Eyes:      Extraocular Movements: Extraocular movements intact  Conjunctiva/sclera: Conjunctivae normal       Pupils: Pupils are equal, round, and reactive to light  Neck:      Musculoskeletal: Normal range of motion and neck supple  No muscular tenderness  Thyroid: No thyromegaly  Vascular: No JVD  Cardiovascular:      Rate and Rhythm: Normal rate and regular rhythm  Pulses: Normal pulses  Heart sounds: No murmur  Pulmonary:      Effort: Pulmonary effort is normal       Breath sounds: Normal breath sounds  Abdominal:      General: There is no distension  Palpations: Abdomen is soft  There is no mass  Tenderness: There is no abdominal tenderness  There is no right CVA tenderness or left CVA tenderness  Musculoskeletal: Normal range of motion  General: No swelling  Right lower leg: No edema  Left lower leg: No edema  Lymphadenopathy:      Cervical: No cervical adenopathy  Skin:     General: Skin is warm and dry  Capillary Refill: Capillary refill takes less than 2 seconds  Neurological:      Mental Status: She is alert and oriented to person, place, and time  Cranial Nerves: No cranial nerve deficit  Sensory: No sensory deficit  Motor: No weakness or abnormal muscle tone  Coordination: Coordination normal       Gait: Gait normal       Deep Tendon Reflexes: Reflexes are normal and symmetric  Reflexes normal       Comments: [employer]    Psychiatric:         Mood and Affect: Mood normal          Behavior: Behavior normal          Thought Content:  Thought content normal          Judgment: Judgment normal       Comments: PHQ-9 Depression Screening    PHQ-9:   Frequency of the following problems over the past two weeks:      Little interest or pleasure in doing things: 0 - not at all  Feeling down, depressed, or hopeless: 0 - not at all  Trouble falling or staying asleep, or sleeping too much: 0 - not at all  Feeling tired or having little energy: 0 - not at all  Poor appetite or overeatin - not at all  Feeling bad about yourself - or that you are a failure or have let   yourself or your family down: 0 - not at all  Trouble concentrating on things, such as reading the newspaper or watching   television: 0 - not at all  Moving or speaking so slowly that other people could have noticed   Or the   opposite - being so fidgety or restless that you have been moving around a   lot more than usual: 0 - not at all  Thoughts that you would be better off dead, or of hurting yourself in some   way: 0 - not at all  PHQ-2 Score: 0  PHQ-9 Score: 0

## 2021-05-10 NOTE — PROGRESS NOTES
Assessment and Plan:     Problem List Items Addressed This Visit        Digestive    Gastroparesis     ? As cause of LUQ pain  Appears to be related to high fat meal  Urged diet control  Monitor  Recheck if pain returns  F/u with GI            Respiratory    Chronic obstructive pulmonary disease (HCC)     Stable on Trelegy  Continue present care  Recheck 6m            Cardiovascular and Mediastinum    Hypertension     Well controlled  Cont present treatment  Monitor labs  Recheck 6m              Other    Recurrent major depressive disorder, in partial remission ([facility] [address] )     Depression Screening Follow-up Plan: Patient's depression screening was positive with a PHQ-2 score of 0  Their PHQ-9 score was 0  Patient assessed for underlying major depression  They have no active suicidal ideations  Brief counseling provided and recommend additional follow-up/re-evaluation next office visit  Continue present care  Recheck 6m           Severe obesity (BMI 35 0-35 9 with comorbidity) ([facility])     BMI Counseling: Body mass index is 34 84 kg/m²  The BMI is above normal  Nutrition recommendations include consuming healthier snacks, moderation in carbohydrate intake, increasing intake of lean protein, reducing intake of saturated fat and trans fat and reducing intake of cholesterol  Exercise recommendations include exercising 3-5 times per week  Recheck 6m             Other Visit Diagnoses     Welcome to Medicare preventive visit    -  Primary    Screening for cardiovascular, respiratory, and genitourinary diseases        Relevant Orders    POCT ECG (Completed)           Preventive health issues were discussed with patient, and age appropriate screening tests were ordered as noted in patient's After Visit Summary  Personalized health advice and appropriate referrals for health education or preventive services given if needed, as noted in patient's After Visit Summary       History of Present Illness:     Patient presents for Medicare Annual Wellness visit    Patient Care Team:  Esme Zapata MD as PCP - Connor Arora MD as PCP - [address] [place] [address],[address] ([place])  MD Monalisa Sunshine MD Kenith Pollock, MD as Endoscopist     Problem List:     Patient Active Problem List   Diagnosis    Seasonal allergic rhinitis    Anxiety    Cataract    Chronic obstructive pulmonary disease ([facility] [address] )    Compression fracture of thoracic vertebra, non-traumatic ([facility] [address] )    Recurrent major depressive disorder, in partial remission ([facility] [address] )    Edema    Esophageal reflux    Fibromyalgia    Hyperlipidemia    Hypertension    Degeneration of intervertebral disc    Lumbosacral radiculopathy at L5    Palpitations    Pulmonary nodule seen on imaging study    Dyspnea on exertion    Early satiety    Abdominal distension    Family history of pancreatic cancer    Pain of upper abdomen    Neuropathic pain    Neck pain    Tobacco dependence    Severe obesity (BMI 35 0-35 9 with comorbidity) ([facility] [address] )    Lower extremity edema    Snoring    Chronic idiopathic constipation    Gastroparesis    Prediabetes    Dermatitis    Chest pain      Past Medical and Surgical History:     Past Medical History:   Diagnosis Date    Acid reflux     Fibromyalgia     Hypertension     Seasonal allergies      Past Surgical History:   Procedure Laterality Date    EYE SURGERY      KNEE SURGERY  1998    TX COLONOSCOPY FLX DX W/COLLJ SPEC WHEN PFRMD N/A 5/9/2017    Procedure: EGD AND COLONOSCOPY;  Surgeon: Nkechi Xiao MD;  [facility];   Service: Gastroenterology      Family History:     Family History   Problem Relation Age of Onset    Pancreatic cancer Mother 67    Lung cancer Sister 40    Cancer Brother         pancreatic    Coronary artery disease Father     Diabetes Father     Hypertension Father     Heart disease Father     Diabetes Paternal Grandmother     Lung cancer Maternal Grandfather 68      Social History: Social History     Socioeconomic History    Marital status: /Civil Union     Spouse name: None    Number of children: 3    Years of education: less than high school    Highest education level: None   Occupational History    Occupation: unemployed   Social Needs    Financial resource strain: None    Food insecurity     Worry: None     Inability: None    Transportation needs     Medical: None     Non-medical: None   Tobacco Use    Smoking status: Current Every Day Smoker     Packs/day: 2 00     Years: 50 00     Pack years: 100 00     Types: Cigarettes    Smokeless tobacco: Never Used   Substance and Sexual Activity    Alcohol use: Never     Frequency: Never     Binge frequency: Never    Drug use: No    Sexual activity: None   Lifestyle    Physical activity     Days per week: None     Minutes per session: None    Stress: None   Relationships    Social connections     Talks on phone: None     Gets together: None     Attends Jain service: None     Active member of club or organization: None     Attends meetings of clubs or organizations: None     Relationship status: None    Intimate partner violence     Fear of current or ex partner: None     Emotionally abused: None     Physically abused: None     Forced sexual activity: None   Other Topics Concern    None   Social History Narrative    Always uses seatbelt    Daily coffee consumption    Daily tea consumption    Dental care regularly    Exercises regularly    Multiple organ donor    No guns in the home    No living will    Denies pets/ animals in the home    Power of  in existence- denied          Medications and Allergies:     Current Outpatient Medications   Medication Sig Dispense Refill    acetaminophen (TYLENOL 8 HOUR ARTHRITIS PAIN) 650 mg CR tablet Take by mouth      albuterol (ProAir HFA) 90 mcg/act inhaler Inhale 2 puffs every 6 (six) hours as needed for wheezing 8 5 g 1    ALPRAZolam (XANAX) 0 25 mg tablet Take 1 tablet (0 25 mg total) by mouth 3 (three) times a day as needed for anxiety 90 tablet 0    bisacodyl (DULCOLAX) 5 mg EC tablet Take 1 tablet (5 mg total) by mouth daily at bedtime With colonoscopy prep per instructions 30 tablet 0    cetirizine (ZyrTEC) 10 mg tablet Take 1 tablet (10 mg total) by mouth daily 90 tablet 0    ergocalciferol (VITAMIN D2) 50,000 units Take 1 capsule (50,000 Units total) by mouth once a week 4 capsule 0    escitalopram (LEXAPRO) 10 mg tablet TAKE 1 TABLET BY MOUTH EVERY DAY 90 tablet 1    fluticasone (FLONASE) 50 mcg/act nasal spray SPRAY 2 SPRAYS INTO EACH NOSTRIL EVERY DAY 16 mL 3    glucosamine-chondroitin 500-400 MG tablet Take 1 tablet by mouth 3 (three) times a day      lisinopril (ZESTRIL) 20 mg tablet TAKE 1 TABLET BY MOUTH EVERY DAY 30 tablet 5    MINOXIDIL, TOPICAL, 5 % SOLN Apply 1 application topically      Misc Natural Products (TART CHERRY ADVANCED PO) Take 1,000 mg/day by mouth      montelukast (SINGULAIR) 10 mg tablet TAKE 1 TABLET BY MOUTH EVERYDAY AT BEDTIME 90 tablet 1    nystatin (MYCOSTATIN) 500,000 units/5 mL suspension Apply 5 mL (500,000 Units total) to the mouth or throat 4 (four) times a day 200 mL 3    pantoprazole (PROTONIX) 40 mg tablet TAKE 1 TABLET BY MOUTH EVERY DAY 30 tablet 0    Probiotic Product (PROBIOTIC-10) CAPS Take by mouth 2 (two) times a day      Promethazine-DM (PHENERGAN-DM) 6 25-15 mg/5 mL oral syrup Take 5 mL by mouth 4 (four) times a day as needed for cough 150 mL 0    senna (Senokot) 8 6 MG tablet Take 1 tablet (8 6 mg total) by mouth daily 90 tablet 0    Trelegy Ellipta 100-62 5-25 MCG/INH inhaler INHALE 1 PUFF DAILY RINSE MOUTH AFTER USE  1 Inhaler 5     No current facility-administered medications for this visit        Allergies   Allergen Reactions    Augmentin [Amoxicillin-Pot Clavulanate] Vomiting    Miconazole Itching and Swelling    Wixela Inhub [Fluticasone-Salmeterol] Palpitations      Immunizations: Immunization History   Administered Date(s) Administered    Tdap 02/28/2008      Health Maintenance:         Topic Date Due    Lung Cancer Screening  10/28/2020    MAMMOGRAM  09/10/2021    Cervical Cancer Screening  02/11/2022    DXA SCAN  09/10/2023    Colorectal Cancer Screening  05/09/2027    HIV Screening  Completed    Hepatitis C Screening  Completed         Topic Date Due    COVID-19 Vaccine (1) Never done    DTaP,Tdap,and Td Vaccines (2 - Td) 02/28/2018    Pneumococcal Vaccine: 65+ Years (1 of 1 - PPSV23) Never done      Medicare Health Risk Assessment:     /82   Pulse 76   Temp 97 9 °F (36 6 °C)   Ht 5' 4" (1 626 m)   Wt 92 1 kg (203 lb)   LMP  (LMP Unknown)   BMI 34 84 kg/m²      Leti Mishra is here for her Welcome to Medicare visit  Health Risk Assessment:   Patient rates overall health as good  Patient feels that their physical health rating is same  Patient is very satisfied with their life  Eyesight was rated as same  Hearing was rated as same  Patient feels that their emotional and mental health rating is same  Patients states they are never, rarely angry  Patient states they are never, rarely unusually tired/fatigued  Pain experienced in the last 7 days has been some  Patient's pain rating has been 8/10  Patient states that she has experienced no weight loss or gain in last 6 months  scatereed joint aches  Hx of fibromyalgia    Depression Screening:   PHQ-2 Score: 0  PHQ-9 Score: 0      Fall Risk Screening: In the past year, patient has experienced: no history of falling in past year      Urinary Incontinence Screening:   Patient has leaked urine accidently in the last six months  Home Safety:  Patient does not have trouble with stairs inside or outside of their home  Patient has working smoke alarms and has working carbon monoxide detector  Home safety hazards include: none  Nutrition:   Current diet is Regular       Medications:   Patient is currently taking over-the-counter supplements  OTC medications include: see medication list  Patient is able to manage medications  Activities of Daily Living (ADLs)/Instrumental Activities of Daily Living (IADLs):   Walk and transfer into and out of bed and chair?: Yes  Dress and groom yourself?: Yes    Bathe or shower yourself?: Yes    Feed yourself? Yes  Do your laundry/housekeeping?: Yes  Manage your money, pay your bills and track your expenses?: Yes  Make your own meals?: Yes    Do your own shopping?: Yes    Previous Hospitalizations:   Any hospitalizations or ED visits within the last 12 months?: No      Advance Care Planning:   Living will: No      PREVENTIVE SCREENINGS      Cardiovascular Screening:    General: Screening Not Indicated and History Lipid Disorder      Diabetes Screening:     General: Screening Current      Colorectal Cancer Screening:     General: Screening Current      Breast Cancer Screening:     General: Screening Current      Cervical Cancer Screening:    General: Screening Not Indicated      Osteoporosis Screening:    General: Screening Current      Abdominal Aortic Aneurysm (AAA) Screening:        General: Screening Not Indicated      Lung Cancer Screening:     General: Screening Current      Hepatitis C Screening:    General: Screening Current    Screening, Brief Intervention, and Referral to Treatment (SBIRT)    Screening  Typical number of drinks in a day: 0  Typical number of drinks in a week: 0  Interpretation: Low risk drinking behavior      AUDIT-C Screenin) How often did you have a drink containing alcohol in the past year? never  2) How many drinks did you have on a typical day when you were drinking in the past year? never  3) How often did you have 6 or more drinks on one occasion in the past year? never    AUDIT-C Score: 0  Interpretation: Score 0-2 (female): Negative screen for alcohol misuse    Single Item Drug Screening:  How often have you used an illegal drug (including marijuana) or a prescription medication for non-medical reasons in the past year? never    Single Item Drug Screen Score: 0  Interpretation: Negative screen for possible drug use disorder    Review of Current Opioid Use    Opioid Risk Tool (ORT) Interpretation: Complete Opioid Risk Tool (ORT)    Other Counseling Topics:   Calcium and vitamin D intake and regular weightbearing exercise  Review of Systems   Constitutional: Positive for fatigue (intermittent)  Negative for activity change, chills, fever and unexpected weight change  HENT: Negative  Eyes: Negative  Respiratory: Positive for shortness of breath (with exertion - better with Trelegy)  Cardiovascular: Positive for chest pain (brief, intermittent, not associated with exertion)  Gastrointestinal: Positive for abdominal pain (recent LUQ)  Endocrine: Negative  Genitourinary: Negative  Musculoskeletal: Positive for arthralgias and myalgias  Negative for back pain and joint swelling  Skin: Negative  Allergic/Immunologic: Negative  Neurological: Negative  Hematological: Negative  Psychiatric/Behavioral: Negative  Objective:    Physical Exam  Vitals signs reviewed  Constitutional:       Appearance: She is well-developed  She is not diaphoretic  HENT:      Head: Normocephalic and atraumatic  Right Ear: Tympanic membrane, ear canal and external ear normal       Left Ear: Tympanic membrane, ear canal and external ear normal    Eyes:      Extraocular Movements: Extraocular movements intact  Conjunctiva/sclera: Conjunctivae normal       Pupils: Pupils are equal, round, and reactive to light  Neck:      Musculoskeletal: Normal range of motion and neck supple  No muscular tenderness  Thyroid: No thyromegaly  Vascular: No JVD  Cardiovascular:      Rate and Rhythm: Normal rate and regular rhythm  Pulses: Normal pulses  Heart sounds: No murmur     Pulmonary:      Effort: Pulmonary effort is normal       Breath sounds: Normal breath sounds  Abdominal:      General: There is no distension  Palpations: Abdomen is soft  There is no mass  Tenderness: There is no abdominal tenderness  There is no right CVA tenderness or left CVA tenderness  Musculoskeletal: Normal range of motion  General: No swelling  Right lower leg: No edema  Left lower leg: No edema  Lymphadenopathy:      Cervical: No cervical adenopathy  Skin:     General: Skin is warm and dry  Capillary Refill: Capillary refill takes less than 2 seconds  Neurological:      Mental Status: She is alert and oriented to person, place, and time  Cranial Nerves: No cranial nerve deficit  Sensory: No sensory deficit  Motor: No weakness or abnormal muscle tone  Coordination: Coordination normal       Gait: Gait normal       Deep Tendon Reflexes: Reflexes are normal and symmetric  Reflexes normal       Comments: [employer]    Psychiatric:         Mood and Affect: Mood normal          Behavior: Behavior normal          Thought Content: Thought content normal          Judgment: Judgment normal       Comments: PHQ-9 Depression Screening    PHQ-9:   Frequency of the following problems over the past two weeks:      Little interest or pleasure in doing things: 0 - not at all  Feeling down, depressed, or hopeless: 0 - not at all  Trouble falling or staying asleep, or sleeping too much: 0 - not at all  Feeling tired or having little energy: 0 - not at all  Poor appetite or overeatin - not at all  Feeling bad about yourself - or that you are a failure or have let   yourself or your family down: 0 - not at all  Trouble concentrating on things, such as reading the newspaper or watching   television: 0 - not at all  Moving or speaking so slowly that other people could have noticed   Or the   opposite - being so fidgety or restless that you have been moving around a   lot more than usual: 0 - not at all  Thoughts that you would be better off dead, or of hurting yourself in some   way: 0 - not at all  PHQ-2 Score: 0  PHQ-9 Score: 0     Kaitlin Cheung MD

## 2021-05-11 DIAGNOSIS — K21.9 GASTROESOPHAGEAL REFLUX DISEASE: ICD-10-CM

## 2021-05-11 RX ORDER — PANTOPRAZOLE SODIUM 40 MG/1
TABLET, DELAYED RELEASE ORAL
Qty: 30 TABLET | Refills: 0 | Status: SHIPPED | OUTPATIENT
Start: 2021-05-11 | End: 2021-06-07

## 2021-05-11 NOTE — ASSESSMENT & PLAN NOTE
Depression Screening Follow-up Plan: Patient's depression screening was positive with a PHQ-2 score of 0  Their PHQ-9 score was 0  Patient assessed for underlying major depression  They have no active suicidal ideations  Brief counseling provided and recommend additional follow-up/re-evaluation next office visit  Continue present care   Recheck 6m

## 2021-05-11 NOTE — ASSESSMENT & PLAN NOTE
? As cause of LUQ pain  Appears to be related to high fat meal  Urged diet control  Monitor  Recheck if pain returns   F/u with GI

## 2021-05-11 NOTE — ASSESSMENT & PLAN NOTE
BMI Counseling: Body mass index is 34 84 kg/m²  The BMI is above normal  Nutrition recommendations include consuming healthier snacks, moderation in carbohydrate intake, increasing intake of lean protein, reducing intake of saturated fat and trans fat and reducing intake of cholesterol  Exercise recommendations include exercising 3-5 times per week    Recheck 6m

## 2021-05-24 DIAGNOSIS — E55.9 VITAMIN D DEFICIENCY: ICD-10-CM

## 2021-05-24 RX ORDER — ERGOCALCIFEROL 1.25 MG/1
CAPSULE ORAL
Qty: 4 CAPSULE | Refills: 0 | Status: SHIPPED | OUTPATIENT
Start: 2021-05-24 | End: 2021-06-21

## 2021-06-05 DIAGNOSIS — K21.9 GASTROESOPHAGEAL REFLUX DISEASE: ICD-10-CM

## 2021-06-07 RX ORDER — PANTOPRAZOLE SODIUM 40 MG/1
TABLET, DELAYED RELEASE ORAL
Qty: 30 TABLET | Refills: 0 | Status: SHIPPED | OUTPATIENT
Start: 2021-06-07 | End: 2021-06-21

## 2021-06-20 DIAGNOSIS — E55.9 VITAMIN D DEFICIENCY: ICD-10-CM

## 2021-06-21 RX ORDER — ERGOCALCIFEROL 1.25 MG/1
CAPSULE ORAL
Qty: 4 CAPSULE | Refills: 0 | Status: SHIPPED | OUTPATIENT
Start: 2021-06-21 | End: 2022-02-24

## 2021-06-22 DIAGNOSIS — J30.9 ALLERGIC RHINITIS, UNSPECIFIED SEASONALITY, UNSPECIFIED TRIGGER: ICD-10-CM

## 2021-06-22 RX ORDER — CETIRIZINE HYDROCHLORIDE 10 MG/1
TABLET ORAL
Qty: 90 TABLET | Refills: 0 | Status: SHIPPED | OUTPATIENT
Start: 2021-06-22 | End: 2021-08-27

## 2021-08-04 DIAGNOSIS — M19.91 PRIMARY OSTEOARTHRITIS, UNSPECIFIED SITE: ICD-10-CM

## 2021-08-26 DIAGNOSIS — F33.0 DEPRESSION, MAJOR, RECURRENT, MILD (HCC): ICD-10-CM

## 2021-08-26 DIAGNOSIS — J42 CHRONIC BRONCHITIS, UNSPECIFIED CHRONIC BRONCHITIS TYPE (HCC): ICD-10-CM

## 2021-08-26 DIAGNOSIS — J30.2 SEASONAL ALLERGIC RHINITIS, UNSPECIFIED TRIGGER: ICD-10-CM

## 2021-08-26 DIAGNOSIS — J30.9 ALLERGIC RHINITIS, UNSPECIFIED SEASONALITY, UNSPECIFIED TRIGGER: ICD-10-CM

## 2021-08-27 RX ORDER — CETIRIZINE HYDROCHLORIDE 10 MG/1
TABLET ORAL
Qty: 90 TABLET | Refills: 0 | Status: SHIPPED | OUTPATIENT
Start: 2021-08-27 | End: 2021-10-08 | Stop reason: SDUPTHER

## 2021-08-27 RX ORDER — MONTELUKAST SODIUM 10 MG/1
TABLET ORAL
Qty: 90 TABLET | Refills: 1 | Status: SHIPPED | OUTPATIENT
Start: 2021-08-27 | End: 2022-01-13

## 2021-08-27 RX ORDER — ESCITALOPRAM OXALATE 10 MG/1
TABLET ORAL
Qty: 90 TABLET | Refills: 1 | Status: SHIPPED | OUTPATIENT
Start: 2021-08-27 | End: 2022-01-13

## 2021-09-01 ENCOUNTER — APPOINTMENT (OUTPATIENT)
Dept: LAB | Facility: MEDICAL CENTER | Age: 66
End: 2021-09-01
Payer: MEDICARE

## 2021-09-01 DIAGNOSIS — I10 ESSENTIAL HYPERTENSION: ICD-10-CM

## 2021-09-01 LAB
ANION GAP SERPL CALCULATED.3IONS-SCNC: 4 MMOL/L (ref 4–13)
BUN SERPL-MCNC: 9 MG/DL (ref 5–25)
CALCIUM SERPL-MCNC: 9.2 MG/DL (ref 8.3–10.1)
CHLORIDE SERPL-SCNC: 103 MMOL/L (ref 100–108)
CO2 SERPL-SCNC: 28 MMOL/L (ref 21–32)
CREAT SERPL-MCNC: 0.59 MG/DL (ref 0.6–1.3)
GFR SERPL CREATININE-BSD FRML MDRD: 96 ML/MIN/1.73SQ M
GLUCOSE P FAST SERPL-MCNC: 87 MG/DL (ref 65–99)
POTASSIUM SERPL-SCNC: 4.1 MMOL/L (ref 3.5–5.3)
SODIUM SERPL-SCNC: 135 MMOL/L (ref 136–145)

## 2021-09-01 PROCEDURE — 36415 COLL VENOUS BLD VENIPUNCTURE: CPT

## 2021-09-01 PROCEDURE — 80048 BASIC METABOLIC PNL TOTAL CA: CPT

## 2021-09-02 ENCOUNTER — TELEPHONE (OUTPATIENT)
Dept: PULMONOLOGY | Facility: CLINIC | Age: 66
End: 2021-09-02

## 2021-09-02 DIAGNOSIS — J42 CHRONIC BRONCHITIS, UNSPECIFIED CHRONIC BRONCHITIS TYPE (HCC): Primary | ICD-10-CM

## 2021-09-02 DIAGNOSIS — J42 CHRONIC BRONCHITIS, UNSPECIFIED CHRONIC BRONCHITIS TYPE (HCC): ICD-10-CM

## 2021-09-02 RX ORDER — FLUTICASONE FUROATE AND VILANTEROL TRIFENATATE 100; 25 UG/1; UG/1
1 POWDER RESPIRATORY (INHALATION) DAILY
Qty: 60 BLISTER | Refills: 3 | Status: SHIPPED | OUTPATIENT
Start: 2021-09-02 | End: 2021-09-03 | Stop reason: ALTCHOICE

## 2021-09-02 RX ORDER — TIOTROPIUM BROMIDE 18 UG/1
18 CAPSULE ORAL; RESPIRATORY (INHALATION) DAILY
Qty: 30 CAPSULE | Refills: 3 | Status: SHIPPED | OUTPATIENT
Start: 2021-09-02 | End: 2021-09-02

## 2021-09-02 RX ORDER — UMECLIDINIUM 62.5 UG/1
1 AEROSOL, POWDER ORAL DAILY
Qty: 30 BLISTER | Refills: 3 | Status: SHIPPED | OUTPATIENT
Start: 2021-09-02 | End: 2021-09-03 | Stop reason: ALTCHOICE

## 2021-09-02 NOTE — TELEPHONE ENCOUNTER
Patient calling saying she has been using her Trelegy for awhile now but has started having issues with urinating and with her eyes  She would like to speak with someone regarding this   [phone]

## 2021-09-02 NOTE — TELEPHONE ENCOUNTER
Spoke with [facility]  She has been on Trelegy for over a year  For the past two months she has been having pain in her eye  The past two weeks, she has been having blurry vision and floaters in her eye  She has also been having urinary retention  She said sometimes she cannot go or just a few drops come out  This past week, the symptoms have gotten really bad  She went to the eye doctor yesterday and was told she has high pressure in her eyes  She also got blood works and said her kidney level was low  She is very concerned  Please advise

## 2021-09-03 ENCOUNTER — OFFICE VISIT (OUTPATIENT)
Dept: FAMILY MEDICINE CLINIC | Facility: CLINIC | Age: 66
End: 2021-09-03
Payer: MEDICARE

## 2021-09-03 VITALS
HEART RATE: 74 BPM | TEMPERATURE: 97.9 F | BODY MASS INDEX: 33.97 KG/M2 | SYSTOLIC BLOOD PRESSURE: 120 MMHG | HEIGHT: 64 IN | DIASTOLIC BLOOD PRESSURE: 82 MMHG | WEIGHT: 199 LBS

## 2021-09-03 DIAGNOSIS — M79.7 FIBROMYALGIA: ICD-10-CM

## 2021-09-03 DIAGNOSIS — I10 ESSENTIAL HYPERTENSION: ICD-10-CM

## 2021-09-03 DIAGNOSIS — J42 CHRONIC BRONCHITIS, UNSPECIFIED CHRONIC BRONCHITIS TYPE (HCC): Primary | ICD-10-CM

## 2021-09-03 DIAGNOSIS — R60.0 LOWER EXTREMITY EDEMA: ICD-10-CM

## 2021-09-03 PROCEDURE — 99214 OFFICE O/P EST MOD 30 MIN: CPT | Performed by: FAMILY MEDICINE

## 2021-09-03 RX ORDER — UMECLIDINIUM BROMIDE AND VILANTEROL TRIFENATATE 62.5; 25 UG/1; UG/1
1 POWDER RESPIRATORY (INHALATION) DAILY
Qty: 30 BLISTER | Refills: 5 | Status: SHIPPED | OUTPATIENT
Start: 2021-09-03 | End: 2021-12-07 | Stop reason: ALTCHOICE

## 2021-09-03 NOTE — PROGRESS NOTES
Assessment/Plan:    Chronic obstructive pulmonary disease (HCC)  Stable  Continue Trelegy  Discussed smoking cessation  Recheck 3m    Hypertension  Stable today  Continue to monitor  Continue present care  Recheck 3m    Fibromyalgia  I reviewed with pt  I explained that hydroxychloroquine is a medication used for inflammatory arthritis and other inflammatory diseases  But since fibro is noninflammatory, I do not feel that it would be an appropriate treatment  Can refer to rheum for second opinion  Lower extremity edema  No signs of CHF or renal failure  Encourage weight loss, increased ambulation and possibly compression stockings  Recheck 3m       Diagnoses and all orders for this visit:    Chronic bronchitis, unspecified chronic bronchitis type ([facility] [address] )  -     umeclidinium-vilanterol (Anoro Ellipta) 62 5-25 MCG/INH inhaler; Inhale 1 puff daily    Essential hypertension    Fibromyalgia    Lower extremity edema          Subjective:      Patient ID: Ephriam Kanner is a 72 y o  female  f/u multiple med issues and review recent labs  - pt states that her mood is unchanged  Still with some family and health stressors  Pt compliant with meds  - pt still with occ brief CP episodes  No radiation to jaw/shoulders and not associated with exertion  Cardiac work up negative  - still with diffuse bodyaches  Wants to know why we have not tried hydroxychloroquine for her fibromyalgia (pt read somewhere that it helps fibro)  - still smoking  No worsening SOB - feels that Trelegy is working well  Due for repeat CT of the lungs in November  - may still get some intermittent leg swelling  Pt concerned that her kidneys were not functioning well  Recent BMP showed creat= 0 59 (eGFR = 96)      The following portions of the patient's history were reviewed and updated as appropriate:   She  has a past medical history of Acid reflux, Fibromyalgia, Hypertension, and Seasonal allergies    She   Patient Active Problem List    Diagnosis Date Noted    Chest pain 02/08/2021    Dermatitis 09/14/2020    Prediabetes 04/10/2020    Chronic idiopathic constipation 12/16/2019    Gastroparesis 12/16/2019    Severe obesity (BMI 35 0-35 9 with comorbidity) ([facility] ) 11/21/2019    Lower extremity edema 11/21/2019    Snoring 11/21/2019    Tobacco dependence 10/02/2019    Neck pain 07/11/2019    Neuropathic pain 01/09/2019    Early satiety 08/28/2018    Abdominal distension 08/28/2018    Family history of pancreatic cancer 08/28/2018    Pain of upper abdomen 08/28/2018    Dyspnea on exertion 05/04/2018    Edema 07/13/2017    Lumbosacral radiculopathy at L5 01/06/2017    Compression fracture of thoracic vertebra, non-traumatic (HCC) 08/30/2016    Hyperlipidemia 12/08/2015    Cataract 08/22/2014    Palpitations 08/05/2014    Chronic obstructive pulmonary disease ([facility] ) 04/17/2013    Esophageal reflux 04/17/2013    Pulmonary nodule seen on imaging study 04/17/2013    Hypertension 03/22/2013    Recurrent major depressive disorder, in partial remission ([facility] ) 03/14/2013    Fibromyalgia 03/14/2013    Degeneration of intervertebral disc 03/14/2013    Anxiety 02/21/2013    Seasonal allergic rhinitis 12/20/2012     She  has a past surgical history that includes Knee surgery (1998); pr colonoscopy flx dx w/collj spec when pfrmd (N/A, 5/9/2017); and Eye surgery  She  reports that she has been smoking cigarettes  She has a 100 00 pack-year smoking history  She has never used smokeless tobacco  She reports that she does not drink alcohol and does not use drugs    Current Outpatient Medications   Medication Sig Dispense Refill    acetaminophen (TYLENOL 8 HOUR ARTHRITIS PAIN) 650 mg CR tablet Take by mouth      ALPRAZolam (XANAX) 0 25 mg tablet Take 1 tablet (0 25 mg total) by mouth 3 (three) times a day as needed for anxiety 90 tablet 0    bisacodyl (DULCOLAX) 5 mg EC tablet Take 1 tablet (5 mg total) by mouth daily at bedtime With colonoscopy prep per instructions 30 tablet 0    cetirizine (ZyrTEC) 10 mg tablet TAKE 1 TABLET BY MOUTH EVERY DAY 90 tablet 0    Diclofenac Sodium (VOLTAREN) 1 % APPLY 2 GRAMS TO AFFECTED AREA 4 TIMES A  g 1    ergocalciferol (VITAMIN D2) 50,000 units TAKE 1 CAPSULE BY MOUTH ONE TIME PER WEEK 4 capsule 0    escitalopram (LEXAPRO) 10 mg tablet TAKE 1 TABLET BY MOUTH EVERY DAY 90 tablet 1    fluticasone (FLONASE) 50 mcg/act nasal spray SPRAY 2 SPRAYS INTO EACH NOSTRIL EVERY DAY 16 mL 3    lisinopril (ZESTRIL) 20 mg tablet TAKE 1 TABLET BY MOUTH EVERY DAY 90 tablet 1    MINOXIDIL, TOPICAL, 5 % SOLN Apply 1 application topically      montelukast (SINGULAIR) 10 mg tablet TAKE 1 TABLET BY MOUTH EVERYDAY AT BEDTIME 90 tablet 1    nystatin (MYCOSTATIN) 500,000 units/5 mL suspension Apply 5 mL (500,000 Units total) to the mouth or throat 4 (four) times a day 200 mL 3    pantoprazole (PROTONIX) 40 mg tablet TAKE 1 TABLET BY MOUTH EVERY DAY 30 tablet 5    Probiotic Product (PROBIOTIC-10) CAPS Take by mouth 2 (two) times a day      senna (Senokot) 8 6 MG tablet Take 1 tablet (8 6 mg total) by mouth daily 90 tablet 0    albuterol (PROVENTIL HFA,VENTOLIN HFA) 90 mcg/act inhaler TAKE 2 PUFFS BY MOUTH EVERY 6 HOURS AS NEEDED FOR WHEEZE 18 g 1    promethazine-dextromethorphan (PHENERGAN-DM) 6 25-15 mg/5 mL oral syrup TAKE 5 ML BY MOUTH 4 TIMES A DAY AS NEEDED FOR COUGH 150 mL 0    umeclidinium-vilanterol (Anoro Ellipta) 62 5-25 MCG/INH inhaler Inhale 1 puff daily 30 blister 5     No current facility-administered medications for this visit  She is allergic to augmentin [amoxicillin-pot clavulanate], miconazole, and wixela inhub [fluticasone-salmeterol]       Review of Systems   Constitutional: Positive for fatigue (persistent)  Negative for activity change, chills, fever and unexpected weight change  HENT: Negative  Eyes: Negative  Respiratory: Positive for shortness of breath (stable on Trelegy)      Cardiovascular: Positive for chest pain (brief  Unchanged)  Gastrointestinal: Positive for abdominal pain (occ LUQ and epigastric)  Endocrine: Negative  Genitourinary: Negative  Musculoskeletal: Positive for arthralgias (diffuse, scattered ) and myalgias (diffuse, scattered)  Negative for back pain and joint swelling  Skin: Negative  Allergic/Immunologic: Negative  Neurological: Positive for headaches  Negative for dizziness and light-headedness  Hematological: Negative  Psychiatric/Behavioral: Positive for dysphoric mood (occ depressed mood)  Negative for suicidal ideas  The patient is not nervous/anxious  Objective:      /82   Pulse 74   Temp 97 9 °F (36 6 °C)   Ht 5' 4" (1 626 m)   Wt 90 3 kg (199 lb)   LMP  (LMP Unknown)   BMI 34 16 kg/m²          Physical Exam  Vitals reviewed  Constitutional:       Appearance: She is well-developed  She is not diaphoretic  HENT:      Head: Normocephalic and atraumatic  Right Ear: Tympanic membrane, ear canal and external ear normal       Left Ear: Tympanic membrane, ear canal and external ear normal       Mouth/Throat:      Mouth: Mucous membranes are moist    Eyes:      General: No scleral icterus  Extraocular Movements: Extraocular movements intact  Conjunctiva/sclera: Conjunctivae normal       Pupils: Pupils are equal, round, and reactive to light  Comments: No pallor appreciated   Neck:      Thyroid: No thyromegaly  Vascular: No JVD  Cardiovascular:      Rate and Rhythm: Normal rate and regular rhythm  Pulses: Normal pulses  Heart sounds: No murmur heard  Pulmonary:      Effort: Pulmonary effort is normal       Breath sounds: Normal breath sounds  No wheezing or rales  Abdominal:      General: There is no distension  Palpations: Abdomen is soft  There is no mass  Tenderness: There is abdominal tenderness (mild along the L lower costal margin)     Musculoskeletal:         General: Tenderness (mild, diffuse to palpation) present  No swelling  Normal range of motion  Cervical back: Normal range of motion and neck supple  No muscular tenderness  Right lower leg: No edema  Left lower leg: No edema  Lymphadenopathy:      Cervical: No cervical adenopathy  Skin:     General: Skin is warm and dry  Capillary Refill: Capillary refill takes less than 2 seconds  Neurological:      Mental Status: She is alert and oriented to person, place, and time  Cranial Nerves: No cranial nerve deficit  Sensory: No sensory deficit  Motor: No weakness or abnormal muscle tone  Gait: Gait normal       Deep Tendon Reflexes: Reflexes are normal and symmetric     Psychiatric:         Mood and Affect: Mood normal

## 2021-09-03 NOTE — TELEPHONE ENCOUNTER
Dr Tabatha Martini had reached out to me - the concern with the inhalers is that the steroids in inhalers can cause or worsen glaucoma  We should switch her to Anoro or Stiolto, he was going to make the change      Thanks,  [facility]

## 2021-09-04 DIAGNOSIS — J42 CHRONIC BRONCHITIS, UNSPECIFIED CHRONIC BRONCHITIS TYPE (HCC): ICD-10-CM

## 2021-09-04 RX ORDER — ALBUTEROL SULFATE 90 UG/1
AEROSOL, METERED RESPIRATORY (INHALATION)
Qty: 18 G | Refills: 1 | Status: SHIPPED | OUTPATIENT
Start: 2021-09-04 | End: 2022-01-13

## 2021-09-06 ENCOUNTER — HOSPITAL ENCOUNTER (EMERGENCY)
Facility: HOSPITAL | Age: 66
Discharge: HOME | End: 2021-09-06
Attending: EMERGENCY MEDICINE | Admitting: EMERGENCY MEDICINE
Payer: MEDICARE

## 2021-09-06 ENCOUNTER — TELEPHONE (OUTPATIENT)
Dept: OTHER | Facility: OTHER | Age: 66
End: 2021-09-06

## 2021-09-06 VITALS
TEMPERATURE: 98.9 F | DIASTOLIC BLOOD PRESSURE: 89 MMHG | OXYGEN SATURATION: 95 % | SYSTOLIC BLOOD PRESSURE: 192 MMHG | HEART RATE: 79 BPM | RESPIRATION RATE: 19 BRPM

## 2021-09-06 DIAGNOSIS — R07.89 CHEST WALL PAIN: Primary | ICD-10-CM

## 2021-09-06 PROCEDURE — 99283 EMERGENCY DEPT VISIT LOW MDM: CPT | Performed by: EMERGENCY MEDICINE

## 2021-09-06 PROCEDURE — 99283 EMERGENCY DEPT VISIT LOW MDM: CPT

## 2021-09-06 NOTE — TELEPHONE ENCOUNTER
Patient is calling because she stated she went to the emergency room today with her son and he got tested for covid, turned up positive, she stated that provider that saw them in ED informed the patient he can go back to work tomorrow because he had been tested on the 16th, tested negative, and the provider had told her son since that occurred that he is no longer contagious  Patient is concerned because she was not tested in the hospital and stated she unsure what to do because she has been around him and has been exposed to her son, she has COPD and she isnt sure why she was not tested, she wants to be tested, would like to also discuss further with her care team directly

## 2021-09-06 NOTE — DISCHARGE INSTRUCTIONS
Normal care  Tylenol if needed for pain  Follow up with your doctor return increasing symptoms, worsening pain pain becomes constant or any other issues

## 2021-09-06 NOTE — ED PROVIDER NOTES
History  Chief Complaint   Patient presents with    Cough     Pt  presents to ER with c/o cough that causes pain in her lungs  HPI patient is a 70-year-old female, reports some cough and congestion over the last few days, patient apparently was evaluated by her provider and started on amoxicillin  Patient reports improvement and no longer has any congestion  She reports decreased cough  Patient reports she had pain today with a cough spell  Reports posterior rib pain only with coughing  Patient reports when she coughs she can feel an area and her back where she is sore  She denies any acute shortness of breath  Denies difficulty breathing  Denies any anterior chest pain  Patient is currently asymptomatic other than when she coughs  Patient can reproduce the pain with cough  Patient denies any diaphoresis  There is no anterior chest pain  Patient denies any shortness of breath  Patient denies any COVID exposure reports she would refuse the COVID vaccine  Past medical history of fibromyalgia hypertension recent bronchitis  Family history noncontributory  Social history smoker, denies drug abuse    Prior to Admission Medications   Prescriptions Last Dose Informant Patient Reported? Taking?    ALPRAZolam (XANAX) 0 25 mg tablet  Self No No   Sig: Take 1 tablet (0 25 mg total) by mouth 3 (three) times a day as needed for anxiety   Diclofenac Sodium (VOLTAREN) 1 %  Self No No   Sig: APPLY 2 GRAMS TO AFFECTED AREA 4 TIMES A DAY   MINOXIDIL, TOPICAL, 5 % SOLN  Self Yes No   Sig: Apply 1 application topically   Probiotic Product (PROBIOTIC-10) CAPS  Self Yes No   Sig: Take by mouth 2 (two) times a day   Promethazine-DM (PHENERGAN-DM) 6 25-15 mg/5 mL oral syrup  Self No No   Sig: Take 5 mL by mouth 4 (four) times a day as needed for cough   acetaminophen (TYLENOL 8 HOUR ARTHRITIS PAIN) 650 mg CR tablet  Self Yes No   Sig: Take by mouth   albuterol (PROVENTIL HFA,VENTOLIN HFA) 90 mcg/act inhaler   No No Sig: TAKE 2 PUFFS BY MOUTH EVERY 6 HOURS AS NEEDED FOR WHEEZE   bisacodyl (DULCOLAX) 5 mg EC tablet  Self No No   Sig: Take 1 tablet (5 mg total) by mouth daily at bedtime With colonoscopy prep per instructions   cetirizine (ZyrTEC) 10 mg tablet  Self No No   Sig: TAKE 1 TABLET BY MOUTH EVERY DAY   ergocalciferol (VITAMIN D2) 50,000 units  Self No No   Sig: TAKE 1 CAPSULE BY MOUTH ONE TIME PER WEEK   escitalopram (LEXAPRO) 10 mg tablet  Self No No   Sig: TAKE 1 TABLET BY MOUTH EVERY DAY   fluticasone (FLONASE) 50 mcg/act nasal spray  Self No No   Sig: SPRAY 2 SPRAYS INTO EACH NOSTRIL EVERY DAY   lisinopril (ZESTRIL) 20 mg tablet  Self No No   Sig: TAKE 1 TABLET BY MOUTH EVERY DAY   montelukast (SINGULAIR) 10 mg tablet  Self No No   Sig: TAKE 1 TABLET BY MOUTH EVERYDAY AT BEDTIME   nystatin (MYCOSTATIN) 500,000 units/5 mL suspension  Self No No   Sig: Apply 5 mL (500,000 Units total) to the mouth or throat 4 (four) times a day   pantoprazole (PROTONIX) 40 mg tablet  Self No No   Sig: TAKE 1 TABLET BY MOUTH EVERY DAY   senna (Senokot) 8 6 MG tablet  Self No No   Sig: Take 1 tablet (8 6 mg total) by mouth daily   umeclidinium-vilanterol (Anoro Ellipta) 62 5-25 MCG/INH inhaler   No No   Sig: Inhale 1 puff daily      Facility-Administered Medications: None       Past Medical History:   Diagnosis Date    Acid reflux     Fibromyalgia     Hypertension     Seasonal allergies        Past Surgical History:   Procedure Laterality Date    EYE SURGERY      KNEE SURGERY  1998    [place] COLONOSCOPY FLX DX W/COLLJ SPEC WHEN PFRMD N/A 5/9/2017    Procedure: EGD AND COLONOSCOPY;  Surgeon: Jcarlos Artis MD;  [facility];   Service: Gastroenterology       Family History   Problem Relation Age of Onset    Pancreatic cancer Mother 67    Lung cancer Sister 40    Cancer Brother         pancreatic    Coronary artery disease Father     Diabetes Father     Hypertension Father     Heart disease Father     Diabetes Paternal Grandmother     Lung cancer Maternal Grandfather 68     I have reviewed and agree with the history as documented  E-Cigarette/Vaping    E-Cigarette Use Never User      E-Cigarette/Vaping Substances     Social History     Tobacco Use    Smoking status: Current Every Day Smoker     Packs/day: 2 00     Years: 50 00     Pack years: 100 00     Types: Cigarettes    Smokeless tobacco: Never Used   Vaping Use    Vaping Use: Never used   Substance Use Topics    Alcohol use: Never    Drug use: No       Review of Systems   Constitutional: Negative for diaphoresis, fatigue and fever  HENT: Negative for congestion, ear pain, nosebleeds and sore throat  Eyes: Negative for photophobia, pain, discharge and visual disturbance  Respiratory: Positive for cough  Negative for choking, chest tightness, shortness of breath and wheezing  Cardiovascular: Positive for chest pain  Negative for palpitations  Gastrointestinal: Negative for abdominal distention, abdominal pain, diarrhea and vomiting  Genitourinary: Negative for dysuria, flank pain and frequency  Musculoskeletal: Negative for back pain, gait problem and joint swelling  Skin: Negative for color change and rash  Neurological: Negative for dizziness, syncope and headaches  Psychiatric/Behavioral: Negative for behavioral problems and confusion  The patient is not nervous/anxious  All other systems reviewed and are negative  Physical Exam  Physical Exam  Vitals and nursing note reviewed  Constitutional:       Appearance: She is well-developed  HENT:      Head: Normocephalic  Right Ear: External ear normal       Left Ear: External ear normal       Nose: Nose normal    Eyes:      General: Lids are normal       Pupils: Pupils are equal, round, and reactive to light  Cardiovascular:      Rate and Rhythm: Normal rate and regular rhythm  Pulses: Normal pulses  Heart sounds: Normal heart sounds     Pulmonary:      Effort: Pulmonary effort is normal  No respiratory distress  Comments: Chest pain reproduced with cough, minimal posterior chest tenderness consistent with chest wall pain  Abdominal:      General: Abdomen is flat  Bowel sounds are normal       Tenderness: There is no abdominal tenderness  Musculoskeletal:         General: No deformity  Normal range of motion  Cervical back: Normal range of motion and neck supple  Skin:     General: Skin is warm and dry  Neurological:      Mental Status: She is alert and oriented to person, place, and time  Pulse oximetry normal 95% adequate oxygenation, there is no hypoxia    Vital Signs  ED Triage Vitals [09/06/21 1207]   Temperature Pulse Respirations Blood Pressure SpO2   98 9 °F (37 2 °C) 79 19 (!) 192/89 95 %      Temp Source Heart Rate Source Patient Position - Orthostatic VS BP Location FiO2 (%)   Oral Monitor Sitting Left arm --      Pain Score       --           Vitals:    09/06/21 1207   BP: (!) 192/89   Pulse: 79   Patient Position - Orthostatic VS: Sitting         Visual Acuity      ED Medications  Medications - No data to display    Diagnostic Studies  Results Reviewed     None                 No orders to display              Procedures  Procedures         ED Course                             SBIRT 22yo+      Most Recent Value   SBIRT (24 yo +)   In order to provide better care to our patients, we are screening all of our patients for alcohol and drug use  Would it be okay to ask you these screening questions? Yes Filed at: 09/06/2021 1234   Initial Alcohol Screen: US AUDIT-C    1  How often do you have a drink containing alcohol?  0 Filed at: 09/06/2021 1234   2  How many drinks containing alcohol do you have on a typical day you are drinking? 0 Filed at: 09/06/2021 1234   3a  Male UNDER 65: How often do you have five or more drinks on one occasion? 0 Filed at: 09/06/2021 1234   3b  FEMALE Any Age, or MALE 65+:  How often do you have 4 or more drinks on one occassion? 0 Filed at: 09/06/2021 1234   Audit-C Score  0 Filed at: 09/06/2021 1234   COLT: How many times in the past year have you    Used an illegal drug or used a prescription medication for non-medical reasons? Never Filed at: 09/06/2021 1234                  Medical decision making 26-year-old female presents emergency department with chest pain only with cough consistent with chest wall pain  Discussed treatment follow-up discussed indications to return  No indication for further diagnostic testing here  MDM    Disposition  Final diagnoses:   Chest wall pain     Time reflects when diagnosis was documented in both MDM as applicable and the Disposition within this note     Time User Action Codes Description Comment    9/6/2021 12:39 PM Alex Real Add [R07 [id number]] Chest wall pain       ED Disposition     ED Disposition Condition Date/Time Comment    Discharge Stable Mon Sep 6, 2021 12:39 PM Liliana Mott discharge to home/self care              Follow-up Information    None         Discharge Medication List as of 9/6/2021 12:39 PM      CONTINUE these medications which have NOT CHANGED    Details   acetaminophen (TYLENOL 8 HOUR ARTHRITIS PAIN) 650 mg CR tablet Take by mouth, Historical Med      albuterol (PROVENTIL HFA,VENTOLIN HFA) 90 mcg/act inhaler TAKE 2 PUFFS BY MOUTH EVERY 6 HOURS AS NEEDED FOR WHEEZE, Normal      ALPRAZolam (XANAX) 0 25 mg tablet Take 1 tablet (0 25 mg total) by mouth 3 (three) times a day as needed for anxiety, Starting Thu 10/1/2020, Normal      bisacodyl (DULCOLAX) 5 mg EC tablet Take 1 tablet (5 mg total) by mouth daily at bedtime With colonoscopy prep per instructions, Starting Fri 10/16/2020, Normal      cetirizine (ZyrTEC) 10 mg tablet TAKE 1 TABLET BY MOUTH EVERY DAY, Normal      Diclofenac Sodium (VOLTAREN) 1 % APPLY 2 GRAMS TO AFFECTED AREA 4 TIMES A DAY, Normal      ergocalciferol (VITAMIN D2) 50,000 units TAKE 1 CAPSULE BY MOUTH ONE TIME PER WEEK, Normal      escitalopram (LEXAPRO) 10 mg tablet TAKE 1 TABLET BY MOUTH EVERY DAY, Normal      fluticasone (FLONASE) 50 mcg/act nasal spray SPRAY 2 SPRAYS INTO EACH NOSTRIL EVERY DAY, Normal      lisinopril (ZESTRIL) 20 mg tablet TAKE 1 TABLET BY MOUTH EVERY DAY, Normal      MINOXIDIL, TOPICAL, 5 % SOLN Apply 1 application topically, Historical Med      montelukast (SINGULAIR) 10 mg tablet TAKE 1 TABLET BY MOUTH EVERYDAY AT BEDTIME, Normal      nystatin (MYCOSTATIN) 500,000 units/5 mL suspension Apply 5 mL (500,000 Units total) to the mouth or throat 4 (four) times a day, Starting Wed 3/24/2021, Normal      pantoprazole (PROTONIX) 40 mg tablet TAKE 1 TABLET BY MOUTH EVERY DAY, Normal      Probiotic Product (PROBIOTIC-10) CAPS Take by mouth 2 (two) times a day, Historical Med      Promethazine-DM (PHENERGAN-DM) 6 25-15 mg/5 mL oral syrup Take 5 mL by mouth 4 (four) times a day as needed for cough, Starting Mon 2/8/2021, Normal      senna (Senokot) 8 6 MG tablet Take 1 tablet (8 6 mg total) by mouth daily, Starting Fri 10/16/2020, Normal      umeclidinium-vilanterol (Anoro Ellipta) 62 5-25 MCG/INH inhaler Inhale 1 puff daily, Starting Fri 9/3/2021, Normal           No discharge procedures on file      PDMP Review       Value Time User    PDMP Reviewed  Yes 10/1/2020 11:31 AM Eliezer Bravo MD          ED Provider  Electronically Signed by           Michael Hopkins MD  09/06/21 03 17 [phone]

## 2021-09-07 ENCOUNTER — TELEPHONE (OUTPATIENT)
Dept: FAMILY MEDICINE CLINIC | Facility: CLINIC | Age: 66
End: 2021-09-07

## 2021-09-07 PROCEDURE — U0005 INFEC AGEN DETEC AMPLI PROBE: HCPCS | Performed by: FAMILY MEDICINE

## 2021-09-07 PROCEDURE — U0003 INFECTIOUS AGENT DETECTION BY NUCLEIC ACID (DNA OR RNA); SEVERE ACUTE RESPIRATORY SYNDROME CORONAVIRUS 2 (SARS-COV-2) (CORONAVIRUS DISEASE [COVID-19]), AMPLIFIED PROBE TECHNIQUE, MAKING USE OF HIGH THROUGHPUT TECHNOLOGIES AS DESCRIBED BY CMS-2020-01-R: HCPCS | Performed by: FAMILY MEDICINE

## 2021-09-07 RX ORDER — DEXTROMETHORPHAN HYDROBROMIDE AND PROMETHAZINE HYDROCHLORIDE 15; 6.25 MG/5ML; MG/5ML
SYRUP ORAL
Qty: 150 ML | Refills: 0 | Status: SHIPPED | OUTPATIENT
Start: 2021-09-07 | End: 2021-10-27

## 2021-09-07 NOTE — TELEPHONE ENCOUNTER
Patient was here on Friday  She woke up Sat coughing congestion vomiting and fever 99 9  Went to ER yesterday and they did not test her   For some reason   They tested her son and it was Positive for Covid     She is requesting to be tested

## 2021-09-08 NOTE — ASSESSMENT & PLAN NOTE
I reviewed with pt  I explained that hydroxychloroquine is a medication used for inflammatory arthritis and other inflammatory diseases  But since fibro is noninflammatory, I do not feel that it would be an appropriate treatment  Can refer to rheum for second opinion

## 2021-09-08 NOTE — ASSESSMENT & PLAN NOTE
No signs of CHF or renal failure  Encourage weight loss, increased ambulation and possibly compression stockings   Recheck 3m

## 2021-09-10 ENCOUNTER — TELEPHONE (OUTPATIENT)
Dept: FAMILY MEDICINE CLINIC | Facility: CLINIC | Age: 66
End: 2021-09-10

## 2021-09-10 NOTE — TELEPHONE ENCOUNTER
Return her call -   It looks like her inhaler was changed from [facility] to [facility][1] - she should be using a daily inhaler  I will also send prednisone to her pharmacy she can start, and she can keep taking the cough medicine Dr Karin Wagner had given her  Please schedule for Covid virtual follow up with her PCP for next week

## 2021-09-10 NOTE — TELEPHONE ENCOUNTER
Called patient, she was sleeping  Gave all instructions to her Son   He will have her call on Monday for an appt

## 2021-09-11 ENCOUNTER — DOCUMENTATION (OUTPATIENT)
Dept: FAMILY MEDICINE CLINIC | Facility: CLINIC | Age: 66
End: 2021-09-11

## 2021-09-11 ENCOUNTER — NURSE TRIAGE (OUTPATIENT)
Dept: OTHER | Facility: OTHER | Age: 66
End: 2021-09-11

## 2021-09-11 DIAGNOSIS — U07.1 COVID-19: Primary | ICD-10-CM

## 2021-09-11 DIAGNOSIS — J44.1 COPD EXACERBATION (HCC): Primary | ICD-10-CM

## 2021-09-11 NOTE — TELEPHONE ENCOUNTER
Reason for Disposition   [1] Prescription refill request for ESSENTIAL medicine (i e , likelihood of harm to patient if not taken) AND [2] triager unable to refill per department policy    Answer Assessment - Initial Assessment Questions  1  NAME of MEDICATION: "What medicine are you calling about?"      Prednisone  2  QUESTION: "What is your question?" (e g , medication refill, side effect)      My prednisone is not at the pharmacy  3  PRESCRIBING HCP: "Who prescribed it?" Reason: if prescribed by specialist, call should be referred to that group  Dr Connor Ravi  4  SYMPTOMS: "Do you have any symptoms?"      Covid positive, cough  5  SEVERITY: If symptoms are present, ask "Are they mild, moderate or severe?"      Covid positive and would like medication to start feeling better   o2 sats were 89 % this morning currently 94 %    Protocols used: MEDICATION QUESTION CALL-ADULT-

## 2021-09-11 NOTE — TELEPHONE ENCOUNTER
Regarding: Covid Positive/ Medication   ----- Message from Emilee Chaparro sent at 9/10/2021  8:28 PM EDT -----  Pt called requesting medical advice, " My doctor was suppose to send Prednisone to my pharmacy but they never received and I am in need of it because I have covid "

## 2021-09-11 NOTE — TELEPHONE ENCOUNTER
Answer Assessment - Initial Assessment Questions  1  NAME of MEDICATION: "What medicine are you calling about?"      prednisone  2  QUESTION: "What is your question?" (e g , medication refill, side effect)      Wasn't ordered  3  PRESCRIBING HCP: "Who prescribed it?" Reason: if prescribed by specialist, call should be referred to that group  Dr Kirk Moya  4  SYMPTOMS: "Do you have any symptoms?"      Fever and bodyaches  5   SEVERITY: If symptoms are present, ask "Are they mild, moderate or severe?"      Mild    Protocols used: MEDICATION QUESTION CALL-ADULT-

## 2021-09-11 NOTE — TELEPHONE ENCOUNTER
Reason for Disposition   [1] Prescription not at pharmacy AND [2] was prescribed by PCP recently (Exception: triager has access to EMR and prescription is recorded there   Go to [facility] and confirm for pharmacy )    Protocols used: MEDICATION QUESTION CALL-ADULT-

## 2021-09-11 NOTE — TELEPHONE ENCOUNTER
Regarding: Covid Positive, Needs Prednisone  ----- Message from Yolanda Kohler sent at 9/11/2021 11:04 AM EDT -----  " I am Covid Positive, I checked my Blood 0xygen, it was 89   I spoke to a Nurse last night, he told me they were going to send a Prescription for Prednisone in for me, It's still not there "

## 2021-09-13 ENCOUNTER — TELEMEDICINE (OUTPATIENT)
Dept: FAMILY MEDICINE CLINIC | Facility: CLINIC | Age: 66
End: 2021-09-13
Payer: MEDICARE

## 2021-09-13 VITALS — OXYGEN SATURATION: 92 %

## 2021-09-13 DIAGNOSIS — U07.1 COVID-19: Primary | ICD-10-CM

## 2021-09-13 PROCEDURE — 99442 PR PHYS/QHP TELEPHONE EVALUATION 11-20 MIN: CPT | Performed by: FAMILY MEDICINE

## 2021-09-13 RX ORDER — PREDNISONE 50 MG/1
50 TABLET ORAL DAILY
Qty: 5 TABLET | Refills: 0 | Status: SHIPPED | OUTPATIENT
Start: 2021-09-13 | End: 2021-09-18

## 2021-09-13 RX ORDER — AZITHROMYCIN 250 MG/1
TABLET, FILM COATED ORAL
Qty: 6 TABLET | Refills: 0 | Status: SHIPPED | OUTPATIENT
Start: 2021-09-13 | End: 2021-09-18

## 2021-09-13 RX ORDER — PREDNISONE 20 MG/1
40 TABLET ORAL DAILY
Qty: 10 TABLET | Refills: 0 | Status: SHIPPED | OUTPATIENT
Start: 2021-09-13 | End: 2021-09-18

## 2021-09-13 NOTE — PROGRESS NOTES
COVID-19 Outpatient Progress Note    Assessment/Plan:    Problem List Items Addressed This Visit     None      Visit Diagnoses     COVID-19    -  Primary    Relevant Medications    azithromycin (Zithromax) 250 mg tablet         Disposition:     I recommended continued isolation until at least 24 hours have passed since recovery defined as resolution of fever without the use of fever-reducing medications AND improvement in COVID symptoms AND 10 days have passed since onset of symptoms (or 10 days have passed since date of first positive viral diagnostic test for asymptomatic patients)  Quarantine completes tomorrow, 9/14  I have spent 12 minutes directly with the patient  Greater than 50% of this time was spent in counseling/coordination of care regarding: diagnostic results, risks and benefits of treatment options, instructions for management, patient and family education, importance of treatment compliance and impressions  Verification of patient location:    Patient is located in the following state in which I hold an active license [place]    Encounter provider bS Mariano MD    Provider located at [address] [place] [id number]    Recent Visits  Date Type Provider Dept   09/10/21 Telephone [facility]   09/07/21 Telephone MD Jeremy Hunt recent visits within past 7 days and meeting all other requirements  Today's Visits  Date Type Provider Dept   09/13/21 Telemedicine MD Jeremy Jones today's visits and meeting all other requirements  Future Appointments  No visits were found meeting these conditions  Showing future appointments within next 150 days and meeting all other requirements         Subjective: Jeana Simeon is a 72 y o  female who has been screened for COVID-19  Symptom change since last report: improving   Patient's symptoms include fatigue, malaise, nasal congestion, anosmia (starting to come back), loss of taste, cough, chest tightness and nausea  Patient denies fever, chills (resolve), rhinorrhea, sore throat, shortness of breath, abdominal pain, vomiting (resolved), diarrhea (resolved), myalgias (resolved) and headaches  Date of symptom onset: 9/4/2021  Date of positive COVID-19 PCR: 9/7/2021  COVID-19 vaccination status: Not vaccinated    Higinio Santa has been staying home and has isolated themselves in her home  She is taking care to not share personal items and is cleaning all surfaces that are touched often, like counters, tabletops, and doorknobs using household cleaning sprays or wipes  She is wearing a mask when she leaves her room  She feels worse in the morning, then gets better after her steroid, inhaler, etc  She has good days and bad days but is overall improving  She is taking Dayquil  She has 2 days left of steroids  She notes she felt better on Trelegy, but it causes her increased eye pressure so she can't take it  Currently on Anoro  SpO2 now is 92%, at its worst was 89%, but came up after taking medication  Lab Results   Component Value Date    SARSCOV2 Positive (A) 09/07/2021     Past Medical History:   Diagnosis Date    Acid reflux     Fibromyalgia     Hypertension     Seasonal allergies      Past Surgical History:   Procedure Laterality Date    EYE SURGERY      KNEE SURGERY  1998    [place] COLONOSCOPY FLX DX W/COLLJ SPEC WHEN PFRMD N/A 5/9/2017    Procedure: EGD AND COLONOSCOPY;  Surgeon: Rand Cramer MD;  [facility];   Service: Gastroenterology     Current Outpatient Medications   Medication Sig Dispense Refill    acetaminophen (TYLENOL 8 HOUR ARTHRITIS PAIN) 650 mg CR tablet Take by mouth      albuterol (PROVENTIL HFA,VENTOLIN HFA) 90 mcg/act inhaler TAKE 2 PUFFS BY MOUTH EVERY 6 HOURS AS NEEDED FOR WHEEZE 18 g 1    ALPRAZolam (XANAX) 0 25 mg tablet Take 1 tablet (0 25 mg total) by mouth 3 (three) times a day as needed for anxiety 90 tablet 0    azithromycin (Zithromax) 250 mg tablet Take 2 tablets (500 mg total) by mouth daily for 1 day, THEN 1 tablet (250 mg total) daily for 4 days  6 tablet 0    bisacodyl (DULCOLAX) 5 mg EC tablet Take 1 tablet (5 mg total) by mouth daily at bedtime With colonoscopy prep per instructions 30 tablet 0    cetirizine (ZyrTEC) 10 mg tablet TAKE 1 TABLET BY MOUTH EVERY DAY 90 tablet 0    Diclofenac Sodium (VOLTAREN) 1 % APPLY 2 GRAMS TO AFFECTED AREA 4 TIMES A  g 1    ergocalciferol (VITAMIN D2) 50,000 units TAKE 1 CAPSULE BY MOUTH ONE TIME PER WEEK 4 capsule 0    escitalopram (LEXAPRO) 10 mg tablet TAKE 1 TABLET BY MOUTH EVERY DAY 90 tablet 1    fluticasone (FLONASE) 50 mcg/act nasal spray SPRAY 2 SPRAYS INTO EACH NOSTRIL EVERY DAY 16 mL 3    lisinopril (ZESTRIL) 20 mg tablet TAKE 1 TABLET BY MOUTH EVERY DAY 90 tablet 1    MINOXIDIL, TOPICAL, 5 % SOLN Apply 1 application topically      montelukast (SINGULAIR) 10 mg tablet TAKE 1 TABLET BY MOUTH EVERYDAY AT BEDTIME 90 tablet 1    nystatin (MYCOSTATIN) 500,000 units/5 mL suspension Apply 5 mL (500,000 Units total) to the mouth or throat 4 (four) times a day 200 mL 3    pantoprazole (PROTONIX) 40 mg tablet TAKE 1 TABLET BY MOUTH EVERY DAY 30 tablet 5    predniSONE 20 mg tablet Take 2 tablets (40 mg total) by mouth daily for 5 days 10 tablet 0    predniSONE 50 mg tablet Take 1 tablet (50 mg total) by mouth daily for 5 days 5 tablet 0    Probiotic Product (PROBIOTIC-10) CAPS Take by mouth 2 (two) times a day      promethazine-dextromethorphan (PHENERGAN-DM) 6 25-15 mg/5 mL oral syrup TAKE 5 ML BY MOUTH 4 TIMES A DAY AS NEEDED FOR COUGH 150 mL 0    senna (Senokot) 8 6 MG tablet Take 1 tablet (8 6 mg total) by mouth daily 90 tablet 0    umeclidinium-vilanterol (Anoro Ellipta) 62 5-25 MCG/INH inhaler Inhale 1 puff daily 30 blister 5     No current facility-administered medications for this visit       Allergies Allergen Reactions    Augmentin [Amoxicillin-Pot Clavulanate] Vomiting    Miconazole Itching and Swelling    Wixela Inhub [Fluticasone-Salmeterol] Palpitations       Review of Systems   Constitutional: Positive for fatigue  Negative for chills (resolve) and fever  HENT: Positive for congestion  Negative for rhinorrhea and sore throat  Respiratory: Positive for cough and chest tightness  Negative for shortness of breath  Gastrointestinal: Positive for nausea  Negative for abdominal pain, diarrhea (resolved) and vomiting (resolved)  Musculoskeletal: Negative for myalgias (resolved)  Neurological: Negative for headaches  All other systems reviewed and are negative  Objective:    Vitals:    09/13/21 1449   SpO2: 92%       Physical Exam  Constitutional:       General: She is not in acute distress  Appearance: Normal appearance  She is ill-appearing  She is not toxic-appearing or diaphoretic  HENT:      Head: Normocephalic and atraumatic  Eyes:      Conjunctiva/sclera: Conjunctivae normal    Pulmonary:      Effort: Pulmonary effort is normal  No respiratory distress  Comments: Speaks in full sentences   Skin:     Findings: No rash  Neurological:      General: No focal deficit present  Mental Status: She is alert  Mental status is at baseline  VIRTUAL VISIT DISCLAIMER    Gerry Carlos Manuelharlan verbally agrees to participate in [employer]  Pt is aware that [employer] could be limited without vital signs or the ability to perform a full hands-on physical Gabbi List understands she or the provider may request at any time to terminate the video visit and request the patient to seek care or treatment in person

## 2021-09-16 ENCOUNTER — TELEMEDICINE (OUTPATIENT)
Dept: FAMILY MEDICINE CLINIC | Facility: CLINIC | Age: 66
End: 2021-09-16
Payer: MEDICARE

## 2021-09-16 VITALS
HEIGHT: 64 IN | DIASTOLIC BLOOD PRESSURE: 80 MMHG | BODY MASS INDEX: 33.46 KG/M2 | WEIGHT: 196 LBS | OXYGEN SATURATION: 96 % | SYSTOLIC BLOOD PRESSURE: 122 MMHG | HEART RATE: 74 BPM | TEMPERATURE: 97.9 F

## 2021-09-16 DIAGNOSIS — U07.1 COVID-19: Primary | ICD-10-CM

## 2021-09-16 PROCEDURE — 99213 OFFICE O/P EST LOW 20 MIN: CPT | Performed by: FAMILY MEDICINE

## 2021-09-16 NOTE — PROGRESS NOTES
COVID-19 Outpatient Progress Note    Assessment/Plan:    Problem List Items Addressed This Visit     None      Visit Diagnoses     COVID-19    -  Primary         Disposition:     No isolation required, she completed 10 days and is mostly recovered at this time  Afebrile  I have spent 12 minutes directly with the patient  Greater than 50% of this time was spent in counseling/coordination of care regarding: diagnostic results, risks and benefits of treatment options, instructions for management, patient and family education, importance of treatment compliance and impressions  Verification of patient location:    Patient is located in the following state in which I hold an active license [place]    Encounter provider Bryson Delatorre MD    Provider located at [address] [place] [address] [address] [facility] Per    Recent Visits  Date Type Provider Dept   09/13/21 [facility] [facility] [address], MD [address] [facility]   09/10/21 Telephone [facility] recent visits within past 7 days and meeting all other requirements  Today's Visits  Date Type Provider Dept   09/16/21 Telemedicine Bryson Delatorre MD [facility] today's visits and meeting all other requirements  Future Appointments  No visits were found meeting these conditions  Showing future appointments within next 150 days and meeting all other requirements     Subjective: Lux Lomas is a 72 y o  female who has been screened for COVID-19  Symptom change since last report: resolving  Patient's symptoms include anosmia (starting to come back), loss of taste and cough  Patient denies fever, chills, fatigue, malaise, congestion, rhinorrhea, sore throat, shortness of breath, chest tightness, abdominal pain, nausea, vomiting (resolved), diarrhea (resolved), myalgias (resolved) and headaches       Date of symptom onset: 9/4/2021  Date of positive COVID-19 PCR: 9/7/2021  COVID-19 vaccination status: Not vaccinated    Barbie Long has been staying home and has isolated themselves in her home  She is taking care to not share personal items and is cleaning all surfaces that are touched often, like counters, tabletops, and doorknobs using household cleaning sprays or wipes  She is wearing a mask when she leaves her room  She feels significantly better  She still is intermittently coughing, but only a little  Smell and taste are coming back  Lab Results   Component Value Date    SARSCOV2 Positive (A) 09/07/2021     Past Medical History:   Diagnosis Date    Acid reflux     Fibromyalgia     Hypertension     Seasonal allergies      Past Surgical History:   Procedure Laterality Date    EYE SURGERY      KNEE SURGERY  1998    [place] COLONOSCOPY FLX DX W/COLLJ SPEC WHEN PFRMD N/A 5/9/2017    Procedure: EGD AND COLONOSCOPY;  Surgeon: Shiva Cazares MD;  [facility]; Service: Gastroenterology     Current Outpatient Medications   Medication Sig Dispense Refill    acetaminophen (TYLENOL 8 HOUR ARTHRITIS PAIN) 650 mg CR tablet Take by mouth      albuterol (PROVENTIL HFA,VENTOLIN HFA) 90 mcg/act inhaler TAKE 2 PUFFS BY MOUTH EVERY 6 HOURS AS NEEDED FOR WHEEZE 18 g 1    ALPRAZolam (XANAX) 0 25 mg tablet Take 1 tablet (0 25 mg total) by mouth 3 (three) times a day as needed for anxiety 90 tablet 0    azithromycin (Zithromax) 250 mg tablet Take 2 tablets (500 mg total) by mouth daily for 1 day, THEN 1 tablet (250 mg total) daily for 4 days   6 tablet 0    bisacodyl (DULCOLAX) 5 mg EC tablet Take 1 tablet (5 mg total) by mouth daily at bedtime With colonoscopy prep per instructions 30 tablet 0    cetirizine (ZyrTEC) 10 mg tablet TAKE 1 TABLET BY MOUTH EVERY DAY 90 tablet 0    Diclofenac Sodium (VOLTAREN) 1 % APPLY 2 GRAMS TO AFFECTED AREA 4 TIMES A  g 1    ergocalciferol (VITAMIN D2) 50,000 units TAKE 1 CAPSULE BY MOUTH ONE TIME PER WEEK 4 capsule 0    escitalopram (LEXAPRO) 10 mg tablet TAKE 1 TABLET BY MOUTH EVERY DAY 90 tablet 1    fluticasone (FLONASE) 50 mcg/act nasal spray SPRAY 2 SPRAYS INTO EACH NOSTRIL EVERY DAY 16 mL 3    lisinopril (ZESTRIL) 20 mg tablet TAKE 1 TABLET BY MOUTH EVERY DAY 90 tablet 1    MINOXIDIL, TOPICAL, 5 % SOLN Apply 1 application topically      montelukast (SINGULAIR) 10 mg tablet TAKE 1 TABLET BY MOUTH EVERYDAY AT BEDTIME 90 tablet 1    nystatin (MYCOSTATIN) 500,000 units/5 mL suspension Apply 5 mL (500,000 Units total) to the mouth or throat 4 (four) times a day 200 mL 3    pantoprazole (PROTONIX) 40 mg tablet TAKE 1 TABLET BY MOUTH EVERY DAY 30 tablet 5    predniSONE 20 mg tablet Take 2 tablets (40 mg total) by mouth daily for 5 days 10 tablet 0    predniSONE 50 mg tablet Take 1 tablet (50 mg total) by mouth daily for 5 days 5 tablet 0    Probiotic Product (PROBIOTIC-10) CAPS Take by mouth 2 (two) times a day      promethazine-dextromethorphan (PHENERGAN-DM) 6 25-15 mg/5 mL oral syrup TAKE 5 ML BY MOUTH 4 TIMES A DAY AS NEEDED FOR COUGH 150 mL 0    senna (Senokot) 8 6 MG tablet Take 1 tablet (8 6 mg total) by mouth daily 90 tablet 0    umeclidinium-vilanterol (Anoro Ellipta) 62 5-25 MCG/INH inhaler Inhale 1 puff daily 30 blister 5     No current facility-administered medications for this visit  Allergies   Allergen Reactions    Augmentin [Amoxicillin-Pot Clavulanate] Vomiting    Miconazole Itching and Swelling    Wixela Inhub [Fluticasone-Salmeterol] Palpitations       Review of Systems   Constitutional: Negative for chills, fatigue and fever  HENT: Negative for congestion, rhinorrhea and sore throat  Respiratory: Positive for cough  Negative for chest tightness and shortness of breath  Gastrointestinal: Negative for abdominal pain, diarrhea (resolved), nausea and vomiting (resolved)  Musculoskeletal: Negative for myalgias (resolved)  Neurological: Negative for headaches  All other systems reviewed and are negative      Objective:    Vitals: 09/16/21 1605   BP: 122/80   Pulse: 74   Temp: 97 9 °F (36 6 °C)   SpO2: 96%   Weight: 88 9 kg (196 lb)   Height: 5' 4" (1 626 m)     Physical Exam  Vitals and nursing note reviewed  Constitutional:       General: She is not in acute distress  Appearance: Normal appearance  She is well-developed  She is not ill-appearing, toxic-appearing or diaphoretic  HENT:      Head: Normocephalic and atraumatic  Right Ear: External ear normal       Left Ear: External ear normal       Nose: Nose normal    Eyes:      Conjunctiva/sclera: Conjunctivae normal    Neck:      Trachea: No tracheal deviation  Cardiovascular:      Rate and Rhythm: Normal rate and regular rhythm  Pulses: Normal pulses  Heart sounds: Normal heart sounds  No murmur heard  Pulmonary:      Effort: Pulmonary effort is normal  No tachypnea, bradypnea or respiratory distress  Breath sounds: Examination of the right-upper field reveals wheezing  Decreased breath sounds and wheezing present  No rhonchi or rales  Abdominal:      Tenderness: There is no abdominal tenderness  Skin:     General: Skin is warm and dry  Capillary Refill: Capillary refill takes less than 2 seconds  Findings: No rash  Neurological:      General: No focal deficit present  Mental Status: She is alert  Mental status is at baseline  Cranial Nerves: No cranial nerve deficit

## 2021-09-20 ENCOUNTER — PATIENT MESSAGE (OUTPATIENT)
Dept: FAMILY MEDICINE CLINIC | Facility: CLINIC | Age: 66
End: 2021-09-20

## 2021-09-20 ENCOUNTER — TELEPHONE (OUTPATIENT)
Dept: FAMILY MEDICINE CLINIC | Facility: CLINIC | Age: 66
End: 2021-09-20

## 2021-09-20 NOTE — TELEPHONE ENCOUNTER
Pt said she was seen thursday, she a covid pt,  She started on Friday with diarrhea, gasey, low fever off and on,  Very weak and tired  Pl adv

## 2021-09-20 NOTE — TELEPHONE ENCOUNTER
Called patient   She is currently afebrile, the highest her temp reached was 99 6  She is coughing occasionally, she is not SOB  No wheezing  She said her head is a little foggy  She is currently using the Anoro inhaler  Should she continue this?

## 2021-09-21 NOTE — TELEPHONE ENCOUNTER
The cough could be related to changing the inhaler, but the fever should not    I called in a z-pack for her - she should call Friday with follow up - earlier if worse

## 2021-09-21 NOTE — TELEPHONE ENCOUNTER
Spoke to patient she states that she have a productive cough and pain in the upper left quadrant of her back as well as the fever  She has not had fevers today but she has been taking tylenol  She was wondering if the recent change in her inhaler may have something to do with her getting sick/not feeling better  Also she is wondering if she may have acquired some other type of virus while she had covid  I notified patient to check her O2 and we would get back to her once you advised on this message

## 2021-09-21 NOTE — TELEPHONE ENCOUNTER
Patient was feeling great all day yesterday, the best she felt in 2 weeks      Until last night she started running a [employer] again and a little wheezing again last night     Please advise

## 2021-09-24 NOTE — TELEPHONE ENCOUNTER
Patient called  She is still spiking temps at night  Last HS 99 8  She is also having lower abdominal pain  She does not currently have diarrhea  She also has been having a pain in her back on the Left side at the top that wraps around to the front  She said it hurts worse when she breathes deep

## 2021-09-27 ENCOUNTER — OFFICE VISIT (OUTPATIENT)
Dept: FAMILY MEDICINE CLINIC | Facility: CLINIC | Age: 66
End: 2021-09-27
Payer: MEDICARE

## 2021-09-27 ENCOUNTER — APPOINTMENT (OUTPATIENT)
Dept: LAB | Facility: HOSPITAL | Age: 66
End: 2021-09-27
Payer: MEDICARE

## 2021-09-27 ENCOUNTER — HOSPITAL ENCOUNTER (OUTPATIENT)
Dept: CT IMAGING | Facility: HOSPITAL | Age: 66
Discharge: HOME | End: 2021-09-27
Payer: MEDICARE

## 2021-09-27 VITALS
HEART RATE: 76 BPM | TEMPERATURE: 98.5 F | SYSTOLIC BLOOD PRESSURE: 120 MMHG | BODY MASS INDEX: 33.63 KG/M2 | WEIGHT: 197 LBS | HEIGHT: 64 IN | DIASTOLIC BLOOD PRESSURE: 80 MMHG

## 2021-09-27 DIAGNOSIS — R10.32 LLQ PAIN: ICD-10-CM

## 2021-09-27 DIAGNOSIS — R10.13 EPIGASTRIC PAIN: ICD-10-CM

## 2021-09-27 DIAGNOSIS — R10.32 LLQ PAIN: Primary | ICD-10-CM

## 2021-09-27 LAB
ALBUMIN SERPL BCP-MCNC: 3.3 G/DL (ref 3.5–5)
ALP SERPL-CCNC: 79 U/L (ref 46–116)
ALT SERPL W P-5'-P-CCNC: 23 U/L (ref 12–78)
ANION GAP SERPL CALCULATED.3IONS-SCNC: 10 MMOL/L (ref 4–13)
AST SERPL W P-5'-P-CCNC: 17 U/L (ref 5–45)
BASOPHILS # BLD AUTO: 0.07 THOUSANDS/ΜL (ref 0–0.1)
BASOPHILS NFR BLD AUTO: 1 % (ref 0–1)
BILIRUB SERPL-MCNC: 0.29 MG/DL (ref 0.2–1)
BUN SERPL-MCNC: 7 MG/DL (ref 5–25)
CALCIUM ALBUM COR SERPL-MCNC: 10.2 MG/DL (ref 8.3–10.1)
CALCIUM SERPL-MCNC: 9.6 MG/DL (ref 8.3–10.1)
CHLORIDE SERPL-SCNC: 97 MMOL/L (ref 100–108)
CO2 SERPL-SCNC: 28 MMOL/L (ref 21–32)
CREAT SERPL-MCNC: 0.66 MG/DL (ref 0.6–1.3)
EOSINOPHIL # BLD AUTO: 0.06 THOUSAND/ΜL (ref 0–0.61)
EOSINOPHIL NFR BLD AUTO: 1 % (ref 0–6)
ERYTHROCYTE [DISTWIDTH] IN BLOOD BY AUTOMATED COUNT: 13.2 % (ref 11.6–15.1)
GFR SERPL CREATININE-BSD FRML MDRD: 93 ML/MIN/1.73SQ M
GLUCOSE SERPL-MCNC: 94 MG/DL (ref 65–140)
HCT VFR BLD AUTO: 41.8 % (ref 34.8–46.1)
HGB BLD-MCNC: 13.5 G/DL (ref 11.5–15.4)
IMM GRANULOCYTES # BLD AUTO: 0.08 THOUSAND/UL (ref 0–0.2)
IMM GRANULOCYTES NFR BLD AUTO: 1 % (ref 0–2)
LIPASE SERPL-CCNC: 165 U/L (ref 73–393)
LYMPHOCYTES # BLD AUTO: 1.74 THOUSANDS/ΜL (ref 0.6–4.47)
LYMPHOCYTES NFR BLD AUTO: 15 % (ref 14–44)
MCH RBC QN AUTO: 27.7 PG (ref 26.8–34.3)
MCHC RBC AUTO-ENTMCNC: 32.3 G/DL (ref 31.4–37.4)
MCV RBC AUTO: 86 FL (ref 82–98)
MONOCYTES # BLD AUTO: 1.02 THOUSAND/ΜL (ref 0.17–1.22)
MONOCYTES NFR BLD AUTO: 9 % (ref 4–12)
NEUTROPHILS # BLD AUTO: 8.75 THOUSANDS/ΜL (ref 1.85–7.62)
NEUTS SEG NFR BLD AUTO: 73 % (ref 43–75)
NRBC BLD AUTO-RTO: 0 /100 WBCS
PLATELET # BLD AUTO: 306 THOUSANDS/UL (ref 149–390)
PMV BLD AUTO: 8.8 FL (ref 8.9–12.7)
POTASSIUM SERPL-SCNC: 4.4 MMOL/L (ref 3.5–5.3)
PROT SERPL-MCNC: 7.9 G/DL (ref 6.4–8.2)
RBC # BLD AUTO: 4.88 MILLION/UL (ref 3.81–5.12)
SODIUM SERPL-SCNC: 135 MMOL/L (ref 136–145)
WBC # BLD AUTO: 11.72 THOUSAND/UL (ref 4.31–10.16)

## 2021-09-27 PROCEDURE — 99214 OFFICE O/P EST MOD 30 MIN: CPT | Performed by: FAMILY MEDICINE

## 2021-09-27 PROCEDURE — 80053 COMPREHEN METABOLIC PANEL: CPT

## 2021-09-27 PROCEDURE — G1004 CDSM NDSC: HCPCS

## 2021-09-27 PROCEDURE — 85025 COMPLETE CBC W/AUTO DIFF WBC: CPT

## 2021-09-27 PROCEDURE — 74177 CT ABD & PELVIS W/CONTRAST: CPT

## 2021-09-27 PROCEDURE — 83690 ASSAY OF LIPASE: CPT

## 2021-09-27 PROCEDURE — 36415 COLL VENOUS BLD VENIPUNCTURE: CPT

## 2021-09-27 RX ORDER — METRONIDAZOLE 500 MG/1
500 TABLET ORAL EVERY 8 HOURS SCHEDULED
Qty: 21 TABLET | Refills: 0 | Status: SHIPPED | OUTPATIENT
Start: 2021-09-27 | End: 2021-10-04

## 2021-09-27 RX ORDER — CIPROFLOXACIN 500 MG/1
500 TABLET, FILM COATED ORAL EVERY 12 HOURS SCHEDULED
Qty: 14 TABLET | Refills: 0 | Status: SHIPPED | OUTPATIENT
Start: 2021-09-27 | End: 2021-09-29 | Stop reason: ALTCHOICE

## 2021-09-27 RX ADMIN — IOHEXOL 100 ML: 350 INJECTION, SOLUTION INTRAVENOUS at 18:19

## 2021-09-27 RX ADMIN — IOHEXOL 50 ML: 240 INJECTION, SOLUTION INTRATHECAL; INTRAVASCULAR; INTRAVENOUS; ORAL at 18:19

## 2021-09-27 NOTE — PROGRESS NOTES
Assessment/Plan:    No problem-specific Assessment & Plan notes found for this encounter  Diagnoses and all orders for this visit:    LLQ pain  -     ciprofloxacin (CIPRO) 500 mg tablet; Take 1 tablet (500 mg total) by mouth every 12 (twelve) hours for 7 days  -     metroNIDAZOLE (FLAGYL) 500 mg tablet; Take 1 tablet (500 mg total) by mouth every 8 (eight) hours for 7 days  -     CBC and differential; Future  -     Comprehensive metabolic panel; Future  -     Cancel: CT abdomen pelvis w contrast; Future  -     CT abdomen pelvis w contrast; Future    Epigastric pain  -     CBC and differential; Future  -     Comprehensive metabolic panel; Future  -     Lipase; Future        Discussion:  I reviewed with pt  Persistent low abd pain with some evidence of peritoneal irritation  Need to r/o diverticulitis  Will refer for STAT CT of the abd/pelvis  If (+), start Cipro and Flagyl as directed  Check labs  further recommendations based on results of tests  ADDENDUM:  I received Tiger Text from on-call physician  Pt without diverticulitis but has evidence of infrarenal aortitis  I called pt  Given persistent symptoms, including elevated temps, and recent COVID, I recommend that pt go to ED for further eval  I discussed case with Rashaad Nguyễn ED attending  F/u after ED eval    Subjective:      Patient ID: Bijan Adams is a 72 y o  female  73 yo female recently diagnosed with COVID on 9/7 presents with persistent fever associated with low abd pain  Pt states that the pain is across her lower abd, but can radiate to the epigastric area  It can be sharp and severe at times  Pt notes that her temp in normally in the low 97 range, but has been increasing to 99 7 or sl higher since her COVID infection  Pt has been constipated and notes that abd pain improved somewhat after taking medication and having a BM  She has occasional cough but denies worsening SOB  She has HA and some eye discomfort   Pt has some mild congestion but denies worsening sinus pain or discharge  She denies sore throat, rashes or urinary symptoms  The following portions of the patient's history were reviewed and updated as appropriate:   She  has a past medical history of Acid reflux, Fibromyalgia, Hypertension, and Seasonal allergies  She   Patient Active Problem List    Diagnosis Date Noted    Chest pain 02/08/2021    Dermatitis 09/14/2020    Prediabetes 04/10/2020    Chronic idiopathic constipation 12/16/2019    Gastroparesis 12/16/2019    Severe obesity (BMI 35 0-35 9 with comorbidity) ([facility] ) 11/21/2019    Lower extremity edema 11/21/2019    Snoring 11/21/2019    Tobacco dependence 10/02/2019    Neck pain 07/11/2019    Neuropathic pain 01/09/2019    Early satiety 08/28/2018    Abdominal distension 08/28/2018    Family history of pancreatic cancer 08/28/2018    Pain of upper abdomen 08/28/2018    Dyspnea on exertion 05/04/2018    Edema 07/13/2017    Lumbosacral radiculopathy at L5 01/06/2017    Compression fracture of thoracic vertebra, non-traumatic (HCC) 08/30/2016    Hyperlipidemia 12/08/2015    Cataract 08/22/2014    Palpitations 08/05/2014    Chronic obstructive pulmonary disease ([facility] ) 04/17/2013    Esophageal reflux 04/17/2013    Pulmonary nodule seen on imaging study 04/17/2013    Hypertension 03/22/2013    Recurrent major depressive disorder, in partial remission ([facility] ) 03/14/2013    Fibromyalgia 03/14/2013    Degeneration of intervertebral disc 03/14/2013    Anxiety 02/21/2013    Seasonal allergic rhinitis 12/20/2012     She  has a past surgical history that includes Knee surgery (1998); pr colonoscopy flx dx w/collj spec when pfrmd (N/A, 5/9/2017); and Eye surgery  She  reports that she has been smoking cigarettes  She has a 100 00 pack-year smoking history  She has never used smokeless tobacco  She reports that she does not drink alcohol and does not use drugs    Current Outpatient Medications   Medication Sig Dispense Refill    acetaminophen (TYLENOL 8 HOUR ARTHRITIS PAIN) 650 mg CR tablet Take by mouth      albuterol (PROVENTIL HFA,VENTOLIN HFA) 90 mcg/act inhaler TAKE 2 PUFFS BY MOUTH EVERY 6 HOURS AS NEEDED FOR WHEEZE 18 g 1    ALPRAZolam (XANAX) 0 25 mg tablet Take 1 tablet (0 25 mg total) by mouth 3 (three) times a day as needed for anxiety 90 tablet 0    bisacodyl (DULCOLAX) 5 mg EC tablet Take 1 tablet (5 mg total) by mouth daily at bedtime With colonoscopy prep per instructions 30 tablet 0    cetirizine (ZyrTEC) 10 mg tablet TAKE 1 TABLET BY MOUTH EVERY DAY 90 tablet 0    Diclofenac Sodium (VOLTAREN) 1 % APPLY 2 GRAMS TO AFFECTED AREA 4 TIMES A  g 1    ergocalciferol (VITAMIN D2) 50,000 units TAKE 1 CAPSULE BY MOUTH ONE TIME PER WEEK 4 capsule 0    escitalopram (LEXAPRO) 10 mg tablet TAKE 1 TABLET BY MOUTH EVERY DAY 90 tablet 1    fluticasone (FLONASE) 50 mcg/act nasal spray SPRAY 2 SPRAYS INTO EACH NOSTRIL EVERY DAY 16 mL 3    lisinopril (ZESTRIL) 20 mg tablet TAKE 1 TABLET BY MOUTH EVERY DAY 90 tablet 1    MINOXIDIL, TOPICAL, 5 % SOLN Apply 1 application topically      montelukast (SINGULAIR) 10 mg tablet TAKE 1 TABLET BY MOUTH EVERYDAY AT BEDTIME 90 tablet 1    nystatin (MYCOSTATIN) 500,000 units/5 mL suspension Apply 5 mL (500,000 Units total) to the mouth or throat 4 (four) times a day 200 mL 3    pantoprazole (PROTONIX) 40 mg tablet TAKE 1 TABLET BY MOUTH EVERY DAY 30 tablet 5    Probiotic Product (PROBIOTIC-10) CAPS Take by mouth 2 (two) times a day      promethazine-dextromethorphan (PHENERGAN-DM) 6 25-15 mg/5 mL oral syrup TAKE 5 ML BY MOUTH 4 TIMES A DAY AS NEEDED FOR COUGH 150 mL 0    senna (Senokot) 8 6 MG tablet Take 1 tablet (8 6 mg total) by mouth daily 90 tablet 0    umeclidinium-vilanterol (Anoro Ellipta) 62 5-25 MCG/INH inhaler Inhale 1 puff daily 30 blister 5    ciprofloxacin (CIPRO) 500 mg tablet Take 1 tablet (500 mg total) by mouth every 12 (twelve) hours for 7 days 14 tablet 0    metroNIDAZOLE (FLAGYL) 500 mg tablet Take 1 tablet (500 mg total) by mouth every 8 (eight) hours for 7 days 21 tablet 0     No current facility-administered medications for this visit  She is allergic to augmentin [amoxicillin-pot clavulanate], miconazole, and wixela inhub [fluticasone-salmeterol]       Review of Systems   Constitutional: Positive for activity change, chills, fatigue and fever  Negative for appetite change  HENT: Positive for congestion  Negative for sinus pressure, sinus pain and sore throat  Eyes: Positive for pain  Negative for photophobia, discharge, redness, itching and visual disturbance  Respiratory: Positive for cough  Negative for shortness of breath, wheezing and stridor  Cardiovascular: Negative  Gastrointestinal: Positive for abdominal pain and constipation  Negative for diarrhea and nausea  Endocrine: Negative  Genitourinary: Negative  Musculoskeletal: Positive for back pain  Negative for joint swelling  Neurological: Positive for headaches  Negative for dizziness, weakness, light-headedness and numbness  Objective:      /80   Pulse 76   Temp 98 5 °F (36 9 °C)   Ht 5' 4" (1 626 m)   Wt 89 4 kg (197 lb)   LMP  (LMP Unknown)   BMI 33 81 kg/m²          Physical Exam  Vitals reviewed  HENT:      Head: Normocephalic  Comments: Sinuses NT  Neg head tilt     Right Ear: Tympanic membrane, ear canal and external ear normal       Left Ear: Tympanic membrane, ear canal and external ear normal    Eyes:      Extraocular Movements: Extraocular movements intact  Conjunctiva/sclera: Conjunctivae normal       Pupils: Pupils are equal, round, and reactive to light  Cardiovascular:      Rate and Rhythm: Normal rate and regular rhythm  Pulses: Normal pulses  Pulmonary:      Effort: Pulmonary effort is normal       Breath sounds: No wheezing or rales  Abdominal:      General: Abdomen is flat  There is no distension  Palpations: There is no mass  Tenderness: There is abdominal tenderness (TTP LLQ, RLQ and epigastric areas  Mild increased pain with heel strike  )  There is no right CVA tenderness or left CVA tenderness  Musculoskeletal:         General: No swelling or tenderness  Normal range of motion  Cervical back: Normal range of motion  Right lower leg: No edema  Left lower leg: No edema  Lymphadenopathy:      Cervical: No cervical adenopathy  Skin:     General: Skin is warm  Capillary Refill: Capillary refill takes less than 2 seconds  Neurological:      Mental Status: She is alert and oriented to person, place, and time  Cranial Nerves: No cranial nerve deficit  Sensory: No sensory deficit  Motor: No weakness        Gait: Gait normal

## 2021-09-28 ENCOUNTER — TELEPHONE (OUTPATIENT)
Dept: RHEUMATOLOGY | Facility: CLINIC | Age: 66
End: 2021-09-28

## 2021-09-29 ENCOUNTER — APPOINTMENT (OUTPATIENT)
Dept: LAB | Facility: MEDICAL CENTER | Age: 66
End: 2021-09-29
Payer: MEDICARE

## 2021-09-29 ENCOUNTER — TELEPHONE (OUTPATIENT)
Dept: FAMILY MEDICINE CLINIC | Facility: CLINIC | Age: 66
End: 2021-09-29

## 2021-09-29 DIAGNOSIS — I77.6 AORTITIS (HCC): ICD-10-CM

## 2021-09-29 LAB
CRP SERPL QL: 22.9 MG/L
ERYTHROCYTE [SEDIMENTATION RATE] IN BLOOD: 74 MM/HOUR (ref 0–29)

## 2021-09-29 PROCEDURE — 86038 ANTINUCLEAR ANTIBODIES: CPT

## 2021-09-29 PROCEDURE — 86430 RHEUMATOID FACTOR TEST QUAL: CPT

## 2021-09-29 PROCEDURE — 86480 TB TEST CELL IMMUN MEASURE: CPT

## 2021-09-29 PROCEDURE — 86618 LYME DISEASE ANTIBODY: CPT

## 2021-09-29 PROCEDURE — 82787 IGG 1 2 3 OR 4 EACH: CPT

## 2021-09-29 PROCEDURE — 36415 COLL VENOUS BLD VENIPUNCTURE: CPT

## 2021-09-29 PROCEDURE — 86200 CCP ANTIBODY: CPT

## 2021-09-29 PROCEDURE — 87040 BLOOD CULTURE FOR BACTERIA: CPT

## 2021-09-29 PROCEDURE — 82306 VITAMIN D 25 HYDROXY: CPT | Performed by: INTERNAL MEDICINE

## 2021-09-29 PROCEDURE — 85652 RBC SED RATE AUTOMATED: CPT

## 2021-09-29 PROCEDURE — 86431 RHEUMATOID FACTOR QUANT: CPT

## 2021-09-29 PROCEDURE — 82784 ASSAY IGA/IGD/IGG/IGM EACH: CPT

## 2021-09-29 PROCEDURE — 86592 SYPHILIS TEST NON-TREP QUAL: CPT

## 2021-09-29 PROCEDURE — 86140 C-REACTIVE PROTEIN: CPT

## 2021-09-30 ENCOUNTER — OFFICE VISIT (OUTPATIENT)
Dept: RHEUMATOLOGY | Facility: CLINIC | Age: 66
End: 2021-09-30
Payer: MEDICARE

## 2021-09-30 VITALS
HEIGHT: 64 IN | SYSTOLIC BLOOD PRESSURE: 144 MMHG | BODY MASS INDEX: 33.63 KG/M2 | WEIGHT: 197 LBS | DIASTOLIC BLOOD PRESSURE: 70 MMHG

## 2021-09-30 DIAGNOSIS — I77.6 AORTITIS (HCC): Primary | ICD-10-CM

## 2021-09-30 DIAGNOSIS — Z79.52 CURRENT CHRONIC USE OF SYSTEMIC STEROIDS: ICD-10-CM

## 2021-09-30 DIAGNOSIS — E55.9 VITAMIN D DEFICIENCY: ICD-10-CM

## 2021-09-30 LAB
B BURGDOR IGG+IGM SER-ACNC: 10
CRYOGLOB RF SER-ACNC: ABNORMAL [IU]/ML
RHEUMATOID FACT SER QL LA: POSITIVE
RPR SER QL: NORMAL

## 2021-09-30 PROCEDURE — 99204 OFFICE O/P NEW MOD 45 MIN: CPT | Performed by: INTERNAL MEDICINE

## 2021-10-01 LAB
CCP AB SER IA-ACNC: 3.7
GAMMA INTERFERON BACKGROUND BLD IA-ACNC: 0.05 IU/ML
IGG SERPL-MCNC: 716 MG/DL (ref 586–1602)
IGG1 SER-MCNC: 321 MG/DL (ref 248–810)
IGG2 SER-MCNC: 268 MG/DL (ref 130–555)
IGG3 SER-MCNC: 41 MG/DL (ref 15–102)
IGG4 SER-MCNC: 3 MG/DL (ref 2–96)
M TB IFN-G BLD-IMP: NEGATIVE
M TB IFN-G CD4+ BCKGRND COR BLD-ACNC: -0.02 IU/ML
M TB IFN-G CD4+ BCKGRND COR BLD-ACNC: -0.02 IU/ML
MITOGEN IGNF BCKGRD COR BLD-ACNC: 1.58 IU/ML
RYE IGE QN: NEGATIVE

## 2021-10-02 LAB — 25(OH)D3 SERPL-MCNC: 42.3 NG/ML (ref 30–100)

## 2021-10-04 ENCOUNTER — TELEPHONE (OUTPATIENT)
Dept: FAMILY MEDICINE CLINIC | Facility: CLINIC | Age: 66
End: 2021-10-04

## 2021-10-04 DIAGNOSIS — B37.0 THRUSH: ICD-10-CM

## 2021-10-04 DIAGNOSIS — K21.9 GASTROESOPHAGEAL REFLUX DISEASE: ICD-10-CM

## 2021-10-04 LAB — BACTERIA BLD CULT: NORMAL

## 2021-10-05 RX ORDER — PANTOPRAZOLE SODIUM 40 MG/1
TABLET, DELAYED RELEASE ORAL
Qty: 90 TABLET | Refills: 1 | Status: SHIPPED | OUTPATIENT
Start: 2021-10-05 | End: 2022-04-06 | Stop reason: SDUPTHER

## 2021-10-06 LAB — BACTERIA BLD CULT: NORMAL

## 2021-10-08 DIAGNOSIS — J30.9 ALLERGIC RHINITIS, UNSPECIFIED SEASONALITY, UNSPECIFIED TRIGGER: ICD-10-CM

## 2021-10-08 RX ORDER — CETIRIZINE HYDROCHLORIDE 10 MG/1
10 TABLET ORAL DAILY
Qty: 90 TABLET | Refills: 0 | Status: SHIPPED | OUTPATIENT
Start: 2021-10-08 | End: 2022-01-25 | Stop reason: SDUPTHER

## 2021-10-12 ENCOUNTER — TELEPHONE (OUTPATIENT)
Dept: OBGYN CLINIC | Facility: HOSPITAL | Age: 66
End: 2021-10-12

## 2021-10-13 ENCOUNTER — APPOINTMENT (OUTPATIENT)
Dept: LAB | Facility: MEDICAL CENTER | Age: 66
End: 2021-10-13
Payer: MEDICARE

## 2021-10-13 LAB
CRP SERPL QL: <3 MG/L
ERYTHROCYTE [SEDIMENTATION RATE] IN BLOOD: 33 MM/HOUR (ref 0–29)

## 2021-10-13 PROCEDURE — 86255 FLUORESCENT ANTIBODY SCREEN: CPT | Performed by: INTERNAL MEDICINE

## 2021-10-13 PROCEDURE — 36415 COLL VENOUS BLD VENIPUNCTURE: CPT | Performed by: INTERNAL MEDICINE

## 2021-10-13 PROCEDURE — 85652 RBC SED RATE AUTOMATED: CPT | Performed by: INTERNAL MEDICINE

## 2021-10-13 PROCEDURE — 86140 C-REACTIVE PROTEIN: CPT | Performed by: INTERNAL MEDICINE

## 2021-10-16 LAB
C-ANCA TITR SER IF: NORMAL TITER
MYELOPEROXIDASE AB SER IA-ACNC: <9 U/ML (ref 0–9)
P-ANCA ATYPICAL TITR SER IF: NORMAL TITER
P-ANCA TITR SER IF: NORMAL TITER
PROTEINASE3 AB SER IA-ACNC: <3.5 U/ML (ref 0–3.5)

## 2021-10-20 ENCOUNTER — TELEPHONE (OUTPATIENT)
Dept: OBGYN CLINIC | Facility: HOSPITAL | Age: 66
End: 2021-10-20

## 2021-10-20 DIAGNOSIS — I77.6 AORTITIS (HCC): ICD-10-CM

## 2021-10-20 RX ORDER — PREDNISONE 20 MG/1
40 TABLET ORAL DAILY
Qty: 60 TABLET | Refills: 0 | Status: SHIPPED | OUTPATIENT
Start: 2021-10-20 | End: 2021-11-19

## 2021-10-27 DIAGNOSIS — J42 CHRONIC BRONCHITIS, UNSPECIFIED CHRONIC BRONCHITIS TYPE (HCC): ICD-10-CM

## 2021-10-27 RX ORDER — DEXTROMETHORPHAN HYDROBROMIDE AND PROMETHAZINE HYDROCHLORIDE 15; 6.25 MG/5ML; MG/5ML
SYRUP ORAL
Qty: 150 ML | Refills: 0 | Status: SHIPPED | OUTPATIENT
Start: 2021-10-27 | End: 2022-01-03

## 2021-10-28 ENCOUNTER — APPOINTMENT (OUTPATIENT)
Dept: RADIOLOGY | Facility: MEDICAL CENTER | Age: 66
End: 2021-10-28
Payer: MEDICARE

## 2021-10-28 ENCOUNTER — OFFICE VISIT (OUTPATIENT)
Dept: FAMILY MEDICINE CLINIC | Facility: CLINIC | Age: 66
End: 2021-10-28
Payer: MEDICARE

## 2021-10-28 ENCOUNTER — TELEPHONE (OUTPATIENT)
Dept: FAMILY MEDICINE CLINIC | Facility: CLINIC | Age: 66
End: 2021-10-28

## 2021-10-28 ENCOUNTER — APPOINTMENT (OUTPATIENT)
Dept: LAB | Facility: MEDICAL CENTER | Age: 66
End: 2021-10-28
Payer: MEDICARE

## 2021-10-28 VITALS
HEART RATE: 76 BPM | BODY MASS INDEX: 33.46 KG/M2 | TEMPERATURE: 98.2 F | WEIGHT: 196 LBS | SYSTOLIC BLOOD PRESSURE: 128 MMHG | HEIGHT: 64 IN | DIASTOLIC BLOOD PRESSURE: 80 MMHG

## 2021-10-28 DIAGNOSIS — R06.2 WHEEZE: ICD-10-CM

## 2021-10-28 DIAGNOSIS — R07.89 CHEST DISCOMFORT: ICD-10-CM

## 2021-10-28 DIAGNOSIS — R06.02 SHORTNESS OF BREATH: ICD-10-CM

## 2021-10-28 DIAGNOSIS — H40.053 RAISED INTRAOCULAR PRESSURE OF BOTH EYES: ICD-10-CM

## 2021-10-28 DIAGNOSIS — I10 PRIMARY HYPERTENSION: ICD-10-CM

## 2021-10-28 DIAGNOSIS — J42 CHRONIC BRONCHITIS, UNSPECIFIED CHRONIC BRONCHITIS TYPE (HCC): ICD-10-CM

## 2021-10-28 DIAGNOSIS — R06.02 SHORTNESS OF BREATH: Primary | ICD-10-CM

## 2021-10-28 DIAGNOSIS — I77.6 AORTITIS (HCC): ICD-10-CM

## 2021-10-28 LAB
BASOPHILS # BLD AUTO: 0.03 THOUSANDS/ΜL (ref 0–0.1)
BASOPHILS NFR BLD AUTO: 0 % (ref 0–1)
EOSINOPHIL # BLD AUTO: 0.03 THOUSAND/ΜL (ref 0–0.61)
EOSINOPHIL NFR BLD AUTO: 0 % (ref 0–6)
ERYTHROCYTE [DISTWIDTH] IN BLOOD BY AUTOMATED COUNT: 14.7 % (ref 11.6–15.1)
HCT VFR BLD AUTO: 45.3 % (ref 34.8–46.1)
HGB BLD-MCNC: 14.4 G/DL (ref 11.5–15.4)
IMM GRANULOCYTES # BLD AUTO: 0.1 THOUSAND/UL (ref 0–0.2)
IMM GRANULOCYTES NFR BLD AUTO: 1 % (ref 0–2)
LYMPHOCYTES # BLD AUTO: 1.11 THOUSANDS/ΜL (ref 0.6–4.47)
LYMPHOCYTES NFR BLD AUTO: 10 % (ref 14–44)
MCH RBC QN AUTO: 28.5 PG (ref 26.8–34.3)
MCHC RBC AUTO-ENTMCNC: 31.8 G/DL (ref 31.4–37.4)
MCV RBC AUTO: 90 FL (ref 82–98)
MONOCYTES # BLD AUTO: 0.59 THOUSAND/ΜL (ref 0.17–1.22)
MONOCYTES NFR BLD AUTO: 5 % (ref 4–12)
NEUTROPHILS # BLD AUTO: 9.61 THOUSANDS/ΜL (ref 1.85–7.62)
NEUTS SEG NFR BLD AUTO: 84 % (ref 43–75)
NRBC BLD AUTO-RTO: 0 /100 WBCS
NT-PROBNP SERPL-MCNC: 128 PG/ML
PLATELET # BLD AUTO: 290 THOUSANDS/UL (ref 149–390)
PMV BLD AUTO: 9.4 FL (ref 8.9–12.7)
RBC # BLD AUTO: 5.06 MILLION/UL (ref 3.81–5.12)
WBC # BLD AUTO: 11.47 THOUSAND/UL (ref 4.31–10.16)

## 2021-10-28 PROCEDURE — 83880 ASSAY OF NATRIURETIC PEPTIDE: CPT

## 2021-10-28 PROCEDURE — 99214 OFFICE O/P EST MOD 30 MIN: CPT | Performed by: FAMILY MEDICINE

## 2021-10-28 PROCEDURE — 71046 X-RAY EXAM CHEST 2 VIEWS: CPT

## 2021-10-28 PROCEDURE — 85025 COMPLETE CBC W/AUTO DIFF WBC: CPT

## 2021-10-28 PROCEDURE — 36415 COLL VENOUS BLD VENIPUNCTURE: CPT

## 2021-10-28 RX ORDER — BIMATOPROST 0.01 %
1 DROPS OPHTHALMIC (EYE)
COMMUNITY
Start: 2021-10-27

## 2021-10-31 PROBLEM — H40.059 INCREASED INTRAOCULAR PRESSURE: Status: ACTIVE | Noted: 2021-10-31

## 2021-10-31 PROBLEM — I77.6 AORTITIS (HCC): Status: ACTIVE | Noted: 2021-10-31

## 2021-11-03 ENCOUNTER — OFFICE VISIT (OUTPATIENT)
Dept: RHEUMATOLOGY | Facility: CLINIC | Age: 66
End: 2021-11-03
Payer: COMMERCIAL

## 2021-11-03 VITALS
HEIGHT: 64 IN | BODY MASS INDEX: 33.46 KG/M2 | WEIGHT: 196 LBS | SYSTOLIC BLOOD PRESSURE: 160 MMHG | DIASTOLIC BLOOD PRESSURE: 80 MMHG

## 2021-11-03 DIAGNOSIS — E55.9 VITAMIN D DEFICIENCY: ICD-10-CM

## 2021-11-03 DIAGNOSIS — Z79.52 CURRENT CHRONIC USE OF SYSTEMIC STEROIDS: ICD-10-CM

## 2021-11-03 DIAGNOSIS — M85.842 OTHER SPECIFIED DISORDERS OF BONE DENSITY AND STRUCTURE, LEFT HAND: ICD-10-CM

## 2021-11-03 DIAGNOSIS — I77.6 AORTITIS (HCC): Primary | ICD-10-CM

## 2021-11-03 DIAGNOSIS — Z13.820 OSTEOPOROSIS SCREENING: ICD-10-CM

## 2021-11-03 PROCEDURE — 99214 OFFICE O/P EST MOD 30 MIN: CPT | Performed by: INTERNAL MEDICINE

## 2021-11-08 ENCOUNTER — APPOINTMENT (OUTPATIENT)
Dept: LAB | Facility: MEDICAL CENTER | Age: 66
End: 2021-11-08
Payer: COMMERCIAL

## 2021-11-08 DIAGNOSIS — R06.02 SHORTNESS OF BREATH: ICD-10-CM

## 2021-11-08 DIAGNOSIS — R06.2 WHEEZE: ICD-10-CM

## 2021-11-08 DIAGNOSIS — R07.89 CHEST DISCOMFORT: ICD-10-CM

## 2021-11-08 LAB
ALBUMIN SERPL BCP-MCNC: 3.5 G/DL (ref 3.5–5)
ALP SERPL-CCNC: 79 U/L (ref 46–116)
ALT SERPL W P-5'-P-CCNC: 31 U/L (ref 12–78)
ANION GAP SERPL CALCULATED.3IONS-SCNC: 6 MMOL/L (ref 4–13)
AST SERPL W P-5'-P-CCNC: 14 U/L (ref 5–45)
BILIRUB SERPL-MCNC: 0.43 MG/DL (ref 0.2–1)
BUN SERPL-MCNC: 9 MG/DL (ref 5–25)
CALCIUM SERPL-MCNC: 9.8 MG/DL (ref 8.3–10.1)
CHLORIDE SERPL-SCNC: 100 MMOL/L (ref 100–108)
CO2 SERPL-SCNC: 26 MMOL/L (ref 21–32)
CREAT SERPL-MCNC: 0.71 MG/DL (ref 0.6–1.3)
CRP SERPL QL: <3 MG/L
ERYTHROCYTE [SEDIMENTATION RATE] IN BLOOD: 45 MM/HOUR (ref 0–29)
GFR SERPL CREATININE-BSD FRML MDRD: 90 ML/MIN/1.73SQ M
GLUCOSE P FAST SERPL-MCNC: 133 MG/DL (ref 65–99)
POTASSIUM SERPL-SCNC: 4.3 MMOL/L (ref 3.5–5.3)
PROT SERPL-MCNC: 7.7 G/DL (ref 6.4–8.2)
SODIUM SERPL-SCNC: 132 MMOL/L (ref 136–145)

## 2021-11-08 PROCEDURE — 80053 COMPREHEN METABOLIC PANEL: CPT

## 2021-11-08 PROCEDURE — 85652 RBC SED RATE AUTOMATED: CPT | Performed by: INTERNAL MEDICINE

## 2021-11-08 PROCEDURE — 86140 C-REACTIVE PROTEIN: CPT | Performed by: INTERNAL MEDICINE

## 2021-11-08 PROCEDURE — 36415 COLL VENOUS BLD VENIPUNCTURE: CPT | Performed by: INTERNAL MEDICINE

## 2021-11-09 ENCOUNTER — HOSPITAL ENCOUNTER (OUTPATIENT)
Dept: RADIOLOGY | Facility: MEDICAL CENTER | Age: 66
Discharge: HOME | End: 2021-11-09

## 2021-11-09 DIAGNOSIS — I77.6 AORTITIS (HCC): ICD-10-CM

## 2021-11-11 ENCOUNTER — HOSPITAL ENCOUNTER (OUTPATIENT)
Dept: RADIOLOGY | Facility: MEDICAL CENTER | Age: 66
Discharge: HOME | End: 2021-11-11
Payer: COMMERCIAL

## 2021-11-11 DIAGNOSIS — I77.6 ARTERITIS, UNSPECIFIED (HCC): ICD-10-CM

## 2021-11-11 PROCEDURE — 74177 CT ABD & PELVIS W/CONTRAST: CPT

## 2021-11-11 PROCEDURE — G1004 CDSM NDSC: HCPCS

## 2021-11-11 RX ADMIN — IOHEXOL 100 ML: 350 INJECTION, SOLUTION INTRAVENOUS at 14:37

## 2021-11-15 ENCOUNTER — TELEPHONE (OUTPATIENT)
Dept: OBGYN CLINIC | Facility: HOSPITAL | Age: 66
End: 2021-11-15

## 2021-11-17 ENCOUNTER — TELEPHONE (OUTPATIENT)
Dept: OBGYN CLINIC | Facility: MEDICAL CENTER | Age: 66
End: 2021-11-17

## 2021-11-17 DIAGNOSIS — I77.6 AORTITIS (HCC): Primary | ICD-10-CM

## 2021-11-17 RX ORDER — PREDNISONE 10 MG/1
TABLET ORAL
Qty: 180 TABLET | Refills: 0 | Status: SHIPPED | OUTPATIENT
Start: 2021-11-17 | End: 2022-01-21

## 2021-11-18 ENCOUNTER — RA CDI HCC (OUTPATIENT)
Dept: OTHER | Facility: HOSPITAL | Age: 66
End: 2021-11-18

## 2021-11-24 ENCOUNTER — OFFICE VISIT (OUTPATIENT)
Dept: FAMILY MEDICINE CLINIC | Facility: CLINIC | Age: 66
End: 2021-11-24
Payer: COMMERCIAL

## 2021-11-24 VITALS
OXYGEN SATURATION: 96 % | RESPIRATION RATE: 16 BRPM | HEIGHT: 64 IN | WEIGHT: 201.8 LBS | TEMPERATURE: 97.7 F | DIASTOLIC BLOOD PRESSURE: 84 MMHG | SYSTOLIC BLOOD PRESSURE: 152 MMHG | HEART RATE: 73 BPM | BODY MASS INDEX: 34.45 KG/M2

## 2021-11-24 DIAGNOSIS — I77.6 AORTITIS (HCC): ICD-10-CM

## 2021-11-24 DIAGNOSIS — I10 PRIMARY HYPERTENSION: Primary | ICD-10-CM

## 2021-11-24 DIAGNOSIS — R10.13 EPIGASTRIC PAIN: ICD-10-CM

## 2021-11-24 DIAGNOSIS — J42 CHRONIC BRONCHITIS, UNSPECIFIED CHRONIC BRONCHITIS TYPE (HCC): ICD-10-CM

## 2021-11-24 DIAGNOSIS — F51.01 PRIMARY INSOMNIA: ICD-10-CM

## 2021-11-24 PROCEDURE — 1160F RVW MEDS BY RX/DR IN RCRD: CPT | Performed by: FAMILY MEDICINE

## 2021-11-24 PROCEDURE — 4004F PT TOBACCO SCREEN RCVD TLK: CPT | Performed by: FAMILY MEDICINE

## 2021-11-24 PROCEDURE — 99214 OFFICE O/P EST MOD 30 MIN: CPT | Performed by: FAMILY MEDICINE

## 2021-11-24 PROCEDURE — 3008F BODY MASS INDEX DOCD: CPT | Performed by: FAMILY MEDICINE

## 2021-11-24 RX ORDER — HYDROXYZINE HYDROCHLORIDE 25 MG/1
TABLET, FILM COATED ORAL
Qty: 60 TABLET | Refills: 1 | Status: SHIPPED | OUTPATIENT
Start: 2021-11-24 | End: 2021-12-16

## 2021-11-28 RX ORDER — AMLODIPINE BESYLATE 2.5 MG/1
2.5 TABLET ORAL DAILY
Qty: 30 TABLET | Refills: 5 | Status: SHIPPED | OUTPATIENT
Start: 2021-11-28 | End: 2022-03-31 | Stop reason: SDUPTHER

## 2021-12-06 ENCOUNTER — TELEPHONE (OUTPATIENT)
Dept: FAMILY MEDICINE CLINIC | Facility: CLINIC | Age: 66
End: 2021-12-06

## 2021-12-13 DIAGNOSIS — I10 ESSENTIAL HYPERTENSION: ICD-10-CM

## 2021-12-13 RX ORDER — LISINOPRIL 20 MG/1
TABLET ORAL
Qty: 90 TABLET | Refills: 1 | Status: SHIPPED | OUTPATIENT
Start: 2021-12-13 | End: 2022-05-06

## 2021-12-16 ENCOUNTER — TELEPHONE (OUTPATIENT)
Dept: PULMONOLOGY | Facility: CLINIC | Age: 66
End: 2021-12-16

## 2021-12-16 ENCOUNTER — TELEPHONE (OUTPATIENT)
Dept: FAMILY MEDICINE CLINIC | Facility: CLINIC | Age: 66
End: 2021-12-16

## 2021-12-16 DIAGNOSIS — F51.01 PRIMARY INSOMNIA: ICD-10-CM

## 2021-12-16 RX ORDER — HYDROXYZINE HYDROCHLORIDE 25 MG/1
TABLET, FILM COATED ORAL
Qty: 60 TABLET | Refills: 1 | Status: SHIPPED | OUTPATIENT
Start: 2021-12-16 | End: 2022-03-09 | Stop reason: ALTCHOICE

## 2021-12-21 ENCOUNTER — TELEPHONE (OUTPATIENT)
Dept: PULMONOLOGY | Facility: HOSPITAL | Age: 66
End: 2021-12-21

## 2022-01-03 DIAGNOSIS — J42 CHRONIC BRONCHITIS, UNSPECIFIED CHRONIC BRONCHITIS TYPE (HCC): ICD-10-CM

## 2022-01-03 RX ORDER — DEXTROMETHORPHAN HYDROBROMIDE AND PROMETHAZINE HYDROCHLORIDE 15; 6.25 MG/5ML; MG/5ML
SYRUP ORAL
Qty: 150 ML | Refills: 0 | Status: SHIPPED | OUTPATIENT
Start: 2022-01-03 | End: 2022-02-28

## 2022-01-13 DIAGNOSIS — J42 CHRONIC BRONCHITIS, UNSPECIFIED CHRONIC BRONCHITIS TYPE (HCC): ICD-10-CM

## 2022-01-13 DIAGNOSIS — J30.2 SEASONAL ALLERGIC RHINITIS, UNSPECIFIED TRIGGER: ICD-10-CM

## 2022-01-13 DIAGNOSIS — F33.0 DEPRESSION, MAJOR, RECURRENT, MILD (HCC): ICD-10-CM

## 2022-01-13 DIAGNOSIS — B37.0 THRUSH: ICD-10-CM

## 2022-01-13 RX ORDER — MONTELUKAST SODIUM 10 MG/1
TABLET ORAL
Qty: 90 TABLET | Refills: 1 | Status: SHIPPED | OUTPATIENT
Start: 2022-01-13 | End: 2022-06-02 | Stop reason: ALTCHOICE

## 2022-01-13 RX ORDER — ALBUTEROL SULFATE 90 UG/1
AEROSOL, METERED RESPIRATORY (INHALATION)
Qty: 18 G | Refills: 1 | Status: SHIPPED | OUTPATIENT
Start: 2022-01-13

## 2022-01-13 RX ORDER — ESCITALOPRAM OXALATE 10 MG/1
TABLET ORAL
Qty: 90 TABLET | Refills: 1 | Status: SHIPPED | OUTPATIENT
Start: 2022-01-13 | End: 2022-06-13

## 2022-01-18 ENCOUNTER — TELEMEDICINE (OUTPATIENT)
Dept: FAMILY MEDICINE CLINIC | Facility: CLINIC | Age: 67
End: 2022-01-18
Payer: COMMERCIAL

## 2022-01-18 ENCOUNTER — TELEPHONE (OUTPATIENT)
Dept: OBGYN CLINIC | Facility: HOSPITAL | Age: 67
End: 2022-01-18

## 2022-01-18 ENCOUNTER — TELEPHONE (OUTPATIENT)
Dept: PULMONOLOGY | Facility: CLINIC | Age: 67
End: 2022-01-18

## 2022-01-18 DIAGNOSIS — J42 CHRONIC BRONCHITIS, UNSPECIFIED CHRONIC BRONCHITIS TYPE (HCC): ICD-10-CM

## 2022-01-18 DIAGNOSIS — I77.6 AORTITIS (HCC): ICD-10-CM

## 2022-01-18 DIAGNOSIS — R91.1 PULMONARY NODULE SEEN ON IMAGING STUDY: ICD-10-CM

## 2022-01-18 DIAGNOSIS — I77.6 AORTITIS (HCC): Primary | ICD-10-CM

## 2022-01-18 DIAGNOSIS — J44.1 COPD EXACERBATION (HCC): Primary | ICD-10-CM

## 2022-01-18 DIAGNOSIS — R91.1 PULMONARY NODULE SEEN ON IMAGING STUDY: Primary | ICD-10-CM

## 2022-01-18 DIAGNOSIS — H40.053 RAISED INTRAOCULAR PRESSURE OF BOTH EYES: ICD-10-CM

## 2022-01-18 DIAGNOSIS — B37.3 CANDIDA VAGINITIS: ICD-10-CM

## 2022-01-18 PROCEDURE — 99214 OFFICE O/P EST MOD 30 MIN: CPT | Performed by: FAMILY MEDICINE

## 2022-01-18 PROCEDURE — 4004F PT TOBACCO SCREEN RCVD TLK: CPT | Performed by: FAMILY MEDICINE

## 2022-01-18 PROCEDURE — 1160F RVW MEDS BY RX/DR IN RCRD: CPT | Performed by: FAMILY MEDICINE

## 2022-01-18 RX ORDER — DOXYCYCLINE HYCLATE 100 MG/1
100 CAPSULE ORAL EVERY 12 HOURS SCHEDULED
Qty: 20 CAPSULE | Refills: 0 | Status: SHIPPED | OUTPATIENT
Start: 2022-01-18 | End: 2022-01-28

## 2022-01-18 RX ORDER — FLUCONAZOLE 150 MG/1
150 TABLET ORAL ONCE
Qty: 1 TABLET | Refills: 0 | Status: SHIPPED | OUTPATIENT
Start: 2022-01-18 | End: 2022-01-18

## 2022-01-18 NOTE — TELEPHONE ENCOUNTER
Please have her do the repeat labs that are in the system as soon as possible, and schedule her for follow-up at [facility] with me, next available

## 2022-01-18 NOTE — TELEPHONE ENCOUNTER
Pt is calling stating that she was taking 60mg sept  of predinsone the 40mg and is down to 20mg this month and pt is stating that she is experiencing pain again in her stomach and back and other areas like she was before but not as bad   Pt would like a call back    CB#[phone]

## 2022-01-18 NOTE — PROGRESS NOTES
Virtual Regular Visit    Verification of patient location:    Patient is located in the following state in which I hold an active license [place]      Assessment/Plan:    Problem List Items Addressed This Visit        Respiratory    Chronic obstructive pulmonary disease ([facility] )     With persistent symptoms  Improvement with abx may indicate persistent infection  Pt is to have repeat CT of the chest next week per pulm  Pt did feel better with Trelegy but had to stop after intra ocular pressures increased twice  Will change abx to doxy 100mg bid x 10d  Continue present inhalers  Recheck 2-3w  Pt to call pulm in the interim            Cardiovascular and Mediastinum    Aortitis (HCC)     Increased symptoms since decreasing pred to 20mg qd  Rheum did not want pt to repeat labs until she was off of pred? Continue present dose of pred  Pt to call rheum to discuss symptoms  Recheck 2-3w            Genitourinary    Candida vaginitis     Pt has done well with Diflucan in the past - refilled  Other    Increased intraocular pressure     Related to ICS component in Trelegy? Seems to improve each time we take her off of and ICS containing product  Has not been noticed while on pred  Pt to f/u with ophth  Recheck 2-3w         Pulmonary nodule seen on imaging study     For repeat CT next week           Other Visit Diagnoses     COPD exacerbation ([facility] )    -  Primary    Relevant Medications    doxycycline hyclate (VIBRAMYCIN) 100 mg capsule               Reason for visit is   Chief Complaint   Patient presents with    Virtual Regular Visit      It was my intent to perform this visit via video technology but the patient was not able to do a video connection so the visit was completed via audio telephone only      Encounter provider Regla Frost MD    Provider located at [address] [address]      Recent Visits  Date Type Provider Dept   01/18/22 MD Jeremy Alva Lon recent visits within past 7 days and meeting all other requirements  Future Appointments  No visits were found meeting these conditions  Showing future appointments within next 150 days and meeting all other requirements       The patient was identified by name and date of birth  Danyell Joe was informed that this is a telemedicine visit and that the visit is being conducted through [address] [facility] and patient was informed that this is a secure, HIPAA-compliant platform  She agrees to proceed     My office door was closed  No one else was in the room  She acknowledged consent and understanding of privacy and security of the video platform  The patient has agreed to participate and understands they can discontinue the visit at any time  It was my intent to perform this visit via video technology but the patient was not able to do a video connection so the visit was completed via audio telephone only  Patient is aware this is a billable service  Subjective  Danyell Joe is a 77 y o  female here for f/u of recent issues  Pt presents for f/u of several concerns  Attempted to perform video visit on several platforms but unable to connect with patients equipment  Visit changed to telephone visit  - Pt with persistent uppers resp symptoms with fatigue, cough and congestion since having COVID in Sept of 2021  Pt improves briefly with zithromax, but gets worse again after she stops  (+) wheeze, primarily in her L side when she lays flat  Presently on Anoro and albuterol, as well as prednisone for her COVID related aortitis  Pt had to stop Trelegy due to increased intra-ocular pressure  Pt presently on 20g of pred due to her post-COVID aortitis, but notes increased abd and back pain over the last week, similar to what she experienced prior to starting pred  Pt also with recurrent candida vaginitis  Pt has experienced constipation recently   She continues to f/u with pulm and rheum  She denies CP, palp or increased leg edema  Pt denies any rashes, joint swelling or worsening stiffness       Past Medical History:   Diagnosis Date    Acid reflux     Fibromyalgia     Hypertension     Seasonal allergies        Past Surgical History:   Procedure Laterality Date    EYE SURGERY      KNEE SURGERY  1998    AK COLONOSCOPY FLX DX W/COLLJ SPEC WHEN PFRMD N/A 5/9/2017    Procedure: EGD AND COLONOSCOPY;  Surgeon: Vince Ji MD;  [facility];   Service: Gastroenterology       Current Outpatient Medications   Medication Sig Dispense Refill    acetaminophen (TYLENOL 8 HOUR ARTHRITIS PAIN) 650 mg CR tablet Take by mouth every 8 (eight) hours as needed        albuterol (PROVENTIL HFA,VENTOLIN HFA) 90 mcg/act inhaler INHALE 2 PUFFS BY MOUTH EVERY 6 HOURS AS NEEDED FOR WHEEZE 18 g 1    ALPRAZolam (XANAX) 0 25 mg tablet Take 1 tablet (0 25 mg total) by mouth 3 (three) times a day as needed for anxiety 90 tablet 0    amLODIPine (NORVASC) 2 5 mg tablet Take 1 tablet (2 5 mg total) by mouth daily 30 tablet 5    bisacodyl (DULCOLAX) 5 mg EC tablet Take 1 tablet (5 mg total) by mouth daily at bedtime With colonoscopy prep per instructions (Patient taking differently: Take 5 mg by mouth daily as needed With colonoscopy prep per instructions ) 30 tablet 0    cetirizine (ZyrTEC) 10 mg tablet Take 1 tablet (10 mg total) by mouth daily 90 tablet 0    Diclofenac Sodium (VOLTAREN) 1 % APPLY 2 GRAMS TO AFFECTED AREA 4 TIMES A  g 1    doxycycline hyclate (VIBRAMYCIN) 100 mg capsule Take 1 capsule (100 mg total) by mouth every 12 (twelve) hours for 10 days 20 capsule 0    ergocalciferol (VITAMIN D2) 50,000 units TAKE 1 CAPSULE BY MOUTH ONE TIME PER WEEK 4 capsule 0    escitalopram (LEXAPRO) 10 mg tablet TAKE 1 TABLET BY MOUTH EVERY DAY 90 tablet 1    fluticasone (FLONASE) 50 mcg/act nasal spray SPRAY 2 SPRAYS INTO EACH NOSTRIL EVERY DAY (Patient taking differently: as needed  ) 16 mL 3    fluticasone-umeclidinium-vilanterol (Trelegy Ellipta) 200-62 5-25 MCG/INH AEPB inhaler Inhale 1 puff daily Rinse mouth after use  60 blister 5    hydrOXYzine HCL (ATARAX) 25 mg tablet TAKE 1-2 TABLETS BY MOUTH AT BEDTIME AS NEEDED FOR INSOMNIA 60 tablet 1    lisinopril (ZESTRIL) 20 mg tablet TAKE 1 TABLET BY MOUTH EVERY DAY 90 tablet 1    Lumigan 0 01 % ophthalmic drops Administer 1 drop to both eyes daily at bedtime        MINOXIDIL, TOPICAL, 5 % SOLN Apply 1 application topically      montelukast (SINGULAIR) 10 mg tablet TAKE 1 TABLET BY MOUTH EVERYDAY AT BEDTIME 90 tablet 1    nystatin (MYCOSTATIN) 500,000 units/5 mL suspension SWISH AND SPIT WITH 5 ML 4 TIMES A  mL 3    pantoprazole (PROTONIX) 40 mg tablet TAKE 1 TABLET BY MOUTH EVERY DAY 90 tablet 1    predniSONE 10 mg tablet Take 3 tablets (30 mg total) by mouth daily for 30 days, THEN 2 tablets (20 mg total) daily for 30 days, THEN 1 tablet (10 mg total) daily  180 tablet 0    Probiotic Product (PROBIOTIC-10) CAPS Take by mouth 2 (two) times a day      promethazine-dextromethorphan (PHENERGAN-DM) 6 25-15 mg/5 mL oral syrup TAKE 5 ML BY MOUTH 4 TIMES A DAY AS NEEDED FOR COUGH 150 mL 0    Travoprost (TRAVATAN OP) Apply to eye 1 drop both eye before bed       No current facility-administered medications for this visit  Allergies   Allergen Reactions    Augmentin [Amoxicillin-Pot Clavulanate] Vomiting    Miconazole Itching and Swelling    Wixela Inhub [Fluticasone-Salmeterol] Palpitations       Review of Systems   Constitutional: Positive for activity change and fatigue  Negative for appetite change, chills, diaphoresis and fever  HENT: Positive for congestion  Negative for sinus pressure, sinus pain and sore throat  Eyes: Positive for visual disturbance (intermittent)  Respiratory: Positive for cough, shortness of breath and wheezing      Cardiovascular: Negative for chest pain, palpitations and leg swelling  Gastrointestinal: Positive for abdominal pain (sl increased epigastric pain since decreasing dose of pred to 20mg) and constipation  Negative for abdominal distention, blood in stool and nausea  Genitourinary: Positive for vaginal discharge  Negative for dysuria, frequency and vaginal bleeding  Musculoskeletal: Positive for arthralgias (scattered, chronic), back pain (sl increased) and myalgias  Negative for joint swelling  Neurological: Positive for headaches (intermittent, chronic)  Negative for dizziness, weakness, light-headedness and numbness  Hematological: Negative  Video Exam    There were no vitals filed for this visit  Physical Exam  Constitutional:       Comments: Unable to connect video   Neurological:      Mental Status: She is alert and oriented to person, place, and time  I spent 20 minutes with patient today in which greater than 50% of the time was spent in counseling/coordination of care regarding possible worsening aortitis, COPD/wheeze and post COPVID symptoms    VIRTUAL VISIT [address] verbally agrees to participate in [employer]  Pt is aware that [employer] could be limited without vital signs or the ability to perform a full hands-on physical Minus Schillings understands she or the provider may request at any time to terminate the video visit and request the patient to seek care or treatment in person

## 2022-01-19 ENCOUNTER — APPOINTMENT (OUTPATIENT)
Dept: LAB | Facility: MEDICAL CENTER | Age: 67
End: 2022-01-19
Payer: COMMERCIAL

## 2022-01-19 PROBLEM — B37.31 CANDIDA VAGINITIS: Status: ACTIVE | Noted: 2022-01-19

## 2022-01-19 PROBLEM — B37.3 CANDIDA VAGINITIS: Status: ACTIVE | Noted: 2022-01-19

## 2022-01-19 LAB
ALBUMIN SERPL BCP-MCNC: 3.9 G/DL (ref 3.5–5)
ALP SERPL-CCNC: 73 U/L (ref 46–116)
ALT SERPL W P-5'-P-CCNC: 22 U/L (ref 12–78)
ANION GAP SERPL CALCULATED.3IONS-SCNC: 5 MMOL/L (ref 4–13)
AST SERPL W P-5'-P-CCNC: 13 U/L (ref 5–45)
BILIRUB SERPL-MCNC: 0.93 MG/DL (ref 0.2–1)
BUN SERPL-MCNC: 9 MG/DL (ref 5–25)
CALCIUM SERPL-MCNC: 10.1 MG/DL (ref 8.3–10.1)
CHLORIDE SERPL-SCNC: 99 MMOL/L (ref 100–108)
CO2 SERPL-SCNC: 28 MMOL/L (ref 21–32)
CREAT SERPL-MCNC: 0.65 MG/DL (ref 0.6–1.3)
CRP SERPL QL: 6 MG/L
ERYTHROCYTE [SEDIMENTATION RATE] IN BLOOD: 56 MM/HOUR (ref 0–29)
GFR SERPL CREATININE-BSD FRML MDRD: 92 ML/MIN/1.73SQ M
GLUCOSE SERPL-MCNC: 133 MG/DL (ref 65–140)
POTASSIUM SERPL-SCNC: 3.9 MMOL/L (ref 3.5–5.3)
PROT SERPL-MCNC: 8 G/DL (ref 6.4–8.2)
SODIUM SERPL-SCNC: 132 MMOL/L (ref 136–145)

## 2022-01-19 PROCEDURE — 36415 COLL VENOUS BLD VENIPUNCTURE: CPT | Performed by: INTERNAL MEDICINE

## 2022-01-19 PROCEDURE — 85652 RBC SED RATE AUTOMATED: CPT | Performed by: INTERNAL MEDICINE

## 2022-01-19 PROCEDURE — 80053 COMPREHEN METABOLIC PANEL: CPT

## 2022-01-19 PROCEDURE — 86140 C-REACTIVE PROTEIN: CPT | Performed by: INTERNAL MEDICINE

## 2022-01-19 NOTE — ASSESSMENT & PLAN NOTE
Related to ICS component in Trelegy? Seems to improve each time we take her off of and ICS containing product  Has not been noticed while on pred  Pt to f/u with ophth   Recheck 2-3w

## 2022-01-19 NOTE — ASSESSMENT & PLAN NOTE
Increased symptoms since decreasing pred to 20mg qd  Rheum did not want pt to repeat labs until she was off of pred? Continue present dose of pred  Pt to call rheum to discuss symptoms   Recheck 2-3w

## 2022-01-19 NOTE — ASSESSMENT & PLAN NOTE
With persistent symptoms  Improvement with abx may indicate persistent infection  Pt is to have repeat CT of the chest next week per pulm  Pt did feel better with Trelegy but had to stop after intra ocular pressures increased twice  Will change abx to doxy 100mg bid x 10d  Continue present inhalers  Recheck 2-3w   Pt to call pulm in the interim

## 2022-01-19 NOTE — TELEPHONE ENCOUNTER
Spoke with patient and relayed information  She is scheduled 2/2 at 11am at [place]   Will complete labs later today

## 2022-01-21 RX ORDER — PREDNISONE 1 MG/1
TABLET ORAL
Qty: 630 TABLET | Refills: 0 | Status: SHIPPED | OUTPATIENT
Start: 2022-01-21 | End: 2022-04-13 | Stop reason: SDUPTHER

## 2022-01-21 NOTE — TELEPHONE ENCOUNTER
Her inflammatory markers have gone up  Please clarify with her what the last dose of prednisone was that she felt comfortable on? If it was 30mg, I want her to go back to 30mg daily for a month, then 20mg daily for a month, then 15mg daily for a month, then keep going down by 5mg increments every month until off

## 2022-01-24 DIAGNOSIS — F41.9 ANXIETY: ICD-10-CM

## 2022-01-24 RX ORDER — ALPRAZOLAM 0.25 MG/1
0.25 TABLET ORAL 3 TIMES DAILY PRN
Qty: 90 TABLET | Refills: 0 | Status: SHIPPED | OUTPATIENT
Start: 2022-01-24

## 2022-01-24 NOTE — TELEPHONE ENCOUNTER
10/01/2020  2  10/01/2020  ALPRAZOLAM 0 25 MG TABLET  90 0  30  [facility]  [id number]  PENNS ([id number])  0   [place]    11/26/2019  1  11/26/2019  ALPRAZOLAM 0 25 MG TABLET  90 0  30  [facility]  [id number]  PENNS ([id number])  0   [place]    07/11/2019  3  07/10/2019  TRAMADOL HCL 50 MG TABLET  12 0  3  [facility] ZW[facility]  [id number]  PENNS ([id number])  0  20 0 MME  [place]

## 2022-01-25 DIAGNOSIS — J30.9 ALLERGIC RHINITIS: ICD-10-CM

## 2022-01-25 DIAGNOSIS — J30.9 ALLERGIC RHINITIS, UNSPECIFIED SEASONALITY, UNSPECIFIED TRIGGER: ICD-10-CM

## 2022-01-25 RX ORDER — FLUTICASONE PROPIONATE 50 MCG
SPRAY, SUSPENSION (ML) NASAL
Qty: 16 ML | Refills: 3 | Status: SHIPPED | OUTPATIENT
Start: 2022-01-25

## 2022-01-25 RX ORDER — CETIRIZINE HYDROCHLORIDE 10 MG/1
10 TABLET ORAL DAILY
Qty: 90 TABLET | Refills: 0 | Status: SHIPPED | OUTPATIENT
Start: 2022-01-25 | End: 2022-04-06 | Stop reason: SDUPTHER

## 2022-01-26 ENCOUNTER — HOSPITAL ENCOUNTER (OUTPATIENT)
Dept: RADIOLOGY | Facility: MEDICAL CENTER | Age: 67
Discharge: HOME | End: 2022-01-26
Payer: COMMERCIAL

## 2022-01-26 DIAGNOSIS — R91.1 PULMONARY NODULE SEEN ON IMAGING STUDY: ICD-10-CM

## 2022-01-26 PROCEDURE — 71271 CT THORAX LUNG CANCER SCR C-: CPT

## 2022-01-28 ENCOUNTER — TELEPHONE (OUTPATIENT)
Dept: PULMONOLOGY | Facility: CLINIC | Age: 67
End: 2022-01-28

## 2022-01-28 NOTE — TELEPHONE ENCOUNTER
Spoke with patient shoulder pulmonary nodules are unremarkable a remained unchanged in likely benign    - patient wanted to make Dr Luz Marina Mireles aware that she is no longer on Trelegy secondary to it having a steroid and having diminished vision    - she is now on Anoro

## 2022-02-01 NOTE — PROGRESS NOTES
Assessment and Plan: Zelda Campuzano is a 77 y o  female who presents for follow up of aortitis  She does not fit into Takayasu arteritis because of her age age and not meeting other criteria (such as BP difference between arms, etc )  Initially patient had COVID19 infection in September  She was complaining of LLQ pain and her PCP ordered CT chest  The imaging showed signs of aortitis in the abdominal aorta  Her ESR and CRP were elevated  Her P-ANCA, C-ANCA , ASPEN antibodies were negative  Patient was treated with prednisonen 40 mg daily initially  Serial CT abdomen pelvis showed resolution of aortitis  Her abdominal pain has significantly improved  Patient says she did have short episode of sharp pain in her periumbilical area yesterday  I will order ECHO and EKG at this moment to work up for cardiac involvement of the vasculitis  We will check her inflammatory markers again and decide if we're going to start her on Actemra  They returned improved so plan on decreasing by 5mg increments every month until off  Finish a full month on 30mg daily, then go down to 25mg daily for a month, then 20mg daily for a month, and so on  EKG showed no changes  Schedule echocardiogram to work-up left sided chest pain  Will consider starting Actemra (tocilizumab) if inflammatory markers worsen    Return to clinic in 2 months    Plan:  Diagnoses and all orders for this visit:    Aortitis ([address] )  -     ECG 12 lead; Future  -     Echo complete w/ contrast if indicated; Future  -     Quantiferon TB Gold Plus  -     Chronic Hepatitis Panel  -     CBC and differential  -     Sedimentation rate, automated  -     C-reactive protein    Shortness of breath  -     ECG 12 lead; Future  -     Echo complete w/ contrast if indicated;  Future    Severe obesity (BMI 35 0-35 9 with comorbidity) (HCC)    Current chronic use of systemic steroids  -     Quantiferon TB Gold Plus  -     Chronic Hepatitis Panel  -     CBC and differential    Current chronic use of systemic steroids - Benefits and potential side-effects of prednisone including, but not limited to infection, diabetes, hypertension, muscle weakness, osteoporosis, peptic ulcer disease and cataracts were discussed with the patient  Follow-up plan: RTC in 2 months        Rheumatic Disease Summary  11/3/21 initial visit: Talbot Kocher is a 77 y o   female who presents for 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  Her abdominal pain symptoms resolved with prednisone 60mg po daily, which was decreased after a couple of weeks as her inflammatory markers improved to 40mg po daily  Has some abdominal discomfort on the 40mg daily or prednisone  She should continue this dose until she has a repeat Abdominal CT scan on 11/11th to see if there's resolutionon of the aortitis, after which, a treatment plan can be made  Repeat ESR/CRP ordered, with ESR slightly uptrending       Will call with plan after CT and next lab results  Continue 2,000 units of Vit  D daily to help with bone health while on high-does prednisone  Continue Protonix 40mg po daily to help with GI prophylaxis while on high-dose prednisone  Schedule DEXA scan for osteoporosis screening given her high-dose prednisone use, chronic tobacco use, and her age    HPI  Talbot Kocher is a 77 y o   female who presents for follow up  Her last clinic visit was 11/3/21 which was her initial visit  Patient complains of short episode of sharp abdominal pain in LUQ  Her repeat CT abdomen showed resolution of aortitis  Patient denies chest pain, sob, palpitation, numbness or pain in extremities, visual changes, fever, chills, myalgias, arthralgia    The following portions of the patient's history were reviewed and updated as appropriate: allergies, current medications, past family history, past medical history, past social history, past surgical history and problem list     Review of Systems:   Review of Systems Constitutional: Positive for appetite change, chills and fatigue  Negative for activity change, diaphoresis and fever  HENT: Positive for rhinorrhea and sinus pain  Negative for congestion, hearing loss, mouth sores and trouble swallowing  Dry mouth   Eyes: Positive for pain  Negative for redness  Dry eyes   Respiratory: Negative for cough, chest tightness, shortness of breath and wheezing  Cardiovascular: Positive for chest pain and leg swelling  Negative for palpitations  Gastrointestinal: Positive for abdominal pain, constipation and diarrhea  Negative for blood in stool and nausea  Endocrine: Positive for cold intolerance and heat intolerance  Genitourinary: Negative for dysuria, flank pain and hematuria  Musculoskeletal: Positive for back pain and joint swelling  Negative for arthralgias  Skin: Negative for pallor and rash  Allergic/Immunologic: Negative  Neurological: Positive for headaches  Negative for dizziness and numbness  Hematological: Negative  Psychiatric/Behavioral: The patient is not nervous/anxious          Home Medications:    Current Outpatient Medications:     acetaminophen (TYLENOL 8 HOUR ARTHRITIS PAIN) 650 mg CR tablet, Take by mouth every 8 (eight) hours as needed  , Disp: , Rfl:     albuterol (PROVENTIL HFA,VENTOLIN HFA) 90 mcg/act inhaler, INHALE 2 PUFFS BY MOUTH EVERY 6 HOURS AS NEEDED FOR WHEEZE, Disp: 18 g, Rfl: 1    ALPRAZolam (XANAX) 0 25 mg tablet, Take 1 tablet (0 25 mg total) by mouth 3 (three) times a day as needed for anxiety, Disp: 90 tablet, Rfl: 0    amLODIPine (NORVASC) 2 5 mg tablet, Take 1 tablet (2 5 mg total) by mouth daily, Disp: 30 tablet, Rfl: 5    bisacodyl (DULCOLAX) 5 mg EC tablet, Take 1 tablet (5 mg total) by mouth daily at bedtime With colonoscopy prep per instructions (Patient taking differently: Take 5 mg by mouth daily as needed With colonoscopy prep per instructions ), Disp: 30 tablet, Rfl: 0    cetirizine (ZyrTEC) 10 mg tablet, Take 1 tablet (10 mg total) by mouth daily, Disp: 90 tablet, Rfl: 0    ergocalciferol (VITAMIN D2) 50,000 units, TAKE 1 CAPSULE BY MOUTH ONE TIME PER WEEK, Disp: 4 capsule, Rfl: 0    escitalopram (LEXAPRO) 10 mg tablet, TAKE 1 TABLET BY MOUTH EVERY DAY, Disp: 90 tablet, Rfl: 1    fluticasone (FLONASE) 50 mcg/act nasal spray, SPRAY 2 SPRAYS INTO EACH NOSTRIL EVERY DAY, Disp: 16 mL, Rfl: 3    lisinopril (ZESTRIL) 20 mg tablet, TAKE 1 TABLET BY MOUTH EVERY DAY, Disp: 90 tablet, Rfl: 1    Lumigan 0 01 % ophthalmic drops, Administer 1 drop to both eyes daily at bedtime  , Disp: , Rfl:     MINOXIDIL, TOPICAL, 5 % SOLN, Apply 1 application topically, Disp: , Rfl:     montelukast (SINGULAIR) 10 mg tablet, TAKE 1 TABLET BY MOUTH EVERYDAY AT BEDTIME, Disp: 90 tablet, Rfl: 1    nystatin (MYCOSTATIN) 500,000 units/5 mL suspension, SWISH AND SPIT WITH 5 ML 4 TIMES A DAY, Disp: 200 mL, Rfl: 3    pantoprazole (PROTONIX) 40 mg tablet, TAKE 1 TABLET BY MOUTH EVERY DAY, Disp: 90 tablet, Rfl: 1    predniSONE 5 mg tablet, Take 6 tablets (30 mg total) by mouth daily for 30 days, THEN 5 tablets (25 mg total) daily for 30 days, THEN 4 tablets (20 mg total) daily for 30 days, THEN 3 tablets (15 mg total) daily for 30 days, THEN 2 tablets (10 mg total) daily for 30 days, THEN 1 tablet (5 mg total) daily  , Disp: 630 tablet, Rfl: 0    promethazine-dextromethorphan (PHENERGAN-DM) 6 25-15 mg/5 mL oral syrup, TAKE 5 ML BY MOUTH 4 TIMES A DAY AS NEEDED FOR COUGH, Disp: 150 mL, Rfl: 0    Travoprost (TRAVATAN OP), Apply to eye 1 drop both eye before bed, Disp: , Rfl:     Diclofenac Sodium (VOLTAREN) 1 %, APPLY 2 GRAMS TO AFFECTED AREA 4 TIMES A DAY, Disp: 240 g, Rfl: 1    fluticasone-umeclidinium-vilanterol (Trelegy Ellipta) 200-62 5-25 MCG/INH AEPB inhaler, Inhale 1 puff daily Rinse mouth after use , Disp: 60 blister, Rfl: 5    hydrOXYzine HCL (ATARAX) 25 mg tablet, TAKE 1-2 TABLETS BY MOUTH AT BEDTIME AS NEEDED FOR INSOMNIA, Disp: 60 tablet, Rfl: 1    Probiotic Product (PROBIOTIC-10) CAPS, Take by mouth 2 (two) times a day, Disp: , Rfl:     Objective:    Vitals:    02/02/22 1056   BP: 134/80   Height: 5' 4" (1 626 m)       Physical Exam  Vitals reviewed  Constitutional:       Appearance: Normal appearance  HENT:      Head: Normocephalic and atraumatic  Nose: Nose normal       Mouth/Throat:      Mouth: Mucous membranes are moist    Eyes:      General: No scleral icterus  Extraocular Movements: Extraocular movements intact  Conjunctiva/sclera: Conjunctivae normal    Cardiovascular:      Rate and Rhythm: Normal rate and regular rhythm  Pulses: Normal pulses  Heart sounds: Normal heart sounds  No murmur heard  No gallop  Pulmonary:      Effort: Pulmonary effort is normal  No respiratory distress  Breath sounds: Normal breath sounds  No wheezing or rales  Abdominal:      General: There is no distension  Palpations: Abdomen is soft  Tenderness: There is abdominal tenderness  There is no guarding  Musculoskeletal:         General: No swelling or tenderness  Skin:     General: Skin is warm  Coloration: Skin is not jaundiced  Findings: No erythema or rash  Neurological:      General: No focal deficit present  Mental Status: She is alert and oriented to person, place, and time  Cranial Nerves: No cranial nerve deficit  Motor: No weakness  Psychiatric:         Mood and Affect: Mood normal          Behavior: Behavior normal        Reviewed labs and imaging  Imaging:   CT lung screening program 1/28/2022  Impression: 1  Tiny stable nodules and scarring  No new or suspicious nodule  Lung-RADS2, benign appearance or behavior  Continue annual screening with LDCT in 12 months  2   Hepatic steatosis  The study was marked in [facility] for follow-up       XR chest pa & lateral 10/31/2021  Impression: No acute cardiopulmonary disease       CT Abdomen/Pelvis with contrast 09/27/2021  IMPRESSION:  Minimal inflammatory changes surrounding the distal infrarenal abdominal aorta best appreciated on images 601/92 and 2/39 concerning for early aortitis  Hepatomegaly with steatosis and hepatic cyst   Colonic diverticulosis without diverticulitis    Labs:   Office Visit on 02/02/2022   Component Date Value Ref Range Status    Ventricular Rate 02/02/2022 67  BPM Final    Atrial Rate 02/02/2022 67  BPM Final    NE Interval 02/02/2022 144  ms Final    QRSD Interval 02/02/2022 80  ms Final    QT Interval 02/02/2022 398  ms Final    QTC Interval 02/02/2022 420  ms Final    P Axis 02/02/2022 38  degrees Final    QRS Axis 02/02/2022 47  degrees Final    T Wave Axis 02/02/2022 46  degrees Final   Office Visit on 02/02/2022   Component Date Value Ref Range Status    QFT Nil 02/02/2022 0 02  0 - 8 0 IU/ml Final    QFT TB1-NIL 02/02/2022 0 00  IU/ml Final    QFT TB2-NIL 02/02/2022 0 00  IU/ml Final    QFT Mitogen-NIL 02/02/2022 0 48  IU/ml Final    QFT Final Interpretation 02/02/2022 Indeterminate* Negative Final    Indeterminate results due to low Interferon-gamma in the mitogen minus nil result (<0 50 IU/ml) may occur due to low lymphocyte count, reduced lymphocyte activity, or inablility of the patient's lymphocytes to generate interferon-gamma  Indeterminate results due to a high level of Interferon-gamma in the Nil tube (>8 00 IU/ml) may occur due to a heterophile antibody or nonspecific Interferon-gamma in the patient's blood sample  Indeterminate results may occur due to incorrect collection, transport or handling of the blood specimen  Based on the given collection time and lab receipt time, this specimen meets acceptable criteria for testing  Repeat testing on a new specimen is suggested      Hepatitis B Surface Ag 02/02/2022 Non-reactive  Non-reactive, NonReactive - Confirmed Final    Hepatitis C Ab 02/02/2022 Non-reactive  Non-reactive Final    Hep B C IgM 02/02/2022 Non-reactive  Non-reactive Final    Hep B Core Total Ab 02/02/2022 Non-reactive  Non-reactive Final    WBC 02/02/2022 14 51* 4 31 - 10 16 Thousand/uL Final    RBC 02/02/2022 5 35* 3 81 - 5 12 Million/uL Final    Hemoglobin 02/02/2022 14 9  11 5 - 15 4 g/dL Final    Hematocrit 02/02/2022 46 7* 34 8 - 46 1 % Final    MCV 02/02/2022 87  82 - 98 fL Final    MCH 02/02/2022 27 9  26 8 - 34 3 pg Final    MCHC 02/02/2022 31 9  31 4 - 37 4 g/dL Final    RDW 02/02/2022 14 4  11 6 - 15 1 % Final    MPV 02/02/2022 9 2  8 9 - 12 7 fL Final    Platelets 93/48/7908 291  149 - 390 Thousands/uL Final    nRBC 02/02/2022 0  /100 WBCs Final    Neutrophils Relative 02/02/2022 84* 43 - 75 % Final    Immat GRANS % 02/02/2022 2  0 - 2 % Final    Lymphocytes Relative 02/02/2022 9* 14 - 44 % Final    Monocytes Relative 02/02/2022 4  4 - 12 % Final    Eosinophils Relative 02/02/2022 1  0 - 6 % Final    Basophils Relative 02/02/2022 0  0 - 1 % Final    Neutrophils Absolute 02/02/2022 12 21* 1 85 - 7 62 Thousands/µL Final    Immature Grans Absolute 02/02/2022 0 23* 0 00 - 0 20 Thousand/uL Final    Lymphocytes Absolute 02/02/2022 1 33  0 60 - 4 47 Thousands/µL Final    Monocytes Absolute 02/02/2022 0 56  0 17 - 1 22 Thousand/µL Final    Eosinophils Absolute 02/02/2022 0 12  0 00 - 0 61 Thousand/µL Final    Basophils Absolute 02/02/2022 0 06  0 00 - 0 10 Thousands/µL Final    Sed Rate 02/02/2022 37* 0 - 29 mm/hour Final    CRP 02/02/2022 <3 0  <3 0 mg/L Final   Orders Only on 11/17/2021   Component Date Value Ref Range Status    Sed Rate 01/19/2022 56* 0 - 29 mm/hour Final    CRP 01/19/2022 6 0* <3 0 mg/L Final   Appointment on 11/08/2021   Component Date Value Ref Range Status    Sodium 11/08/2021 132* 136 - 145 mmol/L Final    Potassium 11/08/2021 4 3  3 5 - 5 3 mmol/L Final    Chloride 11/08/2021 100  100 - 108 mmol/L Final    CO2 11/08/2021 26  21 - 32 mmol/L Final    ANION GAP 11/08/2021 6  4 - 13 mmol/L Final    BUN 11/08/2021 9  5 - 25 mg/dL Final    Creatinine 11/08/2021 0 71  0 60 - 1 30 mg/dL Final    Standardized to IDMS reference method    Glucose, Fasting 11/08/2021 133* 65 - 99 mg/dL Final    Specimen collection should occur prior to Sulfasalazine administration due to the potential for falsely depressed results  Specimen collection should occur prior to Sulfapyridine administration due to the potential for falsely elevated results   Calcium 11/08/2021 9 8  8 3 - 10 1 mg/dL Final    AST 11/08/2021 14  5 - 45 U/L Final    Specimen collection should occur prior to Sulfasalazine administration due to the potential for falsely depressed results   ALT 11/08/2021 31  12 - 78 U/L Final    Specimen collection should occur prior to Sulfasalazine and/or Sulfapyridine administration due to the potential for falsely depressed results   Alkaline Phosphatase 11/08/2021 79  46 - 116 U/L Final    Total Protein 11/08/2021 7 7  6 4 - 8 2 g/dL Final    Albumin 11/08/2021 3 5  3 5 - 5 0 g/dL Final    Total Bilirubin 11/08/2021 0 43  0 20 - 1 00 mg/dL Final    Use of this assay is not recommended for patients undergoing treatment with eltrombopag due to the potential for falsely elevated results      eGFR 11/08/2021 90  ml/min/1 73sq m Final   Office Visit on 11/03/2021   Component Date Value Ref Range Status    Sed Rate 11/08/2021 45* 0 - 29 mm/hour Final    CRP 11/08/2021 <3 0  <3 0 mg/L Final   Appointment on 10/28/2021   Component Date Value Ref Range Status    Sodium 01/19/2022 132* 136 - 145 mmol/L Final    Potassium 01/19/2022 3 9  3 5 - 5 3 mmol/L Final    Chloride 01/19/2022 99* 100 - 108 mmol/L Final    CO2 01/19/2022 28  21 - 32 mmol/L Final    ANION GAP 01/19/2022 5  4 - 13 mmol/L Final    BUN 01/19/2022 9  5 - 25 mg/dL Final    Creatinine 01/19/2022 0 65  0 60 - 1 30 mg/dL Final    Standardized to IDMS reference method    Glucose 01/19/2022 133  65 - 140 mg/dL Final    If the patient is fasting, the ADA then defines impaired fasting glucose as > 100 mg/dL and diabetes as > or equal to 123 mg/dL  Specimen collection should occur prior to Sulfasalazine administration due to the potential for falsely depressed results  Specimen collection should occur prior to Sulfapyridine administration due to the potential for falsely elevated results   Calcium 01/19/2022 10 1  8 3 - 10 1 mg/dL Final    AST 01/19/2022 13  5 - 45 U/L Final    Specimen collection should occur prior to Sulfasalazine administration due to the potential for falsely depressed results   ALT 01/19/2022 22  12 - 78 U/L Final    Specimen collection should occur prior to Sulfasalazine and/or Sulfapyridine administration due to the potential for falsely depressed results   Alkaline Phosphatase 01/19/2022 73  46 - 116 U/L Final    Total Protein 01/19/2022 8 0  6 4 - 8 2 g/dL Final    Albumin 01/19/2022 3 9  3 5 - 5 0 g/dL Final    Total Bilirubin 01/19/2022 0 93  0 20 - 1 00 mg/dL Final    Use of this assay is not recommended for patients undergoing treatment with eltrombopag due to the potential for falsely elevated results      eGFR 01/19/2022 92  ml/min/1 73sq m Final    WBC 10/28/2021 11 47* 4 31 - 10 16 Thousand/uL Final    RBC 10/28/2021 5 06  3 81 - 5 12 Million/uL Final    Hemoglobin 10/28/2021 14 4  11 5 - 15 4 g/dL Final    Hematocrit 10/28/2021 45 3  34 8 - 46 1 % Final    MCV 10/28/2021 90  82 - 98 fL Final    MCH 10/28/2021 28 5  26 8 - 34 3 pg Final    MCHC 10/28/2021 31 8  31 4 - 37 4 g/dL Final    RDW 10/28/2021 14 7  11 6 - 15 1 % Final    MPV 10/28/2021 9 4  8 9 - 12 7 fL Final    Platelets 70/70/7624 290  149 - 390 Thousands/uL Final    nRBC 10/28/2021 0  /100 WBCs Final    Neutrophils Relative 10/28/2021 84* 43 - 75 % Final    Immat GRANS % 10/28/2021 1  0 - 2 % Final    Lymphocytes Relative 10/28/2021 10* 14 - 44 % Final    Monocytes Relative 10/28/2021 5  4 - 12 % Final    Eosinophils Relative 10/28/2021 0  0 - 6 % Final    Basophils Relative 10/28/2021 0  0 - 1 % Final    Neutrophils Absolute 10/28/2021 9 61* 1 85 - 7 62 Thousands/µL Final    Immature Grans Absolute 10/28/2021 0 10  0 00 - 0 20 Thousand/uL Final    Lymphocytes Absolute 10/28/2021 1 11  0 60 - 4 47 Thousands/µL Final    Monocytes Absolute 10/28/2021 0 59  0 17 - 1 22 Thousand/µL Final    Eosinophils Absolute 10/28/2021 0 03  0 00 - 0 61 Thousand/µL Final    Basophils Absolute 10/28/2021 0 03  0 00 - 0 10 Thousands/µL Final    NT-proBNP 10/28/2021 128* <125 pg/mL Final   Office Visit on 09/30/2021   Component Date Value Ref Range Status    C-ANCA 10/13/2021 <1:20  Neg:<1:20 titer Final    Atypical pANCA 10/13/2021 <1:20  Neg:<1:20 titer Final    The atypical pANCA pattern has been observed in a significant  percentage of patients with ulcerative colitis, primary sclerosing  cholangitis and autoimmune hepatitis   MPO AB 10/13/2021 <9 0  0 0 - 9 0 U/mL Final    WA-3 AB 10/13/2021 <3 5  0 0 - 3 5 U/mL Final    P-ANCA 10/13/2021 <1:20  Neg:<1:20 titer Final    The presence of positive fluorescence exhibiting P-ANCA or C-ANCA  patterns alone is not specific for the diagnosis of Wegener's  Granulomatosis (WG) or microscopic polyangiitis  Decisions about  treatment should not be based solely on ANCA IFA results  The  International ANCA Group Consensus recommends follow up testing of  positive sera with both WA-3 and MPO-ANCA enzyme immunoassays  As  many as 5% serum samples are positive only by EIA  Ref  AM J Clin Pathol 1999;111:507-513      Sed Rate 10/13/2021 33* 0 - 29 mm/hour Final    CRP 10/13/2021 <3 0  <3 0 mg/L Final    Vit D, 25-Hydroxy 09/29/2021 42 3  30 0 - 100 0 ng/mL Final   Appointment on 09/29/2021   Component Date Value Ref Range Status    Rheumatoid Factor 09/29/2021 Positive* Negative Final    See RF Quantitation    CRP 09/29/2021 22 9* <3 0 mg/L Final    RPR 09/29/2021 Non-Reactive  Non-Reactive Final    Lyme total antibody 09/29/2021 10  0 00 - 119 Final    Negative (0-119) Absence of detectable Borrelia burgdoferi Antibodies  A negative result does not exclude the possibility of Borrelia infection  If early Lyme disease is suspected,a second sample should be collected & tested 4 weeks after initial testing   ASPEN 09/29/2021 Negative  Negative Final    Cyclic Citrullinated Peptide Ab  09/29/2021 3 7  See comment Final    Sed Rate 09/29/2021 74* 0 - 29 mm/hour Final    QFT Nil 09/29/2021 0 05  0 - 8 0 IU/ml Final    QFT TB1-NIL 09/29/2021 -0 02  IU/ml Final    QFT TB2-NIL 09/29/2021 -0 02  IU/ml Final    QFT Mitogen-NIL 09/29/2021 1 58  IU/ml Final    QFT Final Interpretation 09/29/2021 Negative  Negative Final    No Interferon-gamma response to M  tuberculosis antigens detected  Infection with M  tuberculosis is unlikely  A single negative result does not exclude infection with M  tuberculosis  In patients at high risk for M  tuberculosis infection, a second test should be considered in accordance with the 2017 ATS/IDSA/CDC Clinical Practice Guidelines for Diagnosis of Tuberculosis in Adults and Children  False negative results can be a result of incorrect blood sample collection or handling of the specimen affecting lymphocyte function   IgG 1 09/29/2021 321  248 - 810 mg/dL Final    IgG 2 09/29/2021 268  130 - 555 mg/dL Final    IgG 3 09/29/2021 41  15 - 102 mg/dL Final    IgG 4 09/29/2021 3  2 - 96 mg/dL Final    IgG 09/29/2021 716  586 - 1602 mg/dL Final    Blood Culture 09/29/2021 No Growth After 5 Days  Final    Blood Culture 09/29/2021 No Growth After 5 Days     Final    RF Quantitation 09/29/2021 10 IU/mL* (none) Final   Appointment on 09/27/2021   Component Date Value Ref Range Status    WBC 09/27/2021 11 72* 4 31 - 10 16 Thousand/uL Final    RBC 09/27/2021 4 88  3 81 - 5 12 Million/uL Final    Hemoglobin 09/27/2021 13 5  11 5 - 15 4 g/dL Final  Hematocrit 09/27/2021 41 8  34 8 - 46 1 % Final    MCV 09/27/2021 86  82 - 98 fL Final    MCH 09/27/2021 27 7  26 8 - 34 3 pg Final    MCHC 09/27/2021 32 3  31 4 - 37 4 g/dL Final    RDW 09/27/2021 13 2  11 6 - 15 1 % Final    MPV 09/27/2021 8 8* 8 9 - 12 7 fL Final    Platelets 46/95/0647 306  149 - 390 Thousands/uL Final    nRBC 09/27/2021 0  /100 WBCs Final    Neutrophils Relative 09/27/2021 73  43 - 75 % Final    Immat GRANS % 09/27/2021 1  0 - 2 % Final    Lymphocytes Relative 09/27/2021 15  14 - 44 % Final    Monocytes Relative 09/27/2021 9  4 - 12 % Final    Eosinophils Relative 09/27/2021 1  0 - 6 % Final    Basophils Relative 09/27/2021 1  0 - 1 % Final    Neutrophils Absolute 09/27/2021 8 75* 1 85 - 7 62 Thousands/µL Final    Immature Grans Absolute 09/27/2021 0 08  0 00 - 0 20 Thousand/uL Final    Lymphocytes Absolute 09/27/2021 1 74  0 60 - 4 47 Thousands/µL Final    Monocytes Absolute 09/27/2021 1 02  0 17 - 1 22 Thousand/µL Final    Eosinophils Absolute 09/27/2021 0 06  0 00 - 0 61 Thousand/µL Final    Basophils Absolute 09/27/2021 0 07  0 00 - 0 10 Thousands/µL Final    Sodium 09/27/2021 135* 136 - 145 mmol/L Final    Potassium 09/27/2021 4 4  3 5 - 5 3 mmol/L Final    Chloride 09/27/2021 97* 100 - 108 mmol/L Final    CO2 09/27/2021 28  21 - 32 mmol/L Final    ANION GAP 09/27/2021 10  4 - 13 mmol/L Final    BUN 09/27/2021 7  5 - 25 mg/dL Final    Creatinine 09/27/2021 0 66  0 60 - 1 30 mg/dL Final    Standardized to IDMS reference method    Glucose 09/27/2021 94  65 - 140 mg/dL Final    If the patient is fasting, the ADA then defines impaired fasting glucose as > 100 mg/dL and diabetes as > or equal to 123 mg/dL  Specimen collection should occur prior to Sulfasalazine administration due to the potential for falsely depressed results  Specimen collection should occur prior to Sulfapyridine administration due to the potential for falsely elevated results      Calcium 09/27/2021 9 6  8 3 - 10 1 mg/dL Final    Corrected Calcium 09/27/2021 10 2* 8 3 - 10 1 mg/dL Final    AST 09/27/2021 17  5 - 45 U/L Final    Specimen collection should occur prior to Sulfasalazine administration due to the potential for falsely depressed results   ALT 09/27/2021 23  12 - 78 U/L Final    Specimen collection should occur prior to Sulfasalazine administration due to the potential for falsely depressed results   Alkaline Phosphatase 09/27/2021 79  46 - 116 U/L Final    Total Protein 09/27/2021 7 9  6 4 - 8 2 g/dL Final    Albumin 09/27/2021 3 3* 3 5 - 5 0 g/dL Final    Total Bilirubin 09/27/2021 0 29  0 20 - 1 00 mg/dL Final    Use of this assay is not recommended for patients undergoing treatment with eltrombopag due to the potential for falsely elevated results   eGFR 09/27/2021 93  ml/min/1 73sq m Final    Lipase 09/27/2021 165  73 - 393 u/L Final   Orders Only on 09/07/2021   Component Date Value Ref Range Status    SARS-CoV-2 09/07/2021 Positive* Negative Final   There may be more visits with results that are not included

## 2022-02-02 ENCOUNTER — OFFICE VISIT (OUTPATIENT)
Dept: LAB | Facility: CLINIC | Age: 67
End: 2022-02-02
Payer: COMMERCIAL

## 2022-02-02 ENCOUNTER — APPOINTMENT (OUTPATIENT)
Dept: LAB | Facility: CLINIC | Age: 67
End: 2022-02-02
Payer: COMMERCIAL

## 2022-02-02 ENCOUNTER — OFFICE VISIT (OUTPATIENT)
Dept: RHEUMATOLOGY | Facility: CLINIC | Age: 67
End: 2022-02-02
Payer: COMMERCIAL

## 2022-02-02 VITALS — BODY MASS INDEX: 34.64 KG/M2 | DIASTOLIC BLOOD PRESSURE: 80 MMHG | HEIGHT: 64 IN | SYSTOLIC BLOOD PRESSURE: 134 MMHG

## 2022-02-02 DIAGNOSIS — I77.6 AORTITIS (HCC): ICD-10-CM

## 2022-02-02 DIAGNOSIS — R06.02 SHORTNESS OF BREATH: ICD-10-CM

## 2022-02-02 DIAGNOSIS — I77.6 AORTITIS (HCC): Primary | ICD-10-CM

## 2022-02-02 DIAGNOSIS — Z79.52 CURRENT CHRONIC USE OF SYSTEMIC STEROIDS: ICD-10-CM

## 2022-02-02 DIAGNOSIS — E66.01 SEVERE OBESITY (BMI 35.0-35.9 WITH COMORBIDITY) (HCC): ICD-10-CM

## 2022-02-02 LAB
ATRIAL RATE: 67 BPM
BASOPHILS # BLD AUTO: 0.06 THOUSANDS/ΜL (ref 0–0.1)
BASOPHILS NFR BLD AUTO: 0 % (ref 0–1)
CRP SERPL QL: <3 MG/L
EOSINOPHIL # BLD AUTO: 0.12 THOUSAND/ΜL (ref 0–0.61)
EOSINOPHIL NFR BLD AUTO: 1 % (ref 0–6)
ERYTHROCYTE [DISTWIDTH] IN BLOOD BY AUTOMATED COUNT: 14.4 % (ref 11.6–15.1)
ERYTHROCYTE [SEDIMENTATION RATE] IN BLOOD: 37 MM/HOUR (ref 0–29)
HBV CORE AB SER QL: NORMAL
HBV CORE IGM SER QL: NORMAL
HBV SURFACE AG SER QL: NORMAL
HCT VFR BLD AUTO: 46.7 % (ref 34.8–46.1)
HCV AB SER QL: NORMAL
HGB BLD-MCNC: 14.9 G/DL (ref 11.5–15.4)
IMM GRANULOCYTES # BLD AUTO: 0.23 THOUSAND/UL (ref 0–0.2)
IMM GRANULOCYTES NFR BLD AUTO: 2 % (ref 0–2)
LYMPHOCYTES # BLD AUTO: 1.33 THOUSANDS/ΜL (ref 0.6–4.47)
LYMPHOCYTES NFR BLD AUTO: 9 % (ref 14–44)
MCH RBC QN AUTO: 27.9 PG (ref 26.8–34.3)
MCHC RBC AUTO-ENTMCNC: 31.9 G/DL (ref 31.4–37.4)
MCV RBC AUTO: 87 FL (ref 82–98)
MONOCYTES # BLD AUTO: 0.56 THOUSAND/ΜL (ref 0.17–1.22)
MONOCYTES NFR BLD AUTO: 4 % (ref 4–12)
NEUTROPHILS # BLD AUTO: 12.21 THOUSANDS/ΜL (ref 1.85–7.62)
NEUTS SEG NFR BLD AUTO: 84 % (ref 43–75)
NRBC BLD AUTO-RTO: 0 /100 WBCS
P AXIS: 38 DEGREES
PLATELET # BLD AUTO: 291 THOUSANDS/UL (ref 149–390)
PMV BLD AUTO: 9.2 FL (ref 8.9–12.7)
PR INTERVAL: 144 MS
QRS AXIS: 47 DEGREES
QRSD INTERVAL: 80 MS
QT INTERVAL: 398 MS
QTC INTERVAL: 420 MS
RBC # BLD AUTO: 5.35 MILLION/UL (ref 3.81–5.12)
T WAVE AXIS: 46 DEGREES
VENTRICULAR RATE: 67 BPM
WBC # BLD AUTO: 14.51 THOUSAND/UL (ref 4.31–10.16)

## 2022-02-02 PROCEDURE — 36415 COLL VENOUS BLD VENIPUNCTURE: CPT | Performed by: INTERNAL MEDICINE

## 2022-02-02 PROCEDURE — 85025 COMPLETE CBC W/AUTO DIFF WBC: CPT | Performed by: INTERNAL MEDICINE

## 2022-02-02 PROCEDURE — 86140 C-REACTIVE PROTEIN: CPT | Performed by: INTERNAL MEDICINE

## 2022-02-02 PROCEDURE — 87340 HEPATITIS B SURFACE AG IA: CPT | Performed by: INTERNAL MEDICINE

## 2022-02-02 PROCEDURE — 93005 ELECTROCARDIOGRAM TRACING: CPT

## 2022-02-02 PROCEDURE — 85652 RBC SED RATE AUTOMATED: CPT | Performed by: INTERNAL MEDICINE

## 2022-02-02 PROCEDURE — 86704 HEP B CORE ANTIBODY TOTAL: CPT | Performed by: INTERNAL MEDICINE

## 2022-02-02 PROCEDURE — 86480 TB TEST CELL IMMUN MEASURE: CPT | Performed by: INTERNAL MEDICINE

## 2022-02-02 PROCEDURE — 99214 OFFICE O/P EST MOD 30 MIN: CPT | Performed by: INTERNAL MEDICINE

## 2022-02-02 PROCEDURE — 86705 HEP B CORE ANTIBODY IGM: CPT | Performed by: INTERNAL MEDICINE

## 2022-02-02 PROCEDURE — 86803 HEPATITIS C AB TEST: CPT | Performed by: INTERNAL MEDICINE

## 2022-02-02 PROCEDURE — 93010 ELECTROCARDIOGRAM REPORT: CPT | Performed by: INTERNAL MEDICINE

## 2022-02-02 NOTE — PATIENT INSTRUCTIONS
Continue prednisone 30mg daily for now  Do labs  Do EKG  Schedule echocardiogram  Will consider starting Actemra (tocilizumab) if your inflammatory markers don't improve    Return to clinic in 2 months    Tocilizumab (By injection)   Tocilizumab (toe-si-MELODY-oo-mab)  Treats rheumatoid arthritis, giant cell arteritis, systemic sclerosis-associated interstitial lung disease, polyarticular juvenile idiopathic arthritis, systemic juvenile idiopathic arthritis, and cytokine release syndrome  Brand Name(s): Actemra, Actemra ACTPen   There may be other brand names for this medicine  When This Medicine Should Not Be Used: This medicine is not right for everyone  Do not use it if you had an allergic reaction to tocilizumab  How to Use This Medicine:   Injectable  · Your doctor will prescribe your dose and schedule  This medicine is given through a needle placed into a vein or as a shot under your skin  · If tocilizumab is injected into a vein, it must be given slowly, so the needle will have to stay in place for at least 1 hour  · A nurse or other health provider will give you this medicine  · You may be taught how to give your medicine at home  Make sure you understand all instructions before giving yourself an injection  Do not use more medicine or use it more often than your doctor tells you to  · Allow the prefilled syringe to warm to room temperature for 30 minutes or the autoinjector for 45 minutes before using it  Do not warm the medicine in any other way  · Check the liquid in the prefilled syringe or autoinjector  It should be clear and colorless or slightly yellow  Do not use this medicine if it is cloudy, discolored, or if you see particles in it  · You will be shown the body areas where this shot can be given  Use a different body area each time you give yourself a shot  Keep track of where you give each shot to make sure you rotate body areas   Do not inject into moles or scars, or skin areas that are red, bruised, tender, hard, or not intact  · Use all of the medicine in each prefilled syringe or autoinjector  Use each prefilled syringe and autoinjector only one time  Do not save an open syringe or autoinjector  · Use a new needle and syringe each time you inject your medicine  · This medicine should come with a Medication Guide  Ask your pharmacist for a copy if you do not have one  · Missed dose: Take a dose as soon as you remember  If it is almost time for your next dose, wait until then and take a regular dose  Do not take extra medicine to make up for a missed dose  · If you store this medicine at home, keep it in the refrigerator  Do not freeze  Protect the medicine from direct light  Keep it in its original package until you are ready to use it  · Throw away used needles in a hard, closed container that the needles cannot poke through  Keep this container away from children and pets  Drugs and Foods to Avoid:   Ask your doctor or pharmacist before using any other medicine, including over-the-counter medicines, vitamins, and herbal products  · Some medicines can affect how tocilizumab works  Tell your doctor if you are using any of the following:  ? Abatacept, adalimumab, anakinra, certolizumab, cyclosporine, dextromethorphan, etanercept, golimumab, infliximab, methotrexate, omeprazole, rituximab, theophylline  ? Birth control pills  ? Blood thinner (including warfarin)  ? Medicine that weakens the immune system (including a cancer or steroid medicine)  ? Medicine to lower cholesterol (including atorvastatin, lovastatin, simvastatin)  ? NSAID pain or arthritis medicine (including aspirin, diclofenac, ibuprofen, naproxen)  · This medicine may interfere with vaccines  Ask your doctor before you get a flu shot or any other vaccines    Warnings While Using This Medicine:   · Tell your doctor if you are pregnant or breastfeeding, or if you have liver disease, diabetes, high cholesterol, multiple sclerosis, stomach or bowel disease (including diverticulitis, ulcers), or a weak immune system (including HIV, cancer)  Tell your doctor if you have any type of infection (including hepatitis B or tuberculosis) or an infection that keeps coming back  · This medicine may cause the following problems:  ? Increased risk for serious infections  ? Stomach or bowel problems  ? Liver problems  ? Increased risk of cancer  ? Serious skin reactions  ? High cholesterol in the blood  · You will need to have a skin test for tuberculosis (TB) before you start using this medicine  Tell your doctor if you or anyone in your home has ever had a positive TB skin test or has been exposed to TB  · This medicine may make you bleed, bruise, or get infections more easily  Take precautions to prevent illness and injury  Wash your hands often  · Your doctor will do lab tests at regular visits to check on the effects of this medicine  Keep all appointments  · Keep all medicine out of the reach of children  Never share your medicine with anyone    Possible Side Effects While Using This Medicine:   Call your doctor right away if you notice any of these side effects:  · Allergic reaction: Itching or hives, swelling in your face or hands, swelling or tingling in your mouth or throat, chest tightness, trouble breathing  · Blistering, peeling, red skin rash  · Bloody, black, or tarry stools, severe stomach pain, diarrhea  · Change in how much or how often you urinate  · Chest pain, trouble breathing  · Dark urine or pale stools, nausea, vomiting, loss of appetite, stomach pain, yellow skin or eyes  · Fast, slow, or pounding heartbeat  · Fever, chills, cough, stuffy or runny nose, sore throat, body aches  · Unusual bleeding, bruising, weakness  · Vomiting of material that looks like coffee grounds  If you notice these less serious side effects, talk with your doctor:   · Headache  · Pain, itching, burning, swelling, bleeding, or a lump under your skin where the needle was placed or the shot was given  If you notice other side effects that you think are caused by this medicine, tell your doctor  Call your doctor for medical advice about side effects  You may report side effects to FDA at 4-907-FDA-7252    © Copyright [employer] 2021 Information is for End User's use only and may not be sold, redistributed or otherwise used for commercial purposes  The above information is an  only  It is not intended as medical advice for individual conditions or treatments  Talk to your doctor, nurse or pharmacist before following any medical regimen to see if it is safe and effective for you

## 2022-02-04 LAB
GAMMA INTERFERON BACKGROUND BLD IA-ACNC: 0.02 IU/ML
M TB IFN-G BLD-IMP: ABNORMAL
M TB IFN-G CD4+ BCKGRND COR BLD-ACNC: 0 IU/ML
M TB IFN-G CD4+ BCKGRND COR BLD-ACNC: 0 IU/ML
MITOGEN IGNF BCKGRD COR BLD-ACNC: 0.48 IU/ML

## 2022-02-10 ENCOUNTER — TELEPHONE (OUTPATIENT)
Dept: FAMILY MEDICINE CLINIC | Facility: CLINIC | Age: 67
End: 2022-02-10

## 2022-02-10 NOTE — TELEPHONE ENCOUNTER
I don't see Dr Marci Nelson (optometry) in [place]  I also cannot back date referrals in [facility]   Please let pt know

## 2022-02-10 NOTE — TELEPHONE ENCOUNTER
A  from Dr Mat Riedel called asking if you could put in a referral for pt  She went and saw this doctor yesterday for an eye exam and her insurance needs a referral each time she goes   is asking if you could please date it for yesterday

## 2022-02-23 ENCOUNTER — TELEPHONE (OUTPATIENT)
Dept: FAMILY MEDICINE CLINIC | Facility: CLINIC | Age: 67
End: 2022-02-23

## 2022-02-23 NOTE — TELEPHONE ENCOUNTER
Patient Is requesting an appointment with you this week   She is still having a lot head and eye pain and she saw the eye dr again today and its not her eye    Also her stated her legs fingers and face are swelling a lot     Please advise

## 2022-02-24 ENCOUNTER — OFFICE VISIT (OUTPATIENT)
Dept: FAMILY MEDICINE CLINIC | Facility: CLINIC | Age: 67
End: 2022-02-24
Payer: COMMERCIAL

## 2022-02-24 VITALS
OXYGEN SATURATION: 97 % | TEMPERATURE: 98.6 F | DIASTOLIC BLOOD PRESSURE: 78 MMHG | BODY MASS INDEX: 35.92 KG/M2 | SYSTOLIC BLOOD PRESSURE: 122 MMHG | HEIGHT: 64 IN | HEART RATE: 85 BPM | WEIGHT: 210.4 LBS

## 2022-02-24 DIAGNOSIS — J32.1 CHRONIC FRONTAL SINUSITIS: Primary | ICD-10-CM

## 2022-02-24 DIAGNOSIS — I77.6 AORTITIS (HCC): ICD-10-CM

## 2022-02-24 DIAGNOSIS — H57.13 PAIN OF BOTH EYES: ICD-10-CM

## 2022-02-24 DIAGNOSIS — J42 CHRONIC BRONCHITIS, UNSPECIFIED CHRONIC BRONCHITIS TYPE (HCC): ICD-10-CM

## 2022-02-24 PROCEDURE — 99214 OFFICE O/P EST MOD 30 MIN: CPT | Performed by: FAMILY MEDICINE

## 2022-02-24 PROCEDURE — 4004F PT TOBACCO SCREEN RCVD TLK: CPT | Performed by: FAMILY MEDICINE

## 2022-02-24 PROCEDURE — 1160F RVW MEDS BY RX/DR IN RCRD: CPT | Performed by: FAMILY MEDICINE

## 2022-02-24 PROCEDURE — 3078F DIAST BP <80 MM HG: CPT | Performed by: FAMILY MEDICINE

## 2022-02-24 PROCEDURE — 3074F SYST BP LT 130 MM HG: CPT | Performed by: FAMILY MEDICINE

## 2022-02-24 PROCEDURE — 3008F BODY MASS INDEX DOCD: CPT | Performed by: FAMILY MEDICINE

## 2022-02-24 RX ORDER — UMECLIDINIUM BROMIDE AND VILANTEROL TRIFENATATE 62.5; 25 UG/1; UG/1
1 POWDER RESPIRATORY (INHALATION) DAILY
Qty: 30 BLISTER | Refills: 5 | Status: SHIPPED | OUTPATIENT
Start: 2022-02-24 | End: 2022-03-09 | Stop reason: ALTCHOICE

## 2022-02-24 RX ORDER — CEFDINIR 300 MG/1
300 CAPSULE ORAL EVERY 12 HOURS SCHEDULED
Qty: 20 CAPSULE | Refills: 0 | Status: SHIPPED | OUTPATIENT
Start: 2022-02-24 | End: 2022-03-01 | Stop reason: ALTCHOICE

## 2022-02-24 RX ORDER — GLIMEPIRIDE 2 MG/1
TABLET ORAL
COMMUNITY
Start: 2022-02-22

## 2022-02-24 NOTE — PROGRESS NOTES
Assessment/Plan:    Chronic obstructive pulmonary disease (HCC)  Breathing stable  Continue present meds  Refilled Anoro  F/u with Pulm    Aortitis ([address] )  abd pain improved with higher dose of steroids  Now on a lower wean  Pt with increased weight gain and some Cushingoid changes  Continue to f/u with Rheum  Continue to monitor  Recheck 3m    Pain of both eyes  I reviewed with pt  Optometry eval was unremarkable  ?discomfort related to frontal sinus pain? Will start pt on Cefdinir bid as directed  Recheck Monday if not improved  Consider imaging if not improving       Diagnoses and all orders for this visit:    Chronic frontal sinusitis  -     cefdinir (OMNICEF) 300 mg capsule; Take 1 capsule (300 mg total) by mouth every 12 (twelve) hours for 10 days    Chronic bronchitis, unspecified chronic bronchitis type (HCC)  -     umeclidinium-vilanterol (Anoro Ellipta) 62 5-25 MCG/INH inhaler; Inhale 1 puff daily    Aortitis (HCC)    Pain of both eyes    Other orders  -     timolol (TIMOPTIC) 0 25 % ophthalmic solution  -     Glycerin-Hypromellose- (DRY EYE RELIEF DROPS OP); Apply to eye          Subjective:      Patient ID: Fanta Moore is a 77 y o  female  Here for several concerns  - pt with increasing L eye pain/pessure over the last month  Worse with coughing  Pressures have improved with eye drops  She also notes sl light sensitivity  Has some congestion with intermittent ear congestion and pulsatile tinnitus  Occ nasal discharge  Recently seen by optometry who states that eye pressures are better  - abd pain improved (but not resolved) with increased pred  Just decreased from 30mg to 25mg qd  No worsening pain with decreasing dose  - feels uncomfortable with weight gain since being on high dose pred  No worsening joint pain but feels more fatigued and "a little weaker"  - no CP, palp, or worsening lightheadedness  - no worsening resp symptoms  Needs a refill of Anoro         The following portions of the patient's history were reviewed and updated as appropriate:   She  has a past medical history of Acid reflux, Fibromyalgia, Hypertension, and Seasonal allergies  She   Patient Active Problem List    Diagnosis Date Noted    Pain of both eyes 02/25/2022    Candida vaginitis 01/19/2022    Primary insomnia 11/24/2021    Aortitis ([facility] ) 10/31/2021    Increased intraocular pressure 10/31/2021    Chest pain 02/08/2021    Dermatitis 09/14/2020    Prediabetes 04/10/2020    Chronic idiopathic constipation 12/16/2019    Gastroparesis 12/16/2019    Severe obesity (BMI 35 0-35 9 with comorbidity) ([facility] ) 11/21/2019    Lower extremity edema 11/21/2019    Snoring 11/21/2019    Tobacco dependence 10/02/2019    Neck pain 07/11/2019    Neuropathic pain 01/09/2019    Early satiety 08/28/2018    Abdominal distension 08/28/2018    Family history of pancreatic cancer 08/28/2018    Epigastric pain 08/28/2018    Dyspnea on exertion 05/04/2018    Edema 07/13/2017    Lumbosacral radiculopathy at L5 01/06/2017    Compression fracture of thoracic vertebra, non-traumatic (HCC) 08/30/2016    Hyperlipidemia 12/08/2015    Cataract 08/22/2014    Palpitations 08/05/2014    Chronic obstructive pulmonary disease ([facility] ) 04/17/2013    Esophageal reflux 04/17/2013    Pulmonary nodule seen on imaging study 04/17/2013    Hypertension 03/22/2013    Recurrent major depressive disorder, in partial remission ([place] [id number] ) 03/14/2013    Fibromyalgia 03/14/2013    Degeneration of intervertebral disc 03/14/2013    Anxiety 02/21/2013    Seasonal allergic rhinitis 12/20/2012     She  has a past surgical history that includes Knee surgery (1998); pr colonoscopy flx dx w/collj spec when pfrmd (N/A, 5/9/2017); and Eye surgery  She  reports that she has been smoking cigarettes  She has a 100 00 pack-year smoking history  She has never used smokeless tobacco  She reports that she does not drink alcohol and does not use drugs    Current Outpatient Medications   Medication Sig Dispense Refill    acetaminophen (TYLENOL 8 HOUR ARTHRITIS PAIN) 650 mg CR tablet Take by mouth every 8 (eight) hours as needed        albuterol (PROVENTIL HFA,VENTOLIN HFA) 90 mcg/act inhaler INHALE 2 PUFFS BY MOUTH EVERY 6 HOURS AS NEEDED FOR WHEEZE 18 g 1    ALPRAZolam (XANAX) 0 25 mg tablet Take 1 tablet (0 25 mg total) by mouth 3 (three) times a day as needed for anxiety 90 tablet 0    amLODIPine (NORVASC) 2 5 mg tablet Take 1 tablet (2 5 mg total) by mouth daily 30 tablet 5    cetirizine (ZyrTEC) 10 mg tablet Take 1 tablet (10 mg total) by mouth daily 90 tablet 0    escitalopram (LEXAPRO) 10 mg tablet TAKE 1 TABLET BY MOUTH EVERY DAY 90 tablet 1    fluticasone (FLONASE) 50 mcg/act nasal spray SPRAY 2 SPRAYS INTO EACH NOSTRIL EVERY DAY 16 mL 3    Glycerin-Hypromellose- (DRY EYE RELIEF DROPS OP) Apply to eye      lisinopril (ZESTRIL) 20 mg tablet TAKE 1 TABLET BY MOUTH EVERY DAY 90 tablet 1    Lumigan 0 01 % ophthalmic drops Administer 1 drop to both eyes daily at bedtime        MINOXIDIL, TOPICAL, 5 % SOLN Apply 1 application topically      montelukast (SINGULAIR) 10 mg tablet TAKE 1 TABLET BY MOUTH EVERYDAY AT BEDTIME 90 tablet 1    nystatin (MYCOSTATIN) 500,000 units/5 mL suspension SWISH AND SPIT WITH 5 ML 4 TIMES A  mL 3    pantoprazole (PROTONIX) 40 mg tablet TAKE 1 TABLET BY MOUTH EVERY DAY 90 tablet 1    predniSONE 5 mg tablet Take 6 tablets (30 mg total) by mouth daily for 30 days, THEN 5 tablets (25 mg total) daily for 30 days, THEN 4 tablets (20 mg total) daily for 30 days, THEN 3 tablets (15 mg total) daily for 30 days, THEN 2 tablets (10 mg total) daily for 30 days, THEN 1 tablet (5 mg total) daily   630 tablet 0    promethazine-dextromethorphan (PHENERGAN-DM) 6 25-15 mg/5 mL oral syrup TAKE 5 ML BY MOUTH 4 TIMES A DAY AS NEEDED FOR COUGH 150 mL 0    timolol (TIMOPTIC) 0 25 % ophthalmic solution       cefdinir (OMNICEF) 300 mg capsule Take 1 capsule (300 mg total) by mouth every 12 (twelve) hours for 10 days 20 capsule 0    hydrOXYzine HCL (ATARAX) 25 mg tablet TAKE 1-2 TABLETS BY MOUTH AT BEDTIME AS NEEDED FOR INSOMNIA (Patient not taking: Reported on 2/24/2022 ) 60 tablet 1    Travoprost (TRAVATAN OP) Apply to eye 1 drop both eye before bed (Patient not taking: Reported on 2/24/2022 )      umeclidinium-vilanterol (Anoro Ellipta) 62 5-25 MCG/INH inhaler Inhale 1 puff daily 30 blister 5     No current facility-administered medications for this visit  She is allergic to augmentin [amoxicillin-pot clavulanate], miconazole, and wixela inhub [fluticasone-salmeterol]       Review of Systems   Constitutional: Positive for fatigue  Negative for activity change and appetite change  HENT: Positive for congestion, sinus pressure, sinus pain and tinnitus  Negative for ear discharge, ear pain, hearing loss and voice change  Eyes: Negative  Respiratory: Negative  Cardiovascular: Negative  Gastrointestinal: Positive for abdominal pain (improved but not resolved)  Negative for abdominal distention  Genitourinary: Negative  Musculoskeletal: Positive for arthralgias (unchanged) and myalgias  Negative for joint swelling  Skin: Negative  Neurological: Positive for headaches  Negative for dizziness, weakness, light-headedness and numbness  Psychiatric/Behavioral: Negative  Objective:      /78   Pulse 85   Temp 98 6 °F (37 °C)   Ht 5' 4" (1 626 m)   Wt 95 4 kg (210 lb 6 4 oz)   LMP  (LMP Unknown)   SpO2 97%   BMI 36 12 kg/m²          Physical Exam  Vitals reviewed  HENT:      Head: Normocephalic and atraumatic  Right Ear: Tympanic membrane, ear canal and external ear normal       Left Ear: Tympanic membrane and external ear normal       Nose: Congestion present        Comments: Frontal sinuses TTP      Mouth/Throat:      Mouth: Mucous membranes are moist    Eyes:      Extraocular Movements: Extraocular movements intact  Conjunctiva/sclera: Conjunctivae normal       Pupils: Pupils are equal, round, and reactive to light  Comments: Fundi without papiledema   Cardiovascular:      Rate and Rhythm: Normal rate and regular rhythm  Pulses: Normal pulses  Pulmonary:      Effort: Pulmonary effort is normal       Breath sounds: No wheezing or rales  Abdominal:      General: Abdomen is flat  There is no distension  Palpations: There is no mass  Tenderness: There is no abdominal tenderness  Musculoskeletal:      Cervical back: Normal range of motion  No tenderness  Right lower leg: Edema (trace) present  Left lower leg: Edema (trace) present  Lymphadenopathy:      Cervical: No cervical adenopathy  Skin:     General: Skin is warm  Capillary Refill: Capillary refill takes less than 2 seconds  Neurological:      General: No focal deficit present  Mental Status: She is alert and oriented to person, place, and time  Cranial Nerves: No cranial nerve deficit  Sensory: No sensory deficit  Motor: No weakness        Gait: Gait normal

## 2022-02-24 NOTE — PATIENT INSTRUCTIONS
Gluten-Free Diet   WHAT YOU NEED TO KNOW:   A gluten-free diet is a meal plan that does not contain any gluten  Gluten is a protein found in wheat, rye, and barley  Gluten is found in many breads, pastas, cereals, cakes, pies, and cookies  Some vitamin supplements and medicines may also contain gluten  You need to follow this diet for the rest of your life if you have celiac disease, dermatitis herpetiformis, or non-celiac gluten sensitivity  DISCHARGE INSTRUCTIONS:   Contact your healthcare provider or dietitian if:   · You must take a medicine or vitamin that contains gluten  · Signs and symptoms of your condition get worse  · You are losing weight, without trying  · You have questions or concerns about your condition or care  Follow up with your healthcare provider or dietitian as directed:  Write down your questions so you remember to ask them during your visits  Foods to avoid:  Do not eat foods that contain the following ingredients:  · Barley, barley malt, barley extract, rye, bulgar, semolina, and triticale    · Wheat, wheat bran, wheat germ, and wheat starch     · Wheat varieties such as kamut and spelt    · Bran, couscous, durum, farina, and orzo    · Matzo flour, matzo meal, doug flour     · Oats that are not labeled as gluten-free, unless your dietitian has allowed you to eat a small amount    · Malt extract and malt flavoring    · Einkorn, emmer, faro, panko, seitan, and udon    Foods you can have:  Choose foods labeled as gluten-free  You may be able to eat gluten-free oats, or you may be able to eat regular oats in small amounts  Ask your dietitian if oats are safe for you to eat   You may eat foods that are made with the following types of grains and starches:   · Corn, potato starch, and potato flour    · Soy, tapioca, and teff    · Rice, rice bran, quinoa, sago, and sorghum    · Amaranth, arrowroot, and buckwheat    · Flours made from nuts, beans, and seeds    · Flax, millet, and bonifacio    Examples of gluten-free foods:   · Fresh fruits and vegetables    · Eggs, meat, fish, and poultry    · Dried beans, lentils, and peas    · Beverages such as 100% fruit juice, coffee, tea, cocoa, and soft drinks    · Fats and oils, such as butter, margarine, and vegetable, canola, and olive oils    · Regular, unflavored milk, cottage cheese, most yogurts, and aged cheese such as cheddar cheese    · Cream, sour cream, cream cheese, most ice creams, and sherbets    · Cream of rice, grits, puffed rice, brown rice, and white rice    · Corn tacos and tortillas    Sample menu for 1 day:   · Breakfast:  Soft boiled eggs, gluten-free toast with butter, and milk    · Morning snack:  Gluten-free rice cakes or crackers    · Lunch:  Tuna salad with tomato and lettuce, and an apple     · Afternoon snack:  Carrot sticks    · Dinner:  Broiled chicken, baked potato, green beans, and sorbet with strawberries    Ways to make a gluten-free diet part of your lifestyle:   · Follow up with your healthcare provider or dietitian as directed  It may take time to learn how to properly follow a gluten-free diet  Your dietitian can help you find ways to fit this diet into your lifestyle  · Learn to read food labels  Gluten is found in many foods and drinks  It may not be clear which foods contain gluten  Include a variety of allowed foods so that you can get all the nutrients you need  · Prepare for restaurant meals  Carry a list of items allowed on this diet with you when you are away from home  Go to the restaurant's website or call ahead to find out if the restaurant has gluten-free meals  Tell the  that you cannot eat wheat, rye, or barley  Ask how food is prepared  · Prepare gluten-free foods separately from other foods  Cross-contamination is when foods that contain gluten get mixed in with gluten-free foods   Prevent cross contamination by cleaning counter tops and cutting boards well to remove any crumbs that contain gluten  Wash cooking utensils, colanders, and pans well before you cook gluten-free foods  Buy a separate toaster to use for gluten-free breads  Buy separate jam, jelly, mayonnaise, or peanut butter to use on gluten-free breads to avoid cross contamination  These foods are also available in squeeze containers  · Include naturally gluten-free foods that are high in iron, folate, and vitamin B12  Foods high in iron and vitamin B12 include meat, fish, poultry (chicken and fish), liver, and eggs  Foods high in folate include broccoli, asparagus, orange juice, legumes, peanuts, walnuts, and sunflower seeds  · Ask your healthcare provider if you need a vitamin and mineral supplement  Celiac disease and dermatitis herpetiformis can cause damage to your intestines  This damage can decrease the absorption of certain vitamins and minerals  Also, many gluten-free cereals, pastas, and breads are not fortified with vitamin B and iron  © Copyright [employer] 2021 Information is for End User's use only and may not be sold, redistributed or otherwise used for commercial purposes  All illustrations and images included in CareNotes® are the copyrighted property of [employer]  or [facility] Irene Marroquin   The above information is an  only  It is not intended as medical advice for individual conditions or treatments  Talk to your doctor, nurse or pharmacist before following any medical regimen to see if it is safe and effective for you

## 2022-02-25 PROBLEM — H57.13 PAIN OF BOTH EYES: Status: ACTIVE | Noted: 2022-02-25

## 2022-02-25 NOTE — ASSESSMENT & PLAN NOTE
I reviewed with pt  Optometry eval was unremarkable  ?discomfort related to frontal sinus pain? Will start pt on Cefdinir bid as directed  Recheck Monday if not improved   Consider imaging if not improving

## 2022-02-25 NOTE — ASSESSMENT & PLAN NOTE
abd pain improved with higher dose of steroids  Now on a lower wean  Pt with increased weight gain and some Cushingoid changes  Continue to f/u with Rheum  Continue to monitor   Recheck 3m

## 2022-02-28 DIAGNOSIS — J42 CHRONIC BRONCHITIS, UNSPECIFIED CHRONIC BRONCHITIS TYPE (HCC): ICD-10-CM

## 2022-02-28 RX ORDER — DEXTROMETHORPHAN HYDROBROMIDE AND PROMETHAZINE HYDROCHLORIDE 15; 6.25 MG/5ML; MG/5ML
SYRUP ORAL
Qty: 150 ML | Refills: 0 | Status: SHIPPED | OUTPATIENT
Start: 2022-02-28 | End: 2022-05-03

## 2022-03-01 ENCOUNTER — TELEMEDICINE (OUTPATIENT)
Dept: FAMILY MEDICINE CLINIC | Facility: CLINIC | Age: 67
End: 2022-03-01
Payer: COMMERCIAL

## 2022-03-01 ENCOUNTER — APPOINTMENT (OUTPATIENT)
Dept: LAB | Facility: MEDICAL CENTER | Age: 67
End: 2022-03-01
Payer: COMMERCIAL

## 2022-03-01 DIAGNOSIS — R30.0 DYSURIA: ICD-10-CM

## 2022-03-01 DIAGNOSIS — R10.13 EPIGASTRIC PAIN: ICD-10-CM

## 2022-03-01 DIAGNOSIS — I77.6 AORTITIS (HCC): ICD-10-CM

## 2022-03-01 DIAGNOSIS — Z20.822 ENCOUNTER BY TELEHEALTH FOR SUSPECTED COVID-19: ICD-10-CM

## 2022-03-01 DIAGNOSIS — R50.9 FEBRILE ILLNESS: Primary | ICD-10-CM

## 2022-03-01 DIAGNOSIS — J44.1 COPD EXACERBATION (HCC): ICD-10-CM

## 2022-03-01 LAB
ALBUMIN SERPL BCP-MCNC: 3.4 G/DL (ref 3.5–5)
ALP SERPL-CCNC: 64 U/L (ref 46–116)
ALT SERPL W P-5'-P-CCNC: 22 U/L (ref 12–78)
ANION GAP SERPL CALCULATED.3IONS-SCNC: 8 MMOL/L (ref 4–13)
AST SERPL W P-5'-P-CCNC: 15 U/L (ref 5–45)
BASOPHILS # BLD AUTO: 0.03 THOUSANDS/ΜL (ref 0–0.1)
BASOPHILS NFR BLD AUTO: 0 % (ref 0–1)
BILIRUB SERPL-MCNC: 0.49 MG/DL (ref 0.2–1)
BILIRUB UR QL STRIP: NEGATIVE
BUN SERPL-MCNC: 9 MG/DL (ref 5–25)
CALCIUM ALBUM COR SERPL-MCNC: 10.3 MG/DL (ref 8.3–10.1)
CALCIUM SERPL-MCNC: 9.8 MG/DL (ref 8.3–10.1)
CHLORIDE SERPL-SCNC: 101 MMOL/L (ref 100–108)
CLARITY UR: CLEAR
CO2 SERPL-SCNC: 24 MMOL/L (ref 21–32)
COLOR UR: COLORLESS
CREAT SERPL-MCNC: 0.73 MG/DL (ref 0.6–1.3)
CRP SERPL QL: 102 MG/L
EOSINOPHIL # BLD AUTO: 0 THOUSAND/ΜL (ref 0–0.61)
EOSINOPHIL NFR BLD AUTO: 0 % (ref 0–6)
ERYTHROCYTE [DISTWIDTH] IN BLOOD BY AUTOMATED COUNT: 14.9 % (ref 11.6–15.1)
ERYTHROCYTE [SEDIMENTATION RATE] IN BLOOD: 55 MM/HOUR (ref 0–29)
GFR SERPL CREATININE-BSD FRML MDRD: 86 ML/MIN/1.73SQ M
GLUCOSE SERPL-MCNC: 164 MG/DL (ref 65–140)
GLUCOSE UR STRIP-MCNC: NEGATIVE MG/DL
HCT VFR BLD AUTO: 42 % (ref 34.8–46.1)
HGB BLD-MCNC: 13.9 G/DL (ref 11.5–15.4)
HGB UR QL STRIP.AUTO: NEGATIVE
IMM GRANULOCYTES # BLD AUTO: 0.13 THOUSAND/UL (ref 0–0.2)
IMM GRANULOCYTES NFR BLD AUTO: 1 % (ref 0–2)
KETONES UR STRIP-MCNC: NEGATIVE MG/DL
LEUKOCYTE ESTERASE UR QL STRIP: NEGATIVE
LIPASE SERPL-CCNC: 137 U/L (ref 73–393)
LYMPHOCYTES # BLD AUTO: 1.23 THOUSANDS/ΜL (ref 0.6–4.47)
LYMPHOCYTES NFR BLD AUTO: 7 % (ref 14–44)
MCH RBC QN AUTO: 27.9 PG (ref 26.8–34.3)
MCHC RBC AUTO-ENTMCNC: 33.1 G/DL (ref 31.4–37.4)
MCV RBC AUTO: 84 FL (ref 82–98)
MONOCYTES # BLD AUTO: 1.14 THOUSAND/ΜL (ref 0.17–1.22)
MONOCYTES NFR BLD AUTO: 6 % (ref 4–12)
NEUTROPHILS # BLD AUTO: 15.25 THOUSANDS/ΜL (ref 1.85–7.62)
NEUTS SEG NFR BLD AUTO: 86 % (ref 43–75)
NITRITE UR QL STRIP: NEGATIVE
NRBC BLD AUTO-RTO: 0 /100 WBCS
PH UR STRIP.AUTO: 6 [PH]
PLATELET # BLD AUTO: 222 THOUSANDS/UL (ref 149–390)
PMV BLD AUTO: 10.2 FL (ref 8.9–12.7)
POTASSIUM SERPL-SCNC: 3.8 MMOL/L (ref 3.5–5.3)
PROT SERPL-MCNC: 7.5 G/DL (ref 6.4–8.2)
PROT UR STRIP-MCNC: NEGATIVE MG/DL
RBC # BLD AUTO: 4.99 MILLION/UL (ref 3.81–5.12)
SARS-COV-2 AG UPPER RESP QL IA: NEGATIVE
SODIUM SERPL-SCNC: 133 MMOL/L (ref 136–145)
SP GR UR STRIP.AUTO: 1.01 (ref 1–1.03)
UROBILINOGEN UR STRIP-ACNC: <2 MG/DL
VALID CONTROL: NORMAL
WBC # BLD AUTO: 17.78 THOUSAND/UL (ref 4.31–10.16)

## 2022-03-01 PROCEDURE — 80053 COMPREHEN METABOLIC PANEL: CPT

## 2022-03-01 PROCEDURE — 85652 RBC SED RATE AUTOMATED: CPT

## 2022-03-01 PROCEDURE — 99442 PR PHYS/QHP TELEPHONE EVALUATION 11-20 MIN: CPT | Performed by: FAMILY MEDICINE

## 2022-03-01 PROCEDURE — 83690 ASSAY OF LIPASE: CPT

## 2022-03-01 PROCEDURE — 85025 COMPLETE CBC W/AUTO DIFF WBC: CPT

## 2022-03-01 PROCEDURE — 87811 SARS-COV-2 COVID19 W/OPTIC: CPT | Performed by: FAMILY MEDICINE

## 2022-03-01 PROCEDURE — 81003 URINALYSIS AUTO W/O SCOPE: CPT

## 2022-03-01 PROCEDURE — 36415 COLL VENOUS BLD VENIPUNCTURE: CPT

## 2022-03-01 PROCEDURE — 86140 C-REACTIVE PROTEIN: CPT

## 2022-03-01 RX ORDER — AZITHROMYCIN 250 MG/1
TABLET, FILM COATED ORAL
Qty: 6 TABLET | Refills: 0 | Status: SHIPPED | OUTPATIENT
Start: 2022-03-01 | End: 2022-03-05

## 2022-03-01 NOTE — PROGRESS NOTES
COVID-19 Outpatient Progress Note    Assessment/Plan:    Problem List Items Addressed This Visit     None      Visit Diagnoses     Febrile illness    -  Primary    Relevant Medications    azithromycin (ZITHROMAX) 250 mg tablet    Other Relevant Orders    POCT Rapid Covid Ag    COPD exacerbation ([address] )        Relevant Medications    azithromycin (ZITHROMAX) 250 mg tablet    Encounter by telehealth for suspected COVID-19        Relevant Orders    POCT Rapid Covid Ag        Disposition:     I recommended the patient to come to our office to perform rapid antigen testing for COVID-19  Recommended patient to come to the office to test for COVID-19  Day 1  Of her COVID like symptoms  Still with fever this morning  I will have patient come to the office for a rapid COVID test   I will also change cefdinir to Zithromax  Labs and urine test ordered  Consider antibody testing if she is COVID positive  Recheck based on test results  I have spent 12 minutes directly with the patient  Greater than 50% of this time was spent in counseling/coordination of care regarding: prognosis, instructions for management and impressions  Addendum:  Rapid COVID test was negative  Patient will go for blood work now and start Anna  Recheck Friday if not improved-earlier if worse        Encounter provider Esme Zapata MD    Provider located at [address] [place] [address] [address] [facility]    Recent Visits  Date Type Provider Dept   02/24/22 Office Visit Esme Zapata MD [address] [place] [address]   02/23/22 Telephone MD Jeremy May recent visits within past 7 days and meeting all other requirements  Today's Visits  Date Type Provider Dept   03/01/22 Telemedicine MD Jeremy May today's visits and meeting all other requirements  Future Appointments  No visits were found meeting these conditions  Showing future appointments within next 150 days and meeting all other requirements     This virtual check-in was done via [employer] and patient was informed that this is a secure, HIPAA-compliant platform  She agrees to proceed  Patient agrees to participate in a virtual check in via telephone or video visit instead of presenting to the office to address urgent/immediate medical needs  Patient is aware this is a billable service  After connecting through [facility], the patient was identified by name and date of birth  Lindsey Cunningham was informed that this was a telemedicine visit and that the exam was being conducted confidentially over secure lines  My office door was closed  No one else was in the room  Lindsey Cunningham acknowledged consent and understanding of privacy and security of the telemedicine visit  I informed the patient that I have reviewed her record in Epic and presented the opportunity for her to ask any questions regarding the visit today  The patient agreed to participate  Verification of patient location:  Patient is located in the following state in which I hold an active license: [place]    Subjective: Lindsey Cunningham is a 77 y o  female who is concerned about COVID-19  Patient's symptoms include fever, chills, fatigue, nasal congestion, rhinorrhea, anosmia, loss of taste, cough, shortness of breath, myalgias and headache  Patient denies malaise, sore throat, abdominal pain, nausea, vomiting and diarrhea       - Date of symptom onset: 2/28/2022      COVID-19 vaccination status: Not vaccinated    Exposure:   Contact with a person who is under investigation (PUI) for or who is positive for COVID-19 within the last 14 days?: No    Hospitalized recently for fever and/or lower respiratory symptoms?: No      Currently a healthcare worker that is involved in direct patient care?: No      Works in a special setting where the risk of COVID-19 transmission may be high? (this may include long-term care, correctional and long-term facilities; homeless shelters; assisted-living facilities and group homes ): No      Resident in a special setting where the risk of COVID-19 transmission may be high? (this may include long-term care, correctional and long-term facilities; homeless shelters; assisted-living facilities and group homes ): No      It was my intent to perform this visit via video technology but the patient was not able to do a video connection so the visit was completed via audio telephone only  55-year-old female with a previous history of COVID (delta variant) back in September complicated by post COVID aortitis (presently treated with prednisone) has been on cefdinir for over 4 days due to possible sinusitis, when she developed fever chills and cough last night  She notes some increased shortness of breath as well as wheezing, lost of taste and somewhat loss of smell, as well as severe fatigue  Patient states that she feels worse than she did when she had COVID back in September  She does not believe that she has been in contact with anybody with COVID recently  Lab Results   Component Value Date    SARSCOV2 Positive (A) 09/07/2021     Past Medical History:   Diagnosis Date    Acid reflux     Fibromyalgia     Hypertension     Seasonal allergies      Past Surgical History:   Procedure Laterality Date    EYE SURGERY      KNEE SURGERY  1998    NM COLONOSCOPY FLX DX W/COLLJ SPEC WHEN PFRMD N/A 5/9/2017    Procedure: EGD AND COLONOSCOPY;  Surgeon: Jaclyn Chaves MD;  [facility];   Service: Gastroenterology     Current Outpatient Medications   Medication Sig Dispense Refill    acetaminophen (TYLENOL 8 HOUR ARTHRITIS PAIN) 650 mg CR tablet Take by mouth every 8 (eight) hours as needed        albuterol (PROVENTIL HFA,VENTOLIN HFA) 90 mcg/act inhaler INHALE 2 PUFFS BY MOUTH EVERY 6 HOURS AS NEEDED FOR WHEEZE 18 g 1    ALPRAZolam (XANAX) 0 25 mg tablet Take 1 tablet (0 25 mg total) by mouth 3 (three) times a day as needed for anxiety 90 tablet 0    amLODIPine (NORVASC) 2 5 mg tablet Take 1 tablet (2 5 mg total) by mouth daily 30 tablet 5    azithromycin (ZITHROMAX) 250 mg tablet 2 tab today then 1 tab po qd x 4 d 6 tablet 0    cetirizine (ZyrTEC) 10 mg tablet Take 1 tablet (10 mg total) by mouth daily 90 tablet 0    escitalopram (LEXAPRO) 10 mg tablet TAKE 1 TABLET BY MOUTH EVERY DAY 90 tablet 1    fluticasone (FLONASE) 50 mcg/act nasal spray SPRAY 2 SPRAYS INTO EACH NOSTRIL EVERY DAY 16 mL 3    Glycerin-Hypromellose- (DRY EYE RELIEF DROPS OP) Apply to eye      hydrOXYzine HCL (ATARAX) 25 mg tablet TAKE 1-2 TABLETS BY MOUTH AT BEDTIME AS NEEDED FOR INSOMNIA (Patient not taking: Reported on 2/24/2022 ) 60 tablet 1    lisinopril (ZESTRIL) 20 mg tablet TAKE 1 TABLET BY MOUTH EVERY DAY 90 tablet 1    Lumigan 0 01 % ophthalmic drops Administer 1 drop to both eyes daily at bedtime        MINOXIDIL, TOPICAL, 5 % SOLN Apply 1 application topically      montelukast (SINGULAIR) 10 mg tablet TAKE 1 TABLET BY MOUTH EVERYDAY AT BEDTIME 90 tablet 1    nystatin (MYCOSTATIN) 500,000 units/5 mL suspension SWISH AND SPIT WITH 5 ML 4 TIMES A  mL 3    pantoprazole (PROTONIX) 40 mg tablet TAKE 1 TABLET BY MOUTH EVERY DAY 90 tablet 1    predniSONE 5 mg tablet Take 6 tablets (30 mg total) by mouth daily for 30 days, THEN 5 tablets (25 mg total) daily for 30 days, THEN 4 tablets (20 mg total) daily for 30 days, THEN 3 tablets (15 mg total) daily for 30 days, THEN 2 tablets (10 mg total) daily for 30 days, THEN 1 tablet (5 mg total) daily   630 tablet 0    promethazine-dextromethorphan (PHENERGAN-DM) 6 25-15 mg/5 mL oral syrup TAKE 5 MLS BY MOUTH 4 TIMES A DAY AS NEEDED FOR COUGH 150 mL 0    timolol (TIMOPTIC) 0 25 % ophthalmic solution       Travoprost (TRAVATAN OP) Apply to eye 1 drop both eye before bed (Patient not taking: Reported on 2/24/2022 )      umeclidinium-vilanterol (Anoro Ellipta) 62 5-25 MCG/INH inhaler Inhale 1 puff daily 30 blister 5     No current facility-administered medications for this visit  Allergies   Allergen Reactions    Augmentin [Amoxicillin-Pot Clavulanate] Vomiting    Miconazole Itching and Swelling    Wixela Inhub [Fluticasone-Salmeterol] Palpitations       Review of Systems   Constitutional: Positive for chills, fatigue and fever  HENT: Positive for congestion and rhinorrhea  Negative for sore throat  Respiratory: Positive for cough and shortness of breath  Gastrointestinal: Negative for abdominal pain, diarrhea, nausea and vomiting  Musculoskeletal: Positive for myalgias  Neurological: Positive for headaches  Objective: There were no vitals filed for this visit  Physical Exam  Constitutional:       Comments: It was my intent to perform this visit via video technology but the patient was not able to do a video connection so the visit was completed via audio telephone only  Unable to assessed visually due to tremor issues with patient  However, patient is able speak in full sentences without respiratory distress  Pulmonary:      Effort: Pulmonary effort is normal  No respiratory distress  VIRTUAL VISIT DISCLAIMER    Allegra Ruggiero verbally agrees to participate in [employer]  Pt is aware that [employer] could be limited without vital signs or the ability to perform a full hands-on physical Marissa Cisneros understands she or the provider may request at any time to terminate the video visit and request the patient to seek care or treatment in person

## 2022-03-01 NOTE — PROGRESS NOTES
Virtual Regular Visit    Verification of patient location:    Patient is located in the following state in which I hold an active license [place]      Assessment/Plan:    Problem List Items Addressed This Visit     None               Reason for visit is No chief complaint on file  Encounter provider Connor Singh MD    Provider located at [address] [place] [address] [address]      Recent Visits  Date Type Provider Dept   02/24/22 Office Visit Connor Singh MD [address] [place] [address]   02/23/22 Telephone Connor Singh MD Luna Lon recent visits within past 7 days and meeting all other requirements  Future Appointments  No visits were found meeting these conditions  Showing future appointments within next 150 days and meeting all other requirements       The patient was identified by name and date of birth  Beatriz Aguila was informed that this is a telemedicine visit and that the visit is being conducted through {AMB VIRTUAL VISIT EEQJKF:26507}  {Telemedicine confidentiality :21788} {Telemedicine participants:52852}  She acknowledged consent and understanding of privacy and security of the video platform  The patient has agreed to participate and understands they can discontinue the visit at any time  Patient is aware this is a billable service  Subjective  Beatriz Aguila is a 77 y o  female present for fever  HPI     Past Medical History:   Diagnosis Date    Acid reflux     Fibromyalgia     Hypertension     Seasonal allergies        Past Surgical History:   Procedure Laterality Date    EYE SURGERY      KNEE SURGERY  1998    CA COLONOSCOPY FLX DX W/COLLJ SPEC WHEN PFRMD N/A 5/9/2017    Procedure: EGD AND COLONOSCOPY;  Surgeon: Sadiq Lozano MD;  [facility];   Service: Gastroenterology       Current Outpatient Medications   Medication Sig Dispense Refill    acetaminophen (TYLENOL 8 HOUR ARTHRITIS PAIN) 650 mg CR tablet Take by mouth every 8 (eight) hours as needed        albuterol (PROVENTIL HFA,VENTOLIN HFA) 90 mcg/act inhaler INHALE 2 PUFFS BY MOUTH EVERY 6 HOURS AS NEEDED FOR WHEEZE 18 g 1    ALPRAZolam (XANAX) 0 25 mg tablet Take 1 tablet (0 25 mg total) by mouth 3 (three) times a day as needed for anxiety 90 tablet 0    amLODIPine (NORVASC) 2 5 mg tablet Take 1 tablet (2 5 mg total) by mouth daily 30 tablet 5    cefdinir (OMNICEF) 300 mg capsule Take 1 capsule (300 mg total) by mouth every 12 (twelve) hours for 10 days 20 capsule 0    cetirizine (ZyrTEC) 10 mg tablet Take 1 tablet (10 mg total) by mouth daily 90 tablet 0    escitalopram (LEXAPRO) 10 mg tablet TAKE 1 TABLET BY MOUTH EVERY DAY 90 tablet 1    fluticasone (FLONASE) 50 mcg/act nasal spray SPRAY 2 SPRAYS INTO EACH NOSTRIL EVERY DAY 16 mL 3    Glycerin-Hypromellose- (DRY EYE RELIEF DROPS OP) Apply to eye      hydrOXYzine HCL (ATARAX) 25 mg tablet TAKE 1-2 TABLETS BY MOUTH AT BEDTIME AS NEEDED FOR INSOMNIA (Patient not taking: Reported on 2/24/2022 ) 60 tablet 1    lisinopril (ZESTRIL) 20 mg tablet TAKE 1 TABLET BY MOUTH EVERY DAY 90 tablet 1    Lumigan 0 01 % ophthalmic drops Administer 1 drop to both eyes daily at bedtime        MINOXIDIL, TOPICAL, 5 % SOLN Apply 1 application topically      montelukast (SINGULAIR) 10 mg tablet TAKE 1 TABLET BY MOUTH EVERYDAY AT BEDTIME 90 tablet 1    nystatin (MYCOSTATIN) 500,000 units/5 mL suspension SWISH AND SPIT WITH 5 ML 4 TIMES A  mL 3    pantoprazole (PROTONIX) 40 mg tablet TAKE 1 TABLET BY MOUTH EVERY DAY 90 tablet 1    predniSONE 5 mg tablet Take 6 tablets (30 mg total) by mouth daily for 30 days, THEN 5 tablets (25 mg total) daily for 30 days, THEN 4 tablets (20 mg total) daily for 30 days, THEN 3 tablets (15 mg total) daily for 30 days, THEN 2 tablets (10 mg total) daily for 30 days, THEN 1 tablet (5 mg total) daily   630 tablet 0    promethazine-dextromethorphan (PHENERGAN-DM) 6 25-15 mg/5 mL oral syrup TAKE 5 MLS BY MOUTH 4 TIMES A DAY AS NEEDED FOR COUGH 150 mL 0    timolol (TIMOPTIC) 0 25 % ophthalmic solution       Travoprost (TRAVATAN OP) Apply to eye 1 drop both eye before bed (Patient not taking: Reported on 2/24/2022 )      umeclidinium-vilanterol (Anoro Ellipta) 62 5-25 MCG/INH inhaler Inhale 1 puff daily 30 blister 5     No current facility-administered medications for this visit  Allergies   Allergen Reactions    Augmentin [Amoxicillin-Pot Clavulanate] Vomiting    Miconazole Itching and Swelling    Wixela Inhub [Fluticasone-Salmeterol] Palpitations       Review of Systems    Video Exam    There were no vitals filed for this visit  Physical Exam     {Time Spent:28671}    VIRTUAL VISIT DISCLAIMER      Diana Carmen verbally agrees to participate in [employer]  Pt is aware that [employer] could be limited without vital signs or the ability to perform a full hands-on physical Robert Morales understands she or the provider may request at any time to terminate the video visit and request the patient to seek care or treatment in person

## 2022-03-03 ENCOUNTER — RA CDI HCC (OUTPATIENT)
Dept: OTHER | Facility: HOSPITAL | Age: 67
End: 2022-03-03

## 2022-03-03 NOTE — PROGRESS NOTES
[facility]  coding opportunities       Chart reviewed, no opportunity found: CHART REVIEWED, NO OPPORTUNITY FOUND                        Patients insurance company: [facility] (Medicare Advantage and Commercial)

## 2022-03-09 ENCOUNTER — APPOINTMENT (OUTPATIENT)
Dept: LAB | Facility: CLINIC | Age: 67
End: 2022-03-09
Payer: COMMERCIAL

## 2022-03-09 ENCOUNTER — OFFICE VISIT (OUTPATIENT)
Dept: FAMILY MEDICINE CLINIC | Facility: CLINIC | Age: 67
End: 2022-03-09
Payer: COMMERCIAL

## 2022-03-09 VITALS
WEIGHT: 211 LBS | TEMPERATURE: 98 F | BODY MASS INDEX: 36.02 KG/M2 | HEIGHT: 64 IN | HEART RATE: 76 BPM | SYSTOLIC BLOOD PRESSURE: 120 MMHG | DIASTOLIC BLOOD PRESSURE: 80 MMHG

## 2022-03-09 DIAGNOSIS — F33.41 RECURRENT MAJOR DEPRESSIVE DISORDER, IN PARTIAL REMISSION (HCC): ICD-10-CM

## 2022-03-09 DIAGNOSIS — J42 CHRONIC BRONCHITIS, UNSPECIFIED CHRONIC BRONCHITIS TYPE (HCC): Primary | ICD-10-CM

## 2022-03-09 DIAGNOSIS — I77.6 AORTITIS (HCC): ICD-10-CM

## 2022-03-09 LAB
CRP SERPL QL: <3 MG/L
ERYTHROCYTE [DISTWIDTH] IN BLOOD BY AUTOMATED COUNT: 14.3 % (ref 11.6–15.1)
ERYTHROCYTE [SEDIMENTATION RATE] IN BLOOD: 58 MM/HOUR (ref 0–29)
HCT VFR BLD AUTO: 43.7 % (ref 34.8–46.1)
HGB BLD-MCNC: 14.2 G/DL (ref 11.5–15.4)
MCH RBC QN AUTO: 27.7 PG (ref 26.8–34.3)
MCHC RBC AUTO-ENTMCNC: 32.5 G/DL (ref 31.4–37.4)
MCV RBC AUTO: 85 FL (ref 82–98)
PLATELET # BLD AUTO: 378 THOUSANDS/UL (ref 149–390)
PMV BLD AUTO: 9.1 FL (ref 8.9–12.7)
RBC # BLD AUTO: 5.12 MILLION/UL (ref 3.81–5.12)
WBC # BLD AUTO: 15.63 THOUSAND/UL (ref 4.31–10.16)

## 2022-03-09 PROCEDURE — 86140 C-REACTIVE PROTEIN: CPT

## 2022-03-09 PROCEDURE — 36415 COLL VENOUS BLD VENIPUNCTURE: CPT

## 2022-03-09 PROCEDURE — 99214 OFFICE O/P EST MOD 30 MIN: CPT | Performed by: FAMILY MEDICINE

## 2022-03-09 PROCEDURE — 85027 COMPLETE CBC AUTOMATED: CPT

## 2022-03-09 PROCEDURE — 85652 RBC SED RATE AUTOMATED: CPT

## 2022-03-09 RX ORDER — FLUTICASONE FUROATE, UMECLIDINIUM BROMIDE AND VILANTEROL TRIFENATATE 200; 62.5; 25 UG/1; UG/1; UG/1
1 POWDER RESPIRATORY (INHALATION) DAILY
Qty: 60 BLISTER | Refills: 5 | Status: SHIPPED | OUTPATIENT
Start: 2022-03-09 | End: 2022-06-30

## 2022-03-09 NOTE — PROGRESS NOTES
Assessment/Plan:    Chronic obstructive pulmonary disease (HCC)  Symptomatic lead better when using Trelegy  Med refilled  Recheck 2-3 months    Aortitis ([facility])  Some question oil changes due to long-term treatment with high-dose prednisone  Patient continuing a slow wean  Symptoms are not worse since decreasing the dose though patient still with more discomfort than I would like  Follow-up with rheumatology  Continue present medications-monitor labs  Recheck 2-3 months    Recurrent major depressive disorder, in partial remission (HCC)  Mood stable  Continue present treatment  Recheck her 3 month       Diagnoses and all orders for this visit:    Chronic bronchitis, unspecified chronic bronchitis type ([facility] )  -     fluticasone-umeclidinium-vilanterol (Trelegy Ellipta) 200-62 5-25 MCG/INH AEPB inhaler; Inhale 1 puff daily Rinse mouth after use  Recurrent major depressive disorder, in partial remission (HCC)    Aortitis ([facility])  -     CBC and Platelet; Future  -     C-reactive protein; Future  -     Sedimentation rate, automated; Future          Subjective:      Patient ID: Leslie Shoemaker is a 77 y o  female  Here for several concerns  - L eye pain and wheeze much better since starting Doxy, has 2 more days to go  Still with some change in vision  No fever/chills  No worsening HA  - still with epigastric discomfort  Has not worsened since decreasing pred dose but has not improved as well  No change in BMs  - still feels fatigued and weak  Notices that her face "looks rounder" and that her weight continues to increase since starting pred  - pt denies CP, palpitations, lightheadedness or other CV symptoms with or without exertion  - breathing remains stable since restarting Trelegy  Pt would like a refill        The following portions of the patient's history were reviewed and updated as appropriate:   She  has a past medical history of Acid reflux, Fibromyalgia, Hypertension, and Seasonal allergies    She Patient Active Problem List    Diagnosis Date Noted    Pain of both eyes 02/25/2022    Candida vaginitis 01/19/2022    Primary insomnia 11/24/2021    Aortitis ([facility] ) 10/31/2021    Increased intraocular pressure 10/31/2021    Chest pain 02/08/2021    Dermatitis 09/14/2020    Prediabetes 04/10/2020    Chronic idiopathic constipation 12/16/2019    Gastroparesis 12/16/2019    Severe obesity (BMI 35 0-35 9 with comorbidity) ([facility] ) 11/21/2019    Lower extremity edema 11/21/2019    Snoring 11/21/2019    Tobacco dependence 10/02/2019    Neck pain 07/11/2019    Neuropathic pain 01/09/2019    Early satiety 08/28/2018    Abdominal distension 08/28/2018    Family history of pancreatic cancer 08/28/2018    Epigastric pain 08/28/2018    Dyspnea on exertion 05/04/2018    Edema 07/13/2017    Lumbosacral radiculopathy at L5 01/06/2017    Compression fracture of thoracic vertebra, non-traumatic (HCC) 08/30/2016    Hyperlipidemia 12/08/2015    Cataract 08/22/2014    Palpitations 08/05/2014    Chronic obstructive pulmonary disease ([facility] ) 04/17/2013    Esophageal reflux 04/17/2013    Pulmonary nodule seen on imaging study 04/17/2013    Hypertension 03/22/2013    Recurrent major depressive disorder, in partial remission ([facility] ) 03/14/2013    Fibromyalgia 03/14/2013    Degeneration of intervertebral disc 03/14/2013    Anxiety 02/21/2013    Seasonal allergic rhinitis 12/20/2012     She  has a past surgical history that includes Knee surgery (1998); pr colonoscopy flx dx w/collj spec when pfrmd (N/A, 5/9/2017); and Eye surgery  She  reports that she has been smoking cigarettes  She has a 100 00 pack-year smoking history  She has never used smokeless tobacco  She reports that she does not drink alcohol and does not use drugs    Current Outpatient Medications   Medication Sig Dispense Refill    acetaminophen (TYLENOL 8 HOUR ARTHRITIS PAIN) 650 mg CR tablet Take by mouth every 8 (eight) hours as needed        albuterol (PROVENTIL HFA,VENTOLIN HFA) 90 mcg/act inhaler INHALE 2 PUFFS BY MOUTH EVERY 6 HOURS AS NEEDED FOR WHEEZE 18 g 1    ALPRAZolam (XANAX) 0 25 mg tablet Take 1 tablet (0 25 mg total) by mouth 3 (three) times a day as needed for anxiety 90 tablet 0    amLODIPine (NORVASC) 2 5 mg tablet Take 1 tablet (2 5 mg total) by mouth daily 30 tablet 5    cetirizine (ZyrTEC) 10 mg tablet Take 1 tablet (10 mg total) by mouth daily 90 tablet 0    escitalopram (LEXAPRO) 10 mg tablet TAKE 1 TABLET BY MOUTH EVERY DAY 90 tablet 1    fluticasone (FLONASE) 50 mcg/act nasal spray SPRAY 2 SPRAYS INTO EACH NOSTRIL EVERY DAY 16 mL 3    Glycerin-Hypromellose- (DRY EYE RELIEF DROPS OP) Apply to eye      lisinopril (ZESTRIL) 20 mg tablet TAKE 1 TABLET BY MOUTH EVERY DAY 90 tablet 1    Lumigan 0 01 % ophthalmic drops Administer 1 drop to both eyes daily at bedtime        MINOXIDIL, TOPICAL, 5 % SOLN Apply 1 application topically      montelukast (SINGULAIR) 10 mg tablet TAKE 1 TABLET BY MOUTH EVERYDAY AT BEDTIME 90 tablet 1    nystatin (MYCOSTATIN) 500,000 units/5 mL suspension SWISH AND SPIT WITH 5 ML 4 TIMES A  mL 3    pantoprazole (PROTONIX) 40 mg tablet TAKE 1 TABLET BY MOUTH EVERY DAY 90 tablet 1    predniSONE 5 mg tablet Take 6 tablets (30 mg total) by mouth daily for 30 days, THEN 5 tablets (25 mg total) daily for 30 days, THEN 4 tablets (20 mg total) daily for 30 days, THEN 3 tablets (15 mg total) daily for 30 days, THEN 2 tablets (10 mg total) daily for 30 days, THEN 1 tablet (5 mg total) daily  630 tablet 0    promethazine-dextromethorphan (PHENERGAN-DM) 6 25-15 mg/5 mL oral syrup TAKE 5 MLS BY MOUTH 4 TIMES A DAY AS NEEDED FOR COUGH 150 mL 0    timolol (TIMOPTIC) 0 25 % ophthalmic solution       fluticasone-umeclidinium-vilanterol (Trelegy Ellipta) 200-62 5-25 MCG/INH AEPB inhaler Inhale 1 puff daily Rinse mouth after use   60 blister 5     No current facility-administered medications for this visit      She is allergic to augmentin [amoxicillin-pot clavulanate], miconazole, and wixela inhub [fluticasone-salmeterol]       Review of Systems   Constitutional: Positive for fatigue  Negative for activity change, appetite change, chills and fever  HENT: Negative for congestion, ear discharge, ear pain, rhinorrhea and sore throat  Eyes: Positive for visual disturbance  Negative for pain  Respiratory: Positive for cough  Negative for apnea and chest tightness  Shortness of breath: improved but not resolved  Cardiovascular: Negative  Gastrointestinal: Negative for abdominal pain, diarrhea, nausea and vomiting  Musculoskeletal: Positive for myalgias  Negative for arthralgias, back pain and gait problem  Neurological: Positive for weakness  Negative for numbness and headaches (improved)  Objective:      /80   Pulse 76   Temp 98 °F (36 7 °C)   Ht 5' 4" (1 626 m)   Wt 95 7 kg (211 lb)   LMP  (LMP Unknown)   BMI 36 22 kg/m²          Physical Exam  Vitals reviewed  Constitutional:       Comments: Mild cushingoid facies    HENT:      Head: Normocephalic  Right Ear: Tympanic membrane, ear canal and external ear normal       Left Ear: Tympanic membrane, ear canal and external ear normal       Nose: No congestion  Mouth/Throat:      Mouth: Mucous membranes are moist    Eyes:      Extraocular Movements: Extraocular movements intact  Conjunctiva/sclera: Conjunctivae normal       Pupils: Pupils are equal, round, and reactive to light  Cardiovascular:      Rate and Rhythm: Normal rate and regular rhythm  Pulses: Normal pulses  Pulmonary:      Effort: Pulmonary effort is normal    Abdominal:      General: There is no distension  Palpations: There is no mass  Tenderness: There is no abdominal tenderness (epigastric)  Musculoskeletal:      Cervical back: No tenderness  Right lower leg: No edema  Left lower leg: No edema     Lymphadenopathy: Cervical: No cervical adenopathy  Skin:     General: Skin is warm  Capillary Refill: Capillary refill takes less than 2 seconds  Neurological:      General: No focal deficit present  Mental Status: She is alert

## 2022-03-13 NOTE — ASSESSMENT & PLAN NOTE
Some question oil changes due to long-term treatment with high-dose prednisone  Patient continuing a slow wean  Symptoms are not worse since decreasing the dose though patient still with more discomfort than I would like  Follow-up with rheumatology  Continue present medications-monitor labs    Recheck 2-3 months

## 2022-03-22 ENCOUNTER — OFFICE VISIT (OUTPATIENT)
Dept: FAMILY MEDICINE CLINIC | Facility: CLINIC | Age: 67
End: 2022-03-22
Payer: COMMERCIAL

## 2022-03-22 VITALS
HEIGHT: 64 IN | OXYGEN SATURATION: 98 % | HEART RATE: 87 BPM | DIASTOLIC BLOOD PRESSURE: 102 MMHG | SYSTOLIC BLOOD PRESSURE: 152 MMHG | BODY MASS INDEX: 36.09 KG/M2 | TEMPERATURE: 98.4 F | WEIGHT: 211.4 LBS

## 2022-03-22 DIAGNOSIS — K59.04 CHRONIC IDIOPATHIC CONSTIPATION: ICD-10-CM

## 2022-03-22 DIAGNOSIS — I77.6 AORTITIS (HCC): ICD-10-CM

## 2022-03-22 DIAGNOSIS — H40.053 RAISED INTRAOCULAR PRESSURE OF BOTH EYES: ICD-10-CM

## 2022-03-22 DIAGNOSIS — R10.13 EPIGASTRIC PAIN: ICD-10-CM

## 2022-03-22 DIAGNOSIS — J32.1 CHRONIC FRONTAL SINUSITIS: Primary | ICD-10-CM

## 2022-03-22 DIAGNOSIS — I10 PRIMARY HYPERTENSION: ICD-10-CM

## 2022-03-22 DIAGNOSIS — J42 CHRONIC BRONCHITIS, UNSPECIFIED CHRONIC BRONCHITIS TYPE (HCC): ICD-10-CM

## 2022-03-22 PROCEDURE — 99215 OFFICE O/P EST HI 40 MIN: CPT | Performed by: FAMILY MEDICINE

## 2022-03-22 PROCEDURE — 3077F SYST BP >= 140 MM HG: CPT | Performed by: FAMILY MEDICINE

## 2022-03-22 PROCEDURE — 3725F SCREEN DEPRESSION PERFORMED: CPT | Performed by: FAMILY MEDICINE

## 2022-03-22 PROCEDURE — 3080F DIAST BP >= 90 MM HG: CPT | Performed by: FAMILY MEDICINE

## 2022-03-22 NOTE — PROGRESS NOTES
Assessment/Plan:    Chronic idiopathic constipation  Persistent  Improved with stool softeners  Continue present treatment  Recheck 3-4 weeks    Chronic obstructive pulmonary disease ([address] )  Still with some shortness of breath with exertion though better on Trelegy  Continue present treatment  Monitor eye pressures  Recheck 3-4 weeks    Chronic frontal sinusitis  Persistent symptoms  Patient has failed multiple antibiotic courses  Patient still smokes  Will check CT of the sinuses to rule out obstruction/masses which could be associated with persistent since symptoms  Recheck 3-4 weeks    Hypertension  Elevated today the patient is and discomfort  Recheck 3-4 weeks    Aortitis (HCC)  Sed rate remains elevated though CRP did improve  Patient feels that abdominal symptoms, which were similar to what she had when the aortitis was diagnosed, has worsens since prednisone dose was decreased  Repeat CT of the abdomen to reassess the aorta as well as other epigastric structures  Continue present dose of prednisone for now  Recheck 3-4 weeks  Patient to follow-up with Rheum as well  Mild episode of recurrent major depressive disorder ([address] )  Slightly worse due to patient's overall medical condition at present  Unclear if prednisone is also affecting mood  Will observe for now  Recheck 3-4 weeks    Increased intraocular pressure  With some visual changes  Patient to follow-up with ophthalmology    Epigastric pain  Patient does note worsening reflux  Patient feels that the epigastric pain is similar to what she was experiencing when she was diagnosed with her aortitis  Symptoms seem to have worsened since decreasing prednisone dose  However, patient could also have primary GI issues as well  I will refer patient for CT of the abdomen and pelvis with contrast to better assess her aorta as well as her epigastric area  If aorta appears to be without inflammation, will refer to GI for EGD    Continue pantoprazole for now  Patient considered increasing to b i d  For the next week to see if there is improvement  Follow-up 3-4 weeks       Diagnoses and all orders for this visit:    Chronic frontal sinusitis  -     CT sinus wo contrast; Future    Aortitis (HCC)  -     CT abdomen pelvis w contrast; Future    Epigastric pain  -     CT abdomen pelvis w contrast; Future    Chronic idiopathic constipation    Chronic bronchitis, unspecified chronic bronchitis type ([address] )    Primary hypertension    Raised intraocular pressure of both eyes          Subjective:      Patient ID: Todd Mesa is a 77 y o  female  Here for several concerns  - patient states that she feels horrible  - patient with multiple worsening symptoms since cutting the prednisone down from 30 mg a day to 25 mg a day  - patient with persistent visual issues  She continues to follow-up with her eye doctor-I pressures have been labile but tending to be high  Pain behind her eyes seems to be a little bit better since she was treated for possible sinusitis, however she still has some pain behind the left eyeball  Still gets intermittent diplopia, especially when reading or staring at something for any extended period of time  - still getting frontal sinus area pressure as well as the left retro-orbital discomfort  Patient describes discomfort as pressure-like, as if the head will pop off  Patient denies difficulty with speech but does note that her memory seems to be worse  Cognition seems to be intact  - patient seems to have increased reflux like symptoms after eating  She frequently feels like she needs to cough after eating, and when she does cough, it tends to be productive  Still has dyspnea with exertion despite changing Anoro back to Trelegy  - patient still having significant epigastric discomfort  This may be worse since decreasing her prednisone from 30-25 mg a day    Patient is undergoing treatment for post COVID aortitis-last CT scan done in February did show improvement though patient's symptoms seem to have worsened since then  Recent CRP was <0 3 though ESR remained 55    - patient also notes increased truncal obesity since being on the prednisone  She feels that her lower abdomen is distended but also there seems to be some swelling around the sides into her back, as well as her lateral, anterior and posterior chest   Patient was struggling with constipation last week but this seems to have passed  Patient has had 2 normal bowel movements so far today  Patient also notes some intermittent urinary urgency and frequency  The following portions of the patient's history were reviewed and updated as appropriate:   She  has a past medical history of Acid reflux, Fibromyalgia, Hypertension, and Seasonal allergies    She   Patient Active Problem List    Diagnosis Date Noted    Chronic frontal sinusitis 03/23/2022    Pain of both eyes 02/25/2022    Candida vaginitis 01/19/2022    Primary insomnia 11/24/2021    Aortitis ([facility] [address] ) 10/31/2021    Increased intraocular pressure 10/31/2021    Chest pain 02/08/2021    Dermatitis 09/14/2020    Prediabetes 04/10/2020    Chronic idiopathic constipation 12/16/2019    Gastroparesis 12/16/2019    Severe obesity (BMI 35 0-35 9 with comorbidity) ([facility] [address] ) 11/21/2019    Lower extremity edema 11/21/2019    Snoring 11/21/2019    Tobacco dependence 10/02/2019    Neck pain 07/11/2019    Neuropathic pain 01/09/2019    Early satiety 08/28/2018    Abdominal distension 08/28/2018    Family history of pancreatic cancer 08/28/2018    Epigastric pain 08/28/2018    Dyspnea on exertion 05/04/2018    Edema 07/13/2017    Lumbosacral radiculopathy at L5 01/06/2017    Compression fracture of thoracic vertebra, non-traumatic ([facility] [address] ) 08/30/2016    Hyperlipidemia 12/08/2015    Cataract 08/22/2014    Palpitations 08/05/2014    Chronic obstructive pulmonary disease ([address] ) 04/17/2013    Esophageal reflux 04/17/2013    Pulmonary nodule seen on imaging study 04/17/2013    Hypertension 03/22/2013    Mild episode of recurrent major depressive disorder ([facility] [address] ) 03/14/2013    Fibromyalgia 03/14/2013    Degeneration of intervertebral disc 03/14/2013    Anxiety 02/21/2013    Seasonal allergic rhinitis 12/20/2012     She  has a past surgical history that includes Knee surgery (1998); pr colonoscopy flx dx w/collj spec when pfrmd (N/A, 5/9/2017); and Eye surgery  She  reports that she has been smoking cigarettes  She has a 100 00 pack-year smoking history  She has never used smokeless tobacco  She reports that she does not drink alcohol and does not use drugs  Current Outpatient Medications   Medication Sig Dispense Refill    acetaminophen (TYLENOL 8 HOUR ARTHRITIS PAIN) 650 mg CR tablet Take by mouth every 8 (eight) hours as needed        albuterol (PROVENTIL HFA,VENTOLIN HFA) 90 mcg/act inhaler INHALE 2 PUFFS BY MOUTH EVERY 6 HOURS AS NEEDED FOR WHEEZE 18 g 1    ALPRAZolam (XANAX) 0 25 mg tablet Take 1 tablet (0 25 mg total) by mouth 3 (three) times a day as needed for anxiety 90 tablet 0    amLODIPine (NORVASC) 2 5 mg tablet Take 1 tablet (2 5 mg total) by mouth daily 30 tablet 5    cetirizine (ZyrTEC) 10 mg tablet Take 1 tablet (10 mg total) by mouth daily 90 tablet 0    escitalopram (LEXAPRO) 10 mg tablet TAKE 1 TABLET BY MOUTH EVERY DAY 90 tablet 1    fluticasone (FLONASE) 50 mcg/act nasal spray SPRAY 2 SPRAYS INTO EACH NOSTRIL EVERY DAY 16 mL 3    fluticasone-umeclidinium-vilanterol (Trelegy Ellipta) 200-62 5-25 MCG/INH AEPB inhaler Inhale 1 puff daily Rinse mouth after use   60 blister 5    Glycerin-Hypromellose- (DRY EYE RELIEF DROPS OP) Apply to eye      lisinopril (ZESTRIL) 20 mg tablet TAKE 1 TABLET BY MOUTH EVERY DAY 90 tablet 1    Lumigan 0 01 % ophthalmic drops Administer 1 drop to both eyes daily at bedtime        MINOXIDIL, TOPICAL, 5 % SOLN Apply 1 application topically      montelukast (SINGULAIR) 10 mg tablet TAKE 1 TABLET BY MOUTH EVERYDAY AT BEDTIME 90 tablet 1    nystatin (MYCOSTATIN) 500,000 units/5 mL suspension SWISH AND SPIT WITH 5 ML 4 TIMES A  mL 3    pantoprazole (PROTONIX) 40 mg tablet TAKE 1 TABLET BY MOUTH EVERY DAY 90 tablet 1    predniSONE 5 mg tablet Take 6 tablets (30 mg total) by mouth daily for 30 days, THEN 5 tablets (25 mg total) daily for 30 days, THEN 4 tablets (20 mg total) daily for 30 days, THEN 3 tablets (15 mg total) daily for 30 days, THEN 2 tablets (10 mg total) daily for 30 days, THEN 1 tablet (5 mg total) daily  630 tablet 0    promethazine-dextromethorphan (PHENERGAN-DM) 6 25-15 mg/5 mL oral syrup TAKE 5 MLS BY MOUTH 4 TIMES A DAY AS NEEDED FOR COUGH 150 mL 0    timolol (TIMOPTIC) 0 25 % ophthalmic solution        No current facility-administered medications for this visit  She is allergic to augmentin [amoxicillin-pot clavulanate], miconazole, and wixela inhub [fluticasone-salmeterol]       Review of Systems   Constitutional: Positive for activity change, appetite change and fatigue  Negative for fever  HENT: Positive for congestion, sinus pressure and sinus pain  Negative for ear discharge, ear pain and sore throat  Eyes: Positive for visual disturbance  Negative for pain, discharge and itching  Respiratory: Positive for cough and shortness of breath (with exertion)  Negative for chest tightness  Cardiovascular: Positive for leg swelling (intermittent)  Negative for chest pain and palpitations  Gastrointestinal: Positive for abdominal distention, abdominal pain and constipation  Negative for blood in stool, nausea and vomiting  Endocrine: Negative  Genitourinary: Negative  Musculoskeletal: Positive for arthralgias, back pain, myalgias and neck pain  Skin: Negative for color change, pallor and rash  Neurological: Positive for weakness, light-headedness and headaches   Negative for dizziness, facial asymmetry and numbness  Hematological: Negative  Psychiatric/Behavioral: Positive for dysphoric mood (due to medical issues) and sleep disturbance  Negative for self-injury and suicidal ideas  Objective:      BP (!) 152/102   Pulse 87   Temp 98 4 °F (36 9 °C)   Ht 5' 4" (1 626 m)   Wt 95 9 kg (211 lb 6 4 oz)   LMP  (LMP Unknown)   SpO2 98%   BMI 36 29 kg/m²          Physical Exam  Vitals reviewed  Constitutional:       Comments: Fatigued appearing female in NAD   HENT:      Head: Normocephalic  Right Ear: Tympanic membrane, ear canal and external ear normal       Left Ear: Tympanic membrane, ear canal and external ear normal       Nose: Congestion present  Comments: Frontal and ethmoid areas TTP     Mouth/Throat:      Mouth: Mucous membranes are moist    Eyes:      Extraocular Movements: Extraocular movements intact  Conjunctiva/sclera: Conjunctivae normal       Pupils: Pupils are equal, round, and reactive to light  Neck:      Vascular: No carotid bruit  Cardiovascular:      Rate and Rhythm: Normal rate and regular rhythm  Pulses: Normal pulses  Pulmonary:      Effort: Pulmonary effort is normal       Breath sounds: No wheezing or rales  Abdominal:      General: There is no distension  Palpations: There is no mass  Tenderness: There is abdominal tenderness (TTP over the epigastric area  No masses/pulsatile masses appreciated)  There is no right CVA tenderness, left CVA tenderness, guarding or rebound  Musculoskeletal:         General: Swelling (mild in hands) and tenderness (multiple joints and paralumbar muscles) present  Cervical back: Normal range of motion  Tenderness (mild, paracervical muscle discomfort to palp) present  Right lower leg: Edema (trace) present  Left lower leg: Edema (trace) present  Lymphadenopathy:      Cervical: No cervical adenopathy  Skin:     General: Skin is warm        Capillary Refill: Capillary refill takes less than 2 seconds  Neurological:      Mental Status: She is oriented to person, place, and time  Cranial Nerves: No cranial nerve deficit  Sensory: No sensory deficit  Motor: No weakness  Gait: Gait abnormal (mildly antalgic appearing gait)  Psychiatric:         Thought Content: Thought content normal       Comments: PHQ-2/9 Depression Screening    Little interest or pleasure in doing things: 1 - several days  Feeling down, depressed, or hopeless: 1 - several days  Trouble falling or staying asleep, or sleeping too much: 1 - several days  Feeling tired or having little energy: 2 - more than half the days  Poor appetite or overeatin - not at all  Feeling bad about yourself - or that you are a failure or have let   yourself or your family down: 0 - not at all  Trouble concentrating on things, such as reading the newspaper or watching   television: 1 - several days  Moving or speaking so slowly that other people could have noticed   Or the   opposite - being so fidgety or restless that you have been moving around a   lot more than usual: 0 - not at all  Thoughts that you would be better off dead, or of hurting yourself in some   way: 0 - not at all  PHQ-9 Score: 6   PHQ-9 Interpretation: Mild depression

## 2022-03-23 ENCOUNTER — APPOINTMENT (OUTPATIENT)
Dept: LAB | Facility: MEDICAL CENTER | Age: 67
End: 2022-03-23
Payer: COMMERCIAL

## 2022-03-23 PROBLEM — J32.1 CHRONIC FRONTAL SINUSITIS: Status: ACTIVE | Noted: 2022-03-23

## 2022-03-23 NOTE — ASSESSMENT & PLAN NOTE
Persistent symptoms  Patient has failed multiple antibiotic courses  Patient still smokes  Will check CT of the sinuses to rule out obstruction/masses which could be associated with persistent since symptoms    Recheck 3-4 weeks

## 2022-03-23 NOTE — ASSESSMENT & PLAN NOTE
Still with some shortness of breath with exertion though better on Trelegy  Continue present treatment  Monitor eye pressures    Recheck 3-4 weeks

## 2022-03-23 NOTE — ASSESSMENT & PLAN NOTE
Sed rate remains elevated though CRP did improve  Patient feels that abdominal symptoms, which were similar to what she had when the aortitis was diagnosed, has worsens since prednisone dose was decreased  Repeat CT of the abdomen to reassess the aorta as well as other epigastric structures  Continue present dose of prednisone for now  Recheck 3-4 weeks  Patient to follow-up with Rheum as well

## 2022-03-23 NOTE — ASSESSMENT & PLAN NOTE
Patient does note worsening reflux  Patient feels that the epigastric pain is similar to what she was experiencing when she was diagnosed with her aortitis  Symptoms seem to have worsened since decreasing prednisone dose  However, patient could also have primary GI issues as well  I will refer patient for CT of the abdomen and pelvis with contrast to better assess her aorta as well as her epigastric area  If aorta appears to be without inflammation, will refer to GI for EGD  Continue pantoprazole for now  Patient considered increasing to b i d  For the next week to see if there is improvement    Follow-up 3-4 weeks

## 2022-03-23 NOTE — ASSESSMENT & PLAN NOTE
Slightly worse due to patient's overall medical condition at present  Unclear if prednisone is also affecting mood  Will observe for now    Recheck 3-4 weeks

## 2022-03-29 ENCOUNTER — HOSPITAL ENCOUNTER (OUTPATIENT)
Dept: RADIOLOGY | Facility: MEDICAL CENTER | Age: 67
End: 2022-03-29
Payer: COMMERCIAL

## 2022-03-29 ENCOUNTER — HOSPITAL ENCOUNTER (OUTPATIENT)
Dept: RADIOLOGY | Facility: MEDICAL CENTER | Age: 67
Discharge: HOME | End: 2022-03-29
Payer: COMMERCIAL

## 2022-03-29 DIAGNOSIS — J32.1 CHRONIC FRONTAL SINUSITIS: ICD-10-CM

## 2022-03-29 PROCEDURE — 70486 CT MAXILLOFACIAL W/O DYE: CPT

## 2022-03-30 ENCOUNTER — OFFICE VISIT (OUTPATIENT)
Dept: PULMONOLOGY | Facility: CLINIC | Age: 67
End: 2022-03-30
Payer: COMMERCIAL

## 2022-03-30 VITALS
HEART RATE: 76 BPM | TEMPERATURE: 97.4 F | WEIGHT: 213 LBS | SYSTOLIC BLOOD PRESSURE: 150 MMHG | HEIGHT: 64 IN | DIASTOLIC BLOOD PRESSURE: 98 MMHG | RESPIRATION RATE: 16 BRPM | OXYGEN SATURATION: 98 % | BODY MASS INDEX: 36.37 KG/M2

## 2022-03-30 DIAGNOSIS — J42 CHRONIC BRONCHITIS, UNSPECIFIED CHRONIC BRONCHITIS TYPE (HCC): Primary | ICD-10-CM

## 2022-03-30 DIAGNOSIS — E66.01 SEVERE OBESITY (BMI 35.0-35.9 WITH COMORBIDITY) (HCC): ICD-10-CM

## 2022-03-30 DIAGNOSIS — I77.6 AORTITIS (HCC): ICD-10-CM

## 2022-03-30 DIAGNOSIS — K21.9 GASTROESOPHAGEAL REFLUX DISEASE, UNSPECIFIED WHETHER ESOPHAGITIS PRESENT: ICD-10-CM

## 2022-03-30 DIAGNOSIS — F17.219 CIGARETTE NICOTINE DEPENDENCE WITH NICOTINE-INDUCED DISORDER: ICD-10-CM

## 2022-03-30 DIAGNOSIS — R91.1 PULMONARY NODULE SEEN ON IMAGING STUDY: ICD-10-CM

## 2022-03-30 PROCEDURE — 3008F BODY MASS INDEX DOCD: CPT | Performed by: NURSE PRACTITIONER

## 2022-03-30 PROCEDURE — 4004F PT TOBACCO SCREEN RCVD TLK: CPT | Performed by: NURSE PRACTITIONER

## 2022-03-30 PROCEDURE — 99214 OFFICE O/P EST MOD 30 MIN: CPT | Performed by: NURSE PRACTITIONER

## 2022-03-30 PROCEDURE — 1160F RVW MEDS BY RX/DR IN RCRD: CPT | Performed by: NURSE PRACTITIONER

## 2022-03-30 NOTE — PROGRESS NOTES
Pulmonary Follow-Up Note   Guero Bar 77 y o  female MRN: [id number]  3/30/2022      Assessment/Plan:    Problem List Items Addressed This Visit        Digestive    Esophageal reflux     [facility] had been having some reflux symptoms with use of steroids  She is on PPI with controlled symptoms  Respiratory    Chronic obstructive pulmonary disease ([address] ) - Primary     She will continue with Trelegy and albuterol as needed  I advised her that as she comes down on the steroids, if she has refractory symptoms from pulmonary standpoint, she should let our office know  She does have follow-up with an ophthalmologist for check of her vision  If there are any new concerns with Trelegy, she will also let us know  Cardiovascular and Mediastinum    Aortitis [facility])     Following with Rheumatology and is on a prednisone taper with repeat imaging pending  Nervous and Auditory    Cigarette nicotine dependence with nicotine-induced disorder     No interest in cessation or further discussion today  Relevant Orders    CT lung screening program       Other    Pulmonary nodule seen on imaging study     Nodules were stable in January 2022  Would be due in January 2023 and imaging ordered today  Severe obesity (BMI 35 0-35 9 with comorbidity) ([address] )     Suspect weight gain in the setting of prednisone use and aortitis  Management per primary team   Lifestyle modifications and weight loss as able otherwise  Education provided at this visit:   Pulmonary Rehab:  Not applicable at present   Smoking Cessation:  As above   Lung Cancer Screening:  As above   Inhaler Use:  Reiterated proper use and technique    Return in about 6 months (around 9/30/2022), or if symptoms worsen or fail to improve  All of Leanna's questions were answered prior to leaving the office today  She will follow-up with Dr Korina Huang in 3-6 months or sooner should the need arise   She is aware to call our office with any further questions or concerns  History of Present Illness   Reason for Visit: Follow-up  Chief Complaint:  My breathing is okay as long as I use the Trelegy "  HPI: Bhavin Graff is a 77 y o  female who presents to the office today for follow-up of COPD  Overall, Isabel Benton reports that she has been having multiple medical problems after having COVID in September  She has been maintained on higher doses of steroids due to aortitis and has been following with Rheumatology  She notes some edema and weight gain due to this  She was also having some visual disturbance and was switched from [facility] to [facility]; however, had refractory pulmonary symptoms  She followed with an optometrist and was advised that she could return to the [facility] and has been on it since  She has been doing well  Approximately one month ago, she had a presumed viral tracheobronchitis and has recovered from this  She has no further cough or sputum production  She denies fevers or chills  Shortness of breath is at baseline  She continues to smoke a pack and half of cigarettes per day and has no interest in quitting  Aside from the above, no other complaints  Review of Systems   All other systems reviewed and are negative  A full 12-point review of systems was completed and is negative except for those outlined in the HPI  Historical Information   Past Medical History:   Diagnosis Date    Acid reflux     Fibromyalgia     Hypertension     Seasonal allergies      Past Surgical History:   Procedure Laterality Date    EYE SURGERY      KNEE SURGERY  1998    GA COLONOSCOPY FLX DX W/COLLJ SPEC WHEN PFRMD N/A 5/9/2017    Procedure: EGD AND COLONOSCOPY;  Surgeon: Carlos Garza MD;  [facility];   Service: Gastroenterology     Family History   Problem Relation Age of Onset    Pancreatic cancer Mother 67    Lung cancer Sister 40    Pancreatic cancer Brother     Coronary artery disease Father     Diabetes Father    Gladis Rodriguez Hypertension Father     Heart disease Father     Diabetes Paternal Grandmother     Lung cancer Maternal Grandfather 68    Pancreatic cancer Brother      Social History   Social History     Substance and Sexual Activity   Alcohol Use Never     Social History     Substance and Sexual Activity   Drug Use No     Social History     Tobacco Use   Smoking Status Current Every Day Smoker    Packs/day: 2 00    Years: 50 00    Pack years: 100 00    Types: Cigarettes   Smokeless Tobacco Never Used   Tobacco Comment    pt is down to 1 5 packs a day      E-Cigarette/Vaping    E-Cigarette Use Never User      E-Cigarette/Vaping Substances       Meds/Allergies     Current Outpatient Medications:     acetaminophen (TYLENOL 8 HOUR ARTHRITIS PAIN) 650 mg CR tablet, Take by mouth every 8 (eight) hours as needed  , Disp: , Rfl:     albuterol (PROVENTIL HFA,VENTOLIN HFA) 90 mcg/act inhaler, INHALE 2 PUFFS BY MOUTH EVERY 6 HOURS AS NEEDED FOR WHEEZE, Disp: 18 g, Rfl: 1    ALPRAZolam (XANAX) 0 25 mg tablet, Take 1 tablet (0 25 mg total) by mouth 3 (three) times a day as needed for anxiety, Disp: 90 tablet, Rfl: 0    amLODIPine (NORVASC) 2 5 mg tablet, Take 1 tablet (2 5 mg total) by mouth daily, Disp: 30 tablet, Rfl: 5    cetirizine (ZyrTEC) 10 mg tablet, Take 1 tablet (10 mg total) by mouth daily, Disp: 90 tablet, Rfl: 0    escitalopram (LEXAPRO) 10 mg tablet, TAKE 1 TABLET BY MOUTH EVERY DAY, Disp: 90 tablet, Rfl: 1    fluticasone (FLONASE) 50 mcg/act nasal spray, SPRAY 2 SPRAYS INTO EACH NOSTRIL EVERY DAY, Disp: 16 mL, Rfl: 3    fluticasone-umeclidinium-vilanterol (Trelegy Ellipta) 200-62 5-25 MCG/INH AEPB inhaler, Inhale 1 puff daily Rinse mouth after use , Disp: 60 blister, Rfl: 5    Glycerin-Hypromellose- (DRY EYE RELIEF DROPS OP), Apply to eye, Disp: , Rfl:     lisinopril (ZESTRIL) 20 mg tablet, TAKE 1 TABLET BY MOUTH EVERY DAY, Disp: 90 tablet, Rfl: 1    Lumigan 0 01 % ophthalmic drops, Administer 1 drop to both eyes daily at bedtime  , Disp: , Rfl:     MINOXIDIL, TOPICAL, 5 % SOLN, Apply 1 application topically, Disp: , Rfl:     montelukast (SINGULAIR) 10 mg tablet, TAKE 1 TABLET BY MOUTH EVERYDAY AT BEDTIME, Disp: 90 tablet, Rfl: 1    nystatin (MYCOSTATIN) 500,000 units/5 mL suspension, SWISH AND SPIT WITH 5 ML 4 TIMES A DAY, Disp: 200 mL, Rfl: 3    pantoprazole (PROTONIX) 40 mg tablet, TAKE 1 TABLET BY MOUTH EVERY DAY, Disp: 90 tablet, Rfl: 1    predniSONE 5 mg tablet, Take 6 tablets (30 mg total) by mouth daily for 30 days, THEN 5 tablets (25 mg total) daily for 30 days, THEN 4 tablets (20 mg total) daily for 30 days, THEN 3 tablets (15 mg total) daily for 30 days, THEN 2 tablets (10 mg total) daily for 30 days, THEN 1 tablet (5 mg total) daily  , Disp: 630 tablet, Rfl: 0    promethazine-dextromethorphan (PHENERGAN-DM) 6 25-15 mg/5 mL oral syrup, TAKE 5 MLS BY MOUTH 4 TIMES A DAY AS NEEDED FOR COUGH, Disp: 150 mL, Rfl: 0    timolol (TIMOPTIC) 0 25 % ophthalmic solution, , Disp: , Rfl:   Allergies   Allergen Reactions    Augmentin [Amoxicillin-Pot Clavulanate] Vomiting    Miconazole Itching and Swelling    Wixela Inhub [Fluticasone-Salmeterol] Palpitations       Vitals: Blood pressure 150/98, pulse 76, temperature (!) 97 4 °F (36 3 °C), temperature source Tympanic, resp  rate 16, height 5' 4" (1 626 m), weight 96 6 kg (213 lb), SpO2 98 %, not currently breastfeeding  Body mass index is 36 56 kg/m²  Oxygen Therapy  SpO2: 98 %    Physical Exam:  Physical Exam  Vitals reviewed  Constitutional:       General: She is not in acute distress  Appearance: She is well-developed  She is obese  She is not toxic-appearing or diaphoretic  HENT:      Head: Normocephalic and atraumatic  Eyes:      General: No scleral icterus  Neck:      Trachea: No tracheal deviation  Cardiovascular:      Rate and Rhythm: Normal rate and regular rhythm  Heart sounds: S1 normal and S2 normal  No murmur heard    No friction rub  No gallop  Pulmonary:      Effort: Pulmonary effort is normal  No tachypnea, accessory muscle usage or respiratory distress  Breath sounds: Normal breath sounds  No stridor  No decreased breath sounds, wheezing, rhonchi or rales  Chest:      Chest wall: No tenderness  Abdominal:      General: Bowel sounds are normal  There is no distension  Palpations: Abdomen is soft  Tenderness: There is no abdominal tenderness  Musculoskeletal:      Cervical back: Neck supple  Skin:     General: Skin is warm and dry  Findings: No rash  Neurological:      Mental Status: She is alert and oriented to person, place, and time  GCS: GCS eye subscore is 4  GCS verbal subscore is 5  GCS motor subscore is 6  Psychiatric:         Speech: Speech normal          Behavior: Behavior normal  Behavior is cooperative  Labs:   Lab Results   Component Value Date    WBC 15 63 (H) 03/09/2022    HGB 14 2 03/09/2022    HCT 43 7 03/09/2022    MCV 85 03/09/2022     03/09/2022     Lab Results   Component Value Date     10/08/2015    SODIUM 133 (L) 03/01/2022    K 3 8 03/01/2022     03/01/2022    CO2 24 03/01/2022    ANIONGAP 8 10/08/2015    AGAP 8 03/01/2022    BUN 9 03/01/2022    CREATININE 0 73 03/01/2022    GLUC 164 (H) 03/01/2022    GLUF 133 (H) 11/08/2021    CALCIUM 9 8 03/01/2022    AST 15 03/01/2022    ALT 22 03/01/2022    ALKPHOS 64 03/01/2022    PROT 7 2 10/08/2015    TP 7 5 03/01/2022    BILITOT 0 39 10/08/2015    TBILI 0 49 03/01/2022    EGFR 86 03/01/2022     Imaging and other studies: I have personally reviewed pertinent reports  and CT chest done January 2022 shows stable pulmonary nodules  MIRNA Reich  [facility]        Portions of the record may have been created with voice recognition software    Occasional wrong word or "sound a like" substitutions may have occurred due to the inherent limitations of voice recognition software  Read the chart carefully and recognize, using context, where substitutions have occurred or contact the dictating provider

## 2022-03-30 NOTE — ASSESSMENT & PLAN NOTE
[facility] in [place] had been having some reflux symptoms with use of steroids  She is on PPI with controlled symptoms

## 2022-03-30 NOTE — ASSESSMENT & PLAN NOTE
Suspect weight gain in the setting of prednisone use and aortitis  Management per primary team   Lifestyle modifications and weight loss as able otherwise

## 2022-03-30 NOTE — ASSESSMENT & PLAN NOTE
She will continue with Trelegy and albuterol as needed  I advised her that as she comes down on the steroids, if she has refractory symptoms from pulmonary standpoint, she should let our office know  She does have follow-up with an ophthalmologist for check of her vision  If there are any new concerns with Trelegy, she will also let us know

## 2022-03-31 ENCOUNTER — TELEPHONE (OUTPATIENT)
Dept: FAMILY MEDICINE CLINIC | Facility: CLINIC | Age: 67
End: 2022-03-31

## 2022-03-31 ENCOUNTER — HOSPITAL ENCOUNTER (OUTPATIENT)
Dept: NON INVASIVE DIAGNOSTICS | Facility: MEDICAL CENTER | Age: 67
Discharge: HOME | End: 2022-03-31
Payer: COMMERCIAL

## 2022-03-31 ENCOUNTER — HOSPITAL ENCOUNTER (OUTPATIENT)
Dept: MAMMOGRAPHY | Facility: CLINIC | Age: 67
Discharge: HOME | End: 2022-03-31
Payer: COMMERCIAL

## 2022-03-31 VITALS
DIASTOLIC BLOOD PRESSURE: 98 MMHG | SYSTOLIC BLOOD PRESSURE: 150 MMHG | WEIGHT: 213 LBS | BODY MASS INDEX: 36.37 KG/M2 | HEART RATE: 76 BPM | HEIGHT: 64 IN

## 2022-03-31 VITALS — BODY MASS INDEX: 36.37 KG/M2 | HEIGHT: 64 IN | WEIGHT: 213 LBS

## 2022-03-31 DIAGNOSIS — I77.6 AORTITIS (HCC): ICD-10-CM

## 2022-03-31 DIAGNOSIS — Z12.31 ENCOUNTER FOR SCREENING MAMMOGRAM FOR BREAST CANCER: ICD-10-CM

## 2022-03-31 DIAGNOSIS — R06.02 SHORTNESS OF BREATH: ICD-10-CM

## 2022-03-31 LAB
AORTIC ROOT: 3 CM
APICAL FOUR CHAMBER EJECTION FRACTION: 70 %
E WAVE DECELERATION TIME: 221 MS
FRACTIONAL SHORTENING: 35 % (ref 28–44)
INTERVENTRICULAR SEPTUM IN DIASTOLE (PARASTERNAL SHORT AXIS VIEW): 1.1 CM
INTERVENTRICULAR SEPTUM: 1.1 CM (ref 0.55–1.02)
LAAS-AP2: 19.3 CM2
LAAS-AP4: 18.7 CM2
LEFT ATRIUM SIZE: 3.3 CM
LEFT INTERNAL DIMENSION IN SYSTOLE: 2.4 CM (ref 3.37–5.1)
LEFT VENTRICULAR INTERNAL DIMENSION IN DIASTOLE: 3.7 CM (ref 5.56–8.29)
LEFT VENTRICULAR POSTERIOR WALL IN END DIASTOLE: 1.1 CM (ref 0.53–1.01)
LEFT VENTRICULAR STROKE VOLUME: 39 ML
LVSV (TEICH): 39 ML
MV E'TISSUE VEL-SEP: 8 CM/S
MV PEAK A VEL: 1.01 M/S
MV PEAK E VEL: 78 CM/S
MV STENOSIS PRESSURE HALF TIME: 64 MS
MV VALVE AREA P 1/2 METHOD: 3.44 CM2
RIGHT ATRIAL 2D VOLUME: 22 ML
RIGHT ATRIUM AREA SYSTOLE A4C: 11.5 CM2
RIGHT VENTRICLE ID DIMENSION: 3.1 CM
SL CV LEFT ATRIUM LENGTH A2C: 5.1 CM
SL CV LV EF: 70
SL CV PED ECHO LEFT VENTRICLE DIASTOLIC VOLUME (MOD BIPLANE) 2D: 60 ML
SL CV PED ECHO LEFT VENTRICLE SYSTOLIC VOLUME (MOD BIPLANE) 2D: 20 ML
Z-SCORE OF INTERVENTRICULAR SEPTUM IN END DIASTOLE: 2.59
Z-SCORE OF LEFT VENTRICULAR DIMENSION IN END DIASTOLE: -6.07
Z-SCORE OF LEFT VENTRICULAR DIMENSION IN END SYSTOLE: -4.31
Z-SCORE OF LEFT VENTRICULAR POSTERIOR WALL IN END DIASTOLE: 2.7

## 2022-03-31 PROCEDURE — 93306 TTE W/DOPPLER COMPLETE: CPT

## 2022-03-31 PROCEDURE — 77063 BREAST TOMOSYNTHESIS BI: CPT

## 2022-03-31 PROCEDURE — 93306 TTE W/DOPPLER COMPLETE: CPT | Performed by: INTERNAL MEDICINE

## 2022-03-31 PROCEDURE — 77067 SCR MAMMO BI INCL CAD: CPT

## 2022-03-31 NOTE — TELEPHONE ENCOUNTER
Patient wanted you to know 2 things   Her BP at [facility] yesterday was 150/98 and she decreased pred from 25 once daily to 20 once daily

## 2022-04-01 ENCOUNTER — TELEPHONE (OUTPATIENT)
Dept: OBGYN CLINIC | Facility: HOSPITAL | Age: 67
End: 2022-04-01

## 2022-04-01 NOTE — TELEPHONE ENCOUNTER
Dr Velasquez Irons    [phone]    Patient states that she had an echo done yesterday and wanted to discuss results with you

## 2022-04-03 NOTE — TELEPHONE ENCOUNTER
Please let pt know her echocardiogram returned completely normal, no cause for concern related to her aortitis

## 2022-04-04 ENCOUNTER — HOSPITAL ENCOUNTER (OUTPATIENT)
Dept: RADIOLOGY | Facility: MEDICAL CENTER | Age: 67
Discharge: HOME | End: 2022-04-04
Payer: COMMERCIAL

## 2022-04-04 DIAGNOSIS — I77.6 AORTITIS (HCC): ICD-10-CM

## 2022-04-04 PROCEDURE — 74174 CTA ABD&PLVS W/CONTRAST: CPT

## 2022-04-04 PROCEDURE — G1004 CDSM NDSC: HCPCS

## 2022-04-04 RX ADMIN — IOHEXOL 100 ML: 350 INJECTION, SOLUTION INTRAVENOUS at 11:53

## 2022-04-06 ENCOUNTER — APPOINTMENT (OUTPATIENT)
Dept: LAB | Facility: MEDICAL CENTER | Age: 67
End: 2022-04-06
Payer: COMMERCIAL

## 2022-04-06 DIAGNOSIS — I77.6 AORTITIS (HCC): Primary | ICD-10-CM

## 2022-04-06 DIAGNOSIS — J30.9 ALLERGIC RHINITIS, UNSPECIFIED SEASONALITY, UNSPECIFIED TRIGGER: ICD-10-CM

## 2022-04-06 LAB
CRP SERPL QL: 3.6 MG/L
ERYTHROCYTE [SEDIMENTATION RATE] IN BLOOD: 47 MM/HOUR (ref 0–29)

## 2022-04-06 PROCEDURE — 85652 RBC SED RATE AUTOMATED: CPT | Performed by: INTERNAL MEDICINE

## 2022-04-06 PROCEDURE — 86140 C-REACTIVE PROTEIN: CPT | Performed by: INTERNAL MEDICINE

## 2022-04-06 PROCEDURE — 36415 COLL VENOUS BLD VENIPUNCTURE: CPT | Performed by: INTERNAL MEDICINE

## 2022-04-06 RX ORDER — CETIRIZINE HYDROCHLORIDE 10 MG/1
10 TABLET ORAL DAILY
Qty: 90 TABLET | Refills: 0 | Status: SHIPPED | OUTPATIENT
Start: 2022-04-06 | End: 2022-08-01 | Stop reason: SDUPTHER

## 2022-04-12 NOTE — PROGRESS NOTES
Assessment and Plan: Art Dean is a 77 y o  female who presents for follow up of aortitis  Patient seems to be having more side effects from prednisone than benefits  Aortitis resolved on latest CTA abdomen/pelvis  Decrease prednisone to 15mg daily for 2 weeks, then 10mg daily for 2 week,s, then 5mg daily for 2 weeks, then stoop  Do labs before next visit    Return to clinic in 3 months    Plan:  Diagnoses and all orders for this visit:    Aortitis ([address] )  -     predniSONE 5 mg tablet; Take 3 tablets (15 mg total) by mouth daily for 14 days, THEN 2 tablets (10 mg total) daily for 14 days, THEN 1 tablet (5 mg total) daily for 14 days  -     CBC and differential  -     Comprehensive metabolic panel  -     Sedimentation rate, automated  -     C-reactive protein    Cushingoid facies  -     ACTH    Current chronic use of systemic steroids   Current chronic use of systemic steroids - Benefits and potential side-effects of prednisone including, but not limited to infection, diabetes, hypertension, muscle weakness, osteoporosis, peptic ulcer disease and cataracts were discussed with the patient  Follow-up plan: RTC in 6 months        Rheumatic Disease Summary  11/3/21 initial visit: Art Dean is a 77 y o   female who presents for 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  Her abdominal pain symptoms resolved with prednisone 60mg po daily, which was decreased after a couple of weeks as her inflammatory markers improved to 40mg po daily  Has some abdominal discomfort on the 40mg daily or prednisone  She should continue this dose until she has a repeat Abdominal CT scan on 11/11th to see if there's resolutionon of the aortitis, after which, a treatment plan can be made  Repeat ESR/CRP ordered, with ESR slightly uptrending       Will call with plan after CT and next lab results  Continue 2,000 units of Vit   D daily to help with bone health while on high-does prednisone  Continue Protonix 40mg po daily to help with GI prophylaxis while on high-dose prednisone  Schedule DEXA scan for osteoporosis screening given her high-dose prednisone use, chronic tobacco use, and her age    2/2/22: She does not fit into Takayasu arteritis because of her age age and not meeting other criteria (such as BP difference between arms, etc )  Initially patient had COVID19 infection in September  She was complaining of LLQ pain and her PCP ordered CT chest  The imaging showed signs of aortitis in the abdominal aorta  Her ESR and CRP were elevated  Her P-ANCA, C-ANCA , ASPEN antibodies were negative  Patient was treated with prednisonen 40 mg daily initially  Serial CT abdomen pelvis showed resolution of aortitis  Her abdominal pain has significantly improved  Patient says she did have short episode of sharp pain in her periumbilical area yesterday  I will order ECHO and EKG at this moment to work up for cardiac involvement of the vasculitis  We will check her inflammatory markers again and decide if we're going to start her on Actemra  They returned improved so plan on decreasing by 5mg increments every month until off  Finish a full month on 30mg daily, then go down to 25mg daily for a month, then 20mg daily for a month, and so on  EKG showed no changes  Schedule echocardiogram to work-up left sided chest pain  Will consider starting Actemra (tocilizumab) if inflammatory markers worsen    HPI  Royer Acuna is a 77 y o   female who presents for follow up  Her last clinic visit was 2/2/22  Prednisone has been causing too much swelling  The following portions of the patient's history were reviewed and updated as appropriate: allergies, current medications, past family history, past medical history, past social history, past surgical history and problem list     Review of Systems:   Review of Systems   Constitutional: Negative for fatigue  HENT: Negative for mouth sores  Eyes: Negative for pain  Respiratory: Negative for shortness of breath  Cardiovascular: Negative for leg swelling  Musculoskeletal: Negative for arthralgias and joint swelling  Skin: Negative for rash  Neurological: Negative for weakness  Hematological: Negative for adenopathy  Psychiatric/Behavioral: Negative for sleep disturbance         Home Medications:    Current Outpatient Medications:     acetaminophen (TYLENOL 8 HOUR ARTHRITIS PAIN) 650 mg CR tablet, Take by mouth every 8 (eight) hours as needed  , Disp: , Rfl:     albuterol (PROVENTIL HFA,VENTOLIN HFA) 90 mcg/act inhaler, INHALE 2 PUFFS BY MOUTH EVERY 6 HOURS AS NEEDED FOR WHEEZE, Disp: 18 g, Rfl: 1    ALPRAZolam (XANAX) 0 25 mg tablet, Take 1 tablet (0 25 mg total) by mouth 3 (three) times a day as needed for anxiety, Disp: 90 tablet, Rfl: 0    amLODIPine (NORVASC) 5 mg tablet, Take 1 tablet (5 mg total) by mouth daily, Disp: 30 tablet, Rfl: 5    cetirizine (ZyrTEC) 10 mg tablet, Take 1 tablet (10 mg total) by mouth daily, Disp: 90 tablet, Rfl: 0    escitalopram (LEXAPRO) 10 mg tablet, TAKE 1 TABLET BY MOUTH EVERY DAY, Disp: 90 tablet, Rfl: 1    fluticasone (FLONASE) 50 mcg/act nasal spray, SPRAY 2 SPRAYS INTO EACH NOSTRIL EVERY DAY, Disp: 16 mL, Rfl: 3    fluticasone-umeclidinium-vilanterol (Trelegy Ellipta) 200-62 5-25 MCG/INH AEPB inhaler, Inhale 1 puff daily Rinse mouth after use , Disp: 60 blister, Rfl: 5    gabapentin (Neurontin) 300 mg capsule, Take 1 capsule (300 mg total) by mouth 3 (three) times a day, Disp: 90 capsule, Rfl: 0    Glycerin-Hypromellose- (DRY EYE RELIEF DROPS OP), Apply to eye, Disp: , Rfl:     HYDROcodone-acetaminophen (Norco) 5-325 mg per tablet, Take 1 tablet by mouth every 6 (six) hours as needed for pain Max Daily Amount: 4 tablets, Disp: 60 tablet, Rfl: 0    lidocaine (Lidoderm) 5 %, Apply 1 patch topically daily Remove & Discard patch within 12 hours or as directed by MD, Disp: 30 patch, Rfl: 1    lisinopril (ZESTRIL) 20 mg tablet, TAKE 1 TABLET BY MOUTH EVERY DAY, Disp: 90 tablet, Rfl: 1    Lumigan 0 01 % ophthalmic drops, Administer 1 drop to both eyes daily at bedtime  , Disp: , Rfl:     MINOXIDIL, TOPICAL, 5 % SOLN, Apply 1 application topically, Disp: , Rfl:     montelukast (SINGULAIR) 10 mg tablet, TAKE 1 TABLET BY MOUTH EVERYDAY AT BEDTIME, Disp: 90 tablet, Rfl: 1    naloxone (NARCAN) 4 mg/0 1 mL nasal spray, Administer 1 spray into a nostril  If no response after 2-3 minutes, give another dose in the other nostril using a new spray , Disp: 1 each, Rfl: 1    nystatin (MYCOSTATIN) 500,000 units/5 mL suspension, SWISH AND SPIT WITH 5 ML 4 TIMES A DAY, Disp: 200 mL, Rfl: 3    pantoprazole (PROTONIX) 40 mg tablet, Take 1 tablet (40 mg total) by mouth 2 (two) times a day, Disp: 180 tablet, Rfl: 1    predniSONE 5 mg tablet, Take 3 tablets (15 mg total) by mouth daily for 14 days, THEN 2 tablets (10 mg total) daily for 14 days, THEN 1 tablet (5 mg total) daily for 14 days  , Disp: 84 tablet, Rfl: 0    promethazine-dextromethorphan (PHENERGAN-DM) 6 25-15 mg/5 mL oral syrup, TAKE 5 MLS BY MOUTH 4 TIMES A DAY AS NEEDED FOR COUGH, Disp: 150 mL, Rfl: 0    sucralfate (CARAFATE) 1 g tablet, Take 1 tablet (1 g total) by mouth 4 (four) times a day, Disp: 20 tablet, Rfl: 0    timolol (TIMOPTIC) 0 25 % ophthalmic solution, , Disp: , Rfl:     traMADol (Ultram) 50 mg tablet, Take 1 tablet (50 mg total) by mouth every 6 (six) hours as needed for moderate pain, Disp: 30 tablet, Rfl: 0    Objective:    Vitals:    04/13/22 1203   Weight: 94 8 kg (209 lb)   Height: 5' 4" (1 626 m)       Physical Exam  Constitutional:       General: She is not in acute distress  HENT:      Head: Normocephalic and atraumatic  Eyes:      Conjunctiva/sclera: Conjunctivae normal    Cardiovascular:      Rate and Rhythm: Normal rate and regular rhythm  Heart sounds: S1 normal and S2 normal  No friction rub     Pulmonary:      Effort: Pulmonary effort is normal  No respiratory distress  Breath sounds: Normal breath sounds  No wheezing, rhonchi or rales  Abdominal:      Tenderness: There is abdominal tenderness  Comments: Left upper and lower quadrant abdominal tenderness   Musculoskeletal:         General: No tenderness  Cervical back: Neck supple  Skin:     Coloration: Skin is not pale  Neurological:      Mental Status: She is alert  Mental status is at baseline  Psychiatric:         Mood and Affect: Mood normal          Behavior: Behavior normal        Reviewed labs and imaging  Imaging:   CTA abdomen pelvis w wo contrast 4/4/22  No evidence of vessel inflammation, including the aorta to suggest residual aortitis  No acute intra-abdominal abnormality  CT lung screening program 1/28/2022  Impression: 1  Tiny stable nodules and scarring  No new or suspicious nodule  Lung-RADS2, benign appearance or behavior  Continue annual screening with LDCT in 12 months  2   Hepatic steatosis  The study was marked in [facility] for follow-up  XR chest pa & lateral 10/31/2021  Impression: No acute cardiopulmonary disease       CT Abdomen/Pelvis with contrast 09/27/2021  IMPRESSION:  Minimal inflammatory changes surrounding the distal infrarenal abdominal aorta best appreciated on images 601/92 and 2/39 concerning for early aortitis    Hepatomegaly with steatosis and hepatic cyst   Colonic diverticulosis without diverticulitis    Labs:   Orders Only on 04/06/2022   Component Date Value Ref Range Status    Sed Rate 04/06/2022 47* 0 - 29 mm/hour Final    CRP 04/06/2022 3 6* <3 0 mg/L Final   Hospital Outpatient Visit on 03/31/2022   Component Date Value Ref Range Status    LA size 03/31/2022 3 3  cm Final    LVPWd 03/31/2022 1 10  0 53 - 1 01 cm Final    Left Atrium Area-systolic Apical T* 93/02/3791 19 3  cm2 Final    Left Atrium Area-systolic Four [place]ole* 45/52/8677 18 7  cm2 Final    MV E' Tissue Velocity Septal 03/31/2022 8  cm/s Final  RA 2D Volume 03/31/2022 22 0  mL Final    IVSd 03/31/2022 1 10  cm Final    LV DIASTOLIC VOLUME (MOD BIPLANE) * 03/31/2022 60  mL Final    LEFT VENTRICLE SYSTOLIC VOLUME (MO* 19/55/5379 20  mL Final    Left ventricular stroke volume (2D) 03/31/2022 39 00  mL Final    A4C EF 03/31/2022 70  % Final    LA length (A2C) 03/31/2022 5 10  cm Final    LVIDd 03/31/2022 3 70  5 56 - 8 29 cm Final    IVS 03/31/2022 1 1  0 55 - 1 02 cm Final    LVIDS 03/31/2022 2 40  3 37 - 5 10 cm Final    FS 03/31/2022 35  28 - 44 % Final    Ao root 03/31/2022 3 00  cm Final    RVID d 03/31/2022 3 1  cm Final    MV valve area p 1/2 method 03/31/2022 3 44  cm2 Final    E wave deceleration time 03/31/2022 221  ms Final    MV Peak E Randall 03/31/2022 78  cm/s Final    MV Peak A Randall 03/31/2022 1 01  m/s Final    RAA A4C 03/31/2022 11 5  cm2 Final    MV stenosis pressure 1/2 time 03/31/2022 64  ms Final    LVSV, 2D 03/31/2022 39  mL Final    ZLVPWD 03/31/2022 2 70   Final    ZLVIDS 03/31/2022 -4 31   Final    ZLVIDD 03/31/2022 -6 07   Final    ZIVSD 03/31/2022 2 59   Final    LV EF 03/31/2022 70   Final   Orders Only on 03/23/2022   Component Date Value Ref Range Status    Color, UA 03/23/2022 Colorless   Final    Clarity, UA 03/23/2022 Clear   Final    Specific [place], UA 03/23/2022 1 007  1 003 - 1 030 Final    pH, UA 03/23/2022 6 0  4 5, 5 0, 5 5, 6 0, 6 5, 7 0, 7 5, 8 0 Final    Leukocytes, UA 03/23/2022 Trace* Negative Final    Nitrite, UA 03/23/2022 Negative  Negative Final    Protein, UA 03/23/2022 Negative  Negative mg/dl Final    Glucose, UA 03/23/2022 Negative  Negative mg/dl Final    Ketones, UA 03/23/2022 Negative  Negative mg/dl Final    Urobilinogen, UA 03/23/2022 <2 0  <2 0 mg/dl mg/dl Final    Bilirubin, UA 03/23/2022 Negative  Negative Final    Blood, UA 03/23/2022 Negative  Negative Final    Urine Culture 03/23/2022 20,000-29,000 cfu/ml    Final    Mixed Contaminants X3    RBC, UA 03/23/2022 None Seen  None Seen, 1-2 /hpf Final    WBC, UA 03/23/2022 2-4* None Seen, 1-2 /hpf Final    Epithelial Cells 03/23/2022 Occasional  None Seen, Occasional /hpf Final    Bacteria, UA 03/23/2022 None Seen  None Seen, Occasional /hpf Final   Appointment on 03/09/2022   Component Date Value Ref Range Status    WBC 03/09/2022 15 63* 4 31 - 10 16 Thousand/uL Final    RBC 03/09/2022 5 12  3 81 - 5 12 Million/uL Final    Hemoglobin 03/09/2022 14 2  11 5 - 15 4 g/dL Final    Hematocrit 03/09/2022 43 7  34 8 - 46 1 % Final    MCV 03/09/2022 85  82 - 98 fL Final    MCH 03/09/2022 27 7  26 8 - 34 3 pg Final    MCHC 03/09/2022 32 5  31 4 - 37 4 g/dL Final    RDW 03/09/2022 14 3  11 6 - 15 1 % Final    Platelets 44/74/0076 378  149 - 390 Thousands/uL Final    MPV 03/09/2022 9 1  8 9 - 12 7 fL Final    CRP 03/09/2022 <3 0  <3 0 mg/L Final    Sed Rate 03/09/2022 58* 0 - 29 mm/hour Final   Appointment on 03/01/2022   Component Date Value Ref Range Status    WBC 03/01/2022 17 78* 4 31 - 10 16 Thousand/uL Final    RBC 03/01/2022 4 99  3 81 - 5 12 Million/uL Final    Hemoglobin 03/01/2022 13 9  11 5 - 15 4 g/dL Final    Hematocrit 03/01/2022 42 0  34 8 - 46 1 % Final    MCV 03/01/2022 84  82 - 98 fL Final    MCH 03/01/2022 27 9  26 8 - 34 3 pg Final    MCHC 03/01/2022 33 1  31 4 - 37 4 g/dL Final    RDW 03/01/2022 14 9  11 6 - 15 1 % Final    MPV 03/01/2022 10 2  8 9 - 12 7 fL Final    Platelets 18/73/9185 222  149 - 390 Thousands/uL Final    nRBC 03/01/2022 0  /100 WBCs Final    Neutrophils Relative 03/01/2022 86* 43 - 75 % Final    Immat GRANS % 03/01/2022 1  0 - 2 % Final    Lymphocytes Relative 03/01/2022 7* 14 - 44 % Final    Monocytes Relative 03/01/2022 6  4 - 12 % Final    Eosinophils Relative 03/01/2022 0  0 - 6 % Final    Basophils Relative 03/01/2022 0  0 - 1 % Final    Neutrophils Absolute 03/01/2022 15 25* 1 85 - 7 62 Thousands/µL Final    Immature Grans Absolute 03/01/2022 0 13 0 00 - 0 20 Thousand/uL Final    Lymphocytes Absolute 03/01/2022 1 23  0 60 - 4 47 Thousands/µL Final    Monocytes Absolute 03/01/2022 1 14  0 17 - 1 22 Thousand/µL Final    Eosinophils Absolute 03/01/2022 0 00  0 00 - 0 61 Thousand/µL Final    Basophils Absolute 03/01/2022 0 03  0 00 - 0 10 Thousands/µL Final    Sodium 03/01/2022 133* 136 - 145 mmol/L Final    Potassium 03/01/2022 3 8  3 5 - 5 3 mmol/L Final    Chloride 03/01/2022 101  100 - 108 mmol/L Final    CO2 03/01/2022 24  21 - 32 mmol/L Final    ANION GAP 03/01/2022 8  4 - 13 mmol/L Final    BUN 03/01/2022 9  5 - 25 mg/dL Final    Creatinine 03/01/2022 0 73  0 60 - 1 30 mg/dL Final    Standardized to IDMS reference method    Glucose 03/01/2022 164* 65 - 140 mg/dL Final    If the patient is fasting, the ADA then defines impaired fasting glucose as > 100 mg/dL and diabetes as > or equal to 123 mg/dL  Specimen collection should occur prior to Sulfasalazine administration due to the potential for falsely depressed results  Specimen collection should occur prior to Sulfapyridine administration due to the potential for falsely elevated results   Calcium 03/01/2022 9 8  8 3 - 10 1 mg/dL Final    Corrected Calcium 03/01/2022 10 3* 8 3 - 10 1 mg/dL Final    AST 03/01/2022 15  5 - 45 U/L Final    Specimen collection should occur prior to Sulfasalazine administration due to the potential for falsely depressed results   ALT 03/01/2022 22  12 - 78 U/L Final    Specimen collection should occur prior to Sulfasalazine and/or Sulfapyridine administration due to the potential for falsely depressed results       Alkaline Phosphatase 03/01/2022 64  46 - 116 U/L Final    Total Protein 03/01/2022 7 5  6 4 - 8 2 g/dL Final    Albumin 03/01/2022 3 4* 3 5 - 5 0 g/dL Final    Total Bilirubin 03/01/2022 0 49  0 20 - 1 00 mg/dL Final    Use of this assay is not recommended for patients undergoing treatment with eltrombopag due to the potential for falsely elevated results      eGFR 03/01/2022 86  ml/min/1 73sq m Final    CRP 03/01/2022 102 0* <3 0 mg/L Final    Lipase 03/01/2022 137  73 - 393 u/L Final    Sed Rate 03/01/2022 55* 0 - 29 mm/hour Final    Color, UA 03/01/2022 Colorless   Final    Clarity, UA 03/01/2022 Clear   Final    Specific [place], UA 03/01/2022 1 007  1 003 - 1 030 Final    pH, UA 03/01/2022 6 0  4 5, 5 0, 5 5, 6 0, 6 5, 7 0, 7 5, 8 0 Final    Leukocytes, UA 03/01/2022 Negative  Negative Final    Nitrite, UA 03/01/2022 Negative  Negative Final    Protein, UA 03/01/2022 Negative  Negative mg/dl Final    Glucose, UA 03/01/2022 Negative  Negative mg/dl Final    Ketones, UA 03/01/2022 Negative  Negative mg/dl Final    Urobilinogen, UA 03/01/2022 <2 0  <2 0 mg/dl mg/dl Final    Bilirubin, UA 03/01/2022 Negative  Negative Final    Blood, UA 03/01/2022 Negative  Negative Final   Telemedicine on 03/01/2022   Component Date Value Ref Range Status    POCT SARS-CoV-2 Ag 03/01/2022 Negative  Negative Final    VALID CONTROL 03/01/2022 Valid   Final   Office Visit on 02/02/2022   Component Date Value Ref Range Status    Ventricular Rate 02/02/2022 67  BPM Final    Atrial Rate 02/02/2022 67  BPM Final    OK Interval 02/02/2022 144  ms Final    QRSD Interval 02/02/2022 80  ms Final    QT Interval 02/02/2022 398  ms Final    QTC Interval 02/02/2022 420  ms Final    P Axis 02/02/2022 38  degrees Final    QRS Axis 02/02/2022 47  degrees Final    T Wave Axis 02/02/2022 46  degrees Final   Office Visit on 02/02/2022   Component Date Value Ref Range Status    QFT Nil 02/02/2022 0 02  0 - 8 0 IU/ml Final    QFT TB1-NIL 02/02/2022 0 00  IU/ml Final    QFT TB2-NIL 02/02/2022 0 00  IU/ml Final    QFT Mitogen-NIL 02/02/2022 0 48  IU/ml Final    QFT Final Interpretation 02/02/2022 Indeterminate* Negative Final    Indeterminate results due to low Interferon-gamma in the mitogen minus nil result (<0 50 IU/ml) may occur due to low lymphocyte count, reduced lymphocyte activity, or inablility of the patient's lymphocytes to generate interferon-gamma  Indeterminate results due to a high level of Interferon-gamma in the Nil tube (>8 00 IU/ml) may occur due to a heterophile antibody or nonspecific Interferon-gamma in the patient's blood sample  Indeterminate results may occur due to incorrect collection, transport or handling of the blood specimen  Based on the given collection time and lab receipt time, this specimen meets acceptable criteria for testing  Repeat testing on a new specimen is suggested      Hepatitis B Surface Ag 02/02/2022 Non-reactive  Non-reactive, NonReactive - Confirmed Final    Hepatitis C Ab 02/02/2022 Non-reactive  Non-reactive Final    Hep B C IgM 02/02/2022 Non-reactive  Non-reactive Final    Hep B Core Total Ab 02/02/2022 Non-reactive  Non-reactive Final    WBC 02/02/2022 14 51* 4 31 - 10 16 Thousand/uL Final    RBC 02/02/2022 5 35* 3 81 - 5 12 Million/uL Final    Hemoglobin 02/02/2022 14 9  11 5 - 15 4 g/dL Final    Hematocrit 02/02/2022 46 7* 34 8 - 46 1 % Final    MCV 02/02/2022 87  82 - 98 fL Final    MCH 02/02/2022 27 9  26 8 - 34 3 pg Final    MCHC 02/02/2022 31 9  31 4 - 37 4 g/dL Final    RDW 02/02/2022 14 4  11 6 - 15 1 % Final    MPV 02/02/2022 9 2  8 9 - 12 7 fL Final    Platelets 69/58/3454 291  149 - 390 Thousands/uL Final    nRBC 02/02/2022 0  /100 WBCs Final    Neutrophils Relative 02/02/2022 84* 43 - 75 % Final    Immat GRANS % 02/02/2022 2  0 - 2 % Final    Lymphocytes Relative 02/02/2022 9* 14 - 44 % Final    Monocytes Relative 02/02/2022 4  4 - 12 % Final    Eosinophils Relative 02/02/2022 1  0 - 6 % Final    Basophils Relative 02/02/2022 0  0 - 1 % Final    Neutrophils Absolute 02/02/2022 12 21* 1 85 - 7 62 Thousands/µL Final    Immature Grans Absolute 02/02/2022 0 23* 0 00 - 0 20 Thousand/uL Final    Lymphocytes Absolute 02/02/2022 1 33  0 60 - 4 47 Thousands/µL Final    Monocytes Absolute 02/02/2022 0 56  0 17 - 1 22 Thousand/µL Final    Eosinophils Absolute 02/02/2022 0 12  0 00 - 0 61 Thousand/µL Final    Basophils Absolute 02/02/2022 0 06  0 00 - 0 10 Thousands/µL Final    Sed Rate 02/02/2022 37* 0 - 29 mm/hour Final    CRP 02/02/2022 <3 0  <3 0 mg/L Final   Orders Only on 11/17/2021   Component Date Value Ref Range Status    Sed Rate 01/19/2022 56* 0 - 29 mm/hour Final    CRP 01/19/2022 6 0* <3 0 mg/L Final   Appointment on 11/08/2021   Component Date Value Ref Range Status    Sodium 11/08/2021 132* 136 - 145 mmol/L Final    Potassium 11/08/2021 4 3  3 5 - 5 3 mmol/L Final    Chloride 11/08/2021 100  100 - 108 mmol/L Final    CO2 11/08/2021 26  21 - 32 mmol/L Final    ANION GAP 11/08/2021 6  4 - 13 mmol/L Final    BUN 11/08/2021 9  5 - 25 mg/dL Final    Creatinine 11/08/2021 0 71  0 60 - 1 30 mg/dL Final    Standardized to IDMS reference method    Glucose, Fasting 11/08/2021 133* 65 - 99 mg/dL Final    Specimen collection should occur prior to Sulfasalazine administration due to the potential for falsely depressed results  Specimen collection should occur prior to Sulfapyridine administration due to the potential for falsely elevated results   Calcium 11/08/2021 9 8  8 3 - 10 1 mg/dL Final    AST 11/08/2021 14  5 - 45 U/L Final    Specimen collection should occur prior to Sulfasalazine administration due to the potential for falsely depressed results   ALT 11/08/2021 31  12 - 78 U/L Final    Specimen collection should occur prior to Sulfasalazine and/or Sulfapyridine administration due to the potential for falsely depressed results       Alkaline Phosphatase 11/08/2021 79  46 - 116 U/L Final    Total Protein 11/08/2021 7 7  6 4 - 8 2 g/dL Final    Albumin 11/08/2021 3 5  3 5 - 5 0 g/dL Final    Total Bilirubin 11/08/2021 0 43  0 20 - 1 00 mg/dL Final    Use of this assay is not recommended for patients undergoing treatment with eltrombopag due to the potential for falsely elevated results   eGFR 11/08/2021 90  ml/min/1 73sq m Final   There may be more visits with results that are not included

## 2022-04-13 ENCOUNTER — OFFICE VISIT (OUTPATIENT)
Dept: RHEUMATOLOGY | Facility: CLINIC | Age: 67
End: 2022-04-13
Payer: COMMERCIAL

## 2022-04-13 VITALS — BODY MASS INDEX: 35.68 KG/M2 | WEIGHT: 209 LBS | HEIGHT: 64 IN

## 2022-04-13 DIAGNOSIS — I77.6 AORTITIS (HCC): Primary | ICD-10-CM

## 2022-04-13 DIAGNOSIS — R68.89 CUSHINGOID FACIES: ICD-10-CM

## 2022-04-13 DIAGNOSIS — Z79.52 CURRENT CHRONIC USE OF SYSTEMIC STEROIDS: ICD-10-CM

## 2022-04-13 PROCEDURE — 99214 OFFICE O/P EST MOD 30 MIN: CPT | Performed by: INTERNAL MEDICINE

## 2022-04-13 RX ORDER — PREDNISONE 1 MG/1
TABLET ORAL
Qty: 84 TABLET | Refills: 0 | Status: SHIPPED | OUTPATIENT
Start: 2022-04-13 | End: 2022-05-25

## 2022-04-13 NOTE — PATIENT INSTRUCTIONS
Decrease prednisone to 15mg daily for 2 weeks, then 10mg daily for 2 week,s, then 5mg daily for 2 weeks, then stoop  Do labs before next visit    Return to clinic in 3 months

## 2022-04-25 ENCOUNTER — APPOINTMENT (EMERGENCY)
Dept: CT IMAGING | Facility: HOSPITAL | Age: 67
End: 2022-04-25
Payer: COMMERCIAL

## 2022-04-25 ENCOUNTER — OFFICE VISIT (OUTPATIENT)
Dept: FAMILY MEDICINE CLINIC | Facility: CLINIC | Age: 67
End: 2022-04-25
Payer: COMMERCIAL

## 2022-04-25 ENCOUNTER — HOSPITAL ENCOUNTER (EMERGENCY)
Facility: HOSPITAL | Age: 67
Discharge: HOME | End: 2022-04-25
Attending: EMERGENCY MEDICINE
Payer: COMMERCIAL

## 2022-04-25 VITALS
HEIGHT: 64 IN | DIASTOLIC BLOOD PRESSURE: 76 MMHG | HEART RATE: 80 BPM | SYSTOLIC BLOOD PRESSURE: 120 MMHG | TEMPERATURE: 97.9 F | WEIGHT: 212 LBS | BODY MASS INDEX: 36.19 KG/M2

## 2022-04-25 VITALS
DIASTOLIC BLOOD PRESSURE: 75 MMHG | TEMPERATURE: 99.1 F | RESPIRATION RATE: 17 BRPM | HEART RATE: 69 BPM | OXYGEN SATURATION: 97 % | SYSTOLIC BLOOD PRESSURE: 177 MMHG

## 2022-04-25 DIAGNOSIS — R10.12 LUQ PAIN: Primary | ICD-10-CM

## 2022-04-25 DIAGNOSIS — R10.13 ACUTE EPIGASTRIC PAIN: Primary | ICD-10-CM

## 2022-04-25 LAB
2HR DELTA HS TROPONIN: 1 NG/L
ALBUMIN SERPL BCP-MCNC: 3.8 G/DL (ref 3.5–5)
ALP SERPL-CCNC: 76 U/L (ref 46–116)
ALT SERPL W P-5'-P-CCNC: 59 U/L (ref 12–78)
ANION GAP SERPL CALCULATED.3IONS-SCNC: 9 MMOL/L (ref 4–13)
AST SERPL W P-5'-P-CCNC: 19 U/L (ref 5–45)
ATRIAL RATE: 80 BPM
BASOPHILS # BLD AUTO: 0.05 THOUSANDS/ΜL (ref 0–0.1)
BASOPHILS NFR BLD AUTO: 0 % (ref 0–1)
BILIRUB SERPL-MCNC: 0.42 MG/DL (ref 0.2–1)
BUN SERPL-MCNC: 8 MG/DL (ref 5–25)
CALCIUM SERPL-MCNC: 9.7 MG/DL (ref 8.3–10.1)
CARDIAC TROPONIN I PNL SERPL HS: 5 NG/L
CARDIAC TROPONIN I PNL SERPL HS: 6 NG/L
CHLORIDE SERPL-SCNC: 102 MMOL/L (ref 100–108)
CO2 SERPL-SCNC: 30 MMOL/L (ref 21–32)
CREAT SERPL-MCNC: 0.63 MG/DL (ref 0.6–1.3)
EOSINOPHIL # BLD AUTO: 0.03 THOUSAND/ΜL (ref 0–0.61)
EOSINOPHIL NFR BLD AUTO: 0 % (ref 0–6)
ERYTHROCYTE [DISTWIDTH] IN BLOOD BY AUTOMATED COUNT: 13.9 % (ref 11.6–15.1)
GFR SERPL CREATININE-BSD FRML MDRD: 93 ML/MIN/1.73SQ M
GLUCOSE SERPL-MCNC: 98 MG/DL (ref 65–140)
HCT VFR BLD AUTO: 46.9 % (ref 34.8–46.1)
HGB BLD-MCNC: 15 G/DL (ref 11.5–15.4)
IMM GRANULOCYTES # BLD AUTO: 0.1 THOUSAND/UL (ref 0–0.2)
IMM GRANULOCYTES NFR BLD AUTO: 1 % (ref 0–2)
LACTATE SERPL-SCNC: 1.5 MMOL/L (ref 0.5–2)
LIPASE SERPL-CCNC: 121 U/L (ref 73–393)
LYMPHOCYTES # BLD AUTO: 1.75 THOUSANDS/ΜL (ref 0.6–4.47)
LYMPHOCYTES NFR BLD AUTO: 12 % (ref 14–44)
MCH RBC QN AUTO: 28.6 PG (ref 26.8–34.3)
MCHC RBC AUTO-ENTMCNC: 32 G/DL (ref 31.4–37.4)
MCV RBC AUTO: 89 FL (ref 82–98)
MONOCYTES # BLD AUTO: 1.06 THOUSAND/ΜL (ref 0.17–1.22)
MONOCYTES NFR BLD AUTO: 7 % (ref 4–12)
NEUTROPHILS # BLD AUTO: 11.81 THOUSANDS/ΜL (ref 1.85–7.62)
NEUTS SEG NFR BLD AUTO: 80 % (ref 43–75)
NRBC BLD AUTO-RTO: 0 /100 WBCS
P AXIS: 69 DEGREES
PLATELET # BLD AUTO: 306 THOUSANDS/UL (ref 149–390)
PMV BLD AUTO: 9.1 FL (ref 8.9–12.7)
POTASSIUM SERPL-SCNC: 4.1 MMOL/L (ref 3.5–5.3)
PR INTERVAL: 150 MS
PROT SERPL-MCNC: 7.8 G/DL (ref 6.4–8.2)
QRS AXIS: 70 DEGREES
QRSD INTERVAL: 76 MS
QT INTERVAL: 372 MS
QTC INTERVAL: 429 MS
RBC # BLD AUTO: 5.25 MILLION/UL (ref 3.81–5.12)
SODIUM SERPL-SCNC: 141 MMOL/L (ref 136–145)
T WAVE AXIS: 49 DEGREES
VENTRICULAR RATE: 80 BPM
WBC # BLD AUTO: 14.8 THOUSAND/UL (ref 4.31–10.16)

## 2022-04-25 PROCEDURE — G1004 CDSM NDSC: HCPCS

## 2022-04-25 PROCEDURE — 80053 COMPREHEN METABOLIC PANEL: CPT | Performed by: PHYSICIAN ASSISTANT

## 2022-04-25 PROCEDURE — 99285 EMERGENCY DEPT VISIT HI MDM: CPT | Performed by: PHYSICIAN ASSISTANT

## 2022-04-25 PROCEDURE — 74174 CTA ABD&PLVS W/CONTRAST: CPT

## 2022-04-25 PROCEDURE — 85025 COMPLETE CBC W/AUTO DIFF WBC: CPT | Performed by: PHYSICIAN ASSISTANT

## 2022-04-25 PROCEDURE — 36415 COLL VENOUS BLD VENIPUNCTURE: CPT | Performed by: PHYSICIAN ASSISTANT

## 2022-04-25 PROCEDURE — 93005 ELECTROCARDIOGRAM TRACING: CPT

## 2022-04-25 PROCEDURE — 99284 EMERGENCY DEPT VISIT MOD MDM: CPT

## 2022-04-25 PROCEDURE — 71275 CT ANGIOGRAPHY CHEST: CPT

## 2022-04-25 PROCEDURE — 96375 TX/PRO/DX INJ NEW DRUG ADDON: CPT

## 2022-04-25 PROCEDURE — 93010 ELECTROCARDIOGRAM REPORT: CPT | Performed by: INTERNAL MEDICINE

## 2022-04-25 PROCEDURE — 99214 OFFICE O/P EST MOD 30 MIN: CPT | Performed by: FAMILY MEDICINE

## 2022-04-25 PROCEDURE — 96374 THER/PROPH/DIAG INJ IV PUSH: CPT

## 2022-04-25 PROCEDURE — 83690 ASSAY OF LIPASE: CPT | Performed by: PHYSICIAN ASSISTANT

## 2022-04-25 PROCEDURE — 84484 ASSAY OF TROPONIN QUANT: CPT | Performed by: PHYSICIAN ASSISTANT

## 2022-04-25 PROCEDURE — 83605 ASSAY OF LACTIC ACID: CPT | Performed by: PHYSICIAN ASSISTANT

## 2022-04-25 RX ORDER — SUCRALFATE 1 G/1
1 TABLET ORAL 4 TIMES DAILY
Qty: 20 TABLET | Refills: 0 | Status: SHIPPED | OUTPATIENT
Start: 2022-04-25 | End: 2022-06-02 | Stop reason: ALTCHOICE

## 2022-04-25 RX ORDER — HYDROMORPHONE HCL/PF 1 MG/ML
0.5 SYRINGE (ML) INJECTION ONCE
Status: COMPLETED | OUTPATIENT
Start: 2022-04-25 | End: 2022-04-25

## 2022-04-25 RX ORDER — NICOTINE 21 MG/24HR
21 PATCH, TRANSDERMAL 24 HOURS TRANSDERMAL ONCE
Status: DISCONTINUED | OUTPATIENT
Start: 2022-04-25 | End: 2022-04-25 | Stop reason: HOSPADM

## 2022-04-25 RX ADMIN — HYDROMORPHONE HYDROCHLORIDE 0.5 MG: 1 INJECTION, SOLUTION INTRAMUSCULAR; INTRAVENOUS; SUBCUTANEOUS at 15:52

## 2022-04-25 RX ADMIN — FAMOTIDINE 20 MG: 10 INJECTION, SOLUTION INTRAVENOUS at 15:51

## 2022-04-25 RX ADMIN — IOHEXOL 100 ML: 350 INJECTION, SOLUTION INTRAVENOUS at 16:21

## 2022-04-25 RX ADMIN — NICOTINE 21 MG: 21 PATCH, EXTENDED RELEASE TRANSDERMAL at 17:29

## 2022-04-25 NOTE — PROGRESS NOTES
Assessment/Plan:    Epigastric pain  I reviewed with pt  I am concerned that her severe epigastric pain radiating to her back could be related to pancreatitis (possibly gallstone)  Given her symptoms, I told pt that she should be evaluated in the ED  Pt agrees  ED called to to sign out pt  Diagnoses and all orders for this visit:    Acute epigastric pain        Subjective:      Patient ID: Mallory Zuñiga is a 77 y o  female  Here for acute abd pain  - 70-year-old female here for 2 day history of epigastric pain  Patient states that approximately 1 hour after eating dinner (which consisted of steak, corn on the cob, and potato salad) patient developed severe epigastric pain that radiates straight to her back  No improvement with having a bowel movement (patient has noted some constipation prior to this up)  Patient has decreased appetite but did have 2 pieces of toast with applesauce yesterday without worsening of the abdominal pain  Patient denies any fever chills, nausea, vomiting, or diarrhea  Patient is 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, presently on 15 mg daily for last 2 weeks  Patient states that the pain is in the same area but 1000 times worse  Recent CT scan done on 4/for the not show evidence of persistent aortitis  Patient denies any chest pain or shortness of breath with present symptoms  There is also no diaphoresis or fever/chills  The following portions of the patient's history were reviewed and updated as appropriate:   She  has a past medical history of Acid reflux, Fibromyalgia, Hypertension, and Seasonal allergies    She   Patient Active Problem List    Diagnosis Date Noted    Chronic frontal sinusitis 03/23/2022    Pain of both eyes 02/25/2022    Candida vaginitis 01/19/2022    Primary insomnia 11/24/2021    Aortitis ([facility] [address] ) 10/31/2021    Increased intraocular pressure 10/31/2021    Chest pain 02/08/2021    Dermatitis 09/14/2020    Prediabetes 04/10/2020    Chronic idiopathic constipation 12/16/2019    Gastroparesis 12/16/2019    Severe obesity (BMI 35 0-35 9 with comorbidity) ([facility] ) 11/21/2019    Lower extremity edema 11/21/2019    Snoring 11/21/2019    Cigarette nicotine dependence with nicotine-induced disorder 10/02/2019    Neck pain 07/11/2019    Neuropathic pain 01/09/2019    Early satiety 08/28/2018    Abdominal distension 08/28/2018    Family history of pancreatic cancer 08/28/2018    Epigastric pain 08/28/2018    Dyspnea on exertion 05/04/2018    Edema 07/13/2017    Lumbosacral radiculopathy at L5 01/06/2017    Compression fracture of thoracic vertebra, non-traumatic (HCC) 08/30/2016    Hyperlipidemia 12/08/2015    Cataract 08/22/2014    Palpitations 08/05/2014    Chronic obstructive pulmonary disease ([place] [address] ) 04/17/2013    Esophageal reflux 04/17/2013    Pulmonary nodule seen on imaging study 04/17/2013    Hypertension 03/22/2013    Mild episode of recurrent major depressive disorder ([place] [address] ) 03/14/2013    Fibromyalgia 03/14/2013    Degeneration of intervertebral disc 03/14/2013    Anxiety 02/21/2013    Seasonal allergic rhinitis 12/20/2012     She  has a past surgical history that includes Knee surgery (1998); pr colonoscopy flx dx w/collj spec when pfrmd (N/A, 5/9/2017); and Eye surgery  She  reports that she has been smoking cigarettes  She has a 100 00 pack-year smoking history  She has never used smokeless tobacco  She reports that she does not drink alcohol and does not use drugs    Current Outpatient Medications   Medication Sig Dispense Refill    acetaminophen (TYLENOL 8 HOUR ARTHRITIS PAIN) 650 mg CR tablet Take by mouth every 8 (eight) hours as needed        albuterol (PROVENTIL HFA,VENTOLIN HFA) 90 mcg/act inhaler INHALE 2 PUFFS BY MOUTH EVERY 6 HOURS AS NEEDED FOR WHEEZE 18 g 1    ALPRAZolam (XANAX) 0 25 mg tablet Take 1 tablet (0 25 mg total) by mouth 3 (three) times a day as needed for anxiety 90 tablet 0  amLODIPine (NORVASC) 5 mg tablet Take 1 tablet (5 mg total) by mouth daily 30 tablet 5    cetirizine (ZyrTEC) 10 mg tablet Take 1 tablet (10 mg total) by mouth daily 90 tablet 0    escitalopram (LEXAPRO) 10 mg tablet TAKE 1 TABLET BY MOUTH EVERY DAY 90 tablet 1    fluticasone (FLONASE) 50 mcg/act nasal spray SPRAY 2 SPRAYS INTO EACH NOSTRIL EVERY DAY 16 mL 3    Glycerin-Hypromellose- (DRY EYE RELIEF DROPS OP) Apply to eye      lisinopril (ZESTRIL) 20 mg tablet TAKE 1 TABLET BY MOUTH EVERY DAY 90 tablet 1    Lumigan 0 01 % ophthalmic drops Administer 1 drop to both eyes daily at bedtime        MINOXIDIL, TOPICAL, 5 % SOLN Apply 1 application topically      montelukast (SINGULAIR) 10 mg tablet TAKE 1 TABLET BY MOUTH EVERYDAY AT BEDTIME 90 tablet 1    nystatin (MYCOSTATIN) 500,000 units/5 mL suspension SWISH AND SPIT WITH 5 ML 4 TIMES A  mL 3    pantoprazole (PROTONIX) 40 mg tablet Take 1 tablet (40 mg total) by mouth 2 (two) times a day 180 tablet 1    predniSONE 5 mg tablet Take 3 tablets (15 mg total) by mouth daily for 14 days, THEN 2 tablets (10 mg total) daily for 14 days, THEN 1 tablet (5 mg total) daily for 14 days  84 tablet 0    promethazine-dextromethorphan (PHENERGAN-DM) 6 25-15 mg/5 mL oral syrup TAKE 5 MLS BY MOUTH 4 TIMES A DAY AS NEEDED FOR COUGH 150 mL 0    timolol (TIMOPTIC) 0 25 % ophthalmic solution       fluticasone-umeclidinium-vilanterol (Trelegy Ellipta) 200-62 5-25 MCG/INH AEPB inhaler Inhale 1 puff daily Rinse mouth after use  60 blister 5     No current facility-administered medications for this visit  She is allergic to augmentin [amoxicillin-pot clavulanate], miconazole, and wixela inhub [fluticasone-salmeterol]       Review of Systems   Constitutional: Positive for fatigue  HENT: Negative  Eyes: Negative  Respiratory: Negative  Gastrointestinal: Positive for abdominal distention and abdominal pain   Negative for constipation, diarrhea, nausea and vomiting  Genitourinary: Negative  Musculoskeletal: Positive for back pain (radiating from epigastric area)  Neurological: Negative  Objective:      /76   Pulse 80   Temp 97 9 °F (36 6 °C)   Ht 5' 4" (1 626 m)   Wt 96 2 kg (212 lb)   LMP  (LMP Unknown)   BMI 36 39 kg/m²          Physical Exam  Vitals reviewed  HENT:      Head: Normocephalic  Mouth/Throat:      Mouth: Mucous membranes are moist    Eyes:      Extraocular Movements: Extraocular movements intact  Conjunctiva/sclera: Conjunctivae normal       Pupils: Pupils are equal, round, and reactive to light  Cardiovascular:      Rate and Rhythm: Normal rate and regular rhythm  Pulses: Normal pulses  Pulmonary:      Effort: Pulmonary effort is normal    Abdominal:      General: There is no distension  Palpations: There is no mass  Tenderness: There is abdominal tenderness (TTP over the epigastric area, with less over the RUQ  Discomfort noted with heel strike)  There is no right CVA tenderness or left CVA tenderness  Musculoskeletal:         General: No swelling  Cervical back: Normal range of motion  Right lower leg: No edema  Left lower leg: No edema  Skin:     General: Skin is warm  Capillary Refill: Capillary refill takes less than 2 seconds  Neurological:      General: No focal deficit present  Mental Status: She is alert and oriented to person, place, and time

## 2022-04-25 NOTE — ED PROVIDER NOTES
History  Chief Complaint   Patient presents with    Epigastric Pain     started 3 days ago, was family doctor this morning and was told to come over her  ?pancreatitis      73yo female with a history of hypertension, GERD, fibromyalgia, and aortitis on prednisone presenting for evaluation of abdominal pain x 2-3 days  She reports a sharp, stabbing pain in her epigastric region that radiates to the back  Pain initially started after eating a large dinner of steak, corn on the nix, and potato salad  Pain is worse with coughing and sneezing  Patient has not been eating much due to her symptoms but she was able to eat applesauce last night without any worsening of her pain  Patient was seen by her PCP today for these symptoms and she was sent here for evaluation  Patient is otherwise asymptomatic and denies any fevers, chest pain, shortness of breath, vomiting, diarrhea, dysuria  No previous abdominal surgeries  Patient is currently on prednisone for several months for aortitis  CTA abdomen on 4/4/22 was negative for acute findings  History provided by:  Patient and medical records   used: No    Abdominal Pain  Pain location:  Epigastric  Pain quality: sharp and stabbing    Pain radiates to:  Back  Pain severity:  Moderate  Onset quality:  Gradual  Duration:  3 days  Timing:  Constant  Progression:  Unchanged  Chronicity:  New  Relieved by:  Nothing  Worsened by:  Coughing  Associated symptoms: no chest pain, no chills, no fever, no nausea, no shortness of breath and no vomiting        Prior to Admission Medications   Prescriptions Last Dose Informant Patient Reported? Taking?    ALPRAZolam (XANAX) 0 25 mg tablet  Self No No   Sig: Take 1 tablet (0 25 mg total) by mouth 3 (three) times a day as needed for anxiety   Glycerin-Hypromellose- (DRY EYE RELIEF DROPS OP)  Self Yes No   Sig: Apply to eye   Lumigan 0 01 % ophthalmic drops  Self Yes No   Sig: Administer 1 drop to both eyes daily at bedtime     MINOXIDIL, TOPICAL, 5 % SOLN  Self Yes No   Sig: Apply 1 application topically   acetaminophen (TYLENOL 8 HOUR ARTHRITIS PAIN) 650 mg CR tablet  Self Yes No   Sig: Take by mouth every 8 (eight) hours as needed     albuterol (PROVENTIL HFA,VENTOLIN HFA) 90 mcg/act inhaler  Self No No   Sig: INHALE 2 PUFFS BY MOUTH EVERY 6 HOURS AS NEEDED FOR WHEEZE   amLODIPine (NORVASC) 5 mg tablet  Self No No   Sig: Take 1 tablet (5 mg total) by mouth daily   cetirizine (ZyrTEC) 10 mg tablet  Self No No   Sig: Take 1 tablet (10 mg total) by mouth daily   escitalopram (LEXAPRO) 10 mg tablet  Self No No   Sig: TAKE 1 TABLET BY MOUTH EVERY DAY   fluticasone (FLONASE) 50 mcg/act nasal spray  Self No No   Sig: SPRAY 2 SPRAYS INTO EACH NOSTRIL EVERY DAY   fluticasone-umeclidinium-vilanterol (Trelegy Ellipta) 200-62 5-25 MCG/INH AEPB inhaler  Self No No   Sig: Inhale 1 puff daily Rinse mouth after use  lisinopril (ZESTRIL) 20 mg tablet  Self No No   Sig: TAKE 1 TABLET BY MOUTH EVERY DAY   montelukast (SINGULAIR) 10 mg tablet  Self No No   Sig: TAKE 1 TABLET BY MOUTH EVERYDAY AT BEDTIME   nystatin (MYCOSTATIN) 500,000 units/5 mL suspension  Self No No   Sig: SWISH AND SPIT WITH 5 ML 4 TIMES A DAY   pantoprazole (PROTONIX) 40 mg tablet  Self No No   Sig: Take 1 tablet (40 mg total) by mouth 2 (two) times a day   predniSONE 5 mg tablet  Self No No   Sig: Take 3 tablets (15 mg total) by mouth daily for 14 days, THEN 2 tablets (10 mg total) daily for 14 days, THEN 1 tablet (5 mg total) daily for 14 days     promethazine-dextromethorphan (PHENERGAN-DM) 6 25-15 mg/5 mL oral syrup  Self No No   Sig: TAKE 5 MLS BY MOUTH 4 TIMES A DAY AS NEEDED FOR COUGH   timolol (TIMOPTIC) 0 25 % ophthalmic solution  Self Yes No      Facility-Administered Medications: None       Past Medical History:   Diagnosis Date    Acid reflux     Fibromyalgia     Hypertension     Seasonal allergies        Past Surgical History:   Procedure Laterality Date    EYE SURGERY      KNEE SURGERY  1998    HI COLONOSCOPY FLX DX W/COLLJ SPEC WHEN PFRMD N/A 5/9/2017    Procedure: EGD AND COLONOSCOPY;  Surgeon: Leatha Cerrato MD;  Location: [facility]; Service: Gastroenterology       Family History   Problem Relation Age of Onset    Pancreatic cancer Mother 67    Lung cancer Sister 40    Pancreatic cancer Brother     Coronary artery disease Father     Diabetes Father     Hypertension Father     Heart disease Father     Diabetes Paternal Grandmother     Lung cancer Maternal Grandfather 68    Pancreatic cancer Brother      I have reviewed and agree with the history as documented  E-Cigarette/Vaping    E-Cigarette Use Never User      E-Cigarette/Vaping Substances     Social History     Tobacco Use    Smoking status: Current Every Day Smoker     Packs/day: 2 00     Years: 50 00     Pack years: 100 00     Types: Cigarettes    Smokeless tobacco: Never Used    Tobacco comment: pt is down to 1 5 packs a day    Vaping Use    Vaping Use: Never used   Substance Use Topics    Alcohol use: Never    Drug use: No       Review of Systems   Constitutional: Negative for chills and fever  HENT: Negative for drooling and voice change  Eyes: Negative for discharge and redness  Respiratory: Negative for shortness of breath and stridor  Cardiovascular: Negative for chest pain and leg swelling  Gastrointestinal: Positive for abdominal pain  Negative for nausea and vomiting  Musculoskeletal: Negative for neck pain and neck stiffness  Skin: Negative for color change and rash  Neurological: Negative for seizures and syncope  Psychiatric/Behavioral: Negative for confusion  The patient is not nervous/anxious  All other systems reviewed and are negative  Physical Exam  Physical Exam  Vitals and nursing note reviewed  Constitutional:       General: She is not in acute distress  Appearance: She is well-developed  She is not diaphoretic  Comments: Non-toxic appearing   HENT:      Head: Normocephalic and atraumatic  Right Ear: External ear normal       Left Ear: External ear normal       Nose: Nose normal    Eyes:      General: No scleral icterus  Right eye: No discharge  Left eye: No discharge  Conjunctiva/sclera: Conjunctivae normal    Cardiovascular:      Rate and Rhythm: Normal rate and regular rhythm  Heart sounds: Normal heart sounds  No murmur heard  Pulmonary:      Effort: Pulmonary effort is normal  No respiratory distress  Breath sounds: Normal breath sounds  No stridor  No wheezing or rales  Abdominal:      General: Bowel sounds are normal  There is no distension  Palpations: Abdomen is soft  Tenderness: There is abdominal tenderness in the epigastric area  There is no guarding or rebound  Musculoskeletal:         General: No deformity  Normal range of motion  Cervical back: Normal range of motion and neck supple  Lymphadenopathy:      Cervical: No cervical adenopathy  Skin:     General: Skin is warm and dry  Neurological:      Mental Status: She is alert  She is not disoriented  GCS: GCS eye subscore is 4  GCS verbal subscore is 5  GCS motor subscore is 6     Psychiatric:         Behavior: Behavior normal          Vital Signs  ED Triage Vitals   Temperature Pulse Respirations Blood Pressure SpO2   04/25/22 1455 04/25/22 1455 04/25/22 1455 04/25/22 1455 04/25/22 1455   99 1 °F (37 3 °C) 78 17 165/80 97 %      Temp Source Heart Rate Source Patient Position - Orthostatic VS BP Location FiO2 (%)   04/25/22 1455 04/25/22 1455 -- 04/25/22 1455 --   Tympanic Monitor  Right arm       Pain Score       04/25/22 1552       7           Vitals:    04/25/22 1455 04/25/22 1714   BP: 165/80 (!) 177/75   Pulse: 78 69         Visual Acuity      ED Medications  Medications   nicotine (NICODERM CQ) 21 mg/24 hr TD 24 hr patch 21 mg (21 mg Transdermal Medication Applied 4/25/22 1729) famotidine (PEPCID) injection 20 mg (20 mg Intravenous Given 4/25/22 1551)   HYDROmorphone (DILAUDID) injection 0 5 mg (0 5 mg Intravenous Given 4/25/22 1552)   iohexol (OMNIPAQUE) 350 MG/ML injection (SINGLE-DOSE) 100 mL (100 mL Intravenous Given 4/25/22 1621)       Diagnostic Studies  Results Reviewed     Procedure Component Value Units Date/Time    HS Troponin I 2hr [[id number]]  (Normal) Collected: 04/25/22 1802    Lab Status: Final result Specimen: Blood from Arm, Right Updated: 04/25/22 1839     hs TnI 2hr 6 ng/L      Delta 2hr hsTnI 1 ng/L     HS Troponin I 4hr [[id number]]     Lab Status: No result Specimen: Blood     HS Troponin 0hr (reflex protocol) [[id number]]  (Normal) Collected: 04/25/22 1536    Lab Status: Final result Specimen: Blood from Arm, Right Updated: 04/25/22 1607     hs TnI 0hr 5 ng/L     Lactic acid, plasma [[id number]]  (Normal) Collected: 04/25/22 1536    Lab Status: Final result Specimen: Blood from Arm, Right Updated: 04/25/22 1602     LACTIC ACID 1 5 mmol/L     Narrative:      Result may be elevated if tourniquet was used during collection      Comprehensive metabolic panel [[id number]] Collected: 04/25/22 1536    Lab Status: Final result Specimen: Blood from Arm, Right Updated: 04/25/22 1600     Sodium 141 mmol/L      Potassium 4 1 mmol/L      Chloride 102 mmol/L      CO2 30 mmol/L      ANION GAP 9 mmol/L      BUN 8 mg/dL      Creatinine 0 63 mg/dL      Glucose 98 mg/dL      Calcium 9 7 mg/dL      AST 19 U/L      ALT 59 U/L      Alkaline Phosphatase 76 U/L      Total Protein 7 8 g/dL      Albumin 3 8 g/dL      Total Bilirubin 0 42 mg/dL      eGFR 93 ml/min/1 73sq m     Narrative:      Isabela guidelines for Chronic Kidney Disease (CKD):     Stage 1 with normal or high GFR (GFR > 90 mL/min/1 73 square meters)    Stage 2 Mild CKD (GFR = 60-89 mL/min/1 73 square meters)    Stage 3A Moderate CKD (GFR = 45-59 mL/min/1 73 square meters)    Stage 3B Moderate CKD (GFR = 30-44 mL/min/1 73 square meters)    Stage 4 Severe CKD (GFR = 15-29 mL/min/1 73 square meters)    Stage 5 End Stage CKD (GFR <15 mL/min/1 73 square meters)  Note: GFR calculation is accurate only with a steady state creatinine    Lipase [[id number]]  (Normal) Collected: 04/25/22 1536    Lab Status: Final result Specimen: Blood from Arm, Right Updated: 04/25/22 1600     Lipase 121 u/L     CBC and differential [[id number]]  (Abnormal) Collected: 04/25/22 1536    Lab Status: Final result Specimen: Blood from Arm, Right Updated: 04/25/22 1544     WBC 14 80 Thousand/uL      RBC 5 25 Million/uL      Hemoglobin 15 0 g/dL      Hematocrit 46 9 %      MCV 89 fL      MCH 28 6 pg      MCHC 32 0 g/dL      RDW 13 9 %      MPV 9 1 fL      Platelets 145 Thousands/uL      nRBC 0 /100 WBCs      Neutrophils Relative 80 %      Immat GRANS % 1 %      Lymphocytes Relative 12 %      Monocytes Relative 7 %      Eosinophils Relative 0 %      Basophils Relative 0 %      Neutrophils Absolute 11 81 Thousands/µL      Immature Grans Absolute 0 10 Thousand/uL      Lymphocytes Absolute 1 75 Thousands/µL      Monocytes Absolute 1 06 Thousand/µL      Eosinophils Absolute 0 03 Thousand/µL      Basophils Absolute 0 05 Thousands/µL                  CTA dissection protocol chest/abdomen/pelvis   Final Result by Bam Colin MD (04/25 1714)      No aortic aneurysm, dissection/intramural hematoma or penetrating ulcer  Mild diffuse atherosclerotic changes  No acute findings in the chest, abdomen or pelvis           Workstation performed: [id number]                    Procedures  ECG 12 Lead Documentation Only    Date/Time: 4/25/2022 4:00 PM  Performed by: Abhijeet Degroot PA-C  Authorized by: Abhijeet Degroot PA-C     Indications / Diagnosis:  Abd pain  ECG reviewed by me, the ED Provider: yes    Patient location:  ED  Rate:     ECG rate:  80    ECG rate assessment: normal    Rhythm:     Rhythm: sinus rhythm    Ectopy: Ectopy: none    QRS:     QRS axis:  Normal  Conduction:     Conduction: normal    ST segments:     ST segments:  Normal  T waves:     T waves: normal               ED Course  ED Course as of 04/25/22 2017 Mon Apr 25, 2022   1544 WBC(!): 14 80  Stable from previous labs  She is chronic steroids  1605 Lipase: 121                     MDM  Number of Diagnoses or Management Options  LUQ pain: new and requires workup  Diagnosis management comments: 73yo female presenting for epigastric pain x 2-3 days  Radiates to back  Worse with coughing and sneezing  History of GERD  Currently on prednisone for aortitis  No CP/SOB  She is afebrile and hemodynamically stable  No signs of peritonitis on abdominal exam  Differential diagnosis includes but is not limited to: gastritis, GERD, pancreatitis, hepatitis, cholecystitis, cholelithiasis, colitis, diverticulitis, appendicitis, mesenteric adenitis, UTI, pyelonephritis, SBO, constipation, kidney stone, musculoskeletal, nonspecific abdominal pain  Initial ED plan: Check abdominal labs, lactate, troponin/EKG, and CTA dissection study  IV Dilaudid and Pepcid for pain  Final assessment: Labs reveal a leukocytosis with a WBC of 14 which is consistent with prior labs  Renal function, LFTs, lipase normal  Troponin normal and EKG without ischemic changes  Patient signed out to [facility] PA-C prior to CT results  Final diagnosis and disposition pending          Amount and/or Complexity of Data Reviewed  Clinical lab tests: ordered and reviewed  Tests in the medicine section of CPT®: ordered and reviewed  Review and summarize past medical records: yes  Independent visualization of images, tracings, or specimens: yes    Risk of Complications, Morbidity, and/or Mortality  Presenting problems: moderate  Diagnostic procedures: moderate  Management options: moderate        Disposition  Final diagnoses:   LUQ pain     Time reflects when diagnosis was documented in both MDM as applicable and the Disposition within this note     Time User Action Codes Description Comment    4/25/2022  6:40 PM Handy Lange Add [R10 12] LUQ pain       ED Disposition     ED Disposition Condition Date/Time Comment    Discharge Stable Mon Apr 25, 2022  6:40 PM Loan Salazar discharge to home/self care  Follow-up Information     Follow up With Specialties Details Why Lisa Dorado MD Family Medicine Call in 1 day  [id number]    [address] [place] [address]  [phone]            Discharge Medication List as of 4/25/2022  6:40 PM      START taking these medications    Details   sucralfate (CARAFATE) 1 g tablet Take 1 tablet (1 g total) by mouth 4 (four) times a day, Starting Mon 4/25/2022, Print         CONTINUE these medications which have NOT CHANGED    Details   acetaminophen (TYLENOL 8 HOUR ARTHRITIS PAIN) 650 mg CR tablet Take by mouth every 8 (eight) hours as needed  , Historical Med      albuterol (PROVENTIL HFA,VENTOLIN HFA) 90 mcg/act inhaler INHALE 2 PUFFS BY MOUTH EVERY 6 HOURS AS NEEDED FOR WHEEZE, Normal      ALPRAZolam (XANAX) 0 25 mg tablet Take 1 tablet (0 25 mg total) by mouth 3 (three) times a day as needed for anxiety, Starting Mon 1/24/2022, Normal      amLODIPine (NORVASC) 5 mg tablet Take 1 tablet (5 mg total) by mouth daily, Starting Thu 3/31/2022, Normal      cetirizine (ZyrTEC) 10 mg tablet Take 1 tablet (10 mg total) by mouth daily, Starting Wed 4/6/2022, Normal      escitalopram (LEXAPRO) 10 mg tablet TAKE 1 TABLET BY MOUTH EVERY DAY, Normal      fluticasone (FLONASE) 50 mcg/act nasal spray SPRAY 2 SPRAYS INTO EACH NOSTRIL EVERY DAY, Normal      fluticasone-umeclidinium-vilanterol (Trelegy Ellipta) 200-62 5-25 MCG/INH AEPB inhaler Inhale 1 puff daily Rinse mouth after use , Starting Wed 3/9/2022, Until Fri 4/8/2022, Normal      Glycerin-Hypromellose- (DRY EYE RELIEF DROPS OP) Apply to eye, Historical Med      lisinopril (ZESTRIL) 20 mg tablet TAKE 1 TABLET BY MOUTH EVERY DAY, Normal      Lumigan 0 01 % ophthalmic drops Administer 1 drop to both eyes daily at bedtime  , Starting Wed 10/27/2021, Historical Med      MINOXIDIL, TOPICAL, 5 % SOLN Apply 1 application topically, Historical Med      montelukast (SINGULAIR) 10 mg tablet TAKE 1 TABLET BY MOUTH EVERYDAY AT BEDTIME, Normal      nystatin (MYCOSTATIN) 500,000 units/5 mL suspension SWISH AND SPIT WITH 5 ML 4 TIMES A DAY, Normal      pantoprazole (PROTONIX) 40 mg tablet Take 1 tablet (40 mg total) by mouth 2 (two) times a day, Starting Wed 4/6/2022, Normal      predniSONE 5 mg tablet Multiple Dosages:Starting Wed 4/13/2022, Until Tue 4/26/2022 at 2359, THEN Starting Wed 4/27/2022, Until Tue 5/10/2022 at 2359, THEN Starting Wed 5/11/2022, Until Tue 5/24/2022 at 2359Take 3 tablets (15 mg total) by mouth daily for 14 days, THEN 2 ta blets (10 mg total) daily for 14 days, THEN 1 tablet (5 mg total) daily for 14 days  , Normal      promethazine-dextromethorphan (PHENERGAN-DM) 6 25-15 mg/5 mL oral syrup TAKE 5 MLS BY MOUTH 4 TIMES A DAY AS NEEDED FOR COUGH, Normal      timolol (TIMOPTIC) 0 25 % ophthalmic solution Starting Tue 2/22/2022, Historical Med             No discharge procedures on file      PDMP Review       Value Time User    PDMP Reviewed  Yes 1/24/2022  2:23 PM Susan Jasso MD          ED Provider  Electronically Signed by           Jc Snider PA-C  04/25/22 2043

## 2022-04-25 NOTE — ASSESSMENT & PLAN NOTE
I reviewed with pt  I am concerned that her severe epigastric pain radiating to her back could be related to pancreatitis (possibly gallstone)  Given her symptoms, I told pt that she should be evaluated in the ED  Pt agrees  ED called to to sign out pt

## 2022-04-26 ENCOUNTER — TELEPHONE (OUTPATIENT)
Dept: FAMILY MEDICINE CLINIC | Facility: CLINIC | Age: 67
End: 2022-04-26

## 2022-04-26 NOTE — TELEPHONE ENCOUNTER
Pt is asking if you could please call something into the pharmacy for her pain before she sees you tomorrow

## 2022-04-26 NOTE — TELEPHONE ENCOUNTER
Pt called stating she was seen yesterday in the ED per your request   Pt was told to follow-up with you today  Pt would like to schedule an appt with you to go over her results and states she still has LUQ pain  Where can we put her on your schedule tomorrow  Pt is not available today unless she can do a video visit due to no transportation available

## 2022-04-27 ENCOUNTER — APPOINTMENT (OUTPATIENT)
Dept: RADIOLOGY | Facility: MEDICAL CENTER | Age: 67
End: 2022-04-27
Payer: COMMERCIAL

## 2022-04-27 ENCOUNTER — OFFICE VISIT (OUTPATIENT)
Dept: FAMILY MEDICINE CLINIC | Facility: CLINIC | Age: 67
End: 2022-04-27
Payer: COMMERCIAL

## 2022-04-27 VITALS
WEIGHT: 211 LBS | HEIGHT: 64 IN | SYSTOLIC BLOOD PRESSURE: 130 MMHG | TEMPERATURE: 98.6 F | DIASTOLIC BLOOD PRESSURE: 80 MMHG | HEART RATE: 74 BPM | BODY MASS INDEX: 36.02 KG/M2

## 2022-04-27 DIAGNOSIS — M54.9 MID BACK PAIN: Primary | ICD-10-CM

## 2022-04-27 DIAGNOSIS — R10.13 EPIGASTRIC PAIN: ICD-10-CM

## 2022-04-27 DIAGNOSIS — F11.20 CONTINUOUS OPIOID DEPENDENCE (HCC): ICD-10-CM

## 2022-04-27 DIAGNOSIS — M54.9 MID BACK PAIN: ICD-10-CM

## 2022-04-27 LAB
ATRIAL RATE: 112 BPM
P AXIS: 67 DEGREES
PR INTERVAL: 148 MS
QRS AXIS: 82 DEGREES
QRSD INTERVAL: 56 MS
QT INTERVAL: 294 MS
QTC INTERVAL: 401 MS
T WAVE AXIS: -21 DEGREES
VENTRICULAR RATE: 112 BPM

## 2022-04-27 PROCEDURE — 93010 ELECTROCARDIOGRAM REPORT: CPT | Performed by: INTERNAL MEDICINE

## 2022-04-27 PROCEDURE — 3079F DIAST BP 80-89 MM HG: CPT | Performed by: FAMILY MEDICINE

## 2022-04-27 PROCEDURE — 1160F RVW MEDS BY RX/DR IN RCRD: CPT | Performed by: FAMILY MEDICINE

## 2022-04-27 PROCEDURE — 72072 X-RAY EXAM THORAC SPINE 3VWS: CPT

## 2022-04-27 PROCEDURE — 3008F BODY MASS INDEX DOCD: CPT | Performed by: FAMILY MEDICINE

## 2022-04-27 PROCEDURE — 99214 OFFICE O/P EST MOD 30 MIN: CPT | Performed by: FAMILY MEDICINE

## 2022-04-27 PROCEDURE — 3075F SYST BP GE 130 - 139MM HG: CPT | Performed by: FAMILY MEDICINE

## 2022-04-27 PROCEDURE — 4004F PT TOBACCO SCREEN RCVD TLK: CPT | Performed by: FAMILY MEDICINE

## 2022-04-27 RX ORDER — LIDOCAINE 50 MG/G
1 PATCH TOPICAL DAILY
Qty: 30 PATCH | Refills: 1 | Status: SHIPPED | OUTPATIENT
Start: 2022-04-27

## 2022-04-27 RX ORDER — NALOXONE HYDROCHLORIDE 4 MG/.1ML
SPRAY NASAL
Qty: 1 EACH | Refills: 1 | Status: SHIPPED | OUTPATIENT
Start: 2022-04-27

## 2022-04-27 RX ORDER — HYDROCODONE BITARTRATE AND ACETAMINOPHEN 5; 325 MG/1; MG/1
1 TABLET ORAL EVERY 6 HOURS PRN
Qty: 60 TABLET | Refills: 0 | Status: SHIPPED | OUTPATIENT
Start: 2022-04-27 | End: 2022-05-09 | Stop reason: SDUPTHER

## 2022-04-28 PROBLEM — M54.9 MID BACK PAIN: Status: ACTIVE | Noted: 2022-04-28

## 2022-04-28 NOTE — PROGRESS NOTES
Assessment/Plan:    Mid back pain  Exam concerning for possible compression fracture  There does appear to be mild wedging visible on the CT scan in the approximate T9/T10 area  I reviewed with patient  Refer for thoracic spine x-ray  Start Lidoderm patch to the area as directed  Patient will be prescribed hydrocodone 5/325 1 q 6 hours p r n  For severe pain  Recheck 1 week if not improved-earlier if worse    Epigastric pain  Improved with Carafate  Labs and studies regarding pancreatitis, cholecystitis and other causes were unremarkable  Continue Carafate  Check upper GI to rule out ulcer  Follow-up with GI  Recheck 3-4 weeks    Continuous opioid dependence ([facility] )  I reviewed with patient  Because of her history of alprazolam use, I will prescribe Maalox alone along with her hydrocodone  I reviewed proper use  Recheck 2-3 weeks       Diagnoses and all orders for this visit:    Mid back pain  -     XR spine thoracic 3 vw; Future  -     lidocaine (Lidoderm) 5 %; Apply 1 patch topically daily Remove & Discard patch within 12 hours or as directed by MD  -     HYDROcodone-acetaminophen (Norco) 5-325 mg per tablet; Take 1 tablet by mouth every 6 (six) hours as needed for pain Max Daily Amount: 4 tablets    Epigastric pain  -     FL UPPER GI UGI; Future    Continuous opioid dependence (HCC)  -     naloxone (NARCAN) 4 mg/0 1 mL nasal spray; Administer 1 spray into a nostril  If no response after 2-3 minutes, give another dose in the other nostril using a new spray  Subjective:      Patient ID: Jovan Stafford is a 77 y o  female  Here for f/u ED visit  - pt sent to [address] from this office on 4/25 due to severe epigastric pain radiating to her mid back for further evaluation  Fortunately, labs were negative for pancreatitis and CTA was unremarkable  No pancreatic, biliary, gastric or other issues appreciated  Pt was continued on her pantoprazole, and started on Carafate   Since discharge, pt states that the epigastric pain is a little better but pt still has severe low thoracic back pain  Pt here for recheck   - pt notes that movement can worsen her back pain, and she is having trouble finding comfortable positions  Coughing/sneezing can exacerbate her pain  It can radiate around her chest wall  Tylenol, NSAIDs and Tramadol have been ineffective  - I reviewed CTA and lab results with pt  Of note, on the CTA, she does appear to have DDD and possible mild wedge deformity of ?T10      The following portions of the patient's history were reviewed and updated as appropriate:   She  has a past medical history of Acid reflux, Fibromyalgia, Hypertension, and Seasonal allergies    She   Patient Active Problem List    Diagnosis Date Noted    Mid back pain 04/28/2022    Continuous opioid dependence ([address] ) 04/27/2022    Chronic frontal sinusitis 03/23/2022    Pain of both eyes 02/25/2022    Candida vaginitis 01/19/2022    Primary insomnia 11/24/2021    Aortitis ([address] ) 10/31/2021    Increased intraocular pressure 10/31/2021    Chest pain 02/08/2021    Dermatitis 09/14/2020    Prediabetes 04/10/2020    Chronic idiopathic constipation 12/16/2019    Gastroparesis 12/16/2019    Severe obesity (BMI 35 0-35 9 with comorbidity) ([address] ) 11/21/2019    Lower extremity edema 11/21/2019    Snoring 11/21/2019    Cigarette nicotine dependence with nicotine-induced disorder 10/02/2019    Neck pain 07/11/2019    Neuropathic pain 01/09/2019    Early satiety 08/28/2018    Abdominal distension 08/28/2018    Family history of pancreatic cancer 08/28/2018    Epigastric pain 08/28/2018    Dyspnea on exertion 05/04/2018    Edema 07/13/2017    Lumbosacral radiculopathy at L5 01/06/2017    Compression fracture of thoracic vertebra, non-traumatic ([address] ) 08/30/2016    Hyperlipidemia 12/08/2015    Cataract 08/22/2014    Palpitations 08/05/2014    Chronic obstructive pulmonary disease ([address] ) 04/17/2013    Esophageal reflux 04/17/2013    Pulmonary nodule seen on imaging study 04/17/2013    Hypertension 03/22/2013    Mild episode of recurrent major depressive disorder ([facility] [address] ) 03/14/2013    Fibromyalgia 03/14/2013    Degeneration of intervertebral disc 03/14/2013    Anxiety 02/21/2013    Seasonal allergic rhinitis 12/20/2012     She  has a past surgical history that includes Knee surgery (1998); pr colonoscopy flx dx w/collj spec when pfrmd (N/A, 5/9/2017); and Eye surgery  She  reports that she has been smoking cigarettes  She has a 100 00 pack-year smoking history  She has never used smokeless tobacco  She reports that she does not drink alcohol and does not use drugs  Current Outpatient Medications   Medication Sig Dispense Refill    albuterol (PROVENTIL HFA,VENTOLIN HFA) 90 mcg/act inhaler INHALE 2 PUFFS BY MOUTH EVERY 6 HOURS AS NEEDED FOR WHEEZE 18 g 1    ALPRAZolam (XANAX) 0 25 mg tablet Take 1 tablet (0 25 mg total) by mouth 3 (three) times a day as needed for anxiety 90 tablet 0    amLODIPine (NORVASC) 5 mg tablet Take 1 tablet (5 mg total) by mouth daily 30 tablet 5    cetirizine (ZyrTEC) 10 mg tablet Take 1 tablet (10 mg total) by mouth daily 90 tablet 0    escitalopram (LEXAPRO) 10 mg tablet TAKE 1 TABLET BY MOUTH EVERY DAY 90 tablet 1    fluticasone (FLONASE) 50 mcg/act nasal spray SPRAY 2 SPRAYS INTO EACH NOSTRIL EVERY DAY 16 mL 3    Glycerin-Hypromellose- (DRY EYE RELIEF DROPS OP) Apply to eye      montelukast (SINGULAIR) 10 mg tablet TAKE 1 TABLET BY MOUTH EVERYDAY AT BEDTIME 90 tablet 1    nystatin (MYCOSTATIN) 500,000 units/5 mL suspension SWISH AND SPIT WITH 5 ML 4 TIMES A  mL 3    pantoprazole (PROTONIX) 40 mg tablet Take 1 tablet (40 mg total) by mouth 2 (two) times a day 180 tablet 1    predniSONE 5 mg tablet Take 3 tablets (15 mg total) by mouth daily for 14 days, THEN 2 tablets (10 mg total) daily for 14 days, THEN 1 tablet (5 mg total) daily for 14 days   84 tablet 0    promethazine-dextromethorphan (PHENERGAN-DM) 6 25-15 mg/5 mL oral syrup TAKE 5 MLS BY MOUTH 4 TIMES A DAY AS NEEDED FOR COUGH 150 mL 0    sucralfate (CARAFATE) 1 g tablet Take 1 tablet (1 g total) by mouth 4 (four) times a day 20 tablet 0    timolol (TIMOPTIC) 0 25 % ophthalmic solution       traMADol (Ultram) 50 mg tablet Take 1 tablet (50 mg total) by mouth every 6 (six) hours as needed for moderate pain 30 tablet 0    acetaminophen (TYLENOL 8 HOUR ARTHRITIS PAIN) 650 mg CR tablet Take by mouth every 8 (eight) hours as needed        ALPRAZolam (XANAX) 0 25 mg tablet Take 1 tablet (0 25 mg total) by mouth 3 (three) times a day as needed for anxiety 90 tablet 0    escitalopram (LEXAPRO) 10 mg tablet TAKE 1 TABLET BY MOUTH EVERY DAY 90 tablet 1    fluticasone (FLONASE) 50 mcg/act nasal spray SPRAY 2 SPRAYS INTO EACH NOSTRIL EVERY DAY 16 mL 3    fluticasone-umeclidinium-vilanterol (Trelegy Ellipta) 200-62 5-25 MCG/INH AEPB inhaler Inhale 1 puff daily Rinse mouth after use  60 blister 5    HYDROcodone-acetaminophen (Norco) 5-325 mg per tablet Take 1 tablet by mouth every 6 (six) hours as needed for pain Max Daily Amount: 4 tablets 60 tablet 0    lidocaine (Lidoderm) 5 % Apply 1 patch topically daily Remove & Discard patch within 12 hours or as directed by MD 30 patch 1    lisinopril (ZESTRIL) 20 mg tablet TAKE 1 TABLET BY MOUTH EVERY DAY 90 tablet 1    Lumigan 0 01 % ophthalmic drops Administer 1 drop to both eyes daily at bedtime        MINOXIDIL, TOPICAL, 5 % SOLN Apply 1 application topically      montelukast (SINGULAIR) 10 mg tablet TAKE 1 TABLET BY MOUTH EVERYDAY AT BEDTIME 90 tablet 1    naloxone (NARCAN) 4 mg/0 1 mL nasal spray Administer 1 spray into a nostril  If no response after 2-3 minutes, give another dose in the other nostril using a new spray  1 each 1    timolol (TIMOPTIC) 0 25 % ophthalmic solution        No current facility-administered medications for this visit       She is allergic to augmentin [amoxicillin-pot clavulanate], miconazole, and wixela inhub [fluticasone-salmeterol]       Review of Systems   Constitutional: Positive for activity change, appetite change and fatigue  Negative for chills and fever  HENT: Negative  Eyes: Negative  Respiratory: Negative  Gastrointestinal: Positive for abdominal distention and abdominal pain (improved but not resolved with carafate)  Negative for constipation, diarrhea, nausea and vomiting  Genitourinary: Negative  Musculoskeletal: Positive for back pain (low thoracic back pain)  Neurological: Negative  Objective:      /80   Pulse 74   Temp 98 6 °F (37 °C)   Ht 5' 4" (1 626 m)   Wt 95 7 kg (211 lb)   LMP  (LMP Unknown)   BMI 36 22 kg/m²          Physical Exam  Vitals reviewed  Constitutional:       Comments: Uncomfortable appearing female in no resp distress   HENT:      Head: Normocephalic  Mouth/Throat:      Mouth: Mucous membranes are moist    Eyes:      General: No scleral icterus  Conjunctiva/sclera: Conjunctivae normal    Cardiovascular:      Rate and Rhythm: Normal rate and regular rhythm  Pulses: Normal pulses  Pulmonary:      Effort: Pulmonary effort is normal       Breath sounds: No wheezing or rales  Abdominal:      General: There is no distension  Palpations: There is no mass  Tenderness: There is abdominal tenderness (epigastric area)  Musculoskeletal:         General: Tenderness (along the T9-T12 spinous processes  Palpation around T10 worsens her back pain) present  No swelling  Cervical back: Normal range of motion  No tenderness  Lymphadenopathy:      Cervical: No cervical adenopathy  Skin:     General: Skin is warm  Neurological:      Mental Status: She is alert  Cranial Nerves: No cranial nerve deficit  Sensory: No sensory deficit  Motor: No weakness        Gait: Gait normal

## 2022-04-28 NOTE — TELEPHONE ENCOUNTER
Pt called about script for the lidocaine patches    Will buy OTC patches for now but would like for someone to see if they can get auth for the 5%

## 2022-04-29 ENCOUNTER — TELEPHONE (OUTPATIENT)
Dept: FAMILY MEDICINE CLINIC | Facility: CLINIC | Age: 67
End: 2022-04-29

## 2022-04-29 NOTE — ASSESSMENT & PLAN NOTE
I reviewed with patient  Because of her history of alprazolam use, I will prescribe Maalox alone along with her hydrocodone  I reviewed proper use    Recheck 2-3 weeks

## 2022-04-29 NOTE — TELEPHONE ENCOUNTER
Patient called to report she is not getting any relief from back pain since her appointment on Thursday  She states lidocaine patches and hydrocodone are not working

## 2022-04-29 NOTE — ASSESSMENT & PLAN NOTE
Exam concerning for possible compression fracture  There does appear to be mild wedging visible on the CT scan in the approximate T9/T10 area  I reviewed with patient  Refer for thoracic spine x-ray  Start Lidoderm patch to the area as directed  Patient will be prescribed hydrocodone 5/325 1 q 6 hours p r n  For severe pain    Recheck 1 week if not improved-earlier if worse

## 2022-04-29 NOTE — ASSESSMENT & PLAN NOTE
Improved with Carafate  Labs and studies regarding pancreatitis, cholecystitis and other causes were unremarkable  Continue Carafate  Check upper GI to rule out ulcer  Follow-up with GI    Recheck 3-4 weeks

## 2022-05-03 ENCOUNTER — HOSPITAL ENCOUNTER (OUTPATIENT)
Dept: RADIOLOGY | Facility: HOSPITAL | Age: 67
Discharge: HOME | End: 2022-05-03
Payer: COMMERCIAL

## 2022-05-03 DIAGNOSIS — R10.13 EPIGASTRIC PAIN: ICD-10-CM

## 2022-05-03 DIAGNOSIS — J42 CHRONIC BRONCHITIS, UNSPECIFIED CHRONIC BRONCHITIS TYPE (HCC): ICD-10-CM

## 2022-05-03 PROCEDURE — 74240 X-RAY XM UPR GI TRC 1CNTRST: CPT

## 2022-05-03 RX ORDER — DEXTROMETHORPHAN HYDROBROMIDE AND PROMETHAZINE HYDROCHLORIDE 15; 6.25 MG/5ML; MG/5ML
SYRUP ORAL
Qty: 150 ML | Refills: 0 | Status: SHIPPED | OUTPATIENT
Start: 2022-05-03

## 2022-05-04 ENCOUNTER — TELEPHONE (OUTPATIENT)
Dept: FAMILY MEDICINE CLINIC | Facility: CLINIC | Age: 67
End: 2022-05-04

## 2022-05-06 ENCOUNTER — OFFICE VISIT (OUTPATIENT)
Dept: FAMILY MEDICINE CLINIC | Facility: CLINIC | Age: 67
End: 2022-05-06
Payer: COMMERCIAL

## 2022-05-06 VITALS
BODY MASS INDEX: 36.19 KG/M2 | WEIGHT: 212 LBS | SYSTOLIC BLOOD PRESSURE: 126 MMHG | TEMPERATURE: 98 F | HEART RATE: 74 BPM | DIASTOLIC BLOOD PRESSURE: 82 MMHG | HEIGHT: 64 IN

## 2022-05-06 DIAGNOSIS — R10.13 EPIGASTRIC PAIN: Primary | ICD-10-CM

## 2022-05-06 DIAGNOSIS — R10.11 RUQ PAIN: ICD-10-CM

## 2022-05-06 DIAGNOSIS — I10 ESSENTIAL HYPERTENSION: ICD-10-CM

## 2022-05-06 DIAGNOSIS — S22.060D CLOSED WEDGE COMPRESSION FRACTURE OF T8 VERTEBRA WITH ROUTINE HEALING, SUBSEQUENT ENCOUNTER: ICD-10-CM

## 2022-05-06 PROCEDURE — 99214 OFFICE O/P EST MOD 30 MIN: CPT | Performed by: FAMILY MEDICINE

## 2022-05-06 RX ORDER — LISINOPRIL 20 MG/1
TABLET ORAL
Qty: 90 TABLET | Refills: 1 | Status: SHIPPED | OUTPATIENT
Start: 2022-05-06

## 2022-05-06 NOTE — PROGRESS NOTES
Assessment/Plan:    Closed wedge compression fracture of T8 vertebra ([address] )  I reviewed with patient  Patient refuses kyphoplasty at present  Continue present treatment    Epigastric pain  Primarily postprandial   CT was unremarkable  Upper GI was suboptimal due to patient's inability to change positions appropriately but) due to back pain)  Patient scheduled to see GI at the end of the month  No improvement with PPI  ED recently started patient on Carafate but she is not taking it routinely  Recommend trial for the next 2 or 3 days  Would also consider gallbladder disease given postprandial nature of her symptoms  Will refer patient for HIDA scan  Recheck 2 weeks    RUQ pain  ? As primary cause for patient's epigastric discomfort  Patient does primarily postprandial symptoms  Will refer patient for HIDA scan  Recheck 2 weeks       Diagnoses and all orders for this visit:    Epigastric pain  -     NM hepatobiliary w rx; Future    RUQ pain  -     NM hepatobiliary w rx; Future    Closed wedge compression fracture of T8 vertebra with routine healing, subsequent encounter        Subjective:      Patient ID: Casey Owen is a 77 y o  female  f/u multiple med issues  - pt states that she is still suffering from significant epigastric/upper abd pain with bloating  Symptoms are mostly post prandial  She has some intermittent nausea, as well as decreased appetite  She has not had any significant weight loss  Attempt to  Pt has noted increased constipation since starting hydrocodone but denies diarrhea  She has a hx of gastroparesis  UGI attempted last week was suboptimal b/c pt could not tolerate positioning due to back issues  CT of the abd done on 4/4 and CTA done on 4/25 reviewed - no significant abd findings noted  - pt still with mid back pain  XR suggested acute T8 compression fx with older compression fx in the lower thoracic vertebrae   Pt had been using lidocaine patches and vicoden with decent control  Discussed possible kyphoplasty, but pt would prefer to wait for now  - continues to f/u with rheum re: aortitis  Pt continues to wean off of prednisone  - pt has been having some lower leg swelling that pt felt was due to prolonged pred use  Pt denies worsening SOB with exertion    - I reviewed recent labs and imaging with pt and dtr        The following portions of the patient's history were reviewed and updated as appropriate:   She  has a past medical history of Acid reflux, Fibromyalgia, Hypertension, and Seasonal allergies    She   Patient Active Problem List    Diagnosis Date Noted    RUQ pain 05/10/2022    Closed wedge compression fracture of T8 vertebra ([facility] [address] ) 05/10/2022    Mid back pain 04/28/2022    Continuous opioid dependence ([facility] [address] ) 04/27/2022    Chronic frontal sinusitis 03/23/2022    Pain of both eyes 02/25/2022    Candida vaginitis 01/19/2022    Primary insomnia 11/24/2021    Aortitis ([address] ) 10/31/2021    Increased intraocular pressure 10/31/2021    Chest pain 02/08/2021    Dermatitis 09/14/2020    Prediabetes 04/10/2020    Chronic idiopathic constipation 12/16/2019    Gastroparesis 12/16/2019    Severe obesity (BMI 35 0-35 9 with comorbidity) ([address] ) 11/21/2019    Lower extremity edema 11/21/2019    Snoring 11/21/2019    Cigarette nicotine dependence with nicotine-induced disorder 10/02/2019    Neck pain 07/11/2019    Neuropathic pain 01/09/2019    Early satiety 08/28/2018    Abdominal distension 08/28/2018    Family history of pancreatic cancer 08/28/2018    Epigastric pain 08/28/2018    Dyspnea on exertion 05/04/2018    Edema 07/13/2017    Lumbosacral radiculopathy at L5 01/06/2017    Compression fracture of thoracic vertebra, non-traumatic ([address] ) 08/30/2016    Hyperlipidemia 12/08/2015    Cataract 08/22/2014    Palpitations 08/05/2014    Chronic obstructive pulmonary disease ([address] ) 04/17/2013    Esophageal reflux 04/17/2013    Pulmonary nodule seen on imaging study 04/17/2013    Hypertension 03/22/2013    Mild episode of recurrent major depressive disorder ([facility] [address] ) 03/14/2013    Fibromyalgia 03/14/2013    Degeneration of intervertebral disc 03/14/2013    Anxiety 02/21/2013    Seasonal allergic rhinitis 12/20/2012     She  has a past surgical history that includes Knee surgery (1998); pr colonoscopy flx dx w/collj spec when pfrmd (N/A, 5/9/2017); and Eye surgery  She  reports that she has been smoking cigarettes  She has a 100 00 pack-year smoking history  She has never used smokeless tobacco  She reports that she does not drink alcohol and does not use drugs    Current Outpatient Medications   Medication Sig Dispense Refill    acetaminophen (TYLENOL 8 HOUR ARTHRITIS PAIN) 650 mg CR tablet Take by mouth every 8 (eight) hours as needed        albuterol (PROVENTIL HFA,VENTOLIN HFA) 90 mcg/act inhaler INHALE 2 PUFFS BY MOUTH EVERY 6 HOURS AS NEEDED FOR WHEEZE 18 g 1    ALPRAZolam (XANAX) 0 25 mg tablet Take 1 tablet (0 25 mg total) by mouth 3 (three) times a day as needed for anxiety 90 tablet 0    amLODIPine (NORVASC) 5 mg tablet Take 1 tablet (5 mg total) by mouth daily 30 tablet 5    cetirizine (ZyrTEC) 10 mg tablet Take 1 tablet (10 mg total) by mouth daily 90 tablet 0    escitalopram (LEXAPRO) 10 mg tablet TAKE 1 TABLET BY MOUTH EVERY DAY 90 tablet 1    fluticasone (FLONASE) 50 mcg/act nasal spray SPRAY 2 SPRAYS INTO EACH NOSTRIL EVERY DAY 16 mL 3    Glycerin-Hypromellose- (DRY EYE RELIEF DROPS OP) Apply to eye      lidocaine (Lidoderm) 5 % Apply 1 patch topically daily Remove & Discard patch within 12 hours or as directed by MD 30 patch 1    lisinopril (ZESTRIL) 20 mg tablet TAKE 1 TABLET BY MOUTH EVERY DAY 90 tablet 1    Lumigan 0 01 % ophthalmic drops Administer 1 drop to both eyes daily at bedtime        MINOXIDIL, TOPICAL, 5 % SOLN Apply 1 application topically      montelukast (SINGULAIR) 10 mg tablet TAKE 1 TABLET BY MOUTH EVERYDAY AT BEDTIME 90 tablet 1    naloxone (NARCAN) 4 mg/0 1 mL nasal spray Administer 1 spray into a nostril  If no response after 2-3 minutes, give another dose in the other nostril using a new spray  1 each 1    nystatin (MYCOSTATIN) 500,000 units/5 mL suspension SWISH AND SPIT WITH 5 ML 4 TIMES A  mL 3    pantoprazole (PROTONIX) 40 mg tablet Take 1 tablet (40 mg total) by mouth 2 (two) times a day 180 tablet 1    predniSONE 5 mg tablet Take 3 tablets (15 mg total) by mouth daily for 14 days, THEN 2 tablets (10 mg total) daily for 14 days, THEN 1 tablet (5 mg total) daily for 14 days  84 tablet 0    promethazine-dextromethorphan (PHENERGAN-DM) 6 25-15 mg/5 mL oral syrup TAKE 5 MLS BY MOUTH 4 TIMES A DAY AS NEEDED FOR COUGH 150 mL 0    sulfamethoxazole-trimethoprim (BACTRIM DS) 800-160 mg per tablet Take 1 tablet by mouth every 12 (twelve) hours for 7 days 14 tablet 0    timolol (TIMOPTIC) 0 25 % ophthalmic solution       traMADol (Ultram) 50 mg tablet Take 1 tablet (50 mg total) by mouth every 6 (six) hours as needed for moderate pain 30 tablet 0    fluticasone-umeclidinium-vilanterol (Trelegy Ellipta) 200-62 5-25 MCG/INH AEPB inhaler Inhale 1 puff daily Rinse mouth after use  60 blister 5    gabapentin (Neurontin) 300 mg capsule Take 1 capsule (300 mg total) by mouth 3 (three) times a day (Patient not taking: Reported on 5/6/2022 ) 90 capsule 0    HYDROcodone-acetaminophen (Norco) 5-325 mg per tablet Take 1 tablet by mouth every 6 (six) hours as needed for pain Max Daily Amount: 4 tablets 60 tablet 0    sucralfate (CARAFATE) 1 g tablet Take 1 tablet (1 g total) by mouth 4 (four) times a day (Patient not taking: Reported on 5/6/2022 ) 20 tablet 0     No current facility-administered medications for this visit  She is allergic to augmentin [amoxicillin-pot clavulanate], miconazole, and wixela inhub [fluticasone-salmeterol]       Review of Systems   Constitutional: Positive for activity change, appetite change and fatigue  Negative for chills and fever  HENT: Negative  Eyes: Negative  Respiratory: Negative  Cardiovascular: Positive for leg swelling  Negative for chest pain and palpitations  Gastrointestinal: Positive for abdominal distention, abdominal pain and nausea  Negative for blood in stool, constipation, diarrhea and vomiting  Endocrine: Negative  Genitourinary: Negative  Musculoskeletal: Positive for arthralgias, back pain and myalgias  Skin: Negative  Neurological: Negative  Objective:      /82   Pulse 74   Temp 98 °F (36 7 °C)   Ht 5' 4" (1 626 m)   Wt 96 2 kg (212 lb)   LMP  (LMP Unknown)   BMI 36 39 kg/m²          Physical Exam  Vitals reviewed  Constitutional:       Comments: Uncomfortable appearing female in no resp distress   HENT:      Head: Normocephalic  Mouth/Throat:      Mouth: Mucous membranes are moist    Eyes:      General: No scleral icterus  Extraocular Movements: Extraocular movements intact  Conjunctiva/sclera: Conjunctivae normal       Pupils: Pupils are equal, round, and reactive to light  Cardiovascular:      Rate and Rhythm: Normal rate and regular rhythm  Pulses: Normal pulses  Pulmonary:      Effort: Pulmonary effort is normal       Breath sounds: No wheezing or rales  Abdominal:      General: There is no distension  Palpations: There is no mass  Tenderness: There is abdominal tenderness (RUQ dn epigastric areas  No masses appreciated)  There is no right CVA tenderness or left CVA tenderness  Musculoskeletal:         General: Tenderness (approx T8-T12 spinous processes  ) present  No swelling  Cervical back: Normal range of motion  No tenderness  Lymphadenopathy:      Cervical: No cervical adenopathy  Skin:     General: Skin is warm  Neurological:      Mental Status: She is alert  Cranial Nerves: No cranial nerve deficit  Sensory: No sensory deficit        Motor: No weakness        Gait: Gait normal

## 2022-05-08 DIAGNOSIS — I77.6 AORTITIS (HCC): ICD-10-CM

## 2022-05-08 RX ORDER — PREDNISONE 1 MG/1
TABLET ORAL
Qty: 84 TABLET | Refills: 0 | OUTPATIENT
Start: 2022-05-08

## 2022-05-09 DIAGNOSIS — M54.9 MID BACK PAIN: ICD-10-CM

## 2022-05-09 RX ORDER — HYDROCODONE BITARTRATE AND ACETAMINOPHEN 5; 325 MG/1; MG/1
1 TABLET ORAL EVERY 6 HOURS PRN
Qty: 60 TABLET | Refills: 0 | Status: SHIPPED | OUTPATIENT
Start: 2022-05-09 | End: 2022-05-24 | Stop reason: SDUPTHER

## 2022-05-09 NOTE — TELEPHONE ENCOUNTER
04/27/2022 04/27/2022 HYDROcodone BITARTRATE-ACETAMINOPHE (Tablet)  60 0 15 325 MG-5 MG 20 0 AYLEEN MADDEN

## 2022-05-10 DIAGNOSIS — L08.9 INFECTED CYST OF SKIN: ICD-10-CM

## 2022-05-10 DIAGNOSIS — L72.9 INFECTED CYST OF SKIN: ICD-10-CM

## 2022-05-10 PROBLEM — R10.11 RUQ PAIN: Status: ACTIVE | Noted: 2022-05-10

## 2022-05-10 PROBLEM — S22.060A CLOSED WEDGE COMPRESSION FRACTURE OF T8 VERTEBRA (HCC): Status: ACTIVE | Noted: 2022-05-10

## 2022-05-11 ENCOUNTER — RA CDI HCC (OUTPATIENT)
Dept: OTHER | Facility: HOSPITAL | Age: 67
End: 2022-05-11

## 2022-05-11 RX ORDER — SULFAMETHOXAZOLE AND TRIMETHOPRIM 800; 160 MG/1; MG/1
1 TABLET ORAL EVERY 12 HOURS SCHEDULED
Qty: 14 TABLET | Refills: 0 | Status: SHIPPED | OUTPATIENT
Start: 2022-05-11 | End: 2022-05-18

## 2022-05-11 NOTE — ASSESSMENT & PLAN NOTE
?  As primary cause for patient's epigastric discomfort  Patient does primarily postprandial symptoms  Will refer patient for HIDA scan    Recheck 2 weeks

## 2022-05-11 NOTE — PROGRESS NOTES
[facility]  coding opportunities       Chart reviewed, no opportunity found:   [address]        Patients Insurance     Medicare Insurance: [employer]

## 2022-05-11 NOTE — ASSESSMENT & PLAN NOTE
Primarily postprandial   CT was unremarkable  Upper GI was suboptimal due to patient's inability to change positions appropriately but) due to back pain)  Patient scheduled to see GI at the end of the month  No improvement with PPI  ED recently started patient on Carafate but she is not taking it routinely  Recommend trial for the next 2 or 3 days  Would also consider gallbladder disease given postprandial nature of her symptoms  Will refer patient for HIDA scan    Recheck 2 weeks

## 2022-05-13 ENCOUNTER — TELEPHONE (OUTPATIENT)
Dept: FAMILY MEDICINE CLINIC | Facility: CLINIC | Age: 67
End: 2022-05-13

## 2022-05-13 NOTE — TELEPHONE ENCOUNTER
Patient called  She is scheduled for HIDA on Sunday   Told not to take pain medication prior, but is it ok to use pain patch

## 2022-05-15 ENCOUNTER — HOSPITAL ENCOUNTER (OUTPATIENT)
Dept: NUCLEAR MEDICINE | Facility: HOSPITAL | Age: 67
Discharge: HOME | End: 2022-05-15
Payer: COMMERCIAL

## 2022-05-15 DIAGNOSIS — R10.13 EPIGASTRIC PAIN: ICD-10-CM

## 2022-05-15 DIAGNOSIS — R10.11 RUQ PAIN: ICD-10-CM

## 2022-05-15 PROCEDURE — A9537 TC99M MEBROFENIN: HCPCS

## 2022-05-15 PROCEDURE — G1004 CDSM NDSC: HCPCS

## 2022-05-15 PROCEDURE — 78226 HEPATOBILIARY SYSTEM IMAGING: CPT

## 2022-05-17 ENCOUNTER — OFFICE VISIT (OUTPATIENT)
Dept: FAMILY MEDICINE CLINIC | Facility: CLINIC | Age: 67
End: 2022-05-17
Payer: COMMERCIAL

## 2022-05-17 ENCOUNTER — TELEPHONE (OUTPATIENT)
Dept: FAMILY MEDICINE CLINIC | Facility: CLINIC | Age: 67
End: 2022-05-17

## 2022-05-17 VITALS
HEIGHT: 64 IN | DIASTOLIC BLOOD PRESSURE: 82 MMHG | SYSTOLIC BLOOD PRESSURE: 124 MMHG | HEART RATE: 76 BPM | BODY MASS INDEX: 34.49 KG/M2 | WEIGHT: 202 LBS | TEMPERATURE: 98.9 F

## 2022-05-17 DIAGNOSIS — K59.03 THERAPEUTIC OPIOID INDUCED CONSTIPATION: ICD-10-CM

## 2022-05-17 DIAGNOSIS — R10.13 EPIGASTRIC PAIN: ICD-10-CM

## 2022-05-17 DIAGNOSIS — T40.2X5A THERAPEUTIC OPIOID INDUCED CONSTIPATION: ICD-10-CM

## 2022-05-17 DIAGNOSIS — S22.060D CLOSED WEDGE COMPRESSION FRACTURE OF T8 VERTEBRA WITH ROUTINE HEALING, SUBSEQUENT ENCOUNTER: Primary | ICD-10-CM

## 2022-05-17 PROCEDURE — 99214 OFFICE O/P EST MOD 30 MIN: CPT | Performed by: FAMILY MEDICINE

## 2022-05-17 RX ORDER — NALOXEGOL OXALATE 25 MG/1
TABLET, FILM COATED ORAL
Qty: 30 TABLET | Refills: 1 | Status: SHIPPED | OUTPATIENT
Start: 2022-05-17 | End: 2022-05-26 | Stop reason: ALTCHOICE

## 2022-05-17 NOTE — PROGRESS NOTES
Assessment/Plan:    Closed wedge compression fracture of T8 vertebra ([facility] [address] )  I reviewed with pt  Continues to refuse kyphoplasty eval   Continue present care  Avoid exacerbating positions/activities  Recheck 1m    Epigastric pain  Persistent  Pt unable to have full UGI or Hyda scan done  Await GI eval   Continue present care in the interim    Therapeutic opioid induced constipation  I reviewed with pt  We discussed treatment options  Failed OTC meds except for Milk of Mag,  and Miralax  Start Movantik as directed  Recheck 1-2w if not improved - earlier if worse       Diagnoses and all orders for this visit:    Closed wedge compression fracture of T8 vertebra with routine healing, subsequent encounter    Epigastric pain    Therapeutic opioid induced constipation  -     naloxegol oxalate (Movantik) 25 MG tablet; 1/2 - 1 tab qAM (Patient not taking: Reported on 5/19/2022)        Subjective:      Patient ID: Quentin Ricks is a 77 y o  female  f/u multiple med issues  - pt was unable to perform the full hyda scan due to back pain and inability to get in the position that the study required due to back pain  Exam revealed "patent common bile duct and probable activity within the gallbladder at 21 minutes indicating patent cystic duct "  Full study was aborted  Pt states that she is still having epigastric pain - to see GI next week  Still fighting constipation issues due to opioid use  - back pain continues, and was the reason pt was unable to finish the [facility] or UGI studies  Pt continues to refuse kyphoplasty eval  - no other new complaints      The following portions of the patient's history were reviewed and updated as appropriate:   She  has a past medical history of Acid reflux, Fibromyalgia, Hypertension, and Seasonal allergies    She   Patient Active Problem List    Diagnosis Date Noted    Therapeutic opioid induced constipation 05/20/2022    RUQ pain 05/10/2022    Closed wedge compression fracture of T8 vertebra ([facility] ) 05/10/2022    Mid back pain 04/28/2022    Continuous opioid dependence ([place] [address] ) 04/27/2022    Chronic frontal sinusitis 03/23/2022    Pain of both eyes 02/25/2022    Candida vaginitis 01/19/2022    Primary insomnia 11/24/2021    Aortitis ([facility] ) 10/31/2021    Increased intraocular pressure 10/31/2021    Chest pain 02/08/2021    Dermatitis 09/14/2020    Prediabetes 04/10/2020    Chronic idiopathic constipation 12/16/2019    Gastroparesis 12/16/2019    Severe obesity (BMI 35 0-35 9 with comorbidity) ([place] [address] ) 11/21/2019    Lower extremity edema 11/21/2019    Snoring 11/21/2019    Cigarette nicotine dependence with nicotine-induced disorder 10/02/2019    Neck pain 07/11/2019    Neuropathic pain 01/09/2019    Early satiety 08/28/2018    Abdominal distension 08/28/2018    Family history of pancreatic cancer 08/28/2018    Epigastric pain 08/28/2018    Dyspnea on exertion 05/04/2018    Edema 07/13/2017    Lumbosacral radiculopathy at L5 01/06/2017    Hyperlipidemia 12/08/2015    Cataract 08/22/2014    Palpitations 08/05/2014    Chronic obstructive pulmonary disease ([facility] ) 04/17/2013    Esophageal reflux 04/17/2013    Pulmonary nodule seen on imaging study 04/17/2013    Hypertension 03/22/2013    Mild episode of recurrent major depressive disorder ([place] [address] ) 03/14/2013    Fibromyalgia 03/14/2013    Degeneration of intervertebral disc 03/14/2013    Anxiety 02/21/2013    Seasonal allergic rhinitis 12/20/2012     She  has a past surgical history that includes Knee surgery (1998); pr colonoscopy flx dx w/collj spec when pfrmd (N/A, 05/09/2017); Eye surgery; and Colonoscopy  She  reports that she has been smoking cigarettes  She has a 100 00 pack-year smoking history  She has never used smokeless tobacco  She reports that she does not drink alcohol and does not use drugs    Current Outpatient Medications   Medication Sig Dispense Refill    acetaminophen (TYLENOL) 650 mg CR tablet Take by mouth every 8 (eight) hours as needed        albuterol (PROVENTIL HFA,VENTOLIN HFA) 90 mcg/act inhaler INHALE 2 PUFFS BY MOUTH EVERY 6 HOURS AS NEEDED FOR WHEEZE 18 g 1    ALPRAZolam (XANAX) 0 25 mg tablet Take 1 tablet (0 25 mg total) by mouth 3 (three) times a day as needed for anxiety 90 tablet 0    amLODIPine (NORVASC) 5 mg tablet Take 1 tablet (5 mg total) by mouth daily 30 tablet 5    cetirizine (ZyrTEC) 10 mg tablet Take 1 tablet (10 mg total) by mouth daily 90 tablet 0    escitalopram (LEXAPRO) 10 mg tablet TAKE 1 TABLET BY MOUTH EVERY DAY 90 tablet 1    fluticasone (FLONASE) 50 mcg/act nasal spray SPRAY 2 SPRAYS INTO EACH NOSTRIL EVERY DAY 16 mL 3    gabapentin (Neurontin) 300 mg capsule Take 1 capsule (300 mg total) by mouth 3 (three) times a day 90 capsule 0    Glycerin-Hypromellose- (DRY EYE RELIEF DROPS OP) Apply to eye      HYDROcodone-acetaminophen (Norco) 5-325 mg per tablet Take 1 tablet by mouth every 6 (six) hours as needed for pain Max Daily Amount: 4 tablets 60 tablet 0    lidocaine (Lidoderm) 5 % Apply 1 patch topically daily Remove & Discard patch within 12 hours or as directed by MD 30 patch 1    lisinopril (ZESTRIL) 20 mg tablet TAKE 1 TABLET BY MOUTH EVERY DAY 90 tablet 1    Lumigan 0 01 % ophthalmic drops Administer 1 drop to both eyes daily at bedtime        MINOXIDIL, TOPICAL, 5 % SOLN Apply 1 application topically      montelukast (SINGULAIR) 10 mg tablet TAKE 1 TABLET BY MOUTH EVERYDAY AT BEDTIME 90 tablet 1    naloxegol oxalate (Movantik) 25 MG tablet 1/2 - 1 tab qAM (Patient not taking: Reported on 5/19/2022) 30 tablet 1    naloxone (NARCAN) 4 mg/0 1 mL nasal spray Administer 1 spray into a nostril  If no response after 2-3 minutes, give another dose in the other nostril using a new spray   1 each 1    nystatin (MYCOSTATIN) 500,000 units/5 mL suspension SWISH AND SPIT WITH 5 ML 4 TIMES A  mL 3    pantoprazole (PROTONIX) 40 mg tablet Take 1 tablet (40 mg total) by mouth 2 (two) times a day 180 tablet 1    predniSONE 5 mg tablet Take 3 tablets (15 mg total) by mouth daily for 14 days, THEN 2 tablets (10 mg total) daily for 14 days, THEN 1 tablet (5 mg total) daily for 14 days  84 tablet 0    promethazine-dextromethorphan (PHENERGAN-DM) 6 25-15 mg/5 mL oral syrup TAKE 5 MLS BY MOUTH 4 TIMES A DAY AS NEEDED FOR COUGH 150 mL 0    sucralfate (CARAFATE) 1 g tablet Take 1 tablet (1 g total) by mouth 4 (four) times a day 20 tablet 0    timolol (TIMOPTIC) 0 25 % ophthalmic solution       traMADol (Ultram) 50 mg tablet Take 1 tablet (50 mg total) by mouth every 6 (six) hours as needed for moderate pain 30 tablet 0    fluticasone-umeclidinium-vilanterol (Trelegy Ellipta) 200-62 5-25 MCG/INH AEPB inhaler Inhale 1 puff daily Rinse mouth after use  60 blister 5    polyethylene glycol (GOLYTELY) 4000 mL solution Take 4,000 mL by mouth once for 1 dose Per office instructions 4000 mL 0     No current facility-administered medications for this visit  She is allergic to augmentin [amoxicillin-pot clavulanate], miconazole, and wixela inhub [fluticasone-salmeterol]       Review of Systems   Constitutional: Positive for fatigue  Negative for activity change, appetite change, chills and fever  Respiratory: Negative  Cardiovascular: Negative  Gastrointestinal: Positive for abdominal distention, abdominal pain, constipation and diarrhea (occasional)  Negative for blood in stool, nausea and vomiting  Genitourinary: Negative  Musculoskeletal: Positive for back pain, gait problem and myalgias  Neurological: Negative for dizziness, weakness, light-headedness, numbness and headaches  All other systems reviewed and are negative  Objective:      /82   Pulse 76   Temp 98 9 °F (37 2 °C)   Ht 5' 4" (1 626 m)   Wt 91 6 kg (202 lb)   LMP  (LMP Unknown)   BMI 34 67 kg/m²          Physical Exam  Vitals reviewed     Constitutional:       Comments: Uncomfortable appearing female in no resp distress   HENT:      Head: Normocephalic  Mouth/Throat:      Mouth: Mucous membranes are moist    Eyes:      General: No scleral icterus  Extraocular Movements: Extraocular movements intact  Conjunctiva/sclera: Conjunctivae normal       Pupils: Pupils are equal, round, and reactive to light  Comments: No pallor appreciated   Cardiovascular:      Rate and Rhythm: Normal rate and regular rhythm  Pulses: Normal pulses  Pulmonary:      Effort: Pulmonary effort is normal       Breath sounds: No wheezing or rales  Abdominal:      General: There is no distension  Palpations: There is no mass  Tenderness: There is abdominal tenderness (mild epigastric and RUQ No masses )  There is left CVA tenderness  Musculoskeletal:         General: Tenderness (over T8 area - unchanged) present  No swelling  Cervical back: Normal range of motion  No tenderness  Right lower leg: No edema  Left lower leg: No edema  Lymphadenopathy:      Cervical: No cervical adenopathy  Skin:     General: Skin is warm  Neurological:      Mental Status: She is alert  Cranial Nerves: No cranial nerve deficit  Sensory: No sensory deficit  Motor: No weakness        Gait: Gait normal

## 2022-05-19 ENCOUNTER — TELEPHONE (OUTPATIENT)
Dept: GASTROENTEROLOGY | Facility: AMBULARY SURGERY CENTER | Age: 67
End: 2022-05-19

## 2022-05-19 ENCOUNTER — OFFICE VISIT (OUTPATIENT)
Dept: GASTROENTEROLOGY | Facility: AMBULARY SURGERY CENTER | Age: 67
End: 2022-05-19
Payer: COMMERCIAL

## 2022-05-19 VITALS
RESPIRATION RATE: 18 BRPM | HEART RATE: 78 BPM | BODY MASS INDEX: 34.83 KG/M2 | HEIGHT: 64 IN | DIASTOLIC BLOOD PRESSURE: 76 MMHG | WEIGHT: 204 LBS | SYSTOLIC BLOOD PRESSURE: 124 MMHG | OXYGEN SATURATION: 100 %

## 2022-05-19 DIAGNOSIS — R14.0 BLOATING: ICD-10-CM

## 2022-05-19 DIAGNOSIS — K63.9 COLONIC THICKENING: ICD-10-CM

## 2022-05-19 DIAGNOSIS — R68.81 EARLY SATIETY: ICD-10-CM

## 2022-05-19 DIAGNOSIS — K31.84 GASTROPARESIS: ICD-10-CM

## 2022-05-19 DIAGNOSIS — K76.89 LIVER CYST: ICD-10-CM

## 2022-05-19 DIAGNOSIS — K21.9 GASTROESOPHAGEAL REFLUX DISEASE, UNSPECIFIED WHETHER ESOPHAGITIS PRESENT: ICD-10-CM

## 2022-05-19 DIAGNOSIS — K59.09 CHRONIC CONSTIPATION: Primary | ICD-10-CM

## 2022-05-19 PROCEDURE — 3008F BODY MASS INDEX DOCD: CPT | Performed by: PHYSICIAN ASSISTANT

## 2022-05-19 PROCEDURE — 4004F PT TOBACCO SCREEN RCVD TLK: CPT | Performed by: PHYSICIAN ASSISTANT

## 2022-05-19 PROCEDURE — 3078F DIAST BP <80 MM HG: CPT | Performed by: PHYSICIAN ASSISTANT

## 2022-05-19 PROCEDURE — 3074F SYST BP LT 130 MM HG: CPT | Performed by: PHYSICIAN ASSISTANT

## 2022-05-19 PROCEDURE — 1160F RVW MEDS BY RX/DR IN RCRD: CPT | Performed by: PHYSICIAN ASSISTANT

## 2022-05-19 PROCEDURE — 99214 OFFICE O/P EST MOD 30 MIN: CPT | Performed by: PHYSICIAN ASSISTANT

## 2022-05-19 NOTE — TELEPHONE ENCOUNTER
Patient is scheduled for colonoscopy and EGD on June 30 , 2022 at [facility]  with Indy Loera MD  Patient is aware of pre-procedure prep of Golytley/Dulcolax and they will be called the day prior between 2 and 6 pm for time to report for procedure  Pre-procedure prep has been given to the patient  in person  on May 19, 2022

## 2022-05-19 NOTE — PATIENT INSTRUCTIONS
Scheduled date of EGD/colonoscopy (as of today): 6/30/2022  Physician performing EGD/colonoscopy: Dr Kd Delong  Location of EGD/colonoscopy:  [address]  Desired bowel prep reviewed with patient: Lillie/Dulcolax  Instructions reviewed with patient by: Damon Rojo  Clearances:   None

## 2022-05-19 NOTE — PROGRESS NOTES
Assessment and Plan    #1  Gastroparesis with epigastric bloating and early satiety:  Reports recent worsening with abdominal bloating and early satiety  Could be in the setting of narcotic use for fracture  Patient also feel that this is worse secondary to her constipation  -will treat consult below  -low residue diet and small frequent meals with the patient  -discussed options such as Reglan, domperidone, and EGD with Botox as well as the risks  At this time patient does not want to be on medication for gastroparesis and would rather try Movantik per below  -could consider Norberto Ling in the future once patient is off of narcotic pain medication as this may have some benefit for gastroparesis and constipation  -will plan for EGD due to chronic prednisone use recently to rule out ulcer disease or severe gastritis that could be causing worsening symptoms  -continue PPI which she recently increased to twice daily    #2  Chronic constipation:  Has had issues with chronic constipation for quite some time however this recently and after being put on chronic narcotics  She will not move her bowels unless she is taking MiraLax and when she does it is pasty  -agree with trial of Movantik due to the recent use of narcotics  -can consider other options in the future if patient is taken off narcotics    #3  History of colonic thickening:  Patient had her last colonoscopy in 2010 with 2 hyperplastic polyps removed  She had colonic thickening on a CT scan in 2019 was recommended for repeat colonoscopy which she did not do secondary to being recommended a 2 day bowel prep      -due to worsening constipation currently, would recommend colonoscopy at this time  -we can try Dulcolax and GoLYTELY prep with the assumption that patient's bowels will be improved on the Movantik however I did inform the patient that if she is still having a lot of difficulty with constipation that she needs to call us to prevent having to reschedule her colonoscopy if the prep is poor when we do time   -discussed risks and benefits of the procedure in detail with the patient    #4  Liver cyst: Recent CT scan with contrast showed stability  --------------------------------------------------------------------------------------------------------------------    Chief Complaint: follow up     HPI: Arnoldo Victoria is a 77 y o  female with a history of acid reflux, constipation, COPD, hypertension, recent compression fracture the spine on narcotics, gastroparesis, fibromyalgia, family history of pancreatic cancer, and depression who presents today for follow up for constipation  Patient reports that she recently was having worsening issues with constipation, epigastric bloating, and early satiety  Patient has a history of gastroparesis which has been well controlled on diet in the past   She recently was put on pain medication due to a back fracture which appears to have worsened a lot of her GI symptoms  Reports that she has to depend on MiraLax at this time to help her move her bowels and does not always work  Her family doctor ordered her Movantik which she has not yet started but has had at she denies any blood in the stool or black tarry stool  She does report that the abdominal bloating feels better after she moves her bowels  She has some nausea but denies any significant vomiting  Her acid reflux is better controlled with the increase in PPI she recently increased to twice daily per the recommendation of her PCP  Denies any weight loss  Her last be was in 2017 with hyperplastic polyp removed  She was recommended a 10 year repeat however in 2019 had some colonic thickening with possible colitis and was recommended to have a colonoscopy  At that time she did not perform colonsocopy because she did not want to do a 2 day prep        Review of Systems:   General: negative for fatigue, fever, night sweats or unexpected weight loss  Psychological: negative for anxiety or depression  Ophthalmic: negative for blurry vision or scleral icterus  ENT: negative for headaches, oral lesions, sore throat, vocal changes or dysphagia  Hematological and Lymphatic: negative for pallor or swollen lymph nodes  Respiratory: negative for cough, shortness of breath or wheezing  Cardiovascular: negative for chest pain, edema or murmur  Gastrointestinal: as mentioned in HPI  Genito-Urinary: negative for dysuria or incontinence  Musculoskeletal: negative for joint pain, joint stiffness or joint swelling  Dermatological: negative for pruritus, rash, or jaundice    Current Medications  Current Outpatient Medications   Medication Sig Dispense Refill    acetaminophen (TYLENOL) 650 mg CR tablet Take by mouth every 8 (eight) hours as needed        albuterol (PROVENTIL HFA,VENTOLIN HFA) 90 mcg/act inhaler INHALE 2 PUFFS BY MOUTH EVERY 6 HOURS AS NEEDED FOR WHEEZE 18 g 1    ALPRAZolam (XANAX) 0 25 mg tablet Take 1 tablet (0 25 mg total) by mouth 3 (three) times a day as needed for anxiety 90 tablet 0    amLODIPine (NORVASC) 5 mg tablet Take 1 tablet (5 mg total) by mouth daily 30 tablet 5    cetirizine (ZyrTEC) 10 mg tablet Take 1 tablet (10 mg total) by mouth daily 90 tablet 0    escitalopram (LEXAPRO) 10 mg tablet TAKE 1 TABLET BY MOUTH EVERY DAY 90 tablet 1    fluticasone (FLONASE) 50 mcg/act nasal spray SPRAY 2 SPRAYS INTO EACH NOSTRIL EVERY DAY 16 mL 3    gabapentin (Neurontin) 300 mg capsule Take 1 capsule (300 mg total) by mouth 3 (three) times a day 90 capsule 0    Glycerin-Hypromellose- (DRY EYE RELIEF DROPS OP) Apply to eye      HYDROcodone-acetaminophen (Norco) 5-325 mg per tablet Take 1 tablet by mouth every 6 (six) hours as needed for pain Max Daily Amount: 4 tablets 60 tablet 0    lidocaine (Lidoderm) 5 % Apply 1 patch topically daily Remove & Discard patch within 12 hours or as directed by MD 30 patch 1    lisinopril (ZESTRIL) 20 mg tablet TAKE 1 TABLET BY MOUTH EVERY DAY 90 tablet 1    Lumigan 0 01 % ophthalmic drops Administer 1 drop to both eyes daily at bedtime        MINOXIDIL, TOPICAL, 5 % SOLN Apply 1 application topically      montelukast (SINGULAIR) 10 mg tablet TAKE 1 TABLET BY MOUTH EVERYDAY AT BEDTIME 90 tablet 1    naloxone (NARCAN) 4 mg/0 1 mL nasal spray Administer 1 spray into a nostril  If no response after 2-3 minutes, give another dose in the other nostril using a new spray  1 each 1    nystatin (MYCOSTATIN) 500,000 units/5 mL suspension SWISH AND SPIT WITH 5 ML 4 TIMES A  mL 3    pantoprazole (PROTONIX) 40 mg tablet Take 1 tablet (40 mg total) by mouth 2 (two) times a day 180 tablet 1    polyethylene glycol (GOLYTELY) 4000 mL solution Take 4,000 mL by mouth once for 1 dose Per office instructions 4000 mL 0    predniSONE 5 mg tablet Take 3 tablets (15 mg total) by mouth daily for 14 days, THEN 2 tablets (10 mg total) daily for 14 days, THEN 1 tablet (5 mg total) daily for 14 days  84 tablet 0    promethazine-dextromethorphan (PHENERGAN-DM) 6 25-15 mg/5 mL oral syrup TAKE 5 MLS BY MOUTH 4 TIMES A DAY AS NEEDED FOR COUGH 150 mL 0    sucralfate (CARAFATE) 1 g tablet Take 1 tablet (1 g total) by mouth 4 (four) times a day 20 tablet 0    timolol (TIMOPTIC) 0 25 % ophthalmic solution       traMADol (Ultram) 50 mg tablet Take 1 tablet (50 mg total) by mouth every 6 (six) hours as needed for moderate pain 30 tablet 0    fluticasone-umeclidinium-vilanterol (Trelegy Ellipta) 200-62 5-25 MCG/INH AEPB inhaler Inhale 1 puff daily Rinse mouth after use  60 blister 5    naloxegol oxalate (Movantik) 25 MG tablet 1/2 - 1 tab qAM (Patient not taking: Reported on 5/19/2022) 30 tablet 1     No current facility-administered medications for this visit         Past Medical History  Past Medical History:   Diagnosis Date    Acid reflux     Fibromyalgia     Hypertension     Seasonal allergies        Past Surgical History  Past Surgical History: Procedure Laterality Date    COLONOSCOPY      EYE SURGERY      KNEE SURGERY  1998    UT COLONOSCOPY FLX DX W/COLLJ SPEC WHEN PFRMD N/A 05/09/2017    Procedure: EGD AND COLONOSCOPY;  Surgeon: Nasir Lynch MD;  [facility];   Service: Gastroenterology       Past Social History   Social History     Socioeconomic History    Marital status: /Civil Union     Spouse name: None    Number of children: 3    Years of education: less than high school    Highest education level: None   Occupational History    Occupation: unemployed   Tobacco Use    Smoking status: Current Every Day Smoker     Packs/day: 2 00     Years: 50 00     Pack years: 100 00     Types: Cigarettes    Smokeless tobacco: Never Used    Tobacco comment: pt is down to 1 5 packs a day    Vaping Use    Vaping Use: Never used   Substance and Sexual Activity    Alcohol use: Never    Drug use: No    Sexual activity: None   Other Topics Concern    None   Social History Narrative    Always uses seatbelt    Daily coffee consumption    Daily tea consumption    Dental care regularly    Exercises regularly    Multiple organ donor    No guns in the home    No living will    Denies pets/ animals in the home    Power of  in existence- denied         Social Determinants of Health     Financial Resource Strain: Not on file   Food Insecurity: Not on file   Transportation Needs: Not on file   Physical Activity: Not on file   Stress: Not on file   Social Connections: Not on file   Intimate Partner Violence: Not on file   Housing Stability: Not on file       The following portions of the patient's history were reviewed and updated as appropriate: allergies, current medications, past family history, past medical history, past social history, past surgical history and problem list     Vital Signs  Vitals:    05/19/22 1404   BP: 124/76   BP Location: Left arm   Patient Position: Sitting   Cuff Size: Standard   Pulse: 78   Resp: 18   SpO2: 100%   Weight: 92 5 kg (204 lb)   Height: 5' 4" (1 626 m)       Physical Exam:  General appearance: alert, cooperative, no distress  HEENT: normocephalic, anicteric, no eye erythema or discharge, no oropharyngeal thrush  Neck: supple, trachea midline, no adenopathy  Lungs: CTA b/l, no rales, rhonchi, or wheezing, unlabored respirations  Heart: RRR, no murmur, rubs, or gallops  Abdomen: soft, non-tender, non-distended, normal bowel sounds, no masses or organomegaly  Rectal: deferred  Extremities: no cyanosis, clubbing, or edema  Musculoskeletal: normal gait  Skin: color and texture normal, no jaundice, no rashes or lesions  Psychiatric: alert and oriented, normal affect and behavior

## 2022-05-20 PROBLEM — K59.03 THERAPEUTIC OPIOID INDUCED CONSTIPATION: Status: ACTIVE | Noted: 2022-05-20

## 2022-05-20 PROBLEM — T40.2X5A THERAPEUTIC OPIOID INDUCED CONSTIPATION: Status: ACTIVE | Noted: 2022-05-20

## 2022-05-21 NOTE — ASSESSMENT & PLAN NOTE
I reviewed with pt  We discussed treatment options  Failed OTC meds except for Milk of Mag,  and Miralax  Start Movantik as directed   Recheck 1-2w if not improved - earlier if worse

## 2022-05-21 NOTE — ASSESSMENT & PLAN NOTE
I reviewed with pt  Continues to refuse kyphoplasty eval   Continue present care  Avoid exacerbating positions/activities   Recheck 1m

## 2022-05-21 NOTE — ASSESSMENT & PLAN NOTE
Persistent  Pt unable to have full UGI or Hyda scan done   Await GI eval   Continue present care in the interim

## 2022-05-23 ENCOUNTER — TELEPHONE (OUTPATIENT)
Dept: OBGYN CLINIC | Facility: HOSPITAL | Age: 67
End: 2022-05-23

## 2022-05-23 NOTE — TELEPHONE ENCOUNTER
Patient sees Dr Julián Oliver      Patient is calling to ask if any of her bloodwork needs to be fasting?       CB: [phone]

## 2022-05-24 ENCOUNTER — APPOINTMENT (OUTPATIENT)
Dept: LAB | Facility: MEDICAL CENTER | Age: 67
End: 2022-05-24
Payer: COMMERCIAL

## 2022-05-24 DIAGNOSIS — M54.9 MID BACK PAIN: ICD-10-CM

## 2022-05-24 LAB
ALBUMIN SERPL BCP-MCNC: 3.5 G/DL (ref 3.5–5)
ALP SERPL-CCNC: 75 U/L (ref 46–116)
ALT SERPL W P-5'-P-CCNC: 27 U/L (ref 12–78)
ANION GAP SERPL CALCULATED.3IONS-SCNC: 4 MMOL/L (ref 4–13)
AST SERPL W P-5'-P-CCNC: 26 U/L (ref 5–45)
BASOPHILS # BLD AUTO: 0.09 THOUSANDS/ΜL (ref 0–0.1)
BASOPHILS NFR BLD AUTO: 1 % (ref 0–1)
BILIRUB SERPL-MCNC: 0.61 MG/DL (ref 0.2–1)
BUN SERPL-MCNC: 6 MG/DL (ref 5–25)
CALCIUM SERPL-MCNC: 10.2 MG/DL (ref 8.3–10.1)
CHLORIDE SERPL-SCNC: 102 MMOL/L (ref 100–108)
CO2 SERPL-SCNC: 29 MMOL/L (ref 21–32)
CREAT SERPL-MCNC: 0.69 MG/DL (ref 0.6–1.3)
CRP SERPL QL: 4.7 MG/L
EOSINOPHIL # BLD AUTO: 0.19 THOUSAND/ΜL (ref 0–0.61)
EOSINOPHIL NFR BLD AUTO: 2 % (ref 0–6)
ERYTHROCYTE [DISTWIDTH] IN BLOOD BY AUTOMATED COUNT: 13.4 % (ref 11.6–15.1)
ERYTHROCYTE [SEDIMENTATION RATE] IN BLOOD: 54 MM/HOUR (ref 0–29)
GFR SERPL CREATININE-BSD FRML MDRD: 91 ML/MIN/1.73SQ M
GLUCOSE P FAST SERPL-MCNC: 79 MG/DL (ref 65–99)
HCT VFR BLD AUTO: 43.5 % (ref 34.8–46.1)
HGB BLD-MCNC: 14.1 G/DL (ref 11.5–15.4)
IMM GRANULOCYTES # BLD AUTO: 0.04 THOUSAND/UL (ref 0–0.2)
IMM GRANULOCYTES NFR BLD AUTO: 1 % (ref 0–2)
LYMPHOCYTES # BLD AUTO: 1.89 THOUSANDS/ΜL (ref 0.6–4.47)
LYMPHOCYTES NFR BLD AUTO: 23 % (ref 14–44)
MCH RBC QN AUTO: 28.3 PG (ref 26.8–34.3)
MCHC RBC AUTO-ENTMCNC: 32.4 G/DL (ref 31.4–37.4)
MCV RBC AUTO: 87 FL (ref 82–98)
MONOCYTES # BLD AUTO: 0.95 THOUSAND/ΜL (ref 0.17–1.22)
MONOCYTES NFR BLD AUTO: 12 % (ref 4–12)
NEUTROPHILS # BLD AUTO: 5.13 THOUSANDS/ΜL (ref 1.85–7.62)
NEUTS SEG NFR BLD AUTO: 61 % (ref 43–75)
NRBC BLD AUTO-RTO: 0 /100 WBCS
PLATELET # BLD AUTO: 303 THOUSANDS/UL (ref 149–390)
PMV BLD AUTO: 9.5 FL (ref 8.9–12.7)
POTASSIUM SERPL-SCNC: 4.2 MMOL/L (ref 3.5–5.3)
PROT SERPL-MCNC: 7.5 G/DL (ref 6.4–8.2)
RBC # BLD AUTO: 4.99 MILLION/UL (ref 3.81–5.12)
SODIUM SERPL-SCNC: 135 MMOL/L (ref 136–145)
WBC # BLD AUTO: 8.29 THOUSAND/UL (ref 4.31–10.16)

## 2022-05-24 PROCEDURE — 86140 C-REACTIVE PROTEIN: CPT | Performed by: INTERNAL MEDICINE

## 2022-05-24 PROCEDURE — 85652 RBC SED RATE AUTOMATED: CPT | Performed by: INTERNAL MEDICINE

## 2022-05-24 PROCEDURE — 85025 COMPLETE CBC W/AUTO DIFF WBC: CPT | Performed by: INTERNAL MEDICINE

## 2022-05-24 PROCEDURE — 80053 COMPREHEN METABOLIC PANEL: CPT | Performed by: INTERNAL MEDICINE

## 2022-05-24 PROCEDURE — 36415 COLL VENOUS BLD VENIPUNCTURE: CPT | Performed by: INTERNAL MEDICINE

## 2022-05-24 NOTE — TELEPHONE ENCOUNTER
1  [id number] 05/09/2022 05/09/2022 HYDROcodone BITARTRATE-ACETAMINOPHE (Tablet)  60 0 15 325 MG-5 MG 20 0 AYLEEN MADDEN [facility]  Medicare 0 / 0 [place]    1  [id number] 04/27/2022 04/27/2022 HYDROcodone BITARTRATE-ACETAMINOPHE (Tablet)  60 0 15 325 MG-5 MG 20 0 AYLEEN MADDEN [facility]  Medicare 0 / 0 [place]    1  [id number] 04/26/2022 04/26/2022 traMADol HCL (Tablet)  30 0 7 50 MG 21 43 AYLEEN MADDEN, [facility]    Medicare 0 / 0 [place]

## 2022-05-25 ENCOUNTER — APPOINTMENT (OUTPATIENT)
Dept: LAB | Facility: MEDICAL CENTER | Age: 67
End: 2022-05-25
Payer: COMMERCIAL

## 2022-05-25 PROCEDURE — 36415 COLL VENOUS BLD VENIPUNCTURE: CPT | Performed by: INTERNAL MEDICINE

## 2022-05-25 PROCEDURE — 82024 ASSAY OF ACTH: CPT | Performed by: INTERNAL MEDICINE

## 2022-05-25 RX ORDER — HYDROCODONE BITARTRATE AND ACETAMINOPHEN 5; 325 MG/1; MG/1
1 TABLET ORAL EVERY 6 HOURS PRN
Qty: 60 TABLET | Refills: 0 | Status: SHIPPED | OUTPATIENT
Start: 2022-05-25 | End: 2022-05-26 | Stop reason: SDUPTHER

## 2022-05-26 ENCOUNTER — TELEPHONE (OUTPATIENT)
Dept: OBGYN CLINIC | Facility: HOSPITAL | Age: 67
End: 2022-05-26

## 2022-05-26 DIAGNOSIS — M54.9 MID BACK PAIN: ICD-10-CM

## 2022-05-26 LAB — ACTH PLAS-MCNC: 9.4 PG/ML (ref 7.2–63.3)

## 2022-05-26 RX ORDER — HYDROCODONE BITARTRATE AND ACETAMINOPHEN 5; 325 MG/1; MG/1
1 TABLET ORAL EVERY 6 HOURS PRN
Qty: 60 TABLET | Refills: 0 | Status: SHIPPED | OUTPATIENT
Start: 2022-05-26 | End: 2022-06-08 | Stop reason: SDUPTHER

## 2022-05-26 NOTE — TELEPHONE ENCOUNTER
Spoke with patient  She wanted information sent to her about medication  She will contact us if she wants to start it         Copied patient information from [facility] through [facility]

## 2022-05-26 NOTE — TELEPHONE ENCOUNTER
Patient is seen by Dr Stefani Ricci  Patient called today to see if Dr Stefani Ricci has had a chance to review her labs? Patient had additional 7 am blood test done yesterday at [facility]       Please advise    [phone]

## 2022-05-27 ENCOUNTER — TELEPHONE (OUTPATIENT)
Dept: FAMILY MEDICINE CLINIC | Facility: CLINIC | Age: 67
End: 2022-05-27

## 2022-05-27 DIAGNOSIS — I77.6 AORTITIS (HCC): Primary | ICD-10-CM

## 2022-05-27 RX ORDER — HYDROXYCHLOROQUINE SULFATE 200 MG/1
200 TABLET, FILM COATED ORAL 2 TIMES DAILY
Qty: 60 TABLET | Refills: 3 | Status: SHIPPED | OUTPATIENT
Start: 2022-05-27 | End: 2022-05-27

## 2022-05-27 RX ORDER — HYDROXYCHLOROQUINE SULFATE 200 MG/1
200 TABLET, FILM COATED ORAL 2 TIMES DAILY
Qty: 60 TABLET | Refills: 3 | Status: SHIPPED | OUTPATIENT
Start: 2022-05-27 | End: 2022-09-24

## 2022-05-27 NOTE — TELEPHONE ENCOUNTER
Pt's rheumatologist wants pt to start on Hydroxychloroquine for inflammation  Pt wants to know your thoughts on this

## 2022-06-02 ENCOUNTER — OFFICE VISIT (OUTPATIENT)
Dept: FAMILY MEDICINE CLINIC | Facility: CLINIC | Age: 67
End: 2022-06-02
Payer: COMMERCIAL

## 2022-06-02 VITALS
SYSTOLIC BLOOD PRESSURE: 124 MMHG | HEART RATE: 76 BPM | BODY MASS INDEX: 34.31 KG/M2 | DIASTOLIC BLOOD PRESSURE: 76 MMHG | WEIGHT: 201 LBS | TEMPERATURE: 97.9 F | HEIGHT: 64 IN

## 2022-06-02 DIAGNOSIS — I77.6 AORTITIS (HCC): ICD-10-CM

## 2022-06-02 DIAGNOSIS — S22.060D CLOSED WEDGE COMPRESSION FRACTURE OF T8 VERTEBRA WITH ROUTINE HEALING, SUBSEQUENT ENCOUNTER: ICD-10-CM

## 2022-06-02 DIAGNOSIS — I10 PRIMARY HYPERTENSION: ICD-10-CM

## 2022-06-02 DIAGNOSIS — M70.61 GREATER TROCHANTERIC BURSITIS OF RIGHT HIP: ICD-10-CM

## 2022-06-02 DIAGNOSIS — J42 CHRONIC BRONCHITIS, UNSPECIFIED CHRONIC BRONCHITIS TYPE (HCC): ICD-10-CM

## 2022-06-02 DIAGNOSIS — Z00.00 MEDICARE ANNUAL WELLNESS VISIT, SUBSEQUENT: Primary | ICD-10-CM

## 2022-06-02 DIAGNOSIS — R10.13 EPIGASTRIC PAIN: ICD-10-CM

## 2022-06-02 PROCEDURE — 1125F AMNT PAIN NOTED PAIN PRSNT: CPT | Performed by: FAMILY MEDICINE

## 2022-06-02 PROCEDURE — 3288F FALL RISK ASSESSMENT DOCD: CPT | Performed by: FAMILY MEDICINE

## 2022-06-02 PROCEDURE — 1003F LEVEL OF ACTIVITY ASSESS: CPT | Performed by: FAMILY MEDICINE

## 2022-06-02 PROCEDURE — 1090F PRES/ABSN URINE INCON ASSESS: CPT | Performed by: FAMILY MEDICINE

## 2022-06-02 PROCEDURE — 99214 OFFICE O/P EST MOD 30 MIN: CPT | Performed by: FAMILY MEDICINE

## 2022-06-02 PROCEDURE — 3725F SCREEN DEPRESSION PERFORMED: CPT | Performed by: FAMILY MEDICINE

## 2022-06-02 PROCEDURE — 1170F FXNL STATUS ASSESSED: CPT | Performed by: FAMILY MEDICINE

## 2022-06-02 PROCEDURE — G0439 PPPS, SUBSEQ VISIT: HCPCS | Performed by: FAMILY MEDICINE

## 2022-06-02 NOTE — ASSESSMENT & PLAN NOTE
Epigastric pain has improved with the addition of Plaquenil  Will assume that some of patient's symptoms under clear related to her aortitis  She is scheduled to see GI next week for an EGD and colonoscopy  Continue Plaquenil  Follow-up with Rheumatology    Recheck 3 months

## 2022-06-02 NOTE — PROGRESS NOTES
Assessment and Plan:     Problem List Items Addressed This Visit        Respiratory    Chronic obstructive pulmonary disease ([address] )     Breathing stable  Patient without any interest stopping smoking  No new masses/nodules noted on CT angiogram done back in April  Patient fully understands risks she is assuming with her persistent smoking  Continue present inhalers  Recheck 6 months              Cardiovascular and Mediastinum    Aortitis (HCC)     Epigastric pain has improved with the addition of Plaquenil  Will assume that some of patient's symptoms under clear related to her aortitis  She is scheduled to see GI next week for an EGD and colonoscopy  Continue Plaquenil  Follow-up with Rheumatology  Recheck 3 months           Hypertension     Stable  Continue present medications  Recheck six months              Musculoskeletal and Integument    Closed wedge compression fracture of T8 vertebra (HCC)     Patient did better with higher dose of the Lidoderm patch however she developed a rash while on this  She is back to the 4% patch which helps but is not quite as effective  Patient states that she is finally able to sleep in her bed again does not think that this is slowly improving  We will continue to observe  Patient continues to refuse any kind of intervention  Recheck 3 months           Greater trochanteric bursitis of right hip     Reviewed with patient  Given her prolonged exposure to steroids, I am reluctant to consider injections the this point  Patient understand and agrees  Recommend ice, rest and other conservative measures  Consider physical therapy if not improving  Recheck 3 months-earlier if worse              Other    Epigastric pain     Improved with Plaquenil  Suspect that symptoms are multifactorial with aortitis, and underlying GI issues playing a role  Cannot rule out FX of her T a compression fracture as well    At this point, she will continue her Plaquenil and follow-up with GI for an EGD and colonoscopy to be done next week  Recheck 3 months-earlier if worse             Other Visit Diagnoses     Medicare annual wellness visit, subsequent    -  Primary           Preventive health issues were discussed with patient, and age appropriate screening tests were ordered as noted in patient's After Visit Summary  Personalized health advice and appropriate referrals for health education or preventive services given if needed, as noted in patient's After Visit Summary       History of Present Illness:     Patient presents for Medicare Annual Wellness visit    Patient Care Team:  Isaias Perez MD as PCP - Jing Hernandez MD as PCP - [address] [place] [address] (RTE)  MD Cooper Fields MD Donato Catholic, MD as Endoscopist     Problem List:     Patient Active Problem List   Diagnosis    Seasonal allergic rhinitis    Anxiety    Cataract    Chronic obstructive pulmonary disease ([address] )    Mild episode of recurrent major depressive disorder ([address] )    Edema    Esophageal reflux    Fibromyalgia    Hyperlipidemia    Hypertension    Degeneration of intervertebral disc    Lumbosacral radiculopathy at L5    Palpitations    Pulmonary nodule seen on imaging study    Dyspnea on exertion    Early satiety    Abdominal distension    Family history of pancreatic cancer    Epigastric pain    Neuropathic pain    Neck pain    Cigarette nicotine dependence with nicotine-induced disorder    Severe obesity (BMI 35 0-35 9 with comorbidity) ([address] )    Lower extremity edema    Snoring    Chronic idiopathic constipation    Gastroparesis    Prediabetes    Dermatitis    Chest pain    Aortitis ([address] )    Increased intraocular pressure    Primary insomnia    Candida vaginitis    Pain of both eyes    Chronic frontal sinusitis    Continuous opioid dependence ([address] )    Mid back pain    RUQ pain    Closed wedge compression fracture of T8 vertebra [facility])    Therapeutic opioid induced constipation    Greater trochanteric bursitis of right hip      Past Medical and Surgical History:     Past Medical History:   Diagnosis Date    Acid reflux     Fibromyalgia     Hypertension     Seasonal allergies      Past Surgical History:   Procedure Laterality Date    COLONOSCOPY      EYE SURGERY      KNEE SURGERY  1998    MI COLONOSCOPY FLX DX W/COLLJ SPEC WHEN PFRMD N/A 05/09/2017    Procedure: EGD AND COLONOSCOPY;  Surgeon: Santo Barrera MD;  Location: [facility];   Service: Gastroenterology      Family History:     Family History   Problem Relation Age of Onset    Pancreatic cancer Mother 67    Lung cancer Sister 40    Pancreatic cancer Brother     Coronary artery disease Father     Diabetes Father     Hypertension Father     Heart disease Father     Diabetes Paternal Grandmother     Lung cancer Maternal Grandfather 68    Pancreatic cancer Brother       Social History:     Social History     Socioeconomic History    Marital status: /Civil Union     Spouse name: None    Number of children: 3    Years of education: less than high school    Highest education level: None   Occupational History    Occupation: unemployed   Tobacco Use    Smoking status: Current Every Day Smoker     Packs/day: 2 00     Years: 50 00     Pack years: 100 00     Types: Cigarettes    Smokeless tobacco: Never Used    Tobacco comment: pt is down to 1 5 packs a day    Vaping Use    Vaping Use: Never used   Substance and Sexual Activity    Alcohol use: Never    Drug use: No    Sexual activity: None   Other Topics Concern    None   Social History Narrative    Always uses seatbelt    Daily coffee consumption    Daily tea consumption    Dental care regularly    Exercises regularly    Multiple organ donor    No guns in the home    No living will    Denies pets/ animals in the home    Power of  in existence- denied         Social Determinants of Health Financial Resource Strain: Not on file   Food Insecurity: Not on file   Transportation Needs: Not on file   Physical Activity: Not on file   Stress: Not on file   Social Connections: Not on file   Intimate Partner Violence: Not on file   Housing Stability: Not on file      Medications and Allergies:     Current Outpatient Medications   Medication Sig Dispense Refill    acetaminophen (TYLENOL) 650 mg CR tablet Take by mouth every 8 (eight) hours as needed        albuterol (PROVENTIL HFA,VENTOLIN HFA) 90 mcg/act inhaler INHALE 2 PUFFS BY MOUTH EVERY 6 HOURS AS NEEDED FOR WHEEZE 18 g 1    ALPRAZolam (XANAX) 0 25 mg tablet Take 1 tablet (0 25 mg total) by mouth 3 (three) times a day as needed for anxiety 90 tablet 0    amLODIPine (NORVASC) 5 mg tablet Take 1 tablet (5 mg total) by mouth daily 30 tablet 5    cetirizine (ZyrTEC) 10 mg tablet Take 1 tablet (10 mg total) by mouth daily 90 tablet 0    escitalopram (LEXAPRO) 10 mg tablet TAKE 1 TABLET BY MOUTH EVERY DAY 90 tablet 1    fluticasone (FLONASE) 50 mcg/act nasal spray SPRAY 2 SPRAYS INTO EACH NOSTRIL EVERY DAY 16 mL 3    HYDROcodone-acetaminophen (Norco) 5-325 mg per tablet Take 1 tablet by mouth every 6 (six) hours as needed for pain Max Daily Amount: 4 tablets 60 tablet 0    hydroxychloroquine (PLAQUENIL) 200 mg tablet Take 1 tablet (200 mg total) by mouth 2 (two) times a day 60 tablet 3    lidocaine (Lidoderm) 5 % Apply 1 patch topically daily Remove & Discard patch within 12 hours or as directed by MD 30 patch 1    lisinopril (ZESTRIL) 20 mg tablet TAKE 1 TABLET BY MOUTH EVERY DAY 90 tablet 1    Lumigan 0 01 % ophthalmic drops Administer 1 drop to both eyes daily at bedtime        MINOXIDIL, TOPICAL, 5 % SOLN Apply 1 application topically      naloxone (NARCAN) 4 mg/0 1 mL nasal spray Administer 1 spray into a nostril  If no response after 2-3 minutes, give another dose in the other nostril using a new spray   1 each 1    nystatin (MYCOSTATIN) 500,000 units/5 mL suspension SWISH AND SPIT WITH 5 ML 4 TIMES A  mL 3    pantoprazole (PROTONIX) 40 mg tablet Take 1 tablet (40 mg total) by mouth 2 (two) times a day 180 tablet 1    promethazine-dextromethorphan (PHENERGAN-DM) 6 25-15 mg/5 mL oral syrup TAKE 5 MLS BY MOUTH 4 TIMES A DAY AS NEEDED FOR COUGH 150 mL 0    traMADol (Ultram) 50 mg tablet Take 1 tablet (50 mg total) by mouth every 6 (six) hours as needed for moderate pain 30 tablet 0    fluticasone-umeclidinium-vilanterol (Trelegy Ellipta) 200-62 5-25 MCG/INH AEPB inhaler Inhale 1 puff daily Rinse mouth after use  60 blister 5    Glycerin-Hypromellose- (DRY EYE RELIEF DROPS OP) Apply to eye (Patient not taking: Reported on 6/2/2022)      polyethylene glycol (GOLYTELY) 4000 mL solution Take 4,000 mL by mouth once for 1 dose Per office instructions 4000 mL 0    timolol (TIMOPTIC) 0 25 % ophthalmic solution  (Patient not taking: Reported on 6/2/2022)       No current facility-administered medications for this visit       Allergies   Allergen Reactions    Augmentin [Amoxicillin-Pot Clavulanate] Vomiting    Miconazole Itching and Swelling    Wixela Inhub [Fluticasone-Salmeterol] Palpitations      Immunizations:     Immunization History   Administered Date(s) Administered    Tdap 02/28/2008      Health Maintenance:         Topic Date Due    Cervical Cancer Screening  02/11/2022    Breast Cancer Screening: Mammogram  03/31/2023    Lung Cancer Screening  04/25/2023    DXA SCAN  09/10/2023    Colorectal Cancer Screening  05/09/2027    Hepatitis C Screening  Completed         Topic Date Due    COVID-19 Vaccine (1) Never done    Pneumococcal Vaccine: 65+ Years (1 - PCV) Never done    DTaP,Tdap,and Td Vaccines (2 - Td or Tdap) 02/28/2018      Medicare Health Risk Assessment:     /76   Pulse 76   Temp 97 9 °F (36 6 °C)   Ht 5' 4" (1 626 m)   Wt 91 2 kg (201 lb)   LMP  (LMP Unknown)   BMI 34 50 kg/m²      Shlomo Burks is here for her Subsequent Wellness visit  Last Medicare Wellness visit information reviewed, patient interviewed and updates made to the record today  Health Risk Assessment:   Patient rates overall health as fair  Patient feels that their physical health rating is slightly worse  Patient is satisfied with their life  Eyesight was rated as same  Hearing was rated as same  Patient feels that their emotional and mental health rating is same  Patients states they are never, rarely angry  Patient states they are sometimes unusually tired/fatigued  Pain experienced in the last 7 days has been some  Patient's pain rating has been 6/10  Patient states that she has experienced no weight loss or gain in last 6 months  Back and R hip    Depression Screening:   PHQ-9 Score: 0      Fall Risk Screening: In the past year, patient has experienced: no history of falling in past year      Urinary Incontinence Screening:   Patient has not leaked urine accidently in the last six months  Home Safety:  Patient has trouble with stairs inside or outside of their home  Patient has working smoke alarms and has working carbon monoxide detector  Home safety hazards include: none  Due to back and R hip pain    Nutrition:   Current diet is Regular  Medications:   Patient is currently taking over-the-counter supplements  OTC medications include: see medication list  Patient is able to manage medications  Activities of Daily Living (ADLs)/Instrumental Activities of Daily Living (IADLs):   Walk and transfer into and out of bed and chair?: Yes  Dress and groom yourself?: Yes    Bathe or shower yourself?: Yes    Feed yourself?  Yes  Do your laundry/housekeeping?: Yes  Manage your money, pay your bills and track your expenses?: Yes  Make your own meals?: Yes    Do your own shopping?: Yes    Previous Hospitalizations:   Any hospitalizations or ED visits within the last 12 months?: No      Advance Care Planning:   Living will: No    Durable POA for healthcare: No    Advanced directive: No    Advanced directive counseling given: Yes      Cognitive Screening:   Provider or family/friend/caregiver concerned regarding cognition?: No    PREVENTIVE SCREENINGS      Cardiovascular Screening:    General: Screening Not Indicated and History Lipid Disorder      Diabetes Screening:     General: Screening Current      Colorectal Cancer Screening:     General: Screening Current      Breast Cancer Screening:     General: Screening Current      Cervical Cancer Screening:    General: Screening Not Indicated      Osteoporosis Screening:    General: Screening Current      Abdominal Aortic Aneurysm (AAA) Screening:        General: Screening Not Indicated      Lung Cancer Screening:     General: Screening Current      Hepatitis C Screening:    General: Screening Current    Screening, Brief Intervention, and Referral to Treatment (SBIRT)    Screening    Typical number of drinks in a week: 0    AUDIT-C Screenin) How often did you have a drink containing alcohol in the past year? never  2) How many drinks did you have on a typical day when you were drinking in the past year? 0  3) How often did you have 6 or more drinks on one occasion in the past year? never    AUDIT-C Score: 0  Interpretation: Score 0-2 (female): Negative screen for alcohol misuse    Single Item Drug Screening:  How often have you used an illegal drug (including marijuana) or a prescription medication for non-medical reasons in the past year? never    Single Item Drug Screen Score: 0  Interpretation: Negative screen for possible drug use disorder    Other Counseling Topics:   Calcium and vitamin D intake and regular weightbearing exercise  BMI Counseling: Body mass index is 34 5 kg/m²   The BMI is above normal  Nutrition recommendations include reducing portion sizes, moderation in carbohydrate intake, increasing intake of lean protein, reducing intake of saturated fat and trans fat and reducing intake of cholesterol        Ham Kirkpatrick MD

## 2022-06-02 NOTE — PROGRESS NOTES
Assessment/Plan:    Chronic obstructive pulmonary disease (HCC)  Breathing stable  Patient without any interest stopping smoking  No new masses/nodules noted on CT angiogram done back in April  Patient fully understands risks she is assuming with her persistent smoking  Continue present inhalers  Recheck 6 months    Hypertension  Stable  Continue present medications  Recheck six months    Aortitis (HCC)  Epigastric pain has improved with the addition of Plaquenil  Will assume that some of patient's symptoms under clear related to her aortitis  She is scheduled to see GI next week for an EGD and colonoscopy  Continue Plaquenil  Follow-up with Rheumatology  Recheck 3 months    Closed wedge compression fracture of T8 vertebra ([facility] [address] )  Patient did better with higher dose of the Lidoderm patch however she developed a rash while on this  She is back to the 4% patch which helps but is not quite as effective  Patient states that she is finally able to sleep in her bed again does not think that this is slowly improving  We will continue to observe  Patient continues to refuse any kind of intervention  Recheck 3 months    Greater trochanteric bursitis of right hip  Reviewed with patient  Given her prolonged exposure to steroids, I am reluctant to consider injections the this point  Patient understand and agrees  Recommend ice, rest and other conservative measures  Consider physical therapy if not improving  Recheck 3 months-earlier if worse    Epigastric pain  Improved with Plaquenil  Suspect that symptoms are multifactorial with aortitis, and underlying GI issues playing a role  Cannot rule out FX of her T a compression fracture as well  At this point, she will continue her Plaquenil and follow-up with GI for an EGD and colonoscopy to be done next week    Recheck 3 months-earlier if worse       Diagnoses and all orders for this visit:    Medicare annual wellness visit, subsequent    Epigastric pain    Aortitis (HCC)    Chronic bronchitis, unspecified chronic bronchitis type ([address] )    Closed wedge compression fracture of T8 vertebra with routine healing, subsequent encounter    Greater trochanteric bursitis of right hip    Primary hypertension        Subjective:      Patient ID: Marylee Hammers is a 77 y o  female  f/u multiple med issues and AWV  - has good news to report  After reviewing her lab work done last week, Rheumatology started patient on Plaquenil for persistent inflammation, possibly due to persistent aortitis  Patient states that she started this medication on Sunday and soon after noted that her epigastric area discomfort was markedly improved  Patient notes slow improvement in her postprandial pain to the point that she is now able to eat better (patient had lost over 11 lb due to postprandial pain and decreased appetite)  [facility] since starting this medication which she believes is also decreased her abdominal bloating which was also postprandial   - patient does note that she has had increased right lateral hip pain for the last week  Patient finds that she is limping and feels that she needs a cane to ambulate  No increased low back pain or recent trauma noted  -patient states that she was able to get a 5% lidocaine patch from of friend and found that this worked better for her thoracic compression fracture pain  Unfortunately, patient developed a rash from this pad  Patient is back to using the 4% which seems to not cause a rash but is not as effective    - patient denies CP, palpitations, lightheadedness or other CV symptoms with or without exertion  - patient may have increased urination (slight) since starting Plaquenil    - AWV done          The following portions of the patient's history were reviewed and updated as appropriate:   She  has a past medical history of Acid reflux, Fibromyalgia, Hypertension, and Seasonal allergies  She   Patient Active Problem List    Diagnosis Date Noted    Greater trochanteric bursitis of right hip 06/02/2022    Therapeutic opioid induced constipation 05/20/2022    RUQ pain 05/10/2022    Closed wedge compression fracture of T8 vertebra ([facility] [address] ) 05/10/2022    Mid back pain 04/28/2022    Continuous opioid dependence ([facility] [address] ) 04/27/2022    Chronic frontal sinusitis 03/23/2022    Pain of both eyes 02/25/2022    Candida vaginitis 01/19/2022    Primary insomnia 11/24/2021    Aortitis ([facility] [address] ) 10/31/2021    Increased intraocular pressure 10/31/2021    Chest pain 02/08/2021    Dermatitis 09/14/2020    Prediabetes 04/10/2020    Chronic idiopathic constipation 12/16/2019    Gastroparesis 12/16/2019    Severe obesity (BMI 35 0-35 9 with comorbidity) ([facility] [address] ) 11/21/2019    Lower extremity edema 11/21/2019    Snoring 11/21/2019    Cigarette nicotine dependence with nicotine-induced disorder 10/02/2019    Neck pain 07/11/2019    Neuropathic pain 01/09/2019    Early satiety 08/28/2018    Abdominal distension 08/28/2018    Family history of pancreatic cancer 08/28/2018    Epigastric pain 08/28/2018    Dyspnea on exertion 05/04/2018    Edema 07/13/2017    Lumbosacral radiculopathy at L5 01/06/2017    Hyperlipidemia 12/08/2015    Cataract 08/22/2014    Palpitations 08/05/2014    Chronic obstructive pulmonary disease ([facility] [address] ) 04/17/2013    Esophageal reflux 04/17/2013    Pulmonary nodule seen on imaging study 04/17/2013    Hypertension 03/22/2013    Mild episode of recurrent major depressive disorder ([facility] [address] ) 03/14/2013    Fibromyalgia 03/14/2013    Degeneration of intervertebral disc 03/14/2013    Anxiety 02/21/2013    Seasonal allergic rhinitis 12/20/2012     She  has a past surgical history that includes Knee surgery (1998); pr colonoscopy flx dx w/collj spec when pfrmd (N/A, 05/09/2017); Eye surgery; and Colonoscopy  She  reports that she has been smoking cigarettes   She has a 100 00 pack-year smoking history  She has never used smokeless tobacco  She reports that she does not drink alcohol and does not use drugs  Current Outpatient Medications   Medication Sig Dispense Refill    acetaminophen (TYLENOL) 650 mg CR tablet Take by mouth every 8 (eight) hours as needed        albuterol (PROVENTIL HFA,VENTOLIN HFA) 90 mcg/act inhaler INHALE 2 PUFFS BY MOUTH EVERY 6 HOURS AS NEEDED FOR WHEEZE 18 g 1    ALPRAZolam (XANAX) 0 25 mg tablet Take 1 tablet (0 25 mg total) by mouth 3 (three) times a day as needed for anxiety 90 tablet 0    amLODIPine (NORVASC) 5 mg tablet Take 1 tablet (5 mg total) by mouth daily 30 tablet 5    cetirizine (ZyrTEC) 10 mg tablet Take 1 tablet (10 mg total) by mouth daily 90 tablet 0    escitalopram (LEXAPRO) 10 mg tablet TAKE 1 TABLET BY MOUTH EVERY DAY 90 tablet 1    fluticasone (FLONASE) 50 mcg/act nasal spray SPRAY 2 SPRAYS INTO EACH NOSTRIL EVERY DAY 16 mL 3    HYDROcodone-acetaminophen (Norco) 5-325 mg per tablet Take 1 tablet by mouth every 6 (six) hours as needed for pain Max Daily Amount: 4 tablets 60 tablet 0    hydroxychloroquine (PLAQUENIL) 200 mg tablet Take 1 tablet (200 mg total) by mouth 2 (two) times a day 60 tablet 3    lidocaine (Lidoderm) 5 % Apply 1 patch topically daily Remove & Discard patch within 12 hours or as directed by MD 30 patch 1    lisinopril (ZESTRIL) 20 mg tablet TAKE 1 TABLET BY MOUTH EVERY DAY 90 tablet 1    Lumigan 0 01 % ophthalmic drops Administer 1 drop to both eyes daily at bedtime        MINOXIDIL, TOPICAL, 5 % SOLN Apply 1 application topically      naloxone (NARCAN) 4 mg/0 1 mL nasal spray Administer 1 spray into a nostril  If no response after 2-3 minutes, give another dose in the other nostril using a new spray   1 each 1    nystatin (MYCOSTATIN) 500,000 units/5 mL suspension SWISH AND SPIT WITH 5 ML 4 TIMES A  mL 3    pantoprazole (PROTONIX) 40 mg tablet Take 1 tablet (40 mg total) by mouth 2 (two) times a day 180 tablet 1    promethazine-dextromethorphan (PHENERGAN-DM) 6 25-15 mg/5 mL oral syrup TAKE 5 MLS BY MOUTH 4 TIMES A DAY AS NEEDED FOR COUGH 150 mL 0    traMADol (Ultram) 50 mg tablet Take 1 tablet (50 mg total) by mouth every 6 (six) hours as needed for moderate pain 30 tablet 0    fluticasone-umeclidinium-vilanterol (Trelegy Ellipta) 200-62 5-25 MCG/INH AEPB inhaler Inhale 1 puff daily Rinse mouth after use  60 blister 5    Glycerin-Hypromellose- (DRY EYE RELIEF DROPS OP) Apply to eye (Patient not taking: Reported on 6/2/2022)      polyethylene glycol (GOLYTELY) 4000 mL solution Take 4,000 mL by mouth once for 1 dose Per office instructions 4000 mL 0    timolol (TIMOPTIC) 0 25 % ophthalmic solution  (Patient not taking: Reported on 6/2/2022)       No current facility-administered medications for this visit  She is allergic to augmentin [amoxicillin-pot clavulanate], miconazole, and wixela inhub [fluticasone-salmeterol]       Review of Systems   Constitutional: Positive for fatigue (sl improved)  Negative for activity change, appetite change, chills and fever  HENT: Negative  Eyes: Negative  Respiratory: Negative  Cardiovascular: Negative  Gastrointestinal: Positive for abdominal pain (improved but not resolved) and constipation (improved but not resolved)  Negative for abdominal distention, blood in stool, diarrhea, nausea and vomiting  Genitourinary: Negative  Musculoskeletal: Positive for arthralgias (R lateral hip), back pain, gait problem and myalgias  Neurological: Negative for dizziness, weakness, light-headedness, numbness and headaches  Psychiatric/Behavioral: Negative  All other systems reviewed and are negative  Objective:      /76   Pulse 76   Temp 97 9 °F (36 6 °C)   Ht 5' 4" (1 626 m)   Wt 91 2 kg (201 lb)   LMP  (LMP Unknown)   BMI 34 50 kg/m²          Physical Exam  Vitals reviewed     Constitutional:       Appearance: She is well-developed  She is not diaphoretic  HENT:      Head: Normocephalic and atraumatic  Right Ear: Tympanic membrane, ear canal and external ear normal       Left Ear: Tympanic membrane, ear canal and external ear normal       Mouth/Throat:      Mouth: Mucous membranes are moist    Eyes:      Extraocular Movements: Extraocular movements intact  Conjunctiva/sclera: Conjunctivae normal       Pupils: Pupils are equal, round, and reactive to light  Neck:      Thyroid: No thyromegaly  Vascular: No JVD  Cardiovascular:      Rate and Rhythm: Normal rate and regular rhythm  Pulses: Normal pulses  Heart sounds: No murmur heard  Pulmonary:      Effort: Pulmonary effort is normal       Breath sounds: Normal breath sounds  No wheezing or rales  Abdominal:      General: There is no distension  Palpations: Abdomen is soft  There is no mass  Tenderness: There is abdominal tenderness (mild epigastric - sl improved)  There is no right CVA tenderness or left CVA tenderness  Musculoskeletal:         General: Tenderness (over low thoracic spine  No swelling  Patient also tenderness to palpation over the right greater trochanteric bursa ) present  No swelling  Normal range of motion  Cervical back: Normal range of motion and neck supple  No tenderness  No muscular tenderness  Right lower leg: No edema  Left lower leg: No edema  Lymphadenopathy:      Cervical: No cervical adenopathy  Skin:     General: Skin is warm and dry  Capillary Refill: Capillary refill takes less than 2 seconds  Neurological:      Mental Status: She is alert and oriented to person, place, and time  Cranial Nerves: No cranial nerve deficit  Sensory: No sensory deficit  Motor: No weakness or abnormal muscle tone  Gait: Gait normal       Deep Tendon Reflexes: Reflexes are normal and symmetric        Comments: [employer] 4/5   Psychiatric:         Mood and Affect: Mood normal  Behavior: Behavior normal          Thought Content: Thought content normal          Judgment: Judgment normal       Comments: PHQ-2/9 Depression Screening    Little interest or pleasure in doing things: 0 - not at all  Feeling down, depressed, or hopeless: 0 - not at all  Trouble falling or staying asleep, or sleeping too much: 0 - not at all  Feeling tired or having little energy: 0 - not at all  Poor appetite or overeatin - not at all  Feeling bad about yourself - or that you are a failure or have let   yourself or your family down: 0 - not at all  Trouble concentrating on things, such as reading the newspaper or watching   television: 0 - not at all  Moving or speaking so slowly that other people could have noticed   Or the   opposite - being so fidgety or restless that you have been moving around a   lot more than usual: 0 - not at all  Thoughts that you would be better off dead, or of hurting yourself in some   way: 0 - not at all  PHQ-9 Score: 0   PHQ-9 Interpretation: No or Minimal depression

## 2022-06-02 NOTE — ASSESSMENT & PLAN NOTE
Patient did better with higher dose of the Lidoderm patch however she developed a rash while on this  She is back to the 4% patch which helps but is not quite as effective  Patient states that she is finally able to sleep in her bed again does not think that this is slowly improving  We will continue to observe  Patient continues to refuse any kind of intervention    Recheck 3 months

## 2022-06-02 NOTE — ASSESSMENT & PLAN NOTE
Reviewed with patient  Given her prolonged exposure to steroids, I am reluctant to consider injections the this point  Patient understand and agrees  Recommend ice, rest and other conservative measures  Consider physical therapy if not improving    Recheck 3 months-earlier if worse

## 2022-06-02 NOTE — PATIENT INSTRUCTIONS
Medicare Preventive Visit Patient Instructions  Thank you for completing your Welcome to Medicare Visit or Medicare Annual Wellness Visit today  Your next wellness visit will be due in one year (6/3/2023)  The screening/preventive services that you may require over the next 5-10 years are detailed below  Some tests may not apply to you based off risk factors and/or age  Screening tests ordered at today's visit but not completed yet may show as past due  Also, please note that scanned in results may not display below  Preventive Screenings:  Service Recommendations Previous Testing/Comments   Colorectal Cancer Screening  * Colonoscopy    * Fecal Occult Blood Test (FOBT)/Fecal Immunochemical Test (FIT)  * Fecal DNA/Cologuard Test  * Flexible Sigmoidoscopy Age: 54-65 years old   Colonoscopy: every 10 years (may be performed more frequently if at higher risk)  OR  FOBT/FIT: every 1 year  OR  Cologuard: every 3 years  OR  Sigmoidoscopy: every 5 years  Screening may be recommended earlier than age 48 if at higher risk for colorectal cancer  Also, an individualized decision between you and your healthcare provider will decide whether screening between the ages of 74-80 would be appropriate  Colonoscopy: 10/01/2018  FOBT/FIT: Not on file  Cologuard: Not on file  Sigmoidoscopy: Not on file    Screening Current     Breast Cancer Screening Age: 36 years old  Frequency: every 1-2 years  Not required if history of left and right mastectomy Mammogram: 03/31/2022    Screening Current   Cervical Cancer Screening Between the ages of 21-29, pap smear recommended once every 3 years  Between the ages of 33-67, can perform pap smear with HPV co-testing every 5 years     Recommendations may differ for women with a history of total hysterectomy, cervical cancer, or abnormal pap smears in past  Pap Smear: 02/11/2019    Screening Not Indicated   Hepatitis C Screening Once for adults born between 1945 and 1965  More frequently in patients at high risk for Hepatitis C Hep C Antibody: 05/01/2021    Screening Current   Diabetes Screening 1-2 times per year if you're at risk for diabetes or have pre-diabetes Fasting glucose: 79 mg/dL   A1C: 6 0    Screening Current   Cholesterol Screening Once every 5 years if you don't have a lipid disorder  May order more often based on risk factors  Lipid panel: 05/01/2021    Screening Not Indicated  History Lipid Disorder     Other Preventive Screenings Covered by Medicare:  1  Abdominal Aortic Aneurysm (AAA) Screening: covered once if your at risk  You're considered to be at risk if you have a family history of AAA  2  Lung Cancer Screening: covers low dose CT scan once per year if you meet all of the following conditions: (1) Age 50-69; (2) No signs or symptoms of lung cancer; (3) Current smoker or have quit smoking within the last 15 years; (4) You have a tobacco smoking history of at least 30 pack years (packs per day multiplied by number of years you smoked); (5) You get a written order from a healthcare provider  3  Glaucoma Screening: covered annually if you're considered high risk: (1) You have diabetes OR (2) Family history of glaucoma OR (3)  aged 48 and older OR (3)  American aged 72 and older  3  Osteoporosis Screening: covered every 2 years if you meet one of the following conditions: (1) You're estrogen deficient and at risk for osteoporosis based off medical history and other findings; (2) Have a vertebral abnormality; (3) On glucocorticoid therapy for more than 3 months; (4) Have primary hyperparathyroidism; (5) On osteoporosis medications and need to assess response to drug therapy  · Last bone density test (DXA Scan): 09/10/2020   5  HIV Screening: covered annually if you're between the age of 15-65  Also covered annually if you are younger than 13 and older than 72 with risk factors for HIV infection   For pregnant patients, it is covered up to 3 times per pregnancy  Immunizations:  Immunization Recommendations   Influenza Vaccine Annual influenza vaccination during flu season is recommended for all persons aged >= 6 months who do not have contraindications   Pneumococcal Vaccine (Prevnar and Pneumovax)  * Prevnar = PCV13  * Pneumovax = PPSV23   Adults 25-60 years old: 1-3 doses may be recommended based on certain risk factors  Adults 72 years old: Prevnar (PCV13) vaccine recommended followed by Pneumovax (PPSV23) vaccine  If already received PPSV23 since turning 65, then PCV13 recommended at least one year after PPSV23 dose  Hepatitis B Vaccine 3 dose series if at intermediate or high risk (ex: diabetes, end stage renal disease, liver disease)   Tetanus (Td) Vaccine - COST NOT COVERED BY MEDICARE PART B Following completion of primary series, a booster dose should be given every 10 years to maintain immunity against tetanus  Td may also be given as tetanus wound prophylaxis  Tdap Vaccine - COST NOT COVERED BY MEDICARE PART B Recommended at least once for all adults  For pregnant patients, recommended with each pregnancy  Shingles Vaccine (Shingrix) - COST NOT COVERED BY MEDICARE PART B  2 shot series recommended in those aged 48 and above     Health Maintenance Due:      Topic Date Due    Cervical Cancer Screening  02/11/2022    Breast Cancer Screening: Mammogram  03/31/2023    Lung Cancer Screening  04/25/2023    DXA SCAN  09/10/2023    Colorectal Cancer Screening  05/09/2027    Hepatitis C Screening  Completed     Immunizations Due:      Topic Date Due    COVID-19 Vaccine (1) Never done    Pneumococcal Vaccine: 65+ Years (1 - PCV) Never done    DTaP,Tdap,and Td Vaccines (2 - Td or Tdap) 02/28/2018     Advance Directives   What are advance directives? Advance directives are legal documents that state your wishes and plans for medical care  These plans are made ahead of time in case you lose your ability to make decisions for yourself   Advance directives can apply to any medical decision, such as the treatments you want, and if you want to donate organs  What are the types of advance directives? There are many types of advance directives, and each state has rules about how to use them  You may choose a combination of any of the following:  · Living will: This is a written record of the treatment you want  You can also choose which treatments you do not want, which to limit, and which to stop at a certain time  This includes surgery, medicine, IV fluid, and tube feedings  · Durable power of  for healthcare [place] [facility]): This is a written record that states who you want to make healthcare choices for you when you are unable to make them for yourself  This person, called a proxy, is usually a family member or a friend  You may choose more than 1 proxy  · Do not resuscitate (DNR) order:  A DNR order is used in case your heart stops beating or you stop breathing  It is a request not to have certain forms of treatment, such as CPR  A DNR order may be included in other types of advance directives  · Medical directive: This covers the care that you want if you are in a coma, near death, or unable to make decisions for yourself  You can list the treatments you want for each condition  Treatment may include pain medicine, surgery, blood transfusions, dialysis, IV or tube feedings, and a ventilator (breathing machine)  · Values history: This document has questions about your views, beliefs, and how you feel and think about life  This information can help others choose the care that you would choose  Why are advance directives important? An advance directive helps you control your care  Although spoken wishes may be used, it is better to have your wishes written down  Spoken wishes can be misunderstood, or not followed  Treatments may be given even if you do not want them   An advance directive may make it easier for your family to make difficult choices about your care  Cigarette Smoking and Your Health   Risks to your health if you smoke:  Nicotine and other chemicals found in tobacco damage every cell in your body  Even if you are a light smoker, you have an increased risk for cancer, heart disease, and lung disease  If you are pregnant or have diabetes, smoking increases your risk for complications  Benefits to your health if you stop smoking:   · You decrease respiratory symptoms such as coughing, wheezing, and shortness of breath  · You reduce your risk for cancers of the lung, mouth, throat, kidney, bladder, pancreas, stomach, and cervix  If you already have cancer, you increase the benefits of chemotherapy  You also reduce your risk for cancer returning or a second cancer from developing  · You reduce your risk for heart disease, blood clots, heart attack, and stroke  · You reduce your risk for lung infections, and diseases such as pneumonia, asthma, chronic bronchitis, and emphysema  · Your circulation improves  More oxygen can be delivered to your body  If you have diabetes, you lower your risk for complications, such as kidney, artery, and eye diseases  You also lower your risk for nerve damage  Nerve damage can lead to amputations, poor vision, and blindness  · You improve your body's ability to heal and to fight infections  For more information and support to stop smoking:   · [employer]  Phone: 8- 692 - 504-4131  Web Address: www [email or web address]  Weight Management   Why it is important to manage your weight:  Being overweight increases your risk of health conditions such as heart disease, high blood pressure, type 2 diabetes, and certain types of cancer  It can also increase your risk for osteoarthritis, sleep apnea, and other respiratory problems  Aim for a slow, steady weight loss  Even a small amount of weight loss can lower your risk of health problems    How to lose weight safely:  A safe and healthy way to lose weight is to eat fewer calories and get regular exercise  You can lose up about 1 pound a week by decreasing the number of calories you eat by 500 calories each day  Healthy meal plan for weight management:  A healthy meal plan includes a variety of foods, contains fewer calories, and helps you stay healthy  A healthy meal plan includes the following:  · Eat whole-grain foods more often  A healthy meal plan should contain fiber  Fiber is the part of grains, fruits, and vegetables that is not broken down by your body  Whole-grain foods are healthy and provide extra fiber in your diet  Some examples of whole-grain foods are whole-wheat breads and pastas, oatmeal, brown rice, and bulgur  · Eat a variety of vegetables every day  Include dark, leafy greens such as spinach, kale, tanmay greens, and mustard greens  Eat yellow and orange vegetables such as carrots, sweet potatoes, and winter squash  · Eat a variety of fruits every day  Choose fresh or canned fruit (canned in its own juice or light syrup) instead of juice  Fruit juice has very little or no fiber  · Eat low-fat dairy foods  Drink fat-free (skim) milk or 1% milk  Eat fat-free yogurt and low-fat cottage cheese  Try low-fat cheeses such as mozzarella and other reduced-fat cheeses  · Choose meat and other protein foods that are low in fat  Choose beans or other legumes such as split peas or lentils  Choose fish, skinless poultry (chicken or turkey), or lean cuts of red meat (beef or pork)  Before you cook meat or poultry, cut off any visible fat  · Use less fat and oil  Try baking foods instead of frying them  Add less fat, such as margarine, sour cream, regular salad dressing and mayonnaise to foods  Eat fewer high-fat foods  Some examples of high-fat foods include french fries, doughnuts, ice cream, and cakes  · Eat fewer sweets  Limit foods and drinks that are high in sugar  This includes candy, cookies, regular soda, and sweetened drinks    Exercise: Exercise at least 30 minutes per day on most days of the week  Some examples of exercise include walking, biking, dancing, and swimming  You can also fit in more physical activity by taking the stairs instead of the elevator or parking farther away from stores  Ask your healthcare provider about the best exercise plan for you  © Copyright [employer] 2018 Information is for End User's use only and may not be sold, redistributed or otherwise used for commercial purposes  All illustrations and images included in CareNotes® are the copyrighted property of [employer]  or [facility] Preventive Visit Patient Instructions  Thank you for completing your Welcome to Medicare Visit or Medicare Annual Wellness Visit today  Your next wellness visit will be due in one year (6/3/2023)  The screening/preventive services that you may require over the next 5-10 years are detailed below  Some tests may not apply to you based off risk factors and/or age  Screening tests ordered at today's visit but not completed yet may show as past due  Also, please note that scanned in results may not display below  Preventive Screenings:  Service Recommendations Previous Testing/Comments   Colorectal Cancer Screening  * Colonoscopy    * Fecal Occult Blood Test (FOBT)/Fecal Immunochemical Test (FIT)  * Fecal DNA/Cologuard Test  * Flexible Sigmoidoscopy Age: 54-65 years old   Colonoscopy: every 10 years (may be performed more frequently if at higher risk)  OR  FOBT/FIT: every 1 year  OR  Cologuard: every 3 years  OR  Sigmoidoscopy: every 5 years  Screening may be recommended earlier than age 48 if at higher risk for colorectal cancer  Also, an individualized decision between you and your healthcare provider will decide whether screening between the ages of 74-80 would be appropriate   Colonoscopy: 10/01/2018  FOBT/FIT: Not on file  Cologuard: Not on file  Sigmoidoscopy: Not on file    Screening Current     Breast Cancer Screening Age: 36 years old  Frequency: every 1-2 years  Not required if history of left and right mastectomy Mammogram: 03/31/2022    Screening Current   Cervical Cancer Screening Between the ages of 21-29, pap smear recommended once every 3 years  Between the ages of 33-67, can perform pap smear with HPV co-testing every 5 years  Recommendations may differ for women with a history of total hysterectomy, cervical cancer, or abnormal pap smears in past  Pap Smear: 02/11/2019    Screening Not Indicated   Hepatitis C Screening Once for adults born between 1945 and 1965  More frequently in patients at high risk for Hepatitis C Hep C Antibody: 05/01/2021    Screening Current   Diabetes Screening 1-2 times per year if you're at risk for diabetes or have pre-diabetes Fasting glucose: 79 mg/dL   A1C: 6 0    Screening Current   Cholesterol Screening Once every 5 years if you don't have a lipid disorder  May order more often based on risk factors  Lipid panel: 05/01/2021    Screening Not Indicated  History Lipid Disorder     Other Preventive Screenings Covered by Medicare:  6  Abdominal Aortic Aneurysm (AAA) Screening: covered once if your at risk  You're considered to be at risk if you have a family history of AAA  7  Lung Cancer Screening: covers low dose CT scan once per year if you meet all of the following conditions: (1) Age 50-69; (2) No signs or symptoms of lung cancer; (3) Current smoker or have quit smoking within the last 15 years; (4) You have a tobacco smoking history of at least 30 pack years (packs per day multiplied by number of years you smoked); (5) You get a written order from a healthcare provider  8  Glaucoma Screening: covered annually if you're considered high risk: (1) You have diabetes OR (2) Family history of glaucoma OR (3)  aged 48 and older OR (3)  American aged 72 and older  5   Osteoporosis Screening: covered every 2 years if you meet one of the following conditions: (1) You're estrogen deficient and at risk for osteoporosis based off medical history and other findings; (2) Have a vertebral abnormality; (3) On glucocorticoid therapy for more than 3 months; (4) Have primary hyperparathyroidism; (5) On osteoporosis medications and need to assess response to drug therapy  · Last bone density test (DXA Scan): 09/10/2020  10  HIV Screening: covered annually if you're between the age of 12-76  Also covered annually if you are younger than 13 and older than 72 with risk factors for HIV infection  For pregnant patients, it is covered up to 3 times per pregnancy  Immunizations:  Immunization Recommendations   Influenza Vaccine Annual influenza vaccination during flu season is recommended for all persons aged >= 6 months who do not have contraindications   Pneumococcal Vaccine (Prevnar and Pneumovax)  * Prevnar = PCV13  * Pneumovax = PPSV23   Adults 25-60 years old: 1-3 doses may be recommended based on certain risk factors  Adults 72 years old: Prevnar (PCV13) vaccine recommended followed by Pneumovax (PPSV23) vaccine  If already received PPSV23 since turning 65, then PCV13 recommended at least one year after PPSV23 dose  Hepatitis B Vaccine 3 dose series if at intermediate or high risk (ex: diabetes, end stage renal disease, liver disease)   Tetanus (Td) Vaccine - COST NOT COVERED BY MEDICARE PART B Following completion of primary series, a booster dose should be given every 10 years to maintain immunity against tetanus  Td may also be given as tetanus wound prophylaxis  Tdap Vaccine - COST NOT COVERED BY MEDICARE PART B Recommended at least once for all adults  For pregnant patients, recommended with each pregnancy     Shingles Vaccine (Shingrix) - COST NOT COVERED BY MEDICARE PART B  2 shot series recommended in those aged 48 and above     Health Maintenance Due:      Topic Date Due    Cervical Cancer Screening  02/11/2022    Breast Cancer Screening: Mammogram 03/31/2023    Lung Cancer Screening  04/25/2023    DXA SCAN  09/10/2023    Colorectal Cancer Screening  05/09/2027    Hepatitis C Screening  Completed     Immunizations Due:      Topic Date Due    COVID-19 Vaccine (1) Never done    Pneumococcal Vaccine: 65+ Years (1 - PCV) Never done    DTaP,Tdap,and Td Vaccines (2 - Td or Tdap) 02/28/2018     Advance Directives   What are advance directives? Advance directives are legal documents that state your wishes and plans for medical care  These plans are made ahead of time in case you lose your ability to make decisions for yourself  Advance directives can apply to any medical decision, such as the treatments you want, and if you want to donate organs  What are the types of advance directives? There are many types of advance directives, and each state has rules about how to use them  You may choose a combination of any of the following:  · Living will: This is a written record of the treatment you want  You can also choose which treatments you do not want, which to limit, and which to stop at a certain time  This includes surgery, medicine, IV fluid, and tube feedings  · Durable power of  for healthcare [place] [facility]): This is a written record that states who you want to make healthcare choices for you when you are unable to make them for yourself  This person, called a proxy, is usually a family member or a friend  You may choose more than 1 proxy  · Do not resuscitate (DNR) order:  A DNR order is used in case your heart stops beating or you stop breathing  It is a request not to have certain forms of treatment, such as CPR  A DNR order may be included in other types of advance directives  · Medical directive: This covers the care that you want if you are in a coma, near death, or unable to make decisions for yourself  You can list the treatments you want for each condition   Treatment may include pain medicine, surgery, blood transfusions, dialysis, IV or tube feedings, and a ventilator (breathing machine)  · Values history: This document has questions about your views, beliefs, and how you feel and think about life  This information can help others choose the care that you would choose  Why are advance directives important? An advance directive helps you control your care  Although spoken wishes may be used, it is better to have your wishes written down  Spoken wishes can be misunderstood, or not followed  Treatments may be given even if you do not want them  An advance directive may make it easier for your family to make difficult choices about your care  Cigarette Smoking and Your Health   Risks to your health if you smoke:  Nicotine and other chemicals found in tobacco damage every cell in your body  Even if you are a light smoker, you have an increased risk for cancer, heart disease, and lung disease  If you are pregnant or have diabetes, smoking increases your risk for complications  Benefits to your health if you stop smoking:   · You decrease respiratory symptoms such as coughing, wheezing, and shortness of breath  · You reduce your risk for cancers of the lung, mouth, throat, kidney, bladder, pancreas, stomach, and cervix  If you already have cancer, you increase the benefits of chemotherapy  You also reduce your risk for cancer returning or a second cancer from developing  · You reduce your risk for heart disease, blood clots, heart attack, and stroke  · You reduce your risk for lung infections, and diseases such as pneumonia, asthma, chronic bronchitis, and emphysema  · Your circulation improves  More oxygen can be delivered to your body  If you have diabetes, you lower your risk for complications, such as kidney, artery, and eye diseases  You also lower your risk for nerve damage  Nerve damage can lead to amputations, poor vision, and blindness  · You improve your body's ability to heal and to fight infections    For more information and support to stop smoking:   · [employer]  Phone: 6- 443 - 502-4244  Web Address: www [employer]  Weight Management   Why it is important to manage your weight:  Being overweight increases your risk of health conditions such as heart disease, high blood pressure, type 2 diabetes, and certain types of cancer  It can also increase your risk for osteoarthritis, sleep apnea, and other respiratory problems  Aim for a slow, steady weight loss  Even a small amount of weight loss can lower your risk of health problems  How to lose weight safely:  A safe and healthy way to lose weight is to eat fewer calories and get regular exercise  You can lose up about 1 pound a week by decreasing the number of calories you eat by 500 calories each day  Healthy meal plan for weight management:  A healthy meal plan includes a variety of foods, contains fewer calories, and helps you stay healthy  A healthy meal plan includes the following:  · Eat whole-grain foods more often  A healthy meal plan should contain fiber  Fiber is the part of grains, fruits, and vegetables that is not broken down by your body  Whole-grain foods are healthy and provide extra fiber in your diet  Some examples of whole-grain foods are whole-wheat breads and pastas, oatmeal, brown rice, and bulgur  · Eat a variety of vegetables every day  Include dark, leafy greens such as spinach, kale, tanmay greens, and mustard greens  Eat yellow and orange vegetables such as carrots, sweet potatoes, and winter squash  · Eat a variety of fruits every day  Choose fresh or canned fruit (canned in its own juice or light syrup) instead of juice  Fruit juice has very little or no fiber  · Eat low-fat dairy foods  Drink fat-free (skim) milk or 1% milk  Eat fat-free yogurt and low-fat cottage cheese  Try low-fat cheeses such as mozzarella and other reduced-fat cheeses  · Choose meat and other protein foods that are low in fat    Choose beans or other legumes such as split peas or lentils  Choose fish, skinless poultry (chicken or turkey), or lean cuts of red meat (beef or pork)  Before you cook meat or poultry, cut off any visible fat  · Use less fat and oil  Try baking foods instead of frying them  Add less fat, such as margarine, sour cream, regular salad dressing and mayonnaise to foods  Eat fewer high-fat foods  Some examples of high-fat foods include french fries, doughnuts, ice cream, and cakes  · Eat fewer sweets  Limit foods and drinks that are high in sugar  This includes candy, cookies, regular soda, and sweetened drinks  Exercise:  Exercise at least 30 minutes per day on most days of the week  Some examples of exercise include walking, biking, dancing, and swimming  You can also fit in more physical activity by taking the stairs instead of the elevator or parking farther away from stores  Ask your healthcare provider about the best exercise plan for you  © Copyright [employer] 2018 Information is for End User's use only and may not be sold, redistributed or otherwise used for commercial purposes   All illustrations and images included in CareNotes® are the copyrighted property of A D A M , Inc  or [address] [place] [address]

## 2022-06-02 NOTE — ASSESSMENT & PLAN NOTE
Breathing stable  Patient without any interest stopping smoking  No new masses/nodules noted on CT angiogram done back in April  Patient fully understands risks she is assuming with her persistent smoking  Continue present inhalers    Recheck 6 months

## 2022-06-08 DIAGNOSIS — M54.9 MID BACK PAIN: ICD-10-CM

## 2022-06-09 NOTE — TELEPHONE ENCOUNTER
05/25/2022 05/25/2022 HYDROcodone BITARTRATE-ACETAMINOPHE (Tablet)  60 0 15 325 MG-5 MG 20 0 AYLEEN MADDEN

## 2022-06-10 RX ORDER — HYDROCODONE BITARTRATE AND ACETAMINOPHEN 5; 325 MG/1; MG/1
1 TABLET ORAL EVERY 6 HOURS PRN
Qty: 60 TABLET | Refills: 0 | Status: SHIPPED | OUTPATIENT
Start: 2022-06-10 | End: 2022-06-23 | Stop reason: SDUPTHER

## 2022-06-12 DIAGNOSIS — F33.0 DEPRESSION, MAJOR, RECURRENT, MILD (HCC): ICD-10-CM

## 2022-06-13 RX ORDER — ESCITALOPRAM OXALATE 10 MG/1
TABLET ORAL
Qty: 90 TABLET | Refills: 1 | Status: SHIPPED | OUTPATIENT
Start: 2022-06-13

## 2022-06-16 ENCOUNTER — ANESTHESIA (OUTPATIENT)
Dept: ANESTHESIOLOGY | Facility: HOSPITAL | Age: 67
End: 2022-06-16

## 2022-06-16 ENCOUNTER — ANESTHESIA EVENT (OUTPATIENT)
Dept: ANESTHESIOLOGY | Facility: HOSPITAL | Age: 67
End: 2022-06-16

## 2022-06-17 ENCOUNTER — OFFICE VISIT (OUTPATIENT)
Dept: FAMILY MEDICINE CLINIC | Facility: CLINIC | Age: 67
End: 2022-06-17
Payer: COMMERCIAL

## 2022-06-17 VITALS
HEART RATE: 86 BPM | BODY MASS INDEX: 33.89 KG/M2 | DIASTOLIC BLOOD PRESSURE: 78 MMHG | TEMPERATURE: 96.5 F | WEIGHT: 198.5 LBS | OXYGEN SATURATION: 95 % | HEIGHT: 64 IN | SYSTOLIC BLOOD PRESSURE: 144 MMHG

## 2022-06-17 DIAGNOSIS — M70.61 TROCHANTERIC BURSITIS OF RIGHT HIP: Primary | ICD-10-CM

## 2022-06-17 DIAGNOSIS — B37.0 THRUSH: ICD-10-CM

## 2022-06-17 DIAGNOSIS — L82.1 SK (SEBORRHEIC KERATOSIS): ICD-10-CM

## 2022-06-17 PROCEDURE — 99213 OFFICE O/P EST LOW 20 MIN: CPT | Performed by: FAMILY MEDICINE

## 2022-06-17 NOTE — PATIENT INSTRUCTIONS
Hip Bursitis Exercises   AMBULATORY CARE:   Hip bursitis exercises  help strengthen the muscles in your hip and keep the joint flexible  Strong muscles can help reduce pain, prevent injury, and keep the joint stable  The exercises can also help increase the range of motion in your hip joint  What you need to know about exercise safety:   Move slowly and smoothly  Avoid fast or jerky motions  This will help prevent an injury  Breathe normally  Do not hold your breath  It is important to breathe in and out so you do not tense up during exercise  Tension could prevent you from moving your joint in a full range of motion  Do the exercises and stretches on both legs  Do this so both hips remain strong and flexible  Stop if you feel sharp pain or an increase in pain  Contact your healthcare provider or physical therapist  It is normal to feel some discomfort during exercise  Regular exercise will help decrease your discomfort over time  Warm up before you stretch and exercise  Walk or ride a stationary bike for 5 to 10 minutes  How to do hip stretches: Your healthcare provider or physical therapist will tell you how many times to do each stretch  Do the stretch on both sides before you move to the next stretch  Standing iliotibial band stretch:  Stand with the leg on your injured side behind your other leg  Bend sideways toward the side that is not injured  Stop when you feel a stretch in your outer hip  Hold for 5 to 10 seconds  Then return to the starting position  Lying iliotibial band stretch:  Lie on your back  Bend the knee on your injured side toward your chest  Place your hands on the outside of your knee and thigh  Slowly pull the knee across your body  Stop when you feel a stretch in your hip and outer thigh  Hold for 5 to 10 seconds  Return your leg to the starting position  Hip stretch:  Lie on your back with both legs straight and on the ground   Bend the knee on your injured side toward your chest until you can reach your lower leg  Place both hands on your shin and pull your knee toward your chest  Hold for 5 to 10 seconds  Return your leg to the starting position  Knee to chest:  Lie on your back with both knees bent and feet flat on the floor  Bend the knee on your injured side toward your chest until you can reach your lower leg  Place both hands on your shin and pull your knee toward your chest  Hold for 5 to 10 seconds  Return your leg to the starting position  Internal hip rotator stretch:  You will do this exercise on a table  Lie on your side with the injured hip on top  You may be told to keep a pillow between your thighs  Move the top leg so the foot hangs below the edge of the table  Rotate your hip to raise your foot in the opposite direction of the bottom shoulder  Raise your foot as high as you can so you feel a stretch in the back of your thigh  Hold for 5 seconds  Then slowly lower your foot to the starting position  External hip rotator stretch:  You will do this exercise on a table  Lie on your side with the injured hip on the bottom  You do not need a pillow between your thighs for this exercise  Move the bottom leg so the foot is off the edge of the table  Rotate your hip to lift the foot in the opposite direction of the bottom shoulder  Raise your foot as high as you can so you feel a stretch in your buttock  Hold for 5 seconds  Then slowly lower your foot to the starting position  Kneeling hip flexor stretch:  Kneel on your knee on the injured side  Place the foot of your other leg on the floor so the knee is bent  Put both hands on top of your thigh  Keep your back straight and abdominal muscles tight  Lean forward until you feel a stretch in your other thigh  Hold the stretch for 10 seconds  Return to the starting position  How to do hip strengthening exercises:   Your healthcare provider or physical therapist will tell you how many times to do each exercise  Do the exercise on both sides before you move to the next exercise  Straight leg lift to the side: This may also be called hip abduction  Lie on your side with straight legs, with the injured hip on top  Slowly raise your top leg toward the ceiling as high as you can  Keep your foot pointed  Hold for 5 seconds  Then slowly lower your leg to the starting position  Inner thigh lift: This may also be called hip adduction  Lie on your side with straight legs, with the injured hip on the bottom  Cross your top leg over your bottom leg  Put the foot of your top leg on the floor in front of you  Raise your bottom leg until it touches the top leg  Hold for 5 seconds  Then slowly lower the leg to the floor  Clam exercise:  Lie on your side so your injured side is on top  Bend your knees  Keep your heels together during this exercise  Slowly raise your top knee toward the ceiling  Then lower your leg so your knees are together  Call your doctor if:   You have sharp pain during exercise or at rest     You have questions or concerns about the stretches or exercises  © Copyright [employer] 2022 Information is for End User's use only and may not be sold, redistributed or otherwise used for commercial purposes  All illustrations and images included in CareNotes® are the copyrighted property of A D A M , Inc  or [facility] Irene Marroquin   The above information is an  only  It is not intended as medical advice for individual conditions or treatments  Talk to your doctor, nurse or pharmacist before following any medical regimen to see if it is safe and effective for you

## 2022-06-17 NOTE — PROGRESS NOTES
Assessment/Plan:      Diagnoses and all orders for this visit:    Trochanteric bursitis of right hip  -     Diclofenac Sodium (VOLTAREN) 1 %; Apply 2 g topically 4 (four) times a day    SK (seborrheic keratosis)    SK - reassured benign  Greater trochanteric bursitis of the right hip - advised I do not perform this steroid injection  Recommend referral to ortho for immediate attn, however patient prefers to wait for PCP to return next week     Subjective:     Patient ID: Mary Wheeler is a 77 y o  female  HPI   Skin check - couple of moles she's concerned with  Denies pain, changing, irritation  Right hip pain - worsening  She is interested in steroid injection as discussed with her PCP  Not responding well to analgesia  Review of Systems   Musculoskeletal: Positive for arthralgias, gait problem and myalgias  Skin:        moles   Neurological: Positive for weakness  Negative for numbness  All other systems reviewed and are negative  Objective:     Physical Exam  Vitals and nursing note reviewed  Constitutional:       General: She is not in acute distress  Appearance: Normal appearance  She is well-developed  She is not ill-appearing or diaphoretic  HENT:      Head: Normocephalic and atraumatic  Right Ear: External ear normal       Left Ear: External ear normal       Nose: Nose normal    Eyes:      General: Lids are normal       Conjunctiva/sclera: Conjunctivae normal    Neck:      Vascular: No JVD  Trachea: No tracheal deviation  Pulmonary:      Effort: No accessory muscle usage or respiratory distress  Musculoskeletal:      Comments: Point tenderness over the RIGHT greater trochanter of the hip    Skin:     Findings: No rash  Comments: Multiple SK noted over her chest, back    Neurological:      Mental Status: She is alert

## 2022-06-21 ENCOUNTER — TELEPHONE (OUTPATIENT)
Dept: FAMILY MEDICINE CLINIC | Facility: CLINIC | Age: 67
End: 2022-06-21

## 2022-06-23 DIAGNOSIS — M54.9 MID BACK PAIN: ICD-10-CM

## 2022-06-24 ENCOUNTER — TELEPHONE (OUTPATIENT)
Dept: FAMILY MEDICINE CLINIC | Facility: CLINIC | Age: 67
End: 2022-06-24

## 2022-06-24 ENCOUNTER — OFFICE VISIT (OUTPATIENT)
Dept: FAMILY MEDICINE CLINIC | Facility: CLINIC | Age: 67
End: 2022-06-24
Payer: COMMERCIAL

## 2022-06-24 VITALS
RESPIRATION RATE: 16 BRPM | SYSTOLIC BLOOD PRESSURE: 138 MMHG | TEMPERATURE: 98.1 F | HEART RATE: 83 BPM | DIASTOLIC BLOOD PRESSURE: 84 MMHG | WEIGHT: 196.6 LBS | BODY MASS INDEX: 33.57 KG/M2 | OXYGEN SATURATION: 96 % | HEIGHT: 64 IN

## 2022-06-24 DIAGNOSIS — S22.060D CLOSED WEDGE COMPRESSION FRACTURE OF T8 VERTEBRA WITH ROUTINE HEALING, SUBSEQUENT ENCOUNTER: Primary | ICD-10-CM

## 2022-06-24 DIAGNOSIS — F11.20 CONTINUOUS OPIOID DEPENDENCE (HCC): ICD-10-CM

## 2022-06-24 DIAGNOSIS — G89.29 OTHER CHRONIC PAIN: ICD-10-CM

## 2022-06-24 DIAGNOSIS — M54.9 MID BACK PAIN: ICD-10-CM

## 2022-06-24 DIAGNOSIS — M54.41 ACUTE RIGHT-SIDED LOW BACK PAIN WITH RIGHT-SIDED SCIATICA: ICD-10-CM

## 2022-06-24 PROCEDURE — 1160F RVW MEDS BY RX/DR IN RCRD: CPT | Performed by: FAMILY MEDICINE

## 2022-06-24 PROCEDURE — 99214 OFFICE O/P EST MOD 30 MIN: CPT | Performed by: FAMILY MEDICINE

## 2022-06-24 PROCEDURE — 3075F SYST BP GE 130 - 139MM HG: CPT | Performed by: FAMILY MEDICINE

## 2022-06-24 PROCEDURE — 3079F DIAST BP 80-89 MM HG: CPT | Performed by: FAMILY MEDICINE

## 2022-06-24 PROCEDURE — 4004F PT TOBACCO SCREEN RCVD TLK: CPT | Performed by: FAMILY MEDICINE

## 2022-06-24 PROCEDURE — 3008F BODY MASS INDEX DOCD: CPT | Performed by: FAMILY MEDICINE

## 2022-06-24 RX ORDER — PREDNISONE 20 MG/1
TABLET ORAL
Qty: 12 TABLET | Refills: 0 | Status: SHIPPED | OUTPATIENT
Start: 2022-06-24

## 2022-06-24 RX ORDER — HYDROCODONE BITARTRATE AND ACETAMINOPHEN 5; 325 MG/1; MG/1
1 TABLET ORAL EVERY 6 HOURS PRN
Qty: 60 TABLET | Refills: 0 | Status: SHIPPED | OUTPATIENT
Start: 2022-06-24 | End: 2022-06-24 | Stop reason: SDUPTHER

## 2022-06-24 RX ORDER — HYDROCODONE BITARTRATE AND ACETAMINOPHEN 5; 325 MG/1; MG/1
1 TABLET ORAL EVERY 6 HOURS PRN
Qty: 60 TABLET | Refills: 0 | Status: SHIPPED | OUTPATIENT
Start: 2022-06-24 | End: 2022-07-12 | Stop reason: SDUPTHER

## 2022-06-24 NOTE — TELEPHONE ENCOUNTER
Patient called asking if she should continue to take the hydroxychloroquine along with the prednisone  Please advise

## 2022-06-24 NOTE — ASSESSMENT & PLAN NOTE
Reviewed with patient  Doing a little better but still with significant discomfort  Continue present treatment    Recheck 6-8 weeks

## 2022-06-24 NOTE — TELEPHONE ENCOUNTER
06/06/2022 05/26/2022 HYDROcodone BITARTRATE-ACETAMINOPHE (Tablet)  60 0 15 325 MG-5 MG 20 0 AYLEEN MADDEN POGODZINSKI

## 2022-06-24 NOTE — ASSESSMENT & PLAN NOTE
I reviewed with patient  I feel the majority of her pain is coming from her back  She does have mild trochanteric bursitis though palpation does not necessarily reproduce the discomfort she is having at present  Will start a brief prednisone taper  Patient refuses physical therapy (patient states she cannot afford either the co-pay or the gas to get there)  Continue present medications    Recheck 3-4 weeks if not improving-earlier if worse

## 2022-06-24 NOTE — PATIENT INSTRUCTIONS
Goals of care:  Maximize your health and quality of life by: Increasing your level of function and activity  Decreasing the negative effects of pain on your life  Minimizing the risks and side effects of medications and ensuring safe use of opioid medication     Ways for you to help meet your goals:  Maintain a healthy lifestyle  This includes proper nutrition, regular physical activity as able, try for 8 hours of sleep per night, use stress reduction strategies, avoid triggers  Risks and side effects of opioid use:  Prescription opioids carry serious risks of addiction and  overdose, especially with prolonged use  An opioid overdose,  often marked by slowed breathing, can cause sudden death  The  use of prescription opioids can have a number of side effects as  well, even when taken as directed: Tolerance--meaning you might need to take more of a medication for the same pain relief  Physical dependence--meaning you have symptoms of withdrawal when a medication is stopped  Increased sensitivity to pain  Constipation  Nausea, vomiting, dry mouth  Sleepiness and dizziness   Confusion  Depression  Low levels of testosterone that can result in lower sex drive, energy, and strength  Itching and sweating    If you are prescribed opioids for pain:  Never take opioids in greater amounts or more often than prescribed  Help prevent misuse and abuse  - Never sell or share prescription opioids         - Never use another persons prescription opioids  Store prescription opioids in a secure place and out of reach of others (this may include visitors, children, friends, and family)  Safely dispose of unused prescription opioids: Find your community drug take-back program or your pharmacy mail-back program, or flush them down the toilet, following guidance from the Food and Drug Administration (www fda gov/Drugs/ResourcesForYou)    Visit www cdc gov/drugoverdose to learn about the risks of opioid abuse and overdose  If you believe you may be struggling with addiction, tell your health care provider and ask for guidance or call [address] [facility] [address] at [phone]

## 2022-06-24 NOTE — PROGRESS NOTES
Assessment/Plan     Problem List Items Addressed This Visit        Nervous and Auditory    Acute right-sided low back pain with right-sided sciatica     I reviewed with patient  I feel the majority of her pain is coming from her back  She does have mild trochanteric bursitis though palpation does not necessarily reproduce the discomfort she is having at present  Will start a brief prednisone taper  Patient refuses physical therapy (patient states she cannot afford either the co-pay or the gas to get there)  Continue present medications  Recheck 3-4 weeks if not improving-earlier if worse           Relevant Medications    HYDROcodone-acetaminophen (Norco) 5-325 mg per tablet    predniSONE 20 mg tablet    Other Relevant Orders    Ambulatory referral to Physical Therapy       Musculoskeletal and Integument    Closed wedge compression fracture of T8 vertebra ([facility] ) - Primary     Reviewed with patient  Doing a little better but still with significant discomfort  Continue present treatment  Recheck 6-8 weeks           Relevant Orders    Ambulatory referral to Physical Therapy       Other    Continuous opioid dependence ([facility] )     Pain contract as well as pain note completed  Urine drug screen done    Recheck 2 months           Relevant Orders    [facility] All Prescribed Meds    Amphetamines, Methamphetamines    Butalbital    Phenobarbital    Secobarbital    Temazepam    Alprazolam    Clonazepam    Diazepam    Lorazepam    Oxazepam    Gabapentin    Pregabalin    Cocaine    Heroin    Buprenorphine    Levorphanol    Meperidine    Naltrexone    Fentanyl    Methadone    Oxycodone    Oxymorphone    Tapentadol    Tramadol    Codeine, Hydrocodone, Hydropmorphone, Morphine    Bath Salts    Kratom    Spice    Methylphenidate    Phentermine    Validity Oxidant    Validity Creatinine    Validity pH    Validity Specific      Other Visit Diagnoses     Other chronic pain        Relevant Orders    [facility] All Prescribed Meds Amphetamines, Methamphetamines    Butalbital    Phenobarbital    Secobarbital    Temazepam    Alprazolam    Clonazepam    Diazepam    Lorazepam    Oxazepam    Gabapentin    Pregabalin    Cocaine    Heroin    Buprenorphine    Levorphanol    Meperidine    Naltrexone    Fentanyl    Methadone    Oxycodone    Oxymorphone    Tapentadol    Tramadol    Codeine, Hydrocodone, Hydropmorphone, Morphine    Bath Salts    Kratom    Spice    Methylphenidate    Phentermine    Validity Oxidant    Validity Creatinine    Validity pH    Validity Specific         Opioid Management Plan      Subjective         Pain related diagnoses: T8 compression fracture    Persistent thoracic pain radiating to stomach associated with T8 compression fx    Current pain description: 1) low thoracic spine, radiating around to stomach  2) R low back radiating to her R leg    Functional status: Ambulates  Positional pain    Goals of care: Wean off meds once compression fx and sciatica resolve    Social Support System  Patient receives support from their: daughter and son    Screening Tools/Assessments:    PHQ-2/9:  Last PHQ-2 score: 0 (Last PHQ-2 date: 5/10/2021)  Last PHQ-9 score: 0 (Last PHQ-9 date: 6/2/2022)    Brief Pain Inventory (BPI):  1) Throughout our lives, most of us have had pain from time to time (such as minor headaches, sprains, and toothaches)  Have you had pain other than these everyday kinds of pain today? Yes  2) Where is your pain located? 1) low T spine  2) R low back with sciatica  3) Rate your pain at its worst in the last 24 hours: 8  4) Rate your pain at its least in the last 24 hours: 4  5) Rate your average level of pain: 7  6) Rate your pain right now: 4  7) What treatments or medications are you receiving for your pain?  oxycodone, lidoderm  8) In the past 24 hours, how much relief have pain treatments or medication provided? 40%  9) During the past 24 hours, pain has interfered with your:     A) General activity: 8     B) Mood: 3     C) Walking ability: 8     D) Normal work (work outside the home & housework): 8     E) Relations with other people: 0     F) Sleep: 6     G) Enjoyment of life: 7    Drug Screen:  Last drug screen was performed more than 365 days ago    Opioid agreement:  Active Opioid agreement on file?: No      Naloxone:  Currently prescribed Naloxone (Narcan): Yes      Other treatments tried/failed:   Acetaminophen and prescription NSAIDs    Refused kyphoplasty    Prior imaging:  T8 compression fx confirmed    Patient here for re-evaluation of upper back pain secondary to T8 compression fracture and right lower back/right leg pain  - patient states that she still has right lower back/right leg pain  Patient was recently seen by Dr Stephanie Cody who felt that she may have trochanteric bursitis  Patient states the pain primarily now is in her right lower back near the SI joint and radiates down the right leg into the right lower leg  It is worse with certain positions and activities such as standing and walking  She occasionally feels that the right leg gets weak  She denies any recent falls or trauma  She tried using 1 of her Lidoderm patches on her lower back but was not as effective as it was for her upper back  Patient here for recheck  -patient also continues to have issues with her T8 compression fracture pain  She does get approximately 40% relief by taking hydrocodone and using a Lidoderm patch  This allows her become functional, otherwise patient has difficulty doing anything  She admits to feeling somewhat frustrated by the pain but continues to refuse any treatment including kyphoplasty  Discussed physical therapy for both her upper back and her lower other the therapy or the gas to get there at present  - pain management note and pain contract done  - patient continues to have some GI discomfort  Upper GI symptoms seem to be associated with exacerbations of her T8 compression fracture    Constipation and lower abdominal pain seems to be better with change in diet in constipation treatment  Patient to have a colonoscopy in a few weeks  Pain Medications             acetaminophen (TYLENOL) 650 mg CR tablet Take by mouth every 8 (eight) hours as needed      escitalopram (LEXAPRO) 10 mg tablet TAKE 1 TABLET BY MOUTH EVERY DAY    HYDROcodone-acetaminophen (Norco) 5-325 mg per tablet Take 1 tablet by mouth every 6 (six) hours as needed for pain Max Daily Amount: 4 tablets    predniSONE 20 mg tablet 3 tab po qd x 2 then 2 tab po qd x 2 then 1 tab po qd x 2         Outpatient Morphine Milligram Equivalents Per Day     6/24/22 and after 20 MME/Day    Order Name Dose Route Frequency Maximum MME/Day     HYDROcodone-acetaminophen (Norco) 5-325 mg per tablet 1 tablet Oral Every 6 hours PRN 20 MME/Day    Total Potential Morphine Milligram Equivalents Per Day 20 MME/Day    Calculation Information        HYDROcodone-acetaminophen (Norco) 5-325 mg per tablet    HYDROcodone-acetaminophen 5-325 mg Tabs: maximum daily dose of 20 mg of opioid in combo * morphine equivalence factor of 1 = 20 MME/Day                         PDMP Review       Value Time User    PDMP Reviewed  Yes 6/24/2022  7:52 AM Barby Chow MD         Review of Systems   Constitutional: Positive for fatigue  Negative for activity change, appetite change, chills and fever  HENT: Negative  Eyes: Negative  Respiratory: Negative  Cardiovascular: Negative  Gastrointestinal: Positive for abdominal pain (improved ) and constipation (improved)  Negative for abdominal distention, blood in stool, diarrhea, nausea and vomiting  Genitourinary: Negative  Musculoskeletal: Positive for arthralgias (R lateral hip - unchanged), back pain (T8 area as well as right SI joint area), gait problem (Secondary to sciatica) and myalgias  Neurological: Positive for weakness (?  Right leg intermittently)   Negative for dizziness, light-headedness, numbness and headaches  Psychiatric/Behavioral: Negative  All other systems reviewed and are negative  Objective     /84   Pulse 83   Temp 98 1 °F (36 7 °C)   Resp 16   Ht 5' 4" (1 626 m)   Wt 89 2 kg (196 lb 9 6 oz)   LMP  (LMP Unknown)   SpO2 96%   BMI 33 75 kg/m²     Physical Exam  Vitals reviewed  Eyes:      Conjunctiva/sclera: Conjunctivae normal       Pupils: Pupils are equal, round, and reactive to light  Cardiovascular:      Rate and Rhythm: Normal rate and regular rhythm  Pulmonary:      Effort: Pulmonary effort is normal       Breath sounds: No wheezing or rales  Abdominal:      General: Abdomen is flat  There is no distension  Palpations: There is no mass  Tenderness: There is no abdominal tenderness  Musculoskeletal:         General: Tenderness (Tender to palpation over T8 spinous process as well as right SI joint, and right lateral hip including greater trochanteric bursa,) present  No swelling or deformity  Cervical back: Normal range of motion  Right lower leg: No edema  Left lower leg: No edema  Skin:     General: Skin is warm  Capillary Refill: Capillary refill takes less than 2 seconds  Neurological:      Mental Status: She is alert  Sensory: No sensory deficit  Motor: No weakness        Gait: Gait normal       Deep Tendon Reflexes: Reflexes normal    Psychiatric:         Mood and Affect: Mood normal          Dell Apley, MD

## 2022-06-28 LAB
6MAM UR QL CFM: NEGATIVE NG/ML
7AMINOCLONAZEPAM UR QL CFM: NEGATIVE NG/ML
A-OH ALPRAZ UR QL CFM: ABNORMAL NG/ML
ACCEPTABLE CREAT UR QL: NORMAL MG/DL
ACCEPTIBLE SP GR UR QL: NORMAL
AMPHET UR QL CFM: NEGATIVE NG/ML
BUPRENORPHINE UR QL CFM: NEGATIVE NG/ML
BUTALBITAL UR QL CFM: NEGATIVE NG/ML
BZE UR QL CFM: NEGATIVE NG/ML
CODEINE UR QL CFM: NEGATIVE NG/ML
EDDP UR QL CFM: NEGATIVE NG/ML
EUTYLONE UR QL: NEGATIVE NG/ML
FENTANYL UR QL CFM: NEGATIVE NG/ML
GLIADIN IGG SER IA-ACNC: NEGATIVE NG/ML
HYDROCODONE UR QL CFM: NORMAL NG/ML
HYDROMORPHONE UR QL CFM: NORMAL NG/ML
LORAZEPAM UR QL CFM: NEGATIVE NG/ML
ME-PHENIDATE UR QL CFM: NEGATIVE NG/ML
MEPERIDINE UR QL CFM: NEGATIVE NG/ML
METHADONE UR QL CFM: NEGATIVE NG/ML
METHAMPHET UR QL CFM: NEGATIVE NG/ML
MORPHINE UR QL CFM: NEGATIVE NG/ML
NALTREXONE UR QL CFM: NEGATIVE NG/ML
NITRITE UR QL: NORMAL UG/ML
NORBUPRENORPHINE UR QL CFM: NEGATIVE NG/ML
NORDIAZEPAM UR QL CFM: NEGATIVE NG/ML
NORFENTANYL UR QL CFM: NEGATIVE NG/ML
NORHYDROCODONE UR QL CFM: NORMAL NG/ML
NORMEPERIDINE UR QL CFM: NEGATIVE NG/ML
NOROXYCODONE UR QL CFM: NEGATIVE NG/ML
OXAZEPAM UR QL CFM: NEGATIVE NG/ML
OXYCODONE UR QL CFM: NEGATIVE NG/ML
OXYMORPHONE UR QL CFM: NEGATIVE NG/ML
OXYMORPHONE UR QL CFM: NEGATIVE NG/ML
PHENOBARB UR QL CFM: NEGATIVE NG/ML
RESULT ALL_PRESCRIBED MEDS AND SPECIAL INSTRUCTIONS: NORMAL
SECOBARBITAL UR QL CFM: NEGATIVE NG/ML
SL AMB 3-METHYL-FENTANYL QUANTIFICATION: NORMAL NG/ML
SL AMB 4-ANPP QUANTIFICATION: NORMAL NG/ML
SL AMB 4-FIBF QUANTIFICATION: NORMAL NG/ML
SL AMB 5F-ADB-M7 METABOLITE QUANTIFICATION: NEGATIVE NG/ML
SL AMB 7-OH-MITRAGYNINE (KRATOM ALKALOID) QUANTIFICATION: NEGATIVE NG/ML
SL AMB AB-FUBINACA-M3 METABOLITE QUANTIFICATION: NEGATIVE NG/ML
SL AMB ACETYL FENTANYL QUANTIFICATION: NORMAL NG/ML
SL AMB ACETYL NORFENTANYL QUANTIFICATION: NORMAL NG/ML
SL AMB ACRYL FENTANYL QUANTIFICATION: NORMAL NG/ML
SL AMB BUTRYL FENTANYL QUANTIFICATION: NORMAL NG/ML
SL AMB CARFENTANIL QUANTIFICATION: NORMAL NG/ML
SL AMB CYCLOPROPYL FENTANYL QUANTIFICATION: NORMAL NG/ML
SL AMB DEXTRORPHAN (DEXTROMETHORPHAN METABOLITE) QUANT: NEGATIVE NG/ML
SL AMB FURANYL FENTANYL QUANTIFICATION: NORMAL NG/ML
SL AMB GABAPENTIN QUANTIFICATION: NEGATIVE
SL AMB JWH018 METABOLITE QUANTIFICATION: NEGATIVE NG/ML
SL AMB JWH073 METABOLITE QUANTIFICATION: NEGATIVE NG/ML
SL AMB MDMB-FUBINACA-M1 METABOLITE QUANTIFICATION: NEGATIVE NG/ML
SL AMB METHOXYACETYL FENTANYL QUANTIFICATION: NORMAL NG/ML
SL AMB METHYLONE QUANTIFICATION: NEGATIVE NG/ML
SL AMB N-DESMETHYL U-47700 QUANTIFICATION: NORMAL NG/ML
SL AMB N-DESMETHYL-TRAMADOL QUANTIFICATION: ABNORMAL NG/ML
SL AMB PHENTERMINE QUANTIFICATION: NEGATIVE NG/ML
SL AMB PREGABALIN QUANTIFICATION: NEGATIVE
SL AMB RCS4 METABOLITE QUANTIFICATION: NEGATIVE NG/ML
SL AMB RITALINIC ACID QUANTIFICATION: NEGATIVE NG/ML
SL AMB U-47700 QUANTIFICATION: NORMAL NG/ML
SMOOTH MUSCLE AB TITR SER IF: NEGATIVE NG/ML
SPECIMEN DRAWN SERPL: NEGATIVE NG/ML
SPECIMEN PH ACCEPTABLE UR: NORMAL
TAPENTADOL UR QL CFM: NEGATIVE NG/ML
TEMAZEPAM UR QL CFM: NEGATIVE NG/ML
TEMAZEPAM UR QL CFM: NEGATIVE NG/ML
TRAMADOL UR QL CFM: ABNORMAL NG/ML
URATE/CREAT 24H UR: ABNORMAL NG/ML

## 2022-06-30 ENCOUNTER — ANESTHESIA (OUTPATIENT)
Dept: GASTROENTEROLOGY | Facility: AMBULARY SURGERY CENTER | Age: 67
End: 2022-06-30

## 2022-06-30 ENCOUNTER — ANESTHESIA EVENT (OUTPATIENT)
Dept: GASTROENTEROLOGY | Facility: AMBULARY SURGERY CENTER | Age: 67
End: 2022-06-30

## 2022-06-30 ENCOUNTER — HOSPITAL ENCOUNTER (OUTPATIENT)
Dept: GASTROENTEROLOGY | Facility: AMBULARY SURGERY CENTER | Age: 67
Setting detail: OUTPATIENT SURGERY
Discharge: HOME | End: 2022-06-30
Attending: INTERNAL MEDICINE
Payer: COMMERCIAL

## 2022-06-30 VITALS
OXYGEN SATURATION: 98 % | TEMPERATURE: 97.6 F | HEART RATE: 70 BPM | SYSTOLIC BLOOD PRESSURE: 142 MMHG | RESPIRATION RATE: 16 BRPM | WEIGHT: 194 LBS | DIASTOLIC BLOOD PRESSURE: 68 MMHG | HEIGHT: 64 IN | BODY MASS INDEX: 33.12 KG/M2

## 2022-06-30 DIAGNOSIS — K59.09 CHRONIC CONSTIPATION: ICD-10-CM

## 2022-06-30 DIAGNOSIS — K21.9 GASTROESOPHAGEAL REFLUX DISEASE, UNSPECIFIED WHETHER ESOPHAGITIS PRESENT: ICD-10-CM

## 2022-06-30 PROCEDURE — 45385 COLONOSCOPY W/LESION REMOVAL: CPT | Performed by: INTERNAL MEDICINE

## 2022-06-30 PROCEDURE — 43239 EGD BIOPSY SINGLE/MULTIPLE: CPT | Performed by: INTERNAL MEDICINE

## 2022-06-30 PROCEDURE — 88305 TISSUE EXAM BY PATHOLOGIST: CPT | Performed by: PATHOLOGY

## 2022-06-30 RX ORDER — SODIUM CHLORIDE, SODIUM LACTATE, POTASSIUM CHLORIDE, CALCIUM CHLORIDE 600; 310; 30; 20 MG/100ML; MG/100ML; MG/100ML; MG/100ML
INJECTION, SOLUTION INTRAVENOUS CONTINUOUS PRN
Status: DISCONTINUED | OUTPATIENT
Start: 2022-06-30 | End: 2022-06-30

## 2022-06-30 RX ORDER — SODIUM CHLORIDE, SODIUM LACTATE, POTASSIUM CHLORIDE, CALCIUM CHLORIDE 600; 310; 30; 20 MG/100ML; MG/100ML; MG/100ML; MG/100ML
100 INJECTION, SOLUTION INTRAVENOUS CONTINUOUS
Status: CANCELLED | OUTPATIENT
Start: 2022-06-30

## 2022-06-30 RX ORDER — LIDOCAINE HYDROCHLORIDE 10 MG/ML
INJECTION, SOLUTION EPIDURAL; INFILTRATION; INTRACAUDAL; PERINEURAL AS NEEDED
Status: DISCONTINUED | OUTPATIENT
Start: 2022-06-30 | End: 2022-06-30

## 2022-06-30 RX ORDER — PROPOFOL 10 MG/ML
INJECTION, EMULSION INTRAVENOUS AS NEEDED
Status: DISCONTINUED | OUTPATIENT
Start: 2022-06-30 | End: 2022-06-30

## 2022-06-30 RX ADMIN — LIDOCAINE HYDROCHLORIDE 50 MG: 10 INJECTION, SOLUTION EPIDURAL; INFILTRATION; INTRACAUDAL; PERINEURAL at 07:39

## 2022-06-30 RX ADMIN — PROPOFOL 50 MG: 10 INJECTION, EMULSION INTRAVENOUS at 07:49

## 2022-06-30 RX ADMIN — PROPOFOL 50 MG: 10 INJECTION, EMULSION INTRAVENOUS at 07:53

## 2022-06-30 RX ADMIN — SODIUM CHLORIDE, SODIUM LACTATE, POTASSIUM CHLORIDE, AND CALCIUM CHLORIDE: .6; .31; .03; .02 INJECTION, SOLUTION INTRAVENOUS at 07:36

## 2022-06-30 RX ADMIN — PROPOFOL 100 MG: 10 INJECTION, EMULSION INTRAVENOUS at 07:39

## 2022-06-30 RX ADMIN — PROPOFOL 50 MG: 10 INJECTION, EMULSION INTRAVENOUS at 07:44

## 2022-06-30 NOTE — H&P
History and Physical -  [facility]  Cami Alba 77 y o  female MRN: [id number]        HPI:  63-year-old female with history hypertension and chronic constipation reports having symptoms of bloating and early satiety  Historical Information   Past Medical History:   Diagnosis Date    Acid reflux     Back injury     Fibromyalgia     Hypertension     Seasonal allergies      Past Surgical History:   Procedure Laterality Date    COLONOSCOPY      EYE SURGERY      KNEE SURGERY  1998    IN COLONOSCOPY FLX DX W/COLLJ SPEC WHEN PFRMD N/A 05/09/2017    Procedure: EGD AND COLONOSCOPY;  Surgeon: Maryam Cramer MD;  Location[facility]; Service: Gastroenterology     Social History   Social History     Substance and Sexual Activity   Alcohol Use Never     Social History     Substance and Sexual Activity   Drug Use No     Social History     Tobacco Use   Smoking Status Current Every Day Smoker    Packs/day: 1 50    Years: 50 00    Pack years: 75 00    Types: Cigarettes   Smokeless Tobacco Never Used     Family History   Problem Relation Age of Onset    Pancreatic cancer Mother 67    Lung cancer Sister 40    Pancreatic cancer Brother     Coronary artery disease Father     Diabetes Father     Hypertension Father     Heart disease Father     Diabetes Paternal Grandmother     Lung cancer Maternal Grandfather 68    Pancreatic cancer Brother        Meds/Allergies     (Not in a hospital admission)      Allergies   Allergen Reactions    Augmentin [Amoxicillin-Pot Clavulanate] Vomiting    Miconazole Itching and Swelling    Wixela Inhub [Fluticasone-Salmeterol] Palpitations       Objective     Blood pressure 164/74, pulse 84, temperature (!) 97 1 °F (36 2 °C), temperature source Temporal, resp  rate 20, height 5' 4" (1 626 m), weight 88 kg (194 lb), SpO2 96 %, not currently breastfeeding      PHYSICAL EXAM:    Gen: NAD  CV: S1 & S2 normal, RRR  CHEST: Clear to auscultate  ABD: soft, NT/ND, good bowel sounds  EXT: no edema    ASSESSMENT:     Dyspepsia, constipation    PLAN:    EGD and colonoscopy

## 2022-06-30 NOTE — ANESTHESIA PREPROCEDURE EVALUATION
Interpretation Summary         Left Ventricle: Left ventricular cavity size is normal  Wall thickness is normal  The left ventricular ejection fraction is 70%  Systolic function is normal  Wall motion is normal  Diastolic function is mildly abnormal, consistent with grade I (abnormal) relaxation    Right Ventricle: Right ventricular cavity size is normal  Systolic function is normal       Procedure:  COLONOSCOPY  EGD    Relevant Problems   ANESTHESIA (within normal limits)      CARDIO   (+) Aortitis (HCC)   (+) Chest pain   (+) Dyspnea on exertion   (+) Hyperlipidemia   (+) Hypertension      GI/HEPATIC   (+) Esophageal reflux      MUSCULOSKELETAL   (+) Acute right-sided low back pain with right-sided sciatica   (+) Degeneration of intervertebral disc   (+) Fibromyalgia      NEURO/PSYCH   (+) Anxiety   (+) Continuous opioid dependence (HCC)   (+) Fibromyalgia   (+) Mild episode of recurrent major depressive disorder (HCC)      PULMONARY   (+) Chronic obstructive pulmonary disease (HCC)   (+) Dyspnea on exertion        Physical Exam    Airway    Mallampati score: IV  TM Distance: <3 FB  Neck ROM: full     Dental       Cardiovascular  Rate: normal,     Pulmonary  Pulmonary exam normal     Other Findings  Per pt denies anything remaining that is loose or removeable      Anesthesia Plan  ASA Score- 3     Anesthesia Type- IV sedation with anesthesia with ASA Monitors  Additional Monitors:   Airway Plan:     Comment: Per patient, appropriately NPO, denies active CP/SOB/wheezing/symptoms related to heartburn/nausea/vomiting  Plan Factors-Exercise tolerance (METS): >4 METS  Chart reviewed  Patient summary reviewed  Patient is a current smoker  Induction- intravenous  Postoperative Plan-     Informed Consent- Anesthetic plan and risks discussed with patient  I personally reviewed this patient with the CRNA  Discussed and agreed on the Anesthesia Plan with the CRNA  Dany Norman

## 2022-06-30 NOTE — ANESTHESIA POSTPROCEDURE EVALUATION
Post-Op Assessment Note    CV Status:  Stable  Pain Score: 0    Pain management: adequate     Mental Status:  Alert and awake   Hydration Status:  Euvolemic   PONV Controlled:  Controlled   Airway Patency:  Patent      Post Op Vitals Reviewed: Yes      Staff: Anesthesiologist, CRNA         No complications documented      BP (!) 88/42 (06/30/22 0800)    Temp 97 6 °F (36 4 °C) (06/30/22 0800)    Pulse 67 (06/30/22 0800)   Resp 20 (06/30/22 0800)    SpO2 98 % (06/30/22 0800)

## 2022-07-01 DIAGNOSIS — B37.31 CANDIDA VAGINITIS: Primary | ICD-10-CM

## 2022-07-01 RX ORDER — FLUCONAZOLE 150 MG/1
150 TABLET ORAL ONCE
Qty: 1 TABLET | Refills: 0 | Status: SHIPPED | OUTPATIENT
Start: 2022-07-01 | End: 2022-07-01

## 2022-07-12 ENCOUNTER — TELEPHONE (OUTPATIENT)
Dept: OTHER | Facility: OTHER | Age: 67
End: 2022-07-12

## 2022-07-12 DIAGNOSIS — M54.9 MID BACK PAIN: ICD-10-CM

## 2022-07-12 RX ORDER — HYDROCODONE BITARTRATE AND ACETAMINOPHEN 5; 325 MG/1; MG/1
1 TABLET ORAL EVERY 6 HOURS PRN
Qty: 60 TABLET | Refills: 0 | Status: SHIPPED | OUTPATIENT
Start: 2022-07-22 | End: 2022-07-13

## 2022-07-12 NOTE — TELEPHONE ENCOUNTER
Patient was calling regarding her biopsy results  She was a little concerned since it had been almost two weeks  I did advise her that they are still in process and biopsies have been taking a little longer to come back  I did let her know that I would have the provider or nurse call her back and leave my chart results when they do come in  Patient was agreeable

## 2022-07-12 NOTE — TELEPHONE ENCOUNTER
[place] PDMP verified  Patient follows with Regla Frost MD for this controlled substance  Last refills:    06/24/2022  1   06/24/2022  Hydrocodone-Acetamin 5-325 MG    60 00  15 [facility] [address]   [id number]   Pen ([id number])         I will refill as ordered

## 2022-07-13 ENCOUNTER — TELEPHONE (OUTPATIENT)
Dept: FAMILY MEDICINE CLINIC | Facility: CLINIC | Age: 67
End: 2022-07-13

## 2022-07-13 DIAGNOSIS — M54.9 MID BACK PAIN: ICD-10-CM

## 2022-07-13 RX ORDER — HYDROCODONE BITARTRATE AND ACETAMINOPHEN 5; 325 MG/1; MG/1
1 TABLET ORAL EVERY 6 HOURS PRN
Qty: 60 TABLET | Refills: 0 | Status: SHIPPED | OUTPATIENT
Start: 2022-07-22 | End: 2022-07-13

## 2022-07-13 RX ORDER — HYDROCODONE BITARTRATE AND ACETAMINOPHEN 5; 325 MG/1; MG/1
1 TABLET ORAL EVERY 6 HOURS PRN
Qty: 60 TABLET | Refills: 0 | Status: CANCELLED | OUTPATIENT
Start: 2022-07-13

## 2022-07-13 RX ORDER — HYDROCODONE BITARTRATE AND ACETAMINOPHEN 5; 325 MG/1; MG/1
1 TABLET ORAL EVERY 6 HOURS PRN
Qty: 60 TABLET | Refills: 0 | Status: SHIPPED | OUTPATIENT
Start: 2022-07-22 | End: 2022-08-18 | Stop reason: SDUPTHER

## 2022-07-13 NOTE — TELEPHONE ENCOUNTER
New prescription sent in for the medication explain it is due now because it is 15 day supply not 30 day

## 2022-07-13 NOTE — TELEPHONE ENCOUNTER
Patient called because she tried to get a refill on her Hydrocodone but the pharmacy said that it cannot be filled until 7/22    She states that she usually gets this prescription filled every 2 weeks because it is only a 15 day supply and it's been over 15 days since she got it refilled     She states she takes 4 tablets every day and she gets a quantity of 60    Please advise

## 2022-07-18 ENCOUNTER — TELEPHONE (OUTPATIENT)
Dept: FAMILY MEDICINE CLINIC | Facility: CLINIC | Age: 67
End: 2022-07-18

## 2022-07-18 ENCOUNTER — TELEPHONE (OUTPATIENT)
Dept: GASTROENTEROLOGY | Facility: AMBULARY SURGERY CENTER | Age: 67
End: 2022-07-18

## 2022-07-18 DIAGNOSIS — R10.84 GENERALIZED ABDOMINAL PAIN: ICD-10-CM

## 2022-07-18 DIAGNOSIS — J02.9 PHARYNGITIS, UNSPECIFIED ETIOLOGY: Primary | ICD-10-CM

## 2022-07-18 DIAGNOSIS — Z11.52 ENCOUNTER FOR SCREENING FOR COVID-19: Primary | ICD-10-CM

## 2022-07-18 DIAGNOSIS — R19.7 DIARRHEA, UNSPECIFIED TYPE: Primary | ICD-10-CM

## 2022-07-18 LAB
SARS-COV-2 AG UPPER RESP QL IA: NEGATIVE
VALID CONTROL: NORMAL

## 2022-07-18 PROCEDURE — 87811 SARS-COV-2 COVID19 W/OPTIC: CPT | Performed by: FAMILY MEDICINE

## 2022-07-18 RX ORDER — AZITHROMYCIN 250 MG/1
TABLET, FILM COATED ORAL
Qty: 6 TABLET | Refills: 0 | Status: SHIPPED | OUTPATIENT
Start: 2022-07-18 | End: 2022-07-22

## 2022-07-18 NOTE — TELEPHONE ENCOUNTER
Spoke to patient on phone.  Patient repeat diarrhea for the last week 2-3 to a day which is liquid with occasionally loose stool.  Prior to this patient was having a history of chronic constipation.  Will check stool studies for C diff and bacteria panel to rule out underlying infection.  Patient denies any abdominal cramping or blood in stool.  Patient also reports continued abdominal pain patient reports she has had abdominal pain for the past year but the pain now is different than previous.  Patient has had EGDs.  Colonoscopies.  Ultrasound and CT scans without any explanation of pain.  Will obtain MRI with and without contrast for further evaluation such as malignancy.  Patient given phone number to call and schedule procedure.  Patient reports severe pain in lower abdomen that when she goes to get up and move she has difficulty walking.  Patient abdominal pain may all be related to back issues but need to rule out that there is any malignancy, lesion, blockage, or other abdominal etiology contributing to symptoms.  I will send this message to Dr. Zapata.  Thank you

## 2022-07-18 NOTE — TELEPHONE ENCOUNTER
Attempted to call patient, no answer and voicemail box has not been set up.  Please attempt to call patient in a.m. concerning her symptoms.  Thank you

## 2022-07-18 NOTE — TELEPHONE ENCOUNTER
Please inform patient that 1 of her polyps were tubular adenoma which is a precancerous polyp, other polyps were benign hyperplastic polyps and biopsy from EGD was benign.  When physician reviewed he will make further recommendations as far as when she should have repeat colonoscopy done.  Thank you

## 2022-07-18 NOTE — TELEPHONE ENCOUNTER
Pt called stating last week she had diarrhea all week  Over the weekend she started with a fever, nasal congestion, swollen glands, cough with chest congestion (green in color)  Denies sore throat, ear pain, n/v and headache  Pt is not vaccinated and has no access to a covid swab

## 2022-07-18 NOTE — TELEPHONE ENCOUNTER
Called patient made aware of results. Patient verbalized understanding. Advised to contact our office with any questions or concerns.

## 2022-07-18 NOTE — TELEPHONE ENCOUNTER
Patients GI provider:  Dr. Zapata    Number to return call: ( [phone]    Reason for call: Pt calling to get biopsy results from 6-30-22     Scheduled procedure/appointment date if applicable:  N/A

## 2022-08-01 DIAGNOSIS — J30.9 ALLERGIC RHINITIS, UNSPECIFIED SEASONALITY, UNSPECIFIED TRIGGER: ICD-10-CM

## 2022-08-01 RX ORDER — CETIRIZINE HYDROCHLORIDE 10 MG/1
10 TABLET ORAL DAILY
Qty: 90 TABLET | Refills: 0 | Status: SHIPPED | OUTPATIENT
Start: 2022-08-01 | End: 2022-10-20 | Stop reason: SDUPTHER

## 2022-08-02 DIAGNOSIS — B37.31 CANDIDA VAGINITIS: Primary | ICD-10-CM

## 2022-08-02 RX ORDER — FLUCONAZOLE 150 MG/1
150 TABLET ORAL ONCE
Qty: 1 TABLET | Refills: 0 | Status: SHIPPED | OUTPATIENT
Start: 2022-08-02 | End: 2022-08-02

## 2022-08-09 ENCOUNTER — HOSPITAL ENCOUNTER (OUTPATIENT)
Dept: MRI IMAGING | Facility: HOSPITAL | Age: 67
Discharge: HOME | End: 2022-08-09
Payer: COMMERCIAL

## 2022-08-09 DIAGNOSIS — R10.84 GENERALIZED ABDOMINAL PAIN: ICD-10-CM

## 2022-08-09 PROCEDURE — 74183 MRI ABD W/O CNTR FLWD CNTR: CPT

## 2022-08-09 PROCEDURE — G1004 CDSM NDSC: HCPCS

## 2022-08-09 PROCEDURE — A9585 GADOBUTROL INJECTION: HCPCS | Performed by: NURSE PRACTITIONER

## 2022-08-09 RX ADMIN — GADOBUTROL 8 ML: 604.72 INJECTION INTRAVENOUS at 21:33

## 2022-08-15 ENCOUNTER — TELEPHONE (OUTPATIENT)
Dept: OTHER | Facility: OTHER | Age: 67
End: 2022-08-15

## 2022-08-15 NOTE — TELEPHONE ENCOUNTER
Patient calling due to concerns about her MRI result and wanted to let Dr Glenys Wilson know that she has a family history of pancreatic cancer  Please call back to discuss

## 2022-08-16 ENCOUNTER — APPOINTMENT (OUTPATIENT)
Dept: LAB | Facility: MEDICAL CENTER | Age: 67
End: 2022-08-16
Payer: COMMERCIAL

## 2022-08-16 LAB
CRP SERPL QL: <3 MG/L
ERYTHROCYTE [SEDIMENTATION RATE] IN BLOOD: 62 MM/HOUR (ref 0–29)

## 2022-08-16 NOTE — TELEPHONE ENCOUNTER
I spoke to patient on phone concerning results of MRI  She reports unintentional weight loss of 10 lbs since colonoscopy and is concerned about results since brothers have history of pancreatic cancer  Dr Lei Rubi is aware of results and did explain to patient recommendations are for repeat MRI in 1 year  but could due further evaluation with EUS but do to size of cyst not sure FNA is possible  She would like to discuss this further with Dr Lei Rubi   Thank you

## 2022-08-17 ENCOUNTER — APPOINTMENT (OUTPATIENT)
Dept: LAB | Facility: MEDICAL CENTER | Age: 67
End: 2022-08-17
Payer: COMMERCIAL

## 2022-08-17 DIAGNOSIS — R19.7 DIARRHEA, UNSPECIFIED TYPE: ICD-10-CM

## 2022-08-17 PROCEDURE — 87505 NFCT AGENT DETECTION GI: CPT

## 2022-08-18 DIAGNOSIS — M70.61 TROCHANTERIC BURSITIS OF RIGHT HIP: ICD-10-CM

## 2022-08-18 DIAGNOSIS — M54.9 MID BACK PAIN: ICD-10-CM

## 2022-08-18 DIAGNOSIS — B37.0 THRUSH: ICD-10-CM

## 2022-08-18 LAB
C DIFF TOX GENS STL QL NAA+PROBE: NEGATIVE
CAMPYLOBACTER DNA SPEC NAA+PROBE: NORMAL
SALMONELLA DNA SPEC QL NAA+PROBE: NORMAL
SHIGA TOXIN STX GENE SPEC NAA+PROBE: NORMAL
SHIGELLA DNA SPEC QL NAA+PROBE: NORMAL

## 2022-08-18 RX ORDER — HYDROCODONE BITARTRATE AND ACETAMINOPHEN 5; 325 MG/1; MG/1
1 TABLET ORAL EVERY 6 HOURS PRN
Qty: 60 TABLET | Refills: 0 | Status: SHIPPED | OUTPATIENT
Start: 2022-08-18 | End: 2022-09-09 | Stop reason: SDUPTHER

## 2022-08-18 NOTE — TELEPHONE ENCOUNTER
07/26/2022 07/13/2022 HYDROcodone BITARTRATE-ACETAMINOPHE (Tablet)  60 0 15 325 MG-5 MG 20 0 [facility]

## 2022-08-29 ENCOUNTER — RA CDI HCC (OUTPATIENT)
Dept: OTHER | Facility: HOSPITAL | Age: 67
End: 2022-08-29

## 2022-08-29 NOTE — PROGRESS NOTES
[facility]  coding opportunities       Chart reviewed, no opportunity found:   [address]        Patients Insurance     Medicare Insurance: [employer]

## 2022-09-01 ENCOUNTER — OFFICE VISIT (OUTPATIENT)
Dept: FAMILY MEDICINE CLINIC | Facility: CLINIC | Age: 67
End: 2022-09-01
Payer: COMMERCIAL

## 2022-09-01 VITALS
WEIGHT: 190 LBS | DIASTOLIC BLOOD PRESSURE: 82 MMHG | BODY MASS INDEX: 32.44 KG/M2 | TEMPERATURE: 98.1 F | HEIGHT: 64 IN | HEART RATE: 76 BPM | SYSTOLIC BLOOD PRESSURE: 126 MMHG

## 2022-09-01 DIAGNOSIS — M54.17 LUMBOSACRAL RADICULOPATHY AT L5: Primary | ICD-10-CM

## 2022-09-01 DIAGNOSIS — D49.0 IPMN (INTRADUCTAL PAPILLARY MUCINOUS NEOPLASM): ICD-10-CM

## 2022-09-01 DIAGNOSIS — S22.060D CLOSED WEDGE COMPRESSION FRACTURE OF T8 VERTEBRA WITH ROUTINE HEALING, SUBSEQUENT ENCOUNTER: ICD-10-CM

## 2022-09-01 DIAGNOSIS — M72.2 PLANTAR FASCIITIS OF RIGHT FOOT: ICD-10-CM

## 2022-09-01 PROCEDURE — 99214 OFFICE O/P EST MOD 30 MIN: CPT | Performed by: FAMILY MEDICINE

## 2022-09-01 RX ORDER — MONTELUKAST SODIUM 10 MG/1
10 TABLET ORAL
COMMUNITY
End: 2022-09-12 | Stop reason: SDUPTHER

## 2022-09-01 NOTE — PROGRESS NOTES
Assessment/Plan:    IPMN (intraductal papillary mucinous neoplasm)  I discussed with GI and with pt Pt with strong family hx of pancreatic CA  REC: recheck MRI of pancreas in 6-12m  Lumbosacral radiculopathy at L5  With recent exacerbation  I reviewed with pt  Refer for PT  Avoid exacerbating positions and activities  Recheck 6m    Closed wedge compression fracture of T8 vertebra (HCC)  Slowly healing  Refused aggressive treatment  Continue to monitor    Plantar fasciitis of right foot  I reviewed with pt  Refer to PT  REC: heel lifts and other conservative measures  Recheck 2w if not improved - earlier if worse       Diagnoses and all orders for this visit:    Lumbosacral radiculopathy at L5  -     Ambulatory Referral to Physical Therapy; Future    Closed wedge compression fracture of T8 vertebra with routine healing, subsequent encounter    Plantar fasciitis of right foot  -     Ambulatory Referral to Physical Therapy; Future    IPMN (intraductal papillary mucinous neoplasm)    Other orders  -     montelukast (SINGULAIR) 10 mg tablet; Take 10 mg by mouth daily at bedtime        Subjective:      Patient ID: Cathy Garcia is a 77 y o  female  f/u multiple med issues  - 1m hx of worsening low back pain, radiating to both legs in an L5 dermatome  No trauma/injury, change in bowel/bladder,   - R heel pain starting after increased walking  No trauma, swelling or bruising    - low back/groing pain over the last 1m  Worse with getting up from a chair or bending  Better with walking on a flat surface  - thinks that her legs varicose veins are worsening  Can become tender  No increased edema  - still with upper epigastric discomfort since her EGD in May  MRI in early August revealed "8mm cyst in the pancreatic neck most likely represents a side duct IPMN "  Pt with strong family hx of pancreatic CA   Pt concerned that this could be an early neoplasm  - pt still with   - pt continues to experience decreased appetite  No change in weight or BMs  The following portions of the patient's history were reviewed and updated as appropriate:   She  has a past medical history of Acid reflux, Back injury, Fibromyalgia, Hypertension, and Seasonal allergies    She   Patient Active Problem List    Diagnosis Date Noted    Plantar fasciitis of right foot 09/05/2022    IPMN (intraductal papillary mucinous neoplasm) 09/05/2022    Greater trochanteric bursitis of right hip 06/02/2022    Therapeutic opioid induced constipation 05/20/2022    RUQ pain 05/10/2022    Closed wedge compression fracture of T8 vertebra ([address] ) 05/10/2022    Acute right-sided low back pain with right-sided sciatica 04/28/2022    Continuous opioid dependence ([address] ) 04/27/2022    Chronic frontal sinusitis 03/23/2022    Pain of both eyes 02/25/2022    Candida vaginitis 01/19/2022    Primary insomnia 11/24/2021    Aortitis ([address] ) 10/31/2021    Increased intraocular pressure 10/31/2021    Chest pain 02/08/2021    Dermatitis 09/14/2020    Prediabetes 04/10/2020    Chronic idiopathic constipation 12/16/2019    Gastroparesis 12/16/2019    Severe obesity (BMI 35 0-35 9 with comorbidity) ([address] ) 11/21/2019    Lower extremity edema 11/21/2019    Snoring 11/21/2019    Cigarette nicotine dependence with nicotine-induced disorder 10/02/2019    Neck pain 07/11/2019    Neuropathic pain 01/09/2019    Early satiety 08/28/2018    Abdominal distension 08/28/2018    Family history of pancreatic cancer 08/28/2018    Epigastric pain 08/28/2018    Dyspnea on exertion 05/04/2018    Edema 07/13/2017    Lumbosacral radiculopathy at L5 01/06/2017    Hyperlipidemia 12/08/2015    Cataract 08/22/2014    Palpitations 08/05/2014    Chronic obstructive pulmonary disease ([address] ) 04/17/2013    Esophageal reflux 04/17/2013    Pulmonary nodule seen on imaging study 04/17/2013    Hypertension 03/22/2013    Mild episode of recurrent major depressive disorder ([facility] ) 03/14/2013    Fibromyalgia 03/14/2013    Degeneration of intervertebral disc 03/14/2013    Anxiety 02/21/2013    Seasonal allergic rhinitis 12/20/2012     She  has a past surgical history that includes Knee surgery (1998); pr colonoscopy flx dx w/collj spec when pfrmd (N/A, 05/09/2017); Eye surgery; and Colonoscopy  She  reports that she has been smoking cigarettes  She has a 75 00 pack-year smoking history  She has never used smokeless tobacco  She reports that she does not drink alcohol and does not use drugs    Current Outpatient Medications   Medication Sig Dispense Refill    acetaminophen (TYLENOL) 650 mg CR tablet Take by mouth every 8 (eight) hours as needed        albuterol (PROVENTIL HFA,VENTOLIN HFA) 90 mcg/act inhaler INHALE 2 PUFFS BY MOUTH EVERY 6 HOURS AS NEEDED FOR WHEEZE 18 g 1    ALPRAZolam (XANAX) 0 25 mg tablet Take 1 tablet (0 25 mg total) by mouth 3 (three) times a day as needed for anxiety 90 tablet 0    amLODIPine (NORVASC) 5 mg tablet Take 1 tablet (5 mg total) by mouth daily 30 tablet 5    cetirizine (ZyrTEC) 10 mg tablet Take 1 tablet (10 mg total) by mouth daily 90 tablet 0    Diclofenac Sodium (VOLTAREN) 1 % Apply 2 g topically 4 (four) times a day 100 g 0    escitalopram (LEXAPRO) 10 mg tablet TAKE 1 TABLET BY MOUTH EVERY DAY 90 tablet 1    fluticasone (FLONASE) 50 mcg/act nasal spray SPRAY 2 SPRAYS INTO EACH NOSTRIL EVERY DAY 16 mL 3    Glycerin-Hypromellose- (DRY EYE RELIEF DROPS OP) Apply to eye if needed      HYDROcodone-acetaminophen (Norco) 5-325 mg per tablet Take 1 tablet by mouth every 6 (six) hours as needed for pain Max Daily Amount: 4 tablets 60 tablet 0    lidocaine (Lidoderm) 5 % Apply 1 patch topically daily Remove & Discard patch within 12 hours or as directed by MD 30 patch 1    lisinopril (ZESTRIL) 20 mg tablet TAKE 1 TABLET BY MOUTH EVERY DAY 90 tablet 1    Lumigan 0 01 % ophthalmic drops Administer 1 drop to both eyes daily at bedtime        MINOXIDIL, TOPICAL, 5 % SOLN Apply 1 application topically in the morning For hair      montelukast (SINGULAIR) 10 mg tablet Take 10 mg by mouth daily at bedtime      naloxone (NARCAN) 4 mg/0 1 mL nasal spray Administer 1 spray into a nostril  If no response after 2-3 minutes, give another dose in the other nostril using a new spray  1 each 1    nystatin (MYCOSTATIN) 500,000 units/5 mL suspension Take 5 mL (500,000 Units total) by mouth 4 (four) times a day 200 mL 0    pantoprazole (PROTONIX) 40 mg tablet Take 1 tablet (40 mg total) by mouth 2 (two) times a day 180 tablet 1    promethazine-dextromethorphan (PHENERGAN-DM) 6 25-15 mg/5 mL oral syrup TAKE 5 MLS BY MOUTH 4 TIMES A DAY AS NEEDED FOR COUGH 150 mL 0    timolol (TIMOPTIC) 0 25 % ophthalmic solution       fluticasone-umeclidinium-vilanterol (Trelegy Ellipta) 200-62 5-25 MCG/INH AEPB inhaler Inhale 1 puff daily Rinse mouth after use  60 blister 5     No current facility-administered medications for this visit  She is allergic to augmentin [amoxicillin-pot clavulanate], miconazole, and wixela inhub [fluticasone-salmeterol]       Review of Systems   Constitutional: Positive for activity change, appetite change and fatigue  Negative for chills and fever  HENT: Negative  Eyes: Negative  Respiratory: Negative  Cardiovascular: Negative  Gastrointestinal: Positive for abdominal pain (intermittent epigastric), constipation (occasional) and nausea  Negative for abdominal distention (intermittent) and vomiting  Endocrine: Negative  Genitourinary: Negative  Musculoskeletal: Positive for arthralgias, back pain, gait problem and myalgias  Negative for joint swelling  Skin: Negative  Neurological: Negative for dizziness, weakness, light-headedness, numbness and headaches           Objective:      /82   Pulse 76   Temp 98 1 °F (36 7 °C)   Ht 5' 4" (1 626 m)   Wt 86 2 kg (190 lb)   LMP  (LMP Unknown)   BMI 32 61 kg/m²          Physical Exam  Vitals reviewed  Constitutional:       Appearance: She is well-developed  She is not diaphoretic  HENT:      Head: Normocephalic and atraumatic  Right Ear: Tympanic membrane, ear canal and external ear normal       Left Ear: Tympanic membrane, ear canal and external ear normal       Mouth/Throat:      Mouth: Mucous membranes are moist    Eyes:      Extraocular Movements: Extraocular movements intact  Conjunctiva/sclera: Conjunctivae normal       Pupils: Pupils are equal, round, and reactive to light  Neck:      Thyroid: No thyromegaly  Vascular: No JVD  Cardiovascular:      Rate and Rhythm: Normal rate and regular rhythm  Pulses: Normal pulses  Heart sounds: No murmur heard  Pulmonary:      Effort: Pulmonary effort is normal       Breath sounds: Normal breath sounds  No wheezing or rales  Abdominal:      General: There is no distension  Palpations: Abdomen is soft  There is no mass  Tenderness: There is abdominal tenderness (mild, epigastric  No G/R  No masses)  There is no right CVA tenderness or left CVA tenderness  Musculoskeletal:         General: Tenderness (TTP over the lower lumbar spine and SI joints  R SLR with radicular symptoms   (+) TTP over the plantar fascia insertion on R    Sl TTP over the mid thoracic spine) present  No swelling  Normal range of motion  Cervical back: Normal range of motion and neck supple  No tenderness  No muscular tenderness  Right lower leg: No edema  Left lower leg: No edema  Lymphadenopathy:      Cervical: No cervical adenopathy  Skin:     General: Skin is warm and dry  Capillary Refill: Capillary refill takes less than 2 seconds  Neurological:      Mental Status: She is alert and oriented to person, place, and time  Cranial Nerves: No cranial nerve deficit  Sensory: No sensory deficit  Motor: No weakness or abnormal muscle tone        Gait: Gait normal  Deep Tendon Reflexes: Reflexes are normal and symmetric     Psychiatric:         Mood and Affect: Mood normal          Behavior: Behavior normal

## 2022-09-02 ENCOUNTER — EVALUATION (OUTPATIENT)
Dept: PHYSICAL THERAPY | Facility: MEDICAL CENTER | Age: 67
End: 2022-09-02
Payer: COMMERCIAL

## 2022-09-02 DIAGNOSIS — M54.17 LUMBOSACRAL RADICULOPATHY: Primary | ICD-10-CM

## 2022-09-02 DIAGNOSIS — M72.2 PLANTAR FASCIITIS OF RIGHT FOOT: ICD-10-CM

## 2022-09-02 PROCEDURE — 97162 PT EVAL MOD COMPLEX 30 MIN: CPT

## 2022-09-02 NOTE — PROGRESS NOTES
PT Evaluation     Today's date: 2022  Patient name: Cathy Garcia  : 1955  MRN: [id number]  Referring provider: Molly Arteaga*  Dx:   Encounter Diagnosis     ICD-10-CM    1  Lumbosacral radiculopathy  M54 17    2  Plantar fasciitis of right foot  M72 2 Ambulatory Referral to Physical Therapy       Start Time: 1120  Stop Time: 1200  Total time in clinic (min): 40 minutes    Assessment  Assessment details: Patient is a 77 y o  female reporting to therapy with the referring diagnosis of Lumbosacral radiculopathy and Plantar fasciitis of right foot  Upon exam, patient presents with pain and deficits in low back strength, proximal hip strength, lumbar ROM, walking endurance, soft tissue mobility, and muscle flexibility which interferes with their ability to perform ADLs  This includes walking, prolonged sitting, prolonged standing, and bending activities  Signs and symptoms are consistent with the referring diagnoses with pain that is likely multifactorial in nature  Patient further had a positive windlass test and tenderness to palpate at her right sided plantar fascia  She is a good candidate for therapy in order to address her stated deficits and improve her function so they can return to activities with less pain  Negative prognostic factors include patient PMH and high to moderate irritability of pain  Patient was educated today on positioning for comfort and use of a pillow at her side in order to reduce pain with laying down          Impairments: abnormal or restricted ROM, abnormal movement, activity intolerance, impaired physical strength, lacks appropriate home exercise program, pain with function, poor posture  and poor body mechanics    Symptom irritability: moderateUnderstanding of Dx/Px/POC: fair   Prognosis: fair    Goals  STG  -Patient will be IND with basic level HEP within 4 weeks in order to make improvements at home   - Patient will have a decrease in 3 points on the NPRS within 5 weeks in order to improve quality of life  -Patient will be able to walk for 25 minutes continuously with pain <3/10 within 4 weeks      LTG  -Patient will be IND with an advanced level HEP within 8 weeks in order to make improvements at home   - Patient will have a decrease in 5 points on the NPRS within 6 weeks in order to improve quality of life  - Patient will perform lumbar AROM screen  And display 75% of motion with pain <3/10 within 7 weeks  - Patient will reach her FOTO score goal within 8 weeks  -Patient will be able to lay on her side for 45 minutes with pain <2/10 within 8 weeks  - Patient will walk for 35 minutes continuously with pain <3/10 within 8 weeks      Plan  Plan details: Skilled PT to improve strength, ROM, flexibility, endurance, pain, and function  Patient would benefit from: PT eval and skilled physical therapy  Referral necessary: No  Planned modality interventions: biofeedback, TENS, cryotherapy and traction  Planned therapy interventions: joint mobilization, manual therapy, massage, neuromuscular re-education, patient education, postural training, therapeutic activities, stretching, strengthening, therapeutic exercise, home exercise program, functional ROM exercises, flexibility, graded activity, graded exercise, body mechanics training, abdominal trunk stabilization and ADL retraining  Frequency: 2x week  Duration in weeks: 12  Plan of Care beginning date: 9/2/2022  Plan of Care expiration date: 11/25/2022  Treatment plan discussed with: patient        Subjective Evaluation    History of Present Illness  Mechanism of injury: Patient reports to therapy with low back pain that she reports has been present for about two months  She states her pain was of a more gradual onset as she cannot recall a specific movement or mechanism of injury that first started her symptoms  Her pain wraps around both sides of her low back to the front   Back pain is worst with bending, walking, sitting for prolonged periods of time, or standing for prolonged periods of time such as when she is cooking  Low back pain is general less while walking if she leans on a shopping cart in the grocery store  Additional pain is reported in both legs going down to the tops of her feet  She mainly feels this pain when laying down on her side  Her pain is felt on the side that she lays on  It goes away if she gets up or moves on to her back  Patient further states pain at her heel and bottom of her foot that she first got a few months ago when she started walking more than her usual  This pain will increase with standing and walking activities  She reports it feels like she is walking on a rock  Her goals for therapy are to lower her pain levels  Patient does report past medical history of T8 compression fracture  Not a recurrent problem   Quality of life: fair    Pain  Current pain rating: 3  At best pain ratin  At worst pain rating: 10  Quality: sharp, discomfort and dull ache  Relieving factors: relaxation, rest and medications  Aggravating factors: standing, walking, stair climbing and lifting  Progression: no change      Diagnostic Tests  No diagnostic tests performed  Treatments  Current treatment: medication  Patient Goals  Patient goals for therapy: increased strength, independence with ADLs/IADLs, decreased edema, decreased pain, increased motion and return to sport/leisure activities          Objective     Palpation   Left   Tenderness of the erector spinae and lumbar paraspinals  Right   Tenderness of the erector spinae and lumbar paraspinals  Additional Palpation Details  Tenderness at bilateral paraspinals and low back musculature with gentle palpation  tenderness also over bilateral PSIS    Tenderness     Right Ankle/Foot   Tenderness in the plantar fascia       Additional Tenderness Details  R sided plantar heel TTP at insertion of plantarfasica    Neurological Testing     Sensation Lumbar   Left   Intact: light touch    Right   Intact: light touch    Reflexes   Left   Patellar (L4): normal (2+)  Achilles (S1): normal (2+)  Babinski sign: negative  Clonus sign: negative    Right   Patellar (L4): normal (2+)  Achilles (S1): normal (2+)  Babinski sign: negative  Clonus sign: negative    Additional Neurological Details  WNL dermatomes no sensory deficits reported      Patient denies red flag questions of bowel and bladder dysfunction, saddle paraesthesia, night pain/night sweats  Dermatome WNL see lumbar neuro section    Active Range of Motion     Lumbar   Left lateral flexion:  with pain Restriction level: minimal  Left rotation:  with pain Restriction level: moderate  Right rotation:  with pain Restriction level: moderate  Left Ankle/Foot   Normal active range of motion    Right Ankle/Foot   Normal active range of motion    Additional Active Range of Motion Details  Lumbar flexion AROM- approximately 75% of normal motion, pain at low back   Lumbar rotation R AROM- approximately 50% of normal motion  Lumbar rotation L AROM- approximately 50% of normal motion      Unclear clinical presentation at this time if patient's reported symptoms in her legs ar from her low back   No ankle ROM deficits    Strength/Myotome Testing     Left Hip   Planes of Motion   Flexion: 4  Extension: 3  Abduction: 3-    Right Hip   Planes of Motion   Flexion: 4  Extension: 3  Abduction: 3-    Left Knee   Flexion: 5  Extension: 5    Right Knee   Flexion: 5  Extension: 5    Left Ankle/Foot   Dorsiflexion: 5  Inversion: 5  Eversion: 5  Great toe flexion: 5  Great toe extension: 5    Right Ankle/Foot   Dorsiflexion: 5  Inversion: 5  Eversion: 5  Great toe flexion: 5  Great toe extension: 5    Tests     Lumbar     Left   Negative crossed SLR and passive SLR  Right   Positive passive SLR  Negative crossed SLR  Left Hip   Negative BETY  Right Hip   Positive BETY  Right Ankle/Foot   Positive for lenard  Precautions:   Past Medical History:   Diagnosis Date    Acid reflux     Back injury     Fibromyalgia     Hypertension     Seasonal allergies            Manuals             IASTM R plantarfascia                                                    Neuro Re-Ed             TA             TA march             Towel curls             Windlass stretch             glute set                                       Ther Ex             LTR             SKC             bike             Self plantar fascia STM             Clam shell                                                     Ther Activity                                       Gait Training                                       Modalities

## 2022-09-05 PROBLEM — D49.0 IPMN (INTRADUCTAL PAPILLARY MUCINOUS NEOPLASM): Status: ACTIVE | Noted: 2022-09-05

## 2022-09-05 PROBLEM — M72.2 PLANTAR FASCIITIS OF RIGHT FOOT: Status: ACTIVE | Noted: 2022-09-05

## 2022-09-06 NOTE — ASSESSMENT & PLAN NOTE
I discussed with GI and with pt Pt with strong family hx of pancreatic CA  REC: recheck MRI of pancreas in 6-12m

## 2022-09-06 NOTE — ASSESSMENT & PLAN NOTE
With recent exacerbation  I reviewed with pt  Refer for PT  Avoid exacerbating positions and activities   Recheck 6m

## 2022-09-06 NOTE — ASSESSMENT & PLAN NOTE
I reviewed with pt  Refer to PT  REC: heel lifts and other conservative measures   Recheck 2w if not improved - earlier if worse

## 2022-09-09 ENCOUNTER — OFFICE VISIT (OUTPATIENT)
Dept: PHYSICAL THERAPY | Facility: MEDICAL CENTER | Age: 67
End: 2022-09-09
Payer: COMMERCIAL

## 2022-09-09 DIAGNOSIS — M72.2 PLANTAR FASCIITIS OF RIGHT FOOT: Primary | ICD-10-CM

## 2022-09-09 DIAGNOSIS — I10 PRIMARY HYPERTENSION: ICD-10-CM

## 2022-09-09 DIAGNOSIS — M54.17 LUMBOSACRAL RADICULOPATHY: ICD-10-CM

## 2022-09-09 DIAGNOSIS — M54.9 MID BACK PAIN: ICD-10-CM

## 2022-09-09 DIAGNOSIS — F41.9 ANXIETY: ICD-10-CM

## 2022-09-09 PROCEDURE — 97112 NEUROMUSCULAR REEDUCATION: CPT

## 2022-09-09 PROCEDURE — 97140 MANUAL THERAPY 1/> REGIONS: CPT

## 2022-09-09 RX ORDER — HYDROCODONE BITARTRATE AND ACETAMINOPHEN 5; 325 MG/1; MG/1
1 TABLET ORAL EVERY 6 HOURS PRN
Qty: 60 TABLET | Refills: 0 | Status: SHIPPED | OUTPATIENT
Start: 2022-09-09 | End: 2022-10-17 | Stop reason: SDUPTHER

## 2022-09-09 RX ORDER — ALPRAZOLAM 0.25 MG/1
0.25 TABLET ORAL 3 TIMES DAILY PRN
Qty: 90 TABLET | Refills: 0 | Status: SHIPPED | OUTPATIENT
Start: 2022-09-09

## 2022-09-09 RX ORDER — AMLODIPINE BESYLATE 5 MG/1
5 TABLET ORAL DAILY
Qty: 30 TABLET | Refills: 0 | Status: SHIPPED | OUTPATIENT
Start: 2022-09-09 | End: 2022-10-07

## 2022-09-09 NOTE — TELEPHONE ENCOUNTER
10/01/2020  10/01/2020 ALPRAZolam (Tablet)  90 0 30 0 25 MG NA AYLEEN MADDEN POGODZINSKI  [place]

## 2022-09-09 NOTE — PROGRESS NOTES
Daily Note     Today's date: 2022  Patient name: Caty Kaiser  : 1955  MRN: [id number]  Referring provider: Ban Borges*  Dx:   Encounter Diagnosis     ICD-10-CM    1  Plantar fasciitis of right foot  M72 2    2  Lumbosacral radiculopathy  M54 17        Start Time: 846  Stop Time: 926  Total time in clinic (min): 40 minutes    Subjective: patient reports no changes since her initial evaluation  Objective: See treatment diary below      Assessment: Tolerated treatment well  Plantar fasica pain was reproduced with light pressure and weight bearing today  Patient was educated on the benefits of massage and soft tissue mobilization for this condition  Low back discomfort with exercises was very mild and subsided with rest  Patient demonstrated fatigue post treatment and would benefit from continued PT      Plan: Continue per plan of care        Precautions:   Past Medical History:   Diagnosis Date    Acid reflux     Back injury     Fibromyalgia     Hypertension     Seasonal allergies      PMH: T8 closed wedge compression fracture     PT 1:1 time 5177-8340  Manuals             IASTM R plantarfascia 12' TE                                                    Neuro Re-Ed                          Towel curls 2x10 2"            Supine hip abd 20x 2" YTB            glute set 20x 2"            Hip add iso 20x 2"                         Ther Ex             SKC             bike 5'            Self plantar fascia STM With tennis ball 3'            Windlass stretch 10x10"             Supine HSS 5x10" BL                                      Ther Activity                                       Gait Training                                       Modalities

## 2022-09-11 DIAGNOSIS — J42 CHRONIC BRONCHITIS, UNSPECIFIED CHRONIC BRONCHITIS TYPE (HCC): ICD-10-CM

## 2022-09-12 DIAGNOSIS — J42 CHRONIC BRONCHITIS, UNSPECIFIED CHRONIC BRONCHITIS TYPE (HCC): Primary | ICD-10-CM

## 2022-09-12 RX ORDER — MONTELUKAST SODIUM 10 MG/1
10 TABLET ORAL
Qty: 30 TABLET | Refills: 5 | Status: SHIPPED | OUTPATIENT
Start: 2022-09-12 | End: 2022-10-07

## 2022-09-14 ENCOUNTER — OFFICE VISIT (OUTPATIENT)
Dept: PHYSICAL THERAPY | Facility: MEDICAL CENTER | Age: 67
End: 2022-09-14
Payer: COMMERCIAL

## 2022-09-14 DIAGNOSIS — M72.2 PLANTAR FASCIITIS OF RIGHT FOOT: Primary | ICD-10-CM

## 2022-09-14 DIAGNOSIS — M54.17 LUMBOSACRAL RADICULOPATHY: ICD-10-CM

## 2022-09-14 PROCEDURE — 97110 THERAPEUTIC EXERCISES: CPT

## 2022-09-14 PROCEDURE — 97140 MANUAL THERAPY 1/> REGIONS: CPT

## 2022-09-14 NOTE — PROGRESS NOTES
Daily Note     Today's date: 2022  Patient name: Elizabeth Mason  : 1955  MRN: [id number]  Referring provider: Jayden Chávez*  Dx:   Encounter Diagnosis     ICD-10-CM    1  Plantar fasciitis of right foot  M72 2    2  Lumbosacral radiculopathy  M54 17        Start Time: 1100  Stop Time: 1145  Total time in clinic (min): 45 minutes    Subjective: patient reports she is the "same as she always is"  Objective: See treatment diary below      Assessment: Tolerated treatment well  Patient completed all exercises today without pain increases  She was educated on exercises she can add to a home program to help manage pain  Demonstrations given for minisquat exercise  Patient demonstrated fatigue post treatment and would benefit from continued PT  Plan: Continue per plan of care        Precautions:   Past Medical History:   Diagnosis Date    Acid reflux     Back injury     Fibromyalgia     Hypertension     Seasonal allergies      PMH: T8 closed wedge compression fracture     PT 1:1 time 1496-7852  Manuals            IASTM R plantarfascia 12' TE  12' TE                                                   Neuro Re-Ed             TA             Towel curls 2x10 2" 2x10 2"           Supine hip abd 20x 2" YTB 20x 2" YTB           glute set 20x 2"            Hip add iso 20x 2" 20x 2"                        Ther Ex             SKC  10x5" BL            bike 5' 5'           Self plantar fascia STM With tennis ball 3' With tennis ball 3'           Windlass stretch 10x10"  planta fascia stretch 10x10"           Supine HSS 5x10" BL 5x10" BL           Standing hip abd  2x10 YTB           Mini squats  2x10           Ther Activity                                       Gait Training                                       Modalities

## 2022-09-15 DIAGNOSIS — K59.09 CHRONIC CONSTIPATION: Primary | ICD-10-CM

## 2022-09-15 RX ORDER — SENNOSIDES 8.6 MG
8.6 TABLET ORAL
Qty: 30 TABLET | Refills: 1 | Status: SHIPPED | OUTPATIENT
Start: 2022-09-15 | End: 2022-12-16 | Stop reason: ALTCHOICE

## 2022-09-16 ENCOUNTER — OFFICE VISIT (OUTPATIENT)
Dept: PHYSICAL THERAPY | Facility: MEDICAL CENTER | Age: 67
End: 2022-09-16
Payer: COMMERCIAL

## 2022-09-16 DIAGNOSIS — M72.2 PLANTAR FASCIITIS OF RIGHT FOOT: Primary | ICD-10-CM

## 2022-09-16 DIAGNOSIS — M54.17 LUMBOSACRAL RADICULOPATHY: ICD-10-CM

## 2022-09-16 PROCEDURE — 97140 MANUAL THERAPY 1/> REGIONS: CPT

## 2022-09-16 PROCEDURE — 97110 THERAPEUTIC EXERCISES: CPT

## 2022-09-16 NOTE — PROGRESS NOTES
Daily Note     Today's date: 2022  Patient name: Cami Alba  : 1955  MRN: [id number]  Referring provider: Liseth Cuevas*  Dx:   Encounter Diagnosis     ICD-10-CM    1  Plantar fasciitis of right foot  M72 2    2  Lumbosacral radiculopathy  M54 17        Start Time: 7103  Stop Time: 1056  Total time in clinic (min): 33 minutes    Subjective: patient reports no change since last session  Pain in her back and foot continues to fluctuate at around the same intensity  Foot pain is usually worst in the morning  Objective: See treatment diary below      Assessment:  Patient recommended to do exercises for her plantar fasciitis first thing in the morning if that's when her pain is the worst  Trialed TRA exercise with the patient in a hooklying position and she reported mild to moderate increases in low back pain  This exercise will be discontinued  All other exercises tolerated well  Patient demonstrated fatigue post treatment and would benefit from continued PT  Plan: Continue per plan of care  Precautions:   Past Medical History:   Diagnosis Date    Acid reflux     Back injury     Fibromyalgia     Hypertension     Seasonal allergies      PMH: T8 closed wedge compression fracture       Manuals           IASTM R plantarfascia 12' TE  12' TE  TE 10'                                                 Neuro Re-Ed             TA   10x p!  Hold exercise          Towel curls 2x10 2" 2x10 2" 2x10 2"          Supine hip abd 20x 2" YTB 20x 2" YTB 20x 2" GTB          glute set 20x 2"  20x 2"          Hip add iso 20x 2" 20x 2" 20x 2"                       Ther Ex             SKC  10x5" BL            bike 5' 5' 5'          Self plantar fascia STM With tennis ball 3' With tennis ball 3' With tennis ball 3'          Windlass stretch 10x10"  planta fascia stretch 10x10" planta fascia stretch 10x10"          Supine HSS 5x10" BL 10x10" BL 10x10" BL          Standing hip abd  2x10 YTB 2x10 YTB          Mini squats  2x10 2x10           Ther Activity                                       Gait Training                                       Modalities

## 2022-09-20 DIAGNOSIS — L72.9 SKIN CYST: Primary | ICD-10-CM

## 2022-09-20 RX ORDER — SULFAMETHOXAZOLE AND TRIMETHOPRIM 800; 160 MG/1; MG/1
1 TABLET ORAL EVERY 12 HOURS SCHEDULED
Qty: 14 TABLET | Refills: 0 | Status: SHIPPED | OUTPATIENT
Start: 2022-09-20 | End: 2022-09-27

## 2022-09-21 ENCOUNTER — OFFICE VISIT (OUTPATIENT)
Dept: PHYSICAL THERAPY | Facility: MEDICAL CENTER | Age: 67
End: 2022-09-21
Payer: COMMERCIAL

## 2022-09-21 DIAGNOSIS — M72.2 PLANTAR FASCIITIS OF RIGHT FOOT: Primary | ICD-10-CM

## 2022-09-21 DIAGNOSIS — M54.17 LUMBOSACRAL RADICULOPATHY: ICD-10-CM

## 2022-09-21 PROCEDURE — 97112 NEUROMUSCULAR REEDUCATION: CPT

## 2022-09-21 PROCEDURE — 97110 THERAPEUTIC EXERCISES: CPT

## 2022-09-21 PROCEDURE — 97140 MANUAL THERAPY 1/> REGIONS: CPT

## 2022-09-21 NOTE — PROGRESS NOTES
Daily Note     Today's date: 2022  Patient name: Patt Chao  : 1955  MRN: [id number]  Referring provider: Valerie Wolff  Dx:   Encounter Diagnosis     ICD-10-CM    1  Plantar fasciitis of right foot  M72 2    2  Lumbosacral radiculopathy  M54 17        Start Time: 1021  Stop Time: 1101  Total time in clinic (min): 40 minutes    Subjective: patient reports her back and foot are both doing a little bit better even with her "moving around more" and doing some yard work and housework the last few days  Patient arrives and starts session at 1021 for 21  appointment    Objective: See treatment diary below      Assessment:  Patient tolerated all exercises well today  Added toe yoga exercises for further intrinsic foot strengthening  Additionally added standing hip extension exercise for hip and low back support  No increases in discomfort was reported with any exercise  Patient demonstrated fatigue post treatment and would benefit from continued PT  Plan: Continue per plan of care  Precautions:   Past Medical History:   Diagnosis Date    Acid reflux     Back injury     Fibromyalgia     Hypertension     Seasonal allergies      PMH: T8 closed wedge compression fracture     PT 1:1 time [id number]  Manuals          IASTM R plantarfascia 12' TE  12' TE  TE 10' TE 10'                                                Neuro Re-Ed             TA   10x p!  Hold exercise          Towel curls 2x10 2" 2x10 2" 2x10 2" 2x10 2"         Supine hip abd 20x 2" YTB 20x 2" YTB 20x 2" GTB 20x 2" GTB         glute set 20x 2"  20x 2" 20x2"         Hip add iso 20x 2" 20x 2" 20x 2" 20x 2"         Toe yoga    20x 2"         Ther Ex             SKC  10x5" BL            bike 5' 5' 5' 5'         Self plantar fascia STM With tennis ball 3' With tennis ball 3' With tennis ball 3' With tennis ball 3'         Windlass stretch 10x10"  planta fascia stretch 10x10" planta fascia stretch 10x10" planta fascia stretch 10x10"         Supine HSS 5x10" BL 10x10" BL 10x10" BL 10x10" BL         gastroc stretch     10x10"         Standing hip abd  2x10 YTB 2x10 YTB 2x10 YTB         Standing hip extensions    2x10 YTB         Mini squats  2x10 2x10  2x10         Ther Activity                                       Gait Training                                       Modalities

## 2022-09-22 DIAGNOSIS — M70.61 TROCHANTERIC BURSITIS OF RIGHT HIP: ICD-10-CM

## 2022-09-22 DIAGNOSIS — B37.0 THRUSH: ICD-10-CM

## 2022-09-23 ENCOUNTER — APPOINTMENT (OUTPATIENT)
Dept: PHYSICAL THERAPY | Facility: MEDICAL CENTER | Age: 67
End: 2022-09-23
Payer: COMMERCIAL

## 2022-09-23 ENCOUNTER — OFFICE VISIT (OUTPATIENT)
Dept: FAMILY MEDICINE CLINIC | Facility: CLINIC | Age: 67
End: 2022-09-23
Payer: COMMERCIAL

## 2022-09-23 VITALS
TEMPERATURE: 97.7 F | SYSTOLIC BLOOD PRESSURE: 138 MMHG | HEIGHT: 64 IN | HEART RATE: 86 BPM | RESPIRATION RATE: 16 BRPM | WEIGHT: 189.4 LBS | OXYGEN SATURATION: 98 % | BODY MASS INDEX: 32.33 KG/M2 | DIASTOLIC BLOOD PRESSURE: 74 MMHG

## 2022-09-23 DIAGNOSIS — J01.00 ACUTE NON-RECURRENT MAXILLARY SINUSITIS: Primary | ICD-10-CM

## 2022-09-23 DIAGNOSIS — M54.2 NECK PAIN: ICD-10-CM

## 2022-09-23 PROCEDURE — 99214 OFFICE O/P EST MOD 30 MIN: CPT | Performed by: NURSE PRACTITIONER

## 2022-09-23 PROCEDURE — 3078F DIAST BP <80 MM HG: CPT | Performed by: NURSE PRACTITIONER

## 2022-09-23 PROCEDURE — 3075F SYST BP GE 130 - 139MM HG: CPT | Performed by: NURSE PRACTITIONER

## 2022-09-23 RX ORDER — CEFUROXIME AXETIL 500 MG/1
500 TABLET ORAL EVERY 12 HOURS SCHEDULED
Qty: 14 TABLET | Refills: 0 | Status: SHIPPED | OUTPATIENT
Start: 2022-09-23 | End: 2022-09-30 | Stop reason: SDUPTHER

## 2022-09-23 RX ORDER — METAXALONE 800 MG/1
800 TABLET ORAL EVERY 6 HOURS PRN
Qty: 20 TABLET | Refills: 0 | Status: SHIPPED | OUTPATIENT
Start: 2022-09-23 | End: 2022-09-23

## 2022-09-23 NOTE — PROGRESS NOTES
Name: Cathy Garcia      : 1955      MRN: [id number]  Encounter Provider: Carilyn Goldberg, CRNP  Encounter Date: 2022   Encounter department: [address] [place] [address]     1  Acute non-recurrent maxillary sinusitis  -     cefuroxime (CEFTIN) 500 mg tablet; Take 1 tablet (500 mg total) by mouth every 12 (twelve) hours for 7 days    2  Neck pain  -     metaxalone (SKELAXIN) 800 mg tablet; Take 1 tablet (800 mg total) by mouth every 6 (six) hours as needed for muscle spasms for up to 5 days    #1 Acute non-recurrent maxillary sinusitis  Discussed with patient plan to treat with 7 days of cefuroxime  #2 Neck pain  Discussed with patient plan to treat with metaxalone as needed for muscle spasms  Patient instructed to call if no improvement in 72 hours or symptoms worsen         Subjective      66 y  o female presenting with neck pain  for the past week  She reports the pain started on the left side but has now moved to the right side  She also reports sinus pressure and ear pressure  She had a low grade fever this morning but no other respiratory symptoms  She has been using Sudafed for the sinus and ear pressure but it is only drying her out more  She reports that the neck pains have made it hard for her to sleep at night  Review of Systems   Constitutional: Negative  HENT: Positive for congestion, ear pain and sinus pressure  Negative for postnasal drip, rhinorrhea and sore throat  Respiratory: Negative  Cardiovascular: Negative  Gastrointestinal: Negative  Musculoskeletal: Positive for neck pain  Neurological: Negative  Psychiatric/Behavioral: Negative        Current Outpatient Medications on File Prior to Visit   Medication Sig    acetaminophen (TYLENOL) 650 mg CR tablet Take by mouth every 8 (eight) hours as needed      albuterol (PROVENTIL HFA,VENTOLIN HFA) 90 mcg/act inhaler INHALE 2 PUFFS BY MOUTH EVERY 6 HOURS AS NEEDED FOR WHEEZE    ALPRAZolam (XANAX) 0 25 mg tablet Take 1 tablet (0 25 mg total) by mouth 3 (three) times a day as needed for anxiety    amLODIPine (NORVASC) 5 mg tablet Take 1 tablet (5 mg total) by mouth daily    cetirizine (ZyrTEC) 10 mg tablet Take 1 tablet (10 mg total) by mouth daily    Diclofenac Sodium (VOLTAREN) 1 % Apply 2 g topically 4 (four) times a day    escitalopram (LEXAPRO) 10 mg tablet TAKE 1 TABLET BY MOUTH EVERY DAY    fluticasone (FLONASE) 50 mcg/act nasal spray SPRAY 2 SPRAYS INTO EACH NOSTRIL EVERY DAY    Glycerin-Hypromellose- (DRY EYE RELIEF DROPS OP) Apply to eye if needed    HYDROcodone-acetaminophen (Norco) 5-325 mg per tablet Take 1 tablet by mouth every 6 (six) hours as needed for pain Max Daily Amount: 4 tablets    lidocaine (Lidoderm) 5 % Apply 1 patch topically daily Remove & Discard patch within 12 hours or as directed by MD    lisinopril (ZESTRIL) 20 mg tablet TAKE 1 TABLET BY MOUTH EVERY DAY    MINOXIDIL, TOPICAL, 5 % SOLN Apply 1 application topically in the morning For hair    montelukast (SINGULAIR) 10 mg tablet Take 1 tablet (10 mg total) by mouth daily at bedtime    naloxone (NARCAN) 4 mg/0 1 mL nasal spray Administer 1 spray into a nostril  If no response after 2-3 minutes, give another dose in the other nostril using a new spray      nystatin (MYCOSTATIN) 500,000 units/5 mL suspension Take 5 mL (500,000 Units total) by mouth 4 (four) times a day (Patient taking differently: Take 500,000 Units by mouth daily at bedtime)    pantoprazole (PROTONIX) 40 mg tablet Take 1 tablet (40 mg total) by mouth 2 (two) times a day    promethazine-dextromethorphan (PHENERGAN-DM) 6 25-15 mg/5 mL oral syrup TAKE 5 MLS BY MOUTH 4 TIMES A DAY AS NEEDED FOR COUGH    senna (SENOKOT) 8 6 mg Take 1 tablet (8 6 mg total) by mouth daily at bedtime    sulfamethoxazole-trimethoprim (BACTRIM DS) 800-160 mg per tablet Take 1 tablet by mouth every 12 (twelve) hours for 7 days    timolol (TIMOPTIC) 0 25 % ophthalmic solution     Trelegy Ellipta 200-62 5-25 MCG/INH AEPB inhaler INHALE 1 PUFF DAILY RINSE MOUTH AFTER USE   Lumigan 0 01 % ophthalmic drops Administer 1 drop to both eyes daily at bedtime         Objective     /74   Pulse 86   Temp 97 7 °F (36 5 °C)   Resp 16   Ht 5' 4" (1 626 m)   Wt 85 9 kg (189 lb 6 4 oz)   LMP  (LMP Unknown)   SpO2 98%   BMI 32 51 kg/m² (Reviewed)    Physical Exam  Vitals reviewed  Constitutional:       General: She is not in acute distress  Appearance: She is well-developed and well-groomed  She is not ill-appearing  HENT:      Head: Normocephalic and atraumatic  Eyes:      General: Lids are normal       Extraocular Movements: Extraocular movements intact  Conjunctiva/sclera: Conjunctivae normal       Pupils: Pupils are equal, round, and reactive to light  Neck:      Trachea: Trachea and phonation normal    Cardiovascular:      Rate and Rhythm: Normal rate and regular rhythm  Heart sounds: Normal heart sounds  Pulmonary:      Effort: Pulmonary effort is normal       Breath sounds: Normal breath sounds  Musculoskeletal:      Cervical back: Neck supple  Spasms and tenderness present  No torticollis or bony tenderness  Pain with movement and muscular tenderness present  No spinous process tenderness  Normal range of motion  Thoracic back: Spasms and tenderness present  Back:    Skin:     General: Skin is warm and dry  Capillary Refill: Capillary refill takes less than 2 seconds  Neurological:      General: No focal deficit present  Mental Status: She is alert and oriented to person, place, and time  Psychiatric:         Mood and Affect: Mood normal          Behavior: Behavior normal  Behavior is cooperative  Thought Content:  Thought content normal        Luwanna Mohs, CRNP

## 2022-09-26 DIAGNOSIS — M54.2 NECK PAIN: Primary | ICD-10-CM

## 2022-09-26 RX ORDER — METHOCARBAMOL 500 MG/1
500 TABLET, FILM COATED ORAL 4 TIMES DAILY
Qty: 60 TABLET | Refills: 0 | Status: SHIPPED | OUTPATIENT
Start: 2022-09-26 | End: 2022-12-16 | Stop reason: ALTCHOICE

## 2022-09-26 RX ORDER — PREDNISONE 20 MG/1
TABLET ORAL
Qty: 12 TABLET | Refills: 0 | Status: SHIPPED | OUTPATIENT
Start: 2022-09-26 | End: 2022-10-06 | Stop reason: ALTCHOICE

## 2022-09-28 ENCOUNTER — APPOINTMENT (OUTPATIENT)
Dept: PHYSICAL THERAPY | Facility: MEDICAL CENTER | Age: 67
End: 2022-09-28
Payer: COMMERCIAL

## 2022-09-28 ENCOUNTER — OFFICE VISIT (OUTPATIENT)
Dept: GASTROENTEROLOGY | Facility: AMBULARY SURGERY CENTER | Age: 67
End: 2022-09-28
Payer: COMMERCIAL

## 2022-09-28 VITALS
BODY MASS INDEX: 32.61 KG/M2 | DIASTOLIC BLOOD PRESSURE: 80 MMHG | HEIGHT: 64 IN | HEART RATE: 79 BPM | WEIGHT: 191 LBS | SYSTOLIC BLOOD PRESSURE: 126 MMHG | OXYGEN SATURATION: 95 %

## 2022-09-28 DIAGNOSIS — K59.00 CONSTIPATION, UNSPECIFIED CONSTIPATION TYPE: ICD-10-CM

## 2022-09-28 DIAGNOSIS — Z86.010 HISTORY OF COLON POLYPS: ICD-10-CM

## 2022-09-28 DIAGNOSIS — K21.9 GASTROESOPHAGEAL REFLUX DISEASE WITHOUT ESOPHAGITIS: ICD-10-CM

## 2022-09-28 DIAGNOSIS — K86.2 PANCREATIC CYST: Primary | ICD-10-CM

## 2022-09-28 PROBLEM — Z86.0100 HISTORY OF COLON POLYPS: Status: ACTIVE | Noted: 2022-09-28

## 2022-09-28 PROCEDURE — 1160F RVW MEDS BY RX/DR IN RCRD: CPT | Performed by: INTERNAL MEDICINE

## 2022-09-28 PROCEDURE — 99214 OFFICE O/P EST MOD 30 MIN: CPT | Performed by: INTERNAL MEDICINE

## 2022-09-28 RX ORDER — FAMOTIDINE 20 MG/1
20 TABLET, FILM COATED ORAL 2 TIMES DAILY
Qty: 60 TABLET | Refills: 11 | Status: SHIPPED | OUTPATIENT
Start: 2022-09-28 | End: 2022-10-22

## 2022-09-28 NOTE — ASSESSMENT & PLAN NOTE
Patient with history of chronic GERD for which she takes pantoprazole regularly     -discussed about the long-term side effects from pantoprazole and advised her to take Pepcid twice daily  Sent prescription to the pharmacy    -Patient was explained about the lifestyle and dietary modifications  Advised to avoid fatty foods, chocolates, caffeine, alcohol and any other triggering foods  Avoid eating for at least 3 hours before going to bed

## 2022-09-28 NOTE — ASSESSMENT & PLAN NOTE
8 mm cyst in the neck of the pancreas which could be IPMN      -in view of patient's strong family history of pancreatic cancer will repeat MRI in 3 months     -schedule for EUS

## 2022-09-28 NOTE — PROGRESS NOTES
Follow-up Note -  [facility]  Rayray Malik 1955 female         Reason:  Follow-up regarding pancreatic cyst    HPI:  Ms Mely Pretty came in for follow-up  She has history of chronic constipation and GERD for which I did EGD and colonoscopies a few months ago  Patient reports having diarrhea for couple of weeks after the colonoscopy and had MRI done at that time which showed 8 mm cyst in the neck of the pancreas  Patient is very much concerned about this because of strong family history of pancreatic cancer in her mother and 2 brothers  Complaining about bloating and discomfort in the abdomen secondary to constipation  Denies any blood or mucus in the stool  Good appetite and denies any recent weight loss  She lost about 20 lb last year after COVID but the weight has been stable since then  Denies any difficulty swallowing  She takes pantoprazole twice daily  Chaperon: Ms Alessandro Peña: Review of Systems   Constitutional: Negative for activity change, appetite change, chills, diaphoresis, fatigue, fever and unexpected weight change  HENT: Negative for ear discharge, ear pain, facial swelling, hearing loss, nosebleeds, sore throat, tinnitus and voice change  Eyes: Negative for pain, discharge, redness, itching and visual disturbance  Respiratory: Positive for shortness of breath  Negative for apnea, cough, chest tightness and wheezing  Cardiovascular: Negative for chest pain and palpitations  Gastrointestinal:        As noted in HPI   Endocrine: Negative for cold intolerance, heat intolerance and polyuria  Genitourinary: Positive for difficulty urinating  Negative for dysuria, flank pain, hematuria and urgency  Musculoskeletal: Positive for arthralgias  Negative for back pain, gait problem, joint swelling and myalgias  Skin: Negative for rash and wound  Neurological: Positive for headaches   Negative for dizziness, tremors, seizures, speech difficulty, light-headedness and numbness  Hematological: Negative for adenopathy  Does not bruise/bleed easily  Psychiatric/Behavioral: Negative for agitation, behavioral problems and confusion  The patient is not nervous/anxious  Past Medical History:   Diagnosis Date    Acid reflux     Back injury     Fibromyalgia     Hypertension     Seasonal allergies       Past Surgical History:   Procedure Laterality Date    COLONOSCOPY      EYE SURGERY      KNEE SURGERY  1998    TN COLONOSCOPY FLX DX W/COLLJ SPEC WHEN PFRMD N/A 05/09/2017    Procedure: EGD AND COLONOSCOPY;  Surgeon: Sadiq Lozano MD;  Location[facility]; Service: Gastroenterology     Social History     Socioeconomic History    Marital status:       Spouse name: Not on file    Number of children: 3    Years of education: less than high school    Highest education level: Not on file   Occupational History    Occupation: unemployed   Tobacco Use    Smoking status: Current Every Day Smoker     Packs/day: 1 50     Years: 50 00     Pack years: 75 00     Types: Cigarettes    Smokeless tobacco: Never Used   Vaping Use    Vaping Use: Never used   Substance and Sexual Activity    Alcohol use: Never    Drug use: No    Sexual activity: Not on file   Other Topics Concern    Not on file   Social History Narrative    Always uses seatbelt    Daily coffee consumption    Daily tea consumption    Dental care regularly    Exercises regularly    Multiple organ donor    No guns in the home    No living will    Denies pets/ animals in the home    Power of  in existence- denied         Social Determinants of Health     Financial Resource Strain: Not on file   Food Insecurity: Not on file   Transportation Needs: Not on file   Physical Activity: Not on file   Stress: Not on file   Social Connections: Not on file   Intimate Partner Violence: Not on file   Housing Stability: Not on file     Family History   Problem Relation Age of Onset    Pancreatic cancer Mother 67    Coronary artery disease Father     Diabetes Father     Hypertension Father     Heart disease Father     Lung cancer Sister 40    Pancreatic cancer Brother     Pancreatic cancer Brother     Lung cancer Maternal Grandfather 68    Diabetes Paternal Grandmother      Augmentin [amoxicillin-pot clavulanate], Miconazole, and Wixela inhub [fluticasone-salmeterol]  Current Outpatient Medications   Medication Sig Dispense Refill    acetaminophen (TYLENOL) 650 mg CR tablet Take by mouth every 8 (eight) hours as needed        albuterol (PROVENTIL HFA,VENTOLIN HFA) 90 mcg/act inhaler INHALE 2 PUFFS BY MOUTH EVERY 6 HOURS AS NEEDED FOR WHEEZE 18 g 1    ALPRAZolam (XANAX) 0 25 mg tablet Take 1 tablet (0 25 mg total) by mouth 3 (three) times a day as needed for anxiety 90 tablet 0    amLODIPine (NORVASC) 5 mg tablet Take 1 tablet (5 mg total) by mouth daily 30 tablet 0    cefuroxime (CEFTIN) 500 mg tablet Take 1 tablet (500 mg total) by mouth every 12 (twelve) hours for 7 days 14 tablet 0    cetirizine (ZyrTEC) 10 mg tablet Take 1 tablet (10 mg total) by mouth daily 90 tablet 0    Diclofenac Sodium (VOLTAREN) 1 % Apply 2 g topically 4 (four) times a day 100 g 0    escitalopram (LEXAPRO) 10 mg tablet TAKE 1 TABLET BY MOUTH EVERY DAY 90 tablet 1    fluticasone (FLONASE) 50 mcg/act nasal spray SPRAY 2 SPRAYS INTO EACH NOSTRIL EVERY DAY 16 mL 3    Glycerin-Hypromellose- (DRY EYE RELIEF DROPS OP) Apply to eye if needed      HYDROcodone-acetaminophen (Norco) 5-325 mg per tablet Take 1 tablet by mouth every 6 (six) hours as needed for pain Max Daily Amount: 4 tablets 60 tablet 0    lidocaine (Lidoderm) 5 % Apply 1 patch topically daily Remove & Discard patch within 12 hours or as directed by MD 30 patch 1    lisinopril (ZESTRIL) 20 mg tablet TAKE 1 TABLET BY MOUTH EVERY DAY 90 tablet 1    Lumigan 0 01 % ophthalmic drops Administer 1 drop to both eyes daily at bedtime        methocarbamol (ROBAXIN) 500 mg tablet Take 1 tablet (500 mg total) by mouth 4 (four) times a day 60 tablet 0    MINOXIDIL, TOPICAL, 5 % SOLN Apply 1 application topically in the morning For hair      montelukast (SINGULAIR) 10 mg tablet Take 1 tablet (10 mg total) by mouth daily at bedtime 30 tablet 5    naloxone (NARCAN) 4 mg/0 1 mL nasal spray Administer 1 spray into a nostril  If no response after 2-3 minutes, give another dose in the other nostril using a new spray  1 each 1    nystatin (MYCOSTATIN) 500,000 units/5 mL suspension Take 5 mL (500,000 Units total) by mouth 4 (four) times a day (Patient taking differently: Take 500,000 Units by mouth daily at bedtime) 200 mL 0    pantoprazole (PROTONIX) 40 mg tablet Take 1 tablet (40 mg total) by mouth 2 (two) times a day 180 tablet 1    predniSONE 20 mg tablet 3 tab po qd x 2 then 2 tab po qd x 2 then 1 tab po qd x 2 12 tablet 0    promethazine-dextromethorphan (PHENERGAN-DM) 6 25-15 mg/5 mL oral syrup TAKE 5 MLS BY MOUTH 4 TIMES A DAY AS NEEDED FOR COUGH 150 mL 0    senna (SENOKOT) 8 6 mg Take 1 tablet (8 6 mg total) by mouth daily at bedtime 30 tablet 1    timolol (TIMOPTIC) 0 25 % ophthalmic solution       tiZANidine (ZANAFLEX) 4 mg tablet Take 1 tablet (4 mg total) by mouth every 8 (eight) hours as needed for muscle spasms for up to 5 days 15 tablet 0    Trelegy Ellipta 200-62 5-25 MCG/INH AEPB inhaler INHALE 1 PUFF DAILY RINSE MOUTH AFTER USE  60 each 5     No current facility-administered medications for this visit  Blood pressure 126/80, pulse 79, height 5' 4" (1 626 m), weight 86 6 kg (191 lb), SpO2 95 %, not currently breastfeeding  PHYSICAL EXAM: Physical Exam  Constitutional:       Appearance: Normal appearance  She is well-developed  HENT:      Head: Normocephalic and atraumatic  Nose: Nose normal    Eyes:      Conjunctiva/sclera: Conjunctivae normal    Neck:      Thyroid: No thyromegaly  Vascular: No JVD        Trachea: No tracheal deviation  Cardiovascular:      Rate and Rhythm: Normal rate and regular rhythm  Heart sounds: Normal heart sounds  No murmur heard  No friction rub  No gallop  Pulmonary:      Effort: Pulmonary effort is normal  No respiratory distress  Breath sounds: Normal breath sounds  No wheezing or rales  Abdominal:      General: Bowel sounds are normal  There is no distension  Palpations: Abdomen is soft  There is no mass  Tenderness: There is no abdominal tenderness  There is no guarding  Hernia: No hernia is present  Musculoskeletal:         General: No tenderness or deformity  Cervical back: Neck supple  Right lower leg: No edema  Left lower leg: No edema  Lymphadenopathy:      Cervical: No cervical adenopathy  Skin:     General: Skin is warm and dry  Findings: No erythema or rash  Neurological:      Mental Status: She is alert and oriented to person, place, and time  Psychiatric:         Mood and Affect: Mood normal          Behavior: Behavior normal          Thought Content: Thought content normal           Lab Results   Component Value Date    WBC 8 29 05/24/2022    HGB 14 1 05/24/2022    HCT 43 5 05/24/2022    MCV 87 05/24/2022     05/24/2022     Lab Results   Component Value Date    GLUCOSE 86 10/08/2015    CALCIUM 10 2 (H) 05/24/2022     10/08/2015    K 4 2 05/24/2022    CO2 29 05/24/2022     05/24/2022    BUN 6 05/24/2022    CREATININE 0 69 05/24/2022     Lab Results   Component Value Date    ALT 27 05/24/2022    AST 26 05/24/2022    ALKPHOS 75 05/24/2022    BILITOT 0 39 10/08/2015     Lab Results   Component Value Date    INR 1 01 07/10/2019    PROTIME 13 3 07/10/2019       MRI abdomen w wo contrast and mrcp    Result Date: 8/15/2022  Impression: Common bile duct is at the upper limits of normal but tapers normally towards the biliary lobe without significant change with evidence   Slightly prominent nodes in the upper abdomen but without significant change  Hepatic cysts 8mm cyst in the pancreatic neck most likely represents a side duct IPMN  For simple cyst(s) less than 1 5 cm, recommend followup every 2 year for 5 times or to age [de-identified], whichever comes first  Followup can stop at age [de-identified] or can switch over to [de-identified] year or older algorithm  Recommend next followup in 2 years  Preferred imaging modality: abdomen MRI and MRCP with and without IV contrast, or triple phase abdomen CT with IV contrast, or abdomen MRI and MRCP without IV contrast  The recommendations regarding pancreatic findings assumes that patient does not have family history of pancreatic cancer nor have any symptoms potentially attributable to pancreatic cystic lesions (hyperamylasemia, recent-onset diabetes, severe epigastric pain, weight loss, steatorrhea, or jaundice ) If these conditions are not true, then management should be deferred to judgement of specialists such as gastroenterologists or oncologic surgeons  Recommendations are based on recent consensus statements on management of pancreatic cystic lesions from [address] [place] [address] [employer], [address] [facility], the journal Pancreatology, and our own institutional consensus  The study was marked in EPIC for significant notification  Workstation performed: [id number]       ASSESSMENT & PLAN:    Pancreatic cyst  8 mm cyst in the neck of the pancreas which could be IPMN  -in view of patient's strong family history of pancreatic cancer will repeat MRI in 3 months     -schedule for EUS    Constipation  Patient with history of chronic constipation    -discussed about the use of Linzess or Amitiza but patient does not want to take any extra medication    She reports that prune juice helps with her symptoms but she does not like the taste     -advised to take MiraLax or prune juice regularly    -high-fiber diet and take plenty of liquids    History of colon polyps    -next surveillance colonoscopy in 5 years    Gastroesophageal reflux disease without esophagitis  Patient with history of chronic GERD for which she takes pantoprazole regularly     -discussed about the long-term side effects from pantoprazole and advised her to take Pepcid twice daily  Sent prescription to the pharmacy    -Patient was explained about the lifestyle and dietary modifications  Advised to avoid fatty foods, chocolates, caffeine, alcohol and any other triggering foods  Avoid eating for at least 3 hours before going to bed

## 2022-09-28 NOTE — ASSESSMENT & PLAN NOTE
Patient with history of chronic constipation    -discussed about the use of Linzess or Amitiza but patient does not want to take any extra medication    She reports that prune juice helps with her symptoms but she does not like the taste     -advised to take MiraLax or prune juice regularly    -high-fiber diet and take plenty of liquids

## 2022-09-29 ENCOUNTER — TELEPHONE (OUTPATIENT)
Dept: GASTROENTEROLOGY | Facility: CLINIC | Age: 67
End: 2022-09-29

## 2022-09-29 DIAGNOSIS — K86.2 PANCREATIC CYST: Primary | ICD-10-CM

## 2022-09-29 NOTE — TELEPHONE ENCOUNTER
Called pt in an attempt to schedule her for tomorrow d/t a cancellation, however pt was unable to attend  Last MRI 8/9/2022 showed 8mm cyst in the pancreatic neck most likely represents a side duct IPMN     She has another MRI scheduled for 11/15  For time frame purposes-should pt be scheduled before or after MRI?

## 2022-09-29 NOTE — TELEPHONE ENCOUNTER
Patients GI provider:  Dr Ivette Mckeon    Number to return call: ([phone])    Reason for call: Pt calling because she scheduled her MRI for 11/15/22 and was informed she needs BUN/CREAT done prior  Can an order be put in her chart  Thank you!     Scheduled procedure/appointment date if applicable: Apt/procedure

## 2022-09-29 NOTE — TELEPHONE ENCOUNTER
----- Message from [place] sent at 9/29/2022  3:01 PM EDT -----  Regarding: EUS  Hi everyone  I am unable to secure time in the lab  Can anyone else get this patient in? Thanks!    ----- Message -----  From: Heriberto Sanchez MD  Sent: 9/29/2022  10:09 AM EDT  To: [place]    Either is fine  ----- Message -----  From: [place]  Sent: 9/28/2022  11:05 AM EDT  To: MD Dr Marc Amezcua u want me to schedule this Tues or Wed next week? Please advise  Thanks     ----- Message -----  From: Matti Harper MD  Sent: 9/28/2022  11:03 AM EDT  To: Heriberto Sanchez MD, [facility],  This is the patient we discussed with strong family history of pancreatic cancer  MRI showed 8 mm cyst in the pancreatic head  Patient is very much concerned about malignant transformation    Please schedule for EUS soon  Thanks  Chat

## 2022-09-30 ENCOUNTER — OFFICE VISIT (OUTPATIENT)
Dept: PHYSICAL THERAPY | Facility: MEDICAL CENTER | Age: 67
End: 2022-09-30
Payer: COMMERCIAL

## 2022-09-30 DIAGNOSIS — M54.17 LUMBOSACRAL RADICULOPATHY: ICD-10-CM

## 2022-09-30 DIAGNOSIS — M72.2 PLANTAR FASCIITIS OF RIGHT FOOT: Primary | ICD-10-CM

## 2022-09-30 DIAGNOSIS — J01.00 ACUTE NON-RECURRENT MAXILLARY SINUSITIS: ICD-10-CM

## 2022-09-30 PROCEDURE — 97140 MANUAL THERAPY 1/> REGIONS: CPT

## 2022-09-30 PROCEDURE — 97112 NEUROMUSCULAR REEDUCATION: CPT

## 2022-09-30 PROCEDURE — 97110 THERAPEUTIC EXERCISES: CPT

## 2022-09-30 RX ORDER — CEFUROXIME AXETIL 500 MG/1
500 TABLET ORAL EVERY 12 HOURS SCHEDULED
Qty: 14 TABLET | Refills: 0 | Status: SHIPPED | OUTPATIENT
Start: 2022-09-30 | End: 2022-10-07

## 2022-09-30 NOTE — PROGRESS NOTES
Daily Note     Today's date: 2022  Patient name: Kurtis Baird  : 1955  MRN: [id number]  Referring provider: Pablo Hennessy*  Dx:   Encounter Diagnosis     ICD-10-CM    1  Plantar fasciitis of right foot  M72 2    2  Lumbosacral radiculopathy  M54 17        Start Time: 1105  Stop Time: 1145  Total time in clinic (min): 40 minutes    Subjective: Pt states she is experiencing an increase in her heel pain today  She states she was unable to perform HEP this week 2/2 being sick  Objective: See treatment diary below      Assessment: Tolerated treatment well  Patient denies increase in pain this session  Patient was encouraged to perform HEP to tolerance  Patient demonstrated fatigue post treatment and would benefit from continued PT      Plan: Continue per plan of care  Progress treatment as tolerated  Precautions:   Past Medical History:   Diagnosis Date    Acid reflux     Back injury     Fibromyalgia     Hypertension     Seasonal allergies      PMH: T8 closed wedge compression fracture     PT 1:1 time   Manuals 2022          IASTM R plantarfascia 12' TE  12' TE  TE 10' TE 10' CC 10'                                               Neuro Re-Ed             TA   10x p!  Hold exercise          Towel curls 2x10 2" 2x10 2" 2x10 2" 2x10 2" 2x10 2"        Supine hip abd 20x 2" YTB 20x 2" YTB 20x 2" GTB 20x 2" GTB 20x 2"         glute set 20x 2"  20x 2" 20x2" 20x 2"        Hip add iso 20x 2" 20x 2" 20x 2" 20x 2" 20x 2"        Toe yoga    20x 2" 20x 2"        Ther Ex             SKC  10x5" BL            bike 5' 5' 5' 5' 5'        Self plantar fascia STM With tennis ball 3' With tennis ball 3' With tennis ball 3' With tennis ball 3' With tennis ball 3'        Windlass stretch 10x10"  planta fascia stretch 10x10" planta fascia stretch 10x10" planta fascia stretch 10x10" plantar fasica stretch 10x 10"        Supine HSS 5x10" BL 10x10" BL 10x10" BL 10x10" BL 10x 10" BL gastroc stretch     10x10" 10x 10"         Standing hip abd  2x10 YTB 2x10 YTB 2x10 YTB 2x10 YTB        Standing hip extensions    2x10 YTB 2x10 YTB        Mini squats  2x10 2x10  2x10 2x10        Ther Activity                                       Gait Training                                       Modalities

## 2022-10-03 NOTE — TELEPHONE ENCOUNTER
TC to pt to schedule EUS      Procedure: EUS  Scheduled date of procedure (as of today):  10/11/22 - ok per Dr Paul Delatorre  Physician performing procedure:  Dr Simeon Sung  Location of procedure:  [facility]  Instructions reviewed with patient by:  Mercedes John - emailed to pt  Clearances: n/a

## 2022-10-05 ENCOUNTER — OFFICE VISIT (OUTPATIENT)
Dept: PHYSICAL THERAPY | Facility: MEDICAL CENTER | Age: 67
End: 2022-10-05
Payer: COMMERCIAL

## 2022-10-05 DIAGNOSIS — M72.2 PLANTAR FASCIITIS OF RIGHT FOOT: Primary | ICD-10-CM

## 2022-10-05 DIAGNOSIS — M54.17 LUMBOSACRAL RADICULOPATHY: ICD-10-CM

## 2022-10-05 PROCEDURE — 97140 MANUAL THERAPY 1/> REGIONS: CPT

## 2022-10-05 PROCEDURE — 97110 THERAPEUTIC EXERCISES: CPT

## 2022-10-05 PROCEDURE — 97112 NEUROMUSCULAR REEDUCATION: CPT

## 2022-10-05 NOTE — PROGRESS NOTES
PT ReEvaluation/ Daily Note    Today's date: 10/5/2022  Patient name: Rayray Malik  : 1955  MRN: [id number]  Referring provider: Howard Michael*  Dx:   Encounter Diagnosis     ICD-10-CM    1  Plantar fasciitis of right foot  M72 2    2  Lumbosacral radiculopathy  M54 17        Start Time: 1018  Stop Time: 1100  Total time in clinic (min): 42 minutes    Assessment  Assessment details: ReEvaluation 10/5  Patient reports to therapy for a reevaluation having completed seven visits to date over the course of the last month  Upon examination today, patient shows decreases in reported low back pain levels  Current back pain levels are now ranked 1/10 and can decrease to 0/10  At times  Even when patient gets flare ups of pain it is ranked at a lesser intensity  Progress was also identified in lumbar ROM and proximal hip MMTs, however deficits still do exist especially in hip abduction and extension strength  Patient's stated improvements have led to more ease and less pain with sit to stand transitions, laying on her sides, and ascending and descending steps  Improved function has also been seen in foto outcomes scores  Furthermore, patient's plantar flascia pain remains intermittent but worst with prolonged standing  Tenderness to palpate is still located at plantar fascia insertion at her heel  Patient will continue to benefit from skilled PT in order to further improve pain, function, and low back/proximal hip strength so that she can return to more ADLs with less interference  Patient completed exercise routine today and reports feeling better and "looser" post session        Impairments: abnormal or restricted ROM, abnormal movement, activity intolerance, impaired physical strength, lacks appropriate home exercise program, pain with function, poor posture  and poor body mechanics    Symptom irritability: moderateUnderstanding of Dx/Px/POC: fair   Prognosis: fair    Goals  STG  -Patient will be IND with basic level HEP within 4 weeks in order to make improvements at home - met  - Patient will have a decrease in 3 points on the NPRS within 5 weeks in order to improve quality of life- met   -Patient will be able to walk for 25 minutes continuously with pain <3/10 within 4 weeks- progressing      LTG  -Patient will be IND with an advanced level HEP within 8 weeks in order to make improvements at home - not met  - Patient will have a decrease in 5 points on the NPRS within 6 weeks in order to improve quality of life- progressing 3/10 point decrease  - Patient will perform lumbar AROM screen  And display 75% of motion with pain <3/10 within 7 weeks- met   - Patient will reach her FOTO score goal within 8 weeks- progressing  -Patient will be able to lay on her side for 45 minutes with pain <2/10 within 8 weeks- progressing  - Patient will walk for 35 minutes continuously with pain <3/10 within 8 weeks- progressing      Plan  Plan details: Skilled PT to improve strength, ROM, flexibility, endurance, pain, and function  Patient would benefit from: PT eval and skilled physical therapy  Referral necessary: No  Planned modality interventions: biofeedback, TENS, cryotherapy and traction  Planned therapy interventions: joint mobilization, manual therapy, massage, neuromuscular re-education, patient education, postural training, therapeutic activities, stretching, strengthening, therapeutic exercise, home exercise program, functional ROM exercises, flexibility, graded activity, graded exercise, body mechanics training, abdominal trunk stabilization and ADL retraining  Frequency: 2x week  Duration in weeks: 12  Plan of Care beginning date: 9/2/2022  Plan of Care expiration date: 11/25/2022  Treatment plan discussed with: patient        Subjective Evaluation    History of Present Illness  Mechanism of injury: ReEvaluation 10/5  Patient reports since starting therapy her back has gotten "a little bit better"   She states she is able to do steps better than before, lay on her sides with less pain, and stand from a chair with less pain  However, she still does get increased pain levels with prolonged standing  When pain is present she locates it at her bilateral low back and wrapping around to her sides  Her foot pain is still present and fluctuates day by day  She states that "overall therapy has been helping" and she has "been moving around more"  She reports that she believes she is still benefiting from PT and wants to continue for another 1-2 weeks  Not a recurrent problem   Quality of life: fair    Pain  Current pain ratin  At best pain ratin  At worst pain ratin  Quality: sharp, discomfort and dull ache  Relieving factors: relaxation, rest and medications  Aggravating factors: standing, walking, stair climbing and lifting  Progression: no change      Diagnostic Tests  No diagnostic tests performed  Treatments  Current treatment: medication  Patient Goals  Patient goals for therapy: increased strength, independence with ADLs/IADLs, decreased edema, decreased pain, increased motion and return to sport/leisure activities          Objective     Palpation   Left   Tenderness of the erector spinae and lumbar paraspinals  Right   Tenderness of the erector spinae and lumbar paraspinals  Tenderness     Right Ankle/Foot   Tenderness in the plantar fascia       Additional Tenderness Details  R sided plantar heel TTP at insertion of plantarfasica- remains    Active Range of Motion     Lumbar   Left lateral flexion:  Restriction level: minimal  Left rotation:  with pain Restriction level: minimal  Right rotation:  with pain Restriction level: minimal  Left Ankle/Foot   Normal active range of motion    Right Ankle/Foot   Normal active range of motion    Additional Active Range of Motion Details  Lumbar flexion AROM-WNL hands to toes, no pain increases  Lumbar rotation R AROM- approximately 75% of normal motion, slight increases in pain described as "pulling"  Lumbar rotation L AROM- approximately 75% of normal motion, slight increases in pain described as "pulling"      Strength/Myotome Testing     Left Hip   Planes of Motion   Flexion: 4+  Extension: 3  Abduction: 4-    Right Hip   Planes of Motion   Flexion: 4+  Extension: 3  Abduction: 4-    Left Knee   Flexion: 5  Extension: 5    Right Knee   Flexion: 5  Extension: 5    Left Ankle/Foot   Dorsiflexion: 5  Inversion: 5  Eversion: 5  Great toe flexion: 5  Great toe extension: 5    Right Ankle/Foot   Dorsiflexion: 5  Inversion: 5  Eversion: 5  Great toe flexion: 5  Great toe extension: 5    Tests     Lumbar     Left   Negative crossed SLR and passive SLR  Right   Negative crossed SLR and passive SLR  Left Hip   Negative BETY  Right Hip   Negative BETY  Right Ankle/Foot   Negative for windlass  Precautions:   Past Medical History:   Diagnosis Date    Acid reflux     Back injury     Fibromyalgia     Hypertension     Seasonal allergies          Manuals 9/9 9/14 9/16 9/21 9/30/2022   10/5       IASTM R plantarfascia 12' TE  12' TE  TE 10' TE 10' CC 10' TE 10'                                              Neuro Re-Ed             TA   10x p!  Hold exercise          Towel curls 2x10 2" 2x10 2" 2x10 2" 2x10 2" 2x10 2" 2x10 2"       Supine hip abd 20x 2" YTB 20x 2" YTB 20x 2" GTB 20x 2" GTB 20x 2"  20x 2" GTB       glute set 20x 2"  20x 2" 20x2" 20x 2"        Hip add iso 20x 2" 20x 2" 20x 2" 20x 2" 20x 2" 20x2"       Toe yoga    20x 2" 20x 2"        Ther Ex             SKC  10x5" BL            bike 5' 5' 5' 5' 5' 5'       Self plantar fascia STM With tennis ball 3' With tennis ball 3' With tennis ball 3' With tennis ball 3' With tennis ball 3'        Windlass stretch 10x10"  planta fascia stretch 10x10" planta fascia stretch 10x10" planta fascia stretch 10x10" plantar fasica stretch 10x 10"        Supine HSS 5x10" BL 10x10" BL 10x10" BL 10x10" BL 10x 10" BL 10x 10" BL       gastroc stretch     10x10" 10x 10"  10x 10"        Standing hip abd  2x10 YTB 2x10 YTB 2x10 YTB 2x10 YTB 2x10 GTB       Standing hip extensions    2x10 YTB 2x10 YTB 2x10 GTB       Mini squats  2x10 2x10  2x10 2x10 2x10       Ther Activity                                       Gait Training                                       Modalities

## 2022-10-06 ENCOUNTER — TELEPHONE (OUTPATIENT)
Dept: FAMILY MEDICINE CLINIC | Facility: CLINIC | Age: 67
End: 2022-10-06

## 2022-10-06 ENCOUNTER — OFFICE VISIT (OUTPATIENT)
Dept: FAMILY MEDICINE CLINIC | Facility: CLINIC | Age: 67
End: 2022-10-06
Payer: COMMERCIAL

## 2022-10-06 ENCOUNTER — OFFICE VISIT (OUTPATIENT)
Dept: PULMONOLOGY | Facility: CLINIC | Age: 67
End: 2022-10-06
Payer: COMMERCIAL

## 2022-10-06 VITALS
SYSTOLIC BLOOD PRESSURE: 160 MMHG | DIASTOLIC BLOOD PRESSURE: 90 MMHG | OXYGEN SATURATION: 95 % | HEART RATE: 88 BPM | WEIGHT: 190 LBS | HEIGHT: 64 IN | BODY MASS INDEX: 32.44 KG/M2 | RESPIRATION RATE: 16 BRPM | TEMPERATURE: 99 F

## 2022-10-06 VITALS
SYSTOLIC BLOOD PRESSURE: 158 MMHG | RESPIRATION RATE: 16 BRPM | TEMPERATURE: 98 F | WEIGHT: 189 LBS | OXYGEN SATURATION: 99 % | BODY MASS INDEX: 32.27 KG/M2 | HEART RATE: 94 BPM | DIASTOLIC BLOOD PRESSURE: 82 MMHG | HEIGHT: 64 IN

## 2022-10-06 DIAGNOSIS — I10 PRIMARY HYPERTENSION: ICD-10-CM

## 2022-10-06 DIAGNOSIS — R10.13 EPIGASTRIC PAIN: ICD-10-CM

## 2022-10-06 DIAGNOSIS — J42 CHRONIC BRONCHITIS, UNSPECIFIED CHRONIC BRONCHITIS TYPE (HCC): Primary | ICD-10-CM

## 2022-10-06 DIAGNOSIS — D49.0 IPMN (INTRADUCTAL PAPILLARY MUCINOUS NEOPLASM): Primary | ICD-10-CM

## 2022-10-06 DIAGNOSIS — F17.219 CIGARETTE NICOTINE DEPENDENCE WITH NICOTINE-INDUCED DISORDER: ICD-10-CM

## 2022-10-06 DIAGNOSIS — K86.2 PANCREATIC CYST: ICD-10-CM

## 2022-10-06 DIAGNOSIS — J42 CHRONIC BRONCHITIS, UNSPECIFIED CHRONIC BRONCHITIS TYPE (HCC): ICD-10-CM

## 2022-10-06 PROCEDURE — 99214 OFFICE O/P EST MOD 30 MIN: CPT | Performed by: FAMILY MEDICINE

## 2022-10-06 PROCEDURE — 99214 OFFICE O/P EST MOD 30 MIN: CPT | Performed by: INTERNAL MEDICINE

## 2022-10-06 NOTE — TELEPHONE ENCOUNTER
Patient updated with number to call to schedule with Dr. Landy Fitzpatrick or Dr. Alicia Morris, [phone].   Sorry - meant 10/19

## 2022-10-06 NOTE — TELEPHONE ENCOUNTER
Patient was just seen   You said to put her in on 11/19 that is a Saturday     Please advise where you really wanted her

## 2022-10-06 NOTE — PROGRESS NOTES
Pulmonary Follow Up Note   Rupali Arzate 77 y o  female MRN: [id number]  10/6/2022    Assessment:    Chronic obstructive pulmonary disease ([facility] [address] )  Symptomatically, Alicia Hernandez is doing very well  Will continue the current therapy  Advised her it would be prudent to deescalate to lower steroid dosing in Trelegy, but would prefer to initiate this by giving her a sample in case this is less effective for her  Cigarette nicotine dependence with nicotine-induced disorder  Alicia Hernandez is still not interested in quitting smoking  She suspects her pancreatic issues will take her life before any smoking related complications  We will continue to address at follow-up  She is going to be due for lung cancer screening no earlier than April of 2023  We will follow-up in six months and assess if she has had adequate imaging in the interim, given her surveillance for her pancreatic issues, or whether we need to order this at that time  Plan:    Diagnoses and all orders for this visit:    Chronic bronchitis, unspecified chronic bronchitis type ([facility] [address] )   - continue Trelegy, albuterol    Cigarette nicotine dependence with nicotine-induced disorder    Return in about 6 months (around 4/6/2023)  History of Present Illness   HPI:  Rupali Arzate is a 77 y o  female who presents for routine follow-up  She has had no acute issues since her last appointment  She has been noted to have a pancreatic cyst which appears to be benign by imaging characteristics, but she is somewhat focused on this as she has had multiple family members pass away from pancreatic malignancies  She continues to smoke 1 5 packs per day and has no interest in quitting at the moment  She reports no breathing issues  She is taking the Trelegy one puff daily and essentially never needs to use any rescue medications    The only indication she might have to use one would be if she is cutting her grass, or doing something with significant environmental exposures to worsen her breathing  We did discuss deescalating to the lower steroid dosing form of Trelegy, which she would consider, but she also notes that she discontinued her Trelegy for a brief period of time while she was on oral steroids for aortitis, and had significant symptoms, so she is somewhat hesitant to try to deescalate  No other acute concerns  Review of Systems   Respiratory: Negative for cough, shortness of breath and wheezing  All other systems reviewed and are negative  Historical Information   Past Medical History:   Diagnosis Date    Acid reflux     Back injury     Fibromyalgia     Hypertension     Seasonal allergies      Past Surgical History:   Procedure Laterality Date    COLONOSCOPY      EYE SURGERY      KNEE SURGERY  1998    [place] COLONOSCOPY FLX DX W/COLLJ SPEC WHEN PFRMD N/A 05/09/2017    Procedure: EGD AND COLONOSCOPY;  Surgeon: Marry Bowman MD;  Location: [facility];   Service: Gastroenterology     Family History   Problem Relation Age of Onset    Pancreatic cancer Mother 67    Coronary artery disease Father     Diabetes Father     Hypertension Father     Heart disease Father     Lung cancer Sister 40    Pancreatic cancer Brother     Pancreatic cancer Brother     Lung cancer Maternal Grandfather 68    Diabetes Paternal Grandmother        Meds/Allergies     Current Outpatient Medications:     acetaminophen (TYLENOL) 650 mg CR tablet, Take by mouth every 8 (eight) hours as needed  , Disp: , Rfl:     albuterol (PROVENTIL HFA,VENTOLIN HFA) 90 mcg/act inhaler, INHALE 2 PUFFS BY MOUTH EVERY 6 HOURS AS NEEDED FOR WHEEZE, Disp: 18 g, Rfl: 1    ALPRAZolam (XANAX) 0 25 mg tablet, Take 1 tablet (0 25 mg total) by mouth 3 (three) times a day as needed for anxiety, Disp: 90 tablet, Rfl: 0    amLODIPine (NORVASC) 5 mg tablet, Take 1 tablet (5 mg total) by mouth daily, Disp: 30 tablet, Rfl: 0    cefuroxime (CEFTIN) 500 mg tablet, Take 1 tablet (500 mg total) by mouth every 12 (twelve) hours for 7 days, Disp: 14 tablet, Rfl: 0    cetirizine (ZyrTEC) 10 mg tablet, Take 1 tablet (10 mg total) by mouth daily, Disp: 90 tablet, Rfl: 0    Diclofenac Sodium (VOLTAREN) 1 %, Apply 2 g topically 4 (four) times a day, Disp: 100 g, Rfl: 0    escitalopram (LEXAPRO) 10 mg tablet, TAKE 1 TABLET BY MOUTH EVERY DAY, Disp: 90 tablet, Rfl: 1    famotidine (PEPCID) 20 mg tablet, Take 1 tablet (20 mg total) by mouth 2 (two) times a day, Disp: 60 tablet, Rfl: 11    fluticasone (FLONASE) 50 mcg/act nasal spray, SPRAY 2 SPRAYS INTO EACH NOSTRIL EVERY DAY, Disp: 16 mL, Rfl: 3    Glycerin-Hypromellose- (DRY EYE RELIEF DROPS OP), Apply to eye if needed, Disp: , Rfl:     HYDROcodone-acetaminophen (Norco) 5-325 mg per tablet, Take 1 tablet by mouth every 6 (six) hours as needed for pain Max Daily Amount: 4 tablets, Disp: 60 tablet, Rfl: 0    lidocaine (Lidoderm) 5 %, Apply 1 patch topically daily Remove & Discard patch within 12 hours or as directed by MD, Disp: 30 patch, Rfl: 1    lisinopril (ZESTRIL) 20 mg tablet, TAKE 1 TABLET BY MOUTH EVERY DAY, Disp: 90 tablet, Rfl: 1    Lumigan 0 01 % ophthalmic drops, Administer 1 drop to both eyes daily at bedtime  , Disp: , Rfl:     methocarbamol (ROBAXIN) 500 mg tablet, Take 1 tablet (500 mg total) by mouth 4 (four) times a day, Disp: 60 tablet, Rfl: 0    MINOXIDIL, TOPICAL, 5 % SOLN, Apply 1 application topically in the morning For hair, Disp: , Rfl:     montelukast (SINGULAIR) 10 mg tablet, Take 1 tablet (10 mg total) by mouth daily at bedtime, Disp: 30 tablet, Rfl: 5    naloxone (NARCAN) 4 mg/0 1 mL nasal spray, Administer 1 spray into a nostril   If no response after 2-3 minutes, give another dose in the other nostril using a new spray , Disp: 1 each, Rfl: 1    nystatin (MYCOSTATIN) 500,000 units/5 mL suspension, Take 5 mL (500,000 Units total) by mouth 4 (four) times a day (Patient taking differently: Take 500,000 Units by mouth daily at bedtime), Disp: 200 mL, Rfl: 0    pantoprazole (PROTONIX) 40 mg tablet, Take 1 tablet (40 mg total) by mouth 2 (two) times a day, Disp: 180 tablet, Rfl: 1    promethazine-dextromethorphan (PHENERGAN-DM) 6 25-15 mg/5 mL oral syrup, TAKE 5 MLS BY MOUTH 4 TIMES A DAY AS NEEDED FOR COUGH, Disp: 150 mL, Rfl: 0    senna (SENOKOT) 8 6 mg, Take 1 tablet (8 6 mg total) by mouth daily at bedtime, Disp: 30 tablet, Rfl: 1    timolol (TIMOPTIC) 0 25 % ophthalmic solution, , Disp: , Rfl:     Trelegy Ellipta 200-62 5-25 MCG/INH AEPB inhaler, INHALE 1 PUFF DAILY RINSE MOUTH AFTER USE , Disp: 60 each, Rfl: 5    predniSONE 20 mg tablet, 3 tab po qd x 2 then 2 tab po qd x 2 then 1 tab po qd x 2 (Patient not taking: Reported on 10/6/2022), Disp: 12 tablet, Rfl: 0    tiZANidine (ZANAFLEX) 4 mg tablet, Take 1 tablet (4 mg total) by mouth every 8 (eight) hours as needed for muscle spasms for up to 5 days, Disp: 15 tablet, Rfl: 0  Allergies   Allergen Reactions    Augmentin [Amoxicillin-Pot Clavulanate] Vomiting    Miconazole Itching and Swelling    Wixela Inhub [Fluticasone-Salmeterol] Palpitations       Vitals: Blood pressure 160/90, pulse 88, temperature 99 °F (37 2 °C), temperature source Tympanic, resp  rate 16, height 5' 4" (1 626 m), weight 86 2 kg (190 lb), SpO2 95 %, not currently breastfeeding  Body mass index is 32 61 kg/m²  Oxygen Therapy  SpO2: 95 %  Oxygen Therapy: None (Room air)    Physical Exam  Physical Exam  Vitals reviewed  Constitutional:       General: She is not in acute distress  Appearance: Normal appearance  She is well-developed  She is not ill-appearing  HENT:      Head: Normocephalic and atraumatic  Eyes:      General: No scleral icterus  Conjunctiva/sclera: Conjunctivae normal    Neck:      Vascular: No JVD  Cardiovascular:      Rate and Rhythm: Normal rate and regular rhythm  Heart sounds: Normal heart sounds  No murmur heard  No friction rub  No gallop     Pulmonary:      Effort: Pulmonary effort is normal  No respiratory distress  Breath sounds: Normal breath sounds  No wheezing or rales  Musculoskeletal:      Cervical back: Neck supple  Right lower leg: No edema  Left lower leg: No edema  Skin:     General: Skin is warm and dry  Findings: No rash  Neurological:      General: No focal deficit present  Mental Status: She is alert and oriented to person, place, and time  Mental status is at baseline  Psychiatric:         Mood and Affect: Mood normal          Behavior: Behavior normal          Labs: I have personally reviewed pertinent lab results  Lab Results   Component Value Date    WBC 8 29 05/24/2022    HGB 14 1 05/24/2022    HCT 43 5 05/24/2022    MCV 87 05/24/2022     05/24/2022     Lab Results   Component Value Date    GLUCOSE 86 10/08/2015    CALCIUM 10 2 (H) 05/24/2022     10/08/2015    K 4 2 05/24/2022    CO2 29 05/24/2022     05/24/2022    BUN 6 05/24/2022    CREATININE 0 69 05/24/2022     No results found for: IGE  Lab Results   Component Value Date    ALT 27 05/24/2022    AST 26 05/24/2022    ALKPHOS 75 05/24/2022    BILITOT 0 39 10/08/2015       Imaging and other studies: I have personally reviewed pertinent films in PACS    EKG, Pathology, and Other Studies: I have personally reviewed pertinent reports  [facility]

## 2022-10-06 NOTE — ASSESSMENT & PLAN NOTE
Symptomatically, Robert Celis is doing very well  Will continue the current therapy  Advised her it would be prudent to deescalate to lower steroid dosing in Trelegy, but would prefer to initiate this by giving her a sample in case this is less effective for her

## 2022-10-06 NOTE — PROGRESS NOTES
Name: Suraj Guardado      : 1955      MRN: [id number]  Encounter Provider: Donte Nicolas MD  Encounter Date: 10/6/2022   Encounter department: [address] [place] [address]     1  IPMN (intraductal papillary mucinous neoplasm)    2  Pancreatic cyst    3  Epigastric pain  Discussion:  I reviewed with patient and son  Discussed MRI findings as well of significance of possible diagnosis  Agree with endoscopic ultrasound which will give us more information regarding this lesion  Also agree with repeating MRI and 3 months  Continue PPI therapy  Because epigastric pain could also be associated with other issues, would also recommend that she continue her reflux diet  Follow-up after her endoscopic ultrasound  Follow-up earlier if symptoms should worsen or new symptoms should develop  Subjective      Here to discuss several issues  - pt with persistent epigastric discomfort  Recent abd MRI revealed "8mm cyst in the pancreatic neck most likely represents a side duct IPMN "  Patient with a strong family history of pancreatic CA  She was recently seen by GI who recommended repeat MRI in 3 months as well as a endoscopic ultrasound  Patient states that she would like to know what this lesion represents, but is not sure that she would want any surgical intervention if it was malignant  Patient elected discuss further  - patient admits to some fatigue (chronic) but no change in weight  - patient still smoking  She denies worsening shortness of breath  She is compliant with her inhalers  - patient denies any chest pain, palpitations or lightheadedness with without exertion  Review of Systems   Constitutional: Positive for fatigue  Negative for activity change, appetite change, chills and fever  HENT: Negative  Eyes: Negative  Respiratory: Negative  Cardiovascular: Negative      Gastrointestinal: Positive for abdominal pain (intermittent epigastric) and constipation  Negative for abdominal distention (intermittent), diarrhea, nausea and vomiting  Endocrine: Negative  Genitourinary: Negative  Musculoskeletal: Positive for arthralgias (scattered, chronic), back pain (chronic) and myalgias  Negative for gait problem and joint swelling  Skin: Negative  Neurological: Negative  Negative for dizziness, weakness, light-headedness, numbness and headaches         Current Outpatient Medications on File Prior to Visit   Medication Sig   • acetaminophen (TYLENOL) 650 mg CR tablet Take by mouth every 8 (eight) hours as needed     • albuterol (PROVENTIL HFA,VENTOLIN HFA) 90 mcg/act inhaler INHALE 2 PUFFS BY MOUTH EVERY 6 HOURS AS NEEDED FOR WHEEZE   • ALPRAZolam (XANAX) 0 25 mg tablet Take 1 tablet (0 25 mg total) by mouth 3 (three) times a day as needed for anxiety   • cetirizine (ZyrTEC) 10 mg tablet Take 1 tablet (10 mg total) by mouth daily   • Diclofenac Sodium (VOLTAREN) 1 % Apply 2 g topically 4 (four) times a day   • escitalopram (LEXAPRO) 10 mg tablet TAKE 1 TABLET BY MOUTH EVERY DAY   • famotidine (PEPCID) 20 mg tablet Take 1 tablet (20 mg total) by mouth 2 (two) times a day   • fluticasone (FLONASE) 50 mcg/act nasal spray SPRAY 2 SPRAYS INTO EACH NOSTRIL EVERY DAY   • Glycerin-Hypromellose- (DRY EYE RELIEF DROPS OP) Apply to eye if needed   • HYDROcodone-acetaminophen (Norco) 5-325 mg per tablet Take 1 tablet by mouth every 6 (six) hours as needed for pain Max Daily Amount: 4 tablets   • lidocaine (Lidoderm) 5 % Apply 1 patch topically daily Remove & Discard patch within 12 hours or as directed by MD   • lisinopril (ZESTRIL) 20 mg tablet TAKE 1 TABLET BY MOUTH EVERY DAY   • Lumigan 0 01 % ophthalmic drops Administer 1 drop to both eyes daily at bedtime     • methocarbamol (ROBAXIN) 500 mg tablet Take 1 tablet (500 mg total) by mouth 4 (four) times a day   • MINOXIDIL, TOPICAL, 5 % SOLN Apply 1 application topically in the morning For hair   • naloxone (NARCAN) 4 mg/0 1 mL nasal spray Administer 1 spray into a nostril  If no response after 2-3 minutes, give another dose in the other nostril using a new spray  • nystatin (MYCOSTATIN) 500,000 units/5 mL suspension Take 5 mL (500,000 Units total) by mouth 4 (four) times a day (Patient taking differently: Take 500,000 Units by mouth daily at bedtime)   • pantoprazole (PROTONIX) 40 mg tablet Take 1 tablet (40 mg total) by mouth 2 (two) times a day   • promethazine-dextromethorphan (PHENERGAN-DM) 6 25-15 mg/5 mL oral syrup TAKE 5 MLS BY MOUTH 4 TIMES A DAY AS NEEDED FOR COUGH   • senna (SENOKOT) 8 6 mg Take 1 tablet (8 6 mg total) by mouth daily at bedtime   • timolol (TIMOPTIC) 0 25 % ophthalmic solution    • Trelegy Ellipta 200-62 5-25 MCG/INH AEPB inhaler INHALE 1 PUFF DAILY RINSE MOUTH AFTER USE  • tiZANidine (ZANAFLEX) 4 mg tablet Take 1 tablet (4 mg total) by mouth every 8 (eight) hours as needed for muscle spasms for up to 5 days (Patient not taking: Reported on 10/6/2022)       Objective     /82   Pulse 94   Temp 98 °F (36 7 °C)   Resp 16   Ht 5' 4" (1 626 m)   Wt 85 7 kg (189 lb)   LMP  (LMP Unknown)   SpO2 99%   BMI 32 44 kg/m²     Physical Exam  Vitals reviewed  Constitutional:       Appearance: She is well-developed  She is not diaphoretic  HENT:      Head: Normocephalic and atraumatic  Mouth/Throat:      Mouth: Mucous membranes are moist    Eyes:      General: No scleral icterus  Extraocular Movements: Extraocular movements intact  Conjunctiva/sclera: Conjunctivae normal       Pupils: Pupils are equal, round, and reactive to light  Neck:      Thyroid: No thyromegaly  Vascular: No JVD  Cardiovascular:      Rate and Rhythm: Normal rate and regular rhythm  Pulses: Normal pulses  Heart sounds: No murmur heard  Pulmonary:      Effort: Pulmonary effort is normal       Breath sounds: Normal breath sounds  No wheezing or rales     Abdominal: General: There is no distension  Palpations: Abdomen is soft  There is no mass  Tenderness: There is abdominal tenderness (persistent epigastric  No G/R  No masses appreciated)  Musculoskeletal:      Cervical back: Normal range of motion and neck supple  No tenderness  No muscular tenderness  Right lower leg: No edema  Left lower leg: No edema  Lymphadenopathy:      Cervical: No cervical adenopathy  Skin:     General: Skin is warm and dry  Capillary Refill: Capillary refill takes less than 2 seconds  Neurological:      Mental Status: She is alert and oriented to person, place, and time  Cranial Nerves: No cranial nerve deficit  Sensory: No sensory deficit  Motor: No weakness or abnormal muscle tone  Gait: Gait normal       Deep Tendon Reflexes: Reflexes are normal and symmetric         Julio Rolon MD

## 2022-10-06 NOTE — ASSESSMENT & PLAN NOTE
Rosanne Bravo is still not interested in quitting smoking  She suspects her pancreatic issues will take her life before any smoking related complications  We will continue to address at follow-up  She is going to be due for lung cancer screening no earlier than April of 2023  We will follow-up in six months and assess if she has had adequate imaging in the interim, given her surveillance for her pancreatic issues, or whether we need to order this at that time

## 2022-10-07 ENCOUNTER — OFFICE VISIT (OUTPATIENT)
Dept: PHYSICAL THERAPY | Facility: MEDICAL CENTER | Age: 67
End: 2022-10-07
Payer: COMMERCIAL

## 2022-10-07 DIAGNOSIS — M72.2 PLANTAR FASCIITIS OF RIGHT FOOT: Primary | ICD-10-CM

## 2022-10-07 DIAGNOSIS — M54.17 LUMBOSACRAL RADICULOPATHY: ICD-10-CM

## 2022-10-07 PROCEDURE — 97110 THERAPEUTIC EXERCISES: CPT

## 2022-10-07 PROCEDURE — 97112 NEUROMUSCULAR REEDUCATION: CPT

## 2022-10-07 PROCEDURE — 97140 MANUAL THERAPY 1/> REGIONS: CPT

## 2022-10-07 RX ORDER — AMLODIPINE BESYLATE 5 MG/1
5 TABLET ORAL DAILY
Qty: 90 TABLET | Refills: 1 | Status: SHIPPED | OUTPATIENT
Start: 2022-10-07

## 2022-10-07 RX ORDER — MONTELUKAST SODIUM 10 MG/1
TABLET ORAL
Qty: 90 TABLET | Refills: 2 | Status: SHIPPED | OUTPATIENT
Start: 2022-10-07

## 2022-10-07 NOTE — PROGRESS NOTES
Daily Note     Today's date: 10/7/2022  Patient name: Larkin Boxer  : 1955  MRN: [id number]  Referring provider: Long Ferreira*  Dx:   Encounter Diagnosis     ICD-10-CM    1  Plantar fasciitis of right foot  M72 2    2  Lumbosacral radiculopathy  M54 17        Start Time: 1215  Stop Time: 1258  Total time in clinic (min): 43 minutes    Subjective: Pt states her back has been doing "better"  She notices less pain with laying on her sides  Her foot was hurting yesterday because she was up and moving around more  Today it is a little better  Objective: See treatment diary below      Assessment: Tolerated treatment well  Completes exercises without increases in discomfort  Hip strengthening able to be progressed with addition of minibridge exercise  Patient demonstrated fatigue post treatment and would benefit from continued PT  Patient was educated on use of supportive shoe wear to help manage foot pain  Plan: Continue per plan of care  Progress treatment as tolerated  Precautions:   Past Medical History:   Diagnosis Date    Acid reflux     Back injury     Fibromyalgia     Hypertension     Seasonal allergies      PMH: T8 closed wedge compression fracture     PT 1:1 time 1215- 1258  Manuals 9/9 9/14 9/16 9/21 2022   10/5 10/7      IASTM R plantarfascia 12' TE  12' TE  TE 10' TE 10' CC 10' TE 10' TE 10'                                             Neuro Re-Ed             TA   10x p!  Hold exercise          Towel curls 2x10 2" 2x10 2" 2x10 2" 2x10 2" 2x10 2" 2x10 2" [place]  3'      Supine hip abd 20x 2" YTB 20x 2" YTB 20x 2" GTB 20x 2" GTB 20x 2"  20x 2" GTB 20x 2" GTB      glute set 20x 2"  20x 2" 20x2" 20x 2"  minibridge 2x10       Hip add iso 20x 2" 20x 2" 20x 2" 20x 2" 20x 2" 20x2" 20x2"      Toe yoga    20x 2" 20x 2"  20x 2"      Ther Ex             SKC  10x5" BL            bike 5' 5' 5' 5' 5' 5' 5'      Self plantar fascia STM With tennis ball 3' With tennis ball 3' With tennis ball 3' With tennis ball 3' With tennis ball 3'        Windlass stretch 10x10"  planta fascia stretch 10x10" planta fascia stretch 10x10" planta fascia stretch 10x10" plantar fasica stretch 10x 10"        Supine HSS 5x10" BL 10x10" BL 10x10" BL 10x10" BL 10x 10" BL 10x 10" BL 10x 10" BL      gastroc stretch     10x10" 10x 10"  10x 10"  10x 10"       Standing hip abd  2x10 YTB 2x10 YTB 2x10 YTB 2x10 YTB 2x10 GTB 2x10 GTB      Standing hip extensions    2x10 YTB 2x10 YTB 2x10 GTB 2x10 GTB      Mini squats  2x10 2x10  2x10 2x10 2x10 2x10       LTR       20x 2"      Ther Activity                                       Gait Training                                       Modalities

## 2022-10-11 ENCOUNTER — ANESTHESIA (OUTPATIENT)
Dept: GASTROENTEROLOGY | Facility: HOSPITAL | Age: 67
End: 2022-10-11

## 2022-10-11 ENCOUNTER — HOSPITAL ENCOUNTER (OUTPATIENT)
Dept: GASTROENTEROLOGY | Facility: HOSPITAL | Age: 67
Setting detail: OUTPATIENT SURGERY
Discharge: HOME | End: 2022-10-11
Attending: INTERNAL MEDICINE
Payer: COMMERCIAL

## 2022-10-11 ENCOUNTER — ANESTHESIA EVENT (OUTPATIENT)
Dept: GASTROENTEROLOGY | Facility: HOSPITAL | Age: 67
End: 2022-10-11

## 2022-10-11 VITALS
HEIGHT: 64 IN | TEMPERATURE: 97.1 F | RESPIRATION RATE: 18 BRPM | HEART RATE: 86 BPM | DIASTOLIC BLOOD PRESSURE: 60 MMHG | WEIGHT: 189 LBS | OXYGEN SATURATION: 96 % | SYSTOLIC BLOOD PRESSURE: 126 MMHG | BODY MASS INDEX: 32.27 KG/M2

## 2022-10-11 DIAGNOSIS — K86.2 PANCREATIC CYST: ICD-10-CM

## 2022-10-11 PROCEDURE — 43259 EGD US EXAM DUODENUM/JEJUNUM: CPT | Performed by: INTERNAL MEDICINE

## 2022-10-11 RX ORDER — PROPOFOL 10 MG/ML
INJECTION, EMULSION INTRAVENOUS AS NEEDED
Status: DISCONTINUED | OUTPATIENT
Start: 2022-10-11 | End: 2022-10-11

## 2022-10-11 RX ORDER — LIDOCAINE HYDROCHLORIDE 10 MG/ML
INJECTION, SOLUTION EPIDURAL; INFILTRATION; INTRACAUDAL; PERINEURAL AS NEEDED
Status: DISCONTINUED | OUTPATIENT
Start: 2022-10-11 | End: 2022-10-11

## 2022-10-11 RX ORDER — GLYCOPYRROLATE 0.2 MG/ML
INJECTION INTRAMUSCULAR; INTRAVENOUS AS NEEDED
Status: DISCONTINUED | OUTPATIENT
Start: 2022-10-11 | End: 2022-10-11

## 2022-10-11 RX ORDER — SODIUM CHLORIDE 9 MG/ML
INJECTION, SOLUTION INTRAVENOUS CONTINUOUS PRN
Status: DISCONTINUED | OUTPATIENT
Start: 2022-10-11 | End: 2022-10-11

## 2022-10-11 RX ORDER — PROPOFOL 10 MG/ML
INJECTION, EMULSION INTRAVENOUS CONTINUOUS PRN
Status: DISCONTINUED | OUTPATIENT
Start: 2022-10-11 | End: 2022-10-11

## 2022-10-11 RX ADMIN — PROPOFOL 130 MCG/KG/MIN: 10 INJECTION, EMULSION INTRAVENOUS at 11:05

## 2022-10-11 RX ADMIN — PROPOFOL 100 MG: 10 INJECTION, EMULSION INTRAVENOUS at 11:05

## 2022-10-11 RX ADMIN — LIDOCAINE HYDROCHLORIDE 100 MG: 10 INJECTION, SOLUTION EPIDURAL; INFILTRATION; INTRACAUDAL; PERINEURAL at 11:05

## 2022-10-11 RX ADMIN — PROPOFOL 30 MG: 10 INJECTION, EMULSION INTRAVENOUS at 11:12

## 2022-10-11 RX ADMIN — SODIUM CHLORIDE: 9 INJECTION, SOLUTION INTRAVENOUS at 11:00

## 2022-10-11 RX ADMIN — GLYCOPYRROLATE 0.1 MG: 0.2 INJECTION INTRAMUSCULAR; INTRAVENOUS at 11:02

## 2022-10-11 RX ADMIN — PROPOFOL 30 MG: 10 INJECTION, EMULSION INTRAVENOUS at 11:17

## 2022-10-11 NOTE — ANESTHESIA PREPROCEDURE EVALUATION
Procedure:  ENDOSCOPIC ULTRASOUND (UPPER)    Relevant Problems   CARDIO   (+) Aortitis (HCC)   (+) Chest pain   (+) Dyspnea on exertion   (+) Hyperlipidemia   (+) Hypertension      GI/HEPATIC   (+) Esophageal reflux   (+) Gastroesophageal reflux disease without esophagitis   (+) IPMN (intraductal papillary mucinous neoplasm)   (+) Pancreatic cyst      MUSCULOSKELETAL   (+) Acute right-sided low back pain with right-sided sciatica   (+) Degeneration of intervertebral disc   (+) Fibromyalgia      NEURO/PSYCH   (+) Anxiety   (+) Continuous opioid dependence (HCC)   (+) Fibromyalgia   (+) History of colon polyps   (+) Mild episode of recurrent major depressive disorder (HCC)      PULMONARY   (+) Chronic obstructive pulmonary disease (HCC)   (+) Dyspnea on exertion      Respiratory   (+) Chronic frontal sinusitis   (+) Seasonal allergic rhinitis      Digestive   (+) Gastroparesis   (+) Therapeutic opioid induced constipation      Nervous and Auditory   (+) Cigarette nicotine dependence with nicotine-induced disorder   (+) Lumbosacral radiculopathy at L5      Musculoskeletal and Integument   (+) Closed wedge compression fracture of T8 vertebra (HCC)   (+) Dermatitis   (+) Greater trochanteric bursitis of right hip      Other   (+) Abdominal distension   (+) Cataract   (+) Constipation   (+) Edema   (+) Epigastric pain   (+) Lower extremity edema   (+) Neck pain   (+) Neuropathic pain   (+) Palpitations   (+) RUQ pain   (+) Severe obesity (BMI 35 0-35 9 with comorbidity) (Formerly [facility])   (+) Snoring      Lab Results   Component Value Date     10/08/2015    SODIUM 135 (L) 05/24/2022    K 4 2 05/24/2022     05/24/2022    CO2 29 05/24/2022    ANIONGAP 8 10/08/2015    AGAP 4 05/24/2022    BUN 6 05/24/2022    CREATININE 0 69 05/24/2022    GLUC 98 04/25/2022    GLUF 79 05/24/2022    CALCIUM 10 2 (H) 05/24/2022    AST 26 05/24/2022    ALT 27 05/24/2022    ALKPHOS 75 05/24/2022    PROT 7 2 10/08/2015    TP 7 5 05/24/2022 BILITOT 0 39 10/08/2015    TBILI 0 61 05/24/2022    EGFR 91 05/24/2022     Lab Results   Component Value Date    WBC 8 29 05/24/2022    HGB 14 1 05/24/2022    HCT 43 5 05/24/2022    MCV 87 05/24/2022     05/24/2022         Physical Exam    Airway    Mallampati score: II  TM Distance: <3 FB  Neck ROM: full     Dental   upper dentures and lower dentures,     Cardiovascular      Pulmonary      Other Findings        Anesthesia Plan  ASA Score- 3     Anesthesia Type- IV sedation with anesthesia with ASA Monitors  Additional Monitors:   Airway Plan:           Plan Factors-Exercise tolerance (METS): >4 METS  Chart reviewed  EKG reviewed  Imaging results reviewed  Existing labs reviewed  Patient summary reviewed  Induction-     Postoperative Plan-     Informed Consent- Anesthetic plan and risks discussed with patient  I personally reviewed this patient with the CRNA  Discussed and agreed on the Anesthesia Plan with the BOONE Gonzalez

## 2022-10-11 NOTE — ANESTHESIA POSTPROCEDURE EVALUATION
Post-Op Assessment Note    CV Status:  Stable  Pain Score: 0    Pain management: adequate     Mental Status:  Alert and awake   Hydration Status:  Euvolemic   PONV Controlled:  Controlled   Airway Patency:  Patent      Post Op Vitals Reviewed: Yes      Staff: Anesthesiologist, CRNA         No complications documented      /61 (10/11/22 1130)    Temp (!) 97 1 °F (36 2 °C) (10/11/22 1130)    Pulse 82 (10/11/22 1130)   Resp 22 (10/11/22 1130)    SpO2 96 % (10/11/22 1130)

## 2022-10-11 NOTE — DISCHARGE INSTRUCTIONS
Upper Endoscopic Gastrointestinal Ultrasonography   WHAT YOU NEED TO KNOW:   An upper gastrointestinal endoscopic ultrasound is done to look at the different parts of your upper gastrointestinal (GI) tract  The upper GI tract includes the esophagus, stomach, and duodenum (first part of the small intestine)  This procedure is used to help diagnose and treat diseases that affect the upper GI tract  DISCHARGE INSTRUCTIONS:   Seek care immediately if:   You have sudden, severe abdominal pain  You have problems swallowing  You have a large amount of black, sticky bowel movements or blood in your bowel movements  You have sudden trouble breathing  You feel weak, lightheaded, or faint or your heart beats faster than normal for you  Contact your healthcare provider if:   You have a fever and chills  You have nausea or are vomiting  Your abdomen is bloated or feels full and hard  You have abdominal pain  You have a large amount of black, sticky bowel movements or blood in your bowel movements  You have not had a bowel movement for 3 days after your procedure  You have rash or hives  You lose your appetite, your skin feels itchy, and your skin turns yellow  You have questions or concerns about your procedure  Self-care:   ·      Rest when you feel it is needed  You may be drowsy for up to 24 hours after your procedure  Return to your daily activities as directed  ·       Ask when you can eat regular foods  Healthy foods include fruits, vegetables, whole-grain breads, low-fat dairy products, beans, lean meats, and fish  ·       Relieve a sore throat with ice chips, liquids, or lozenges as directed  Follow up with your healthcare provider as directed: Write down your questions so you remember to ask them during your visits        If you take a “blood thinner”, please review the specific instructions from your endoscopist about when you should resume it  These can be found in the “Recommendation” and “Your Medication list” sections of this After Visit Summary

## 2022-10-12 ENCOUNTER — OFFICE VISIT (OUTPATIENT)
Dept: PHYSICAL THERAPY | Facility: MEDICAL CENTER | Age: 67
End: 2022-10-12
Payer: COMMERCIAL

## 2022-10-12 DIAGNOSIS — M72.2 PLANTAR FASCIITIS OF RIGHT FOOT: Primary | ICD-10-CM

## 2022-10-12 DIAGNOSIS — M54.17 LUMBOSACRAL RADICULOPATHY: ICD-10-CM

## 2022-10-12 PROCEDURE — 97140 MANUAL THERAPY 1/> REGIONS: CPT

## 2022-10-12 PROCEDURE — 97112 NEUROMUSCULAR REEDUCATION: CPT

## 2022-10-12 PROCEDURE — 97110 THERAPEUTIC EXERCISES: CPT

## 2022-10-12 NOTE — PROGRESS NOTES
Daily Note     Today's date: 10/12/2022  Patient name: Ronaldo Johnston  : 1955  MRN: [id number]  Referring provider: Kiersten Hedrick  Dx:   Encounter Diagnosis     ICD-10-CM    1  Plantar fasciitis of right foot  M72 2    2  Lumbosacral radiculopathy  M54 17        Start Time: 1226  Stop Time: 1307  Total time in clinic (min): 41 minutes    Subjective: Pt reports her foot is "doing pretty good today"  Her back is also "alright"  Objective: See treatment diary below      Assessment: Tolerated treatment well  Completes exercises without any increases in discomfort today  Patient displayed increased intrinsic foot muscle strength as measured by improved performance with marble pick ups  Patient demonstrated fatigue post treatment and would benefit from continued PT  Plan: Continue per plan of care  Progress treatment as tolerated  Precautions:   Past Medical History:   Diagnosis Date   • Acid reflux    • Back injury    • Fibromyalgia    • Hypertension    • Seasonal allergies      PMH: T8 closed wedge compression fracture       Manuals 9/9 9/14 9/16 9/21 2022   10/5 10/7 10/12     IASTM R plantarfascia 12' TE  12' TE  TE 10' TE 10' CC 10' TE 10' TE 10' TE 10'                                            Neuro Re-Ed             TA   10x p!  Hold exercise          Towel curls 2x10 2" 2x10 2" 2x10 2" 2x10 2" 2x10 2" 2x10 2" [place]  3' [place]  3'     Supine hip abd 20x 2" YTB 20x 2" YTB 20x 2" GTB 20x 2" GTB 20x 2"  20x 2" GTB 20x 2" GTB 20x 2" GTB     glute set 20x 2"  20x 2" 20x2" 20x 2"  minibridge 2x10  minibridge 2x10      Hip add iso 20x 2" 20x 2" 20x 2" 20x 2" 20x 2" 20x2" 20x2" 20x2"     Toe yoga    20x 2" 20x 2"  20x 2" 20x2"     Ther Ex             SKC  10x5" BL            bike 5' 5' 5' 5' 5' 5' 5' 5'     Self plantar fascia STM With tennis ball 3' With tennis ball 3' With tennis ball 3' With tennis ball 3' With tennis ball 3'        Windlass stretch 10x10"  planta fascia stretch 10x10" planta fascia stretch 10x10" planta fascia stretch 10x10" plantar fasica stretch 10x 10"        Supine HSS 5x10" BL 10x10" BL 10x10" BL 10x10" BL 10x 10" BL 10x 10" BL 10x 10" BL 10x 10" BL     gastroc stretch     10x10" 10x 10"  10x 10"  10x 10"  10x 10" BL     Standing hip abd  2x10 YTB 2x10 YTB 2x10 YTB 2x10 YTB 2x10 GTB 2x10 GTB 2x10 GTB     Standing hip extensions    2x10 YTB 2x10 YTB 2x10 GTB 2x10 GTB 2x10 GTB     Mini squats  2x10 2x10  2x10 2x10 2x10 2x10  2x10     LTR       20x 2" 20x2"     Ther Activity                                       Gait Training                                       Modalities

## 2022-10-14 ENCOUNTER — OFFICE VISIT (OUTPATIENT)
Dept: PHYSICAL THERAPY | Facility: MEDICAL CENTER | Age: 67
End: 2022-10-14
Payer: COMMERCIAL

## 2022-10-14 DIAGNOSIS — M72.2 PLANTAR FASCIITIS OF RIGHT FOOT: ICD-10-CM

## 2022-10-14 DIAGNOSIS — M54.17 LUMBOSACRAL RADICULOPATHY: Primary | ICD-10-CM

## 2022-10-14 PROCEDURE — 97110 THERAPEUTIC EXERCISES: CPT

## 2022-10-14 PROCEDURE — 97140 MANUAL THERAPY 1/> REGIONS: CPT

## 2022-10-14 PROCEDURE — 97112 NEUROMUSCULAR REEDUCATION: CPT

## 2022-10-14 NOTE — PROGRESS NOTES
Daily Note     Today's date: 10/14/2022  Patient name: Gerald Xie  : 1955  MRN: [id number]  Referring provider: Claritza Izaguirre*  Dx:   Encounter Diagnosis     ICD-10-CM    1  Lumbosacral radiculopathy  M54 17    2  Plantar fasciitis of right foot  M72 2        Start Time: 1215  Stop Time: 1255  Total time in clinic (min): 40 minutes    Subjective: Pt reports continued intermittent foot pain  She reports wearing a brace around her mid foot for arch support  She started wearing that today  Her back is "not too bad"  Patient reports that she wishes to put therapy on hold at this time  Objective: See treatment diary below      Assessment: Tolerated treatment well  Patient was educated that her arch support for her foot is good to continue using to help manage pain as long as it does not cause discomfort or friction on her skin  Patient demonstrated fatigue post treatment and would benefit from continued PT  Patient wishes to put future therapy sessions on hold at this time due to other medical conditions that she wants to focus on  Patient was educated that her chart will be left open for a few weeks in case that she wants to reach back out to therapy to continue  Patient additionally will be following up with her referring provider on 10/19  Plan: Therapy on hold at this time  Continue PT plan of care if patient returns  Precautions:   Past Medical History:   Diagnosis Date   • Acid reflux    • Back injury    • Fibromyalgia    • Hypertension    • Seasonal allergies      PMH: T8 closed wedge compression fracture       Manuals 9/9 9/14 9/16 9/21 2022   10/5 10/7 10/12 10/14    IASTM R plantarfascia 12' TE  12' TE  TE 10' TE 10' CC 10' TE 10' TE 10' TE 10' STM TE 10'                                           Neuro Re-Ed             TA   10x p!  Hold exercise          Towel curls 2x10 2" 2x10 2" 2x10 2" 2x10 2" 2x10 2" 2x10 2" [place]  3' [place]  3' [place]  3' Supine hip abd 20x 2" YTB 20x 2" YTB 20x 2" GTB 20x 2" GTB 20x 2"  20x 2" GTB 20x 2" GTB 20x 2" GTB 20x 2" GTB    glute set 20x 2"  20x 2" 20x2" 20x 2"  minibridge 2x10  minibridge 2x10  minibridge 2x10     Hip add iso 20x 2" 20x 2" 20x 2" 20x 2" 20x 2" 20x2" 20x2" 20x2" 20x2"    Toe yoga    20x 2" 20x 2"  20x 2" 20x2" 20x2"    Ther Ex             SKC  10x5" BL            bike 5' 5' 5' 5' 5' 5' 5' 5' 5'    Self plantar fascia STM With tennis ball 3' With tennis ball 3' With tennis ball 3' With tennis ball 3' With tennis ball 3'        Windlass stretch 10x10"  planta fascia stretch 10x10" planta fascia stretch 10x10" planta fascia stretch 10x10" plantar fasica stretch 10x 10"        Supine HSS 5x10" BL 10x10" BL 10x10" BL 10x10" BL 10x 10" BL 10x 10" BL 10x 10" BL 10x 10" BL 10x 10" BL    gastroc stretch     10x10" 10x 10"  10x 10"  10x 10"  10x 10" BL 10x 10" BL    Standing hip abd  2x10 YTB 2x10 YTB 2x10 YTB 2x10 YTB 2x10 GTB 2x10 GTB 2x10 GTB 2x10 GTB    Standing hip extensions    2x10 YTB 2x10 YTB 2x10 GTB 2x10 GTB 2x10 GTB 2x10 GTB    Mini squats  2x10 2x10  2x10 2x10 2x10 2x10  2x10 2x10    LTR       20x 2" 20x2" 20x2"    Ther Activity                                       Gait Training                                       Modalities

## 2022-10-17 ENCOUNTER — TELEPHONE (OUTPATIENT)
Dept: GASTROENTEROLOGY | Facility: CLINIC | Age: 67
End: 2022-10-17

## 2022-10-17 DIAGNOSIS — M54.2 NECK PAIN: ICD-10-CM

## 2022-10-17 DIAGNOSIS — K59.04 CHRONIC IDIOPATHIC CONSTIPATION: Primary | ICD-10-CM

## 2022-10-17 DIAGNOSIS — K86.2 PANCREATIC CYST: Primary | ICD-10-CM

## 2022-10-17 DIAGNOSIS — M54.9 MID BACK PAIN: ICD-10-CM

## 2022-10-17 RX ORDER — LINACLOTIDE 145 UG/1
145 CAPSULE, GELATIN COATED ORAL DAILY
Qty: 30 CAPSULE | Refills: 5 | Status: SHIPPED | OUTPATIENT
Start: 2022-10-17 | End: 2022-12-16 | Stop reason: ALTCHOICE

## 2022-10-17 NOTE — TELEPHONE ENCOUNTER
1  [id number] 10/13/2022  10/13/2022 HYDROcodone BITARTRATE-ACETAMINOPHE (Tablet)  120 0 30 325 MG-5 MG 20 0 [facility]  Medicare 0 / 0 [place]    1  [id number] 09/19/2022 09/19/2022 Carisoprodol (Tablet)  60 0 30 350 MG NA [facility]  Medicare 0 / 0 [place]    1  [id number] 09/16/2022 09/16/2022 HYDROcodone BITARTRATE-ACETAMINOPHE (Tablet)  120 0 30 325 MG-5 MG 20 0 [facility]    Medicare 0 / 0 [place]

## 2022-10-17 NOTE — TELEPHONE ENCOUNTER
Spoke with patient, reviewed provider recommendations  Patient verbalized understanding  Patient had EUS done with Dr Ashish Geller on 10/11/22 and was told he would refer her to surg oncology but patient has not heard from their office  I do not see a referral in for them  Please advise if patient is supposed to be referred to surg onc or should patient wait til next MRI is done in november?

## 2022-10-17 NOTE — TELEPHONE ENCOUNTER
Patients GI provider:  Dr Oscar Rosa    Number to return call: [phone]    Reason for call: Pt calling stating she discussed with Dr Oscar Rosa taking [place] Safer  Pt would now like to take linzess and is requesting it be sent to her pharmacy  Please reach out to pt regarding this matter      Scheduled procedure/appointment date if applicable: N/A

## 2022-10-17 NOTE — TELEPHONE ENCOUNTER
Please Advise      Spoke with patient, at last OV 9/28 was recommended to try linzess or amitiza but patient denied and wanted to stay with prune juice and miralax  Patient now reports she has been using miralax daily, prune juice and laxative tablets but does not have full BMs or daily BMs  Her last BM was this morning but it was only a small amount  She does have lower abdominal pain, worsens with constipation  Patient would like to try linzess at this time and sent to [facility] in [place]

## 2022-10-17 NOTE — TELEPHONE ENCOUNTER
I sent 145 mcg daily linzess to [facility] in [facility] [place]  Would recommend she start this middle dose, can titrate up or down pending effect  Please advise patient it can take up to a week to help fully with bowel movements and up to 4 weeks to see significant improvement with abdominal pain   It may cause some loose stools in first week but patient should call for any significant diarrhea

## 2022-10-18 RX ORDER — HYDROCODONE BITARTRATE AND ACETAMINOPHEN 5; 325 MG/1; MG/1
1 TABLET ORAL EVERY 6 HOURS PRN
Qty: 60 TABLET | Refills: 0 | Status: SHIPPED | OUTPATIENT
Start: 2022-10-18

## 2022-10-18 RX ORDER — TIZANIDINE 4 MG/1
4 TABLET ORAL EVERY 8 HOURS PRN
Qty: 15 TABLET | Refills: 0 | Status: SHIPPED | OUTPATIENT
Start: 2022-10-18 | End: 2022-10-23

## 2022-10-19 ENCOUNTER — OFFICE VISIT (OUTPATIENT)
Dept: FAMILY MEDICINE CLINIC | Facility: CLINIC | Age: 67
End: 2022-10-19
Payer: COMMERCIAL

## 2022-10-19 VITALS
HEIGHT: 64 IN | DIASTOLIC BLOOD PRESSURE: 76 MMHG | BODY MASS INDEX: 32.58 KG/M2 | TEMPERATURE: 97.6 F | HEART RATE: 84 BPM | SYSTOLIC BLOOD PRESSURE: 132 MMHG | WEIGHT: 190.8 LBS

## 2022-10-19 DIAGNOSIS — R10.13 EPIGASTRIC PAIN: ICD-10-CM

## 2022-10-19 DIAGNOSIS — D49.0 IPMN (INTRADUCTAL PAPILLARY MUCINOUS NEOPLASM): Primary | ICD-10-CM

## 2022-10-19 DIAGNOSIS — G89.29 CHRONIC PAIN OF RIGHT HEEL: ICD-10-CM

## 2022-10-19 DIAGNOSIS — M79.671 CHRONIC PAIN OF RIGHT HEEL: ICD-10-CM

## 2022-10-19 DIAGNOSIS — M72.2 PLANTAR FASCIITIS: ICD-10-CM

## 2022-10-19 PROCEDURE — 99214 OFFICE O/P EST MOD 30 MIN: CPT | Performed by: FAMILY MEDICINE

## 2022-10-19 NOTE — PROGRESS NOTES
Name: Amirah Vera      : 1955      MRN: [id number]  Encounter Provider: Latosha Obregon MD  Encounter Date: 10/19/2022   Encounter department: [address] [place] [address] Road     1  IPMN (intraductal papillary mucinous neoplasm)  Assessment & Plan:  I reviewed EUS results with pt  Pt with strong family hx of pancreatic CA  REC: recheck MRI in 3m  F/u with GI      2  Plantar fasciitis  Assessment & Plan:  Failed conservative care  Will refer to foot surgery for further eval   Recheck 3m    Orders:  -     Ambulatory Referral to Orthopedic Surgery; Future    3  Chronic pain of right heel  -     Ambulatory Referral to Orthopedic Surgery; Future    4  Epigastric pain  Assessment & Plan:  Unclear cause  EGD did not show obvious cause, and aortitis resolved  ?related to back? Discussed possible pain management eval - pt reluctant  Continue present care  Recheck 3m - earlier if worse             Subjective     f/u multiple med issues  - pt with recent EUS for follow up of pancreatic abnormality - possible IPMN  EUS revealed "Oval and anechoic cyst measuring 8 mm x 3 mm with main pancreatic duct involvement, well-defined margins and smooth margins in the neck of the pancreas "  Pt was recommended to recheck with MRI or CT in 3-6m  Pt still with epigastric discomfort but denies any change in weight or BMs  - pt with persistent back pain  She is unsure if back pain is related to her epigastric discomfort  - pt with persistent R heel pain  She has failed conservative measures including PT    - she denies any new CV or resp complaints  She continues to smoke at previous levels          Review of Systems   Constitutional: Positive for fatigue  Negative for activity change, appetite change, chills and fever  HENT: Negative  Eyes: Negative  Respiratory: Negative  Cardiovascular: Negative      Gastrointestinal: Positive for abdominal pain (epigastric - unchanged) and constipation (chronic)  Negative for abdominal distention (intermittent), diarrhea, nausea and vomiting  Endocrine: Negative  Genitourinary: Negative  Musculoskeletal: Positive for arthralgias (R heel), back pain (chronic - unchanged) and myalgias  Negative for gait problem and joint swelling  Skin: Negative  Neurological: Negative  Negative for dizziness, weakness, light-headedness, numbness and headaches  Past Medical History:   Diagnosis Date   • Acid reflux    • Back injury    • COPD (chronic obstructive pulmonary disease) (HCC)    • Fibromyalgia    • Hypertension    • Seasonal allergies      Past Surgical History:   Procedure Laterality Date   • COLONOSCOPY     • EYE SURGERY     • KNEE SURGERY  1998   • [place] COLONOSCOPY FLX DX W/COLLJ SPEC WHEN PFRMD N/A 05/09/2017    Procedure: EGD AND COLONOSCOPY;  Surgeon: Aleyda Guzman MD;  Location: [facility]; Service: Gastroenterology     Family History   Problem Relation Age of Onset   • Pancreatic cancer Mother 67   • Coronary artery disease Father    • Diabetes Father    • Hypertension Father    • Heart disease Father    • Lung cancer Sister 40   • Pancreatic cancer Brother    • Pancreatic cancer Brother    • Lung cancer Maternal Grandfather 68   • Diabetes Paternal Grandmother      Social History     Socioeconomic History   • Marital status:       Spouse name: None   • Number of children: 3   • Years of education: less than high school   • Highest education level: None   Occupational History   • Occupation: unemployed   Tobacco Use   • Smoking status: Current Every Day Smoker     Packs/day: 1 50     Years: 50 00     Pack years: 75 00     Types: Cigarettes     Start date: 1968   • Smokeless tobacco: Never Used   Vaping Use   • Vaping Use: Never used   Substance and Sexual Activity   • Alcohol use: Never   • Drug use: No   • Sexual activity: None   Other Topics Concern   • None   Social History Narrative    Always uses seatbelt    Daily coffee consumption    Daily tea consumption    Dental care regularly    Exercises regularly    Multiple organ donor    No guns in the home    No living will    Denies pets/ animals in the home    Power of  in existence- denied         Social Determinants of Health     Financial Resource Strain: Not on file   Food Insecurity: Not on file   Transportation Needs: Not on file   Physical Activity: Not on file   Stress: Not on file   Social Connections: Not on file   Intimate Partner Violence: Not on file   Housing Stability: Not on file     Current Outpatient Medications on File Prior to Visit   Medication Sig   • acetaminophen (TYLENOL) 650 mg CR tablet Take by mouth every 8 (eight) hours as needed     • albuterol (PROVENTIL HFA,VENTOLIN HFA) 90 mcg/act inhaler INHALE 2 PUFFS BY MOUTH EVERY 6 HOURS AS NEEDED FOR WHEEZE   • ALPRAZolam (XANAX) 0 25 mg tablet Take 1 tablet (0 25 mg total) by mouth 3 (three) times a day as needed for anxiety   • amLODIPine (NORVASC) 5 mg tablet TAKE 1 TABLET (5 MG TOTAL) BY MOUTH DAILY     • escitalopram (LEXAPRO) 10 mg tablet TAKE 1 TABLET BY MOUTH EVERY DAY   • fluticasone (FLONASE) 50 mcg/act nasal spray SPRAY 2 SPRAYS INTO EACH NOSTRIL EVERY DAY   • Glycerin-Hypromellose- (DRY EYE RELIEF DROPS OP) Apply to eye if needed   • HYDROcodone-acetaminophen (Norco) 5-325 mg per tablet Take 1 tablet by mouth every 6 (six) hours as needed for pain Max Daily Amount: 4 tablets   • lidocaine (Lidoderm) 5 % Apply 1 patch topically daily Remove & Discard patch within 12 hours or as directed by MD   • linaCLOtide (Linzess) 145 MCG CAPS Take 1 capsule (145 mcg total) by mouth in the morning   • lisinopril (ZESTRIL) 20 mg tablet TAKE 1 TABLET BY MOUTH EVERY DAY   • Lumigan 0 01 % ophthalmic drops Administer 1 drop to both eyes daily at bedtime     • methocarbamol (ROBAXIN) 500 mg tablet Take 1 tablet (500 mg total) by mouth 4 (four) times a day   • MINOXIDIL, TOPICAL, 5 % SOLN Apply 1 application topically in the morning For hair   • montelukast (SINGULAIR) 10 mg tablet TAKE 1 TABLET BY MOUTH DAILY AT BEDTIME   • naloxone (NARCAN) 4 mg/0 1 mL nasal spray Administer 1 spray into a nostril  If no response after 2-3 minutes, give another dose in the other nostril using a new spray  • pantoprazole (PROTONIX) 40 mg tablet Take 1 tablet (40 mg total) by mouth 2 (two) times a day   • promethazine-dextromethorphan (PHENERGAN-DM) 6 25-15 mg/5 mL oral syrup TAKE 5 MLS BY MOUTH 4 TIMES A DAY AS NEEDED FOR COUGH   • senna (SENOKOT) 8 6 mg Take 1 tablet (8 6 mg total) by mouth daily at bedtime   • timolol (TIMOPTIC) 0 25 % ophthalmic solution    • tiZANidine (ZANAFLEX) 4 mg tablet Take 1 tablet (4 mg total) by mouth every 8 (eight) hours as needed for muscle spasms for up to 5 days   • Trelegy Ellipta 200-62 5-25 MCG/INH AEPB inhaler INHALE 1 PUFF DAILY RINSE MOUTH AFTER USE  Allergies   Allergen Reactions   • Augmentin [Amoxicillin-Pot Clavulanate] Vomiting   • Miconazole Itching and Swelling   • Wixela Inhub [Fluticasone-Salmeterol] Palpitations     Immunization History   Administered Date(s) Administered   • Tdap 02/28/2008       Objective     /76 (BP Location: Left arm, Patient Position: Sitting, Cuff Size: Standard)   Pulse 84   Temp 97 6 °F (36 4 °C)   Ht 5' 4" (1 626 m)   Wt 86 5 kg (190 lb 12 8 oz)   LMP  (LMP Unknown)   BMI 32 75 kg/m²     Physical Exam  Vitals reviewed  Constitutional:       Appearance: She is well-developed  She is not diaphoretic  HENT:      Head: Normocephalic and atraumatic  Mouth/Throat:      Mouth: Mucous membranes are moist    Eyes:      General: No scleral icterus  Extraocular Movements: Extraocular movements intact  Conjunctiva/sclera: Conjunctivae normal       Pupils: Pupils are equal, round, and reactive to light  Neck:      Thyroid: No thyromegaly  Vascular: No JVD  Cardiovascular:      Rate and Rhythm: Normal rate and regular rhythm  Pulses: Normal pulses  Heart sounds: No murmur heard  Pulmonary:      Effort: Pulmonary effort is normal       Breath sounds: Normal breath sounds  No wheezing or rales  Abdominal:      General: There is no distension  Palpations: Abdomen is soft  There is no mass  Tenderness: There is abdominal tenderness (mild epigastric  No masses)  Musculoskeletal:         General: Tenderness (tenderness over R plantar fascia insertion  No swelling or crepitus) present  No deformity  Cervical back: Normal range of motion and neck supple  No tenderness  No muscular tenderness  Right lower leg: No edema  Left lower leg: No edema  Lymphadenopathy:      Cervical: No cervical adenopathy  Skin:     General: Skin is warm and dry  Capillary Refill: Capillary refill takes less than 2 seconds  Neurological:      Mental Status: She is alert and oriented to person, place, and time  Cranial Nerves: No cranial nerve deficit  Sensory: No sensory deficit  Motor: No weakness or abnormal muscle tone  Gait: Gait normal       Deep Tendon Reflexes: Reflexes are normal and symmetric         Kristen Sterling MD

## 2022-10-20 DIAGNOSIS — M70.61 TROCHANTERIC BURSITIS OF RIGHT HIP: ICD-10-CM

## 2022-10-20 DIAGNOSIS — B37.0 THRUSH: ICD-10-CM

## 2022-10-20 DIAGNOSIS — J30.9 ALLERGIC RHINITIS, UNSPECIFIED SEASONALITY, UNSPECIFIED TRIGGER: ICD-10-CM

## 2022-10-20 RX ORDER — CETIRIZINE HYDROCHLORIDE 10 MG/1
10 TABLET ORAL DAILY
Qty: 90 TABLET | Refills: 0 | Status: SHIPPED | OUTPATIENT
Start: 2022-10-20

## 2022-10-22 DIAGNOSIS — K21.9 GASTROESOPHAGEAL REFLUX DISEASE WITHOUT ESOPHAGITIS: ICD-10-CM

## 2022-10-22 RX ORDER — FAMOTIDINE 20 MG/1
TABLET, FILM COATED ORAL
Qty: 180 TABLET | Refills: 4 | Status: SHIPPED | OUTPATIENT
Start: 2022-10-22

## 2022-10-23 NOTE — ASSESSMENT & PLAN NOTE
Unclear cause  EGD did not show obvious cause, and aortitis resolved  ?related to back? Discussed possible pain management eval - pt reluctant  Continue present care    Recheck 3m - earlier if worse

## 2022-10-23 NOTE — ASSESSMENT & PLAN NOTE
I reviewed EUS results with pt  Pt with strong family hx of pancreatic CA  REC: recheck MRI in 3m    F/u with GI

## 2022-11-01 ENCOUNTER — OFFICE VISIT (OUTPATIENT)
Dept: PODIATRY | Facility: CLINIC | Age: 67
End: 2022-11-01

## 2022-11-01 VITALS
HEIGHT: 64 IN | DIASTOLIC BLOOD PRESSURE: 100 MMHG | HEART RATE: 87 BPM | BODY MASS INDEX: 33.09 KG/M2 | SYSTOLIC BLOOD PRESSURE: 182 MMHG | WEIGHT: 193.8 LBS

## 2022-11-01 DIAGNOSIS — M79.671 CHRONIC PAIN OF RIGHT HEEL: ICD-10-CM

## 2022-11-01 DIAGNOSIS — M72.2 PLANTAR FASCIITIS: ICD-10-CM

## 2022-11-01 DIAGNOSIS — G89.29 CHRONIC PAIN OF RIGHT HEEL: ICD-10-CM

## 2022-11-01 RX ORDER — TRIAMCINOLONE ACETONIDE 40 MG/ML
40 INJECTION, SUSPENSION INTRA-ARTICULAR; INTRAMUSCULAR ONCE
Status: COMPLETED | OUTPATIENT
Start: 2022-11-01 | End: 2022-11-01

## 2022-11-01 RX ADMIN — TRIAMCINOLONE ACETONIDE 40 MG: 40 INJECTION, SUSPENSION INTRA-ARTICULAR; INTRAMUSCULAR at 20:32

## 2022-11-01 NOTE — PROGRESS NOTES
Assessment/Plan:    The patient's clinical examination today is consistent with plantar fasciitis of the right heel  The etiology and treatment options were discussed with the patient  Due to the patient's chronicity and severity of her pain, she would like to proceed with injection therapy  A cortisone injection was administered to the medial plantar right heel without complication  It was well tolerated  We discussed the need for continued use of supportive shoe gear with well-formed arches  She will follow-up with me in 3 weeks  Diagnoses and all orders for this visit:    Plantar fasciitis  -     Ambulatory Referral to Orthopedic Surgery    Chronic pain of right heel  -     Ambulatory Referral to Orthopedic Surgery          Subjective:      Patient ID: Tereza Harris is a 77 y o  female  The patient presents today for her initial consultation with [facility] group with a chief complaint of right heel pain that has been present since June  She states that the pain is worse when getting up after periods of rest or with her 1st step in the morning when getting out of bed  She can not recall any specific injury or trauma to her right foot but does note that her pain began with increased ambulation in a pair shoes that she felt was supportive at the time        The following portions of the patient's history were reviewed and updated as appropriate: allergies, current medications, past family history, past medical history, past social history, past surgical history and problem list       PAST MEDICAL HISTORY:  Past Medical History:   Diagnosis Date   • Acid reflux    • Back injury    • COPD (chronic obstructive pulmonary disease) ([facility] [address] )    • Fibromyalgia    • Hypertension    • Seasonal allergies        PAST SURGICAL HISTORY:  Past Surgical History:   Procedure Laterality Date   • COLONOSCOPY     • EYE SURGERY     • KNEE SURGERY  1998   • [place] COLONOSCOPY FLX DX W/COLLJ SPEC WHEN PFRMD N/A 05/09/2017    Procedure: EGD AND COLONOSCOPY;  Surgeon: Alonso Montalvo MD;  Location: [facility]; Service: Gastroenterology        ALLERGIES:  Augmentin [amoxicillin-pot clavulanate], Miconazole, and Wixela inhub [fluticasone-salmeterol]    MEDICATIONS:  Current Outpatient Medications   Medication Sig Dispense Refill   • acetaminophen (TYLENOL) 650 mg CR tablet Take by mouth every 8 (eight) hours as needed       • albuterol (PROVENTIL HFA,VENTOLIN HFA) 90 mcg/act inhaler INHALE 2 PUFFS BY MOUTH EVERY 6 HOURS AS NEEDED FOR WHEEZE 18 g 1   • ALPRAZolam (XANAX) 0 25 mg tablet Take 1 tablet (0 25 mg total) by mouth 3 (three) times a day as needed for anxiety 90 tablet 0   • amLODIPine (NORVASC) 5 mg tablet TAKE 1 TABLET (5 MG TOTAL) BY MOUTH DAILY   90 tablet 1   • cetirizine (ZyrTEC) 10 mg tablet Take 1 tablet (10 mg total) by mouth daily 90 tablet 0   • Diclofenac Sodium (VOLTAREN) 1 % Apply 2 g topically 4 (four) times a day 100 g 0   • escitalopram (LEXAPRO) 10 mg tablet TAKE 1 TABLET BY MOUTH EVERY DAY 90 tablet 1   • Glycerin-Hypromellose- (DRY EYE RELIEF DROPS OP) Apply to eye if needed     • HYDROcodone-acetaminophen (Norco) 5-325 mg per tablet Take 1 tablet by mouth every 6 (six) hours as needed for pain Max Daily Amount: 4 tablets 60 tablet 0   • lidocaine (Lidoderm) 5 % Apply 1 patch topically daily Remove & Discard patch within 12 hours or as directed by MD 30 patch 1   • linaCLOtide (Linzess) 145 MCG CAPS Take 1 capsule (145 mcg total) by mouth in the morning 30 capsule 5   • lisinopril (ZESTRIL) 20 mg tablet TAKE 1 TABLET BY MOUTH EVERY DAY 90 tablet 1   • Lumigan 0 01 % ophthalmic drops Administer 1 drop to both eyes daily at bedtime       • methocarbamol (ROBAXIN) 500 mg tablet Take 1 tablet (500 mg total) by mouth 4 (four) times a day 60 tablet 0   • MINOXIDIL, TOPICAL, 5 % SOLN Apply 1 application topically in the morning For hair     • montelukast (SINGULAIR) 10 mg tablet TAKE 1 TABLET BY MOUTH DAILY AT BEDTIME 90 tablet 2   • naloxone (NARCAN) 4 mg/0 1 mL nasal spray Administer 1 spray into a nostril  If no response after 2-3 minutes, give another dose in the other nostril using a new spray  1 each 1   • nystatin (MYCOSTATIN) 500,000 units/5 mL suspension Take 5 mL (500,000 Units total) by mouth 4 (four) times a day 200 mL 0   • pantoprazole (PROTONIX) 40 mg tablet Take 1 tablet (40 mg total) by mouth 2 (two) times a day 180 tablet 1   • promethazine-dextromethorphan (PHENERGAN-DM) 6 25-15 mg/5 mL oral syrup TAKE 5 MLS BY MOUTH 4 TIMES A DAY AS NEEDED FOR COUGH 150 mL 0   • senna (SENOKOT) 8 6 mg Take 1 tablet (8 6 mg total) by mouth daily at bedtime 30 tablet 1   • timolol (TIMOPTIC) 0 25 % ophthalmic solution      • famotidine (PEPCID) 20 mg tablet TAKE 1 TABLET BY MOUTH TWICE A DAY (Patient not taking: Reported on 11/1/2022) 180 tablet 4   • fluticasone (FLONASE) 50 mcg/act nasal spray SPRAY 2 SPRAYS INTO EACH NOSTRIL EVERY DAY (Patient not taking: Reported on 11/1/2022) 16 mL 3   • tiZANidine (ZANAFLEX) 4 mg tablet Take 1 tablet (4 mg total) by mouth every 8 (eight) hours as needed for muscle spasms for up to 5 days 15 tablet 0   • Trelegy Ellipta 200-62 5-25 MCG/INH AEPB inhaler INHALE 1 PUFF DAILY RINSE MOUTH AFTER USE  60 each 5     Current Facility-Administered Medications   Medication Dose Route Frequency Provider Last Rate Last Admin   • triamcinolone acetonide (KENALOG-40) 40 mg/mL injection 40 mg  40 mg Intra-lesional Once Pattie Nolasco DPM           SOCIAL HISTORY:  Social History     Socioeconomic History   • Marital status:       Spouse name: None   • Number of children: 3   • Years of education: less than high school   • Highest education level: None   Occupational History   • Occupation: unemployed   Tobacco Use   • Smoking status: Current Every Day Smoker     Packs/day: 1 50     Years: 50 00     Pack years: 75 00     Types: Cigarettes     Start date: 1968   • Smokeless tobacco: Never Used   Vaping Use   • Vaping Use: Never used   Substance and Sexual Activity   • Alcohol use: Never   • Drug use: No   • Sexual activity: Not Currently   Other Topics Concern   • None   Social History Narrative    Always uses seatbelt    Daily coffee consumption    Daily tea consumption    Dental care regularly    Exercises regularly    Multiple organ donor    No guns in the home    No living will    Denies pets/ animals in the home    Power of  in existence- denied         Social Determinants of Health     Financial Resource Strain: Not on file   Food Insecurity: Not on file   Transportation Needs: Not on file   Physical Activity: Not on file   Stress: Not on file   Social Connections: Not on file   Intimate Partner Violence: Not on file   Housing Stability: Not on file        Review of Systems   Constitutional: Negative for chills and fever  HENT: Negative for ear pain and sore throat  Eyes: Negative for pain and visual disturbance  Respiratory: Negative for cough and shortness of breath  Cardiovascular: Negative for chest pain and palpitations  Gastrointestinal: Negative for abdominal pain and vomiting  Genitourinary: Negative for dysuria and hematuria  Musculoskeletal: Negative for arthralgias and back pain  Skin: Negative for color change and rash  Neurological: Negative for seizures and syncope  Psychiatric/Behavioral: Negative  All other systems reviewed and are negative  Objective:      BP (!) 182/100   Pulse 87   Ht 5' 4" (1 626 m) Comment: verbal  Wt 87 9 kg (193 lb 12 8 oz)   LMP  (LMP Unknown)   BMI 33 27 kg/m²          Physical Exam  Constitutional:       Appearance: Normal appearance  HENT:      Head: Normocephalic and atraumatic  Nose: Nose normal    Cardiovascular:      Pulses:           Dorsalis pedis pulses are 2+ on the right side  Posterior tibial pulses are 2+ on the right side     Pulmonary:      Effort: Pulmonary effort is normal  Musculoskeletal:        Feet:    Feet:      Comments: (+) TTP of the plantar medial tubercle; no pain with lateral squeeze, no ecchymosis, no edema, no calor, no erythema  Skin:     General: Skin is warm  Capillary Refill: Capillary refill takes less than 2 seconds  Neurological:      General: No focal deficit present  Mental Status: She is alert and oriented to person, place, and time  Psychiatric:         Mood and Affect: Mood normal          Behavior: Behavior normal          Thought Content: Thought content normal                  Foot injection     Date/Time 11/1/2022 9:10 AM     Performed by  Edgar Roper DPM     Authorized by Edgar Roper DPM      Universal Protocol   Consent: Verbal consent obtained  Risks and benefits: risks, benefits and alternatives were discussed  Consent given by: patient  Time out: Immediately prior to procedure a "time out" was called to verify the correct patient, procedure, equipment, support staff and site/side marked as required  Timeout called at: 11/1/2022 9:10 AM   Patient understanding: patient states understanding of the procedure being performed  Patient consent: the patient's understanding of the procedure matches consent given  Patient identity confirmed: verbally with patient and provided demographic data        Local anesthesia used: yes      Anesthesia: local infiltration     Anesthesia   Local anesthesia used: yes  Local Anesthetic: bupivacaine 0 25% without epinephrine  Anesthetic total: 2 mL     Sedation   Patient sedated: no        Specimen: no    Culture: no   Procedure Details   Procedure Notes: The right heel was prepped with alcohol  Ethyl chloride was administered for topical anesthesia  I proceeded to inject 2 ml 0 25% bupivacaine plain/0 5 ml kenalog 40  The patient tolerated it well

## 2022-11-02 ENCOUNTER — TELEPHONE (OUTPATIENT)
Dept: PODIATRY | Facility: CLINIC | Age: 67
End: 2022-11-02

## 2022-11-02 NOTE — TELEPHONE ENCOUNTER
Caller: Patient    Doctor/Office: Jose    Callback  [phone]     Location of pain: heel of foot - rt    Pain scale:  Very high    Duration of pain: since 2:00 yesterday afternoon

## 2022-11-03 DIAGNOSIS — K59.04 CHRONIC IDIOPATHIC CONSTIPATION: Primary | ICD-10-CM

## 2022-11-03 RX ORDER — LINACLOTIDE 72 UG/1
1 CAPSULE, GELATIN COATED ORAL
Qty: 30 CAPSULE | Refills: 2 | Status: SHIPPED | OUTPATIENT
Start: 2022-11-03 | End: 2022-12-16 | Stop reason: ALTCHOICE

## 2022-11-04 ENCOUNTER — APPOINTMENT (OUTPATIENT)
Dept: LAB | Facility: CLINIC | Age: 67
End: 2022-11-04

## 2022-11-04 DIAGNOSIS — K86.2 PANCREATIC CYST: ICD-10-CM

## 2022-11-04 LAB
ANION GAP SERPL CALCULATED.3IONS-SCNC: 7 MMOL/L (ref 4–13)
BUN SERPL-MCNC: 13 MG/DL (ref 5–25)
CALCIUM SERPL-MCNC: 9.8 MG/DL (ref 8.3–10.1)
CHLORIDE SERPL-SCNC: 102 MMOL/L (ref 96–108)
CO2 SERPL-SCNC: 26 MMOL/L (ref 21–32)
CREAT SERPL-MCNC: 0.54 MG/DL (ref 0.6–1.3)
GFR SERPL CREATININE-BSD FRML MDRD: 98 ML/MIN/1.73SQ M
GLUCOSE P FAST SERPL-MCNC: 89 MG/DL (ref 65–99)
POTASSIUM SERPL-SCNC: 3.8 MMOL/L (ref 3.5–5.3)
SODIUM SERPL-SCNC: 135 MMOL/L (ref 135–147)

## 2022-11-14 DIAGNOSIS — M54.9 MID BACK PAIN: ICD-10-CM

## 2022-11-14 DIAGNOSIS — M70.61 TROCHANTERIC BURSITIS OF RIGHT HIP: ICD-10-CM

## 2022-11-14 DIAGNOSIS — M54.2 NECK PAIN: ICD-10-CM

## 2022-11-14 DIAGNOSIS — I10 ESSENTIAL HYPERTENSION: ICD-10-CM

## 2022-11-14 DIAGNOSIS — F33.0 DEPRESSION, MAJOR, RECURRENT, MILD (HCC): ICD-10-CM

## 2022-11-14 DIAGNOSIS — I10 PRIMARY HYPERTENSION: ICD-10-CM

## 2022-11-14 DIAGNOSIS — K21.9 GASTROESOPHAGEAL REFLUX DISEASE: ICD-10-CM

## 2022-11-14 DIAGNOSIS — J30.9 ALLERGIC RHINITIS, UNSPECIFIED SEASONALITY, UNSPECIFIED TRIGGER: ICD-10-CM

## 2022-11-14 RX ORDER — HYDROCODONE BITARTRATE AND ACETAMINOPHEN 5; 325 MG/1; MG/1
1 TABLET ORAL EVERY 6 HOURS PRN
Qty: 60 TABLET | Refills: 0 | Status: SHIPPED | OUTPATIENT
Start: 2022-11-14

## 2022-11-14 RX ORDER — AMLODIPINE BESYLATE 5 MG/1
5 TABLET ORAL DAILY
Qty: 90 TABLET | Refills: 1 | Status: SHIPPED | OUTPATIENT
Start: 2022-11-14

## 2022-11-14 RX ORDER — LISINOPRIL 20 MG/1
20 TABLET ORAL DAILY
Qty: 90 TABLET | Refills: 1 | Status: SHIPPED | OUTPATIENT
Start: 2022-11-14

## 2022-11-14 RX ORDER — CETIRIZINE HYDROCHLORIDE 10 MG/1
10 TABLET ORAL DAILY
Qty: 90 TABLET | Refills: 0 | Status: SHIPPED | OUTPATIENT
Start: 2022-11-14

## 2022-11-14 RX ORDER — PANTOPRAZOLE SODIUM 40 MG/1
40 TABLET, DELAYED RELEASE ORAL 2 TIMES DAILY
Qty: 180 TABLET | Refills: 1 | Status: SHIPPED | OUTPATIENT
Start: 2022-11-14

## 2022-11-14 RX ORDER — TIZANIDINE 4 MG/1
4 TABLET ORAL EVERY 8 HOURS PRN
Qty: 15 TABLET | Refills: 1 | Status: SHIPPED | OUTPATIENT
Start: 2022-11-14 | End: 2022-11-19

## 2022-11-14 RX ORDER — ESCITALOPRAM OXALATE 10 MG/1
10 TABLET ORAL DAILY
Qty: 90 TABLET | Refills: 1 | Status: SHIPPED | OUTPATIENT
Start: 2022-11-14

## 2022-11-15 ENCOUNTER — HOSPITAL ENCOUNTER (OUTPATIENT)
Dept: MRI IMAGING | Facility: HOSPITAL | Age: 67
Discharge: HOME | End: 2022-11-15
Attending: INTERNAL MEDICINE

## 2022-11-15 DIAGNOSIS — K86.2 PANCREATIC CYST: ICD-10-CM

## 2022-11-15 RX ADMIN — GADOBUTROL 8 ML: 604.72 INJECTION INTRAVENOUS at 12:19

## 2022-11-22 ENCOUNTER — CONSULT (OUTPATIENT)
Dept: SURGICAL ONCOLOGY | Facility: CLINIC | Age: 67
End: 2022-11-22

## 2022-11-22 VITALS
OXYGEN SATURATION: 95 % | WEIGHT: 194 LBS | HEART RATE: 88 BPM | SYSTOLIC BLOOD PRESSURE: 138 MMHG | HEIGHT: 64 IN | DIASTOLIC BLOOD PRESSURE: 78 MMHG | BODY MASS INDEX: 33.12 KG/M2 | TEMPERATURE: 97.8 F | RESPIRATION RATE: 16 BRPM

## 2022-11-22 DIAGNOSIS — K86.2 PANCREATIC CYST: Primary | ICD-10-CM

## 2022-11-22 NOTE — PROGRESS NOTES
Surgical Oncology Consultation    [facility]  [facility] [address] [address]    Patient: Marce Yao  1955  [id number]    Primary Care provider:  Dusty Cazares MD  [address]  [address] [facility] [address]    Referring provider:  Genevieve Sandoval MD  [address] [place] [address]    Diagnoses and all orders for this visit:    Pancreatic cyst  -     Ambulatory Referral to Surgical Oncology  -     Ambulatory Referral to Oncology Genetics; Future        Chief Complaint   Patient presents with   • Consult       Return for Office Visit  Oncology History    No history exists  History of Present Illness  : This is a 49-year-old female who is here today with a new diagnosis of a pancreatic cyst   Briefly, the patient underwent initial cross-sectional imaging in September, rule revealing a 8 mm cyst at the neck of the pancreas  This appeared to be a branch duct IPMN  She underwent a U S  With evaluation, which was 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 given the prominence of the main duct of the pancreas  There was no ductal dilation or other concerning features  It was decided that she should undergo interval MRI, which was accomplished recently after 3 months from her initial MRI  This demonstrated stability of the cyst   At this time, the patient describes intermittent abdominal pain, bloating, nausea, diarrhea and constipation  She has undergone extensive workup with EGD and colonoscopy with no etiology for her symptoms  She has not have any symptoms consistent with pancreatic insufficiency  Her blood sugars are within limits and she does not have significant diarrhea  She has no significant cardiopulmonary history, however, she does have a history of COPD due to a longstanding history of smoking with about 70 pack-year history  She accomplishes all of her own ADLs and is an ECOG of 0   Of note, the patient's mother and 2 brothers are  of pancreas cancer  The never had any genetic testing as far she knows  Review of Systems  Complete ROS Surg Onc:   Constitutional: The patient denies new or recent history of general fatigue, no recent weight loss, no change in appetite  Eyes: No complaints of visual problems, no scleral icterus  ENT: No complaints of ear pain, no hoarseness, no difficulty swallowing,  no tinnitus and no new masses in head, oral cavity, or neck  Cardiovascular: No complaints of chest pain, no palpitations, no ankle edema  Respiratory: No complaints of shortness of breath, no cough  Gastrointestinal: No complaints of jaundice, no bloody stools, no pale stools  Genitourinary: No complaints of dysuria, no hematuria, no nocturia, no frequent urination, no urethral discharge  Musculoskeletal: No complaints of weakness, paralysis, joint stiffness or arthralgias  Integumentary: No complaints of rash, no new lesions  Neurological: No complaints of convulsions, no seizures, no dizziness  Hematologic/Lymphatic: No complaints of easy bruising  Endocrine:  No hot or cold intolerance  No polydipsia, polyphagia, or polyuria  Allergy/immunology:  No environmental allergies  No food allergies  Not immunocompromised        Patient Active Problem List   Diagnosis   • Seasonal allergic rhinitis   • Anxiety   • Cataract   • Chronic obstructive pulmonary disease (HCC)   • Mild episode of recurrent major depressive disorder (HCC)   • Edema   • Esophageal reflux   • Fibromyalgia   • Hyperlipidemia   • Hypertension   • Degeneration of intervertebral disc   • Lumbosacral radiculopathy at L5   • Palpitations   • Pulmonary nodule seen on imaging study   • Dyspnea on exertion   • Early satiety   • Abdominal distension   • Family history of pancreatic cancer   • Epigastric pain   • Neuropathic pain   • Neck pain   • Cigarette nicotine dependence with nicotine-induced disorder   • Severe obesity (BMI 35 0-35 9 with comorbidity) ([facility])   • Lower extremity edema   • Snoring   • Chronic idiopathic constipation   • Gastroparesis   • Prediabetes   • Dermatitis   • Chest pain   • Aortitis ([facility])   • Increased intraocular pressure   • Primary insomnia   • Candida vaginitis   • Pain of both eyes   • Chronic frontal sinusitis   • Continuous opioid dependence ([facility])   • Acute right-sided low back pain with right-sided sciatica   • RUQ pain   • Closed wedge compression fracture of T8 vertebra ([facility])   • Therapeutic opioid induced constipation   • Greater trochanteric bursitis of right hip   • Plantar fasciitis   • IPMN (intraductal papillary mucinous neoplasm)   • Pancreatic cyst   • History of colon polyps   • Constipation   • Gastroesophageal reflux disease without esophagitis     Past Medical History:   Diagnosis Date   • Acid reflux    • Back injury    • COPD (chronic obstructive pulmonary disease) ([facility] )    • Fibromyalgia    • Hypertension    • Seasonal allergies      Past Surgical History:   Procedure Laterality Date   • COLONOSCOPY     • EYE SURGERY     • KNEE SURGERY  1998   • [place] COLONOSCOPY FLX DX W/COLLJ SPEC WHEN PFRMD N/A 05/09/2017    Procedure: EGD AND COLONOSCOPY;  Surgeon: Santo Barrera MD;  [facility]; Service: Gastroenterology     Family History   Problem Relation Age of Onset   • Pancreatic cancer Mother 67   • Coronary artery disease Father    • Diabetes Father    • Hypertension Father    • Heart disease Father    • Lung cancer Sister 40   • Pancreatic cancer Brother    • Pancreatic cancer Brother    • Lung cancer Maternal Grandfather 68   • Diabetes Paternal Grandmother      Social History     Socioeconomic History   • Marital status:       Spouse name: Not on file   • Number of children: 3   • Years of education: less than high school   • Highest education level: Not on file   Occupational History   • Occupation: unemployed   Tobacco Use   • Smoking status: Every Day     Packs/day: 1 50     Years: 50 00     Pack years: 75 00     Types: Cigarettes     Start date: 1968   • Smokeless tobacco: Never   Vaping Use   • Vaping Use: Never used   Substance and Sexual Activity   • Alcohol use: Never   • Drug use: No   • Sexual activity: Not Currently   Other Topics Concern   • Not on file   Social History Narrative    Always uses seatbelt    Daily coffee consumption    Daily tea consumption    Dental care regularly    Exercises regularly    Multiple organ donor    No guns in the home    No living will    Denies pets/ animals in the home    Power of  in existence- denied         Social Determinants of Health     Financial Resource Strain: Not on file   Food Insecurity: Not on file   Transportation Needs: Not on file   Physical Activity: Not on file   Stress: Not on file   Social Connections: Not on file   Intimate Partner Violence: Not on file   Housing Stability: Not on file       Current Outpatient Medications:   •  acetaminophen (TYLENOL) 650 mg CR tablet, Take by mouth every 8 (eight) hours as needed  , Disp: , Rfl:   •  albuterol (PROVENTIL HFA,VENTOLIN HFA) 90 mcg/act inhaler, INHALE 2 PUFFS BY MOUTH EVERY 6 HOURS AS NEEDED FOR WHEEZE, Disp: 18 g, Rfl: 1  •  ALPRAZolam (XANAX) 0 25 mg tablet, Take 1 tablet (0 25 mg total) by mouth 3 (three) times a day as needed for anxiety, Disp: 90 tablet, Rfl: 0  •  amLODIPine (NORVASC) 5 mg tablet, Take 1 tablet (5 mg total) by mouth daily, Disp: 90 tablet, Rfl: 1  •  cetirizine (ZyrTEC) 10 mg tablet, Take 1 tablet (10 mg total) by mouth daily, Disp: 90 tablet, Rfl: 0  •  Diclofenac Sodium (VOLTAREN) 1 %, Apply 2 g topically 4 (four) times a day, Disp: 100 g, Rfl: 0  •  escitalopram (LEXAPRO) 10 mg tablet, Take 1 tablet (10 mg total) by mouth daily, Disp: 90 tablet, Rfl: 1  •  famotidine (PEPCID) 20 mg tablet, TAKE 1 TABLET BY MOUTH TWICE A DAY, Disp: 180 tablet, Rfl: 4  •  fluticasone (FLONASE) 50 mcg/act nasal spray, SPRAY 2 SPRAYS INTO EACH NOSTRIL EVERY DAY, Disp: 16 mL, Rfl: 3  •  Glycerin-Hypromellose- (DRY EYE RELIEF DROPS OP), Apply to eye if needed, Disp: , Rfl:   •  HYDROcodone-acetaminophen (Norco) 5-325 mg per tablet, Take 1 tablet by mouth every 6 (six) hours as needed for pain Max Daily Amount: 4 tablets, Disp: 60 tablet, Rfl: 0  •  lidocaine (Lidoderm) 5 %, Apply 1 patch topically daily Remove & Discard patch within 12 hours or as directed by MD, Disp: 30 patch, Rfl: 1  •  linaCLOtide (Linzess) 145 MCG CAPS, Take 1 capsule (145 mcg total) by mouth in the morning, Disp: 30 capsule, Rfl: 5  •  linaCLOtide (Linzess) 72 MCG CAPS, Take 1 capsule by mouth daily before breakfast, Disp: 30 capsule, Rfl: 2  •  lisinopril (ZESTRIL) 20 mg tablet, Take 1 tablet (20 mg total) by mouth daily, Disp: 90 tablet, Rfl: 1  •  Lumigan 0 01 % ophthalmic drops, Administer 1 drop to both eyes daily at bedtime  , Disp: , Rfl:   •  methocarbamol (ROBAXIN) 500 mg tablet, Take 1 tablet (500 mg total) by mouth 4 (four) times a day, Disp: 60 tablet, Rfl: 0  •  MINOXIDIL, TOPICAL, 5 % SOLN, Apply 1 application topically in the morning For hair, Disp: , Rfl:   •  montelukast (SINGULAIR) 10 mg tablet, TAKE 1 TABLET BY MOUTH DAILY AT BEDTIME, Disp: 90 tablet, Rfl: 2  •  naloxone (NARCAN) 4 mg/0 1 mL nasal spray, Administer 1 spray into a nostril   If no response after 2-3 minutes, give another dose in the other nostril using a new spray , Disp: 1 each, Rfl: 1  •  nystatin (MYCOSTATIN) 500,000 units/5 mL suspension, Take 5 mL (500,000 Units total) by mouth 4 (four) times a day, Disp: 200 mL, Rfl: 0  •  pantoprazole (PROTONIX) 40 mg tablet, Take 1 tablet (40 mg total) by mouth 2 (two) times a day, Disp: 180 tablet, Rfl: 1  •  promethazine-dextromethorphan (PHENERGAN-DM) 6 25-15 mg/5 mL oral syrup, TAKE 5 MLS BY MOUTH 4 TIMES A DAY AS NEEDED FOR COUGH, Disp: 150 mL, Rfl: 0  •  senna (SENOKOT) 8 6 mg, Take 1 tablet (8 6 mg total) by mouth daily at bedtime, Disp: 30 tablet, Rfl: 1  •  timolol (TIMOPTIC) 0 25 % ophthalmic solution, , Disp: , Rfl:   •  tiZANidine (ZANAFLEX) 4 mg tablet, Take 1 tablet (4 mg total) by mouth every 8 (eight) hours as needed for muscle spasms for up to 5 days, Disp: 15 tablet, Rfl: 1  •  Trelegy Ellipta 200-62 5-25 MCG/INH AEPB inhaler, INHALE 1 PUFF DAILY RINSE MOUTH AFTER USE , Disp: 60 each, Rfl: 5  Allergies   Allergen Reactions   • Augmentin [Amoxicillin-Pot Clavulanate] Vomiting   • Miconazole Itching and Swelling   • Wixela Inhub [Fluticasone-Salmeterol] Palpitations       Vitals:    11/22/22 1013   BP: 138/78   Pulse: 88   Resp: 16   Temp: 97 8 °F (36 6 °C)   SpO2: 95%       Physical Exam   General: Appears well, appears stated age  Skin: Warm, anicteric  HEENT: Normocephalic, atraumatic; sclera aniceteric, mucous membranes moist; cervical nodes without adenopathy  Cardiopulmonary: RRR, Easy WOB, no BLE edema  Abd: Flat and soft, nontender, no masses appreciated, no hepatosplenomegaly  MSK: Symmetric, no cyanosis, no overt weakness  Lymphatic: No cervical, axillary or inguinal lymphadenopathy  Neuro: Affect appropriate, no gross motor abnormalities      Pathology:  [unfilled]    Labs: Reviewed in [facility]    Imaging  MRI abdomen w wo contrast and mrcp    Result Date: 11/18/2022  Narrative: MRI OF THE ABDOMEN WITH AND WITHOUT CONTRAST WITH MRCP INDICATION: 77 years / Female  K86 2: Cyst of pancreas  COMPARISON: Comparison is made to prior studies, most recent is an MRI dated August 9, 2022  The rest, with TECHNIQUE:  Multiplanar/multisequence MRI of the abdomen with 3D MRCP was performed before and after administration of contrast  IV Contrast:  8 mL of Gadobutrol injection (SINGLE-DOSE) FINDINGS: LOWER CHEST:   Unremarkable LIVER: No concerning liver lesions  Few nonenhancing hepatic cysts, with the largest measuring 1 4 cm in the left lateral segment  Intrahepatic vessels are patent  Laynemakenna Cordova BILE DUCTS:  No intrahepatic or extrahepatic bile duct dilation  Common bile duct is normal in caliber  No choledocholithiasis, biliary stricture or suspicious mass  GALLBLADDER:  Normal  PANCREAS:  8 mm cyst in the neck of the pancreas unchanged there is direct communication with the pancreatic duct (series 9 image 86) compatible with a branch duct type IPMN  No pancreatic ductal dilatation  No enhancing soft tissue components  ADRENAL GLANDS:  Mild left adrenal thickening unchanged  SPLEEN:  Normal  KIDNEYS/PROXIMAL URETERS:  No hydroureteronephrosis  No suspicious renal mass  BOWEL:   No dilated loops of bowel  PERITONEUM/RETROPERITONEUM:  No ascites  LYMPH NODES:  No abdominal lymphadenopathy  VASCULAR STRUCTURES:  No aneurysm  ABDOMINAL WALL:  Unremarkable  OSSEOUS STRUCTURES:  Hemangioma in the L1 vertebral body unchanged  Impression: Stable size of 8 mm cyst in the neck of the pancreas with imaging characteristics compatible with a branch duct type IPMN  No worrisome imaging features  Recommend repeat imaging every 2 years x5 for a total of 10 years, and if stable, no further imaging needed  If the lesion does increase in size and or change in morphology, management can be reassessed at that time  Workstation performed: [id number]       I independently reviewed and interpreted the above laboratory and imaging data, including GI consult, EUS, MRI  Discussed with Dr Kirit Olmos      Discussion/Summary: This is a 78-year-old female with an 8 mm cyst of the pancreas  On my review of imaging and Dr Nyla Carmen EUS, this does appear to be a main duct IPMN  Likewise, due to the patient's significant family history of pancreas cancer, this increases her risk for development of cancer  I would like her to undergo genetic testing to evaluate her to risks  Likewise, we will review her imaging at our tumor board to discuss next steps    Given the patient's young age, I am likely to recommend resection given that this is within the main duct of the pancreas and therefore much higher risk of developing into a cancer  She will return in 3-4 weeks time after the above studies for further discussion

## 2022-11-23 ENCOUNTER — TELEPHONE (OUTPATIENT)
Dept: GENETICS | Facility: CLINIC | Age: 67
End: 2022-11-23

## 2022-11-23 NOTE — TELEPHONE ENCOUNTER
I called Jay Saldaña to schedule a new patient appointment with the Cancer Risk and Genetics Program       Outcome:  Telephone Genetics appointment scheduled for Monday 11/28/22 at 9:30 am with Stephanie Gupta    Personal/Family History Related to Appointment:  Personal history of pancreatic cyst, most likely main duct IPMN and family history of pancreatic cancer

## 2022-11-25 ENCOUNTER — TELEPHONE (OUTPATIENT)
Dept: HEMATOLOGY ONCOLOGY | Facility: CLINIC | Age: 67
End: 2022-11-25

## 2022-11-28 ENCOUNTER — CLINICAL SUPPORT (OUTPATIENT)
Dept: GENETICS | Facility: CLINIC | Age: 67
End: 2022-11-28

## 2022-11-28 ENCOUNTER — DOCUMENTATION (OUTPATIENT)
Dept: GENETICS | Facility: CLINIC | Age: 67
End: 2022-11-28

## 2022-11-28 DIAGNOSIS — J01.90 ACUTE NON-RECURRENT SINUSITIS, UNSPECIFIED LOCATION: Primary | ICD-10-CM

## 2022-11-28 DIAGNOSIS — Z86.010 HISTORY OF COLON POLYPS: ICD-10-CM

## 2022-11-28 DIAGNOSIS — K86.2 PANCREATIC CYST: Primary | ICD-10-CM

## 2022-11-28 DIAGNOSIS — Z80.0 FAMILY HISTORY OF PANCREATIC CANCER: ICD-10-CM

## 2022-11-28 RX ORDER — AZITHROMYCIN 250 MG/1
TABLET, FILM COATED ORAL
Qty: 6 TABLET | Refills: 0 | Status: SHIPPED | OUTPATIENT
Start: 2022-11-28 | End: 2022-12-02

## 2022-11-28 NOTE — PROGRESS NOTES
Pre-Test Genetic Counseling Consult Note    Patient Name: Ismael Lee   /Age: 1955/66 y o  Referring Provider: Real Moore MD    Date of Service: 2022  Genetic Counselor: Gordon Hicks MS, [facility] [place]  Interpretation Services: None  Location: Telephone consult   Length of Visit: 61 minutes      Shlomo Burks was referred to the [address] [place] [address] and Genetic Assessment Program due to her personal history of a pancreatic cyst and colon polyps  She also has a significant family history of pancreatic cancer  She presents today to discuss the possibility of a hereditary cancer syndrome, options for genetic testing, and implications for her and her family  Cancer History and Treatment:     Personal History: No personal history of cancer  She has a history of 9 hyperplastic colon polyps and 1 tubular adenoma  She also has a history of a pancreatic cyst which is 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  Screening Hx:     Breast:  Breast Imaging: Annual; most recent 3/31/2022  Breast Biopsy: None   Breast Density: Almost entirely fatty    Colon:  Colonoscopy: Routinely; most recent 22, 9 hyperplastic colon polyps and 1 tubular adenoma     Final Diagnosis   A  Stomach, Biopsy:  - Oxyntic-type gastric mucosa with no specific pathologic change   -Negative for H  pylori organisms on H&E stain      B  Colon, Ascending (Polyp), Biopsy:  - Tubular adenoma x 1     C  Colon, Sigmoid (Polyp), Biopsy:  - Hyperplastic polyp x1     D  Colon, Rectum (Polyps), Biopsy:  - Hyperplastic polyps x 6   Electronically signed by Cameron Montemayor DO on 2022 at 11:01 AM     2017  Final Diagnosis   A  Stomach, body (biopsy):  - Oxyntic mucosa with no diagnostic abnormality  - No H pylori identified on immunohistochemistry stain  - No intestinal metaplasia (Alcian blue/PAS)     B   Rectum, polyp x2 (polypectomy):  - Fragments of hyperplastic polyp  - Negative for dysplasia      Interpretation performed at [place], [address] [facility] [address]      Electronically signed by Robert Santiago MD on 5/11/2017 at  2:39 PM     Gynecologic:  Ovaries and Uterus intact     Skin:  No current screening    Reproductive History: Not assessed     Medical and Surgical History  Pertinent surgical history:   Past Surgical History:   Procedure Laterality Date   • COLONOSCOPY     • EYE SURGERY     • KNEE SURGERY  1998   • [place] COLONOSCOPY FLX DX W/COLLJ SPEC WHEN PFRMD N/A 05/09/2017    Procedure: EGD AND COLONOSCOPY;  Surgeon: Christian Bustos MD;  [facility]; Service: Gastroenterology      Pertinent medical history:  Past Medical History:   Diagnosis Date   • Acid reflux    • Back injury    • COPD (chronic obstructive pulmonary disease) (HCC)    • Fibromyalgia    • Hypertension    • Seasonal allergies        Other History:  Height:   Ht Readings from Last 1 Encounters:   11/22/22 5' 4" (1 626 m)     Weight:   Wt Readings from Last 1 Encounters:   11/22/22 88 kg (194 lb)     Relevant Family History     Patient reports no Ashkenazi Anabaptist ancestry  Please refer to the scanned pedigree in the Media Tab for a complete family history     *All history is reported as provided by the patient; records are not available for review, except where indicated  Assessment:  We discussed sporadic, familial and hereditary cancer  We also discussed the many factors that influence our risk for cancer such as age, environmental exposures, lifestyle choices and family history  We reviewed the indications suggestive of a hereditary predisposition to cancer      Genetic testing is indicated for Erik Whiting based on the following criteria: Meets NCCN V1 2023 Testing Criteria for Pancreatic Cancer Susceptibility Genes: Family history of 3 first-degree relatives with pancreatic cancer    The risks, benefits, and limitations of genetic testing were reviewed with the patient, as well as genetic discrimination laws, and possible test results (positive, negative, variants of uncertain significance) and their clinical implications  If positive for a mutation, options for managing cancer risk including increased surveillance, chemoprevention, and in some cases prophylactic surgery were discussed  Pelonantonina Atkins was informed that if a hereditary cancer syndrome was identified in her, first degree relatives (parents, siblings, and children) have a chance of also inheriting the condition  Genetic testing would allow for predictive genetic testing in other relatives, who may also be at risk depending on their degree of relation  Plan: Patient decided to proceed with testing and provided consent  Summary:     Sample Collection:  A blood kit was mailed home to the patient    Genetic Testing Preformed: CustomNext: Cancer® +RNAinsight® (57 genes): APC, YOLIE, AXIN2, BAP1, BARD1, BMPR1A, BRCA1, BRCA2, BRIP1, CDH1, CDK4, CDKN2A, CHEK2, DICER1, EGLN1, EPCAM, FH, FLCN, GREM1, HOXB13, KIF1B, MAX, MEN1, MET, MITF, MLH1, MSH2, MSH3, MSH6, MUTYH, NBN, NF1, NTHL1, PALB2, PMS2, POLD1, POLE, POT1, PTEN, RAD51C, RAD51D, RB1, RECQL, RET, SDHA, SDHAF2, SDHB, SDHC, SDHD, SMAD4, SMARCA4, STK11, SWFO311, TP53, TSC1, TSC2, VHL     Results take approximately 2-3 weeks to complete once test is started  We will contact Pelon Atkins once results are available  Additional recommendations for surveillance/medical management will be made pending genetic test results  Length To Time In Minutes Device Was In Place: 10

## 2022-11-28 NOTE — LETTER
2022     Carmine Ly MD  [address] [place] [id number]    Patient: Ismael Lee  YOB: 1955  Date of Visit: 2022      Dear Dr Live Ji: Thank you for referring Ned Alves to me for evaluation  Below are my notes for this consultation  If you have questions, please do not hesitate to call me  I look forward to following your patient along with you  Sincerely,        Ying Moran GC        CC: No Recipients        Pre-Test Genetic Counseling Consult Note    Patient Name: Ismael Lee   /Age: 1955/66 y o  Referring Provider: Real Moore MD    Date of Service: 2022  Genetic Counselor: Gordon Hicks MS, [facility]  Interpretation Services: None  Location: Telephone consult   Length of Visit: 61 minutes      Shlomo Burks was referred to the [address] [place] [address] and Genetic Assessment Program due to her personal history of a pancreatic cyst and colon polyps  She also has a significant family history of pancreatic cancer  She presents today to discuss the possibility of a hereditary cancer syndrome, options for genetic testing, and implications for her and her family  Cancer History and Treatment:     Personal History: No personal history of cancer  She has a history of 9 hyperplastic colon polyps and 1 tubular adenoma  She also has a history of a pancreatic cyst which is 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  Screening Hx:     Breast:  Breast Imaging: Annual; most recent 3/31/2022  Breast Biopsy: None   Breast Density: Almost entirely fatty    Colon:  Colonoscopy: Routinely; most recent 22, 9 hyperplastic colon polyps and 1 tubular adenoma     Final Diagnosis   A  Stomach, Biopsy:  - Oxyntic-type gastric mucosa with no specific pathologic change   -Negative for H  pylori organisms on H&E stain      B  Colon, Ascending (Polyp), Biopsy:  - Tubular adenoma x 1     C   Colon, Sigmoid (Polyp), Biopsy:  - Hyperplastic polyp x1     D  Colon, Rectum (Polyps), Biopsy:  - Hyperplastic polyps x 6   Electronically signed by Kyle Barfield DO on 7/18/2022 at 11:01 AM     5/9/2017  Final Diagnosis   A  Stomach, body (biopsy):  - Oxyntic mucosa with no diagnostic abnormality  - No H pylori identified on immunohistochemistry stain  - No intestinal metaplasia (Alcian blue/PAS)     B  Rectum, polyp x2 (polypectomy):  - Fragments of hyperplastic polyp  - Negative for dysplasia      Interpretation performed at [facility], [address] [place] [address]      Electronically signed by Amelie Granados MD on 5/11/2017 at  2:39 PM     Gynecologic:  Ovaries and Uterus intact     Skin:  No current screening    Reproductive History: Not assessed     Medical and Surgical History  Pertinent surgical history:   Past Surgical History:   Procedure Laterality Date   • COLONOSCOPY     • EYE SURGERY     • KNEE SURGERY  1998   • PA COLONOSCOPY FLX DX W/COLLJ SPEC WHEN PFRMD N/A 05/09/2017    Procedure: EGD AND COLONOSCOPY;  Surgeon: Dale Kenny MD;  [facility]; Service: Gastroenterology      Pertinent medical history:  Past Medical History:   Diagnosis Date   • Acid reflux    • Back injury    • COPD (chronic obstructive pulmonary disease) (HCC)    • Fibromyalgia    • Hypertension    • Seasonal allergies        Other History:  Height:   Ht Readings from Last 1 Encounters:   11/22/22 5' 4" (1 626 m)     Weight:   Wt Readings from Last 1 Encounters:   11/22/22 88 kg (194 lb)     Relevant Family History     Patient reports no Ashkenazi Buddhism ancestry  Please refer to the scanned pedigree in the Media Tab for a complete family history     *All history is reported as provided by the patient; records are not available for review, except where indicated  Assessment:  We discussed sporadic, familial and hereditary cancer    We also discussed the many factors that influence our risk for cancer such as age, environmental exposures, lifestyle choices and family history  We reviewed the indications suggestive of a hereditary predisposition to cancer  Genetic testing is indicated for Barb Mccabe based on the following criteria: Meets NCCN V1 2023 Testing Criteria for Pancreatic Cancer Susceptibility Genes: Family history of 3 first-degree relatives with pancreatic cancer    The risks, benefits, and limitations of genetic testing were reviewed with the patient, as well as genetic discrimination laws, and possible test results (positive, negative, variants of uncertain significance) and their clinical implications  If positive for a mutation, options for managing cancer risk including increased surveillance, chemoprevention, and in some cases prophylactic surgery were discussed  Barb Mccabe was informed that if a hereditary cancer syndrome was identified in her, first degree relatives (parents, siblings, and children) have a chance of also inheriting the condition  Genetic testing would allow for predictive genetic testing in other relatives, who may also be at risk depending on their degree of relation  Plan: Patient decided to proceed with testing and provided consent  Summary:     Sample Collection:  A blood kit was mailed home to the patient    Genetic Testing Preformed: CustomNext: Cancer® +RNAinsight® (57 genes): APC, YOLIE, AXIN2, BAP1, BARD1, BMPR1A, BRCA1, BRCA2, BRIP1, CDH1, CDK4, CDKN2A, CHEK2, DICER1, EGLN1, EPCAM, FH, FLCN, GREM1, HOXB13, KIF1B, MAX, MEN1, MET, MITF, MLH1, MSH2, MSH3, MSH6, MUTYH, NBN, NF1, NTHL1, PALB2, PMS2, POLD1, POLE, POT1, PTEN, RAD51C, RAD51D, RB1, RECQL, RET, SDHA, SDHAF2, SDHB, SDHC, SDHD, SMAD4, SMARCA4, STK11, TTHC321, TP53, TSC1, TSC2, VHL     Results take approximately 2-3 weeks to complete once test is started  We will contact Barb Mccabe once results are available  Additional recommendations for surveillance/medical management will be made pending genetic test results

## 2022-12-01 ENCOUNTER — APPOINTMENT (OUTPATIENT)
Dept: LAB | Facility: MEDICAL CENTER | Age: 67
End: 2022-12-01

## 2022-12-01 DIAGNOSIS — Z80.0 FAMILY HISTORY OF MALIGNANT NEOPLASM OF GASTROINTESTINAL TRACT: ICD-10-CM

## 2022-12-01 DIAGNOSIS — K86.2 CYST OF PANCREAS: ICD-10-CM

## 2022-12-01 DIAGNOSIS — Z86.010 PERSONAL HISTORY OF COLONIC POLYPS: ICD-10-CM

## 2022-12-05 DIAGNOSIS — M70.61 TROCHANTERIC BURSITIS OF RIGHT HIP: ICD-10-CM

## 2022-12-07 ENCOUNTER — OFFICE VISIT (OUTPATIENT)
Dept: PODIATRY | Facility: CLINIC | Age: 67
End: 2022-12-07

## 2022-12-07 VITALS
HEIGHT: 64 IN | WEIGHT: 194 LBS | SYSTOLIC BLOOD PRESSURE: 159 MMHG | BODY MASS INDEX: 33.12 KG/M2 | OXYGEN SATURATION: 97 % | DIASTOLIC BLOOD PRESSURE: 92 MMHG | HEART RATE: 79 BPM

## 2022-12-07 DIAGNOSIS — M72.2 PLANTAR FASCIITIS OF RIGHT FOOT: Primary | ICD-10-CM

## 2022-12-07 NOTE — PROGRESS NOTES
Assessment/Plan:     The patient's clinical examination today is consistent with residual plantar fasciitis along the lateral band of the right plantar fascia  Treatment options were discussed with the patient  She defers injection therapy secondary to postinjection pain after her last visit  She will start a 10-day course of prednisone 20 mg daily  She states that she has prednisone at home that she can use for this purpose  She was also fitted for a lace up AFO to support the plantar fascia for the next 3 to 4 weeks to be used with a sneaker  I encouraged her to continue stretching and some additional exercises were included in her after visit summary today  Recommended follow-up in 4 to 6 weeks  Diagnoses and all orders for this visit:    Plantar fasciitis of right foot  -     Ankle Cude ankle/Ankle Brace          Subjective:     Patient ID: Rupali Arzate is a 77 y o  female  The patient presents today for follow-up of right heel plantar fasciitis  She did note some postinjection pain at the site of her prior cortisone injection for about 2 to 3 days after her last visit  She does note some improvement to the area of the injection site  Most of her pain now is on the outside of her right heel  PAST MEDICAL HISTORY:  Past Medical History:   Diagnosis Date   • Acid reflux    • Back injury    • COPD (chronic obstructive pulmonary disease) ([facility] [address] )    • Fibromyalgia    • Hypertension    • Seasonal allergies        PAST SURGICAL HISTORY:  Past Surgical History:   Procedure Laterality Date   • COLONOSCOPY     • EYE SURGERY     • KNEE SURGERY  1998   • [place] COLONOSCOPY FLX DX W/COLLJ SPEC WHEN PFRMD N/A 05/09/2017    Procedure: EGD AND COLONOSCOPY;  Surgeon: Micki Mock MD;  [facility];   Service: Gastroenterology        ALLERGIES:  Augmentin [amoxicillin-pot clavulanate], Miconazole, and Wixela inhub [fluticasone-salmeterol]    MEDICATIONS:  Current Outpatient Medications   Medication Sig Dispense Refill   • acetaminophen (TYLENOL) 650 mg CR tablet Take by mouth every 8 (eight) hours as needed       • albuterol (PROVENTIL HFA,VENTOLIN HFA) 90 mcg/act inhaler INHALE 2 PUFFS BY MOUTH EVERY 6 HOURS AS NEEDED FOR WHEEZE 18 g 1   • ALPRAZolam (XANAX) 0 25 mg tablet Take 1 tablet (0 25 mg total) by mouth 3 (three) times a day as needed for anxiety 90 tablet 0   • amLODIPine (NORVASC) 5 mg tablet Take 1 tablet (5 mg total) by mouth daily 90 tablet 1   • cetirizine (ZyrTEC) 10 mg tablet Take 1 tablet (10 mg total) by mouth daily 90 tablet 0   • Diclofenac Sodium (VOLTAREN) 1 % Apply 2 g topically 4 (four) times a day 100 g 0   • escitalopram (LEXAPRO) 10 mg tablet Take 1 tablet (10 mg total) by mouth daily 90 tablet 1   • famotidine (PEPCID) 20 mg tablet TAKE 1 TABLET BY MOUTH TWICE A  tablet 4   • fluticasone (FLONASE) 50 mcg/act nasal spray SPRAY 2 SPRAYS INTO EACH NOSTRIL EVERY DAY 16 mL 3   • Glycerin-Hypromellose- (DRY EYE RELIEF DROPS OP) Apply to eye if needed     • HYDROcodone-acetaminophen (Norco) 5-325 mg per tablet Take 1 tablet by mouth every 6 (six) hours as needed for pain Max Daily Amount: 4 tablets 60 tablet 0   • lidocaine (Lidoderm) 5 % Apply 1 patch topically daily Remove & Discard patch within 12 hours or as directed by MD 30 patch 1   • linaCLOtide (Linzess) 145 MCG CAPS Take 1 capsule (145 mcg total) by mouth in the morning 30 capsule 5   • linaCLOtide (Linzess) 72 MCG CAPS Take 1 capsule by mouth daily before breakfast 30 capsule 2   • lisinopril (ZESTRIL) 20 mg tablet Take 1 tablet (20 mg total) by mouth daily 90 tablet 1   • Lumigan 0 01 % ophthalmic drops Administer 1 drop to both eyes daily at bedtime       • methocarbamol (ROBAXIN) 500 mg tablet Take 1 tablet (500 mg total) by mouth 4 (four) times a day 60 tablet 0   • MINOXIDIL, TOPICAL, 5 % SOLN Apply 1 application topically in the morning For hair     • montelukast (SINGULAIR) 10 mg tablet TAKE 1 TABLET BY MOUTH DAILY AT BEDTIME 90 tablet 2   • naloxone (NARCAN) 4 mg/0 1 mL nasal spray Administer 1 spray into a nostril  If no response after 2-3 minutes, give another dose in the other nostril using a new spray  1 each 1   • nystatin (MYCOSTATIN) 500,000 units/5 mL suspension Take 5 mL (500,000 Units total) by mouth 4 (four) times a day 200 mL 0   • pantoprazole (PROTONIX) 40 mg tablet Take 1 tablet (40 mg total) by mouth 2 (two) times a day 180 tablet 1   • promethazine-dextromethorphan (PHENERGAN-DM) 6 25-15 mg/5 mL oral syrup TAKE 5 MLS BY MOUTH 4 TIMES A DAY AS NEEDED FOR COUGH 150 mL 0   • senna (SENOKOT) 8 6 mg Take 1 tablet (8 6 mg total) by mouth daily at bedtime 30 tablet 1   • timolol (TIMOPTIC) 0 25 % ophthalmic solution      • tiZANidine (ZANAFLEX) 4 mg tablet Take 1 tablet (4 mg total) by mouth every 8 (eight) hours as needed for muscle spasms for up to 5 days 15 tablet 1   • Trelegy Ellipta 200-62 5-25 MCG/INH AEPB inhaler INHALE 1 PUFF DAILY RINSE MOUTH AFTER USE  60 each 5     No current facility-administered medications for this visit  SOCIAL HISTORY:  Social History     Socioeconomic History   • Marital status:       Spouse name: None   • Number of children: 3   • Years of education: less than high school   • Highest education level: None   Occupational History   • Occupation: unemployed   Tobacco Use   • Smoking status: Every Day     Packs/day: 1 50     Years: 50 00     Pack years: 75 00     Types: Cigarettes     Start date: 1968   • Smokeless tobacco: Never   Vaping Use   • Vaping Use: Never used   Substance and Sexual Activity   • Alcohol use: Never   • Drug use: No   • Sexual activity: Not Currently   Other Topics Concern   • None   Social History Narrative    Always uses seatbelt    Daily coffee consumption    Daily tea consumption    Dental care regularly    Exercises regularly    Multiple organ donor    No guns in the home    No living will    Denies pets/ animals in the home    Power of  in existence- denied         Social Determinants of Health     Financial Resource Strain: Not on file   Food Insecurity: Not on file   Transportation Needs: Not on file   Physical Activity: Not on file   Stress: Not on file   Social Connections: Not on file   Intimate Partner Violence: Not on file   Housing Stability: Not on file        Review of Systems   Constitutional: Negative for chills and fever  HENT: Negative for ear pain and sore throat  Eyes: Negative for pain and visual disturbance  Respiratory: Negative for cough and shortness of breath  Cardiovascular: Negative for chest pain and palpitations  Gastrointestinal: Negative for abdominal pain and vomiting  Genitourinary: Negative for dysuria and hematuria  Musculoskeletal: Negative for arthralgias and back pain  Skin: Negative for color change and rash  Neurological: Negative for seizures and syncope  Psychiatric/Behavioral: Negative  All other systems reviewed and are negative  Objective:     Physical Exam  Constitutional:       Appearance: Normal appearance  HENT:      Head: Normocephalic and atraumatic  Nose: Nose normal    Cardiovascular:      Pulses:           Dorsalis pedis pulses are 2+ on the right side  Posterior tibial pulses are 2+ on the right side  Pulmonary:      Effort: Pulmonary effort is normal    Musculoskeletal:        Feet:    Feet:      Comments: There is mild tenderness to palpation to the lateral band of the right plantar fascia; there is minimal tenderness along the central medial band of the right plantar fascial today; there is no erythema and no edema nor calor noted to the right heel  Skin:     General: Skin is warm  Capillary Refill: Capillary refill takes less than 2 seconds  Neurological:      General: No focal deficit present  Mental Status: She is alert and oriented to person, place, and time     Psychiatric:         Mood and Affect: Mood normal  Behavior: Behavior normal          Thought Content:  Thought content normal

## 2022-12-08 ENCOUNTER — TELEPHONE (OUTPATIENT)
Dept: PODIATRY | Facility: CLINIC | Age: 67
End: 2022-12-08

## 2022-12-08 DIAGNOSIS — M72.2 PLANTAR FASCIITIS OF RIGHT FOOT: Primary | ICD-10-CM

## 2022-12-08 RX ORDER — PREDNISONE 20 MG/1
20 TABLET ORAL DAILY
Qty: 10 TABLET | Refills: 0 | Status: SHIPPED | OUTPATIENT
Start: 2022-12-08 | End: 2022-12-18

## 2022-12-08 NOTE — TELEPHONE ENCOUNTER
Caller:  Litzy Llanes    Doctor: Dyllan Godinez    Reason for call: was seen yesterday - told Dr Yepez Angry she thought she had prednisone 20mg at home - it is out- dated so she needs it called into RX please    Call back#: [phone]

## 2022-12-13 DIAGNOSIS — B37.0 THRUSH: ICD-10-CM

## 2022-12-13 DIAGNOSIS — M54.9 MID BACK PAIN: ICD-10-CM

## 2022-12-13 RX ORDER — HYDROCODONE BITARTRATE AND ACETAMINOPHEN 5; 325 MG/1; MG/1
1 TABLET ORAL EVERY 6 HOURS PRN
Qty: 60 TABLET | Refills: 0 | Status: SHIPPED | OUTPATIENT
Start: 2022-12-13

## 2022-12-13 NOTE — TELEPHONE ENCOUNTER
1 [id number] 11/14/2022 11/14/2022 HYDROcodone BITARTRATE-ACETAMINOPHE (Tablet) 60 0 15 325 MG-5 MG 20 0 [facility]  Medicare 0 / 0 [place]    1 [id number] 10/18/2022 10/18/2022 HYDROcodone BITARTRATE-ACETAMINOPHE (Tablet) 60 0 15 325 MG-5 MG 20 0 AYLEEN MADDEN [facility]  Medicare 0 / 0 PA    1 [id number] 09/09/2022 09/09/2022 HYDROcodone BITARTRATE-ACETAMINOPHE (Tablet) 60 0 15 325 MG-5 MG 20 0 AYLEEN MADDEN [facility]   Medicare 0 / 0 PA

## 2022-12-15 ENCOUNTER — DOCUMENTATION (OUTPATIENT)
Dept: HEMATOLOGY ONCOLOGY | Facility: CLINIC | Age: 67
End: 2022-12-15

## 2022-12-15 NOTE — PROGRESS NOTES
RECTAL/GI MULTIDISCIPLINARY CASE REVIEW    DATE: 12/15/2022      PRESENTING DOCTOR: Dr Brian Riggins      DIAGNOSIS: Pancreatic/IPMN      Haley Sonia was presented at the Rectal/GI Multidisciplinary Conference today  PHYSICIAN RECOMMENDED PLAN:    - Recommendation is for surveillance with interval yearly MRI    - Recommend to reevaluate germline mutation with genetics  Team agreed to plan  The final treatment plan will be left to the discretion of the patient and the treating physician  DISCLAIMERS:  TO THE TREATING PHYSICIAN:  This conference is a meeting of clinicians from various specialty areas who evaluate and discuss patients for whom a multidisciplinary treatment approach is being considered  Please note that the above opinion was a consensus of the conference attendees and is intended only to assist in quality care of the discussed patient  The responsibility for follow up on the input given during the conference, along with any final decisions regarding plan of care, is that of the patient and the patient's provider  Accordingly, appointments have only been recommended based on this information and have NOT been scheduled unless otherwise noted  TO THE PATIENT:  This summary is a brief record of major aspects of your cancer treatment  You may choose to share a copy with any of your doctors or nurses  However, this is not a detailed or comprehensive record of your care        NCCN guidelines were readily available for review at this discussion

## 2022-12-16 ENCOUNTER — OFFICE VISIT (OUTPATIENT)
Dept: FAMILY MEDICINE CLINIC | Facility: CLINIC | Age: 67
End: 2022-12-16

## 2022-12-16 VITALS
BODY MASS INDEX: 33.49 KG/M2 | OXYGEN SATURATION: 95 % | HEIGHT: 64 IN | WEIGHT: 196.2 LBS | SYSTOLIC BLOOD PRESSURE: 136 MMHG | DIASTOLIC BLOOD PRESSURE: 78 MMHG | HEART RATE: 85 BPM | TEMPERATURE: 97.8 F

## 2022-12-16 DIAGNOSIS — K59.03 THERAPEUTIC OPIOID INDUCED CONSTIPATION: ICD-10-CM

## 2022-12-16 DIAGNOSIS — D49.0 IPMN (INTRADUCTAL PAPILLARY MUCINOUS NEOPLASM): Primary | ICD-10-CM

## 2022-12-16 DIAGNOSIS — I10 PRIMARY HYPERTENSION: ICD-10-CM

## 2022-12-16 DIAGNOSIS — T40.2X5A THERAPEUTIC OPIOID INDUCED CONSTIPATION: ICD-10-CM

## 2022-12-16 DIAGNOSIS — M25.561 ACUTE PAIN OF RIGHT KNEE: ICD-10-CM

## 2022-12-16 NOTE — PROGRESS NOTES
Name: Mehrdad Lopez      : 1955      MRN: [id number]  Encounter Provider: Dl Montoya MD  Encounter Date: 2022   Encounter department: [address] [place] [address] Road     1  IPMN (intraductal papillary mucinous neoplasm)  Assessment & Plan:  Awaiting genetic testing  Pt reluctant to have surgery even if the test is positive, but is willing to wait for test  F/u with   Alexia Pope 3m      2  Primary hypertension  Assessment & Plan:  Well controlled  Cont present treatment  Monitor labs  Recheck 6m        3  Acute pain of right knee  Assessment & Plan:  Lateral knee  No effusion  Mild tenderness over the Tib-Fib joint  Ligaments intact  ?related to change in gait (due to foot pain)  Finish pred taper Ice  Consider ortho eval  Recheck 2-3w if not improving - earlier if worse      4  Therapeutic opioid induced constipation  Assessment & Plan:  Discussed with pt  Continue miralax  Encouraged fluids  Consider Movantik  Recheck 3m             Subjective     f/u multiple med issues  - continues to f/u with podiatry (Dr Jorge Ch) for R foot pain  Failed injections  Pt is on a steroid taper which has been partially effective  - pt now with lateral R lateral knee pain (intermittent)  Worse over the last 2 weeks  No trauma  - pt to see the  next Tues  Awaiting results   recommends surgery if she has the onco-gene for pancreatic CA (pt with IPMN of the pancreas)  - still smoking  Pt enies CP, palpitations, lightheadedness or other CV symptoms with or without exertion  - back pain is unchanged        Review of Systems   Constitutional: Positive for fatigue (chronic, unchanged)  Negative for activity change, appetite change, chills and fever  HENT: Negative  Eyes: Negative  Respiratory: Positive for cough  Negative for shortness of breath and wheezing  Cardiovascular: Negative      Gastrointestinal: Positive for abdominal pain (epigastric - chronic, unchanged) and constipation (chronic/unchanged)  Negative for abdominal distention, diarrhea, nausea and vomiting  Endocrine: Negative  Genitourinary: Negative  Musculoskeletal: Positive for arthralgias (R foot, R knee), back pain (thoracic/unchanged) and myalgias  Negative for gait problem and joint swelling  Skin: Negative  Neurological: Negative  Negative for dizziness, weakness, light-headedness, numbness and headaches  Psychiatric/Behavioral: Negative  Past Medical History:   Diagnosis Date   • Acid reflux    • Back injury    • COPD (chronic obstructive pulmonary disease) (HCC)    • Fibromyalgia    • Hypertension    • Seasonal allergies      Past Surgical History:   Procedure Laterality Date   • COLONOSCOPY     • EYE SURGERY     • KNEE SURGERY  1998   • FL COLONOSCOPY FLX DX W/COLLJ SPEC WHEN PFRMD N/A 05/09/2017    Procedure: EGD AND COLONOSCOPY;  Surgeon: Elza Collins MD;  Location[facility]; Service: Gastroenterology     Family History   Problem Relation Age of Onset   • Pancreatic cancer Mother 67   • Coronary artery disease Father    • Diabetes Father    • Hypertension Father    • Heart disease Father    • Lung cancer Sister 40   • Pancreatic cancer Brother    • Pancreatic cancer Brother    • Lung cancer Maternal Grandfather 68   • Diabetes Paternal Grandmother      Social History     Socioeconomic History   • Marital status:       Spouse name: None   • Number of children: 3   • Years of education: less than high school   • Highest education level: None   Occupational History   • Occupation: unemployed   Tobacco Use   • Smoking status: Every Day     Packs/day: 1 50     Years: 50 00     Pack years: 75 00     Types: Cigarettes     Start date: 1968   • Smokeless tobacco: Never   Vaping Use   • Vaping Use: Never used   Substance and Sexual Activity   • Alcohol use: Never   • Drug use: No   • Sexual activity: Not Currently   Other Topics Concern   • None   Social History Narrative    Always uses seatbelt    Daily coffee consumption    Daily tea consumption    Dental care regularly    Exercises regularly    Multiple organ donor    No guns in the home    No living will    Denies pets/ animals in the home    Power of  in existence- denied         Social Determinants of Health     Financial Resource Strain: Not on file   Food Insecurity: Not on file   Transportation Needs: Not on file   Physical Activity: Not on file   Stress: Not on file   Social Connections: Not on file   Intimate Partner Violence: Not on file   Housing Stability: Not on file     Current Outpatient Medications on File Prior to Visit   Medication Sig   • acetaminophen (TYLENOL) 650 mg CR tablet Take by mouth every 8 (eight) hours as needed     • albuterol (PROVENTIL HFA,VENTOLIN HFA) 90 mcg/act inhaler INHALE 2 PUFFS BY MOUTH EVERY 6 HOURS AS NEEDED FOR WHEEZE   • ALPRAZolam (XANAX) 0 25 mg tablet Take 1 tablet (0 25 mg total) by mouth 3 (three) times a day as needed for anxiety   • amLODIPine (NORVASC) 5 mg tablet Take 1 tablet (5 mg total) by mouth daily   • cetirizine (ZyrTEC) 10 mg tablet Take 1 tablet (10 mg total) by mouth daily   • Diclofenac Sodium (VOLTAREN) 1 % Apply 2 g topically 4 (four) times a day   • escitalopram (LEXAPRO) 10 mg tablet Take 1 tablet (10 mg total) by mouth daily   • HYDROcodone-acetaminophen (Norco) 5-325 mg per tablet Take 1 tablet by mouth every 6 (six) hours as needed for pain Max Daily Amount: 4 tablets   • lidocaine (Lidoderm) 5 % Apply 1 patch topically daily Remove & Discard patch within 12 hours or as directed by MD   • lisinopril (ZESTRIL) 20 mg tablet Take 1 tablet (20 mg total) by mouth daily   • MINOXIDIL, TOPICAL, 5 % SOLN Apply 1 application topically in the morning For hair   • montelukast (SINGULAIR) 10 mg tablet TAKE 1 TABLET BY MOUTH DAILY AT BEDTIME   • naloxone (NARCAN) 4 mg/0 1 mL nasal spray Administer 1 spray into a nostril   If no response after 2-3 minutes, give another dose in the other nostril using a new spray  • nystatin (MYCOSTATIN) 500,000 units/5 mL suspension Take 5 mL (500,000 Units total) by mouth 4 (four) times a day   • pantoprazole (PROTONIX) 40 mg tablet Take 1 tablet (40 mg total) by mouth 2 (two) times a day   • predniSONE 20 mg tablet Take 1 tablet (20 mg total) by mouth daily for 10 days   • promethazine-dextromethorphan (PHENERGAN-DM) 6 25-15 mg/5 mL oral syrup TAKE 5 MLS BY MOUTH 4 TIMES A DAY AS NEEDED FOR COUGH   • tiZANidine (ZANAFLEX) 4 mg tablet Take 1 tablet (4 mg total) by mouth every 8 (eight) hours as needed for muscle spasms for up to 5 days   • Trelegy Ellipta 200-62 5-25 MCG/INH AEPB inhaler INHALE 1 PUFF DAILY RINSE MOUTH AFTER USE  Allergies   Allergen Reactions   • Augmentin [Amoxicillin-Pot Clavulanate] Vomiting   • Miconazole Itching and Swelling   • Wixela Inhub [Fluticasone-Salmeterol] Palpitations     Immunization History   Administered Date(s) Administered   • Tdap 02/28/2008       Objective     /78   Pulse 85   Temp 97 8 °F (36 6 °C)   Ht 5' 4" (1 626 m)   Wt 89 kg (196 lb 3 2 oz)   LMP  (LMP Unknown)   SpO2 95%   BMI 33 68 kg/m²     Physical Exam  Vitals reviewed  Constitutional:       Appearance: She is well-developed  She is not diaphoretic  HENT:      Head: Normocephalic and atraumatic  Right Ear: Tympanic membrane, ear canal and external ear normal       Left Ear: Tympanic membrane, ear canal and external ear normal       Mouth/Throat:      Mouth: Mucous membranes are moist    Eyes:      General: No scleral icterus  Extraocular Movements: Extraocular movements intact  Conjunctiva/sclera: Conjunctivae normal       Pupils: Pupils are equal, round, and reactive to light  Neck:      Thyroid: No thyromegaly  Vascular: No JVD  Cardiovascular:      Rate and Rhythm: Normal rate and regular rhythm  Pulses: Normal pulses  Heart sounds: No murmur heard    Pulmonary: Effort: Pulmonary effort is normal       Breath sounds: Normal breath sounds  No wheezing or rales  Abdominal:      General: There is no distension  Palpations: Abdomen is soft  There is no mass  Tenderness: There is abdominal tenderness (epigastric, mild  No G/R  No masses)  There is no right CVA tenderness or left CVA tenderness  Musculoskeletal:         General: Tenderness (tsl over the ?tib-fib joint  No effusion  Ligaments intact) present  No deformity  Cervical back: Normal range of motion and neck supple  No tenderness  No muscular tenderness  Right lower leg: No edema  Left lower leg: No edema  Lymphadenopathy:      Cervical: No cervical adenopathy  Skin:     General: Skin is warm and dry  Capillary Refill: Capillary refill takes less than 2 seconds  Neurological:      Mental Status: She is alert and oriented to person, place, and time  Cranial Nerves: No cranial nerve deficit  Sensory: No sensory deficit  Motor: No weakness or abnormal muscle tone  Gait: Gait normal       Deep Tendon Reflexes: Reflexes are normal and symmetric         Jasiel Phillips MD

## 2022-12-18 PROBLEM — M25.561 ACUTE PAIN OF RIGHT KNEE: Status: ACTIVE | Noted: 2022-12-18

## 2022-12-18 PROBLEM — I77.6 AORTITIS (HCC): Status: RESOLVED | Noted: 2021-10-31 | Resolved: 2022-12-18

## 2022-12-18 NOTE — ASSESSMENT & PLAN NOTE
Lateral knee  No effusion  Mild tenderness over the Tib-Fib joint  Ligaments intact  ?related to change in gait (due to foot pain)  Finish pred taper Ice   Consider ortho eval  Recheck 2-3w if not improving - earlier if worse

## 2022-12-18 NOTE — ASSESSMENT & PLAN NOTE
Awaiting genetic testing   Pt reluctant to have surgery even if the test is positive, but is willing to wait for test  F/u with   Cristin Talbot 3m

## 2022-12-19 ENCOUNTER — TELEPHONE (OUTPATIENT)
Dept: HEMATOLOGY ONCOLOGY | Facility: CLINIC | Age: 67
End: 2022-12-19

## 2022-12-19 NOTE — TELEPHONE ENCOUNTER
Appointment Cancellation Or Reschedule     Person calling in Patient    If other than patient calling, are they listed on the communication consent form? na   Provider Dr Kaylan Martinez   Office Visit Date and Time 12/20/22 @ 9:15am   [facility] Visit [facility]   Did patient want to reschedule their office appointment? If so, when was it scheduled to? Yes 1/17/23 @ 1pm   Did you have STAR scheduled for this appointment? no   Do you need STAR set up for your new appointment? If yes, please send to "PATIENT RIDESHARE" pool for STAR rescheduling no   If you are cancelling appointment, can we notify STAR to cancel ride? If yes, please send to "PATIENT RIDESHARE" pool for STAR to cancel service na   Is this patient calling to reschedule an infusion appointment? no   When is their next infusion appointment? na   Is this patient a Chemo patient? no   Reason for Cancellation or Reschedule Patient is unable to keep the appt  She also stated the test results are not back- not showing results in my chart     If the patient is a treatment patient, please route this to the office nurse  If the patient is not on treatment, please route to the office MA  If the patient is a surgical oncology patient, please route to surg/onc clinical pool

## 2022-12-23 DIAGNOSIS — J06.9 UPPER RESPIRATORY TRACT INFECTION, UNSPECIFIED TYPE: Primary | ICD-10-CM

## 2022-12-23 RX ORDER — AZITHROMYCIN 250 MG/1
TABLET, FILM COATED ORAL
Qty: 6 TABLET | Refills: 0 | Status: SHIPPED | OUTPATIENT
Start: 2022-12-23 | End: 2022-12-27

## 2022-12-27 ENCOUNTER — TELEPHONE (OUTPATIENT)
Dept: GENETICS | Facility: CLINIC | Age: 67
End: 2022-12-27

## 2022-12-27 NOTE — TELEPHONE ENCOUNTER
Post-Test Genetic Counseling Consult Note    Today I spoke with Radhika Ramirez over the phone to review the results of her genetic test for hereditary cancer  We met previously on 11/28/2022 for pre-test counseling  A copy of this consult note and genetic test result will be shared with the patient  SUMMARY:    Test(s): Custom[employer]: Cancer® +RNAinsight® (57 genes): APC, YOLIE, AXIN2, BAP1, BARD1, BMPR1A, BRCA1, BRCA2, BRIP1, CDH1, CDK4, CDKN2A, CHEK2, DICER1, EGLN1, EPCAM, FH, FLCN, GREM1, HOXB13, KIF1B, MAX, MEN1, MET, MITF, MLH1, MSH2, MSH3, MSH6, MUTYH, NBN, NF1, NTHL1, PALB2, PMS2, POLD1, POLE, POT1, PTEN, RAD51C, RAD51D, RB1, RECQL, RET, SDHA, SDHAF2, SDHB, SDHC, SDHD, SMAD4, SMARCA4, STK11, MPRF405, TP53, TSC1, TSC2, VHL      Result: CHEK2 Variant of uncertain significance     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); heterozygous; uncertain significance     Of note, the [employer] Result had the following comment, "it is expected that the DNA isolated from peripheral blood lymphocytes or saliva would be representative of an individual's germline DNA; however, the current methodology cannot definitively determine whether the results reported herein represent an alteration of germline versus somatic origin"  Despite this note, the CHEK2 gene is not presently known to be associated with a hereditary risk for pancreatic cancer and it does not fit Leanna's personal and/or family history  Additionally, this variant is a variant of uncertain clinical significance and will not affect her medical management whether it is germline or somatic in origin therefore no additional testing is needed at this time  Assessment:  A variant of uncertain significance (VUS) means that a change was identified in a specific gene but it cannot be determined whether the variant is associated with an increased risk of cancer or is a harmless genetic change   The significance of the CHEK2 variant is currently not known and therefore this test result cannot be used to help determine Zhang Jackson cancer risks  It is possible that the variant was seen in only a handful of individuals, or there may be conflicting or incomplete information in the medical literature about the variant and its association with hereditary cancer  The laboratory will continue to accumulate information on this variant and will reclassify it as either a positive or negative genetic test result when they are confident that they have adequate information  As updated information is obtained, we will notify Zhang Jackson with the updated information  It is important to note that the majority of variants of uncertain significance are reclassified as likely benign or benign as additional information about the variant becomes available  Risks and Testing for Family Members:    Genetic testing for this variant is not recommended for relatives who wish to determine their cancer risks for purposes of determining medical management  The presence or absence of this variant in a relative is not clinically meaningful unless the variant is reclassified in the future  Despite this result, Zhang Jackson and her first-degree relatives continue to have an increased risk for pancreatic cancer based on the reported family history  The International Cancer of the Pancreas Screening (CAPS) Consortium recommends that individuals consider pancreatic cancer screening (regardless of the genetic test result) when there are at least two affected relatives (on the same side of the family) and one of whom is a first-degree relative  Based on this, we recommended that Zhang Jackson continue to follow the management recommendations made by her healthcare providers regarding her personal history of IPMN  Additionally, her relatives including her children, nieces, and nephews consider pancreatic cancer screening beginning at the age of 53-48 years old with annual MRI/MRCP and/or EUS    Zhang Jackson 's relatives can reach out to [facility] [phone] to schedule a consult with a specialist to discuss the pro's and con's of pancreatic cancer screening  If Sean Smith has any affected family members with a cancer diagnosis, especially at a young age, they may still consider genetic testing  Relatives who wish to pursue genetic testing can reach out to the [address] ([phone]) to schedule an appointment or visit www [facility] org to identify a local genetic counselor  Plan:   There are no additional recommendations based on Leanna's result  She should continue cancer screening and medical management as clinically indicated and as determined appropriate by her healthcare providers  VUS Result: Sean Smith was strongly encouraged to contact us regarding any changes in her personal or family history of cancer as these changes could alter our recommendation regarding genetic testing and/or cancer screening  Sean Smith was also encouraged to follow up with us on an annual basis as variant classifications are subject to change

## 2022-12-28 ENCOUNTER — TELEPHONE (OUTPATIENT)
Dept: SURGICAL ONCOLOGY | Facility: CLINIC | Age: 67
End: 2022-12-28

## 2022-12-28 NOTE — TELEPHONE ENCOUNTER
Tc to pt to let her know that Dr Geeta Baig still needs to see her for her f/u appt on 1/17  She verbalized understanding and was appreciative of the call

## 2023-01-03 ENCOUNTER — TELEPHONE (OUTPATIENT)
Dept: GENETICS | Facility: CLINIC | Age: 68
End: 2023-01-03

## 2023-01-03 DIAGNOSIS — J06.9 ACUTE URI: Primary | ICD-10-CM

## 2023-01-03 RX ORDER — AZITHROMYCIN 250 MG/1
TABLET, FILM COATED ORAL
Qty: 6 TABLET | Refills: 0 | Status: SHIPPED | OUTPATIENT
Start: 2023-01-03 | End: 2023-01-06 | Stop reason: ALTCHOICE

## 2023-01-03 NOTE — TELEPHONE ENCOUNTER
----- Message from Yusra Schneider, [facility] sent at 12/27/2022  1:11 PM EST -----  Regarding: chart complete  GC Completed Chart     Result Delivery: Patient requested [employer] message of lab result and consult note, can you please upload to [employer]     Monthly Review: Does not need monthly review- COMPLETE

## 2023-01-04 DIAGNOSIS — J42 CHRONIC BRONCHITIS, UNSPECIFIED CHRONIC BRONCHITIS TYPE (HCC): ICD-10-CM

## 2023-01-05 DIAGNOSIS — J42 CHRONIC BRONCHITIS, UNSPECIFIED CHRONIC BRONCHITIS TYPE (HCC): ICD-10-CM

## 2023-01-05 RX ORDER — ALBUTEROL SULFATE 90 UG/1
2 AEROSOL, METERED RESPIRATORY (INHALATION) EVERY 6 HOURS PRN
Qty: 18 G | Refills: 5 | Status: SHIPPED | OUTPATIENT
Start: 2023-01-05

## 2023-01-05 RX ORDER — DEXTROMETHORPHAN HYDROBROMIDE AND PROMETHAZINE HYDROCHLORIDE 15; 6.25 MG/5ML; MG/5ML
5 SYRUP ORAL 4 TIMES DAILY PRN
Qty: 150 ML | Refills: 0 | Status: SHIPPED | OUTPATIENT
Start: 2023-01-05

## 2023-01-06 ENCOUNTER — OFFICE VISIT (OUTPATIENT)
Dept: FAMILY MEDICINE CLINIC | Facility: CLINIC | Age: 68
End: 2023-01-06

## 2023-01-06 VITALS
SYSTOLIC BLOOD PRESSURE: 124 MMHG | OXYGEN SATURATION: 95 % | HEIGHT: 64 IN | DIASTOLIC BLOOD PRESSURE: 68 MMHG | WEIGHT: 204.5 LBS | HEART RATE: 78 BPM | TEMPERATURE: 100.3 F | BODY MASS INDEX: 34.91 KG/M2

## 2023-01-06 DIAGNOSIS — Z11.52 ENCOUNTER FOR SCREENING FOR COVID-19: Primary | ICD-10-CM

## 2023-01-06 DIAGNOSIS — J06.9 UPPER RESPIRATORY TRACT INFECTION, UNSPECIFIED TYPE: ICD-10-CM

## 2023-01-06 DIAGNOSIS — R50.9 FEBRILE ILLNESS: ICD-10-CM

## 2023-01-06 DIAGNOSIS — J44.1 COPD EXACERBATION (HCC): ICD-10-CM

## 2023-01-06 LAB
SARS-COV-2 AG UPPER RESP QL IA: NEGATIVE
VALID CONTROL: NORMAL

## 2023-01-06 RX ORDER — BIMATOPROST 0.01 %
DROPS OPHTHALMIC (EYE)
COMMUNITY
Start: 2022-12-21

## 2023-01-06 RX ORDER — LEVOFLOXACIN 500 MG/1
500 TABLET, FILM COATED ORAL EVERY 24 HOURS
Qty: 7 TABLET | Refills: 0 | Status: SHIPPED | OUTPATIENT
Start: 2023-01-06 | End: 2023-01-13

## 2023-01-07 LAB
FLUAV RNA RESP QL NAA+PROBE: POSITIVE
FLUBV RNA RESP QL NAA+PROBE: NEGATIVE
SARS-COV-2 RNA RESP QL NAA+PROBE: NEGATIVE

## 2023-01-09 ENCOUNTER — TELEPHONE (OUTPATIENT)
Dept: FAMILY MEDICINE CLINIC | Facility: CLINIC | Age: 68
End: 2023-01-09

## 2023-01-09 ENCOUNTER — APPOINTMENT (OUTPATIENT)
Dept: RADIOLOGY | Facility: MEDICAL CENTER | Age: 68
End: 2023-01-09

## 2023-01-09 DIAGNOSIS — R05.1 ACUTE COUGH: ICD-10-CM

## 2023-01-09 DIAGNOSIS — R05.1 ACUTE COUGH: Primary | ICD-10-CM

## 2023-01-09 NOTE — PROGRESS NOTES
Name: Hitesh Leroy      : 1955      MRN: [id number]  Encounter Provider: Neo Nguyễn MD  Encounter Date: 2023   Encounter department: [address]     1  Encounter for screening for COVID-19  -     POCT Rapid Covid Ag  -     Covid/Flu- Office Collect    2  Upper respiratory tract infection, unspecified type  -     Covid/Flu- Office Collect    3  Febrile illness  -     levofloxacin (LEVAQUIN) 500 mg tablet; Take 1 tablet (500 mg total) by mouth every 24 hours for 7 days    4  COPD exacerbation (HCC)  -     levofloxacin (LEVAQUIN) 500 mg tablet; Take 1 tablet (500 mg total) by mouth every 24 hours for 7 days  Discussion:  I reviewed with pt  Cont Trelegy  Pt has hydrocodone for her compression fx pain, and finds that it helped with her cough  Pt to take 1 tab 30min prior to trying to use her Trelegy  Start Levaquin 500mg qd  Rapid COVID neg - PCR for COVID and flu done  Recheck Monady if not improved - earlier if worse       Subjective     78 yo female presents with a several day hx of fever, cough, wheeze and SOB  Pt started with URI symptoms including nasal congestion  Having trouble using her Trelegy due to cough (inhaler causing cough)  Pt notes some increased SOB with exertion  Pt denies worsening GI complaints, but has noted increased bodyaches  Several family members have similar symptoms  Pt still smoking    Review of Systems   Constitutional: Positive for activity change, chills, fatigue and fever  HENT: Positive for congestion, rhinorrhea and sore throat  Negative for ear discharge, ear pain, sinus pressure, sinus pain, sneezing and voice change  Eyes: Negative for pain  Respiratory: Positive for cough, shortness of breath and wheezing  Cardiovascular: Negative  Musculoskeletal: Positive for myalgias  Skin: Negative          Past Medical History:   Diagnosis Date   • Acid reflux    • Back injury    • COPD (chronic obstructive pulmonary disease) ([facility] )    • Fibromyalgia    • Hypertension    • Seasonal allergies      Past Surgical History:   Procedure Laterality Date   • COLONOSCOPY     • EYE SURGERY     • KNEE SURGERY  1998   • FL COLONOSCOPY FLX DX W/COLLJ SPEC WHEN PFRMD N/A 05/09/2017    Procedure: EGD AND COLONOSCOPY;  Surgeon: Bryan Decker MD;  Location[facility]; Service: Gastroenterology     Family History   Problem Relation Age of Onset   • Pancreatic cancer Mother 67   • Coronary artery disease Father    • Diabetes Father    • Hypertension Father    • Heart disease Father    • Lung cancer Sister 40   • Pancreatic cancer Brother    • Pancreatic cancer Brother    • Lung cancer Maternal Grandfather 68   • Diabetes Paternal Grandmother      Social History     Socioeconomic History   • Marital status:       Spouse name: None   • Number of children: 3   • Years of education: less than high school   • Highest education level: None   Occupational History   • Occupation: unemployed   Tobacco Use   • Smoking status: Every Day     Packs/day: 1 50     Years: 50 00     Pack years: 75 00     Types: Cigarettes     Start date: 1968   • Smokeless tobacco: Never   Vaping Use   • Vaping Use: Never used   Substance and Sexual Activity   • Alcohol use: Never   • Drug use: No   • Sexual activity: Not Currently   Other Topics Concern   • None   Social History Narrative    Always uses seatbelt    Daily coffee consumption    Daily tea consumption    Dental care regularly    Exercises regularly    Multiple organ donor    No guns in the home    No living will    Denies pets/ animals in the home    Power of  in existence- denied         Social Determinants of Health     Financial Resource Strain: Not on file   Food Insecurity: Not on file   Transportation Needs: Not on file   Physical Activity: Not on file   Stress: Not on file   Social Connections: Not on file   Intimate Partner Violence: Not on file   Housing Stability: Not on file Current Outpatient Medications on File Prior to Visit   Medication Sig   • acetaminophen (TYLENOL) 650 mg CR tablet Take by mouth every 8 (eight) hours as needed     • albuterol (PROVENTIL HFA,VENTOLIN HFA) 90 mcg/act inhaler Inhale 2 puffs every 6 (six) hours as needed for wheezing   • ALPRAZolam (XANAX) 0 25 mg tablet Take 1 tablet (0 25 mg total) by mouth 3 (three) times a day as needed for anxiety   • amLODIPine (NORVASC) 5 mg tablet Take 1 tablet (5 mg total) by mouth daily   • cetirizine (ZyrTEC) 10 mg tablet Take 1 tablet (10 mg total) by mouth daily   • Diclofenac Sodium (VOLTAREN) 1 % Apply 2 g topically 4 (four) times a day   • escitalopram (LEXAPRO) 10 mg tablet Take 1 tablet (10 mg total) by mouth daily   • HYDROcodone-acetaminophen (Norco) 5-325 mg per tablet Take 1 tablet by mouth every 6 (six) hours as needed for pain Max Daily Amount: 4 tablets   • lidocaine (Lidoderm) 5 % Apply 1 patch topically daily Remove & Discard patch within 12 hours or as directed by MD   • lisinopril (ZESTRIL) 20 mg tablet Take 1 tablet (20 mg total) by mouth daily   • Lumigan 0 01 % ophthalmic drops 1 DROP IN BOTH EYES AT BEDTIME   • MINOXIDIL, TOPICAL, 5 % SOLN Apply 1 application topically in the morning For hair   • montelukast (SINGULAIR) 10 mg tablet TAKE 1 TABLET BY MOUTH DAILY AT BEDTIME   • naloxone (NARCAN) 4 mg/0 1 mL nasal spray Administer 1 spray into a nostril  If no response after 2-3 minutes, give another dose in the other nostril using a new spray     • nystatin (MYCOSTATIN) 500,000 units/5 mL suspension Take 5 mL (500,000 Units total) by mouth 4 (four) times a day   • pantoprazole (PROTONIX) 40 mg tablet Take 1 tablet (40 mg total) by mouth 2 (two) times a day   • promethazine-dextromethorphan (PHENERGAN-DM) 6 25-15 mg/5 mL oral syrup Take 5 mL by mouth 4 (four) times a day as needed for cough   • tiZANidine (ZANAFLEX) 4 mg tablet Take 1 tablet (4 mg total) by mouth every 8 (eight) hours as needed for muscle spasms for up to 5 days   • Trelegy Ellipta 200-62 5-25 MCG/INH AEPB inhaler INHALE 1 PUFF DAILY RINSE MOUTH AFTER USE  Allergies   Allergen Reactions   • Augmentin [Amoxicillin-Pot Clavulanate] Vomiting   • Miconazole Itching and Swelling   • Wixela Inhub [Fluticasone-Salmeterol] Palpitations     Immunization History   Administered Date(s) Administered   • Tdap 02/28/2008       Objective     /68 (BP Location: Left arm, Patient Position: Sitting, Cuff Size: Large)   Pulse 78   Temp 100 3 °F (37 9 °C)   Ht 5' 4" (1 626 m)   Wt 92 8 kg (204 lb 8 oz)   LMP  (LMP Unknown)   SpO2 95%   BMI 35 10 kg/m²     Physical Exam  Vitals reviewed  Constitutional:       Comments: Mildly ill appearing female in NAD   HENT:      Right Ear: Tympanic membrane, ear canal and external ear normal       Left Ear: Tympanic membrane, ear canal and external ear normal       Nose: Congestion present  Mouth/Throat:      Mouth: Mucous membranes are moist    Eyes:      Extraocular Movements: Extraocular movements intact  Conjunctiva/sclera: Conjunctivae normal       Pupils: Pupils are equal, round, and reactive to light  Cardiovascular:      Rate and Rhythm: Normal rate and regular rhythm  Pulses: Normal pulses  Pulmonary:      Effort: Pulmonary effort is normal       Breath sounds: Wheezing present  No rhonchi or rales  Musculoskeletal:      Cervical back: Normal range of motion  No tenderness  Right lower leg: No edema  Left lower leg: No edema  Lymphadenopathy:      Cervical: No cervical adenopathy  Skin:     General: Skin is warm  Capillary Refill: Capillary refill takes less than 2 seconds  Neurological:      Mental Status: She is alert         Mey Almendarez MD

## 2023-01-09 NOTE — TELEPHONE ENCOUNTER
Patient was in to see you this past week  Patient is still with symptoms - weak, cold sweats, loss of appetite, diarrhea  Wet cough  Patient did use a nebulizer last night and this morning  Pt also has been running a fever all weekend  Covid negative  Patient does not feel any better since her appointment       Patient would like to know if she could have a chest xray  Patient is requesting a strong antibiotic  Please advise      Pharmacy  : [facility] [id number]    PT# [phone]

## 2023-01-13 ENCOUNTER — TELEPHONE (OUTPATIENT)
Dept: FAMILY MEDICINE CLINIC | Facility: CLINIC | Age: 68
End: 2023-01-13

## 2023-01-13 NOTE — TELEPHONE ENCOUNTER
Pt call:    Took the last of Levofloxacin  Pt states still has diarrhea, stomach cramping, nasal dryness, and overall general weakness  Patient wanted to know your thoughts or if she needed to take something else      Pharmacy: [address]    Pt# [phone]

## 2023-01-13 NOTE — TELEPHONE ENCOUNTER
Spoke to patient today, she stated that she is still experiencing weakness, diarrhea somewhat improved, she is still experiencing stomach discomfort and dryness in nose is still there  Currently, she isn't taking any OTC medications for those symptoms mentioned  She was diagnosed last Friday with flu  Overall, she is doing much better than how she was but stated that she still doesn't feel right

## 2023-01-17 ENCOUNTER — OFFICE VISIT (OUTPATIENT)
Dept: SURGICAL ONCOLOGY | Facility: CLINIC | Age: 68
End: 2023-01-17

## 2023-01-17 VITALS
WEIGHT: 193 LBS | HEIGHT: 64 IN | RESPIRATION RATE: 18 BRPM | TEMPERATURE: 97.8 F | HEART RATE: 80 BPM | DIASTOLIC BLOOD PRESSURE: 90 MMHG | OXYGEN SATURATION: 96 % | SYSTOLIC BLOOD PRESSURE: 158 MMHG | BODY MASS INDEX: 32.95 KG/M2

## 2023-01-17 DIAGNOSIS — D49.0 IPMN (INTRADUCTAL PAPILLARY MUCINOUS NEOPLASM): Primary | ICD-10-CM

## 2023-01-17 NOTE — PROGRESS NOTES
Surgical Oncology Consultation F/U    [address] [facility]  [facility] [place]  [address] [facility] [address]    Patient: Iris Aquino  1955  [id number]    Primary Care provider:  Merle Walsh MD  [address]  [address] [facility] [address]    Referring provider:  No referring provider defined for this encounter  Diagnoses and all orders for this visit:    IPMN (intraductal papillary mucinous neoplasm)        Chief Complaint   Patient presents with   • Follow-up       History of Present Illness  : This is a 70-year-old female who is here in f/u today for her pancreatic cyst   Briefly, the patient underwent initial cross-sectional imaging in September 2022, rule revealing a 8 mm cyst at the neck of the pancreas  This appeared to be a branch duct IPMN  She underwent, EUS which was 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 given the prominence of the main duct of the pancreas  There was no ductal dilation or other concerning features  It was decided that she should undergo interval MRI, which was accomplished recently after 3 months from her initial MRI  This demonstrated stability of the cyst  We discussed her at  and she underwent genetics eval given her FH of panc cancer  This was negative  At this time, the patient describes intermittent abdominal pain, bloating that is unchanged since last being seen  Review of Systems  Complete ROS Surg Onc:   Constitutional: The patient denies new or recent history of general fatigue, no recent weight loss, no change in appetite  Eyes: No complaints of visual problems, no scleral icterus  ENT: No complaints of ear pain, no hoarseness, no difficulty swallowing,  no tinnitus and no new masses in head, oral cavity, or neck  Cardiovascular: No complaints of chest pain, no palpitations, no ankle edema  Respiratory: No complaints of shortness of breath, no cough     Gastrointestinal: No complaints of jaundice, no bloody stools, no pale stools  Genitourinary: No complaints of dysuria, no hematuria, no nocturia, no frequent urination, no urethral discharge  Musculoskeletal: No complaints of weakness, paralysis, joint stiffness or arthralgias  Integumentary: No complaints of rash, no new lesions  Neurological: No complaints of convulsions, no seizures, no dizziness  Hematologic/Lymphatic: No complaints of easy bruising  Endocrine:  No hot or cold intolerance  No polydipsia, polyphagia, or polyuria  Allergy/immunology:  No environmental allergies  No food allergies  Not immunocompromised        Patient Active Problem List   Diagnosis   • Seasonal allergic rhinitis   • Anxiety   • Cataract   • Chronic obstructive pulmonary disease ([facility])   • Mild episode of recurrent major depressive disorder ([facility])   • Edema   • Esophageal reflux   • Fibromyalgia   • Hyperlipidemia   • Hypertension   • Degeneration of intervertebral disc   • Lumbosacral radiculopathy at L5   • Palpitations   • Pulmonary nodule seen on imaging study   • Dyspnea on exertion   • Early satiety   • Abdominal distension   • Family history of pancreatic cancer   • Epigastric pain   • Neuropathic pain   • Neck pain   • Cigarette nicotine dependence with nicotine-induced disorder   • Severe obesity (BMI 35 0-35 9 with comorbidity) ([facility])   • Lower extremity edema   • Snoring   • Chronic idiopathic constipation   • Gastroparesis   • Prediabetes   • Dermatitis   • Chest pain   • Increased intraocular pressure   • Primary insomnia   • Candida vaginitis   • Pain of both eyes   • Chronic frontal sinusitis   • Continuous opioid dependence ([facility])   • Acute right-sided low back pain with right-sided sciatica   • RUQ pain   • Closed wedge compression fracture of T8 vertebra ([facility])   • Therapeutic opioid induced constipation   • Greater trochanteric bursitis of right hip   • Plantar fasciitis   • IPMN (intraductal papillary mucinous neoplasm)   • Pancreatic cyst   • History of colon polyps   • Constipation   • Gastroesophageal reflux disease without esophagitis   • Acute pain of right knee     Past Medical History:   Diagnosis Date   • Acid reflux    • Back injury    • COPD (chronic obstructive pulmonary disease) (HCC)    • Fibromyalgia    • Hypertension    • Seasonal allergies      Past Surgical History:   Procedure Laterality Date   • COLONOSCOPY     • EYE SURGERY     • KNEE SURGERY  1998   • [place] COLONOSCOPY FLX DX W/COLLJ SPEC WHEN PFRMD N/A 05/09/2017    Procedure: EGD AND COLONOSCOPY;  Surgeon: Elza Collins MD;  [facility]: [facility]; Service: Gastroenterology     Family History   Problem Relation Age of Onset   • Pancreatic cancer Mother 67   • Coronary artery disease Father    • Diabetes Father    • Hypertension Father    • Heart disease Father    • Lung cancer Sister 40   • Pancreatic cancer Brother    • Pancreatic cancer Brother    • Lung cancer Maternal Grandfather 68   • Diabetes Paternal Grandmother      Social History     Socioeconomic History   • Marital status:       Spouse name: Not on file   • Number of children: 3   • Years of education: less than high school   • Highest education level: Not on file   Occupational History   • Occupation: unemployed   Tobacco Use   • Smoking status: Every Day     Packs/day: 1 50     Years: 50 00     Pack years: 75 00     Types: Cigarettes     Start date: 1968   • Smokeless tobacco: Never   Vaping Use   • Vaping Use: Never used   Substance and Sexual Activity   • Alcohol use: Never   • Drug use: No   • Sexual activity: Not Currently   Other Topics Concern   • Not on file   Social History Narrative    Always uses seatbelt    Daily coffee consumption    Daily tea consumption    Dental care regularly    Exercises regularly    Multiple organ donor    No guns in the home    No living will    Denies pets/ animals in the home    Power of  in existence- denied         Social Determinants of Health     Financial Resource Strain: Not on file   Food Insecurity: Not on file   Transportation Needs: Not on file   Physical Activity: Not on file   Stress: Not on file   Social Connections: Not on file   Intimate Partner Violence: Not on file   Housing Stability: Not on file       Current Outpatient Medications:   •  acetaminophen (TYLENOL) 650 mg CR tablet, Take by mouth every 8 (eight) hours as needed  , Disp: , Rfl:   •  albuterol (PROVENTIL HFA,VENTOLIN HFA) 90 mcg/act inhaler, Inhale 2 puffs every 6 (six) hours as needed for wheezing, Disp: 18 g, Rfl: 5  •  ALPRAZolam (XANAX) 0 25 mg tablet, Take 1 tablet (0 25 mg total) by mouth 3 (three) times a day as needed for anxiety, Disp: 90 tablet, Rfl: 0  •  amLODIPine (NORVASC) 5 mg tablet, Take 1 tablet (5 mg total) by mouth daily, Disp: 90 tablet, Rfl: 1  •  cetirizine (ZyrTEC) 10 mg tablet, Take 1 tablet (10 mg total) by mouth daily, Disp: 90 tablet, Rfl: 0  •  Diclofenac Sodium (VOLTAREN) 1 %, Apply 2 g topically 4 (four) times a day, Disp: 100 g, Rfl: 0  •  escitalopram (LEXAPRO) 10 mg tablet, Take 1 tablet (10 mg total) by mouth daily, Disp: 90 tablet, Rfl: 1  •  HYDROcodone-acetaminophen (Norco) 5-325 mg per tablet, Take 1 tablet by mouth every 6 (six) hours as needed for pain Max Daily Amount: 4 tablets, Disp: 60 tablet, Rfl: 0  •  lidocaine (Lidoderm) 5 %, Apply 1 patch topically daily Remove & Discard patch within 12 hours or as directed by MD, Disp: 30 patch, Rfl: 1  •  lisinopril (ZESTRIL) 20 mg tablet, Take 1 tablet (20 mg total) by mouth daily, Disp: 90 tablet, Rfl: 1  •  Lumigan 0 01 % ophthalmic drops, 1 DROP IN BOTH EYES AT BEDTIME, Disp: , Rfl:   •  MINOXIDIL, TOPICAL, 5 % SOLN, Apply 1 application topically in the morning For hair, Disp: , Rfl:   •  montelukast (SINGULAIR) 10 mg tablet, TAKE 1 TABLET BY MOUTH DAILY AT BEDTIME, Disp: 90 tablet, Rfl: 2  •  naloxone (NARCAN) 4 mg/0 1 mL nasal spray, Administer 1 spray into a nostril   If no response after 2-3 minutes, give another dose in the other nostril using a new spray , Disp: 1 each, Rfl: 1  •  nystatin (MYCOSTATIN) 500,000 units/5 mL suspension, Take 5 mL (500,000 Units total) by mouth 4 (four) times a day, Disp: 200 mL, Rfl: 0  •  pantoprazole (PROTONIX) 40 mg tablet, Take 1 tablet (40 mg total) by mouth 2 (two) times a day, Disp: 180 tablet, Rfl: 1  •  promethazine-dextromethorphan (PHENERGAN-DM) 6 25-15 mg/5 mL oral syrup, Take 5 mL by mouth 4 (four) times a day as needed for cough, Disp: 150 mL, Rfl: 0  •  tiZANidine (ZANAFLEX) 4 mg tablet, Take 1 tablet (4 mg total) by mouth every 8 (eight) hours as needed for muscle spasms for up to 5 days, Disp: 15 tablet, Rfl: 1  •  Trelegy Ellipta 200-62 5-25 MCG/INH AEPB inhaler, INHALE 1 PUFF DAILY RINSE MOUTH AFTER USE , Disp: 60 each, Rfl: 5  Allergies   Allergen Reactions   • Augmentin [Amoxicillin-Pot Clavulanate] Vomiting   • Miconazole Itching and Swelling   • Wixela Inhub [Fluticasone-Salmeterol] Palpitations       Vitals:    01/17/23 1246   BP: 158/90   Pulse: 80   Resp: 18   Temp: 97 8 °F (36 6 °C)   SpO2: 96%       Physical Exam   General: Appears well, appears stated age  Skin: Warm, anicteric  HEENT: Normocephalic, atraumatic; sclera aniceteric, mucous membranes moist; cervical nodes without adenopathy  Cardiopulmonary: RRR, Easy WOB, no BLE edema  Abd: Flat and soft, nontender, no masses appreciated, no hepatosplenomegaly  MSK: Symmetric, no cyanosis, no overt weakness  Lymphatic: No cervical, axillary or inguinal lymphadenopathy  Neuro: Affect appropriate, no gross motor abnormalities    Labs: Reviewed in EPIC    Imaging  MRI abdomen w wo contrast and mrcp    Result Date: 11/18/2022  Narrative: MRI OF THE ABDOMEN WITH AND WITHOUT CONTRAST WITH MRCP INDICATION: 77 years / Female  K86 2: Cyst of pancreas  COMPARISON: Comparison is made to prior studies, most recent is an MRI dated August 9, 2022   The rest, with TECHNIQUE:  Multiplanar/multisequence MRI of the abdomen with 3D MRCP was performed before and after administration of contrast  IV Contrast:  8 mL of Gadobutrol injection (SINGLE-DOSE) FINDINGS: LOWER CHEST:   Unremarkable LIVER: No concerning liver lesions  Few nonenhancing hepatic cysts, with the largest measuring 1 4 cm in the left lateral segment  Intrahepatic vessels are patent  Elaine Glatter BILE DUCTS:  No intrahepatic or extrahepatic bile duct dilation  Common bile duct is normal in caliber  No choledocholithiasis, biliary stricture or suspicious mass  GALLBLADDER:  Normal  PANCREAS:  8 mm cyst in the neck of the pancreas unchanged there is direct communication with the pancreatic duct (series 9 image 86) compatible with a branch duct type IPMN  No pancreatic ductal dilatation  No enhancing soft tissue components  ADRENAL GLANDS:  Mild left adrenal thickening unchanged  SPLEEN:  Normal  KIDNEYS/PROXIMAL URETERS:  No hydroureteronephrosis  No suspicious renal mass  BOWEL:   No dilated loops of bowel  PERITONEUM/RETROPERITONEUM:  No ascites  LYMPH NODES:  No abdominal lymphadenopathy  VASCULAR STRUCTURES:  No aneurysm  ABDOMINAL WALL:  Unremarkable  OSSEOUS STRUCTURES:  Hemangioma in the L1 vertebral body unchanged  Impression: Stable size of 8 mm cyst in the neck of the pancreas with imaging characteristics compatible with a branch duct type IPMN  No worrisome imaging features  Recommend repeat imaging every 2 years x5 for a total of 10 years, and if stable, no further imaging needed  If the lesion does increase in size and or change in morphology, management can be reassessed at that time  Workstation performed: [id number]       I independently reviewed and interpreted the above laboratory and imaging data, including GI consult, EUS, MRI  Discussed with Dr William Lefort Carmelo Dancer  Reviewed genetics  Discussion/Summary: This is a 60-year-old female with an 8 mm cyst of the pancreas, likely BD vs main duct but with no suspicious features   Genetics eval with VUS only  Discussed at TB  Determination made that obs with short interval f/u is appropriate  Will see her in Sep with repeat MR

## 2023-01-18 NOTE — TELEPHONE ENCOUNTER
Patient called stating that she is still not feeling well  Her breathing has improved, she has not had to use her nebulizer, but still using her inhaler  She is still experiencing all of the same symptoms from her visit on 1/6/23  Temp is 99 1 - 99 4 She is asking when she is able to see you again

## 2023-01-19 ENCOUNTER — OFFICE VISIT (OUTPATIENT)
Dept: FAMILY MEDICINE CLINIC | Facility: CLINIC | Age: 68
End: 2023-01-19

## 2023-01-19 ENCOUNTER — APPOINTMENT (OUTPATIENT)
Dept: LAB | Facility: CLINIC | Age: 68
End: 2023-01-19

## 2023-01-19 VITALS
DIASTOLIC BLOOD PRESSURE: 86 MMHG | WEIGHT: 194.6 LBS | SYSTOLIC BLOOD PRESSURE: 142 MMHG | BODY MASS INDEX: 33.22 KG/M2 | HEART RATE: 84 BPM | TEMPERATURE: 98.2 F | HEIGHT: 64 IN | OXYGEN SATURATION: 95 %

## 2023-01-19 DIAGNOSIS — R10.13 EPIGASTRIC PAIN: Primary | ICD-10-CM

## 2023-01-19 DIAGNOSIS — I77.6 AORTITIS (HCC): ICD-10-CM

## 2023-01-19 DIAGNOSIS — R10.13 EPIGASTRIC PAIN: ICD-10-CM

## 2023-01-19 DIAGNOSIS — D49.0 IPMN (INTRADUCTAL PAPILLARY MUCINOUS NEOPLASM): ICD-10-CM

## 2023-01-19 DIAGNOSIS — M54.9 MID BACK PAIN: ICD-10-CM

## 2023-01-19 DIAGNOSIS — I10 PRIMARY HYPERTENSION: ICD-10-CM

## 2023-01-19 DIAGNOSIS — J01.20 ACUTE NON-RECURRENT ETHMOIDAL SINUSITIS: ICD-10-CM

## 2023-01-19 DIAGNOSIS — J42 CHRONIC BRONCHITIS, UNSPECIFIED CHRONIC BRONCHITIS TYPE (HCC): ICD-10-CM

## 2023-01-19 LAB
ALBUMIN SERPL BCP-MCNC: 3.8 G/DL (ref 3.5–5)
ALP SERPL-CCNC: 69 U/L (ref 46–116)
ALT SERPL W P-5'-P-CCNC: 22 U/L (ref 12–78)
ANION GAP SERPL CALCULATED.3IONS-SCNC: 5 MMOL/L (ref 4–13)
AST SERPL W P-5'-P-CCNC: 19 U/L (ref 5–45)
BASOPHILS # BLD AUTO: 0.07 THOUSANDS/ÂΜL (ref 0–0.1)
BASOPHILS NFR BLD AUTO: 1 % (ref 0–1)
BILIRUB SERPL-MCNC: 0.41 MG/DL (ref 0.2–1)
BUN SERPL-MCNC: 10 MG/DL (ref 5–25)
CALCIUM SERPL-MCNC: 9.9 MG/DL (ref 8.3–10.1)
CHLORIDE SERPL-SCNC: 103 MMOL/L (ref 96–108)
CO2 SERPL-SCNC: 28 MMOL/L (ref 21–32)
CREAT SERPL-MCNC: 0.57 MG/DL (ref 0.6–1.3)
CRP SERPL QL: 3.6 MG/L
EOSINOPHIL # BLD AUTO: 0.07 THOUSAND/ÂΜL (ref 0–0.61)
EOSINOPHIL NFR BLD AUTO: 1 % (ref 0–6)
ERYTHROCYTE [DISTWIDTH] IN BLOOD BY AUTOMATED COUNT: 13.3 % (ref 11.6–15.1)
ERYTHROCYTE [SEDIMENTATION RATE] IN BLOOD: 60 MM/HOUR (ref 0–29)
GFR SERPL CREATININE-BSD FRML MDRD: 96 ML/MIN/1.73SQ M
GLUCOSE P FAST SERPL-MCNC: 72 MG/DL (ref 65–99)
HCT VFR BLD AUTO: 45.8 % (ref 34.8–46.1)
HGB BLD-MCNC: 14.6 G/DL (ref 11.5–15.4)
IMM GRANULOCYTES # BLD AUTO: 0.07 THOUSAND/UL (ref 0–0.2)
IMM GRANULOCYTES NFR BLD AUTO: 1 % (ref 0–2)
LIPASE SERPL-CCNC: 219 U/L (ref 73–393)
LYMPHOCYTES # BLD AUTO: 2.22 THOUSANDS/ÂΜL (ref 0.6–4.47)
LYMPHOCYTES NFR BLD AUTO: 20 % (ref 14–44)
MCH RBC QN AUTO: 27.9 PG (ref 26.8–34.3)
MCHC RBC AUTO-ENTMCNC: 31.9 G/DL (ref 31.4–37.4)
MCV RBC AUTO: 88 FL (ref 82–98)
MONOCYTES # BLD AUTO: 1.37 THOUSAND/ÂΜL (ref 0.17–1.22)
MONOCYTES NFR BLD AUTO: 13 % (ref 4–12)
NEUTROPHILS # BLD AUTO: 7.14 THOUSANDS/ÂΜL (ref 1.85–7.62)
NEUTS SEG NFR BLD AUTO: 64 % (ref 43–75)
NRBC BLD AUTO-RTO: 0 /100 WBCS
PLATELET # BLD AUTO: 371 THOUSANDS/UL (ref 149–390)
PMV BLD AUTO: 8.9 FL (ref 8.9–12.7)
POTASSIUM SERPL-SCNC: 4.1 MMOL/L (ref 3.5–5.3)
PROT SERPL-MCNC: 7.9 G/DL (ref 6.4–8.4)
RBC # BLD AUTO: 5.23 MILLION/UL (ref 3.81–5.12)
SODIUM SERPL-SCNC: 136 MMOL/L (ref 135–147)
WBC # BLD AUTO: 10.94 THOUSAND/UL (ref 4.31–10.16)

## 2023-01-19 RX ORDER — CEFDINIR 300 MG/1
300 CAPSULE ORAL EVERY 12 HOURS SCHEDULED
Qty: 14 CAPSULE | Refills: 0 | Status: SHIPPED | OUTPATIENT
Start: 2023-01-19 | End: 2023-01-26

## 2023-01-19 RX ORDER — ALBUTEROL SULFATE 2.5 MG/3ML
2.5 SOLUTION RESPIRATORY (INHALATION) EVERY 6 HOURS PRN
Qty: 180 ML | Refills: 5 | Status: SHIPPED | OUTPATIENT
Start: 2023-01-19

## 2023-01-19 RX ORDER — HYDROCODONE BITARTRATE AND ACETAMINOPHEN 5; 325 MG/1; MG/1
1 TABLET ORAL EVERY 6 HOURS PRN
Qty: 60 TABLET | Refills: 0 | Status: SHIPPED | OUTPATIENT
Start: 2023-01-19

## 2023-01-19 NOTE — PROGRESS NOTES
Name: Manolo Singh      : 1955      MRN: [id number]  Encounter Provider: Ho Hernandez MD  Encounter Date: 2023   Encounter department: [address] [place] [address]     1  Epigastric pain  Assessment & Plan:  Patient states that resembles her aortitis pain  We will check labs including CRP and ESR  Continue present medications for now  Further recommendations based on lab results  Patient to go to ED if symptoms are worsen    Orders:  -     CBC and differential; Future  -     Comprehensive metabolic panel; Future  -     C-reactive protein; Future  -     Lipase; Future  -     Sedimentation rate, automated; Future    2  Acute non-recurrent ethmoidal sinusitis  Assessment & Plan:  Reviewed with patient  Still with findings suggestive of sinusitis  Start cefdinir as directed twice daily  Recheck 1 week if not improved    Orders:  -     cefdinir (OMNICEF) 300 mg capsule; Take 1 capsule (300 mg total) by mouth every 12 (twelve) hours for 7 days    3  Chronic bronchitis, unspecified chronic bronchitis type ([facility] [address] )  Assessment & Plan:  Some persistent wheeze since having influenza  Patient with wheeze on exam   Urged smoking cessation  Increase nebulizer treatment to every 6 hours  Continue Trelegy  Recheck 1 week if not improved    Orders:  -     albuterol (2 5 mg/3 mL) 0 083 % nebulizer solution; Take 3 mL (2 5 mg total) by nebulization every 6 (six) hours as needed for wheezing or shortness of breath    4  Aortitis ([facility] [address] )  Assessment & Plan:  Present epigastric pain resembles her aortitis pain  We will recheck labs  Further recommendations based on results  Orders:  -     CBC and differential; Future  -     Comprehensive metabolic panel; Future  -     C-reactive protein; Future  -     Lipase; Future  -     Sedimentation rate, automated; Future    5  Mid back pain  -     HYDROcodone-acetaminophen (Norco) 5-325 mg per tablet;  Take 1 tablet by mouth every 6 (six) hours as needed for pain Max Daily Amount: 4 tablets    6  Primary hypertension  Assessment & Plan:  Slightly elevated today  Continue to monitor  We will check labs due to her other symptoms  No change in medications for now  Recheck 2 to 4 weeks             Subjective     15-year-old female developed flulike symptoms around the first or second day of the month  Patient was seen on 1/6 and had a COVID and flu PCR done-COVID was negative but flu was positive for influenza type A  Patient was outside the window of treatment for Tamiflu started on Levaquin for sinus pressure as well as productive cough  Since then, patient has had persistent fatigue, slightly elevated temperatures (awakens ), productive cough, intermittent diarrhea, and recently return of her epigastric pain (similar to what she felt when she had aortitis)  Over the last few days, the diarrhea has stopped however bowels now have a foul smell, and a pale-colored  Patient also notes that they are greasy  Review of Systems   Constitutional: Positive for activity change, chills and fatigue  Negative for fever (sl increased temp: )  HENT: Positive for congestion, rhinorrhea and sore throat  Negative for ear discharge, ear pain, sinus pressure, sinus pain, sneezing and voice change  Eyes: Negative  Respiratory: Positive for cough, shortness of breath (mild) and wheezing  Cardiovascular: Negative  Gastrointestinal: Positive for abdominal pain and diarrhea  Negative for abdominal distention, blood in stool, nausea and vomiting  Genitourinary: Negative  Musculoskeletal: Positive for back pain and myalgias  Skin: Negative  Neurological: Negative for dizziness, weakness, light-headedness, numbness and headaches         Past Medical History:   Diagnosis Date   • Acid reflux    • Back injury    • COPD (chronic obstructive pulmonary disease) ([facility])    • Fibromyalgia    • Hypertension    • Seasonal allergies Past Surgical History:   Procedure Laterality Date   • COLONOSCOPY     • EYE SURGERY     • KNEE SURGERY  1998   • [place] COLONOSCOPY FLX DX W/COLLJ SPEC WHEN PFRMD N/A 05/09/2017    Procedure: EGD AND COLONOSCOPY;  Surgeon: Dhaval Salgado MD;  [facility]; Service: Gastroenterology     Family History   Problem Relation Age of Onset   • Pancreatic cancer Mother 67   • Coronary artery disease Father    • Diabetes Father    • Hypertension Father    • Heart disease Father    • Lung cancer Sister 40   • Pancreatic cancer Brother    • Pancreatic cancer Brother    • Lung cancer Maternal Grandfather 68   • Diabetes Paternal Grandmother      Social History     Socioeconomic History   • Marital status:       Spouse name: None   • Number of children: 3   • Years of education: less than high school   • Highest education level: None   Occupational History   • Occupation: unemployed   Tobacco Use   • Smoking status: Every Day     Packs/day: 1 50     Years: 50 00     Pack years: 75 00     Types: Cigarettes     Start date: 1968   • Smokeless tobacco: Never   Vaping Use   • Vaping Use: Never used   Substance and Sexual Activity   • Alcohol use: Never   • Drug use: No   • Sexual activity: Not Currently   Other Topics Concern   • None   Social History Narrative    Always uses seatbelt    Daily coffee consumption    Daily tea consumption    Dental care regularly    Exercises regularly    Multiple organ donor    No guns in the home    No living will    Denies pets/ animals in the home    Power of  in existence- denied         Social Determinants of Health     Financial Resource Strain: Not on file   Food Insecurity: Not on file   Transportation Needs: Not on file   Physical Activity: Not on file   Stress: Not on file   Social Connections: Not on file   Intimate Partner Violence: Not on file   Housing Stability: Not on file     Current Outpatient Medications on File Prior to Visit   Medication Sig   • acetaminophen (TYLENOL) 650 mg CR tablet Take by mouth every 8 (eight) hours as needed     • albuterol (PROVENTIL HFA,VENTOLIN HFA) 90 mcg/act inhaler Inhale 2 puffs every 6 (six) hours as needed for wheezing   • ALPRAZolam (XANAX) 0 25 mg tablet Take 1 tablet (0 25 mg total) by mouth 3 (three) times a day as needed for anxiety   • amLODIPine (NORVASC) 5 mg tablet Take 1 tablet (5 mg total) by mouth daily   • cetirizine (ZyrTEC) 10 mg tablet Take 1 tablet (10 mg total) by mouth daily   • Diclofenac Sodium (VOLTAREN) 1 % Apply 2 g topically 4 (four) times a day   • escitalopram (LEXAPRO) 10 mg tablet Take 1 tablet (10 mg total) by mouth daily   • lidocaine (Lidoderm) 5 % Apply 1 patch topically daily Remove & Discard patch within 12 hours or as directed by MD   • lisinopril (ZESTRIL) 20 mg tablet Take 1 tablet (20 mg total) by mouth daily   • Lumigan 0 01 % ophthalmic drops 1 DROP IN BOTH EYES AT BEDTIME   • MINOXIDIL, TOPICAL, 5 % SOLN Apply 1 application topically in the morning For hair   • montelukast (SINGULAIR) 10 mg tablet TAKE 1 TABLET BY MOUTH DAILY AT BEDTIME   • naloxone (NARCAN) 4 mg/0 1 mL nasal spray Administer 1 spray into a nostril  If no response after 2-3 minutes, give another dose in the other nostril using a new spray  • nystatin (MYCOSTATIN) 500,000 units/5 mL suspension Take 5 mL (500,000 Units total) by mouth 4 (four) times a day   • pantoprazole (PROTONIX) 40 mg tablet Take 1 tablet (40 mg total) by mouth 2 (two) times a day   • tiZANidine (ZANAFLEX) 4 mg tablet Take 1 tablet (4 mg total) by mouth every 8 (eight) hours as needed for muscle spasms for up to 5 days   • Trelegy Ellipta 200-62 5-25 MCG/INH AEPB inhaler INHALE 1 PUFF DAILY RINSE MOUTH AFTER USE     • promethazine-dextromethorphan (PHENERGAN-DM) 6 25-15 mg/5 mL oral syrup Take 5 mL by mouth 4 (four) times a day as needed for cough (Patient not taking: Reported on 1/19/2023)     Allergies   Allergen Reactions   • Augmentin [Amoxicillin-Pot Clavulanate] Vomiting   • Miconazole Itching and Swelling   • Wixela Inhub [Fluticasone-Salmeterol] Palpitations     Immunization History   Administered Date(s) Administered   • Tdap 02/28/2008       Objective     /86   Pulse 84   Temp 98 2 °F (36 8 °C)   Ht 5' 4" (1 626 m)   Wt 88 3 kg (194 lb 9 6 oz)   LMP  (LMP Unknown)   SpO2 95%   BMI 33 40 kg/m²     Physical Exam  Vitals reviewed  HENT:      Head: Normocephalic  Right Ear: Tympanic membrane, ear canal and external ear normal       Left Ear: Tympanic membrane, ear canal and external ear normal       Nose: Congestion present  Comments: Ethmoid sinuses TTP with (+) head tilt     Mouth/Throat:      Mouth: Mucous membranes are moist    Eyes:      Extraocular Movements: Extraocular movements intact  Conjunctiva/sclera: Conjunctivae normal       Pupils: Pupils are equal, round, and reactive to light  Cardiovascular:      Rate and Rhythm: Normal rate and regular rhythm  Pulmonary:      Breath sounds: Wheezing present  No rales  Abdominal:      General: There is no distension  Palpations: There is no mass  Tenderness: There is abdominal tenderness (diffuse - sl increased in epigastric area)  There is no guarding or rebound  Musculoskeletal:         General: Tenderness (over low thoracic spine) present  Cervical back: No tenderness  Right lower leg: No edema  Left lower leg: No edema  Lymphadenopathy:      Cervical: No cervical adenopathy  Skin:     Capillary Refill: Capillary refill takes less than 2 seconds  Neurological:      General: No focal deficit present  Mental Status: She is alert and oriented to person, place, and time         Shruthi Quintanilla MD

## 2023-01-20 PROBLEM — M54.41 ACUTE RIGHT-SIDED LOW BACK PAIN WITH RIGHT-SIDED SCIATICA: Status: RESOLVED | Noted: 2022-04-28 | Resolved: 2023-01-20

## 2023-01-20 PROBLEM — R68.81 EARLY SATIETY: Status: RESOLVED | Noted: 2018-08-28 | Resolved: 2023-01-20

## 2023-01-20 PROBLEM — J01.20 ACUTE NON-RECURRENT ETHMOIDAL SINUSITIS: Status: ACTIVE | Noted: 2023-01-20

## 2023-01-20 PROBLEM — M25.561 ACUTE PAIN OF RIGHT KNEE: Status: RESOLVED | Noted: 2022-12-18 | Resolved: 2023-01-20

## 2023-01-20 PROBLEM — R07.9 CHEST PAIN: Status: RESOLVED | Noted: 2021-02-08 | Resolved: 2023-01-20

## 2023-01-20 NOTE — ASSESSMENT & PLAN NOTE
Slightly elevated today  Continue to monitor  We will check labs due to her other symptoms  No change in medications for now    Recheck 2 to 4 weeks

## 2023-01-20 NOTE — ASSESSMENT & PLAN NOTE
Some persistent wheeze since having influenza  Patient with wheeze on exam   Urged smoking cessation  Increase nebulizer treatment to every 6 hours  Continue Trelegy    Recheck 1 week if not improved

## 2023-01-20 NOTE — ASSESSMENT & PLAN NOTE
Reviewed with patient  Still with findings suggestive of sinusitis  Start cefdinir as directed twice daily    Recheck 1 week if not improved

## 2023-01-20 NOTE — ASSESSMENT & PLAN NOTE
Present epigastric pain resembles her aortitis pain  We will recheck labs  Further recommendations based on results

## 2023-01-20 NOTE — ASSESSMENT & PLAN NOTE
Patient states that resembles her aortitis pain  We will check labs including CRP and ESR  Continue present medications for now  Further recommendations based on lab results    Patient to go to ED if symptoms are worsen

## 2023-01-28 DIAGNOSIS — J30.9 ALLERGIC RHINITIS, UNSPECIFIED SEASONALITY, UNSPECIFIED TRIGGER: ICD-10-CM

## 2023-01-30 RX ORDER — CETIRIZINE HYDROCHLORIDE 10 MG/1
10 TABLET ORAL DAILY
Qty: 90 TABLET | Refills: 0 | Status: SHIPPED | OUTPATIENT
Start: 2023-01-30

## 2023-02-02 ENCOUNTER — TELEPHONE (OUTPATIENT)
Dept: FAMILY MEDICINE CLINIC | Facility: CLINIC | Age: 68
End: 2023-02-02

## 2023-02-02 DIAGNOSIS — L02.92 BOIL: Primary | ICD-10-CM

## 2023-02-02 RX ORDER — SULFAMETHOXAZOLE AND TRIMETHOPRIM 800; 160 MG/1; MG/1
1 TABLET ORAL EVERY 12 HOURS SCHEDULED
Qty: 14 TABLET | Refills: 0 | Status: SHIPPED | OUTPATIENT
Start: 2023-02-02 | End: 2023-02-09

## 2023-02-02 NOTE — TELEPHONE ENCOUNTER
Patient states that antibiotic you put her on caused her to be very constipated     She states she is in a lot of pain from the constipation in her stomach and her lower back    She states she also has a boil in her groin area     She is wondering if she has an infection and is asking if you can put her on another antibiotic   She states she will take a laxative to help with the constipation     Please advise

## 2023-02-04 ENCOUNTER — APPOINTMENT (EMERGENCY)
Dept: CT IMAGING | Facility: HOSPITAL | Age: 68
End: 2023-02-04

## 2023-02-04 ENCOUNTER — HOSPITAL ENCOUNTER (EMERGENCY)
Facility: HOSPITAL | Age: 68
Discharge: HOME | End: 2023-02-04
Attending: EMERGENCY MEDICINE

## 2023-02-04 VITALS
HEART RATE: 67 BPM | RESPIRATION RATE: 17 BRPM | DIASTOLIC BLOOD PRESSURE: 81 MMHG | SYSTOLIC BLOOD PRESSURE: 185 MMHG | WEIGHT: 194.67 LBS | TEMPERATURE: 97.9 F | OXYGEN SATURATION: 98 % | BODY MASS INDEX: 33.41 KG/M2

## 2023-02-04 DIAGNOSIS — E87.1 HYPONATREMIA: ICD-10-CM

## 2023-02-04 DIAGNOSIS — R10.84 GENERALIZED ABDOMINAL PAIN: Primary | ICD-10-CM

## 2023-02-04 LAB
ALBUMIN SERPL BCP-MCNC: 4 G/DL (ref 3.5–5)
ALP SERPL-CCNC: 77 U/L (ref 46–116)
ALT SERPL W P-5'-P-CCNC: 22 U/L (ref 12–78)
ANION GAP SERPL CALCULATED.3IONS-SCNC: 12 MMOL/L (ref 4–13)
AST SERPL W P-5'-P-CCNC: 23 U/L (ref 5–45)
BACTERIA UR QL AUTO: ABNORMAL /HPF
BASOPHILS # BLD AUTO: 0.08 THOUSANDS/ÂΜL (ref 0–0.1)
BASOPHILS NFR BLD AUTO: 1 % (ref 0–1)
BILIRUB SERPL-MCNC: 0.44 MG/DL (ref 0.2–1)
BILIRUB UR QL STRIP: NEGATIVE
BUN SERPL-MCNC: 8 MG/DL (ref 5–25)
CALCIUM SERPL-MCNC: 9.4 MG/DL (ref 8.3–10.1)
CHLORIDE SERPL-SCNC: 89 MMOL/L (ref 96–108)
CLARITY UR: CLEAR
CO2 SERPL-SCNC: 24 MMOL/L (ref 21–32)
COLOR UR: YELLOW
CREAT SERPL-MCNC: 0.82 MG/DL (ref 0.6–1.3)
EOSINOPHIL # BLD AUTO: 0.47 THOUSAND/ÂΜL (ref 0–0.61)
EOSINOPHIL NFR BLD AUTO: 5 % (ref 0–6)
ERYTHROCYTE [DISTWIDTH] IN BLOOD BY AUTOMATED COUNT: 12.9 % (ref 11.6–15.1)
GFR SERPL CREATININE-BSD FRML MDRD: 74 ML/MIN/1.73SQ M
GLUCOSE SERPL-MCNC: 111 MG/DL (ref 65–140)
GLUCOSE UR STRIP-MCNC: NEGATIVE MG/DL
HCT VFR BLD AUTO: 44.2 % (ref 34.8–46.1)
HGB BLD-MCNC: 15 G/DL (ref 11.5–15.4)
HGB UR QL STRIP.AUTO: NEGATIVE
HYALINE CASTS #/AREA URNS LPF: ABNORMAL /LPF
IMM GRANULOCYTES # BLD AUTO: 0.08 THOUSAND/UL (ref 0–0.2)
IMM GRANULOCYTES NFR BLD AUTO: 1 % (ref 0–2)
KETONES UR STRIP-MCNC: ABNORMAL MG/DL
LEUKOCYTE ESTERASE UR QL STRIP: NEGATIVE
LIPASE SERPL-CCNC: 112 U/L (ref 73–393)
LYMPHOCYTES # BLD AUTO: 1.29 THOUSANDS/ÂΜL (ref 0.6–4.47)
LYMPHOCYTES NFR BLD AUTO: 14 % (ref 14–44)
MAGNESIUM SERPL-MCNC: 1.8 MG/DL (ref 1.6–2.6)
MCH RBC QN AUTO: 28.3 PG (ref 26.8–34.3)
MCHC RBC AUTO-ENTMCNC: 33.9 G/DL (ref 31.4–37.4)
MCV RBC AUTO: 83 FL (ref 82–98)
MONOCYTES # BLD AUTO: 0.95 THOUSAND/ÂΜL (ref 0.17–1.22)
MONOCYTES NFR BLD AUTO: 10 % (ref 4–12)
MUCOUS THREADS UR QL AUTO: ABNORMAL
NEUTROPHILS # BLD AUTO: 6.7 THOUSANDS/ÂΜL (ref 1.85–7.62)
NEUTS SEG NFR BLD AUTO: 69 % (ref 43–75)
NITRITE UR QL STRIP: NEGATIVE
NON-SQ EPI CELLS URNS QL MICRO: ABNORMAL /HPF
NRBC BLD AUTO-RTO: 0 /100 WBCS
PH UR STRIP.AUTO: 6 [PH]
PLATELET # BLD AUTO: 296 THOUSANDS/UL (ref 149–390)
PMV BLD AUTO: 8.4 FL (ref 8.9–12.7)
POTASSIUM SERPL-SCNC: 3.7 MMOL/L (ref 3.5–5.3)
PROT SERPL-MCNC: 8.5 G/DL (ref 6.4–8.4)
PROT UR STRIP-MCNC: ABNORMAL MG/DL
RBC # BLD AUTO: 5.3 MILLION/UL (ref 3.81–5.12)
RBC #/AREA URNS AUTO: ABNORMAL /HPF
SODIUM SERPL-SCNC: 125 MMOL/L (ref 135–147)
SP GR UR STRIP.AUTO: 1.04 (ref 1–1.03)
UROBILINOGEN UR STRIP-ACNC: 2 MG/DL
WBC # BLD AUTO: 9.57 THOUSAND/UL (ref 4.31–10.16)
WBC #/AREA URNS AUTO: ABNORMAL /HPF

## 2023-02-04 RX ORDER — SODIUM CHLORIDE 1000 MG
1 TABLET, SOLUBLE MISCELLANEOUS 3 TIMES DAILY
Qty: 21 TABLET | Refills: 0 | Status: SHIPPED | OUTPATIENT
Start: 2023-02-04

## 2023-02-04 RX ADMIN — IOHEXOL 100 ML: 350 INJECTION, SOLUTION INTRAVENOUS at 08:26

## 2023-02-04 RX ADMIN — SODIUM CHLORIDE 1000 ML: 0.9 INJECTION, SOLUTION INTRAVENOUS at 07:33

## 2023-02-04 NOTE — DISCHARGE INSTRUCTIONS
A  personal message from Dr Radha Henry,  Thank you so much for allowing me to care for you today  I pride myself in the care and attention I give all my patients  I hope you were a witness to this tonight  If for any reason your condition does not improve, worsens, or you have a question that was not answered during your visit you can feel free to text me on my personal phone   # [phone]  I will answer to your message and continue your care past your emergency room visit

## 2023-02-04 NOTE — ED PROVIDER NOTES
History  Chief Complaint   Patient presents with   • Abdominal Pain     Pt reports abd and lower back pain, constipation x 1 week  Pt was seen on  for same issues  This patient presents emergency department for the evaluation of diffuse nonlocalized abdominal pain  She has not been feeling well since early January when she had the flu  On and off she has had some symptoms of not feeling well  She was treated with an antibiotic initially when she got really constipated  Then the primary doctor put her on Bactrim to treat a skin boil  Upon follow-up she had an elevated sed rate which was not terribly elevated but there was some elevation  When she called her primary doctor tell him about the abdominal pain yesterday he recommended to come to the emergency department  Patient has had decreased p o  intake over the last couple of days  She has tried some laxatives with definite output from that  And the pain is generalized across the abdomen but maximizes around the left lower back  She has had some low-grade temperatures at home without nausea or vomiting  Currently the pain is 5 out of 10  After taking some Tylenol this morning  Patient does smoke but does not drink  Only surgeries where eye surgery and knee surgery      History provided by:  Patient   used: No        Prior to Admission Medications   Prescriptions Last Dose Informant Patient Reported? Taking?    ALPRAZolam (XANAX) 0 25 mg tablet   No No   Sig: Take 1 tablet (0 25 mg total) by mouth 3 (three) times a day as needed for anxiety   Diclofenac Sodium (VOLTAREN) 1 %   No No   Sig: Apply 2 g topically 4 (four) times a day   HYDROcodone-acetaminophen (Norco) 5-325 mg per tablet   No No   Sig: Take 1 tablet by mouth every 6 (six) hours as needed for pain Max Daily Amount: 4 tablets   Lumigan 0 01 % ophthalmic drops   Yes No   Si DROP IN BOTH EYES AT BEDTIME   MINOXIDIL, TOPICAL, 5 % SOLN   Yes No   Sig: Apply 1 application topically in the morning For hair   Trelegy Ellipta 200-62 5-25 MCG/INH AEPB inhaler   No No   Sig: INHALE 1 PUFF DAILY RINSE MOUTH AFTER USE    acetaminophen (TYLENOL) 650 mg CR tablet   Yes No   Sig: Take by mouth every 8 (eight) hours as needed     albuterol (2 5 mg/3 mL) 0 083 % nebulizer solution   No No   Sig: Take 3 mL (2 5 mg total) by nebulization every 6 (six) hours as needed for wheezing or shortness of breath   albuterol (PROVENTIL HFA,VENTOLIN HFA) 90 mcg/act inhaler   No No   Sig: Inhale 2 puffs every 6 (six) hours as needed for wheezing   amLODIPine (NORVASC) 5 mg tablet   No No   Sig: Take 1 tablet (5 mg total) by mouth daily   cetirizine (ZyrTEC) 10 mg tablet   No No   Sig: Take 1 tablet (10 mg total) by mouth daily   escitalopram (LEXAPRO) 10 mg tablet   No No   Sig: Take 1 tablet (10 mg total) by mouth daily   lidocaine (Lidoderm) 5 %   No No   Sig: Apply 1 patch topically daily Remove & Discard patch within 12 hours or as directed by MD   lisinopril (ZESTRIL) 20 mg tablet   No No   Sig: Take 1 tablet (20 mg total) by mouth daily   montelukast (SINGULAIR) 10 mg tablet   No No   Sig: TAKE 1 TABLET BY MOUTH DAILY AT BEDTIME   naloxone (NARCAN) 4 mg/0 1 mL nasal spray   No No   Sig: Administer 1 spray into a nostril  If no response after 2-3 minutes, give another dose in the other nostril using a new spray     nystatin (MYCOSTATIN) 500,000 units/5 mL suspension   No No   Sig: Take 5 mL (500,000 Units total) by mouth 4 (four) times a day   pantoprazole (PROTONIX) 40 mg tablet   No No   Sig: Take 1 tablet (40 mg total) by mouth 2 (two) times a day   promethazine-dextromethorphan (PHENERGAN-DM) 6 25-15 mg/5 mL oral syrup   No No   Sig: Take 5 mL by mouth 4 (four) times a day as needed for cough   Patient not taking: Reported on 1/19/2023   sulfamethoxazole-trimethoprim (BACTRIM DS) 800-160 mg per tablet   No No   Sig: Take 1 tablet by mouth every 12 (twelve) hours for 7 days   tiZANidine (ZANAFLEX) 4 mg tablet   No No   Sig: Take 1 tablet (4 mg total) by mouth every 8 (eight) hours as needed for muscle spasms for up to 5 days      Facility-Administered Medications: None       Past Medical History:   Diagnosis Date   • Acid reflux    • Back injury    • COPD (chronic obstructive pulmonary disease) ([address] )    • Fibromyalgia    • Hypertension    • Seasonal allergies        Past Surgical History:   Procedure Laterality Date   • COLONOSCOPY     • EYE SURGERY     • KNEE SURGERY  1998   • NE COLONOSCOPY FLX DX W/COLLJ SPEC WHEN PFRMD N/A 05/09/2017    Procedure: EGD AND COLONOSCOPY;  Surgeon: Tabitha Ferrara MD;  Location: [facility]; Service: Gastroenterology       Family History   Problem Relation Age of Onset   • Pancreatic cancer Mother 67   • Coronary artery disease Father    • Diabetes Father    • Hypertension Father    • Heart disease Father    • Lung cancer Sister 40   • Pancreatic cancer Brother    • Pancreatic cancer Brother    • Lung cancer Maternal Grandfather 68   • Diabetes Paternal Grandmother      I have reviewed and agree with the history as documented  E-Cigarette/Vaping   • E-Cigarette Use Never User      E-Cigarette/Vaping Substances     Social History     Tobacco Use   • Smoking status: Every Day     Packs/day: 1 50     Years: 50 00     Pack years: 75 00     Types: Cigarettes     Start date: 1968   • Smokeless tobacco: Never   Vaping Use   • Vaping Use: Never used   Substance Use Topics   • Alcohol use: Never   • Drug use: No       Review of Systems   Constitutional: Negative for chills and fever  HENT: Negative for ear pain and sore throat  Eyes: Negative for pain and visual disturbance  Respiratory: Negative for cough and shortness of breath  Cardiovascular: Negative for chest pain and palpitations  Gastrointestinal: Positive for abdominal pain and constipation  Negative for vomiting  Genitourinary: Negative for dysuria, enuresis and hematuria     Musculoskeletal: Negative for arthralgias and back pain  Skin: Negative for color change and rash  Neurological: Negative for seizures and syncope  All other systems reviewed and are negative  Physical Exam  Physical Exam  Vitals and nursing note reviewed  Constitutional:       General: She is not in acute distress  Appearance: She is well-developed  She is obese  HENT:      Head: Normocephalic and atraumatic  Eyes:      Conjunctiva/sclera: Conjunctivae normal       Pupils: Pupils are equal, round, and reactive to light  Cardiovascular:      Rate and Rhythm: Normal rate and regular rhythm  Heart sounds: Normal heart sounds  No murmur heard  Pulmonary:      Effort: Pulmonary effort is normal  No respiratory distress  Breath sounds: Normal breath sounds  Abdominal:      General: Abdomen is flat  Bowel sounds are normal       Palpations: Abdomen is soft  There is no hepatomegaly, splenomegaly or pulsatile mass  Tenderness: There is generalized abdominal tenderness  There is no guarding or rebound  Negative signs include Mg's sign, Rovsing's sign and McBurney's sign  Hernia: No hernia is present  Musculoskeletal:         General: No swelling  Cervical back: Neck supple  Skin:     General: Skin is warm and dry  Capillary Refill: Capillary refill takes less than 2 seconds  Neurological:      General: No focal deficit present  Mental Status: She is alert and oriented to person, place, and time     Psychiatric:         Mood and Affect: Mood normal          Behavior: Behavior normal          Vital Signs  ED Triage Vitals [02/04/23 0710]   Temperature Pulse Respirations Blood Pressure SpO2   97 9 °F (36 6 °C) 85 18 (!) 197/84 97 %      Temp src Heart Rate Source Patient Position - Orthostatic VS BP Location FiO2 (%)   -- Monitor -- -- --      Pain Score       --           Vitals:    02/04/23 0710 02/04/23 0712   BP: (!) 197/84 (!) 178/76   Pulse: 85          Visual Acuity      ED Medications  Medications   sodium chloride 0 9 % bolus 1,000 mL (1,000 mL Intravenous New Bag 2/4/23 0733)   iohexol (OMNIPAQUE) 350 MG/ML injection (MULTI-DOSE) 100 mL (100 mL Intravenous Given 2/4/23 0826)       Diagnostic Studies  Results Reviewed     Procedure Component Value Units Date/Time    Urine Microscopic [[id number]]  (Abnormal) Collected: 02/04/23 0729    Lab Status: Final result Specimen: Urine, Clean Catch Updated: 02/04/23 0812     RBC, UA 1-2 /hpf      WBC, UA 2-4 /hpf      Epithelial Cells Occasional /hpf      Bacteria, UA None Seen /hpf      MUCUS THREADS Moderate     Hyaline Casts, UA 0-3 /lpf     UA w Reflex to Microscopic w Reflex to Culture [[id number]]  (Abnormal) Collected: 02/04/23 0729    Lab Status: Final result Specimen: Urine, Clean Catch Updated: 02/04/23 0810     Color, UA Yellow     Clarity, UA Clear     Specific Gravity, UA 1 040     pH, UA 6 0     Leukocytes, UA Negative     Nitrite, UA Negative     Protein,  (2+) mg/dl      Glucose, UA Negative mg/dl      Ketones, UA Trace mg/dl      Urobilinogen, UA 2 0 mg/dl      Bilirubin, UA Negative     Occult Blood, UA Negative    Comprehensive metabolic panel [[id number]]  (Abnormal) Collected: 02/04/23 0732    Lab Status: Final result Specimen: Blood from Arm, Right Updated: 02/04/23 0802     Sodium 125 mmol/L      Potassium 3 7 mmol/L      Chloride 89 mmol/L      CO2 24 mmol/L      ANION GAP 12 mmol/L      BUN 8 mg/dL      Creatinine 0 82 mg/dL      Glucose 111 mg/dL      Calcium 9 4 mg/dL      AST 23 U/L      ALT 22 U/L      Alkaline Phosphatase 77 U/L      Total Protein 8 5 g/dL      Albumin 4 0 g/dL      Total Bilirubin 0 44 mg/dL      eGFR 74 ml/min/1 73sq m     Narrative:      [facility] guidelines for Chronic Kidney Disease (CKD):   •  Stage 1 with normal or high GFR (GFR > 90 mL/min/1 73 square meters)  •  Stage 2 Mild CKD (GFR = 60-89 mL/min/1 73 square meters)  •  Stage 3A Moderate CKD (GFR = 45-59 mL/min/1 73 square meters)  •  Stage 3B Moderate CKD (GFR = 30-44 mL/min/1 73 square meters)  •  Stage 4 Severe CKD (GFR = 15-29 mL/min/1 73 square meters)  •  Stage 5 End Stage CKD (GFR <15 mL/min/1 73 square meters)  Note: GFR calculation is accurate only with a steady state creatinine    Lipase [[id number]]  (Normal) Collected: 02/04/23 0732    Lab Status: Final result Specimen: Blood from Arm, Right Updated: 02/04/23 0802     Lipase 112 u/L     Magnesium [[id number]]  (Normal) Collected: 02/04/23 0732    Lab Status: Final result Specimen: Blood from Arm, Right Updated: 02/04/23 0802     Magnesium 1 8 mg/dL     CBC and differential [[id number]]  (Abnormal) Collected: 02/04/23 0732    Lab Status: Final result Specimen: Blood from Arm, Right Updated: 02/04/23 0735     WBC 9 57 Thousand/uL      RBC 5 30 Million/uL      Hemoglobin 15 0 g/dL      Hematocrit 44 2 %      MCV 83 fL      MCH 28 3 pg      MCHC 33 9 g/dL      RDW 12 9 %      MPV 8 4 fL      Platelets 211 Thousands/uL      nRBC 0 /100 WBCs      Neutrophils Relative 69 %      Immat GRANS % 1 %      Lymphocytes Relative 14 %      Monocytes Relative 10 %      Eosinophils Relative 5 %      Basophils Relative 1 %      Neutrophils Absolute 6 70 Thousands/µL      Immature Grans Absolute 0 08 Thousand/uL      Lymphocytes Absolute 1 29 Thousands/µL      Monocytes Absolute 0 95 Thousand/µL      Eosinophils Absolute 0 47 Thousand/µL      Basophils Absolute 0 08 Thousands/µL                  CT abdomen pelvis with contrast   Final Result by Mohan Tolentino MD (02/04 0210)      1  No acute abnormality in the abdomen or pelvis  2   Diverticulosis coli              Workstation performed: [id number]                    Procedures  Procedures         ED Course  ED Course as of 02/04/23 0936   Sat Feb 04, 2023   4404 Sodium(!): 125                               SBIRT 20yo+    Flowsheet Row Most Recent Value   SBIRT (25 yo +)    In order to provide better care to our patients, we are screening all of our patients for alcohol and drug use  Would it be okay to ask you these screening questions? Yes Filed at: 02/04/2023 6556   Initial Alcohol Screen: US AUDIT-C     1  How often do you have a drink containing alcohol? 0 Filed at: 02/04/2023 0734   2  How many drinks containing alcohol do you have on a typical day you are drinking? 0 Filed at: 02/04/2023 0734   3b  FEMALE Any Age, or MALE 65+: How often do you have 4 or more drinks on one occassion? 0 Filed at: 02/04/2023 0734   Audit-C Score 0 Filed at: 02/04/2023 1111   COLT: How many times in the past year have you    Used an illegal drug or used a prescription medication for non-medical reasons? Never Filed at: 02/04/2023 1946                    Medical Decision Making  Differential diagnosis includes diverticulitis, colitis, small bowel obstruction, UTI/pyelonephritis  Nonspecific abdominal cramps, constipation    Generalized abdominal pain: acute illness or injury     Details: abdominal pain - non focal  NEG CT as per radiologist   pt already on atibiotics presumed UTI  Hyponatremia: acute illness or injury     Details: low Sodium - low compared to baseline will correct (IVFs given in ER)  Amount and/or Complexity of Data Reviewed  Labs: ordered  Decision-making details documented in ED Course  Radiology: ordered  Risk  OTC drugs  Prescription drug management  Disposition  Final diagnoses:   Generalized abdominal pain   Hyponatremia     Time reflects when diagnosis was documented in both MDM as applicable and the Disposition within this note     Time User Action Codes Description Comment    2/4/2023  9:31 AM Tremayne Adame [R10 84] Generalized abdominal pain     2/4/2023  9:31 AM Tremayne Adame [E87 1] Hyponatremia       ED Disposition     ED Disposition   Discharge    Condition   Stable    Date/Time   Sat Feb 4, 2023  9:31 AM    Comment   Hannah Elizalde discharge to home/self care  Follow-up Information     Follow up With Specialties Details Why Dong Narayanan MD Family Medicine In 3 days you will need your sodium rechecked by TUESDAY [address]  [phone]            Patient's Medications   Discharge Prescriptions    SODIUM CHLORIDE 1 G TABLET    Take 1 tablet (1 g total) by mouth 3 (three) times a day       Start Date: 2/4/2023  End Date: --       Order Dose: 1 g       Quantity: 21 tablet    Refills: 0       No discharge procedures on file      PDMP Review       Value Time User    PDMP Reviewed  Yes 1/19/2023  9:59 AM Ho Hernandez MD          ED Provider  Electronically Signed by           Mary Moore MD  02/04/23 8517

## 2023-02-05 ENCOUNTER — HOSPITAL ENCOUNTER (EMERGENCY)
Facility: HOSPITAL | Age: 68
Discharge: HOME | End: 2023-02-05
Attending: EMERGENCY MEDICINE

## 2023-02-05 VITALS
TEMPERATURE: 97.4 F | BODY MASS INDEX: 33.12 KG/M2 | WEIGHT: 194 LBS | OXYGEN SATURATION: 98 % | RESPIRATION RATE: 17 BRPM | DIASTOLIC BLOOD PRESSURE: 81 MMHG | HEART RATE: 85 BPM | SYSTOLIC BLOOD PRESSURE: 193 MMHG | HEIGHT: 64 IN

## 2023-02-05 DIAGNOSIS — B02.9 SHINGLES: Primary | ICD-10-CM

## 2023-02-05 RX ORDER — HYDROCODONE BITARTRATE AND ACETAMINOPHEN 5; 325 MG/1; MG/1
1 TABLET ORAL EVERY 6 HOURS PRN
Qty: 20 TABLET | Refills: 0 | Status: SHIPPED | OUTPATIENT
Start: 2023-02-05 | End: 2023-02-17 | Stop reason: SDUPTHER

## 2023-02-05 RX ORDER — HYDROCODONE BITARTRATE AND ACETAMINOPHEN 5; 325 MG/1; MG/1
1 TABLET ORAL ONCE
Status: COMPLETED | OUTPATIENT
Start: 2023-02-05 | End: 2023-02-05

## 2023-02-05 RX ORDER — VALACYCLOVIR HYDROCHLORIDE 1 G/1
1000 TABLET, FILM COATED ORAL 3 TIMES DAILY
Qty: 30 TABLET | Refills: 0 | Status: SHIPPED | OUTPATIENT
Start: 2023-02-05 | End: 2023-02-17

## 2023-02-05 RX ORDER — VALACYCLOVIR HYDROCHLORIDE 500 MG/1
1000 TABLET, FILM COATED ORAL EVERY 8 HOURS SCHEDULED
Status: DISCONTINUED | OUTPATIENT
Start: 2023-02-05 | End: 2023-02-05 | Stop reason: HOSPADM

## 2023-02-05 RX ADMIN — VALACYCLOVIR HYDROCHLORIDE 1000 MG: 500 TABLET, FILM COATED ORAL at 10:32

## 2023-02-05 RX ADMIN — HYDROCODONE BITARTRATE AND ACETAMINOPHEN 1 TABLET: 5; 325 TABLET ORAL at 10:18

## 2023-02-05 NOTE — ED PROVIDER NOTES
History  Chief Complaint   Patient presents with   • Rash     Pt c/o rash on left flank that started at 2 am, pt states it is painful      80-year-old female presenting here with a chief complaint of rash over her left flank area  She was seen here yesterday and evaluated for abdominal pain and her work-up was unremarkable  She went home and today has now developed a rash concerning for shingles  Prior to Admission Medications   Prescriptions Last Dose Informant Patient Reported? Taking?    ALPRAZolam (XANAX) 0 25 mg tablet   No No   Sig: Take 1 tablet (0 25 mg total) by mouth 3 (three) times a day as needed for anxiety   Diclofenac Sodium (VOLTAREN) 1 %   No No   Sig: Apply 2 g topically 4 (four) times a day   HYDROcodone-acetaminophen (Norco) 5-325 mg per tablet   No No   Sig: Take 1 tablet by mouth every 6 (six) hours as needed for pain Max Daily Amount: 4 tablets   Lumigan 0 01 % ophthalmic drops   Yes No   Si DROP IN BOTH EYES AT BEDTIME   MINOXIDIL, TOPICAL, 5 % SOLN   Yes No   Sig: Apply 1 application topically in the morning For hair   Trelegy Ellipta 200-62 5-25 MCG/INH AEPB inhaler   No No   Sig: INHALE 1 PUFF DAILY RINSE MOUTH AFTER USE    acetaminophen (TYLENOL) 650 mg CR tablet   Yes No   Sig: Take by mouth every 8 (eight) hours as needed     albuterol (2 5 mg/3 mL) 0 083 % nebulizer solution   No No   Sig: Take 3 mL (2 5 mg total) by nebulization every 6 (six) hours as needed for wheezing or shortness of breath   albuterol (PROVENTIL HFA,VENTOLIN HFA) 90 mcg/act inhaler   No No   Sig: Inhale 2 puffs every 6 (six) hours as needed for wheezing   amLODIPine (NORVASC) 5 mg tablet   No No   Sig: Take 1 tablet (5 mg total) by mouth daily   cetirizine (ZyrTEC) 10 mg tablet   No No   Sig: Take 1 tablet (10 mg total) by mouth daily   escitalopram (LEXAPRO) 10 mg tablet   No No   Sig: Take 1 tablet (10 mg total) by mouth daily   lidocaine (Lidoderm) 5 %   No No   Sig: Apply 1 patch topically daily Remove & Discard patch within 12 hours or as directed by MD   lisinopril (ZESTRIL) 20 mg tablet   No No   Sig: Take 1 tablet (20 mg total) by mouth daily   montelukast (SINGULAIR) 10 mg tablet   No No   Sig: TAKE 1 TABLET BY MOUTH DAILY AT BEDTIME   naloxone (NARCAN) 4 mg/0 1 mL nasal spray   No No   Sig: Administer 1 spray into a nostril  If no response after 2-3 minutes, give another dose in the other nostril using a new spray  nystatin (MYCOSTATIN) 500,000 units/5 mL suspension   No No   Sig: Take 5 mL (500,000 Units total) by mouth 4 (four) times a day   pantoprazole (PROTONIX) 40 mg tablet   No No   Sig: Take 1 tablet (40 mg total) by mouth 2 (two) times a day   promethazine-dextromethorphan (PHENERGAN-DM) 6 25-15 mg/5 mL oral syrup   No No   Sig: Take 5 mL by mouth 4 (four) times a day as needed for cough   Patient not taking: Reported on 1/19/2023   sodium chloride 1 g tablet   No No   Sig: Take 1 tablet (1 g total) by mouth 3 (three) times a day   sulfamethoxazole-trimethoprim (BACTRIM DS) 800-160 mg per tablet   No No   Sig: Take 1 tablet by mouth every 12 (twelve) hours for 7 days   tiZANidine (ZANAFLEX) 4 mg tablet   No No   Sig: Take 1 tablet (4 mg total) by mouth every 8 (eight) hours as needed for muscle spasms for up to 5 days      Facility-Administered Medications: None       Past Medical History:   Diagnosis Date   • Acid reflux    • Back injury    • COPD (chronic obstructive pulmonary disease) (HCC)    • Fibromyalgia    • Hypertension    • Seasonal allergies        Past Surgical History:   Procedure Laterality Date   • COLONOSCOPY     • EYE SURGERY     • KNEE SURGERY  1998   • [place] COLONOSCOPY FLX DX W/COLLJ SPEC WHEN PFRMD N/A 05/09/2017    Procedure: EGD AND COLONOSCOPY;  Surgeon: Oren Rocha MD;  Location: [facility];   Service: Gastroenterology       Family History   Problem Relation Age of Onset   • Pancreatic cancer Mother 67   • Coronary artery disease Father    • Diabetes Father    • Hypertension Father    • Heart disease Father    • Lung cancer Sister 40   • Pancreatic cancer Brother    • Pancreatic cancer Brother    • Lung cancer Maternal Grandfather 68   • Diabetes Paternal Grandmother      I have reviewed and agree with the history as documented  E-Cigarette/Vaping   • E-Cigarette Use Never User      E-Cigarette/Vaping Substances     Social History     Tobacco Use   • Smoking status: Every Day     Packs/day: 1 50     Years: 50 00     Pack years: 75 00     Types: Cigarettes     Start date: 1968   • Smokeless tobacco: Never   Vaping Use   • Vaping Use: Never used   Substance Use Topics   • Alcohol use: Never   • Drug use: No       Review of Systems   Constitutional: Negative for diaphoresis, fatigue and fever  HENT: Negative for congestion, ear pain, nosebleeds and sore throat  Eyes: Negative for photophobia, pain, discharge and visual disturbance  Respiratory: Negative for cough, choking, chest tightness, shortness of breath and wheezing  Cardiovascular: Negative for chest pain and palpitations  Gastrointestinal: Negative for abdominal distention, abdominal pain, diarrhea and vomiting  Genitourinary: Negative for dysuria, flank pain and frequency  Musculoskeletal: Negative for back pain, gait problem and joint swelling  Skin: Positive for rash  Negative for color change  Neurological: Negative for dizziness, syncope and headaches  Psychiatric/Behavioral: Negative for behavioral problems and confusion  The patient is not nervous/anxious  All other systems reviewed and are negative  Physical Exam  Physical Exam  Vitals and nursing note reviewed  Constitutional:       General: She is not in acute distress  Appearance: She is well-developed  She is not ill-appearing or toxic-appearing  HENT:      Head: Normocephalic and atraumatic  Mouth/Throat:      Dentition: Normal dentition  Eyes:      General:         Right eye: No discharge           Left eye: No discharge  Cardiovascular:      Rate and Rhythm: Normal rate and regular rhythm  Pulmonary:      Effort: Pulmonary effort is normal  No accessory muscle usage or respiratory distress  Abdominal:      General: There is no distension  Tenderness: There is no guarding  Musculoskeletal:         General: Normal range of motion  Cervical back: Normal range of motion and neck supple  Skin:     General: Skin is warm and dry  Findings: Rash (Shingles over the left flank area dermatome) present  Neurological:      Mental Status: She is alert and oriented to person, place, and time  Coordination: Coordination normal    Psychiatric:         Behavior: Behavior is cooperative  Vital Signs  ED Triage Vitals   Temperature Pulse Respirations Blood Pressure SpO2   02/05/23 1010 02/05/23 1010 02/05/23 1010 02/05/23 1010 02/05/23 1010   (!) 97 4 °F (36 3 °C) 85 17 (!) 193/81 98 %      Temp Source Heart Rate Source Patient Position - Orthostatic VS BP Location FiO2 (%)   02/05/23 1010 02/05/23 1010 02/05/23 1010 02/05/23 1010 --   Temporal Monitor Sitting Right arm       Pain Score       02/05/23 1018       3           Vitals:    02/05/23 1010   BP: (!) 193/81   Pulse: 85   Patient Position - Orthostatic VS: Sitting         Visual Acuity      ED Medications  Medications   HYDROcodone-acetaminophen (NORCO) 5-325 mg per tablet 1 tablet (1 tablet Oral Given 2/5/23 1018)       Diagnostic Studies  Results Reviewed     None                 No orders to display              Procedures  Procedures         ED Course                               SBIRT 20yo+    Flowsheet Row Most Recent Value   SBIRT (25 yo +)    In order to provide better care to our patients, we are screening all of our patients for alcohol and drug use  Would it be okay to ask you these screening questions? Yes Filed at: 02/05/2023 1038   Initial Alcohol Screen: US AUDIT-C     1   How often do you have a drink containing alcohol? 0 Filed at: 02/05/2023 1038   2  How many drinks containing alcohol do you have on a typical day you are drinking? 0 Filed at: 02/05/2023 1038   3b  FEMALE Any Age, or MALE 65+: How often do you have 4 or more drinks on one occassion? 0 Filed at: 02/05/2023 1038   Audit-C Score 0 Filed at: 02/05/2023 1038   COLT: How many times in the past year have you    Used an illegal drug or used a prescription medication for non-medical reasons? Never Filed at: 02/05/2023 1038                    Medical Decision Making  Shingles: acute illness or injury  Risk  Prescription drug management  Disposition  Final diagnoses:   Shingles     Time reflects when diagnosis was documented in both MDM as applicable and the Disposition within this note     Time User Action Codes Description Comment    2/5/2023 10:15 AM Adam Lo Add [B02 9] Shingles       ED Disposition     ED Disposition   Discharge    Condition   Stable    Date/Time   Sun Feb 5, 2023 10:16 AM    Comment   Samantha Poisson discharge to home/self care  Follow-up Information    None         Discharge Medication List as of 2/5/2023 10:16 AM      START taking these medications    Details   !! HYDROcodone-acetaminophen (NORCO) 5-325 mg per tablet Take 1 tablet by mouth every 6 (six) hours as needed for pain for up to 20 doses Max Daily Amount: 4 tablets, Starting Sun 2/5/2023, Normal      valACYclovir (Valtrex) 1,000 mg tablet Take 1 tablet (1,000 mg total) by mouth 3 (three) times a day for 10 days, Starting Sun 2/5/2023, Until Wed 2/15/2023, Normal       !! - Potential duplicate medications found  Please discuss with provider        CONTINUE these medications which have NOT CHANGED    Details   sulfamethoxazole-trimethoprim (BACTRIM DS) 800-160 mg per tablet Take 1 tablet by mouth every 12 (twelve) hours for 7 days, Starting Thu 2/2/2023, Until Thu 2/9/2023, Normal      acetaminophen (TYLENOL) 650 mg CR tablet Take by mouth every 8 (eight) hours as needed  , Historical Med      albuterol (2 5 mg/3 mL) 0 083 % nebulizer solution Take 3 mL (2 5 mg total) by nebulization every 6 (six) hours as needed for wheezing or shortness of breath, Starting Thu 1/19/2023, Normal      albuterol (PROVENTIL HFA,VENTOLIN HFA) 90 mcg/act inhaler Inhale 2 puffs every 6 (six) hours as needed for wheezing, Starting Thu 1/5/2023, Normal      ALPRAZolam (XANAX) 0 25 mg tablet Take 1 tablet (0 25 mg total) by mouth 3 (three) times a day as needed for anxiety, Starting Fri 9/9/2022, Normal      amLODIPine (NORVASC) 5 mg tablet Take 1 tablet (5 mg total) by mouth daily, Starting Mon 11/14/2022, Normal      cetirizine (ZyrTEC) 10 mg tablet Take 1 tablet (10 mg total) by mouth daily, Starting Mon 1/30/2023, Normal      Diclofenac Sodium (VOLTAREN) 1 % Apply 2 g topically 4 (four) times a day, Starting Tue 12/6/2022, Normal      escitalopram (LEXAPRO) 10 mg tablet Take 1 tablet (10 mg total) by mouth daily, Starting Mon 11/14/2022, Normal      !! HYDROcodone-acetaminophen (Norco) 5-325 mg per tablet Take 1 tablet by mouth every 6 (six) hours as needed for pain Max Daily Amount: 4 tablets, Starting Thu 1/19/2023, Normal      lidocaine (Lidoderm) 5 % Apply 1 patch topically daily Remove & Discard patch within 12 hours or as directed by MD, Starting Wed 4/27/2022, Normal      lisinopril (ZESTRIL) 20 mg tablet Take 1 tablet (20 mg total) by mouth daily, Starting Mon 11/14/2022, Normal      Lumigan 0 01 % ophthalmic drops 1 DROP IN BOTH EYES AT BEDTIME, Historical Med      MINOXIDIL, TOPICAL, 5 % SOLN Apply 1 application topically in the morning For hair, Historical Med      montelukast (SINGULAIR) 10 mg tablet TAKE 1 TABLET BY MOUTH DAILY AT BEDTIME, Normal      naloxone (NARCAN) 4 mg/0 1 mL nasal spray Administer 1 spray into a nostril   If no response after 2-3 minutes, give another dose in the other nostril using a new spray , Normal      nystatin (MYCOSTATIN) 500,000 units/5 mL suspension Take 5 mL (500,000 Units total) by mouth 4 (four) times a day, Starting Tue 12/13/2022, Normal      pantoprazole (PROTONIX) 40 mg tablet Take 1 tablet (40 mg total) by mouth 2 (two) times a day, Starting Mon 11/14/2022, Normal      promethazine-dextromethorphan (PHENERGAN-DM) 6 25-15 mg/5 mL oral syrup Take 5 mL by mouth 4 (four) times a day as needed for cough, Starting Thu 1/5/2023, Normal      sodium chloride 1 g tablet Take 1 tablet (1 g total) by mouth 3 (three) times a day, Starting Sat 2/4/2023, Normal      tiZANidine (ZANAFLEX) 4 mg tablet Take 1 tablet (4 mg total) by mouth every 8 (eight) hours as needed for muscle spasms for up to 5 days, Starting Mon 11/14/2022, Until Thu 1/19/2023 at 2359, Normal      Trelegy Ellipta 200-62 5-25 MCG/INH AEPB inhaler INHALE 1 PUFF DAILY RINSE MOUTH AFTER USE , Starting Mon 9/12/2022, Until Thu 1/19/2023, Normal       !! - Potential duplicate medications found  Please discuss with provider  No discharge procedures on file      PDMP Review       Value Time User    PDMP Reviewed  Yes 1/19/2023  9:59 AM Princess Juarez MD          ED Provider  Electronically Signed by           MIRNA Zhang  02/05/23 9598

## 2023-02-06 ENCOUNTER — APPOINTMENT (OUTPATIENT)
Dept: LAB | Facility: CLINIC | Age: 68
End: 2023-02-06

## 2023-02-06 ENCOUNTER — OFFICE VISIT (OUTPATIENT)
Dept: FAMILY MEDICINE CLINIC | Facility: CLINIC | Age: 68
End: 2023-02-06

## 2023-02-06 VITALS
WEIGHT: 193.8 LBS | OXYGEN SATURATION: 96 % | SYSTOLIC BLOOD PRESSURE: 120 MMHG | HEART RATE: 92 BPM | DIASTOLIC BLOOD PRESSURE: 64 MMHG | TEMPERATURE: 97.6 F | HEIGHT: 64 IN | BODY MASS INDEX: 33.09 KG/M2

## 2023-02-06 DIAGNOSIS — K59.00 CONSTIPATION, UNSPECIFIED CONSTIPATION TYPE: ICD-10-CM

## 2023-02-06 DIAGNOSIS — E87.1 HYPONATREMIA: ICD-10-CM

## 2023-02-06 DIAGNOSIS — E87.1 HYPONATREMIA: Primary | ICD-10-CM

## 2023-02-06 DIAGNOSIS — B02.9 HERPES ZOSTER WITHOUT COMPLICATION: Primary | ICD-10-CM

## 2023-02-06 LAB — SODIUM SERPL-SCNC: 131 MMOL/L (ref 135–147)

## 2023-02-06 RX ORDER — LACTULOSE 20 G/30ML
SOLUTION ORAL
Qty: 300 ML | Refills: 3 | Status: SHIPPED | OUTPATIENT
Start: 2023-02-06

## 2023-02-06 RX ORDER — GABAPENTIN 300 MG/1
CAPSULE ORAL
Qty: 90 CAPSULE | Refills: 2 | Status: SHIPPED | OUTPATIENT
Start: 2023-02-06

## 2023-02-07 NOTE — PROGRESS NOTES
Patient ID: Manolo Singh is a 79 y o  female  HPI: 79 y  o female presents for a post ER check where she was found to have herpes zoster  She was started on valtrex and hydrocodone for pain  She was also found to be hyponatremic with a sodium of 125 and put on salt tablets  She has been constipated chronically and has failed stool softeners, fiber supplements, ducolax, miralax, and mag citrate  She even failed Linzess  SUBJECTIVE    Family History   Problem Relation Age of Onset   • Pancreatic cancer Mother 67   • Coronary artery disease Father    • Diabetes Father    • Hypertension Father    • Heart disease Father    • Lung cancer Sister 40   • Pancreatic cancer Brother    • Pancreatic cancer Brother    • Lung cancer Maternal Grandfather 68   • Diabetes Paternal Grandmother      Social History     Socioeconomic History   • Marital status:       Spouse name: Not on file   • Number of children: 3   • Years of education: less than high school   • Highest education level: Not on file   Occupational History   • Occupation: unemployed   Tobacco Use   • Smoking status: Every Day     Packs/day: 1 50     Years: 50 00     Pack years: 75 00     Types: Cigarettes     Start date: 1968   • Smokeless tobacco: Never   Vaping Use   • Vaping Use: Never used   Substance and Sexual Activity   • Alcohol use: Never   • Drug use: No   • Sexual activity: Not Currently   Other Topics Concern   • Not on file   Social History Narrative    Always uses seatbelt    Daily coffee consumption    Daily tea consumption    Dental care regularly    Exercises regularly    Multiple organ donor    No guns in the home    No living will    Denies pets/ animals in the home    Power of  in existence- denied         Social Determinants of Health     Financial Resource Strain: Not on file   Food Insecurity: Not on file   Transportation Needs: Not on file   Physical Activity: Not on file   Stress: Not on file   Social Connections: Not on file   Intimate Partner Violence: Not on file   Housing Stability: Not on file     Past Medical History:   Diagnosis Date   • Acid reflux    • Back injury    • COPD (chronic obstructive pulmonary disease) (HCC)    • Fibromyalgia    • Hypertension    • Seasonal allergies      Past Surgical History:   Procedure Laterality Date   • COLONOSCOPY     • EYE SURGERY     • KNEE SURGERY  1998   • [place] COLONOSCOPY FLX DX W/COLLJ SPEC WHEN PFRMD N/A 05/09/2017    Procedure: EGD AND COLONOSCOPY;  Surgeon: Oren Rocha MD;  Location: [facility];   Service: Gastroenterology     Allergies   Allergen Reactions   • Augmentin [Amoxicillin-Pot Clavulanate] Vomiting   • Miconazole Itching and Swelling   • Wixela Inhub [Fluticasone-Salmeterol] Palpitations       Current Outpatient Medications:   •  acetaminophen (TYLENOL) 650 mg CR tablet, Take by mouth every 8 (eight) hours as needed  , Disp: , Rfl:   •  albuterol (2 5 mg/3 mL) 0 083 % nebulizer solution, Take 3 mL (2 5 mg total) by nebulization every 6 (six) hours as needed for wheezing or shortness of breath, Disp: 180 mL, Rfl: 5  •  albuterol (PROVENTIL HFA,VENTOLIN HFA) 90 mcg/act inhaler, Inhale 2 puffs every 6 (six) hours as needed for wheezing, Disp: 18 g, Rfl: 5  •  ALPRAZolam (XANAX) 0 25 mg tablet, Take 1 tablet (0 25 mg total) by mouth 3 (three) times a day as needed for anxiety, Disp: 90 tablet, Rfl: 0  •  amLODIPine (NORVASC) 5 mg tablet, Take 1 tablet (5 mg total) by mouth daily, Disp: 90 tablet, Rfl: 1  •  cetirizine (ZyrTEC) 10 mg tablet, Take 1 tablet (10 mg total) by mouth daily, Disp: 90 tablet, Rfl: 0  •  Diclofenac Sodium (VOLTAREN) 1 %, Apply 2 g topically 4 (four) times a day, Disp: 100 g, Rfl: 0  •  escitalopram (LEXAPRO) 10 mg tablet, Take 1 tablet (10 mg total) by mouth daily, Disp: 90 tablet, Rfl: 1  •  gabapentin (Neurontin) 300 mg capsule, 1-3 tabs up to three times a day, Disp: 90 capsule, Rfl: 2  •  HYDROcodone-acetaminophen (Norco) 5-325 mg per tablet, Take 1 tablet by mouth every 6 (six) hours as needed for pain Max Daily Amount: 4 tablets, Disp: 60 tablet, Rfl: 0  •  HYDROcodone-acetaminophen (NORCO) 5-325 mg per tablet, Take 1 tablet by mouth every 6 (six) hours as needed for pain for up to 20 doses Max Daily Amount: 4 tablets, Disp: 20 tablet, Rfl: 0  •  lactulose 20 g/30 mL, 1-3 tablespoons daily prn constipation, Disp: 300 mL, Rfl: 3  •  lidocaine (Lidoderm) 5 %, Apply 1 patch topically daily Remove & Discard patch within 12 hours or as directed by MD, Disp: 30 patch, Rfl: 1  •  lisinopril (ZESTRIL) 20 mg tablet, Take 1 tablet (20 mg total) by mouth daily, Disp: 90 tablet, Rfl: 1  •  Lumigan 0 01 % ophthalmic drops, 1 DROP IN BOTH EYES AT BEDTIME, Disp: , Rfl:   •  MINOXIDIL, TOPICAL, 5 % SOLN, Apply 1 application topically in the morning For hair, Disp: , Rfl:   •  montelukast (SINGULAIR) 10 mg tablet, TAKE 1 TABLET BY MOUTH DAILY AT BEDTIME, Disp: 90 tablet, Rfl: 2  •  naloxone (NARCAN) 4 mg/0 1 mL nasal spray, Administer 1 spray into a nostril   If no response after 2-3 minutes, give another dose in the other nostril using a new spray , Disp: 1 each, Rfl: 1  •  nystatin (MYCOSTATIN) 500,000 units/5 mL suspension, Take 5 mL (500,000 Units total) by mouth 4 (four) times a day, Disp: 200 mL, Rfl: 0  •  pantoprazole (PROTONIX) 40 mg tablet, Take 1 tablet (40 mg total) by mouth 2 (two) times a day, Disp: 180 tablet, Rfl: 1  •  sulfamethoxazole-trimethoprim (BACTRIM DS) 800-160 mg per tablet, Take 1 tablet by mouth every 12 (twelve) hours for 7 days (Patient not taking: Reported on 2/6/2023), Disp: 14 tablet, Rfl: 0  •  Trelegy Ellipta 200-62 5-25 MCG/INH AEPB inhaler, INHALE 1 PUFF DAILY RINSE MOUTH AFTER USE , Disp: 60 each, Rfl: 5  •  valACYclovir (Valtrex) 1,000 mg tablet, Take 1 tablet (1,000 mg total) by mouth 3 (three) times a day for 10 days, Disp: 30 tablet, Rfl: 0  •  promethazine-dextromethorphan (PHENERGAN-DM) 6 25-15 mg/5 mL oral syrup, Take 5 mL by mouth 4 (four) times a day as needed for cough (Patient not taking: Reported on 1/19/2023), Disp: 150 mL, Rfl: 0  •  sodium chloride 1 g tablet, Take 1 tablet (1 g total) by mouth 3 (three) times a day (Patient not taking: Reported on 2/6/2023), Disp: 21 tablet, Rfl: 0  •  tiZANidine (ZANAFLEX) 4 mg tablet, Take 1 tablet (4 mg total) by mouth every 8 (eight) hours as needed for muscle spasms for up to 5 days (Patient not taking: Reported on 2/6/2023), Disp: 15 tablet, Rfl: 1    Review of Systems  Constitutional:     Denies fever, chills ,fatigue ,weakness ,weight loss, weight gain     ENT: Denies earache ,loss of hearing ,nosebleed, nasal discharge,nasal congestion ,sore throat ,hoarseness  Pulmonary: Denies shortness of breath ,cough  ,dyspnea on exertion, orthopnea  ,PND   Cardiovascular:  Denies bradycardia , tachycardia  ,palpations, lower extremity edema leg, claudication  Breast:  Denies new or changing breast lumps ,nipple discharge ,nipple changes  Abdomen:  Denies abdominal pain , anorexia , indigestion, nausea, vomiting, constipation, diarrhea  Musculoskeletal: Denies myalgias, arthralgias, joint swelling, joint stiffness , limb pain, limb swelling  Gu: denies dysuria, polyuria  Skin: + painful, raised red blistered rash at waist band level on left starting on back and wrapping around to low abdomen on front  Neuro: Denies headache, numbness, tingling, confusion, loss of consciousness, dizziness, vertigo  Psychiatric: Denies feelings of depression, suicidal ideation, anxiety, sleep disturbances    OBJECTIVE  /64   Pulse 92   Temp 97 6 °F (36 4 °C)   Ht 5' 4" (1 626 m)   Wt 87 9 kg (193 lb 12 8 oz)   LMP  (LMP Unknown)   SpO2 96%   BMI 33 27 kg/m²   Constitutional:   NAD, well appearing and well nourished      ENT:   Conjunctiva and lids: no injection, edema, or discharge     Pupils and iris: MARQUISE bilaterally    External inspection of ears and nose: normal without deformities or discharge  Otoscopic exam: Canals patent without erythema  Nasal mucosa, septum and turbinates: Normal or edema or discharge         Oropharynx:  Moist mucosa, normal tongue and tonsils without lesions  No erythema        Pulmonary:Respiratory effort normal rate and rhythm, no increased work of breathing  Auscultation of lungs:  Clear bilaterally with no adventitious breath sounds       Cardiovascular: regular rate and rhythm, S1 and S2, no murmur, no edema and/or varicosities of LE      Abdomen: Soft and non-distended     Positive bowel sounds      No heptomegaly or splenomegaly      Gu: no suprapubic tenderness or CVA tenderness, no urethral discharge  Lymphatic:  No anterior or posterior cervical lymphadenopathy         Musculoskeletal:  Gait and station: Normal gait      Digits and nails normal without clubbing or cyanosis       Inspection/palpation of joints, bones, and muscles:  No joint tenderness, swelling, full active and passive range of motion       Skin: + erythematous, vesicular rash on left throrax in dermatomal configuaration at waist-band level without signs of purulence or erythemat of skin surrounding site  Neuro:       Normal reflexes      Psych:   alert and oriented to person, place and time     normal mood and affect       Assessment/Plan:Diagnoses and all orders for this visit:    Herpes zoster without complication  Comments:  Pt to call if pain is not better controlled over the next 2-3 days time  Orders:  -     gabapentin (Neurontin) 300 mg capsule; 1-3 tabs up to three times a day    Hyponatremia  Comments: Will recheck sodium and treat accordingly  Orders:  -     Sodium; Future    Constipation, unspecified constipation type  Comments:  Pt to call in 2-3 days [facility] update  Orders:  -     lactulose 20 g/30 mL; 1-3 tablespoons daily prn constipation        Reviewed with patient plan to treat with above plan      Patient instructed to call in 72 hours if not feeling better or if symptoms worsen

## 2023-02-13 ENCOUNTER — APPOINTMENT (OUTPATIENT)
Dept: LAB | Facility: MEDICAL CENTER | Age: 68
End: 2023-02-13

## 2023-02-13 DIAGNOSIS — E87.1 HYPONATREMIA: ICD-10-CM

## 2023-02-13 LAB — SODIUM SERPL-SCNC: 136 MMOL/L (ref 135–147)

## 2023-02-14 DIAGNOSIS — E87.1 HYPONATREMIA: Primary | ICD-10-CM

## 2023-02-16 ENCOUNTER — APPOINTMENT (OUTPATIENT)
Dept: LAB | Facility: MEDICAL CENTER | Age: 68
End: 2023-02-16

## 2023-02-16 DIAGNOSIS — J01.20 ACUTE NON-RECURRENT ETHMOIDAL SINUSITIS: Primary | ICD-10-CM

## 2023-02-16 DIAGNOSIS — E87.1 HYPONATREMIA: ICD-10-CM

## 2023-02-16 LAB
ANION GAP SERPL CALCULATED.3IONS-SCNC: 7 MMOL/L (ref 4–13)
BUN SERPL-MCNC: 10 MG/DL (ref 5–25)
CALCIUM SERPL-MCNC: 9.7 MG/DL (ref 8.3–10.1)
CHLORIDE SERPL-SCNC: 99 MMOL/L (ref 96–108)
CO2 SERPL-SCNC: 25 MMOL/L (ref 21–32)
CREAT SERPL-MCNC: 0.54 MG/DL (ref 0.6–1.3)
GFR SERPL CREATININE-BSD FRML MDRD: 97 ML/MIN/1.73SQ M
GLUCOSE SERPL-MCNC: 92 MG/DL (ref 65–140)
POTASSIUM SERPL-SCNC: 3.8 MMOL/L (ref 3.5–5.3)
SODIUM SERPL-SCNC: 131 MMOL/L (ref 135–147)

## 2023-02-16 RX ORDER — AZITHROMYCIN 250 MG/1
TABLET, FILM COATED ORAL
Qty: 6 TABLET | Refills: 0 | Status: SHIPPED | OUTPATIENT
Start: 2023-02-16 | End: 2023-02-20

## 2023-02-17 ENCOUNTER — OFFICE VISIT (OUTPATIENT)
Dept: FAMILY MEDICINE CLINIC | Facility: CLINIC | Age: 68
End: 2023-02-17

## 2023-02-17 VITALS
HEIGHT: 64 IN | WEIGHT: 193 LBS | TEMPERATURE: 98 F | OXYGEN SATURATION: 98 % | BODY MASS INDEX: 32.95 KG/M2 | HEART RATE: 91 BPM | DIASTOLIC BLOOD PRESSURE: 84 MMHG | SYSTOLIC BLOOD PRESSURE: 136 MMHG

## 2023-02-17 DIAGNOSIS — B02.9 HERPES ZOSTER WITHOUT COMPLICATION: ICD-10-CM

## 2023-02-17 DIAGNOSIS — E87.1 HYPONATREMIA: Primary | ICD-10-CM

## 2023-02-17 DIAGNOSIS — J01.20 ACUTE NON-RECURRENT ETHMOIDAL SINUSITIS: ICD-10-CM

## 2023-02-17 DIAGNOSIS — M54.9 MID BACK PAIN: ICD-10-CM

## 2023-02-17 RX ORDER — HYDROCODONE BITARTRATE AND ACETAMINOPHEN 5; 325 MG/1; MG/1
1 TABLET ORAL EVERY 6 HOURS PRN
Qty: 60 TABLET | Refills: 0 | Status: SHIPPED | OUTPATIENT
Start: 2023-02-17

## 2023-02-17 NOTE — PROGRESS NOTES
Name: Alin Baker      : 1955      MRN: [id number]  Encounter Provider: Eboni Cruz MD  Encounter Date: 2023   Encounter department: [address]     1  Hyponatremia  Assessment & Plan:  Unclear cause  Sodium improved with salt tablets  Now patient is been off medication for several days  I will have her call for a repeat BMP as well as urinary electrolytes tomorrow morning  Further recommendations based on these results  Orders:  -     Basic metabolic panel; Future    2  Mid back pain  Assessment & Plan:  ? Multifactorial related to degenerative disc disease, previous compression fracture, and now herpes zoster in this area  I reviewed treatment options with patient  Recommend gabapentin around-the-clock as directed  Continue hydrocodone  Recheck 2 weeks if not resolving-earlier if worse    Orders:  -     HYDROcodone-acetaminophen (Norco) 5-325 mg per tablet; Take 1 tablet by mouth every 6 (six) hours as needed for pain Max Daily Amount: 4 tablets    3  Acute non-recurrent ethmoidal sinusitis  Assessment & Plan:  Patient was started on Zithromax yesterday when symptoms appointment was made  Patient feels a little bit better today  Continue present treatment  Recheck 1 week if not resolved      4  Herpes zoster without complication  Assessment & Plan:  Rash is resolving though discomfort remains  I reviewed with patient  Recommend taking gabapentin around-the-clock  Use hydrocodone only as necessary  Continue to observe  Recheck 2 to 3 weeks if not resolving-earlier if worse             Subjective     Pt here for several concerns  - 78 yo female presents with several day hx of nasal congestion, sinus pressure, cough and fatigue  COVID test neg  She denies fever but had brief chills  Pt denies SOB  Still smoking  - pt seen in the ED for abd pain on   Pain was L sided  Exam was unremarkable, but labs revealed low Na+ (125)  Pt admits to decreased appetite but no diarrhea or n/v  Pt was given Na+ tabs to take and advised to f/u with this office  The next day, pt developed a rash and returned to the ED where shingles was diagnosed  Pt started on appropriate treatment and discharged  Since then, repeat Na+ had improved  Pt was told to stop her Na+ tabs earlier this week in order to recheck labs and start eval for low Na  - pt notes persistent mid and low back pain that worsened recently  Pt is not sure if the worsening pain is related to her shingles  Pt taking Norco 1 q6h for pain as well as gabapentin (though she may not be taking this regularly)  No urinary symptoms    Review of Systems   Constitutional: Positive for fatigue  Negative for activity change, appetite change and fever  HENT: Negative  Eyes: Negative  Respiratory: Negative  Cardiovascular: Negative  Gastrointestinal: Positive for abdominal pain and constipation  Negative for abdominal distention, diarrhea and vomiting  Genitourinary: Negative  Musculoskeletal: Positive for arthralgias and back pain  Negative for myalgias  Skin: Positive for rash  Neurological: Negative for weakness and numbness         (+) neuralgia       Past Medical History:   Diagnosis Date   • Acid reflux    • Back injury    • COPD (chronic obstructive pulmonary disease) (HCC)    • Fibromyalgia    • Hypertension    • Seasonal allergies      Past Surgical History:   Procedure Laterality Date   • COLONOSCOPY     • EYE SURGERY     • KNEE SURGERY  1998   • [place] COLONOSCOPY FLX DX W/COLLJ SPEC WHEN PFRMD N/A 05/09/2017    Procedure: EGD AND COLONOSCOPY;  Surgeon: Margi Waterman MD;  Location[facility];   Service: Gastroenterology     Family History   Problem Relation Age of Onset   • Pancreatic cancer Mother 67   • Coronary artery disease Father    • Diabetes Father    • Hypertension Father    • Heart disease Father    • Lung cancer Sister 40   • Pancreatic cancer Brother    • Pancreatic cancer Brother    • Lung cancer Maternal Grandfather 68   • Diabetes Paternal Grandmother      Social History     Socioeconomic History   • Marital status:       Spouse name: None   • Number of children: 3   • Years of education: less than high school   • Highest education level: None   Occupational History   • Occupation: unemployed   Tobacco Use   • Smoking status: Every Day     Packs/day: 1 50     Years: 50 00     Pack years: 75 00     Types: Cigarettes     Start date: 1968     Passive exposure: Past   • Smokeless tobacco: Never   Vaping Use   • Vaping Use: Never used   Substance and Sexual Activity   • Alcohol use: Never   • Drug use: No   • Sexual activity: Not Currently   Other Topics Concern   • None   Social History Narrative    Always uses seatbelt    Daily coffee consumption    Daily tea consumption    Dental care regularly    Exercises regularly    Multiple organ donor    No guns in the home    No living will    Denies pets/ animals in the home    Power of  in existence- denied         Social Determinants of Health     Financial Resource Strain: Not on file   Food Insecurity: Not on file   Transportation Needs: Not on file   Physical Activity: Not on file   Stress: Not on file   Social Connections: Not on file   Intimate Partner Violence: Not on file   Housing Stability: Not on file     Current Outpatient Medications on File Prior to Visit   Medication Sig   • acetaminophen (TYLENOL) 650 mg CR tablet Take by mouth every 8 (eight) hours as needed     • albuterol (2 5 mg/3 mL) 0 083 % nebulizer solution Take 3 mL (2 5 mg total) by nebulization every 6 (six) hours as needed for wheezing or shortness of breath   • albuterol (PROVENTIL HFA,VENTOLIN HFA) 90 mcg/act inhaler Inhale 2 puffs every 6 (six) hours as needed for wheezing   • ALPRAZolam (XANAX) 0 25 mg tablet Take 1 tablet (0 25 mg total) by mouth 3 (three) times a day as needed for anxiety   • amLODIPine (NORVASC) 5 mg tablet Take 1 tablet (5 mg total) by mouth daily   • [] azithromycin (ZITHROMAX) 250 mg tablet 2 tab today then 1 tab po qd x 4 d   • cetirizine (ZyrTEC) 10 mg tablet Take 1 tablet (10 mg total) by mouth daily   • escitalopram (LEXAPRO) 10 mg tablet Take 1 tablet (10 mg total) by mouth daily   • gabapentin (Neurontin) 300 mg capsule 1-3 tabs up to three times a day   • lactulose 20 g/30 mL 1-3 tablespoons daily prn constipation   • lidocaine (Lidoderm) 5 % Apply 1 patch topically daily Remove & Discard patch within 12 hours or as directed by MD   • lisinopril (ZESTRIL) 20 mg tablet Take 1 tablet (20 mg total) by mouth daily   • Lumigan 0 01 % ophthalmic drops 1 DROP IN BOTH EYES AT BEDTIME   • MINOXIDIL, TOPICAL, 5 % SOLN Apply 1 application topically in the morning For hair   • montelukast (SINGULAIR) 10 mg tablet TAKE 1 TABLET BY MOUTH DAILY AT BEDTIME   • nystatin (MYCOSTATIN) 500,000 units/5 mL suspension Take 5 mL (500,000 Units total) by mouth 4 (four) times a day   • pantoprazole (PROTONIX) 40 mg tablet Take 1 tablet (40 mg total) by mouth 2 (two) times a day   • Trelegy Ellipta 200-62 5-25 MCG/INH AEPB inhaler INHALE 1 PUFF DAILY RINSE MOUTH AFTER USE  • Diclofenac Sodium (VOLTAREN) 1 % Apply 2 g topically 4 (four) times a day (Patient not taking: Reported on 2023)   • naloxone (NARCAN) 4 mg/0 1 mL nasal spray Administer 1 spray into a nostril  If no response after 2-3 minutes, give another dose in the other nostril using a new spray   (Patient not taking: Reported on 2023)   • promethazine-dextromethorphan (PHENERGAN-DM) 6 25-15 mg/5 mL oral syrup Take 5 mL by mouth 4 (four) times a day as needed for cough (Patient not taking: Reported on 2023)   • sodium chloride 1 g tablet Take 1 tablet (1 g total) by mouth 3 (three) times a day (Patient not taking: Reported on 2023)   • tiZANidine (ZANAFLEX) 4 mg tablet Take 1 tablet (4 mg total) by mouth every 8 (eight) hours as needed for muscle spasms for up to 5 days (Patient not taking: Reported on 2/6/2023)   • valACYclovir (Valtrex) 1,000 mg tablet Take 1 tablet (1,000 mg total) by mouth 3 (three) times a day for 10 days (Patient not taking: Reported on 2/17/2023)     Allergies   Allergen Reactions   • Augmentin [Amoxicillin-Pot Clavulanate] Vomiting   • Miconazole Itching and Swelling   • Wixela Inhub [Fluticasone-Salmeterol] Palpitations     Immunization History   Administered Date(s) Administered   • Tdap 02/28/2008       Objective     /84   Pulse 91   Temp 98 °F (36 7 °C)   Ht 5' 4" (1 626 m)   Wt 87 5 kg (193 lb)   LMP  (LMP Unknown)   SpO2 98%   BMI 33 13 kg/m²     Physical Exam  Vitals reviewed  HENT:      Head: Normocephalic  Right Ear: Tympanic membrane, ear canal and external ear normal       Left Ear: Tympanic membrane, ear canal and external ear normal       Nose: Congestion present  Comments: Sinuses sl TTP     Mouth/Throat:      Mouth: Mucous membranes are moist    Eyes:      General: No scleral icterus  Extraocular Movements: Extraocular movements intact  Conjunctiva/sclera: Conjunctivae normal       Pupils: Pupils are equal, round, and reactive to light  Cardiovascular:      Rate and Rhythm: Normal rate and regular rhythm  Pulses: Normal pulses  Pulmonary:      Effort: Pulmonary effort is normal       Breath sounds: No wheezing or rales  Abdominal:      General: There is no distension  Palpations: There is no mass  Tenderness: There is abdominal tenderness (over herpes zoster dermatome)  There is no right CVA tenderness, left CVA tenderness, guarding or rebound  Musculoskeletal:         General: Tenderness (over low thoracic/upper lumber spine) present  Cervical back: No tenderness  Right lower leg: No edema  Left lower leg: No edema  Lymphadenopathy:      Cervical: No cervical adenopathy  Skin:     Findings: Rash (resolving herpes zoster) present     Neurological: General: No focal deficit present  Mental Status: She is alert and oriented to person, place, and time  Sensory: No sensory deficit  Motor: No weakness         Nora Valdez MD

## 2023-02-18 ENCOUNTER — APPOINTMENT (OUTPATIENT)
Dept: LAB | Facility: MEDICAL CENTER | Age: 68
End: 2023-02-18

## 2023-02-18 DIAGNOSIS — E87.1 HYPONATREMIA: ICD-10-CM

## 2023-02-18 LAB
ANION GAP SERPL CALCULATED.3IONS-SCNC: 4 MMOL/L (ref 4–13)
BUN SERPL-MCNC: 11 MG/DL (ref 5–25)
CALCIUM SERPL-MCNC: 10.2 MG/DL (ref 8.3–10.1)
CHLORIDE SERPL-SCNC: 102 MMOL/L (ref 96–108)
CO2 SERPL-SCNC: 28 MMOL/L (ref 21–32)
CREAT SERPL-MCNC: 0.57 MG/DL (ref 0.6–1.3)
GFR SERPL CREATININE-BSD FRML MDRD: 96 ML/MIN/1.73SQ M
GLUCOSE SERPL-MCNC: 93 MG/DL (ref 65–140)
POTASSIUM SERPL-SCNC: 4.4 MMOL/L (ref 3.5–5.3)
SODIUM SERPL-SCNC: 134 MMOL/L (ref 135–147)

## 2023-02-20 ENCOUNTER — APPOINTMENT (OUTPATIENT)
Dept: LAB | Facility: MEDICAL CENTER | Age: 68
End: 2023-02-20

## 2023-02-21 PROBLEM — E87.1 HYPONATREMIA: Status: ACTIVE | Noted: 2023-02-21

## 2023-02-21 PROBLEM — B02.9 HERPES ZOSTER WITHOUT COMPLICATION: Status: ACTIVE | Noted: 2023-02-21

## 2023-02-21 NOTE — ASSESSMENT & PLAN NOTE
Rash is resolving though discomfort remains  I reviewed with patient  Recommend taking gabapentin around-the-clock  Use hydrocodone only as necessary  Continue to observe    Recheck 2 to 3 weeks if not resolving-earlier if worse

## 2023-02-21 NOTE — ASSESSMENT & PLAN NOTE
Unclear cause  Sodium improved with salt tablets  Now patient is been off medication for several days  I will have her call for a repeat BMP as well as urinary electrolytes tomorrow morning  Further recommendations based on these results

## 2023-02-21 NOTE — ASSESSMENT & PLAN NOTE
?  Multifactorial related to degenerative disc disease, previous compression fracture, and now herpes zoster in this area  I reviewed treatment options with patient  Recommend gabapentin around-the-clock as directed  Continue hydrocodone    Recheck 2 weeks if not resolving-earlier if worse

## 2023-02-21 NOTE — ASSESSMENT & PLAN NOTE
Patient was started on Zithromax yesterday when symptoms appointment was made  Patient feels a little bit better today  Continue present treatment    Recheck 1 week if not resolved

## 2023-02-22 ENCOUNTER — TELEPHONE (OUTPATIENT)
Dept: FAMILY MEDICINE CLINIC | Facility: CLINIC | Age: 68
End: 2023-02-22

## 2023-03-02 DIAGNOSIS — K21.9 GASTROESOPHAGEAL REFLUX DISEASE: ICD-10-CM

## 2023-03-02 DIAGNOSIS — J30.9 ALLERGIC RHINITIS, UNSPECIFIED SEASONALITY, UNSPECIFIED TRIGGER: ICD-10-CM

## 2023-03-02 RX ORDER — CETIRIZINE HYDROCHLORIDE 10 MG/1
TABLET ORAL
Qty: 90 TABLET | Refills: 0 | Status: SHIPPED | OUTPATIENT
Start: 2023-03-02

## 2023-03-02 RX ORDER — PANTOPRAZOLE SODIUM 40 MG/1
TABLET, DELAYED RELEASE ORAL
Qty: 180 TABLET | Refills: 1 | Status: SHIPPED | OUTPATIENT
Start: 2023-03-02

## 2023-03-08 DIAGNOSIS — J42 CHRONIC BRONCHITIS, UNSPECIFIED CHRONIC BRONCHITIS TYPE (HCC): ICD-10-CM

## 2023-03-08 RX ORDER — FLUTICASONE FUROATE, UMECLIDINIUM BROMIDE AND VILANTEROL TRIFENATATE 200; 62.5; 25 UG/1; UG/1; UG/1
POWDER RESPIRATORY (INHALATION)
Qty: 60 EACH | Refills: 5 | Status: SHIPPED | OUTPATIENT
Start: 2023-03-08

## 2023-03-09 ENCOUNTER — TELEPHONE (OUTPATIENT)
Dept: FAMILY MEDICINE CLINIC | Facility: CLINIC | Age: 68
End: 2023-03-09

## 2023-03-09 DIAGNOSIS — J42 CHRONIC BRONCHITIS, UNSPECIFIED CHRONIC BRONCHITIS TYPE (HCC): ICD-10-CM

## 2023-03-09 RX ORDER — DEXTROMETHORPHAN HYDROBROMIDE AND PROMETHAZINE HYDROCHLORIDE 15; 6.25 MG/5ML; MG/5ML
5 SYRUP ORAL 4 TIMES DAILY PRN
Qty: 150 ML | Refills: 0 | Status: SHIPPED | OUTPATIENT
Start: 2023-03-09 | End: 2023-03-17 | Stop reason: SDUPTHER

## 2023-03-09 RX ORDER — AZITHROMYCIN 250 MG/1
TABLET, FILM COATED ORAL
Qty: 6 TABLET | Refills: 0 | Status: SHIPPED | OUTPATIENT
Start: 2023-03-09 | End: 2023-03-13

## 2023-03-09 NOTE — TELEPHONE ENCOUNTER
Patient tested positive for covid this afternoon    She is not vaccinated    She states symptoms started Monday with cough (sometimes productive, sometimes dry), fatigue, slight wheezing and body aches    She denies ear pain, sore throat, headache, n/v/d     She states she doesn't feel too bad and her biggest complain is the coughing     She is agreeable to either a virtual or for meds to be sent to pharmacy     Please advise

## 2023-03-13 DIAGNOSIS — J42 CHRONIC BRONCHITIS, UNSPECIFIED CHRONIC BRONCHITIS TYPE (HCC): Primary | ICD-10-CM

## 2023-03-13 RX ORDER — PREDNISONE 20 MG/1
20 TABLET ORAL 2 TIMES DAILY WITH MEALS
Qty: 10 TABLET | Refills: 0 | Status: SHIPPED | OUTPATIENT
Start: 2023-03-13 | End: 2023-03-18

## 2023-03-13 NOTE — TELEPHONE ENCOUNTER
Patient was given z- pac and cough syrup on Thursday and patient is still not feeling any better  Temp is 99 6    Symptoms:  Aches and pains  Fatigue  Coughing up thick mucus  Fever    Patient does not feel any better    Pharmacy : [facility]    Patient wanted to know if there was other medication that can be called in      Pt # [phone]

## 2023-03-16 ENCOUNTER — TELEPHONE (OUTPATIENT)
Dept: FAMILY MEDICINE CLINIC | Facility: CLINIC | Age: 68
End: 2023-03-16

## 2023-03-16 ENCOUNTER — APPOINTMENT (OUTPATIENT)
Dept: RADIOLOGY | Facility: MEDICAL CENTER | Age: 68
End: 2023-03-16

## 2023-03-16 DIAGNOSIS — R05.2 SUBACUTE COUGH: Primary | ICD-10-CM

## 2023-03-16 DIAGNOSIS — J42 CHRONIC BRONCHITIS, UNSPECIFIED CHRONIC BRONCHITIS TYPE (HCC): ICD-10-CM

## 2023-03-16 DIAGNOSIS — R05.2 SUBACUTE COUGH: ICD-10-CM

## 2023-03-16 NOTE — TELEPHONE ENCOUNTER
Patient called and is still not feeling any better  Patient has 1 day of steroid left  Patient is coughing up a lot of green mucus and tired  Requesting another round of Promethazine  Patient also states she is having side effects from steroid (anxious, racing heart and slight headache)  Patient is also wanting to know if she can have a chest xray?     Pharmacy: [facility]  Pt # [phone]

## 2023-03-17 DIAGNOSIS — J42 CHRONIC BRONCHITIS, UNSPECIFIED CHRONIC BRONCHITIS TYPE (HCC): ICD-10-CM

## 2023-03-17 RX ORDER — DEXTROMETHORPHAN HYDROBROMIDE AND PROMETHAZINE HYDROCHLORIDE 15; 6.25 MG/5ML; MG/5ML
5 SYRUP ORAL 4 TIMES DAILY PRN
Qty: 150 ML | Refills: 0 | Status: SHIPPED | OUTPATIENT
Start: 2023-03-17

## 2023-03-18 DIAGNOSIS — M70.61 TROCHANTERIC BURSITIS OF RIGHT HIP: ICD-10-CM

## 2023-03-21 PROBLEM — J01.20 ACUTE NON-RECURRENT ETHMOIDAL SINUSITIS: Status: RESOLVED | Noted: 2023-01-20 | Resolved: 2023-03-21

## 2023-03-28 DIAGNOSIS — M54.9 MID BACK PAIN: ICD-10-CM

## 2023-03-28 RX ORDER — HYDROCODONE BITARTRATE AND ACETAMINOPHEN 5; 325 MG/1; MG/1
1 TABLET ORAL EVERY 6 HOURS PRN
Qty: 60 TABLET | Refills: 0 | Status: SHIPPED | OUTPATIENT
Start: 2023-03-28

## 2023-03-28 NOTE — TELEPHONE ENCOUNTER
1  [id number] 02/17/2023 02/17/2023 ACETAMINOPHEN 325 MG / HYDROcodone BITARTRATE 5 MG ORAL TABLET (Tablet)  60 0 15 5 0 MG/325 0 MG 20 0 AYLEEN MADDEN POGODZINSKI  [facility] Medicare 0 / 0 [place]    1  [id number] 02/14/2023 02/14/2023 ALPRAZolam (Tablet)  90 0 30 0 25 MG NA AYLEEN MADDEN, KAYCE  [facility]  Medicare 0 / 0 [place]    1  [id number] 01/19/2023 01/19/2023 ACETAMINOPHEN 325 MG / HYDROcodone BITARTRATE 5 MG ORAL TABLET (Tablet)  60 0 15 5 0 MG/325 0 MG 20 0 AYLEEN MADDEN, KAYCE  [facility]    Medicare 0 / 0

## 2023-04-07 DIAGNOSIS — B37.0 THRUSH: ICD-10-CM

## 2023-04-19 PROBLEM — R73.9 HYPERGLYCEMIA: Status: ACTIVE | Noted: 2023-04-19

## 2023-04-19 PROBLEM — M25.571 CHRONIC PAIN OF RIGHT ANKLE: Status: ACTIVE | Noted: 2023-04-19

## 2023-04-19 PROBLEM — G89.29 CHRONIC PAIN OF RIGHT ANKLE: Status: ACTIVE | Noted: 2023-04-19

## 2023-04-21 ENCOUNTER — TELEPHONE (OUTPATIENT)
Dept: FAMILY MEDICINE CLINIC | Facility: CLINIC | Age: 68
End: 2023-04-21

## 2023-04-21 NOTE — TELEPHONE ENCOUNTER
Patient called asking for a return phone call to discuss her recent labs and xray  She states that she was able to see them on mychart, but does not know what they mean

## 2023-04-26 ENCOUNTER — OFFICE VISIT (OUTPATIENT)
Dept: PODIATRY | Facility: CLINIC | Age: 68
End: 2023-04-26

## 2023-04-26 ENCOUNTER — HOSPITAL ENCOUNTER (OUTPATIENT)
Dept: NON INVASIVE DIAGNOSTICS | Facility: CLINIC | Age: 68
Discharge: HOME | End: 2023-04-26

## 2023-04-26 VITALS
SYSTOLIC BLOOD PRESSURE: 140 MMHG | DIASTOLIC BLOOD PRESSURE: 81 MMHG | WEIGHT: 191 LBS | OXYGEN SATURATION: 96 % | BODY MASS INDEX: 32.61 KG/M2 | HEIGHT: 64 IN | HEART RATE: 91 BPM

## 2023-04-26 DIAGNOSIS — R00.2 PALPITATIONS: ICD-10-CM

## 2023-04-26 DIAGNOSIS — M65.9 SYNOVITIS: Primary | ICD-10-CM

## 2023-04-26 DIAGNOSIS — M25.571 SINUS TARSI SYNDROME OF RIGHT FOOT: ICD-10-CM

## 2023-04-26 PROBLEM — M65.90 SYNOVITIS: Status: ACTIVE | Noted: 2023-04-26

## 2023-04-26 RX ORDER — METHYLPREDNISOLONE 4 MG/1
TABLET ORAL
Qty: 21 TABLET | Refills: 0 | Status: SHIPPED | OUTPATIENT
Start: 2023-04-26

## 2023-04-26 NOTE — PROGRESS NOTES
Assessment/Plan:     Recent x-rays of the patient's right ankle were personally reviewed and interpreted  I saw no signs of fracture or dislocation to the left ankle  The official report was appreciated  The patient's clinical examination is significant for moderate tenderness to palpation of the right lateral subtalar joint and sinus tarsi  There is some mild localized edema over the sinus tarsi  Tenderness can also be noted with inversion and eversion of the subtalar joint  There is no erythema nor ecchymosis  There is no tenderness with palpation of the plantar fascia today  There is no tenderness with palpation of the lateral ankle or syndesmotic ligament  The patient's clinical findings were most consistent with a sinus Tarsi syndrome and synovitis of the subtalar joint  Treatment options were discussed with the patient  We will start with conservative therapy with ankle bracing as well as anti-inflammatory therapy with Medrol Dosepak  We will consider injection therapy at her next visit  Recommend follow-up in 2 to 3 weeks  Diagnoses and all orders for this visit:    Synovitis  -     methylPREDNISolone 4 MG tablet therapy pack; Use as directed on package  -     Ankle Cude ankle/Ankle Brace    Sinus tarsi syndrome of right foot  -     methylPREDNISolone 4 MG tablet therapy pack; Use as directed on package  -     Ankle Cude ankle/Ankle Brace          Subjective:     Patient ID: Ryan Morin is a 79 y o  female  The patient presents today with a chief complaint of lateral right ankle pain that has been present for over a month  She states that she had recent x-rays done of her right ankle that were negative for fracture  She cannot recall any specific injury or trauma to the right ankle  Pain is exacerbated with prolonged periods of ambulation or weightbearing and is alleviated by rest   Notes no significant tenderness to her heels today        PAST MEDICAL HISTORY:  Past Medical History:   Diagnosis Date   • Acid reflux    • Back injury    • COPD (chronic obstructive pulmonary disease) (HCC)    • Fibromyalgia    • Hypertension    • Seasonal allergies        PAST SURGICAL HISTORY:  Past Surgical History:   Procedure Laterality Date   • COLONOSCOPY     • EYE SURGERY     • KNEE SURGERY  1998   • [place] COLONOSCOPY FLX DX W/COLLJ SPEC WHEN PFRMD N/A 05/09/2017    Procedure: EGD AND COLONOSCOPY;  Surgeon: Rachel Brunner MD;  Location[facility];   Service: Gastroenterology        ALLERGIES:  Augmentin [amoxicillin-pot clavulanate], Miconazole, and Wixela inhub [fluticasone-salmeterol]    MEDICATIONS:  Current Outpatient Medications   Medication Sig Dispense Refill   • acetaminophen (TYLENOL) 650 mg CR tablet Take by mouth every 8 (eight) hours as needed       • albuterol (2 5 mg/3 mL) 0 083 % nebulizer solution Take 3 mL (2 5 mg total) by nebulization every 6 (six) hours as needed for wheezing or shortness of breath 180 mL 5   • albuterol (PROVENTIL HFA,VENTOLIN HFA) 90 mcg/act inhaler Inhale 2 puffs every 6 (six) hours as needed for wheezing 18 g 5   • ALPRAZolam (XANAX) 0 25 mg tablet Take 1 tablet (0 25 mg total) by mouth 3 (three) times a day as needed for anxiety 90 tablet 0   • amLODIPine (NORVASC) 5 mg tablet Take 1 tablet (5 mg total) by mouth daily 90 tablet 1   • cetirizine (ZyrTEC) 10 mg tablet Take 1 tablet (10 mg total) by mouth daily 90 tablet 0   • Diclofenac Sodium (VOLTAREN) 1 % Apply 2 g topically 4 (four) times a day 100 g 0   • escitalopram (LEXAPRO) 10 mg tablet Take 1 tablet (10 mg total) by mouth daily 90 tablet 1   • HYDROcodone-acetaminophen (Norco) 5-325 mg per tablet Take 1 tablet by mouth every 6 (six) hours as needed for pain Max Daily Amount: 4 tablets 60 tablet 0   • lactulose 20 g/30 mL 1-3 tablespoons daily prn constipation 300 mL 3   • lidocaine (Lidoderm) 5 % Apply 1 patch topically daily Remove & Discard patch within 12 hours or as directed by MD 30 patch 1   • lisinopril (ZESTRIL) 20 mg tablet Take 1 tablet (20 mg total) by mouth daily 90 tablet 1   • Lumigan 0 01 % ophthalmic drops 1 DROP IN BOTH EYES AT BEDTIME     • methylPREDNISolone 4 MG tablet therapy pack Use as directed on package 21 tablet 0   • MINOXIDIL, TOPICAL, 5 % SOLN Apply 1 application topically in the morning For hair     • montelukast (SINGULAIR) 10 mg tablet TAKE 1 TABLET BY MOUTH DAILY AT BEDTIME 90 tablet 2   • nystatin (MYCOSTATIN) 500,000 units/5 mL suspension Take 5 mL (500,000 Units total) by mouth 4 (four) times a day 200 mL 0   • pantoprazole (PROTONIX) 40 mg tablet TAKE 1 TABLET BY MOUTH TWICE A  tablet 1   • promethazine-dextromethorphan (PHENERGAN-DM) 6 25-15 mg/5 mL oral syrup Take 5 mL by mouth 4 (four) times a day as needed for cough 150 mL 0   • Trelegy Ellipta 200-62 5-25 MCG/ACT AEPB inhaler INHALE 1 PUFF DAILY RINSE MOUTH AFTER USE  60 each 5   • naloxone (NARCAN) 4 mg/0 1 mL nasal spray Administer 1 spray into a nostril  If no response after 2-3 minutes, give another dose in the other nostril using a new spray  (Patient not taking: Reported on 2/17/2023) 1 each 1     No current facility-administered medications for this visit  SOCIAL HISTORY:  Social History     Socioeconomic History   • Marital status:       Spouse name: None   • Number of children: 3   • Years of education: less than high school   • Highest education level: None   Occupational History   • Occupation: unemployed   Tobacco Use   • Smoking status: Every Day     Packs/day: 1 50     Years: 50 00     Pack years: 75 00     Types: Cigarettes     Start date: 1968     Passive exposure: Past   • Smokeless tobacco: Never   Vaping Use   • Vaping Use: Never used   Substance and Sexual Activity   • Alcohol use: Never   • Drug use: No   • Sexual activity: Not Currently   Other Topics Concern   • None   Social History Narrative    Always uses seatbelt    Daily coffee consumption    Daily tea consumption    Dental care regularly    Exercises regularly    Multiple organ donor    No guns in the home    No living will    Denies pets/ animals in the home    Power of  in existence- denied         Social Determinants of Health     Financial Resource Strain: Not on file   Food Insecurity: Not on file   Transportation Needs: Not on file   Physical Activity: Not on file   Stress: Not on file   Social Connections: Not on file   Intimate Partner Violence: Not on file   Housing Stability: Not on file        Review of Systems   Constitutional: Negative  HENT: Negative  Eyes: Negative  Respiratory: Negative  Cardiovascular: Negative  Endocrine: Negative  Musculoskeletal: Negative  Skin: Negative  Neurological: Negative  Hematological: Negative  Psychiatric/Behavioral: Negative  Objective:     Physical Exam  Constitutional:       Appearance: Normal appearance  HENT:      Head: Normocephalic and atraumatic  Nose: Nose normal    Cardiovascular:      Pulses:           Dorsalis pedis pulses are 2+ on the right side  Posterior tibial pulses are 1+ on the right side  Pulmonary:      Effort: Pulmonary effort is normal    Musculoskeletal:        Feet:    Feet:      Right foot:      Skin integrity: Skin integrity normal       Comments: The patient's clinical examination is significant for moderate tenderness to palpation of the right lateral subtalar joint and sinus tarsi  There is some mild localized edema over the sinus tarsi  Tenderness can also be noted with inversion and eversion of the subtalar joint  There is no erythema nor ecchymosis  There is no tenderness with palpation of the plantar fascia today  There is no tenderness with palpation of the lateral ankle or syndesmotic ligament  Skin:     General: Skin is warm  Capillary Refill: Capillary refill takes less than 2 seconds  Neurological:      General: No focal deficit present        Mental Status: She is alert and oriented to person, place, and time  Psychiatric:         Mood and Affect: Mood normal          Behavior: Behavior normal          Thought Content:  Thought content normal

## 2023-05-11 ENCOUNTER — OFFICE VISIT (OUTPATIENT)
Dept: PODIATRY | Facility: CLINIC | Age: 68
End: 2023-05-11

## 2023-05-11 VITALS
SYSTOLIC BLOOD PRESSURE: 170 MMHG | WEIGHT: 192 LBS | HEIGHT: 64 IN | HEART RATE: 85 BPM | OXYGEN SATURATION: 98 % | BODY MASS INDEX: 32.78 KG/M2 | DIASTOLIC BLOOD PRESSURE: 102 MMHG

## 2023-05-11 DIAGNOSIS — M25.571 SINUS TARSI SYNDROME OF RIGHT FOOT: Primary | ICD-10-CM

## 2023-05-11 DIAGNOSIS — J42 CHRONIC BRONCHITIS, UNSPECIFIED CHRONIC BRONCHITIS TYPE (HCC): ICD-10-CM

## 2023-05-11 DIAGNOSIS — B37.0 THRUSH: ICD-10-CM

## 2023-05-11 DIAGNOSIS — I10 PRIMARY HYPERTENSION: ICD-10-CM

## 2023-05-11 DIAGNOSIS — I10 ESSENTIAL HYPERTENSION: ICD-10-CM

## 2023-05-11 RX ORDER — BUPIVACAINE HYDROCHLORIDE 2.5 MG/ML
1 INJECTION, SOLUTION EPIDURAL; INFILTRATION; INTRACAUDAL
Status: SHIPPED | OUTPATIENT
Start: 2023-05-11

## 2023-05-11 RX ORDER — TRIAMCINOLONE ACETONIDE 40 MG/ML
20 INJECTION, SUSPENSION INTRA-ARTICULAR; INTRAMUSCULAR
Status: SHIPPED | OUTPATIENT
Start: 2023-05-11

## 2023-05-11 RX ORDER — MONTELUKAST SODIUM 10 MG/1
TABLET ORAL
Qty: 90 TABLET | Refills: 2 | Status: SHIPPED | OUTPATIENT
Start: 2023-05-11

## 2023-05-11 RX ORDER — AMLODIPINE BESYLATE 5 MG/1
5 TABLET ORAL DAILY
Qty: 90 TABLET | Refills: 1 | Status: SHIPPED | OUTPATIENT
Start: 2023-05-11

## 2023-05-11 RX ORDER — LISINOPRIL 20 MG/1
TABLET ORAL
Qty: 90 TABLET | Refills: 1 | Status: SHIPPED | OUTPATIENT
Start: 2023-05-11

## 2023-05-11 RX ADMIN — BUPIVACAINE HYDROCHLORIDE 1 ML: 2.5 INJECTION, SOLUTION EPIDURAL; INFILTRATION; INTRACAUDAL at 17:06

## 2023-05-11 RX ADMIN — TRIAMCINOLONE ACETONIDE 20 MG: 40 INJECTION, SUSPENSION INTRA-ARTICULAR; INTRAMUSCULAR at 17:06

## 2023-05-11 NOTE — PROGRESS NOTES
Assessment/Plan:     The patient's clinical evaluation today is significant for persistent tenderness to the lateral aspect of the right talocalcaneal joint in the area of the sinus tarsi  There is reduced edema and calor to the joint compared to her prior visit  There is mild tenderness with inversion and eversion of the subtalar joint of the right foot  There is no erythema nor ecchymosis  The patient would like to proceed with injection therapy today  After prepping of skin with alcohol, a CSI was administered to the lateral aspect of the subtalar joint into the sinus tarsi without complication  The patient tolerated the injection well  Continue supportive shoe gear  She will follow-up with me in 4 to 6 weeks or as needed  Diagnoses and all orders for this visit:    Sinus tarsi syndrome of right foot  -     Small joint arthrocentesis: R subtalar  -     bupivacaine (PF) (MARCAINE) 0 25 % injection 1 mL  -     triamcinolone acetonide (KENALOG-40) 40 mg/mL injection 20 mg          Subjective:     Patient ID: Silvio Vela is a 79 y o  female  The patient presents today for follow-up of lateral right hindfoot pain  She was prescribed a course of a Medrol Dosepak at her last visit  She states that at the second day, she felt great and had no pain at all  However the pain relief was short-lived  Regardless, the pain is better compared to her prior visit  She does note decreased swelling in her right foot and ankle at this point in time  She is amenable to injection therapy today  Review of Systems   Constitutional: Negative  HENT: Negative  Eyes: Negative  Respiratory: Negative  Cardiovascular: Negative  Endocrine: Negative  Musculoskeletal: Negative  Neurological: Negative  Hematological: Negative  Psychiatric/Behavioral: Negative  Objective:     Physical Exam  Constitutional:       Appearance: Normal appearance     HENT:      Head: Normocephalic and "atraumatic  Nose: Nose normal    Cardiovascular:      Pulses:           Dorsalis pedis pulses are 2+ on the right side  Posterior tibial pulses are 1+ on the right side  Pulmonary:      Effort: Pulmonary effort is normal    Musculoskeletal:        Feet:    Feet:      Right foot:      Skin integrity: Skin integrity normal       Comments: The patient's clinical evaluation today is significant for persistent tenderness to the lateral aspect of the right talocalcaneal joint in the area of the sinus tarsi  There is reduced edema and calor to the joint compared to her prior visit  There is mild tenderness with inversion and eversion of the subtalar joint of the right foot  There is no erythema nor ecchymosis  Skin:     General: Skin is warm  Capillary Refill: Capillary refill takes less than 2 seconds  Neurological:      General: No focal deficit present  Mental Status: She is alert and oriented to person, place, and time  Psychiatric:         Mood and Affect: Mood normal          Behavior: Behavior normal          Thought Content: Thought content normal          Small joint arthrocentesis: R subtalar  Universal Protocol:  Consent: Verbal consent obtained  Risks and benefits: risks, benefits and alternatives were discussed  Consent given by: patient  Time out: Immediately prior to procedure a \"time out\" was called to verify the correct patient, procedure, equipment, support staff and site/side marked as required    Timeout called at: 5/11/2023 12:00 PM   Patient understanding: patient states understanding of the procedure being performed  Patient consent: the patient's understanding of the procedure matches consent given  Patient identity confirmed: verbally with patient and provided demographic data    Supporting Documentation  Indications: pain and joint swelling   Procedure Details  Location: foot - R subtalar  Needle size: 25 G  Approach: lateral  Medications administered: 1 mL " bupivacaine (PF) 0 25 %; 20 mg triamcinolone acetonide 40 mg/mL    Dressing:  Sterile dressing applied     After prepping of skin with alcohol, a CSI was administered to the lateral aspect of the subtalar joint into the sinus tarsi without complication  The patient tolerated the injection well

## 2023-05-12 ENCOUNTER — TELEPHONE (OUTPATIENT)
Dept: FAMILY MEDICINE CLINIC | Facility: CLINIC | Age: 68
End: 2023-05-12

## 2023-05-31 ENCOUNTER — OFFICE VISIT (OUTPATIENT)
Dept: FAMILY MEDICINE CLINIC | Facility: CLINIC | Age: 68
End: 2023-05-31
Payer: COMMERCIAL

## 2023-05-31 ENCOUNTER — APPOINTMENT (OUTPATIENT)
Dept: RADIOLOGY | Facility: MEDICAL CENTER | Age: 68
End: 2023-05-31
Payer: COMMERCIAL

## 2023-05-31 VITALS
BODY MASS INDEX: 33.02 KG/M2 | HEART RATE: 86 BPM | DIASTOLIC BLOOD PRESSURE: 86 MMHG | OXYGEN SATURATION: 98 % | TEMPERATURE: 98.2 F | SYSTOLIC BLOOD PRESSURE: 140 MMHG | WEIGHT: 193.4 LBS | HEIGHT: 64 IN

## 2023-05-31 DIAGNOSIS — R30.0 DYSURIA: ICD-10-CM

## 2023-05-31 DIAGNOSIS — Z12.31 SCREENING MAMMOGRAM FOR BREAST CANCER: ICD-10-CM

## 2023-05-31 DIAGNOSIS — M54.50 CHRONIC LEFT-SIDED LOW BACK PAIN WITHOUT SCIATICA: Primary | ICD-10-CM

## 2023-05-31 DIAGNOSIS — G89.29 CHRONIC LEFT-SIDED LOW BACK PAIN WITHOUT SCIATICA: ICD-10-CM

## 2023-05-31 DIAGNOSIS — G89.29 CHRONIC LEFT-SIDED LOW BACK PAIN WITHOUT SCIATICA: Primary | ICD-10-CM

## 2023-05-31 DIAGNOSIS — F11.20 CONTINUOUS OPIOID DEPENDENCE (HCC): ICD-10-CM

## 2023-05-31 DIAGNOSIS — M54.50 CHRONIC LEFT-SIDED LOW BACK PAIN WITHOUT SCIATICA: ICD-10-CM

## 2023-05-31 DIAGNOSIS — K59.04 CHRONIC IDIOPATHIC CONSTIPATION: ICD-10-CM

## 2023-05-31 DIAGNOSIS — J42 CHRONIC BRONCHITIS, UNSPECIFIED CHRONIC BRONCHITIS TYPE (HCC): ICD-10-CM

## 2023-05-31 DIAGNOSIS — D49.0 IPMN (INTRADUCTAL PAPILLARY MUCINOUS NEOPLASM): ICD-10-CM

## 2023-05-31 DIAGNOSIS — I10 PRIMARY HYPERTENSION: ICD-10-CM

## 2023-05-31 LAB
SL AMB  POCT GLUCOSE, UA: NORMAL
SL AMB LEUKOCYTE ESTERASE,UA: NORMAL
SL AMB POCT BILIRUBIN,UA: NORMAL
SL AMB POCT BLOOD,UA: NORMAL
SL AMB POCT CLARITY,UA: NORMAL
SL AMB POCT COLOR,UA: YELLOW
SL AMB POCT KETONES,UA: NORMAL
SL AMB POCT NITRITE,UA: NORMAL
SL AMB POCT PH,UA: 5
SL AMB POCT SPECIFIC GRAVITY,UA: 1.01
SL AMB POCT URINE PROTEIN: NORMAL
SL AMB POCT UROBILINOGEN: NORMAL

## 2023-05-31 PROCEDURE — 81002 URINALYSIS NONAUTO W/O SCOPE: CPT | Performed by: FAMILY MEDICINE

## 2023-05-31 PROCEDURE — 99215 OFFICE O/P EST HI 40 MIN: CPT | Performed by: FAMILY MEDICINE

## 2023-05-31 PROCEDURE — 72110 X-RAY EXAM L-2 SPINE 4/>VWS: CPT

## 2023-05-31 RX ORDER — METHYLPREDNISOLONE 4 MG/1
TABLET ORAL
Qty: 21 EACH | Refills: 0 | Status: SHIPPED | OUTPATIENT
Start: 2023-05-31

## 2023-05-31 NOTE — PROGRESS NOTES
Name: Caty Fregoso      : 1955      MRN: [id number]  Encounter Provider: Sudha Harrington MD  Encounter Date: 2023   Encounter department: [address]     1  Chronic left-sided low back pain without sciatica  Assessment & Plan:  I reviewed with pt  Trial of pred taper  Check XR of LS spine  Recheck 1-2w if not improving    Orders:  -     methylPREDNISolone 4 MG tablet therapy pack; Use as directed on package  -     XR spine lumbar minimum 4 views non injury; Future; Expected date: 2023    2  Continuous opioid dependence ([facility] [address] )  Assessment & Plan:  Pt has weaned herself off of hydrocodone and is doing well on Tylenol  Continue to monitor  Recheck 3m      3  Chronic idiopathic constipation  Assessment & Plan:  Greatly improved since stopping hydrocodone  Continue to monitor  Recheck 3m      4  IPMN (intraductal papillary mucinous neoplasm)  Assessment & Plan:  Followed by Surgical oncology  For repeat MRI and f/u in Sept  Recheck 3m      5  Chronic bronchitis, unspecified chronic bronchitis type ([facility] [address] )  Assessment & Plan:  Breathing stable  Pt up to date with pulm  Continue Trelegy with albuterol prn  Discused smoking cessation - pt not interested at present  Recheck 3m      6  Dysuria  Assessment & Plan:  UA unremarkable  Symptoms improving on their own? Will monitor for now  Pt to call 1 week if symptoms have not resolved- earlier if worse    Orders:  -     POCT urine dip    7  Screening mammogram for breast cancer  -     Mammo screening bilateral w 3d & cad; Future; Expected date: 2023    8  Primary hypertension  Assessment & Plan:  Borderline control  Continue present care  Recheck 3m             Subjective     f/u multiple med issues  - continues to have intermittent L low back/flank pain  Can be associated with difficulty urinating  Worse with straining to have BM or to urinate  No fever/chills   CT in Feb was without obvious renal /urinary findings  UA in office today was unremarkable  - R foot pain better since she had injections with podiatry 4w ago  Due to f/u on 6/8  - breathing stable  Rarely using albuterol at present  Still smoking    - abd pain better  Pt was able to take herself off hydrocodone (not takes around 4000mg of Tylenol a day)  Finds that her constipation has resolved and she has 2-3 normal BMs a day      Review of Systems   Constitutional: Negative  HENT: Negative  Eyes: Negative  Respiratory: Negative  Cardiovascular: Negative  Gastrointestinal: Negative for abdominal distention, abdominal pain (improved), constipation (resolved), diarrhea and nausea  Genitourinary: Negative  Musculoskeletal: Positive for arthralgias, back pain and gait problem  Negative for joint swelling  Skin: Negative  Neurological: Positive for headaches (occasional)  Negative for dizziness, weakness, light-headedness and numbness  Psychiatric/Behavioral: Negative  Past Medical History:   Diagnosis Date   • Acid reflux    • Back injury    • COPD (chronic obstructive pulmonary disease) (HCC)    • Fibromyalgia    • Hypertension    • Seasonal allergies      Past Surgical History:   Procedure Laterality Date   • COLONOSCOPY     • EYE SURGERY     • KNEE SURGERY  1998   • RI COLONOSCOPY FLX DX W/COLLJ SPEC WHEN PFRMD N/A 05/09/2017    Procedure: EGD AND COLONOSCOPY;  Surgeon: Mine Ivy MD;  Location: [facility]; Service: Gastroenterology     Family History   Problem Relation Age of Onset   • Pancreatic cancer Mother 67   • Coronary artery disease Father    • Diabetes Father    • Hypertension Father    • Heart disease Father    • Lung cancer Sister 40   • Pancreatic cancer Brother    • Pancreatic cancer Brother    • Lung cancer Maternal Grandfather 68   • Diabetes Paternal Grandmother      Social History     Socioeconomic History   • Marital status:       Spouse name: None   • Number of children: 3   • Years of education: less than high school   • Highest education level: None   Occupational History   • Occupation: unemployed   Tobacco Use   • Smoking status: Every Day     Packs/day: 1 50     Years: 50 00     Total pack years: 75 00     Types: Cigarettes     Start date: 1968     Passive exposure: Past   • Smokeless tobacco: Never   Vaping Use   • Vaping Use: Never used   Substance and Sexual Activity   • Alcohol use: Never   • Drug use: No   • Sexual activity: Not Currently   Other Topics Concern   • None   Social History Narrative    Always uses seatbelt    Daily coffee consumption    Daily tea consumption    Dental care regularly    Exercises regularly    Multiple organ donor    No guns in the home    No living will    Denies pets/ animals in the home    Power of  in existence- denied         Social Determinants of Health     Financial Resource Strain: Not on file   Food Insecurity: Not on file   Transportation Needs: Not on file   Physical Activity: Not on file   Stress: Not on file   Social Connections: Not on file   Intimate Partner Violence: Not on file   Housing Stability: Not on file     Current Outpatient Medications on File Prior to Visit   Medication Sig   • acetaminophen (TYLENOL) 650 mg CR tablet Take by mouth every 8 (eight) hours as needed     • albuterol (2 5 mg/3 mL) 0 083 % nebulizer solution Take 3 mL (2 5 mg total) by nebulization every 6 (six) hours as needed for wheezing or shortness of breath   • albuterol (PROVENTIL HFA,VENTOLIN HFA) 90 mcg/act inhaler Inhale 2 puffs every 6 (six) hours as needed for wheezing   • ALPRAZolam (XANAX) 0 25 mg tablet Take 1 tablet (0 25 mg total) by mouth 3 (three) times a day as needed for anxiety   • amLODIPine (NORVASC) 5 mg tablet TAKE 1 TABLET (5 MG TOTAL) BY MOUTH DAILY     • cetirizine (ZyrTEC) 10 mg tablet Take 1 tablet (10 mg total) by mouth daily   • Diclofenac Sodium (VOLTAREN) 1 % Apply 2 g topically 4 (four) times a day   • escitalopram (LEXAPRO) "10 mg tablet Take 1 tablet (10 mg total) by mouth daily   • lidocaine (Lidoderm) 5 % Apply 1 patch topically daily Remove & Discard patch within 12 hours or as directed by MD   • lisinopril (ZESTRIL) 20 mg tablet TAKE 1 TABLET BY MOUTH EVERY DAY   • Lumigan 0 01 % ophthalmic drops 1 DROP IN BOTH EYES AT BEDTIME   • MINOXIDIL, TOPICAL, 5 % SOLN Apply 1 application topically in the morning For hair   • montelukast (SINGULAIR) 10 mg tablet TAKE 1 TABLET BY MOUTH EVERYDAY AT BEDTIME   • nystatin (MYCOSTATIN) 500,000 units/5 mL suspension Take 5 mL (500,000 Units total) by mouth 4 (four) times a day   • pantoprazole (PROTONIX) 40 mg tablet TAKE 1 TABLET BY MOUTH TWICE A DAY   • promethazine-dextromethorphan (PHENERGAN-DM) 6 25-15 mg/5 mL oral syrup Take 5 mL by mouth 4 (four) times a day as needed for cough   • Trelegy Ellipta 200-62 5-25 MCG/ACT AEPB inhaler INHALE 1 PUFF DAILY RINSE MOUTH AFTER USE    • HYDROcodone-acetaminophen (Norco) 5-325 mg per tablet Take 1 tablet by mouth every 6 (six) hours as needed for pain Max Daily Amount: 4 tablets (Patient not taking: Reported on 5/31/2023)   • lactulose 20 g/30 mL 1-3 tablespoons daily prn constipation (Patient not taking: Reported on 5/31/2023)   • naloxone (NARCAN) 4 mg/0 1 mL nasal spray Administer 1 spray into a nostril  If no response after 2-3 minutes, give another dose in the other nostril using a new spray  (Patient not taking: Reported on 2/17/2023)     Allergies   Allergen Reactions   • Augmentin [Amoxicillin-Pot Clavulanate] Vomiting   • Miconazole Itching and Swelling   • Wixela Inhub [Fluticasone-Salmeterol] Palpitations     Immunization History   Administered Date(s) Administered   • Tdap 02/28/2008       Objective     /86   Pulse 86   Temp 98 2 °F (36 8 °C)   Ht 5' 4\" (1 626 m)   Wt 87 7 kg (193 lb 6 4 oz)   LMP  (LMP Unknown)   SpO2 98%   BMI 33 20 kg/m²     Physical Exam  Vitals reviewed  HENT:      Head: Normocephalic        Right Ear: " Tympanic membrane, ear canal and external ear normal       Left Ear: Tympanic membrane, ear canal and external ear normal       Nose: Congestion (mild) present  Mouth/Throat:      Mouth: Mucous membranes are moist    Eyes:      General: No scleral icterus  Extraocular Movements: Extraocular movements intact  Conjunctiva/sclera: Conjunctivae normal       Pupils: Pupils are equal, round, and reactive to light  Neck:      Vascular: No carotid bruit  Cardiovascular:      Rate and Rhythm: Normal rate and regular rhythm  Pulses: Normal pulses  Pulmonary:      Effort: Pulmonary effort is normal       Breath sounds: No wheezing or rales  Abdominal:      General: There is no distension  Palpations: There is no mass  Tenderness: There is abdominal tenderness (minimal)  There is no right CVA tenderness or left CVA tenderness  Musculoskeletal:         General: Tenderness (T10 area NT (previously tender)  Mild low paralumbar muscle tenderness) and deformity (mild diffuse arthritic changes) present  Cervical back: No tenderness  Right lower leg: No edema  Left lower leg: No edema  Lymphadenopathy:      Cervical: No cervical adenopathy  Skin:     General: Skin is warm  Capillary Refill: Capillary refill takes less than 2 seconds  Neurological:      General: No focal deficit present  Mental Status: She is alert and oriented to person, place, and time  Cranial Nerves: No cranial nerve deficit  Sensory: No sensory deficit  Motor: No weakness        Gait: Gait normal    Psychiatric:         Mood and Affect: Mood normal        Tom Morales MD

## 2023-06-04 PROBLEM — R30.0 DYSURIA: Status: ACTIVE | Noted: 2023-06-04

## 2023-06-04 PROBLEM — G89.29 CHRONIC LEFT-SIDED LOW BACK PAIN WITHOUT SCIATICA: Status: ACTIVE | Noted: 2023-06-04

## 2023-06-04 PROBLEM — M54.50 CHRONIC LEFT-SIDED LOW BACK PAIN WITHOUT SCIATICA: Status: ACTIVE | Noted: 2023-06-04

## 2023-06-04 PROBLEM — I77.6 AORTITIS (HCC): Status: RESOLVED | Noted: 2021-10-31 | Resolved: 2023-06-04

## 2023-06-04 PROBLEM — F33.41 RECURRENT MAJOR DEPRESSIVE DISORDER, IN PARTIAL REMISSION (HCC): Status: ACTIVE | Noted: 2023-06-04

## 2023-06-04 NOTE — ASSESSMENT & PLAN NOTE
UA unremarkable  Symptoms improving on their own? Will monitor for now   Pt to call 1 week if symptoms have not resolved- earlier if worse

## 2023-06-04 NOTE — ASSESSMENT & PLAN NOTE
Pt has weaned herself off of hydrocodone and is doing well on Tylenol  Continue to monitor   Recheck 3m

## 2023-06-04 NOTE — ASSESSMENT & PLAN NOTE
Breathing stable  Pt up to date with pulm  Continue Trelegy with albuterol prn  Discused smoking cessation - pt not interested at present   Recheck 3m

## 2023-06-05 ENCOUNTER — TELEPHONE (OUTPATIENT)
Dept: FAMILY MEDICINE CLINIC | Facility: CLINIC | Age: 68
End: 2023-06-05

## 2023-06-05 NOTE — TELEPHONE ENCOUNTER
Patient called to give you an update per your request    She states that she did get the x ray, but results are not in yet    She said that she has been taking the prednisone and also a laxative and on Saturday she began feeling better  Then she states she ate a salad yesterday, and the pain returned and was even worse than before    She is wondering if it could be diverticulitis?  She said it seem like it's a digestion issue     She will be finishing the prednisone today and is asking how she should proceed from here     Please advise

## 2023-06-07 NOTE — TELEPHONE ENCOUNTER
"Patient called asking for response to message  She is still in pain  She states it is \"pretty bad\"  When asked to describe the pain, she states that it is constant, it becomes a sharp pain if she coughs or strains her muscles (ex: making a bowel movement)  She is still struggling to urinate (on and off)  When she is walking, there is not as much pressure on her side  She has been taking 3 tylonel in the AM and 3 tylonel in the PM, a total of 1300 mg every day for the past week  She is asking if this is safe, what else can she do for the pain  Please advise     "

## 2023-06-08 DIAGNOSIS — R10.32 LLQ ABDOMINAL PAIN: Primary | ICD-10-CM

## 2023-06-08 RX ORDER — CIPROFLOXACIN 500 MG/1
500 TABLET, FILM COATED ORAL EVERY 12 HOURS SCHEDULED
Qty: 20 TABLET | Refills: 0 | Status: SHIPPED | OUTPATIENT
Start: 2023-06-08 | End: 2023-06-18

## 2023-06-08 RX ORDER — METRONIDAZOLE 500 MG/1
500 TABLET ORAL EVERY 8 HOURS SCHEDULED
Qty: 30 TABLET | Refills: 0 | Status: SHIPPED | OUTPATIENT
Start: 2023-06-08 | End: 2023-06-18

## 2023-06-08 NOTE — TELEPHONE ENCOUNTER
Patient states she does have chills  She said they ease with tylenol and after going to bathroom  Stool is a reddish brown color  Pain is constant without prune juice  She is concerned she may have diverticulitis flare up  She says laying on it or moving towards that side it causes pressure on that area which worsens the pain  She is only using regular tylenol currently  She is taking 2 ES Tylenol when it is very bad   Using the prune juice and having BM's relieves the pressure

## 2023-06-13 ENCOUNTER — TELEPHONE (OUTPATIENT)
Dept: FAMILY MEDICINE CLINIC | Facility: CLINIC | Age: 68
End: 2023-06-13

## 2023-06-13 NOTE — TELEPHONE ENCOUNTER
Pt left message via outlook  Called patient back  Pt states the antibiotic and flagyl are making her ill  Pt states she doesn't feel well on them  Eyes are bothering her  She also reports ear pressure  She said she also feels swollen  I asked if she has any issues urinating  She said there is frequency and hesitancy but that is not new for her  She reports improvement in the left sided abdominal pressure since starting the antibiotic  She also has allergies which could be contributing  Asking if she should discontinue the antibiotic

## 2023-06-14 NOTE — TELEPHONE ENCOUNTER
Pt said symptoms are not worsening and are a little better  She will continue the course of antibiotics she said she can tolerate it to finish, it is not unbearable  She said she will call if symptoms worsen but does notice improvement

## 2023-07-03 DIAGNOSIS — B37.0 THRUSH: ICD-10-CM

## 2023-07-05 ENCOUNTER — PATIENT MESSAGE (OUTPATIENT)
Dept: FAMILY MEDICINE CLINIC | Facility: CLINIC | Age: 68
End: 2023-07-05

## 2023-07-06 ENCOUNTER — APPOINTMENT (EMERGENCY)
Dept: RADIOLOGY | Facility: HOSPITAL | Age: 68
End: 2023-07-06
Payer: COMMERCIAL

## 2023-07-06 ENCOUNTER — APPOINTMENT (EMERGENCY)
Dept: CT IMAGING | Facility: HOSPITAL | Age: 68
End: 2023-07-06
Payer: COMMERCIAL

## 2023-07-06 ENCOUNTER — APPOINTMENT (EMERGENCY)
Dept: VASCULAR ULTRASOUND | Facility: HOSPITAL | Age: 68
End: 2023-07-06
Payer: COMMERCIAL

## 2023-07-06 ENCOUNTER — HOSPITAL ENCOUNTER (EMERGENCY)
Facility: HOSPITAL | Age: 68
Discharge: HOME | End: 2023-07-06
Attending: EMERGENCY MEDICINE
Payer: COMMERCIAL

## 2023-07-06 VITALS
SYSTOLIC BLOOD PRESSURE: 136 MMHG | RESPIRATION RATE: 21 BRPM | BODY MASS INDEX: 32.92 KG/M2 | TEMPERATURE: 98.3 F | OXYGEN SATURATION: 93 % | DIASTOLIC BLOOD PRESSURE: 62 MMHG | HEART RATE: 72 BPM | WEIGHT: 191.8 LBS

## 2023-07-06 DIAGNOSIS — K52.9 COLITIS: ICD-10-CM

## 2023-07-06 DIAGNOSIS — S09.90XA CLOSED HEAD INJURY, INITIAL ENCOUNTER: Primary | ICD-10-CM

## 2023-07-06 DIAGNOSIS — M79.605 LEFT LEG PAIN: ICD-10-CM

## 2023-07-06 LAB
2HR DELTA HS TROPONIN: -1 NG/L
ALBUMIN SERPL BCP-MCNC: 4.4 G/DL (ref 3.5–5)
ALP SERPL-CCNC: 63 U/L (ref 34–104)
ALT SERPL W P-5'-P-CCNC: 12 U/L (ref 7–52)
ANION GAP SERPL CALCULATED.3IONS-SCNC: 8 MMOL/L
AST SERPL W P-5'-P-CCNC: 15 U/L (ref 13–39)
ATRIAL RATE: 83 BPM
BASOPHILS # BLD AUTO: 0.06 THOUSANDS/ÂΜL (ref 0–0.1)
BASOPHILS NFR BLD AUTO: 1 % (ref 0–1)
BILIRUB SERPL-MCNC: 0.43 MG/DL (ref 0.2–1)
BUN SERPL-MCNC: 8 MG/DL (ref 5–25)
CALCIUM SERPL-MCNC: 9.8 MG/DL (ref 8.4–10.2)
CARDIAC TROPONIN I PNL SERPL HS: 4 NG/L
CARDIAC TROPONIN I PNL SERPL HS: 5 NG/L
CHLORIDE SERPL-SCNC: 101 MMOL/L (ref 96–108)
CO2 SERPL-SCNC: 26 MMOL/L (ref 21–32)
CREAT SERPL-MCNC: 0.49 MG/DL (ref 0.6–1.3)
EOSINOPHIL # BLD AUTO: 0.1 THOUSAND/ÂΜL (ref 0–0.61)
EOSINOPHIL NFR BLD AUTO: 1 % (ref 0–6)
ERYTHROCYTE [DISTWIDTH] IN BLOOD BY AUTOMATED COUNT: 13.2 % (ref 11.6–15.1)
GFR SERPL CREATININE-BSD FRML MDRD: 101 ML/MIN/1.73SQ M
GLUCOSE SERPL-MCNC: 123 MG/DL (ref 65–140)
HCT VFR BLD AUTO: 43 % (ref 34.8–46.1)
HGB BLD-MCNC: 14.4 G/DL (ref 11.5–15.4)
IMM GRANULOCYTES # BLD AUTO: 0.05 THOUSAND/UL (ref 0–0.2)
IMM GRANULOCYTES NFR BLD AUTO: 0 % (ref 0–2)
LYMPHOCYTES # BLD AUTO: 1.75 THOUSANDS/ÂΜL (ref 0.6–4.47)
LYMPHOCYTES NFR BLD AUTO: 15 % (ref 14–44)
MCH RBC QN AUTO: 29.1 PG (ref 26.8–34.3)
MCHC RBC AUTO-ENTMCNC: 33.5 G/DL (ref 31.4–37.4)
MCV RBC AUTO: 87 FL (ref 82–98)
MONOCYTES # BLD AUTO: 1.02 THOUSAND/ÂΜL (ref 0.17–1.22)
MONOCYTES NFR BLD AUTO: 9 % (ref 4–12)
NEUTROPHILS # BLD AUTO: 8.85 THOUSANDS/ÂΜL (ref 1.85–7.62)
NEUTS SEG NFR BLD AUTO: 74 % (ref 43–75)
NRBC BLD AUTO-RTO: 0 /100 WBCS
P AXIS: 64 DEGREES
PLATELET # BLD AUTO: 279 THOUSANDS/UL (ref 149–390)
PMV BLD AUTO: 8.9 FL (ref 8.9–12.7)
POTASSIUM SERPL-SCNC: 3.7 MMOL/L (ref 3.5–5.3)
PR INTERVAL: 156 MS
PROT SERPL-MCNC: 7.7 G/DL (ref 6.4–8.4)
QRS AXIS: 65 DEGREES
QRSD INTERVAL: 78 MS
QT INTERVAL: 356 MS
QTC INTERVAL: 418 MS
RBC # BLD AUTO: 4.95 MILLION/UL (ref 3.81–5.12)
SODIUM SERPL-SCNC: 135 MMOL/L (ref 135–147)
T WAVE AXIS: 66 DEGREES
VENTRICULAR RATE: 83 BPM
WBC # BLD AUTO: 11.83 THOUSAND/UL (ref 4.31–10.16)

## 2023-07-06 PROCEDURE — 85025 COMPLETE CBC W/AUTO DIFF WBC: CPT | Performed by: PHYSICIAN ASSISTANT

## 2023-07-06 PROCEDURE — 93971 EXTREMITY STUDY: CPT | Performed by: INTERNAL MEDICINE

## 2023-07-06 PROCEDURE — 74177 CT ABD & PELVIS W/CONTRAST: CPT

## 2023-07-06 PROCEDURE — 99284 EMERGENCY DEPT VISIT MOD MDM: CPT

## 2023-07-06 PROCEDURE — 80053 COMPREHEN METABOLIC PANEL: CPT | Performed by: PHYSICIAN ASSISTANT

## 2023-07-06 PROCEDURE — 36415 COLL VENOUS BLD VENIPUNCTURE: CPT | Performed by: PHYSICIAN ASSISTANT

## 2023-07-06 PROCEDURE — 96360 HYDRATION IV INFUSION INIT: CPT

## 2023-07-06 PROCEDURE — 84484 ASSAY OF TROPONIN QUANT: CPT | Performed by: PHYSICIAN ASSISTANT

## 2023-07-06 PROCEDURE — 93010 ELECTROCARDIOGRAM REPORT: CPT | Performed by: INTERNAL MEDICINE

## 2023-07-06 PROCEDURE — 99284 EMERGENCY DEPT VISIT MOD MDM: CPT | Performed by: PHYSICIAN ASSISTANT

## 2023-07-06 PROCEDURE — 70450 CT HEAD/BRAIN W/O DYE: CPT

## 2023-07-06 PROCEDURE — 71045 X-RAY EXAM CHEST 1 VIEW: CPT

## 2023-07-06 PROCEDURE — 93971 EXTREMITY STUDY: CPT

## 2023-07-06 PROCEDURE — 93005 ELECTROCARDIOGRAM TRACING: CPT

## 2023-07-06 PROCEDURE — G1004 CDSM NDSC: HCPCS

## 2023-07-06 PROCEDURE — 96361 HYDRATE IV INFUSION ADD-ON: CPT

## 2023-07-06 RX ORDER — ONDANSETRON 4 MG/1
4 TABLET, ORALLY DISINTEGRATING ORAL EVERY 8 HOURS PRN
Qty: 20 TABLET | Refills: 0 | Status: SHIPPED | OUTPATIENT
Start: 2023-07-06 | End: 2023-07-12 | Stop reason: ALTCHOICE

## 2023-07-06 RX ORDER — DICYCLOMINE HCL 20 MG
20 TABLET ORAL 2 TIMES DAILY
Qty: 20 TABLET | Refills: 0 | Status: SHIPPED | OUTPATIENT
Start: 2023-07-06 | End: 2023-07-12 | Stop reason: ALTCHOICE

## 2023-07-06 RX ADMIN — IOHEXOL 100 ML: 350 INJECTION, SOLUTION INTRAVENOUS at 09:40

## 2023-07-06 RX ADMIN — SODIUM CHLORIDE 1000 ML: 0.9 INJECTION, SOLUTION INTRAVENOUS at 09:08

## 2023-07-06 NOTE — DISCHARGE INSTRUCTIONS
Stop taking aspirin. Drink plenty of fluids. Outpatient GI follow up. Return to the ED with any new or worsening symptoms as discussed.

## 2023-07-06 NOTE — ED PROVIDER NOTES
History  Chief Complaint   Patient presents with   • Head Injury     Pt reports she slipped and hit head on Tuesday, no thinners, no LOC , reports she doesn't feel right since then , also reports bright red blood in her stool      HPI    Prior to Admission Medications   Prescriptions Last Dose Informant Patient Reported? Taking? ALPRAZolam (XANAX) 0.25 mg tablet  Self No No   Sig: Take 1 tablet (0.25 mg total) by mouth 3 (three) times a day as needed for anxiety   Diclofenac Sodium (VOLTAREN) 1 %  Self No No   Sig: Apply 2 g topically 4 (four) times a day   HYDROcodone-acetaminophen (Norco) 5-325 mg per tablet  Self No No   Sig: Take 1 tablet by mouth every 6 (six) hours as needed for pain Max Daily Amount: 4 tablets   Patient not taking: Reported on 2023   Lumigan 0.01 % ophthalmic drops  Self Yes No   Si DROP IN BOTH EYES AT BEDTIME   MINOXIDIL, TOPICAL, 5 % SOLN  Self Yes No   Sig: Apply 1 application topically in the morning For hair   Trelegy Ellipta 200-62.5-25 MCG/ACT AEPB inhaler  Self No No   Sig: INHALE 1 PUFF DAILY RINSE MOUTH AFTER USE.   acetaminophen (TYLENOL) 650 mg CR tablet  Self Yes No   Sig: Take by mouth every 8 (eight) hours as needed     albuterol (2.5 mg/3 mL) 0.083 % nebulizer solution  Self No No   Sig: Take 3 mL (2.5 mg total) by nebulization every 6 (six) hours as needed for wheezing or shortness of breath   albuterol (PROVENTIL HFA,VENTOLIN HFA) 90 mcg/act inhaler  Self No No   Sig: Inhale 2 puffs every 6 (six) hours as needed for wheezing   amLODIPine (NORVASC) 5 mg tablet  Self No No   Sig: TAKE 1 TABLET (5 MG TOTAL) BY MOUTH DAILY.    cetirizine (ZyrTEC) 10 mg tablet  Self No No   Sig: Take 1 tablet (10 mg total) by mouth daily   escitalopram (LEXAPRO) 10 mg tablet  Self No No   Sig: Take 1 tablet (10 mg total) by mouth daily   lactulose 20 g/30 mL  Self No No   Si-3 tablespoons daily prn constipation   Patient not taking: Reported on 2023   lidocaine (Lidoderm) 5 % Self No No   Sig: Apply 1 patch topically daily Remove & Discard patch within 12 hours or as directed by MD   lisinopril (ZESTRIL) 20 mg tablet  Self No No   Sig: TAKE 1 TABLET BY MOUTH EVERY DAY   methylPREDNISolone 4 MG tablet therapy pack   No No   Sig: Use as directed on package   montelukast (SINGULAIR) 10 mg tablet  Self No No   Sig: TAKE 1 TABLET BY MOUTH EVERYDAY AT BEDTIME   naloxone (NARCAN) 4 mg/0.1 mL nasal spray  Self No No   Sig: Administer 1 spray into a nostril. If no response after 2-3 minutes, give another dose in the other nostril using a new spray. Patient not taking: Reported on 2/17/2023   nystatin (MYCOSTATIN) 500,000 units/5 mL suspension   No No   Sig: Take 5 mL (500,000 Units total) by mouth 4 (four) times a day   pantoprazole (PROTONIX) 40 mg tablet  Self No No   Sig: TAKE 1 TABLET BY MOUTH TWICE A DAY   promethazine-dextromethorphan (PHENERGAN-DM) 6.25-15 mg/5 mL oral syrup  Self No No   Sig: Take 5 mL by mouth 4 (four) times a day as needed for cough      Facility-Administered Medications Last Administration Doses Remaining   bupivacaine (PF) (MARCAINE) 0.25 % injection 1 mL 5/11/2023  5:06 PM    triamcinolone acetonide (KENALOG-40) 40 mg/mL injection 20 mg 5/11/2023  5:06 PM           Past Medical History:   Diagnosis Date   • Acid reflux    • Back injury    • COPD (chronic obstructive pulmonary disease) (HCC)    • Fibromyalgia    • Hypertension    • Seasonal allergies        Past Surgical History:   Procedure Laterality Date   • COLONOSCOPY     • EYE SURGERY     • KNEE SURGERY  1998   • [place] COLONOSCOPY FLX DX W/COLLJ SPEC WHEN PFRMD N/A 05/09/2017    Procedure: EGD AND COLONOSCOPY;  Surgeon: Malena Velasco MD;  Location: [facility];   Service: Gastroenterology       Family History   Problem Relation Age of Onset   • Pancreatic cancer Mother 67   • Coronary artery disease Father    • Diabetes Father    • Hypertension Father    • Heart disease Father    • Lung cancer Sister 40   • Pancreatic cancer Brother    • Pancreatic cancer Brother    • Lung cancer Maternal Grandfather 68   • Diabetes Paternal Grandmother      I have reviewed and agree with the history as documented. E-Cigarette/Vaping   • E-Cigarette Use Never User      E-Cigarette/Vaping Substances     Social History     Tobacco Use   • Smoking status: Every Day     Packs/day: 1.50     Years: 50.00     Total pack years: 75.00     Types: Cigarettes     Start date: 1968     Passive exposure: Past   • Smokeless tobacco: Never   Vaping Use   • Vaping Use: Never used   Substance Use Topics   • Alcohol use: Never   • Drug use: No       Review of Systems    Physical Exam  Physical Exam    Vital Signs  ED Triage Vitals [07/06/23 0825]   Temperature Pulse Respirations Blood Pressure SpO2   98.3 °F (36.8 °C) 92 18 (!) 179/127 96 %      Temp Source Heart Rate Source Patient Position - Orthostatic VS BP Location FiO2 (%)   Tympanic Monitor Sitting Left arm --      Pain Score       2           Vitals:    07/06/23 0825   BP: (!) 179/127   Pulse: 92   Patient Position - Orthostatic VS: Sitting         Visual Acuity  Visual Acuity    Flowsheet Row Most Recent Value   L Pupil Size (mm) 3   R Pupil Size (mm) 3          ED Medications  Medications - No data to display    Diagnostic Studies  Results Reviewed     None                 No orders to display              Procedures  Procedures         ED Course                                             MDM    Disposition  Final diagnoses:   None     ED Disposition     None      Follow-up Information    None         Patient's Medications   Discharge Prescriptions    No medications on file       No discharge procedures on file.     PDMP Review       Value Time User    PDMP Reviewed  Yes 3/28/2023  2:25 PM Kevin Brody MD          ED Provider  Electronically Signed by headaches. All other systems reviewed and are negative. Physical Exam  Physical Exam  Vitals and nursing note reviewed. Constitutional:       General: She is not in acute distress. Appearance: Normal appearance. She is well-developed. She is obese. She is not ill-appearing, toxic-appearing or diaphoretic. HENT:      Head: Normocephalic and atraumatic. Comments: No external signs of trauma     Right Ear: Tympanic membrane, ear canal and external ear normal. No hemotympanum. Left Ear: Tympanic membrane, ear canal and external ear normal. No hemotympanum. Eyes:      Conjunctiva/sclera: Conjunctivae normal.   Cardiovascular:      Rate and Rhythm: Normal rate and regular rhythm. Pulses: Normal pulses. Pulmonary:      Effort: Pulmonary effort is normal. No respiratory distress. Breath sounds: Normal breath sounds. No wheezing, rhonchi or rales. Chest:      Chest wall: No tenderness. Abdominal:      General: There is no distension. Palpations: Abdomen is soft. Tenderness: There is no abdominal tenderness. There is no guarding. Genitourinary:     Comments: Patient declined  exam/guaiac testing  Musculoskeletal:         General: Normal range of motion. Cervical back: Normal range of motion and neck supple. Comments: Calves soft, nontender, nonedematous. No unilateral calf asymmetry. distal sensation and pulses intact. Symmetric 5/5 strength in all extremities. Skin:     General: Skin is warm and dry. Capillary Refill: Capillary refill takes less than 2 seconds. Neurological:      Mental Status: She is alert and oriented to person, place, and time. GCS: GCS eye subscore is 4. GCS verbal subscore is 5. GCS motor subscore is 6. Sensory: Sensation is intact. Motor: Motor function is intact. No weakness. Coordination: Coordination is intact. Gait: Gait is intact.    Psychiatric:         Mood and Affect: Mood normal. Vital Signs  ED Triage Vitals [07/06/23 0825]   Temperature Pulse Respirations Blood Pressure SpO2   98.3 °F (36.8 °C) 92 18 (!) 179/127 96 %      Temp Source Heart Rate Source Patient Position - Orthostatic VS BP Location FiO2 (%)   Tympanic Monitor Sitting Left arm --      Pain Score       2           Vitals:    07/06/23 0915 07/06/23 0930 07/06/23 1000 07/06/23 1030   BP: 163/76 164/67 150/65 136/62   Pulse: 81 77 77 72   Patient Position - Orthostatic VS:             Visual Acuity  Visual Acuity    Flowsheet Row Most Recent Value   L Pupil Size (mm) 3   R Pupil Size (mm) 3          ED Medications  Medications   sodium chloride 0.9 % bolus 1,000 mL (0 mL Intravenous Stopped 7/6/23 1106)   iohexol (OMNIPAQUE) 350 MG/ML injection (MULTI-DOSE) 100 mL (100 mL Intravenous Given 7/6/23 0940)       Diagnostic Studies  Results Reviewed     Procedure Component Value Units Date/Time    HS Troponin I 2hr [[id number]]  (Normal) Collected: 07/06/23 1109    Lab Status: Final result Specimen: Blood from Arm, Right Updated: 07/06/23 1214     hs TnI 2hr 4 ng/L      Delta 2hr hsTnI -1 ng/L     HS Troponin 0hr (reflex protocol) [[id number]]  (Normal) Collected: 07/06/23 0908    Lab Status: Final result Specimen: Blood from Arm, Right Updated: 07/06/23 0938     hs TnI 0hr 5 ng/L     Comprehensive metabolic panel [[id number]]  (Abnormal) Collected: 07/06/23 0908    Lab Status: Final result Specimen: Blood from Arm, Right Updated: 07/06/23 0930     Sodium 135 mmol/L      Potassium 3.7 mmol/L      Chloride 101 mmol/L      CO2 26 mmol/L      ANION GAP 8 mmol/L      BUN 8 mg/dL      Creatinine 0.49 mg/dL      Glucose 123 mg/dL      Calcium 9.8 mg/dL      AST 15 U/L      ALT 12 U/L      Alkaline Phosphatase 63 U/L      Total Protein 7.7 g/dL      Albumin 4.4 g/dL      Total Bilirubin 0.43 mg/dL      eGFR 101 ml/min/1.73sq m     Narrative:      [place] guidelines for Chronic Kidney Disease (CKD):   •  Stage 1 with normal or high GFR (GFR > 90 mL/min/1.73 square meters)  •  Stage 2 Mild CKD (GFR = 60-89 mL/min/1.73 square meters)  •  Stage 3A Moderate CKD (GFR = 45-59 mL/min/1.73 square meters)  •  Stage 3B Moderate CKD (GFR = 30-44 mL/min/1.73 square meters)  •  Stage 4 Severe CKD (GFR = 15-29 mL/min/1.73 square meters)  •  Stage 5 End Stage CKD (GFR <15 mL/min/1.73 square meters)  Note: GFR calculation is accurate only with a steady state creatinine    CBC and differential [[id number]]  (Abnormal) Collected: 07/06/23 0908    Lab Status: Final result Specimen: Blood from Arm, Right Updated: 07/06/23 0914     WBC 11.83 Thousand/uL      RBC 4.95 Million/uL      Hemoglobin 14.4 g/dL      Hematocrit 43.0 %      MCV 87 fL      MCH 29.1 pg      MCHC 33.5 g/dL      RDW 13.2 %      MPV 8.9 fL      Platelets 108 Thousands/uL      nRBC 0 /100 WBCs      Neutrophils Relative 74 %      Immat GRANS % 0 %      Lymphocytes Relative 15 %      Monocytes Relative 9 %      Eosinophils Relative 1 %      Basophils Relative 1 %      Neutrophils Absolute 8.85 Thousands/µL      Immature Grans Absolute 0.05 Thousand/uL      Lymphocytes Absolute 1.75 Thousands/µL      Monocytes Absolute 1.02 Thousand/µL      Eosinophils Absolute 0.10 Thousand/µL      Basophils Absolute 0.06 Thousands/µL                  VAS lower limb venous duplex study, unilateral/limited   Final Result by Karina Bean MD (07/06 1401)      CT head without contrast   Final Result by Lizzie Davey MD (07/06 1000)      No acute intracranial abnormality. Workstation performed: [id number]         CT abdomen pelvis with contrast   Final Result by Lizzie Davey MD (07/06 1011)         1. Mild colitis of the distal transverse and descending colon which could be of an infectious or inflammatory etiology. Follow-up to complete resolution is recommended as well as correlation with colonoscopy.    2. Colonic diverticulosis without associated diverticulitis. 3. Pancreatic cyst seen on MRI is poorly visualized. The study was marked in [facility] for immediate notification. Workstation performed: [id number]         XR chest 1 view portable   Final Result by Lida Vinson MD (07/06 1006)      No acute cardiopulmonary disease. Workstation performed: [id number]                    Procedures  ECG 12 Lead Documentation Only    Date/Time: 7/6/2023 9:08 AM    Performed by: Rafal Hobbs PA-C  Authorized by: Rafal Hobbs PA-C    Indications / Diagnosis:  Weakness  ECG reviewed by me, the ED Provider: yes    Patient location:  ED  Rate:     ECG rate:  83    ECG rate assessment: normal    Rhythm:     Rhythm: sinus rhythm    QRS:     QRS axis:  Normal    QRS intervals:  Normal  Conduction:     Conduction: normal    Comments:      No ischemic ST/T wave change             ED Course  ED Course as of 07/17/23 1209   Thu Jul 06, 2023   1104 Duplex negative for DVT per US technician                               SBIRT 22yo+    Flowsheet Row Most Recent Value   Initial Alcohol Screen: US AUDIT-C     1. How often do you have a drink containing alcohol? 0 Filed at: 07/06/2023 0850   2. How many drinks containing alcohol do you have on a typical day you are drinking? 0 Filed at: 07/06/2023 0850   3b. FEMALE Any Age, or MALE 65+: How often do you have 4 or more drinks on one occassion? 0 Filed at: 07/06/2023 0850   Audit-C Score 0 Filed at: 07/06/2023 9462   COLT: How many times in the past year have you. .. Used an illegal drug or used a prescription medication for non-medical reasons? Never Filed at: 07/06/2023 0995                    Medical Decision Making  This is a 51-year-old female arriving ambulatory to the emergency department for evaluation with multiple complaints as per HPI.     Differential diagnosis is broad, including but is not limited to: Closed head injury, posttraumatic headache, concussion, will obtain imaging to evaluate for any traumatic findings to include calvarial fx intracranial hemorrhage, subdural hematoma; regarding abdominal pain, will obtain imaging to evaluate for evidence of diverticulitis, enteritis, colitis, neoplasm among other acute abdominal pelvic pathology; regarding patient's left leg pain, will obtain duplex to evaluate for acute DVT, with consideration of other etiologies to include osteoarthritis. Given patient's vague complaints, will also obtain lab work to evaluate for evidence of ACS/anginal equivalent, arrhythmia, electrolyte derangements, acute kidney injury/dehydration, anemia    Initial ED plan: ECG, labs, imaging    Final ED Assessment: Vital signs reviewed on ED presentation, examination as above. All labs and imaging independently reviewed with imaging interpreted by the Radiologist.  ECG without ischemic change, troponin within normal limits. Remainder of lab work is largely within normal limits. There are no electrolyte derangements or evidence of renal impairment. CT head reports no acute intracranial abnormality. CT abdomen/pelvis reports findings consistent with colitis recommending follow-up to complete resolution and correlation with colonoscopy. Duplex is negative for DVT. All test results were discussed with the patient at bedside. Reviewed discharge plan with prescriptions for Zofran and Bentyl to continue as needed for symptom alleviation, recommended discontinuation of aspirin for analgesia at this time, and Tylenol as needed for pain control. Encouraged outpatient PCP follow-up for further evaluation and management, as well as outpatient gastroenterology and orthopedic follow-up, all of which have been included in the patient's discharge paperwork. Parameters for ED return discussed at length. Patient verbalized understanding and was comfortable and agreeable with disposition and care plan.   She was discharged in stable condition and had ambulated independently from the emergency department today without issue. Closed head injury, initial encounter: acute illness or injury  Colitis: acute illness or injury  Amount and/or Complexity of Data Reviewed  Labs: ordered. Radiology: ordered. ECG/medicine tests: ordered. Risk  OTC drugs. Prescription drug management. Disposition  Final diagnoses:   Closed head injury, initial encounter   Colitis   Left leg pain     Time reflects when diagnosis was documented in both MDM as applicable and the Disposition within this note     Time User Action Codes Description Comment    7/6/2023 11:06 AM Caprice Hane Add [S09.90XA] Closed head injury, initial encounter     7/6/2023 11:06 AM Caprice Hane Add [K52.9] Colitis     7/6/2023 11:06 AM Caprice Hane Add [M79.605] Left leg pain       ED Disposition     ED Disposition   Discharge    Condition   Stable    Date/Time   Thu Jul 6, 2023 10:32 AM    Comment   Lee Benton discharge to home/self care. Follow-up Information     Follow up With Specialties Details Why Contact Info Additional Information    Amari Ramirez MD Family Medicine   [address]   [address] [facility]   [address] [facility]  Lower Level  [address] [facility]  [phone] [facility], [address], Lower Level, [facility], [address], [address]     [address] [facility] Surgery   [address] [facility]  [address] [facility] [id number] [phone] [address], [place] [address] [place], [place], [address], 200 [facility] Emergency Department Emergency Medicine  If symptoms worsen [address] [id number] [address] [facility] Emergency Department, [place], [place], [address]          Discharge Medication List as of 7/6/2023 11:46 AM      START taking these medications    Details   dicyclomine (BENTYL) 20 mg tablet Take 1 tablet (20 mg total) by mouth 2 (two) times a day, Starting Thu 7/6/2023, Normal      ondansetron (Zofran ODT) 4 mg disintegrating tablet Take 1 tablet (4 mg total) by mouth every 8 (eight) hours as needed for nausea or vomiting, Starting Thu 7/6/2023, Normal         CONTINUE these medications which have NOT CHANGED    Details   acetaminophen (TYLENOL) 650 mg CR tablet Take by mouth every 8 (eight) hours as needed  , Historical Med      albuterol (2.5 mg/3 mL) 0.083 % nebulizer solution Take 3 mL (2.5 mg total) by nebulization every 6 (six) hours as needed for wheezing or shortness of breath, Starting Thu 1/19/2023, Normal      albuterol (PROVENTIL HFA,VENTOLIN HFA) 90 mcg/act inhaler Inhale 2 puffs every 6 (six) hours as needed for wheezing, Starting Thu 1/5/2023, Normal      ALPRAZolam (XANAX) 0.25 mg tablet Take 1 tablet (0.25 mg total) by mouth 3 (three) times a day as needed for anxiety, Starting Tue 2/14/2023, Normal      amLODIPine (NORVASC) 5 mg tablet TAKE 1 TABLET (5 MG TOTAL) BY MOUTH DAILY. , Starting Thu 5/11/2023, Normal      Diclofenac Sodium (VOLTAREN) 1 % Apply 2 g topically 4 (four) times a day, Starting Fri 4/21/2023, Normal      lidocaine (Lidoderm) 5 % Apply 1 patch topically daily Remove & Discard patch within 12 hours or as directed by MD, Starting Wed 4/27/2022, Normal      lisinopril (ZESTRIL) 20 mg tablet TAKE 1 TABLET BY MOUTH EVERY DAY, Normal      Lumigan 0.01 % ophthalmic drops 1 DROP IN BOTH EYES AT BEDTIME, Historical Med      MINOXIDIL, TOPICAL, 5 % SOLN Apply 1 application topically in the morning For hair, Historical Med      montelukast (SINGULAIR) 10 mg tablet TAKE 1 TABLET BY MOUTH EVERYDAY AT BEDTIME, Normal      nystatin (MYCOSTATIN) 500,000 units/5 mL suspension Take 5 mL (500,000 Units total) by mouth 4 (four) times a day, Starting Mon 7/3/2023, Normal      pantoprazole (PROTONIX) 40 mg tablet TAKE 1 TABLET BY MOUTH TWICE A DAY, Normal      promethazine-dextromethorphan (PHENERGAN-DM) 6.25-15 mg/5 mL oral syrup Take 5 mL by mouth 4 (four) times a day as needed for cough, Starting Fri 3/17/2023, Normal      Trelegy Ellipta 200-62.5-25 MCG/ACT AEPB inhaler INHALE 1 PUFF DAILY RINSE MOUTH AFTER USE., Normal      cetirizine (ZyrTEC) 10 mg tablet Take 1 tablet (10 mg total) by mouth daily, Starting Fri 4/21/2023, Normal      escitalopram (LEXAPRO) 10 mg tablet Take 1 tablet (10 mg total) by mouth daily, Starting Mon 11/14/2022, Normal      HYDROcodone-acetaminophen (Norco) 5-325 mg per tablet Take 1 tablet by mouth every 6 (six) hours as needed for pain Max Daily Amount: 4 tablets, Starting Tue 3/28/2023, Normal      lactulose 20 g/30 mL 1-3 tablespoons daily prn constipation, Normal      methylPREDNISolone 4 MG tablet therapy pack Use as directed on package, Normal      naloxone (NARCAN) 4 mg/0.1 mL nasal spray Administer 1 spray into a nostril. If no response after 2-3 minutes, give another dose in the other nostril using a new spray., Normal             No discharge procedures on file.     PDMP Review       Value Time User    PDMP Reviewed  Yes 3/28/2023  2:25 PM Pebbles Cope MD          ED Provider  Electronically Signed by           Nadege Torres PA-C  07/17/23 7191

## 2023-07-07 ENCOUNTER — VBI (OUTPATIENT)
Dept: ADMINISTRATIVE | Facility: OTHER | Age: 68
End: 2023-07-07

## 2023-07-07 NOTE — TELEPHONE ENCOUNTER
Jossy Makenzie    ED Visit Information     Ed visit date: 7/6/23  Diagnosis Description: Head injury  In Network? Yes Margarita Read  Discharge status: Home  Discharged with meds ? Yes  Number of ED visits to date: 3  ED Severity:3     Outreach Information    Outreach successful: Yes 1  Date letter mailed:NA  Date Finalized:7/7/23    Care Coordination    Follow up appointment with pcp: yes 7/12/23  Transportation issues ?  No    Value [employer] type: [place] [facility] PCP: No  Called PCP first?: Yes  Told to go to ED by PCP?: Yes  Same-Day or Next Day Appointment Offered?: No  Would have used same-day or next-day if offered?: Yes  Feels able to call PCP for urgent problems ?: Yes  Understands what emergencies can be handled by PCP ?: Yes  Ever any problems getting appointment with PCP for minor emergency/urgency problems?: No  Practice Contacted Patient ?: No  Pt had ED follow up with pcp/staff ?: No    Seen for follow-up out of network ?: No  Reason Patient went to ED instead of Urgent Care or PCP?: PCP instructions  07/07/2023 11:47 AM EDT by Carleen Hahn 07/07/2023 11:47 AM EDT by Colton Marin (Self) [phone] (PDON)[phone] (Home)  [phone] (Home) Remove  - Communicated - doing ok. has PCP OV next week

## 2023-07-11 ENCOUNTER — OFFICE VISIT (OUTPATIENT)
Dept: PODIATRY | Facility: CLINIC | Age: 68
End: 2023-07-11
Payer: COMMERCIAL

## 2023-07-11 VITALS
WEIGHT: 194 LBS | HEIGHT: 64 IN | HEART RATE: 89 BPM | OXYGEN SATURATION: 98 % | DIASTOLIC BLOOD PRESSURE: 89 MMHG | BODY MASS INDEX: 33.12 KG/M2 | SYSTOLIC BLOOD PRESSURE: 150 MMHG

## 2023-07-11 DIAGNOSIS — M25.571 SINUS TARSI SYNDROME OF RIGHT FOOT: Primary | ICD-10-CM

## 2023-07-11 DIAGNOSIS — M65.9 SYNOVITIS: ICD-10-CM

## 2023-07-11 DIAGNOSIS — J30.9 ALLERGIC RHINITIS, UNSPECIFIED SEASONALITY, UNSPECIFIED TRIGGER: ICD-10-CM

## 2023-07-11 DIAGNOSIS — F33.0 DEPRESSION, MAJOR, RECURRENT, MILD (HCC): ICD-10-CM

## 2023-07-11 DIAGNOSIS — M19.071 DJD (DEGENERATIVE JOINT DISEASE), ANKLE AND FOOT, RIGHT: ICD-10-CM

## 2023-07-11 PROCEDURE — 99213 OFFICE O/P EST LOW 20 MIN: CPT | Performed by: PODIATRIST

## 2023-07-11 NOTE — PROGRESS NOTES
Assessment/Plan: On clinical examination today, there is some mild persistent tenderness palpation over the area of the lateral subtalar joint sinus tarsi. There is no significant tenderness in this area during ambulation or with ADLs. Some mild tenderness is also noted with palpation to dorsal midfoot joints consistent with DJD. There is no erythema nor edema nor calor nor ecchymosis noted to the patient's right foot and ankle. The patient states that the pain in her right foot and ankle for the most part is improved compared to her prior visit. She states that she has good days and bad. She has found some comfort with using the light brace around her right ankle. An Ace wrap was dispensed to help assist the patient with light compression. She states that she has tried a full ankle brace and has found it to be too rigid. Compression socks are too tight. We did also make recommendations for some topical NSAID therapy with her at this with Voltaren gel or Aspercreme or perhaps even Biofreeze. I believe most of her discomfort is stemming from arthritic changes to the hindfoot and midfoot. She would likely have good days and bad. We will reserve injection therapy for more acute flareups. Overall, she is doing fairly well today and will follow-up with me on an as-needed basis. Diagnoses and all orders for this visit:    Sinus tarsi syndrome of right foot    Synovitis    DJD (degenerative joint disease), ankle and foot, right          Subjective:     Patient ID: Moi Andersen is a 79 y.o. female. The patient presents today for follow-up of right ankle and hindfoot pain. She is status post injection therapy at her last visit. She states that she is canceled twice as the pain seem to be getting better and she had no significant discomfort but ultimately she is starting to feel some discomfort more towards the top of her right midfoot today.   There has been no interval injury or trauma to the right lower extremity. PAST MEDICAL HISTORY:  Past Medical History:   Diagnosis Date   • Acid reflux    • Back injury    • COPD (chronic obstructive pulmonary disease) ([address])    • Fibromyalgia    • Hypertension    • Seasonal allergies        PAST SURGICAL HISTORY:  Past Surgical History:   Procedure Laterality Date   • COLONOSCOPY     • EYE SURGERY     • KNEE SURGERY  1998   • RI COLONOSCOPY FLX DX W/COLLJ SPEC WHEN PFRMD N/A 05/09/2017    Procedure: EGD AND COLONOSCOPY;  Surgeon: Roman Clark MD;  Location: [facility]; Service: Gastroenterology        ALLERGIES:  Augmentin [amoxicillin-pot clavulanate], Miconazole, and Wixela inhub [fluticasone-salmeterol]    MEDICATIONS:  Current Outpatient Medications   Medication Sig Dispense Refill   • acetaminophen (TYLENOL) 650 mg CR tablet Take by mouth every 8 (eight) hours as needed       • albuterol (2.5 mg/3 mL) 0.083 % nebulizer solution Take 3 mL (2.5 mg total) by nebulization every 6 (six) hours as needed for wheezing or shortness of breath 180 mL 5   • albuterol (PROVENTIL HFA,VENTOLIN HFA) 90 mcg/act inhaler Inhale 2 puffs every 6 (six) hours as needed for wheezing 18 g 5   • ALPRAZolam (XANAX) 0.25 mg tablet Take 1 tablet (0.25 mg total) by mouth 3 (three) times a day as needed for anxiety 90 tablet 0   • amLODIPine (NORVASC) 5 mg tablet TAKE 1 TABLET (5 MG TOTAL) BY MOUTH DAILY.  90 tablet 1   • cetirizine (ZyrTEC) 10 mg tablet Take 1 tablet (10 mg total) by mouth daily 90 tablet 0   • Diclofenac Sodium (VOLTAREN) 1 % Apply 2 g topically 4 (four) times a day 100 g 0   • escitalopram (LEXAPRO) 10 mg tablet Take 1 tablet (10 mg total) by mouth daily 90 tablet 1   • lidocaine (Lidoderm) 5 % Apply 1 patch topically daily Remove & Discard patch within 12 hours or as directed by MD 30 patch 1   • lisinopril (ZESTRIL) 20 mg tablet TAKE 1 TABLET BY MOUTH EVERY DAY 90 tablet 1   • Lumigan 0.01 % ophthalmic drops 1 DROP IN BOTH EYES AT BEDTIME     • MINOXIDIL, TOPICAL, 5 % SOLN Apply 1 application topically in the morning For hair     • montelukast (SINGULAIR) 10 mg tablet TAKE 1 TABLET BY MOUTH EVERYDAY AT BEDTIME 90 tablet 2   • nystatin (MYCOSTATIN) 500,000 units/5 mL suspension Take 5 mL (500,000 Units total) by mouth 4 (four) times a day 200 mL 0   • pantoprazole (PROTONIX) 40 mg tablet TAKE 1 TABLET BY MOUTH TWICE A  tablet 1   • promethazine-dextromethorphan (PHENERGAN-DM) 6.25-15 mg/5 mL oral syrup Take 5 mL by mouth 4 (four) times a day as needed for cough 150 mL 0   • Trelegy Ellipta 200-62.5-25 MCG/ACT AEPB inhaler INHALE 1 PUFF DAILY RINSE MOUTH AFTER USE. 60 each 5   • dicyclomine (BENTYL) 20 mg tablet Take 1 tablet (20 mg total) by mouth 2 (two) times a day (Patient not taking: Reported on 7/11/2023) 20 tablet 0   • HYDROcodone-acetaminophen (Norco) 5-325 mg per tablet Take 1 tablet by mouth every 6 (six) hours as needed for pain Max Daily Amount: 4 tablets (Patient not taking: Reported on 5/31/2023) 60 tablet 0   • lactulose 20 g/30 mL 1-3 tablespoons daily prn constipation (Patient not taking: Reported on 5/31/2023) 300 mL 3   • methylPREDNISolone 4 MG tablet therapy pack Use as directed on package (Patient not taking: Reported on 7/11/2023) 21 each 0   • naloxone (NARCAN) 4 mg/0.1 mL nasal spray Administer 1 spray into a nostril. If no response after 2-3 minutes, give another dose in the other nostril using a new spray.  (Patient not taking: Reported on 2/17/2023) 1 each 1   • ondansetron (Zofran ODT) 4 mg disintegrating tablet Take 1 tablet (4 mg total) by mouth every 8 (eight) hours as needed for nausea or vomiting (Patient not taking: Reported on 7/11/2023) 20 tablet 0     Current Facility-Administered Medications   Medication Dose Route Frequency Provider Last Rate Last Admin   • bupivacaine (PF) (MARCAINE) 0.25 % injection 1 mL  1 mL Intra-articular  Rosibel Sachs Garcia, DPM   1 mL at 05/11/23 1706   • triamcinolone acetonide (KENALOG-40) 40 mg/mL injection 20 mg  20 mg Intra-articular  Hansa Garcia DPM   20 mg at 05/11/23 0829       SOCIAL HISTORY:  Social History     Socioeconomic History   • Marital status:      Spouse name: None   • Number of children: 3   • Years of education: less than high school   • Highest education level: None   Occupational History   • Occupation: unemployed   Tobacco Use   • Smoking status: Every Day     Packs/day: 1.50     Years: 50.00     Total pack years: 75.00     Types: Cigarettes     Start date: 1968     Passive exposure: Past   • Smokeless tobacco: Never   Vaping Use   • Vaping Use: Never used   Substance and Sexual Activity   • Alcohol use: Never   • Drug use: No   • Sexual activity: Not Currently   Other Topics Concern   • None   Social History Narrative    Always uses seatbelt    Daily coffee consumption    Daily tea consumption    Dental care regularly    Exercises regularly    Multiple organ donor    No guns in the home    No living will    Denies pets/ animals in the home    Power of  in existence- denied         Social Determinants of Health     Financial Resource Strain: Not on file   Food Insecurity: Not on file   Transportation Needs: Not on file   Physical Activity: Not on file   Stress: Not on file   Social Connections: Not on file   Intimate Partner Violence: Not on file   Housing Stability: Not on file        Review of Systems   Constitutional: Negative. HENT: Negative. Eyes: Negative. Respiratory: Negative. Cardiovascular: Negative. Endocrine: Negative. Musculoskeletal: Negative. Neurological: Negative. Hematological: Negative. Psychiatric/Behavioral: Negative. Objective:     Physical Exam  Constitutional:       Appearance: Normal appearance. HENT:      Head: Normocephalic and atraumatic. Nose: Nose normal.   Cardiovascular:      Pulses:           Dorsalis pedis pulses are 2+ on the right side.         Posterior tibial pulses are 2+ on the right side.   Pulmonary:      Effort: Pulmonary effort is normal.   Feet:      Right foot:      Skin integrity: Skin integrity normal.      Comments: On clinical examination today, there is some mild persistent tenderness palpation over the area of the lateral subtalar joint sinus tarsi. There is no significant tenderness in this area during ambulation or with ADLs. Some mild tenderness is also noted with palpation to dorsal midfoot joints consistent with DJD. There is no erythema nor edema nor calor nor ecchymosis noted to the patient's right foot and ankle. Skin:     General: Skin is warm. Capillary Refill: Capillary refill takes less than 2 seconds. Neurological:      General: No focal deficit present. Mental Status: She is alert and oriented to person, place, and time. Psychiatric:         Mood and Affect: Mood normal.         Behavior: Behavior normal.         Thought Content:  Thought content normal.

## 2023-07-12 ENCOUNTER — OFFICE VISIT (OUTPATIENT)
Dept: FAMILY MEDICINE CLINIC | Facility: CLINIC | Age: 68
End: 2023-07-12
Payer: COMMERCIAL

## 2023-07-12 VITALS
TEMPERATURE: 97.4 F | BODY MASS INDEX: 33.05 KG/M2 | DIASTOLIC BLOOD PRESSURE: 84 MMHG | SYSTOLIC BLOOD PRESSURE: 124 MMHG | WEIGHT: 193.6 LBS | HEIGHT: 64 IN | HEART RATE: 86 BPM | OXYGEN SATURATION: 98 %

## 2023-07-12 DIAGNOSIS — F17.210 SMOKING GREATER THAN 20 PACK YEARS: ICD-10-CM

## 2023-07-12 DIAGNOSIS — Z00.00 MEDICARE ANNUAL WELLNESS VISIT, SUBSEQUENT: Primary | ICD-10-CM

## 2023-07-12 DIAGNOSIS — F33.41 RECURRENT MAJOR DEPRESSIVE DISORDER, IN PARTIAL REMISSION (HCC): ICD-10-CM

## 2023-07-12 DIAGNOSIS — D49.0 IPMN (INTRADUCTAL PAPILLARY MUCINOUS NEOPLASM): ICD-10-CM

## 2023-07-12 DIAGNOSIS — Z12.2 ENCOUNTER FOR SCREENING FOR LUNG CANCER: ICD-10-CM

## 2023-07-12 DIAGNOSIS — I10 PRIMARY HYPERTENSION: ICD-10-CM

## 2023-07-12 DIAGNOSIS — J42 CHRONIC BRONCHITIS, UNSPECIFIED CHRONIC BRONCHITIS TYPE (HCC): ICD-10-CM

## 2023-07-12 PROCEDURE — G0439 PPPS, SUBSEQ VISIT: HCPCS | Performed by: FAMILY MEDICINE

## 2023-07-12 PROCEDURE — 99214 OFFICE O/P EST MOD 30 MIN: CPT | Performed by: FAMILY MEDICINE

## 2023-07-12 PROCEDURE — G0442 ANNUAL ALCOHOL SCREEN 15 MIN: HCPCS | Performed by: FAMILY MEDICINE

## 2023-07-12 RX ORDER — ESCITALOPRAM OXALATE 10 MG/1
TABLET ORAL
Qty: 90 TABLET | Refills: 1 | Status: SHIPPED | OUTPATIENT
Start: 2023-07-12

## 2023-07-12 RX ORDER — CETIRIZINE HYDROCHLORIDE 10 MG/1
TABLET ORAL
Qty: 90 TABLET | Refills: 0 | Status: SHIPPED | OUTPATIENT
Start: 2023-07-12

## 2023-07-12 NOTE — PATIENT INSTRUCTIONS
Medicare Preventive Visit Patient Instructions  Thank you for completing your Welcome to Medicare Visit or Medicare Annual Wellness Visit today. Your next wellness visit will be due in one year (7/12/2024). The screening/preventive services that you may require over the next 5-10 years are detailed below. Some tests may not apply to you based off risk factors and/or age. Screening tests ordered at today's visit but not completed yet may show as past due. Also, please note that scanned in results may not display below. Preventive Screenings:  Service Recommendations Previous Testing/Comments   Colorectal Cancer Screening  * Colonoscopy    * Fecal Occult Blood Test (FOBT)/Fecal Immunochemical Test (FIT)  * Fecal DNA/Cologuard Test  * Flexible Sigmoidoscopy Age: 43-73 years old   Colonoscopy: every 10 years (may be performed more frequently if at higher risk)  OR  FOBT/FIT: every 1 year  OR  Cologuard: every 3 years  OR  Sigmoidoscopy: every 5 years  Screening may be recommended earlier than age 39 if at higher risk for colorectal cancer. Also, an individualized decision between you and your healthcare provider will decide whether screening between the ages of 77-80 would be appropriate. Colonoscopy: 06/30/2022  FOBT/FIT: Not on file  Cologuard: Not on file  Sigmoidoscopy: Not on file    Screening Current     Breast Cancer Screening Age: 36 years old  Frequency: every 1-2 years  Not required if history of left and right mastectomy Mammogram: 03/31/2022    Screening Current   Cervical Cancer Screening Between the ages of 21-29, pap smear recommended once every 3 years. Between the ages of 32-69, can perform pap smear with HPV co-testing every 5 years.    Recommendations may differ for women with a history of total hysterectomy, cervical cancer, or abnormal pap smears in past. Pap Smear: 02/11/2019    Screening Not Indicated   Hepatitis C Screening Once for adults born between 1945 and 1965  More frequently in patients at high risk for Hepatitis C Hep C Antibody: 05/01/2021    Screening Current   Diabetes Screening 1-2 times per year if you're at risk for diabetes or have pre-diabetes Fasting glucose: 93 mg/dL (4/18/2023)  A1C: 5.6 % (4/18/2023)  Screening Current   Cholesterol Screening Once every 5 years if you don't have a lipid disorder. May order more often based on risk factors. Lipid panel: 05/01/2021    Screening Not Indicated  History Lipid Disorder     Other Preventive Screenings Covered by Medicare:  1. Abdominal Aortic Aneurysm (AAA) Screening: covered once if your at risk. You're considered to be at risk if you have a family history of AAA. 2. Lung Cancer Screening: covers low dose CT scan once per year if you meet all of the following conditions: (1) Age 48-67; (2) No signs or symptoms of lung cancer; (3) Current smoker or have quit smoking within the last 15 years; (4) You have a tobacco smoking history of at least 20 pack years (packs per day multiplied by number of years you smoked); (5) You get a written order from a healthcare provider. 3. Glaucoma Screening: covered annually if you're considered high risk: (1) You have diabetes OR (2) Family history of glaucoma OR (3)  aged 48 and older OR (3)  American aged 72 and older  3. Osteoporosis Screening: covered every 2 years if you meet one of the following conditions: (1) You're estrogen deficient and at risk for osteoporosis based off medical history and other findings; (2) Have a vertebral abnormality; (3) On glucocorticoid therapy for more than 3 months; (4) Have primary hyperparathyroidism; (5) On osteoporosis medications and need to assess response to drug therapy. · Last bone density test (DXA Scan): 09/10/2020.  5. HIV Screening: covered annually if you're between the age of 15-65. Also covered annually if you are younger than 13 and older than 72 with risk factors for HIV infection.  For pregnant patients, it is covered up to 3 times per pregnancy. Immunizations:  Immunization Recommendations   Influenza Vaccine Annual influenza vaccination during flu season is recommended for all persons aged >= 6 months who do not have contraindications   Pneumococcal Vaccine   * Pneumococcal conjugate vaccine = PCV13 (Prevnar 13), PCV15 (Vaxneuvance), PCV20 (Prevnar 20)  * Pneumococcal polysaccharide vaccine = PPSV23 (Pneumovax) Adults 20-63 years old: 1-3 doses may be recommended based on certain risk factors  Adults 72 years old: 1-2 doses may be recommended based off what pneumonia vaccine you previously received   Hepatitis B Vaccine 3 dose series if at intermediate or high risk (ex: diabetes, end stage renal disease, liver disease)   Tetanus (Td) Vaccine - COST NOT COVERED BY MEDICARE PART B Following completion of primary series, a booster dose should be given every 10 years to maintain immunity against tetanus. Td may also be given as tetanus wound prophylaxis. Tdap Vaccine - COST NOT COVERED BY MEDICARE PART B Recommended at least once for all adults. For pregnant patients, recommended with each pregnancy. Shingles Vaccine (Shingrix) - COST NOT COVERED BY MEDICARE PART B  2 shot series recommended in those aged 48 and above     Health Maintenance Due:      Topic Date Due   • Breast Cancer Screening: Mammogram  03/31/2023   • Lung Cancer Screening  04/25/2023   • DXA SCAN  09/10/2023   • Colorectal Cancer Screening  06/29/2027   • Hepatitis C Screening  Completed     Immunizations Due:      Topic Date Due   • COVID-19 Vaccine (1) Never done   • Pneumococcal Vaccine: 65+ Years (1 - PCV) Never done   • Hepatitis B Vaccine (1 of 3 - Risk 3-dose series) Never done     Advance Directives   What are advance directives? Advance directives are legal documents that state your wishes and plans for medical care. These plans are made ahead of time in case you lose your ability to make decisions for yourself.  Advance directives can apply to any medical decision, such as the treatments you want, and if you want to donate organs. What are the types of advance directives? There are many types of advance directives, and each state has rules about how to use them. You may choose a combination of any of the following:  · Living will: This is a written record of the treatment you want. You can also choose which treatments you do not want, which to limit, and which to stop at a certain time. This includes surgery, medicine, IV fluid, and tube feedings. · Durable power of  for healthcare [facility]): This is a written record that states who you want to make healthcare choices for you when you are unable to make them for yourself. This person, called a proxy, is usually a family member or a friend. You may choose more than 1 proxy. · Do not resuscitate (DNR) order:  A DNR order is used in case your heart stops beating or you stop breathing. It is a request not to have certain forms of treatment, such as CPR. A DNR order may be included in other types of advance directives. · Medical directive: This covers the care that you want if you are in a coma, near death, or unable to make decisions for yourself. You can list the treatments you want for each condition. Treatment may include pain medicine, surgery, blood transfusions, dialysis, IV or tube feedings, and a ventilator (breathing machine). · Values history: This document has questions about your views, beliefs, and how you feel and think about life. This information can help others choose the care that you would choose. Why are advance directives important? An advance directive helps you control your care. Although spoken wishes may be used, it is better to have your wishes written down. Spoken wishes can be misunderstood, or not followed. Treatments may be given even if you do not want them. An advance directive may make it easier for your family to make difficult choices about your care. Fall Prevention    Fall prevention  includes ways to make your home and other areas safer. It also includes ways you can move more carefully to prevent a fall. Health conditions that cause changes in your blood pressure, vision, or muscle strength and coordination may increase your risk for falls. Medicines may also increase your risk for falls if they make you dizzy, weak, or sleepy. Fall prevention tips:   · Stand or sit up slowly. · Use assistive devices as directed. · Wear shoes that fit well and have soles that . · Wear a personal alarm. · Stay active. · Manage your medical conditions. Home Safety Tips:  · Add items to prevent falls in the bathroom. · Keep paths clear. · Install bright lights in your home. · Keep items you use often on shelves within reach. · Paint or place reflective tape on the edges of your stairs. Urinary Incontinence   Urinary incontinence (UI)  is when you lose control of your bladder. UI develops because your bladder cannot store or empty urine properly. The 3 most common types of UI are stress incontinence, urge incontinence, or both. Medicines:   · May be given to help strengthen your bladder control. Report any side effects of medication to your healthcare provider. Do pelvic muscle exercises often:  Your pelvic muscles help you stop urinating. Squeeze these muscles tight for 5 seconds, then relax for 5 seconds. Gradually work up to squeezing for 10 seconds. Do 3 sets of 15 repetitions a day, or as directed. This will help strengthen your pelvic muscles and improve bladder control. Train your bladder:  Go to the bathroom at set times, such as every 2 hours, even if you do not feel the urge to go. You can also try to hold your urine when you feel the urge to go. For example, hold your urine for 5 minutes when you feel the urge to go. As that becomes easier, hold your urine for 10 minutes. Self-care:   · Keep a UI record.   Write down how often you leak urine and how much you leak. Make a note of what you were doing when you leaked urine. · Drink liquids as directed. You may need to limit the amount of liquid you drink to help control your urine leakage. Do not drink any liquid right before you go to bed. Limit or do not have drinks that contain caffeine or alcohol. · Prevent constipation. Eat a variety of high-fiber foods. Good examples are high-fiber cereals, beans, vegetables, and whole-grain breads. Walking is the best way to trigger your intestines to have a bowel movement. · Exercise regularly and maintain a healthy weight. Weight loss and exercise will decrease pressure on your bladder and help you control your leakage. · Use a catheter as directed  to help empty your bladder. A catheter is a tiny, plastic tube that is put into your bladder to drain your urine. · Go to behavior therapy as directed. Behavior therapy may be used to help you learn to control your urge to urinate. Cigarette Smoking and Your Health   Risks to your health if you smoke:  Nicotine and other chemicals found in tobacco damage every cell in your body. Even if you are a light smoker, you have an increased risk for cancer, heart disease, and lung disease. If you are pregnant or have diabetes, smoking increases your risk for complications. Benefits to your health if you stop smoking:   · You decrease respiratory symptoms such as coughing, wheezing, and shortness of breath. · You reduce your risk for cancers of the lung, mouth, throat, kidney, bladder, pancreas, stomach, and cervix. If you already have cancer, you increase the benefits of chemotherapy. You also reduce your risk for cancer returning or a second cancer from developing. · You reduce your risk for heart disease, blood clots, heart attack, and stroke. · You reduce your risk for lung infections, and diseases such as pneumonia, asthma, chronic bronchitis, and emphysema.   · Your circulation improves. More oxygen can be delivered to your body. If you have diabetes, you lower your risk for complications, such as kidney, artery, and eye diseases. You also lower your risk for nerve damage. Nerve damage can lead to amputations, poor vision, and blindness. · You improve your body's ability to heal and to fight infections. For more information and support to stop smoking:   · [employer]  Phone: 8- [phone]  Web Address: www.[employer]  Weight Management   Why it is important to manage your weight:  Being overweight increases your risk of health conditions such as heart disease, high blood pressure, type 2 diabetes, and certain types of cancer. It can also increase your risk for osteoarthritis, sleep apnea, and other respiratory problems. Aim for a slow, steady weight loss. Even a small amount of weight loss can lower your risk of health problems. How to lose weight safely:  A safe and healthy way to lose weight is to eat fewer calories and get regular exercise. You can lose up about 1 pound a week by decreasing the number of calories you eat by 500 calories each day. Healthy meal plan for weight management:  A healthy meal plan includes a variety of foods, contains fewer calories, and helps you stay healthy. A healthy meal plan includes the following:  · Eat whole-grain foods more often. A healthy meal plan should contain fiber. Fiber is the part of grains, fruits, and vegetables that is not broken down by your body. Whole-grain foods are healthy and provide extra fiber in your diet. Some examples of whole-grain foods are whole-wheat breads and pastas, oatmeal, brown rice, and bulgur. · Eat a variety of vegetables every day. Include dark, leafy greens such as spinach, kale, tanmay greens, and mustard greens. Eat yellow and orange vegetables such as carrots, sweet potatoes, and winter squash. · Eat a variety of fruits every day.   Choose fresh or canned fruit (canned in its own juice or light syrup) instead of juice. Fruit juice has very little or no fiber. · Eat low-fat dairy foods. Drink fat-free (skim) milk or 1% milk. Eat fat-free yogurt and low-fat cottage cheese. Try low-fat cheeses such as mozzarella and other reduced-fat cheeses. · Choose meat and other protein foods that are low in fat. Choose beans or other legumes such as split peas or lentils. Choose fish, skinless poultry (chicken or turkey), or lean cuts of red meat (beef or pork). Before you cook meat or poultry, cut off any visible fat. · Use less fat and oil. Try baking foods instead of frying them. Add less fat, such as margarine, sour cream, regular salad dressing and mayonnaise to foods. Eat fewer high-fat foods. Some examples of high-fat foods include french fries, doughnuts, ice cream, and cakes. · Eat fewer sweets. Limit foods and drinks that are high in sugar. This includes candy, cookies, regular soda, and sweetened drinks. Exercise:  Exercise at least 30 minutes per day on most days of the week. Some examples of exercise include walking, biking, dancing, and swimming. You can also fit in more physical activity by taking the stairs instead of the elevator or parking farther away from stores. Ask your healthcare provider about the best exercise plan for you. © Copyright [employer] 2018 Information is for End User's use only and may not be sold, redistributed or otherwise used for commercial purposes. All illustrations and images included in CareNotes® are the copyrighted property of [employer] Inc. or [facility] Preventive Visit Patient Instructions  Thank you for completing your Welcome to Medicare Visit or Medicare Annual Wellness Visit today. Your next wellness visit will be due in one year (7/12/2024). The screening/preventive services that you may require over the next 5-10 years are detailed below. Some tests may not apply to you based off risk factors and/or age.  Screening tests ordered at today's visit but not completed yet may show as past due. Also, please note that scanned in results may not display below. Preventive Screenings:  Service Recommendations Previous Testing/Comments   Colorectal Cancer Screening  * Colonoscopy    * Fecal Occult Blood Test (FOBT)/Fecal Immunochemical Test (FIT)  * Fecal DNA/Cologuard Test  * Flexible Sigmoidoscopy Age: 43-73 years old   Colonoscopy: every 10 years (may be performed more frequently if at higher risk)  OR  FOBT/FIT: every 1 year  OR  Cologuard: every 3 years  OR  Sigmoidoscopy: every 5 years  Screening may be recommended earlier than age 39 if at higher risk for colorectal cancer. Also, an individualized decision between you and your healthcare provider will decide whether screening between the ages of 77-80 would be appropriate. Colonoscopy: 06/30/2022  FOBT/FIT: Not on file  Cologuard: Not on file  Sigmoidoscopy: Not on file    Screening Current     Breast Cancer Screening Age: 36 years old  Frequency: every 1-2 years  Not required if history of left and right mastectomy Mammogram: 03/31/2022    Screening Current   Cervical Cancer Screening Between the ages of 21-29, pap smear recommended once every 3 years. Between the ages of 32-69, can perform pap smear with HPV co-testing every 5 years. Recommendations may differ for women with a history of total hysterectomy, cervical cancer, or abnormal pap smears in past. Pap Smear: 02/11/2019    Screening Not Indicated   Hepatitis C Screening Once for adults born between 1945 and 1965  More frequently in patients at high risk for Hepatitis C Hep C Antibody: 05/01/2021    Screening Current   Diabetes Screening 1-2 times per year if you're at risk for diabetes or have pre-diabetes Fasting glucose: 93 mg/dL (4/18/2023)  A1C: 5.6 % (4/18/2023)  Screening Current   Cholesterol Screening Once every 5 years if you don't have a lipid disorder. May order more often based on risk factors.  Lipid panel: 05/01/2021    Screening Not Indicated  History Lipid Disorder     Other Preventive Screenings Covered by Medicare:  6. Abdominal Aortic Aneurysm (AAA) Screening: covered once if your at risk. You're considered to be at risk if you have a family history of AAA. 7. Lung Cancer Screening: covers low dose CT scan once per year if you meet all of the following conditions: (1) Age 48-67; (2) No signs or symptoms of lung cancer; (3) Current smoker or have quit smoking within the last 15 years; (4) You have a tobacco smoking history of at least 20 pack years (packs per day multiplied by number of years you smoked); (5) You get a written order from a healthcare provider. 8. Glaucoma Screening: covered annually if you're considered high risk: (1) You have diabetes OR (2) Family history of glaucoma OR (3)  aged 48 and older OR (3)  American aged 72 and older  5. Osteoporosis Screening: covered every 2 years if you meet one of the following conditions: (1) You're estrogen deficient and at risk for osteoporosis based off medical history and other findings; (2) Have a vertebral abnormality; (3) On glucocorticoid therapy for more than 3 months; (4) Have primary hyperparathyroidism; (5) On osteoporosis medications and need to assess response to drug therapy. · Last bone density test (DXA Scan): 09/10/2020. 10. HIV Screening: covered annually if you're between the age of 14-79. Also covered annually if you are younger than 13 and older than 72 with risk factors for HIV infection. For pregnant patients, it is covered up to 3 times per pregnancy.     Immunizations:  Immunization Recommendations   Influenza Vaccine Annual influenza vaccination during flu season is recommended for all persons aged >= 6 months who do not have contraindications   Pneumococcal Vaccine   * Pneumococcal conjugate vaccine = PCV13 (Prevnar 13), PCV15 (Vaxneuvance), PCV20 (Prevnar 20)  * Pneumococcal polysaccharide vaccine = PPSV23 (Pneumovax) Adults 2364 years old: 1-3 doses may be recommended based on certain risk factors  Adults 72 years old: 1-2 doses may be recommended based off what pneumonia vaccine you previously received   Hepatitis B Vaccine 3 dose series if at intermediate or high risk (ex: diabetes, end stage renal disease, liver disease)   Tetanus (Td) Vaccine - COST NOT COVERED BY MEDICARE PART B Following completion of primary series, a booster dose should be given every 10 years to maintain immunity against tetanus. Td may also be given as tetanus wound prophylaxis. Tdap Vaccine - COST NOT COVERED BY MEDICARE PART B Recommended at least once for all adults. For pregnant patients, recommended with each pregnancy. Shingles Vaccine (Shingrix) - COST NOT COVERED BY MEDICARE PART B  2 shot series recommended in those aged 48 and above     Health Maintenance Due:      Topic Date Due   • Breast Cancer Screening: Mammogram  03/31/2023   • Lung Cancer Screening  04/25/2023   • DXA SCAN  09/10/2023   • Colorectal Cancer Screening  06/29/2027   • Hepatitis C Screening  Completed     Immunizations Due:      Topic Date Due   • COVID-19 Vaccine (1) Never done   • Pneumococcal Vaccine: 65+ Years (1 - PCV) Never done   • Hepatitis B Vaccine (1 of 3 - Risk 3-dose series) Never done     Advance Directives   What are advance directives? Advance directives are legal documents that state your wishes and plans for medical care. These plans are made ahead of time in case you lose your ability to make decisions for yourself. Advance directives can apply to any medical decision, such as the treatments you want, and if you want to donate organs. What are the types of advance directives? There are many types of advance directives, and each state has rules about how to use them. You may choose a combination of any of the following:  · Living will: This is a written record of the treatment you want.  You can also choose which treatments you do not want, which to limit, and which to stop at a certain time. This includes surgery, medicine, IV fluid, and tube feedings. · Durable power of  for [facility]): This is a written record that states who you want to make healthcare choices for you when you are unable to make them for yourself. This person, called a proxy, is usually a family member or a friend. You may choose more than 1 proxy. · Do not resuscitate (DNR) order:  A DNR order is used in case your heart stops beating or you stop breathing. It is a request not to have certain forms of treatment, such as CPR. A DNR order may be included in other types of advance directives. · Medical directive: This covers the care that you want if you are in a coma, near death, or unable to make decisions for yourself. You can list the treatments you want for each condition. Treatment may include pain medicine, surgery, blood transfusions, dialysis, IV or tube feedings, and a ventilator (breathing machine). · Values history: This document has questions about your views, beliefs, and how you feel and think about life. This information can help others choose the care that you would choose. Why are advance directives important? An advance directive helps you control your care. Although spoken wishes may be used, it is better to have your wishes written down. Spoken wishes can be misunderstood, or not followed. Treatments may be given even if you do not want them. An advance directive may make it easier for your family to make difficult choices about your care. Fall Prevention    Fall prevention  includes ways to make your home and other areas safer. It also includes ways you can move more carefully to prevent a fall. Health conditions that cause changes in your blood pressure, vision, or muscle strength and coordination may increase your risk for falls. Medicines may also increase your risk for falls if they make you dizzy, weak, or sleepy.    Fall prevention tips:   · Stand or sit up slowly. · Use assistive devices as directed. · Wear shoes that fit well and have soles that . · Wear a personal alarm. · Stay active. · Manage your medical conditions. Home Safety Tips:  · Add items to prevent falls in the bathroom. · Keep paths clear. · Install bright lights in your home. · Keep items you use often on shelves within reach. · Paint or place reflective tape on the edges of your stairs. Urinary Incontinence   Urinary incontinence (UI)  is when you lose control of your bladder. UI develops because your bladder cannot store or empty urine properly. The 3 most common types of UI are stress incontinence, urge incontinence, or both. Medicines:   · May be given to help strengthen your bladder control. Report any side effects of medication to your healthcare provider. Do pelvic muscle exercises often:  Your pelvic muscles help you stop urinating. Squeeze these muscles tight for 5 seconds, then relax for 5 seconds. Gradually work up to squeezing for 10 seconds. Do 3 sets of 15 repetitions a day, or as directed. This will help strengthen your pelvic muscles and improve bladder control. Train your bladder:  Go to the bathroom at set times, such as every 2 hours, even if you do not feel the urge to go. You can also try to hold your urine when you feel the urge to go. For example, hold your urine for 5 minutes when you feel the urge to go. As that becomes easier, hold your urine for 10 minutes. Self-care:   · Keep a UI record. Write down how often you leak urine and how much you leak. Make a note of what you were doing when you leaked urine. · Drink liquids as directed. You may need to limit the amount of liquid you drink to help control your urine leakage. Do not drink any liquid right before you go to bed. Limit or do not have drinks that contain caffeine or alcohol. · Prevent constipation. Eat a variety of high-fiber foods. Good examples are high-fiber cereals, beans, vegetables, and whole-grain breads. Walking is the best way to trigger your intestines to have a bowel movement. · Exercise regularly and maintain a healthy weight. Weight loss and exercise will decrease pressure on your bladder and help you control your leakage. · Use a catheter as directed  to help empty your bladder. A catheter is a tiny, plastic tube that is put into your bladder to drain your urine. · Go to behavior therapy as directed. Behavior therapy may be used to help you learn to control your urge to urinate. Cigarette Smoking and Your Health   Risks to your health if you smoke:  Nicotine and other chemicals found in tobacco damage every cell in your body. Even if you are a light smoker, you have an increased risk for cancer, heart disease, and lung disease. If you are pregnant or have diabetes, smoking increases your risk for complications. Benefits to your health if you stop smoking:   · You decrease respiratory symptoms such as coughing, wheezing, and shortness of breath. · You reduce your risk for cancers of the lung, mouth, throat, kidney, bladder, pancreas, stomach, and cervix. If you already have cancer, you increase the benefits of chemotherapy. You also reduce your risk for cancer returning or a second cancer from developing. · You reduce your risk for heart disease, blood clots, heart attack, and stroke. · You reduce your risk for lung infections, and diseases such as pneumonia, asthma, chronic bronchitis, and emphysema. · Your circulation improves. More oxygen can be delivered to your body. If you have diabetes, you lower your risk for complications, such as kidney, artery, and eye diseases. You also lower your risk for nerve damage. Nerve damage can lead to amputations, poor vision, and blindness. · You improve your body's ability to heal and to fight infections.   For more information and support to stop smoking: · [employer]  Phone: 7- 051 - 116-7396  Web Address: www.food.de  Weight Management   Why it is important to manage your weight:  Being overweight increases your risk of health conditions such as heart disease, high blood pressure, type 2 diabetes, and certain types of cancer. It can also increase your risk for osteoarthritis, sleep apnea, and other respiratory problems. Aim for a slow, steady weight loss. Even a small amount of weight loss can lower your risk of health problems. How to lose weight safely:  A safe and healthy way to lose weight is to eat fewer calories and get regular exercise. You can lose up about 1 pound a week by decreasing the number of calories you eat by 500 calories each day. Healthy meal plan for weight management:  A healthy meal plan includes a variety of foods, contains fewer calories, and helps you stay healthy. A healthy meal plan includes the following:  · Eat whole-grain foods more often. A healthy meal plan should contain fiber. Fiber is the part of grains, fruits, and vegetables that is not broken down by your body. Whole-grain foods are healthy and provide extra fiber in your diet. Some examples of whole-grain foods are whole-wheat breads and pastas, oatmeal, brown rice, and bulgur. · Eat a variety of vegetables every day. Include dark, leafy greens such as spinach, kale, tanmay greens, and mustard greens. Eat yellow and orange vegetables such as carrots, sweet potatoes, and winter squash. · Eat a variety of fruits every day. Choose fresh or canned fruit (canned in its own juice or light syrup) instead of juice. Fruit juice has very little or no fiber. · Eat low-fat dairy foods. Drink fat-free (skim) milk or 1% milk. Eat fat-free yogurt and low-fat cottage cheese. Try low-fat cheeses such as mozzarella and other reduced-fat cheeses. · Choose meat and other protein foods that are low in fat. Choose beans or other legumes such as split peas or lentils. Choose fish, skinless poultry (chicken or turkey), or lean cuts of red meat (beef or pork). Before you cook meat or poultry, cut off any visible fat. · Use less fat and oil. Try baking foods instead of frying them. Add less fat, such as margarine, sour cream, regular salad dressing and mayonnaise to foods. Eat fewer high-fat foods. Some examples of high-fat foods include french fries, doughnuts, ice cream, and cakes. · Eat fewer sweets. Limit foods and drinks that are high in sugar. This includes candy, cookies, regular soda, and sweetened drinks. Exercise:  Exercise at least 30 minutes per day on most days of the week. Some examples of exercise include walking, biking, dancing, and swimming. You can also fit in more physical activity by taking the stairs instead of the elevator or parking farther away from stores. Ask your healthcare provider about the best exercise plan for you. © Copyright [employer] 2018 Information is for End User's use only and may not be sold, redistributed or otherwise used for commercial purposes.  All illustrations and images included in CareNotes® are the copyrighted property of [employer] Inc[employer] or [address] [place] [address]
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
## 2023-07-13 PROBLEM — M70.61 GREATER TROCHANTERIC BURSITIS OF RIGHT HIP: Status: RESOLVED | Noted: 2022-06-02 | Resolved: 2023-07-13

## 2023-07-13 PROBLEM — H57.13 PAIN OF BOTH EYES: Status: RESOLVED | Noted: 2022-02-25 | Resolved: 2023-07-13

## 2023-07-13 PROBLEM — K92.1 BLOOD IN STOOL: Status: ACTIVE | Noted: 2023-07-13

## 2023-07-13 PROBLEM — R60.0 LOWER EXTREMITY EDEMA: Status: RESOLVED | Noted: 2019-11-21 | Resolved: 2023-07-13

## 2023-07-13 PROBLEM — B02.9 HERPES ZOSTER WITHOUT COMPLICATION: Status: RESOLVED | Noted: 2023-02-21 | Resolved: 2023-07-13

## 2023-07-13 PROBLEM — R10.13 EPIGASTRIC PAIN: Status: RESOLVED | Noted: 2018-08-28 | Resolved: 2023-07-13

## 2023-07-13 PROBLEM — R60.9 EDEMA: Status: RESOLVED | Noted: 2017-07-13 | Resolved: 2023-07-13

## 2023-07-13 PROBLEM — R10.11 RUQ PAIN: Status: RESOLVED | Noted: 2022-05-10 | Resolved: 2023-07-13

## 2023-07-13 PROBLEM — M72.2 PLANTAR FASCIITIS: Status: RESOLVED | Noted: 2022-09-05 | Resolved: 2023-07-13

## 2023-07-13 PROBLEM — M65.9 SYNOVITIS: Status: RESOLVED | Noted: 2023-04-26 | Resolved: 2023-07-13

## 2023-07-13 PROBLEM — F11.20 CONTINUOUS OPIOID DEPENDENCE (HCC): Status: RESOLVED | Noted: 2022-04-27 | Resolved: 2023-07-13

## 2023-07-13 PROBLEM — M65.90 SYNOVITIS: Status: RESOLVED | Noted: 2023-04-26 | Resolved: 2023-07-13

## 2023-07-13 PROBLEM — L30.9 DERMATITIS: Status: RESOLVED | Noted: 2020-09-14 | Resolved: 2023-07-13

## 2023-07-13 PROBLEM — K59.00 CONSTIPATION: Status: RESOLVED | Noted: 2022-09-28 | Resolved: 2023-07-13

## 2023-07-13 PROBLEM — R06.09 DYSPNEA ON EXERTION: Status: RESOLVED | Noted: 2018-05-04 | Resolved: 2023-07-13

## 2023-07-13 PROBLEM — R14.0 ABDOMINAL DISTENSION: Status: RESOLVED | Noted: 2018-08-28 | Resolved: 2023-07-13

## 2023-07-13 PROBLEM — E87.1 HYPONATREMIA: Status: RESOLVED | Noted: 2023-02-21 | Resolved: 2023-07-13

## 2023-07-13 NOTE — ASSESSMENT & PLAN NOTE
Still smoking. Breathing stable on Trelegy. Has not needed rescue inhaler in 2w. Continue to monitor. Check CT of the lungs. Avoid outdoor activities when air quality is poor.  Recheck 6m

## 2023-07-13 NOTE — ASSESSMENT & PLAN NOTE
Mood stable. Depression Screening Follow-up Plan: Patient's depression screening was positive with a PHQ-2 score of 0. Their PHQ-9 score was 3. Continue present care.  Recheck 6m - earlier if worse

## 2023-07-13 NOTE — ASSESSMENT & PLAN NOTE
Bright red. Recent colonoscopy showed a few polyps but nothing else. CT suggested colitis of transverse and descending colon. No blood in stool since being in ED, and pt was without abd discomfort on exam today. Continue to monitor. I will discuss this occurrence with her GI provider.  Recheck 6m - earlier if symptoms return

## 2023-08-09 DIAGNOSIS — J30.9 ALLERGIC RHINITIS, UNSPECIFIED SEASONALITY, UNSPECIFIED TRIGGER: ICD-10-CM

## 2023-08-09 RX ORDER — CETIRIZINE HYDROCHLORIDE 10 MG/1
TABLET ORAL
Qty: 90 TABLET | Refills: 0 | Status: SHIPPED | OUTPATIENT
Start: 2023-08-09

## 2023-08-14 DIAGNOSIS — K21.9 GASTROESOPHAGEAL REFLUX DISEASE: ICD-10-CM

## 2023-08-14 RX ORDER — PANTOPRAZOLE SODIUM 40 MG/1
TABLET, DELAYED RELEASE ORAL
Qty: 180 TABLET | Refills: 1 | Status: SHIPPED | OUTPATIENT
Start: 2023-08-14

## 2023-08-17 ENCOUNTER — OFFICE VISIT (OUTPATIENT)
Dept: FAMILY MEDICINE CLINIC | Facility: CLINIC | Age: 68
End: 2023-08-17
Payer: COMMERCIAL

## 2023-08-17 VITALS
TEMPERATURE: 97.3 F | OXYGEN SATURATION: 98 % | SYSTOLIC BLOOD PRESSURE: 112 MMHG | BODY MASS INDEX: 33.19 KG/M2 | HEART RATE: 84 BPM | WEIGHT: 194.4 LBS | DIASTOLIC BLOOD PRESSURE: 78 MMHG | HEIGHT: 64 IN

## 2023-08-17 DIAGNOSIS — R07.89 INTERMITTENT LEFT-SIDED CHEST PAIN: Primary | ICD-10-CM

## 2023-08-17 DIAGNOSIS — I20.8 ANGINA AT REST (HCC): ICD-10-CM

## 2023-08-17 DIAGNOSIS — M79.602 LEFT ARM PAIN: ICD-10-CM

## 2023-08-17 DIAGNOSIS — L02.92 BOIL: ICD-10-CM

## 2023-08-17 PROCEDURE — 93000 ELECTROCARDIOGRAM COMPLETE: CPT | Performed by: FAMILY MEDICINE

## 2023-08-17 PROCEDURE — 99214 OFFICE O/P EST MOD 30 MIN: CPT | Performed by: FAMILY MEDICINE

## 2023-08-17 RX ORDER — SULFAMETHOXAZOLE AND TRIMETHOPRIM 800; 160 MG/1; MG/1
1 TABLET ORAL EVERY 12 HOURS SCHEDULED
Qty: 14 TABLET | Refills: 0 | Status: SHIPPED | OUTPATIENT
Start: 2023-08-17 | End: 2023-08-24

## 2023-08-17 NOTE — PROGRESS NOTES
Name: Inés Perez      : 1955      MRN: [id number]  Encounter Provider: Dc Dooley MD  Encounter Date: 2023   Encounter department: [facility]     1. Intermittent left-sided chest pain  -     POCT ECG  -     Echo stress test, dobutamine; Future; Expected date: 2023    2. Angina at rest [facility])  -     Echo stress test, dobutamine; Future; Expected date: 2023    3. Left arm pain    4. Boil  -     sulfamethoxazole-trimethoprim (BACTRIM DS) 800-160 mg per tablet; Take 1 tablet by mouth every 12 (twelve) hours for 7 days    Discussion: Reviewed with patient. The "pinching" pain is atypical, extension to the chest, occasional worsening with exertion, and episode of diaphoresis with this on Tuesday, is concerning. 10-year ASCVD risk is 13.6%, and patient does continue to smoke. I could not reproduce patient's discomfort with any neck or shoulder maneuvers. Portable chest x-ray she had done at the beginning of July was unremarkable as well. Patient is scheduled for a CT of the chest within the next few weeks as part of her lung cancer screening-this should help eliminate any pulmonary cause. ECG was unremarkable though patient's symptoms are compelling. I will also refer patient for dobutamine stress echo -between patient's lung issues as well as back and orthopedic issues, patient is not a candidate for an exercise/treadmill test.  Patient will start a baby aspirin once a day. She agrees to go to the ED if symptoms should worsen prior to her having the test done. Follow-up 3 to 4 weeks  - In regards to her boil-these are recurrent. Now draining. Continue local care. Restart Bactrim DS 1 tablet twice daily for 7 days. Recheck if not resolving within a week.   Patient to call for any other problems or concerns in the interim         Subjective     Here for several concerns  -19-year-old female with a history of hypertension, COPD and smoking, presents with a 2-week history of pinching like pain intermittently in the left shoulder. Patient states that occasionally can come on with exertion but will also come on at rest.  Can radiate potentially down into the left hand, the left hand pain can occur without the shoulder area pinching pain. Pain can also be felt in the left chest as well as the left back. It is not associated with shortness of breath or nausea. Patient has noticed increased epigastric pain recently though she is not sure that this is related. This past Tuesday morning, after getting up and going downstairs for coffee, patient developed symptoms again though this time it was associated with diaphoresis. Patient did have a left chest discomfort but no substernal discomfort with this, and this episode was not associated with palpitations or shortness of breath. - pt also with boil on buttock that is just starting to drain. Pt typically needs bactrim to help it resolve  - still with epigastric discomfort. Has appt with GI in Sept    Review of Systems   Constitutional: Positive for fatigue. Negative for activity change and appetite change. HENT: Negative. Eyes: Negative. Respiratory: Positive for cough and shortness of breath (with exertion). Negative for wheezing. Cardiovascular: Positive for chest pain. Negative for palpitations and leg swelling. Gastrointestinal: Positive for abdominal pain. Negative for abdominal distention, diarrhea, nausea and vomiting. Genitourinary: Negative. Musculoskeletal: Positive for arthralgias, back pain, gait problem and myalgias. Skin: Negative. Neurological: Negative for dizziness, weakness, light-headedness, numbness and headaches.        Past Medical History:   Diagnosis Date   • Acid reflux    • Back injury    • COPD (chronic obstructive pulmonary disease) (HCC)    • Fibromyalgia    • Hypertension    • Seasonal allergies      Past Surgical History:   Procedure Laterality Date   • COLONOSCOPY     • EYE SURGERY     • KNEE SURGERY  1998   • IN COLONOSCOPY FLX DX W/COLLJ SPEC WHEN PFRMD N/A 05/09/2017    Procedure: EGD AND COLONOSCOPY;  Surgeon: Roman Clark MD;  [facility]: [facility]; Service: Gastroenterology     Family History   Problem Relation Age of Onset   • Pancreatic cancer Mother 67   • Coronary artery disease Father    • Diabetes Father    • Hypertension Father    • Heart disease Father    • Lung cancer Sister 40   • Pancreatic cancer Brother    • Pancreatic cancer Brother    • Lung cancer Maternal Grandfather 68   • Diabetes Paternal Grandmother      Social History     Socioeconomic History   • Marital status:      Spouse name: None   • Number of children: 3   • Years of education: less than high school   • Highest education level: None   Occupational History   • Occupation: unemployed   Tobacco Use   • Smoking status: Every Day     Packs/day: 1.50     Years: 50.00     Total pack years: 75.00     Types: Cigarettes     Start date: 1968     Passive exposure: Past   • Smokeless tobacco: Never   Vaping Use   • Vaping Use: Never used   Substance and Sexual Activity   • Alcohol use: Never   • Drug use: No   • Sexual activity: Not Currently   Other Topics Concern   • None   Social History Narrative    Always uses seatbelt    Daily coffee consumption    Daily tea consumption    Dental care regularly    Exercises regularly    Multiple organ donor    No guns in the home    No living will    Denies pets/ animals in the home    Power of  in existence- denied         Social Determinants of Health     Financial Resource Strain: Low Risk  (7/12/2023)    Overall Financial Resource Strain (CARDIA)    • Difficulty of Paying Living Expenses: Not hard at all   Food Insecurity: Not on file   Transportation Needs: No Transportation Needs (7/12/2023)    PRAPARE - Transportation    • Lack of Transportation (Medical): No    • Lack of Transportation (Non-Medical):  No Physical Activity: Not on file   Stress: Not on file   Social Connections: Not on file   Intimate Partner Violence: Not on file   Housing Stability: Not on file     Current Outpatient Medications on File Prior to Visit   Medication Sig   • acetaminophen (TYLENOL) 650 mg CR tablet Take by mouth every 8 (eight) hours as needed     • albuterol (2.5 mg/3 mL) 0.083 % nebulizer solution Take 3 mL (2.5 mg total) by nebulization every 6 (six) hours as needed for wheezing or shortness of breath   • albuterol (PROVENTIL HFA,VENTOLIN HFA) 90 mcg/act inhaler Inhale 2 puffs every 6 (six) hours as needed for wheezing   • ALPRAZolam (XANAX) 0.25 mg tablet Take 1 tablet (0.25 mg total) by mouth 3 (three) times a day as needed for anxiety   • amLODIPine (NORVASC) 5 mg tablet TAKE 1 TABLET (5 MG TOTAL) BY MOUTH DAILY. • cetirizine (ZyrTEC) 10 mg tablet TAKE 1 TABLET BY MOUTH EVERY DAY   • Cholecalciferol (VITAMIN D3 PO) Take by mouth   • Diclofenac Sodium (VOLTAREN) 1 % Apply 2 g topically 4 (four) times a day   • escitalopram (LEXAPRO) 10 mg tablet TAKE 1 TABLET BY MOUTH EVERY DAY   • lidocaine (Lidoderm) 5 % Apply 1 patch topically daily Remove & Discard patch within 12 hours or as directed by MD   • lisinopril (ZESTRIL) 20 mg tablet TAKE 1 TABLET BY MOUTH EVERY DAY   • MINOXIDIL, TOPICAL, 5 % SOLN Apply 1 application topically in the morning For hair   • montelukast (SINGULAIR) 10 mg tablet TAKE 1 TABLET BY MOUTH EVERYDAY AT BEDTIME   • nystatin (MYCOSTATIN) 500,000 units/5 mL suspension Take 5 mL (500,000 Units total) by mouth 4 (four) times a day   • pantoprazole (PROTONIX) 40 mg tablet TAKE 1 TABLET BY MOUTH TWICE A DAY   • promethazine-dextromethorphan (PHENERGAN-DM) 6.25-15 mg/5 mL oral syrup Take 5 mL by mouth 4 (four) times a day as needed for cough   • Trelegy Ellipta 200-62.5-25 MCG/ACT AEPB inhaler INHALE 1 PUFF DAILY RINSE MOUTH AFTER USE.    • [DISCONTINUED] Lumigan 0.01 % ophthalmic drops 1 DROP IN BOTH EYES AT BEDTIME     Allergies   Allergen Reactions   • Augmentin [Amoxicillin-Pot Clavulanate] Vomiting   • Miconazole Itching and Swelling   • Wixela Inhub [Fluticasone-Salmeterol] Palpitations     Immunization History   Administered Date(s) Administered   • Tdap 02/28/2008       Objective     /78 (BP Location: Left arm, Patient Position: Sitting, Cuff Size: Large)   Pulse 84   Temp (!) 97.3 °F (36.3 °C)   Ht 5' 4" (1.626 m)   Wt 88.2 kg (194 lb 6.4 oz)   LMP  (LMP Unknown)   SpO2 98%   BMI 33.37 kg/m²     Physical Exam  Vitals reviewed. HENT:      Mouth/Throat:      Mouth: Mucous membranes are moist.   Eyes:      Extraocular Movements: Extraocular movements intact. Conjunctiva/sclera: Conjunctivae normal.      Pupils: Pupils are equal, round, and reactive to light. Comments: No pallor   Neck:      Vascular: No carotid bruit. Comments: No arm/shoulder pain with posterior flexion with or without rotation  Cardiovascular:      Rate and Rhythm: Normal rate and regular rhythm. Pulses: Normal pulses. Pulmonary:      Effort: Pulmonary effort is normal.      Breath sounds: No wheezing or rales. Abdominal:      General: There is no distension. Palpations: There is no mass. Tenderness: There is no abdominal tenderness. Musculoskeletal:         General: No tenderness or deformity. Cervical back: Normal range of motion. Tenderness: mild L>R posterior cervical muscles. Right lower leg: No edema. Left lower leg: No edema. Comments: L shoulder "pinching" not reproduced with abduction, anterior flexion, empty can test or other maneuvers. Bicep tendon NT. Lymphadenopathy:      Cervical: No cervical adenopathy. Skin:     General: Skin is warm. Capillary Refill: Capillary refill takes less than 2 seconds. Neurological:      General: No focal deficit present. Mental Status: She is alert and oriented to person, place, and time. Cranial Nerves:  No cranial nerve deficit. Sensory: No sensory deficit. Motor: No weakness. Gait: Gait abnormal (antalgic appearing gait).       Deep Tendon Reflexes: Reflexes normal.       Kevin Brody MD

## 2023-08-23 DIAGNOSIS — M70.61 TROCHANTERIC BURSITIS OF RIGHT HIP: ICD-10-CM

## 2023-08-23 DIAGNOSIS — B37.0 THRUSH: ICD-10-CM

## 2023-08-24 ENCOUNTER — HOSPITAL ENCOUNTER (OUTPATIENT)
Dept: RADIOLOGY | Facility: MEDICAL CENTER | Age: 68
Discharge: HOME | End: 2023-08-24
Payer: COMMERCIAL

## 2023-08-24 VITALS — WEIGHT: 194 LBS | BODY MASS INDEX: 33.12 KG/M2 | HEIGHT: 64 IN

## 2023-08-24 DIAGNOSIS — F17.210 SMOKING GREATER THAN 20 PACK YEARS: ICD-10-CM

## 2023-08-24 DIAGNOSIS — Z12.2 ENCOUNTER FOR SCREENING FOR LUNG CANCER: ICD-10-CM

## 2023-08-24 DIAGNOSIS — Z12.31 SCREENING MAMMOGRAM FOR BREAST CANCER: ICD-10-CM

## 2023-08-24 PROCEDURE — 77067 SCR MAMMO BI INCL CAD: CPT

## 2023-08-24 PROCEDURE — 77063 BREAST TOMOSYNTHESIS BI: CPT

## 2023-08-24 PROCEDURE — 71271 CT THORAX LUNG CANCER SCR C-: CPT

## 2023-08-30 ENCOUNTER — LAB (OUTPATIENT)
Dept: LAB | Facility: HOSPITAL | Age: 68
End: 2023-08-30
Payer: COMMERCIAL

## 2023-08-30 ENCOUNTER — HOSPITAL ENCOUNTER (OUTPATIENT)
Dept: NON INVASIVE DIAGNOSTICS | Facility: HOSPITAL | Age: 68
Discharge: HOME | End: 2023-08-30
Payer: COMMERCIAL

## 2023-08-30 VITALS — HEIGHT: 64 IN | BODY MASS INDEX: 33.12 KG/M2 | WEIGHT: 194 LBS

## 2023-08-30 DIAGNOSIS — R07.89 INTERMITTENT LEFT-SIDED CHEST PAIN: ICD-10-CM

## 2023-08-30 DIAGNOSIS — I20.8 ANGINA AT REST (HCC): ICD-10-CM

## 2023-08-30 DIAGNOSIS — I10 PRIMARY HYPERTENSION: ICD-10-CM

## 2023-08-30 LAB
ARRHY DURING EX: NORMAL
CHEST PAIN STATEMENT: NORMAL
CHOLEST SERPL-MCNC: 256 MG/DL
HDLC SERPL-MCNC: 51 MG/DL
LDLC SERPL CALC-MCNC: 154 MG/DL (ref 0–100)
MAX DIASTOLIC BP: 82 MMHG
MAX HEART RATE: 196 BPM
MAX HR PERCENT: 91 %
MAX HR: 140 BPM
MAX PREDICTED HEART RATE: 153 BPM
MAX. SYSTOLIC BP: 178 MMHG
NONHDLC SERPL-MCNC: 205 MG/DL
PROTOCOL NAME: NORMAL
RATE PRESSURE PRODUCT: NORMAL
REASON FOR TERMINATION: NORMAL
SL CV LV EF: 60
SL CV STRESS RECOVERY BP: NORMAL MMHG
SL CV STRESS RECOVERY HR: 85 BPM
SL CV STRESS STAGE REACHED: 2
STRESS BASELINE BP: NORMAL MMHG
STRESS BASELINE HR: 76 BPM
STRESS O2 SAT REST: 95 %
STRESS PEAK HR: 140 BPM
STRESS POST ESTIMATED WORKLOAD: 5.8 METS
STRESS POST EXERCISE DUR MIN: 3 MIN
STRESS POST EXERCISE DUR SEC: 30 SEC
STRESS POST O2 SAT PEAK: 98 %
STRESS POST PEAK BP: 180 MMHG
TARGET HR FORMULA: NORMAL
TEST INDICATION: NORMAL
TIME IN EXERCISE PHASE: NORMAL
TRIGL SERPL-MCNC: 255 MG/DL

## 2023-08-30 PROCEDURE — 80061 LIPID PANEL: CPT

## 2023-08-30 PROCEDURE — 93350 STRESS TTE ONLY: CPT | Performed by: INTERNAL MEDICINE

## 2023-08-30 PROCEDURE — 36415 COLL VENOUS BLD VENIPUNCTURE: CPT

## 2023-08-30 PROCEDURE — 93350 STRESS TTE ONLY: CPT

## 2023-08-31 ENCOUNTER — TELEPHONE (OUTPATIENT)
Dept: HEMATOLOGY ONCOLOGY | Facility: CLINIC | Age: 68
End: 2023-08-31

## 2023-08-31 NOTE — TELEPHONE ENCOUNTER
Appointment Change  Cancel, Reschedule, Change to Virtual      Who are you speaking with? Patient   If it is not the patient, are they listed on an active communication consent form? N/A   Which provider is the appointment scheduled with? Dr. Quinton Solano   When was the original appointment scheduled? Please list date and time  9/18/23 145   At which location is the appointment scheduled to take place? [facility]   Was the appointment rescheduled? Was the appointment changed from an in person visit to a virtual visit? If so, please list the details of the change. 9/18/23 11:15   What is the reason for the appointment change? provider   Was [facility] transport scheduled for this visit? N/A   Does [facility] transport need to be scheduled for the new visit (if applicable) N/A   Does the patient need an infusion appointment rescheduled? N/A   Does the patient have an infusion appointment scheduled? If so, when? No   Is the patient undergoing chemotherapy? N/A   Was the no-show policy reviewed for appointments being changed with less then 24 hours of notice?  N/A

## 2023-09-06 ENCOUNTER — OFFICE VISIT (OUTPATIENT)
Dept: FAMILY MEDICINE CLINIC | Facility: CLINIC | Age: 68
End: 2023-09-06
Payer: COMMERCIAL

## 2023-09-06 VITALS
SYSTOLIC BLOOD PRESSURE: 126 MMHG | WEIGHT: 193.4 LBS | HEART RATE: 83 BPM | BODY MASS INDEX: 33.02 KG/M2 | HEIGHT: 64 IN | TEMPERATURE: 97.2 F | OXYGEN SATURATION: 98 % | DIASTOLIC BLOOD PRESSURE: 76 MMHG

## 2023-09-06 DIAGNOSIS — E78.2 MIXED HYPERLIPIDEMIA: ICD-10-CM

## 2023-09-06 DIAGNOSIS — K59.04 CHRONIC IDIOPATHIC CONSTIPATION: Primary | ICD-10-CM

## 2023-09-06 DIAGNOSIS — D49.0 IPMN (INTRADUCTAL PAPILLARY MUCINOUS NEOPLASM): ICD-10-CM

## 2023-09-06 DIAGNOSIS — J42 CHRONIC BRONCHITIS, UNSPECIFIED CHRONIC BRONCHITIS TYPE (HCC): ICD-10-CM

## 2023-09-06 PROCEDURE — 99214 OFFICE O/P EST MOD 30 MIN: CPT | Performed by: FAMILY MEDICINE

## 2023-09-06 RX ORDER — ROSUVASTATIN CALCIUM 5 MG/1
5 TABLET, COATED ORAL DAILY
Qty: 30 TABLET | Refills: 5 | Status: SHIPPED | OUTPATIENT
Start: 2023-09-06

## 2023-09-06 NOTE — PATIENT INSTRUCTIONS
Low FODMAPs Diet     You need a diet that limits, but does not eliminate foods that contain: lactose, fructose, fructans, galactans, and sugar alcohols (polyols). This is often referred to as a low FODMAPs diet as it is low in fermetable oligo-, di-, and monosaccharides and polyls (FODMAPs). The low FODMAPs diet will help minimize symptoms, such as bloating, cramping, burping, flatulence, and constipation and/or diarrhea, that are often associated with Irritable Bowel Syndrome (IBS). Because there are different levels of intolerance, you need to eliminate foods high in FODMAPs for 6-8 weeks and then gradually reintroduce foods to identify bothersome foods. Reintroduce one food every four days with a 2-week break between bothersome foods. The end result is to identify the threshold that you are able to consume FODMAP containing foods without causing bothersome GI symptoms.      Type of Food High in FODMAPs Low in FODMAPs   Milk -Milk: Cow, Sheep, Goat, Soy  -Creamy soups made with    milk  -Evaporated milk  -Sweetened condensed milk -Milk: [place], Coconut, Hazelnut, Hemp, Rice  -Lactose free cow's milk  -Lactose free nae  -Lactose free ice cream    (non-dairy alternatives)  -Purchase lactase enzyme to  make your own evaporated or  condensed milk if needed   Yogurt -Cow's milk yogurt (Greek yogurt is lowest in FODMAPs)  -Soy yogurt -Coconut milk yogurt   Cheese -Cottage cheese  -Ricotta cheese  -[place]scapone cheese -Hard cheeses including  cheddar, Swiss, brie, blue  cheese, mozzarella, parmesan,  and feta  -No more than 2 tablespoons ricotta or cottage cheese  -Lactose free cottage cheese   Dairy-based  Condiments -Sour cream  -Whipping cream -Butter  -Half and half  -Cream cheese   Dairy-based desserts -Ice cream  -Frozen yogurt  -Sherbet -Sorbet from FODMAPs friendly fruit   Fruit -Apples, Pears  -Cherries, Raspberries,  Blackberries  -Watermelon  -Nectarines, White peaches, Apricots, Plums  -Peaches  -Prunes  -Christian, Papaya  -Persimmon  -Orange juice  -Canned fruit  -Large portions of any fruit -Banana  -Blueberries, Strawberries  -Cantaloupe, Honeydew  -Grapefruit, Lemon, Lime  -Grapes  -Kiwi  -Pineapple  -Rhubarb  -Tangelos  - <1/4 avocado  - <1 tablespoon dried fruit     Limit consumption to one low FODMAPs fruit per meal.  Consume ripe fruit.  (Firm, less- ripe fruit contains more fructose.)   Vegetables -Artichokes  -Asparagus  -Sugar snap peas  -Cabbage  -Onions  -Shallot  -Jhon  -Onion and garlic salt  powders  -Garlic  -Cauliflower  -Mushrooms  -Pumpkin  -Green Peppers -Bok timothy, Bean sprouts  -Red bell pepper  -Lettuce, Spinach  -Carrots  -Chives, Spring onion (green part    Only)  -Cucumber  -Eggplant  -Green beans  -Tomato  -Potatoes  -Garlic infused oil; purchase    flavored oil or saute onion and    garlic in oil and then discard    onion and garlic  -Water chestnuts  -<1 stick celery  -<1/2 cup sweet potato, broccoli,    [place] sprouts, butternut    squash, fennel  -<1/3 cup green peas  -<10 snow peas         Grains -Wheat  -Rye  -Barley-large quantities  -Spelt -Brown rice  -Oats, oat bran  -Quinoa  -Corn  -Gluten-free bread, cereals,    pastas and crackers without    honey, apple/pear juice, agave    or HFCS  -[place]amaste Food Perfect Flour  Blend or Ahmed Michelle Gluten Free  -Multi-Purpose Flour   Legumes -Chickpeas, Hummus  -Kidney beans, Baked beans  -Edamame  -Lentils  -Soy milk -Tofu  -Peanuts   Nuts and Seeds -Pistachios -10-15 max or 1-2 tablespoons of Almonds, Macadamia, Pecans,  Pine nuts, Walnuts, Pumpkin  Seed, Sesame seeds, and  Sunflower seeds   Sweeteners -Honey  -Agave  -High fructose corn syrup  -Sorbitol, Mannitol, Xylitol,  Maltitol  -Splenda (may alter friendly  gut latha) -Sugar  -Glucose, Sucrose  -Pure maple syrup  -Aspartame   Additives -Inulin, found in yogurt, nae, cereals, and other foods with added fiber  -FOS    (fructo- oligosaccharides)  -Sugar alcohols (see sweeteners)  -Chicory root    Alcohol -Rum -Wine, Beer  -Vodka, Gin  -Limit to one serving as all  alcohol is a gastric irritant   Protein-rich food  -Fish, Chicken, [place], Eggs,  Meat   Fat-rich food  -Olive and canola oil  -Olives  -<1/4 avocado      Sample Low FODMAPs Diet Menu:     Breakfast  [place] Corn Flakes or oats, with rice or almond milk, banana and 1 tablespoon sliced almonds  [employer] or [employer] oatmeal with 1 tablespoon dried fruit and nuts  Quinoa flakes with rice or almond milk, 3/4 cup strawberries, and 1 tablespoon pecans     Lunch  Temo's white bread sandwich with sliced turkey, lettuce or spinach leaves, tomato, sliced cheddar cheese and [place] lactose-free vanilla yogurt, 1/2 cup blueberries and baby carrots  Stir boogie with brown rice or rice noodles, chicken, shrimp, or beef, peppers and Plantiga, ask for no onion or garlic and the sauce on the side  Fruit salad with 1 cup (total) low FODMAP fruits, kiwi, strawberries and blueberries, spinach salad with lemon dressing and cherry tomatoes, and brown rice cakes with natural almond butter     Snack  Glutino pretzels or Blue Phyllis [place] Nut thins and mozzarella string cheese  Hard boiled egg and cherry tomatoes  Pumpkin seeds  Brown rice cakes with natural peanut butter  Banana and handful almonds  1 stick celery with natural almond butter or,  Carrots and red pepper dipped in [employer] chicken or salmon with baked sweet potato with olive oil or butter, sauteed spinach and red peppers seasoned with green parts of onion, salt, pepper, handful of pine nuts and olive oil, and a kiwi  Mamta's baked potato and a side salad with chicken, bring your own homemade salad dressing that does not contain garlic or onion  Kayden

## 2023-09-06 NOTE — PROGRESS NOTES
Name: Ludy Griffiths      : 1955      MRN: [id number]  Encounter Provider: Qiana Pineda MD  Encounter Date: 2023   Encounter department: [address] [place] [address]     1. Chronic idiopathic constipation  Assessment & Plan:  I reviewed with pt. Encouraged stool softeners ,hydration and other conservative measures. Recheck 3m      2. IPMN (intraductal papillary mucinous neoplasm)  Assessment & Plan: For repeat MRI. F/u with GI. Recheck 3m - earlier if MRI shows that the lesion is worse      3. Mixed hyperlipidemia  Assessment & Plan:  I reviewed with pt. ASCVD 10y risk >17%. Start rosuvastatin 5mg qd. Recheck lipid panel in 2-3m. Orders:  -     rosuvastatin (CRESTOR) 5 mg tablet; Take 1 tablet (5 mg total) by mouth daily    4. Chronic bronchitis, unspecified chronic bronchitis type ([address])  Assessment & Plan:  Still smoking. Continue present care. Encouraged smoking cessation. Recheck 3m           Subjective     f/u multiple med issues  - pt had negative stress echo since last visit. Pt refused dobutamine and did a treadmill test instead. Pt did achieve HR >90% target, but pt only achieved 5.3 METs. Pt denies substernal CP, shoulder discomfort or jaw discomfort with or without exertion. - pt continues to smoke. She denies worsening SOB. She is due to see Pulm later this month  - pt to have MRI pf the pancreas next week. She has persistent intermittent epigastric pain as well as constipation   - pt denies any other new concerns    Review of Systems   Constitutional: Positive for fatigue (intermittent). Negative for activity change, appetite change, chills and fever. HENT: Negative. Eyes: Negative. Respiratory: Positive for shortness of breath (intermittent with exertion) and wheezing (intermittent). Cardiovascular: Negative for chest pain, palpitations and leg swelling.    Gastrointestinal: Positive for abdominal pain (intermittent epigastric) and constipation. Negative for abdominal distention, diarrhea, nausea and vomiting. Genitourinary: Negative. Musculoskeletal: Positive for arthralgias, back pain and myalgias. Negative for gait problem. Skin: Negative. Neurological: Negative for dizziness, light-headedness and headaches. Past Medical History:   Diagnosis Date   • Acid reflux    • Back injury    • COPD (chronic obstructive pulmonary disease) (HCC)    • Fibromyalgia    • Hypertension    • Seasonal allergies      Past Surgical History:   Procedure Laterality Date   • COLONOSCOPY     • EYE SURGERY     • KNEE SURGERY  1998   • NM COLONOSCOPY FLX DX W/COLLJ SPEC WHEN PFRMD N/A 05/09/2017    Procedure: EGD AND COLONOSCOPY;  Surgeon: Annalee Rawls MD;  Location: [facility]; Service: Gastroenterology     Family History   Problem Relation Age of Onset   • Pancreatic cancer Mother 67   • Coronary artery disease Father    • Diabetes Father    • Hypertension Father    • Heart disease Father    • Lung cancer Sister 40   • No Known Problems Daughter    • No Known Problems Maternal Grandmother    • Lung cancer Maternal Grandfather 68   • Diabetes Paternal Grandmother    • No Known Problems Paternal Grandfather    • Pancreatic cancer Brother    • Pancreatic cancer Brother    • No Known Problems Son    • No Known Problems Son      Social History     Socioeconomic History   • Marital status:       Spouse name: None   • Number of children: 3   • Years of education: less than high school   • Highest education level: None   Occupational History   • Occupation: unemployed   Tobacco Use   • Smoking status: Every Day     Packs/day: 1.50     Years: 50.00     Total pack years: 75.00     Types: Cigarettes     Start date: 1968     Passive exposure: Past   • Smokeless tobacco: Never   Vaping Use   • Vaping Use: Never used   Substance and Sexual Activity   • Alcohol use: Never   • Drug use: No   • Sexual activity: Not Currently   Other Topics Concern   • None Social History Narrative    Always uses seatbelt    Daily coffee consumption    Daily tea consumption    Dental care regularly    Exercises regularly    Multiple organ donor    No guns in the home    No living will    Denies pets/ animals in the home    Power of  in existence- denied         Social Determinants of Health     Financial Resource Strain: Low Risk  (7/12/2023)    Overall Financial Resource Strain (CARDIA)    • Difficulty of Paying Living Expenses: Not hard at all   Food Insecurity: Not on file   Transportation Needs: No Transportation Needs (7/12/2023)    PRAPARE - Transportation    • Lack of Transportation (Medical): No    • Lack of Transportation (Non-Medical): No   Physical Activity: Not on file   Stress: Not on file   Social Connections: Not on file   Intimate Partner Violence: Not on file   Housing Stability: Not on file     Current Outpatient Medications on File Prior to Visit   Medication Sig   • acetaminophen (TYLENOL) 650 mg CR tablet Take by mouth every 8 (eight) hours as needed     • albuterol (PROVENTIL HFA,VENTOLIN HFA) 90 mcg/act inhaler Inhale 2 puffs every 6 (six) hours as needed for wheezing   • ALPRAZolam (XANAX) 0.25 mg tablet Take 1 tablet (0.25 mg total) by mouth 3 (three) times a day as needed for anxiety   • amLODIPine (NORVASC) 5 mg tablet TAKE 1 TABLET (5 MG TOTAL) BY MOUTH DAILY.    • BABY ASPIRIN PO Take by mouth   • cetirizine (ZyrTEC) 10 mg tablet TAKE 1 TABLET BY MOUTH EVERY DAY   • Cholecalciferol (VITAMIN D3 PO) Take by mouth   • Diclofenac Sodium (VOLTAREN) 1 % Apply 2 g topically 4 (four) times a day   • escitalopram (LEXAPRO) 10 mg tablet TAKE 1 TABLET BY MOUTH EVERY DAY   • lidocaine (Lidoderm) 5 % Apply 1 patch topically daily Remove & Discard patch within 12 hours or as directed by MD   • lisinopril (ZESTRIL) 20 mg tablet TAKE 1 TABLET BY MOUTH EVERY DAY   • MINOXIDIL, TOPICAL, 5 % SOLN Apply 1 application topically in the morning For hair   • montelukast (SINGULAIR) 10 mg tablet TAKE 1 TABLET BY MOUTH EVERYDAY AT BEDTIME   • nystatin (MYCOSTATIN) 500,000 units/5 mL suspension Take 5 mL (500,000 Units total) by mouth 4 (four) times a day   • pantoprazole (PROTONIX) 40 mg tablet TAKE 1 TABLET BY MOUTH TWICE A DAY   • promethazine-dextromethorphan (PHENERGAN-DM) 6.25-15 mg/5 mL oral syrup Take 5 mL by mouth 4 (four) times a day as needed for cough   • Trelegy Ellipta 200-62.5-25 MCG/ACT AEPB inhaler INHALE 1 PUFF DAILY RINSE MOUTH AFTER USE. • albuterol (2.5 mg/3 mL) 0.083 % nebulizer solution Take 3 mL (2.5 mg total) by nebulization every 6 (six) hours as needed for wheezing or shortness of breath (Patient not taking: Reported on 9/6/2023)     Allergies   Allergen Reactions   • Augmentin [Amoxicillin-Pot Clavulanate] Vomiting   • Miconazole Itching and Swelling   • Wixela Inhub [Fluticasone-Salmeterol] Palpitations     Immunization History   Administered Date(s) Administered   • Tdap 02/28/2008       Objective     /76 (BP Location: Left arm, Patient Position: Sitting, Cuff Size: Adult)   Pulse 83   Temp (!) 97.2 °F (36.2 °C)   Ht 5' 4" (1.626 m)   Wt 87.7 kg (193 lb 6.4 oz)   LMP  (LMP Unknown)   SpO2 98%   BMI 33.20 kg/m²     Physical Exam  Vitals reviewed. HENT:      Head: Normocephalic. Mouth/Throat:      Mouth: Mucous membranes are moist.   Eyes:      General: No scleral icterus. Extraocular Movements: Extraocular movements intact. Conjunctiva/sclera: Conjunctivae normal.      Pupils: Pupils are equal, round, and reactive to light. Cardiovascular:      Rate and Rhythm: Normal rate and regular rhythm. Pulmonary:      Effort: Pulmonary effort is normal.      Breath sounds: No wheezing or rales. Abdominal:      General: There is no distension. Palpations: There is no mass. Tenderness: There is no abdominal tenderness. Musculoskeletal:         General: Tenderness (mild parathoracic spine) present.       Cervical back: Normal range of motion. No tenderness. Right lower leg: No edema. Left lower leg: No edema. Lymphadenopathy:      Cervical: No cervical adenopathy. Skin:     General: Skin is warm. Capillary Refill: Capillary refill takes less than 2 seconds. Neurological:      General: No focal deficit present. Mental Status: She is oriented to person, place, and time.        Justen Ac MD

## 2023-09-07 NOTE — ASSESSMENT & PLAN NOTE
I reviewed with pt. Encouraged stool softeners ,hydration and other conservative measures.  Recheck 3m

## 2023-09-08 ENCOUNTER — VBI (OUTPATIENT)
Dept: ADMINISTRATIVE | Facility: OTHER | Age: 68
End: 2023-09-08

## 2023-09-08 DIAGNOSIS — I10 ESSENTIAL HYPERTENSION: ICD-10-CM

## 2023-09-08 RX ORDER — LISINOPRIL 20 MG/1
TABLET ORAL
Qty: 90 TABLET | Refills: 1 | Status: SHIPPED | OUTPATIENT
Start: 2023-09-08

## 2023-09-09 DIAGNOSIS — J42 CHRONIC BRONCHITIS, UNSPECIFIED CHRONIC BRONCHITIS TYPE (HCC): ICD-10-CM

## 2023-09-11 ENCOUNTER — HOSPITAL ENCOUNTER (OUTPATIENT)
Dept: MRI IMAGING | Facility: HOSPITAL | Age: 68
Discharge: HOME | End: 2023-09-11
Attending: STUDENT IN AN ORGANIZED HEALTH CARE EDUCATION/TRAINING PROGRAM
Payer: COMMERCIAL

## 2023-09-11 ENCOUNTER — TELEPHONE (OUTPATIENT)
Dept: FAMILY MEDICINE CLINIC | Facility: CLINIC | Age: 68
End: 2023-09-11

## 2023-09-11 DIAGNOSIS — J42 CHRONIC BRONCHITIS, UNSPECIFIED CHRONIC BRONCHITIS TYPE (HCC): ICD-10-CM

## 2023-09-11 DIAGNOSIS — D49.0 IPMN (INTRADUCTAL PAPILLARY MUCINOUS NEOPLASM): ICD-10-CM

## 2023-09-11 DIAGNOSIS — L02.92 BOIL: Primary | ICD-10-CM

## 2023-09-11 PROCEDURE — 74183 MRI ABD W/O CNTR FLWD CNTR: CPT

## 2023-09-11 PROCEDURE — G1004 CDSM NDSC: HCPCS

## 2023-09-11 PROCEDURE — A9585 GADOBUTROL INJECTION: HCPCS | Performed by: STUDENT IN AN ORGANIZED HEALTH CARE EDUCATION/TRAINING PROGRAM

## 2023-09-11 RX ORDER — FLUTICASONE FUROATE, UMECLIDINIUM BROMIDE AND VILANTEROL TRIFENATATE 200; 62.5; 25 UG/1; UG/1; UG/1
POWDER RESPIRATORY (INHALATION)
Qty: 60 EACH | Refills: 0 | Status: CANCELLED | OUTPATIENT
Start: 2023-09-11

## 2023-09-11 RX ORDER — GADOBUTROL 604.72 MG/ML
8 INJECTION INTRAVENOUS
Status: COMPLETED | OUTPATIENT
Start: 2023-09-11 | End: 2023-09-11

## 2023-09-11 RX ORDER — SULFAMETHOXAZOLE AND TRIMETHOPRIM 800; 160 MG/1; MG/1
1 TABLET ORAL EVERY 12 HOURS SCHEDULED
Qty: 20 TABLET | Refills: 0 | Status: SHIPPED | OUTPATIENT
Start: 2023-09-11 | End: 2023-09-21

## 2023-09-11 RX ORDER — FLUTICASONE FUROATE, UMECLIDINIUM BROMIDE AND VILANTEROL TRIFENATATE 200; 62.5; 25 UG/1; UG/1; UG/1
POWDER RESPIRATORY (INHALATION)
Qty: 60 EACH | Refills: 5 | Status: SHIPPED | OUTPATIENT
Start: 2023-09-11

## 2023-09-11 RX ADMIN — GADOBUTROL 8 ML: 604.72 INJECTION INTRAVENOUS at 14:22

## 2023-09-11 NOTE — TELEPHONE ENCOUNTER
Message left on outlook. Hi, my name is Eve Menjivar. My phone number is [phone]. I'm a patient of Doctor Rosario. I'm calling to see if he can give me an antibiotic for a boil in my groin area. He had given me bactrim before and it didn't really work, so I was hoping he could call in something. Thank you.

## 2023-09-11 NOTE — TELEPHONE ENCOUNTER
Called patient and advised, she said bactrim hasn't worked for her in the past. Advised patient to start it tonight and call in a couple days if no better

## 2023-09-12 DIAGNOSIS — L02.92 BOIL: Primary | ICD-10-CM

## 2023-09-12 RX ORDER — CEPHALEXIN 500 MG/1
500 CAPSULE ORAL EVERY 8 HOURS SCHEDULED
Qty: 21 CAPSULE | Refills: 0 | Status: SHIPPED | OUTPATIENT
Start: 2023-09-12 | End: 2023-09-19

## 2023-09-18 ENCOUNTER — TELEPHONE (OUTPATIENT)
Dept: HEMATOLOGY ONCOLOGY | Facility: CLINIC | Age: 68
End: 2023-09-18

## 2023-09-18 NOTE — TELEPHONE ENCOUNTER
Appointment Schedule   Who are you speaking with? Patient   If it is not the patient, are they listed on an active communication consent form? N/A   Which provider is the appointment scheduled with? Dr. Jt Galeana   At which location is the appointment scheduled for? [facility]   When is the appointment scheduled? Please list date and time 10/2/23 11:15am   What is the reason for this appointment? Follow up (r/s from 9/18/23)   Did patient voice understanding of the details of this appointment? Yes   Was the no show policy reviewed with patient? Yes       Patient called to reschedule appointment for 9/18/23 at 11:15am with Dr. Jt Galeana due to her MRI results still pending. MDD (major depressive disorder)

## 2023-09-22 ENCOUNTER — OFFICE VISIT (OUTPATIENT)
Dept: GASTROENTEROLOGY | Facility: AMBULARY SURGERY CENTER | Age: 68
End: 2023-09-22
Payer: COMMERCIAL

## 2023-09-22 VITALS
OXYGEN SATURATION: 98 % | SYSTOLIC BLOOD PRESSURE: 136 MMHG | DIASTOLIC BLOOD PRESSURE: 76 MMHG | HEART RATE: 86 BPM | WEIGHT: 195 LBS | BODY MASS INDEX: 33.29 KG/M2 | HEIGHT: 64 IN

## 2023-09-22 DIAGNOSIS — K52.9 COLITIS: Primary | ICD-10-CM

## 2023-09-22 DIAGNOSIS — K62.5 RECTAL BLEEDING: ICD-10-CM

## 2023-09-22 DIAGNOSIS — R10.84 GENERALIZED POSTPRANDIAL ABDOMINAL PAIN: ICD-10-CM

## 2023-09-22 PROCEDURE — 99214 OFFICE O/P EST MOD 30 MIN: CPT | Performed by: PHYSICIAN ASSISTANT

## 2023-09-22 NOTE — PROGRESS NOTES
Follow-up Note -  [facility]  Sotero Rodriguez 1955 79 y.o. female         Reason: Follow-up; recent emergency room presentation with rectal bleeding, postprandial abdominal pain and bloating    HPI: 71-year-old female with history of COPD, fibromyalgia who presents for follow-up; she is seen Dr. Thai Hallman in her office about a year ago in September 2022 for follow-up of chronic constipation, recent issues with diarrhea and MRI findings showing 8 mm cyst in the neck of the pancreas, which raise concern in the patient because of strong family history of pancreatic cancer in her mother and 2 brothers. Regarding her chronic constipation she expressed preference to not start Linzess or Amitiza so she was advised about MiraLAX and prune juice, regarding her pancreatic cyst she underwent EUS 9/2022 showing what appeared to be a main duct IPMN measuring 8 x 3 mm with no worrisome features. She actually just had another MRI 11 days ago showing apparent stability of this cyst.    Her last colonoscopy in June 2022 saw the removal of 8 polyps, most of which were hyperplastic but 1 in the ascending colon returned tubular adenoma so she was recommended for colonoscopy in 5 years. At this time, she follows up from a recent emergency room presentation in July (about 2 months ago), she said she had recently had a fall after slipping while trying to climb a pool ladder, was having complaint of headache since then, she had also noted bright red blood in the stool with clots since the day after that fall. Her CT scan on 7/6 showed mild wall thickening involving distal transverse colon and the descending colon. The patient tells me that she believes she was dehydrated around the time of symptom onset, she said at the pool party it was hot outside, she was not drinking very much, and she had not swam in 5 years.   She experienced significant abdominal cramping along with her rectal bleeding and clots per rectum with which she had presented to the emergency room. She is a smoker. She tells me she has not had any recurrent rectal bleeding episodes since hospitalization, she does however chronically experience postprandial abdominal pain and pressure-like sensation in the epigastric region, oftentimes with bloating, she can experience the symptoms some degree when she is not eating but it is definitely worsened with eating she tells me. She tells me she also struggles with constipation generally, she did start fish oil and cholesterol medication a week ago however and she says this seems to have actually helped with her bowel habits, she is having bowel movements almost every day at this point without much effort. REVIEW OF SYSTEMS:      CONSTITUTIONAL: Denies any fever, chills, or rigors. Good appetite, and no recent weight loss. HEENT: No earache or tinnitus. Denies hearing loss or visual disturbances. CARDIOVASCULAR: No chest pain or palpitations. RESPIRATORY: Denies any cough, hemoptysis, shortness of breath or dyspnea on exertion. GASTROINTESTINAL: As noted in the History of Present Illness. GENITOURINARY: No problems with urination. Denies any hematuria or dysuria. NEUROLOGIC: No dizziness or vertigo, denies headaches. MUSCULOSKELETAL: Denies any muscle or joint pain. SKIN: Denies skin rashes or itching. ENDOCRINE: Denies excessive thirst. Denies intolerance to heat or cold. PSYCHOSOCIAL: Denies depression or anxiety. Denies any recent memory loss.      Past Medical History:   Diagnosis Date   • Acid reflux    • Back injury    • COPD (chronic obstructive pulmonary disease) (HCC)    • Fibromyalgia    • Hypertension    • Seasonal allergies       Past Surgical History:   Procedure Laterality Date   • COLONOSCOPY     • EYE SURGERY     • KNEE SURGERY  1998   • NE COLONOSCOPY FLX DX W/COLLJ SPEC WHEN PFRMD N/A 05/09/2017    Procedure: EGD AND COLONOSCOPY;  Surgeon: Tunde Bruno MD;  Location: [facility];  Service: Gastroenterology     Social History     Socioeconomic History   • Marital status:      Spouse name: Not on file   • Number of children: 3   • Years of education: less than high school   • Highest education level: Not on file   Occupational History   • Occupation: unemployed   Tobacco Use   • Smoking status: Every Day     Packs/day: 1.50     Years: 50.00     Total pack years: 75.00     Types: Cigarettes     Start date: 1968     Passive exposure: Past   • Smokeless tobacco: Never   Vaping Use   • Vaping Use: Never used   Substance and Sexual Activity   • Alcohol use: Never   • Drug use: No   • Sexual activity: Not Currently   Other Topics Concern   • Not on file   Social History Narrative    Always uses seatbelt    Daily coffee consumption    Daily tea consumption    Dental care regularly    Exercises regularly    Multiple organ donor    No guns in the home    No living will    Denies pets/ animals in the home    Power of  in existence- denied         Social Determinants of Health     Financial Resource Strain: Low Risk  (7/12/2023)    Overall Financial Resource Strain (CARDIA)    • Difficulty of Paying Living Expenses: Not hard at all   Food Insecurity: Not on file   Transportation Needs: No Transportation Needs (7/12/2023)    PRAPARE - Transportation    • Lack of Transportation (Medical): No    • Lack of Transportation (Non-Medical):  No   Physical Activity: Not on file   Stress: Not on file   Social Connections: Not on file   Intimate Partner Violence: Not on file   Housing Stability: Not on file     Family History   Problem Relation Age of Onset   • Pancreatic cancer Mother 67   • Coronary artery disease Father    • Diabetes Father    • Hypertension Father    • Heart disease Father    • Lung cancer Sister 40   • No Known Problems Daughter    • No Known Problems Maternal Grandmother    • Lung cancer Maternal Grandfather 68   • Diabetes Paternal Grandmother    • No Known Problems Paternal Grandfather    • Pancreatic cancer Brother    • Pancreatic cancer Brother    • No Known Problems Son    • No Known Problems Son      Augmentin [amoxicillin-pot clavulanate], Miconazole, and Wixela inhub [fluticasone-salmeterol]  Current Outpatient Medications   Medication Sig Dispense Refill   • acetaminophen (TYLENOL) 650 mg CR tablet Take by mouth every 8 (eight) hours as needed       • albuterol (PROVENTIL HFA,VENTOLIN HFA) 90 mcg/act inhaler Inhale 2 puffs every 6 (six) hours as needed for wheezing 18 g 5   • ALPRAZolam (XANAX) 0.25 mg tablet Take 1 tablet (0.25 mg total) by mouth 3 (three) times a day as needed for anxiety 90 tablet 0   • amLODIPine (NORVASC) 5 mg tablet TAKE 1 TABLET (5 MG TOTAL) BY MOUTH DAILY.  90 tablet 1   • cetirizine (ZyrTEC) 10 mg tablet TAKE 1 TABLET BY MOUTH EVERY DAY 90 tablet 0   • Cholecalciferol (VITAMIN D3 PO) Take by mouth     • Diclofenac Sodium (VOLTAREN) 1 % Apply 2 g topically 4 (four) times a day 100 g 0   • escitalopram (LEXAPRO) 10 mg tablet TAKE 1 TABLET BY MOUTH EVERY DAY 90 tablet 1   • lidocaine (Lidoderm) 5 % Apply 1 patch topically daily Remove & Discard patch within 12 hours or as directed by MD 30 patch 1   • lisinopril (ZESTRIL) 20 mg tablet TAKE 1 TABLET BY MOUTH EVERY DAY 90 tablet 1   • MINOXIDIL, TOPICAL, 5 % SOLN Apply 1 application topically in the morning For hair     • montelukast (SINGULAIR) 10 mg tablet TAKE 1 TABLET BY MOUTH EVERYDAY AT BEDTIME 90 tablet 2   • nystatin (MYCOSTATIN) 500,000 units/5 mL suspension Take 5 mL (500,000 Units total) by mouth 4 (four) times a day 200 mL 0   • Omega-3 Fatty Acids (FISH OIL PO) Take by mouth     • pantoprazole (PROTONIX) 40 mg tablet TAKE 1 TABLET BY MOUTH TWICE A  tablet 1   • promethazine-dextromethorphan (PHENERGAN-DM) 6.25-15 mg/5 mL oral syrup Take 5 mL by mouth 4 (four) times a day as needed for cough 150 mL 0   • rosuvastatin (CRESTOR) 5 mg tablet Take 1 tablet (5 mg total) by mouth daily 30 tablet 5   • Trelegy Ellipta 200-62.5-25 MCG/ACT AEPB inhaler INHALE 1 PUFF DAILY RINSE MOUTH AFTER USE. 60 each 5   • albuterol (2.5 mg/3 mL) 0.083 % nebulizer solution Take 3 mL (2.5 mg total) by nebulization every 6 (six) hours as needed for wheezing or shortness of breath (Patient not taking: Reported on 9/6/2023) 180 mL 5   • BABY ASPIRIN PO Take by mouth (Patient not taking: Reported on 9/22/2023)       Current Facility-Administered Medications   Medication Dose Route Frequency Provider Last Rate Last Admin   • bupivacaine (PF) (MARCAINE) 0.25 % injection 1 mL  1 mL Intra-articular  Ora Stefany Garcia, DPM   1 mL at 05/11/23 1706   • triamcinolone acetonide (KENALOG-40) 40 mg/mL injection 20 mg  20 mg Intra-articular  Ora Stefany Garcia, DPM   20 mg at 05/11/23 1706       Blood pressure 136/76, pulse 86, height 5' 4" (1.626 m), weight 88.5 kg (195 lb), SpO2 98 %, not currently breastfeeding. PHYSICAL EXAM:      General Appearance:   Alert, cooperative, no distress, appears stated age    HEENT:   Normocephalic, atraumatic, anicteric.     Neck:  Supple, symmetrical, trachea midline, no adenopathy;    thyroid: no enlargement/tenderness/nodules; no carotid  bruit or JVD    Lungs:   Clear to auscultation bilaterally; no rales, rhonchi or wheezing; respirations unlabored    Heart[de-identified]   S1 and S2 normal; regular rate and rhythm; no murmur, rub, or gallop.    Abdomen:   Soft, non-tender, non-distended; normal bowel sounds; no masses, no organomegaly    Extremities: No edema, erythema, wounds, rashes   Rectal:   Deferred                      Lab Results   Component Value Date    WBC 11.83 (H) 07/06/2023    HGB 14.4 07/06/2023    HCT 43.0 07/06/2023    MCV 87 07/06/2023     07/06/2023     Lab Results   Component Value Date    GLUCOSE 86 10/08/2015    CALCIUM 9.8 07/06/2023     10/08/2015    K 3.7 07/06/2023    CO2 26 07/06/2023     07/06/2023    BUN 8 07/06/2023    CREATININE 0.49 (L) 07/06/2023 Lab Results   Component Value Date    ALT 12 07/06/2023    AST 15 07/06/2023    ALKPHOS 63 07/06/2023    BILITOT 0.39 10/08/2015     Lab Results   Component Value Date    INR 1.01 07/10/2019    PROTIME 13.3 07/10/2019       MRI abdomen w wo contrast and mrcp    Result Date: 9/19/2023  Impression: 8 mm pancreatic neck cyst compatible with a sidebranch IPMN, unchanged from 2022. Hepatomegaly. Mild hepatic steatosis. Workstation performed: [id number]       ASSESSMENT & PLAN:    Generalized postprandial abdominal pain  Patient has been experiencing postprandial abdominal pain and bloating which may well represent functional/dietary dyspepsia, she has had relatively recent endoscopic evaluations bidirectionally. However she also recently experienced an episode of what I suspect to have been ischemic colitis; acute onset of abdominal cramping and rectal bleeding with clots and found to have colitis involving the splenic flexure region of the colon, all correlated with an unusual degree of dehydration and exertion on the part of the patient.   Therefore some suspicion is raised for mesenteric ischemia, she is a smoker with risk factor for vascular disease    -We will check mesenteric Doppler study    -We will discuss with attending GI physician regarding the utility/risks and benefits of repeating colonoscopy; cannot exclude underlying colorectal lesion at this point given her recent presentation with rectal bleeding and CT scan findings, although it is felt to be highly unlikely to represent underlying adenoma or malignancy as her last colonoscopy was last year and well-prepped    -Continue with Protonix    -Advised patient about minimizing caffeine intake, minimizing intake of gas producing foods, patient was given information on FODMAP diet    -If mesenteric Doppler study is revealing for any significant large vessel stenotic/occlusive disease, we can give referral to vascular surgery for further management and follow-up    -I also suggested patient trial a soluble fiber supplement over-the-counter along with increased fluid intake, she should generally make it a point to avoid dehydration and excessive physical exertion; would also generally recommend taking care to avoid excessive antihypertensive therapy

## 2023-09-22 NOTE — ASSESSMENT & PLAN NOTE
Patient has been experiencing postprandial abdominal pain and bloating which may well represent functional/dietary dyspepsia, she has had relatively recent endoscopic evaluations bidirectionally. However she also recently experienced an episode of what I suspect to have been ischemic colitis; acute onset of abdominal cramping and rectal bleeding with clots and found to have colitis involving the splenic flexure region of the colon, all correlated with an unusual degree of dehydration and exertion on the part of the patient.   Therefore some suspicion is raised for mesenteric ischemia, she is a smoker with risk factor for vascular disease    -We will check mesenteric Doppler study    -We will discuss with attending GI physician regarding the utility/risks and benefits of repeating colonoscopy; cannot exclude underlying colorectal lesion at this point given her recent presentation with rectal bleeding and CT scan findings, although it is felt to be highly unlikely to represent underlying adenoma or malignancy as her last colonoscopy was last year and well-prepped    -Continue with Protonix    -Advised patient about minimizing caffeine intake, minimizing intake of gas producing foods, patient was given information on FODMAP diet    -If mesenteric Doppler study is revealing for any significant large vessel stenotic/occlusive disease, we can give referral to vascular surgery for further management and follow-up    -I also suggested patient trial a soluble fiber supplement over-the-counter along with increased fluid intake, she should generally make it a point to avoid dehydration and excessive physical exertion; would also generally recommend taking care to avoid excessive antihypertensive therapy

## 2023-09-25 ENCOUNTER — TELEPHONE (OUTPATIENT)
Age: 68
End: 2023-09-25

## 2023-09-25 ENCOUNTER — CLINICAL SUPPORT (OUTPATIENT)
Dept: FAMILY MEDICINE CLINIC | Facility: CLINIC | Age: 68
End: 2023-09-25
Payer: COMMERCIAL

## 2023-09-25 DIAGNOSIS — R68.89 FLU-LIKE SYMPTOMS: Primary | ICD-10-CM

## 2023-09-25 DIAGNOSIS — J34.89 SINUS PRESSURE: Primary | ICD-10-CM

## 2023-09-25 DIAGNOSIS — I10 PRIMARY HYPERTENSION: ICD-10-CM

## 2023-09-25 LAB
SARS-COV-2 AG UPPER RESP QL IA: NEGATIVE
VALID CONTROL: NORMAL

## 2023-09-25 PROCEDURE — 87811 SARS-COV-2 COVID19 W/OPTIC: CPT

## 2023-09-25 RX ORDER — AZITHROMYCIN 250 MG/1
TABLET, FILM COATED ORAL
Qty: 6 TABLET | Refills: 0 | Status: SHIPPED | OUTPATIENT
Start: 2023-09-25 | End: 2023-09-29

## 2023-09-25 NOTE — TELEPHONE ENCOUNTER
Patient called stated that yesterday she had a fever 100.3 and has been coughing with phlegm patient is asking for and ABX and she is refusing to go to the urgent care.   Oneida Baxter

## 2023-09-25 NOTE — TELEPHONE ENCOUNTER
Patient started on Friday with Runny nose/stuffy nose, fever, cough, post nasal drip, no sore throat, temp 100 all night. Patient denies shortness of breath. She thinks she has a bad cold. She does not have covid test at home. She would prefer if you just give her an antibiotic to knock it out.

## 2023-09-26 RX ORDER — AMLODIPINE BESYLATE 5 MG/1
5 TABLET ORAL DAILY
Qty: 90 TABLET | Refills: 1 | Status: SHIPPED | OUTPATIENT
Start: 2023-09-26

## 2023-09-29 ENCOUNTER — TELEPHONE (OUTPATIENT)
Age: 68
End: 2023-09-29

## 2023-09-29 NOTE — TELEPHONE ENCOUNTER
Patient stating she took her last dose of the azithromycin this morning and is still not feeling any better. She is still experiencing coughing, wheezing more so in her nose instead of her chest and just feels overall terrible still. Temperatures have been running 99.2-100.2 for the past week except for today. Patient requesting to have another antibiotic sent in.

## 2023-10-02 DIAGNOSIS — B37.0 THRUSH: ICD-10-CM

## 2023-10-02 DIAGNOSIS — J42 CHRONIC BRONCHITIS, UNSPECIFIED CHRONIC BRONCHITIS TYPE (HCC): ICD-10-CM

## 2023-10-03 ENCOUNTER — TELEPHONE (OUTPATIENT)
Age: 68
End: 2023-10-03

## 2023-10-03 DIAGNOSIS — J42 CHRONIC BRONCHITIS, UNSPECIFIED CHRONIC BRONCHITIS TYPE (HCC): Primary | ICD-10-CM

## 2023-10-03 RX ORDER — CEFDINIR 300 MG/1
300 CAPSULE ORAL EVERY 12 HOURS SCHEDULED
Qty: 14 CAPSULE | Refills: 0 | Status: SHIPPED | OUTPATIENT
Start: 2023-10-03 | End: 2023-10-10

## 2023-10-03 NOTE — TELEPHONE ENCOUNTER
[employer] message sent attached to rx refill request for cough med to call office for update prior to refill.

## 2023-10-03 NOTE — TELEPHONE ENCOUNTER
Patient calls in states she was on a Z pack and up until yesterday has had a fever up until yesterday . Patient request a different medication be sent in as the [employer] is not working. Denies chest pain or SOB. Complains of continues cough & just not feeling well.

## 2023-10-03 NOTE — TELEPHONE ENCOUNTER
Patient called again and states the office called her again. Please call this patient back again. She's unclear about whether or not medications are being sent in.

## 2023-10-04 NOTE — TELEPHONE ENCOUNTER
See other message. These were to be sent in with the [facility] can you please approve.

## 2023-10-04 NOTE — TELEPHONE ENCOUNTER
Pt called regarding the promethazine and nystatin. These meds are still pending approval. Please review.

## 2023-10-05 RX ORDER — DEXTROMETHORPHAN HYDROBROMIDE AND PROMETHAZINE HYDROCHLORIDE 15; 6.25 MG/5ML; MG/5ML
5 SYRUP ORAL 4 TIMES DAILY PRN
Qty: 150 ML | Refills: 0 | Status: SHIPPED | OUTPATIENT
Start: 2023-10-05

## 2023-10-12 ENCOUNTER — TELEPHONE (OUTPATIENT)
Age: 68
End: 2023-10-12

## 2023-10-12 ENCOUNTER — NURSE TRIAGE (OUTPATIENT)
Age: 68
End: 2023-10-12

## 2023-10-12 DIAGNOSIS — B37.31 CANDIDA VAGINITIS: Primary | ICD-10-CM

## 2023-10-12 RX ORDER — FLUCONAZOLE 150 MG/1
150 TABLET ORAL ONCE
Qty: 1 TABLET | Refills: 0 | Status: SHIPPED | OUTPATIENT
Start: 2023-10-12 | End: 2023-10-12

## 2023-10-12 NOTE — TELEPHONE ENCOUNTER
Please see triage note. Patient just finished two courses of antibiotics now having vaginal itching. Patient is requesting medication to be sent to pharmacy. Please advise.

## 2023-10-12 NOTE — TELEPHONE ENCOUNTER
Reason for Disposition   Symptoms of a yeast infection (i.e., itchy, white discharge, not bad smelling) and feels like prior vaginal yeast infections    Answer Assessment - Initial Assessment Questions  1. SYMPTOM: "What's the main symptom you're concerned about?" (e.g., pain, itching, dryness)      Itching   2. LOCATION: "Where is the   located?" (e.g., inside/outside, left/right)     Vaginal   3. ONSET: "When did the  start?"      yesterday  4. PAIN: "Is there any pain?" If Yes, ask: "How bad is it?" (Scale: 1-10; mild, moderate, severe)      0  5. ITCHING: "Is there any itching?" If Yes, ask: "How bad is it?" (Scale: 1-10; mild, moderate, severe)      Yes. 10  6. CAUSE: "What do you think is causing the discharge?" "Have you had the same problem before? What happened then?"      Just finished course of two antibiotics   7. OTHER SYMPTOMS: "Do you have any other symptoms?" (e.g., fever, itching, vaginal bleeding, pain with urination, injury to genital area, vaginal foreign body)      no    Protocols used: Vaginal Symptoms-ADULT-[place]  Patient would like medication to be sent to pharmacy.

## 2023-10-23 DIAGNOSIS — F41.9 ANXIETY: ICD-10-CM

## 2023-10-23 DIAGNOSIS — J30.9 ALLERGIC RHINITIS, UNSPECIFIED SEASONALITY, UNSPECIFIED TRIGGER: ICD-10-CM

## 2023-10-23 RX ORDER — ALPRAZOLAM 0.25 MG/1
0.25 TABLET ORAL 3 TIMES DAILY PRN
Qty: 90 TABLET | Refills: 0 | Status: SHIPPED | OUTPATIENT
Start: 2023-10-23

## 2023-10-23 RX ORDER — CETIRIZINE HYDROCHLORIDE 10 MG/1
10 TABLET ORAL DAILY
Qty: 90 TABLET | Refills: 1 | Status: SHIPPED | OUTPATIENT
Start: 2023-10-23

## 2023-10-23 NOTE — TELEPHONE ENCOUNTER
1 [id number] 03/28/2023 03/28/2023 ACETAMINOPHEN 325 MG / HYDROcodone BITARTRATE 5 MG ORAL TABLET (Tablet) 60.0 15 5.0 MG/325.0 MG 20.0 AYLEEN MADDEN, [facility] Medicare 0 / 0 [place]    1 [id number] 02/17/2023 02/17/2023 ACETAMINOPHEN 325 MG / HYDROcodone BITARTRATE 5 MG ORAL TABLET (Tablet) 60.0 15 5.0 MG/325.0 MG 20.0 [facility] Medicare 0 / 0 PA    1 [id number] 02/14/2023 02/14/2023 ALPRAZolam (Tablet) 90.0 30 0.25 MG NA AYLEEN MADDEN [facility] Medicare 0 / 0 PA    1 [id number] 01/19/2023 01/19/2023 ACETAMINOPHEN 325 MG / HYDROcodone BITARTRATE 5 MG ORAL TABLET (Tablet) 60.0 15 5.0 MG/325.0 MG 20.0 AYLEEN MADDEN [facility] Medicare 0 / 0 PA    1 [id number] 12/14/2022 12/13/2022 ACETAMINOPHEN 325 MG / HYDROcodone BITARTRATE 5 MG ORAL TABLET (Tablet) 60.0 15 5.0 MG/325.0 MG 20.0 AYLEEN MADDEN [facility] Medicare 0 / 0 PA    1 [id number] 11/14/2022 11/14/2022 ACETAMINOPHEN 325 MG / HYDROcodone BITARTRATE 5 MG ORAL TABLET (Tablet) 60.0 15 5.0 MG/325.0 MG 20.0 AYLEEN MADDEN [facility]  Medicare 0 / 0 [place]

## 2023-10-24 ENCOUNTER — CLINICAL SUPPORT (OUTPATIENT)
Dept: FAMILY MEDICINE CLINIC | Facility: CLINIC | Age: 68
End: 2023-10-24
Payer: COMMERCIAL

## 2023-10-24 ENCOUNTER — TELEPHONE (OUTPATIENT)
Age: 68
End: 2023-10-24

## 2023-10-24 DIAGNOSIS — U07.1 COVID: Primary | ICD-10-CM

## 2023-10-24 DIAGNOSIS — J22 LOWER RESPIRATORY INFECTION: Primary | ICD-10-CM

## 2023-10-24 DIAGNOSIS — J42 CHRONIC BRONCHITIS, UNSPECIFIED CHRONIC BRONCHITIS TYPE (HCC): ICD-10-CM

## 2023-10-24 LAB
SARS-COV-2 AG UPPER RESP QL IA: POSITIVE
VALID CONTROL: ABNORMAL

## 2023-10-24 PROCEDURE — 87811 SARS-COV-2 COVID19 W/OPTIC: CPT

## 2023-10-24 RX ORDER — DEXTROMETHORPHAN HYDROBROMIDE AND PROMETHAZINE HYDROCHLORIDE 15; 6.25 MG/5ML; MG/5ML
5 SYRUP ORAL 4 TIMES DAILY PRN
Qty: 150 ML | Refills: 0 | Status: SHIPPED | OUTPATIENT
Start: 2023-10-24

## 2023-10-24 RX ORDER — NIRMATRELVIR AND RITONAVIR 300-100 MG
3 KIT ORAL 2 TIMES DAILY
Qty: 30 TABLET | Refills: 0 | Status: SHIPPED | OUTPATIENT
Start: 2023-10-24 | End: 2023-10-29

## 2023-10-24 NOTE — TELEPHONE ENCOUNTER
Patient called and states has 99.7 temp, runny/stuffed nose, body aches, sorethroat for 1 day. Offered appointment and [facility] has no ride or car at this time. Patient wanted to know if medicare is covering Covid tests at the pharmacy. If medication can be sent to [facility] in [place].  Questions patient can be reached at [phone]

## 2023-10-24 NOTE — TELEPHONE ENCOUNTER
Patient states that her temp 98.7. Started with scratchy throat yesterday. She has burning in nose, PND, bodyaches and pains, chills, no nausea, vomiting, diarrhea. Productive cough with occasionally yellow tinge. Mucous is thin, but getting getting thicker as the day goes on. Yes ear pain/ yes headache. She is coming in a red minivan for rapid covid test as she was in hospital waiting room about a week ago.

## 2023-10-24 NOTE — PROGRESS NOTES
Patient came in today for COVID test.  She has had symptoms for the last 2 days. COVID test was positive. Med list and recent labs reviewed. Patient will start on Paxlovid 3 tablets twice daily. She is instructed to hold her Crestor and her alprazolam while on this medication. She will also watch for low blood pressure while on amlodipine but this medication. Start Promethazine DM for cough.   Patient will call Friday with [id number] if symptoms should worsen

## 2023-10-26 ENCOUNTER — TELEPHONE (OUTPATIENT)
Age: 68
End: 2023-10-26

## 2023-10-26 NOTE — TELEPHONE ENCOUNTER
Patient called to report her blood pressure. States her PCP suggest she check it since she is on the antiviral medication for covid.  - reading is 136/66

## 2023-10-26 NOTE — TELEPHONE ENCOUNTER
Pt aware. She wanted you to know currently she is taking only the Lisinipril. She is asking for a range of what readings you are looking for to be considered to low so if it is after hours she has an idea.  She said Diastolic is low to her so she would like an idea of ranges

## 2023-11-13 ENCOUNTER — OFFICE VISIT (OUTPATIENT)
Dept: FAMILY MEDICINE CLINIC | Facility: CLINIC | Age: 68
End: 2023-11-13
Payer: COMMERCIAL

## 2023-11-13 VITALS
OXYGEN SATURATION: 98 % | HEIGHT: 64 IN | BODY MASS INDEX: 33.29 KG/M2 | DIASTOLIC BLOOD PRESSURE: 84 MMHG | HEART RATE: 83 BPM | SYSTOLIC BLOOD PRESSURE: 124 MMHG | TEMPERATURE: 98.1 F | WEIGHT: 195 LBS

## 2023-11-13 DIAGNOSIS — I10 PRIMARY HYPERTENSION: Primary | ICD-10-CM

## 2023-11-13 DIAGNOSIS — H92.01 OTALGIA OF RIGHT EAR: ICD-10-CM

## 2023-11-13 DIAGNOSIS — K92.1 HEMATOCHEZIA: ICD-10-CM

## 2023-11-13 DIAGNOSIS — D49.0 IPMN (INTRADUCTAL PAPILLARY MUCINOUS NEOPLASM): ICD-10-CM

## 2023-11-13 PROCEDURE — 99214 OFFICE O/P EST MOD 30 MIN: CPT | Performed by: FAMILY MEDICINE

## 2023-11-13 RX ORDER — AMOXICILLIN 875 MG/1
875 TABLET, COATED ORAL 2 TIMES DAILY
Qty: 20 TABLET | Refills: 0 | Status: SHIPPED | OUTPATIENT
Start: 2023-11-13 | End: 2023-11-23

## 2023-11-13 NOTE — PROGRESS NOTES
Name: Duane Adams      : 1955      MRN: [id number]  Encounter Provider: Arun Ash MD  Encounter Date: 2023   Encounter department: [address] [place] [address]     1. Primary hypertension  Assessment & Plan:  Well controlled. Cont present treatment. Monitor labs. Recheck 6m      Orders:  -     Comprehensive metabolic panel; Future  -     Lipid panel; Future    2. IPMN (intraductal papillary mucinous neoplasm)  Assessment & Plan:  I reviewed with pt. Recent MRI unchanged. Continue to monitor. F/u with GI      3. Otalgia of right ear  Assessment & Plan:  I reviewed with pt. (+) shotty, sl tender ipsilateral adenopathy suggests inflammation/infection. Start amox as directed. Continue present care. Recheck 1w if not improved    Orders:  -     amoxicillin (AMOXIL) 875 mg tablet; Take 1 tablet (875 mg total) by mouth 2 (two) times a day for 10 days    4. Hematochezia  Assessment & Plan:  I reviewed with pt. GI concerned that pt could have experienced ischemic colitis. Await vascular study result. Recheck 1m           Subjective     f/u multiple med issues  - pt states that she has been having some anterior neck and R ear discomfort since last bout of COVID (10/24). Also remains very fatigued. Breathing stable. - pt still smoking approx 1ppd. Pt denies worsening SOB. She had to reschedule appt with Pulm due to COVID  - pt still having upper abd pain that can worsen with eating or prolonged standing. I reviewed recent GI note with patient. They were concerned given her last episode of abdominal pain associated with rectal bleeding and clotting could represent ischemic colitis. Patient is scheduled for celiac and mesenteric arterial duplex this Thursday. Patient has made dietary changes including decreasing caffeine as recommended by GI but has not noticed any improvement so far.   Recent MRI of the pancreas show that the neck cyst and IPMN have not changed compared to previous study. - pt denies any other new concerns      Review of Systems   Constitutional:  Positive for fatigue. Negative for chills and fever. HENT:  Positive for congestion and ear pain. Eyes: Negative. Respiratory:  Positive for shortness of breath (mild with exertion - unchanged). Negative for cough and wheezing. Cardiovascular:  Positive for leg swelling (mild). Negative for chest pain and palpitations. Gastrointestinal:  Positive for abdominal pain (intermittent), anal bleeding and blood in stool. Negative for abdominal distention, diarrhea and nausea. Genitourinary: Negative. Musculoskeletal:  Positive for arthralgias, back pain, myalgias and neck pain. Neurological:  Negative for dizziness, weakness, light-headedness, numbness and headaches. Past Medical History:   Diagnosis Date   • Acid reflux    • Back injury    • COPD (chronic obstructive pulmonary disease) (HCC)    • Fibromyalgia    • Hypertension    • Seasonal allergies      Past Surgical History:   Procedure Laterality Date   • COLONOSCOPY     • EYE SURGERY     • KNEE SURGERY  1998   • RI COLONOSCOPY FLX DX W/COLLJ SPEC WHEN PFRMD N/A 05/09/2017    Procedure: EGD AND COLONOSCOPY;  Surgeon: Simba Alonzo MD;  Location: [facility]; Service: Gastroenterology     Family History   Problem Relation Age of Onset   • Pancreatic cancer Mother 67   • Coronary artery disease Father    • Diabetes Father    • Hypertension Father    • Heart disease Father    • Lung cancer Sister 40   • No Known Problems Daughter    • No Known Problems Maternal Grandmother    • Lung cancer Maternal Grandfather 68   • Diabetes Paternal Grandmother    • No Known Problems Paternal Grandfather    • Pancreatic cancer Brother    • Pancreatic cancer Brother    • No Known Problems Son    • No Known Problems Son      Social History     Socioeconomic History   • Marital status:       Spouse name: None   • Number of children: 3   • Years of education: less than high school   • Highest education level: None   Occupational History   • Occupation: unemployed   Tobacco Use   • Smoking status: Every Day     Packs/day: 1.50     Years: 50.00     Total pack years: 75.00     Types: Cigarettes     Start date: 1968     Passive exposure: Past   • Smokeless tobacco: Never   Vaping Use   • Vaping Use: Never used   Substance and Sexual Activity   • Alcohol use: Never   • Drug use: No   • Sexual activity: Not Currently   Other Topics Concern   • None   Social History Narrative    Always uses seatbelt    Daily coffee consumption    Daily tea consumption    Dental care regularly    Exercises regularly    Multiple organ donor    No guns in the home    No living will    Denies pets/ animals in the home    Power of  in existence- denied         Social Determinants of Health     Financial Resource Strain: Low Risk  (7/12/2023)    Overall Financial Resource Strain (CARDIA)    • Difficulty of Paying Living Expenses: Not hard at all   Food Insecurity: Not on file   Transportation Needs: No Transportation Needs (7/12/2023)    PRAPARE - Transportation    • Lack of Transportation (Medical): No    • Lack of Transportation (Non-Medical): No   Physical Activity: Not on file   Stress: Not on file   Social Connections: Not on file   Intimate Partner Violence: Not on file   Housing Stability: Not on file     Current Outpatient Medications on File Prior to Visit   Medication Sig   • acetaminophen (TYLENOL) 650 mg CR tablet Take by mouth every 8 (eight) hours as needed     • albuterol (PROVENTIL HFA,VENTOLIN HFA) 90 mcg/act inhaler Inhale 2 puffs every 6 (six) hours as needed for wheezing   • ALPRAZolam (XANAX) 0.25 mg tablet Take 1 tablet (0.25 mg total) by mouth 3 (three) times a day as needed for anxiety   • amLODIPine (NORVASC) 5 mg tablet TAKE 1 TABLET (5 MG TOTAL) BY MOUTH DAILY.    • cetirizine (ZyrTEC) 10 mg tablet Take 1 tablet (10 mg total) by mouth daily   • Cholecalciferol (VITAMIN D3 PO) Take by mouth   • Diclofenac Sodium (VOLTAREN) 1 % Apply 2 g topically 4 (four) times a day   • escitalopram (LEXAPRO) 10 mg tablet TAKE 1 TABLET BY MOUTH EVERY DAY   • lidocaine (Lidoderm) 5 % Apply 1 patch topically daily Remove & Discard patch within 12 hours or as directed by MD   • lisinopril (ZESTRIL) 20 mg tablet TAKE 1 TABLET BY MOUTH EVERY DAY   • MINOXIDIL, TOPICAL, 5 % SOLN Apply 1 application topically in the morning For hair   • montelukast (SINGULAIR) 10 mg tablet TAKE 1 TABLET BY MOUTH EVERYDAY AT BEDTIME   • nystatin (MYCOSTATIN) 500,000 units/5 mL suspension Take 5 mL (500,000 Units total) by mouth 4 (four) times a day   • Omega-3 Fatty Acids (FISH OIL PO) Take 1,000 mg by mouth   • pantoprazole (PROTONIX) 40 mg tablet TAKE 1 TABLET BY MOUTH TWICE A DAY   • promethazine-dextromethorphan (PHENERGAN-DM) 6.25-15 mg/5 mL oral syrup Take 5 mL by mouth 4 (four) times a day as needed for cough   • rosuvastatin (CRESTOR) 5 mg tablet TAKE 1 TABLET (5 MG TOTAL) BY MOUTH DAILY. • Trelegy Ellipta 200-62.5-25 MCG/ACT AEPB inhaler INHALE 1 PUFF DAILY RINSE MOUTH AFTER USE. • albuterol (2.5 mg/3 mL) 0.083 % nebulizer solution Take 3 mL (2.5 mg total) by nebulization every 6 (six) hours as needed for wheezing or shortness of breath (Patient not taking: Reported on 9/6/2023)     Allergies   Allergen Reactions   • Augmentin [Amoxicillin-Pot Clavulanate] Vomiting   • Miconazole Itching and Swelling   • Wixela Inhub [Fluticasone-Salmeterol] Palpitations     Immunization History   Administered Date(s) Administered   • Tdap 02/28/2008       Objective     /84 (BP Location: Left arm, Patient Position: Sitting, Cuff Size: Large)   Pulse 83   Temp 98.1 °F (36.7 °C)   Ht 5' 4" (1.626 m)   Wt 88.5 kg (195 lb)   LMP  (LMP Unknown)   SpO2 98%   BMI 33.47 kg/m²     Physical Exam  Vitals reviewed. HENT:      Head: Normocephalic.       Right Ear: Tympanic membrane, ear canal and external ear normal.      Left Ear: Tympanic membrane, ear canal and external ear normal.      Nose: Nose normal.      Mouth/Throat:      Mouth: Mucous membranes are moist.   Eyes:      General: No scleral icterus. Extraocular Movements: Extraocular movements intact. Conjunctiva/sclera: Conjunctivae normal.      Pupils: Pupils are equal, round, and reactive to light. Cardiovascular:      Rate and Rhythm: Normal rate and regular rhythm. Pulmonary:      Effort: Pulmonary effort is normal.      Breath sounds: No wheezing or rales. Abdominal:      General: There is no distension. Palpations: There is no mass. Tenderness: There is no abdominal tenderness. Musculoskeletal:         General: Deformity (mild OA changes in hands) present. Cervical back: No tenderness. Right lower leg: No edema. Left lower leg: No edema. Lymphadenopathy:      Cervical: Cervical adenopathy (R shotty, sl tender) present. Skin:     Findings: No lesion. Neurological:      General: No focal deficit present. Mental Status: She is alert and oriented to person, place, and time.        Padmini Lindquist MD

## 2023-11-15 PROBLEM — H92.01 OTALGIA OF RIGHT EAR: Status: ACTIVE | Noted: 2023-11-15

## 2023-11-15 NOTE — ASSESSMENT & PLAN NOTE
I reviewed with pt. GI concerned that pt could have experienced ischemic colitis. Await vascular study result.  Recheck 1m

## 2023-11-15 NOTE — ASSESSMENT & PLAN NOTE
I reviewed with pt. (+) shotty, sl tender ipsilateral adenopathy suggests inflammation/infection. Start amox as directed. Continue present care.  Recheck 1w if not improved

## 2023-11-16 ENCOUNTER — HOSPITAL ENCOUNTER (OUTPATIENT)
Dept: VASCULAR ULTRASOUND | Facility: HOSPITAL | Age: 68
Discharge: HOME | End: 2023-11-16
Payer: COMMERCIAL

## 2023-11-16 ENCOUNTER — TELEPHONE (OUTPATIENT)
Age: 68
End: 2023-11-16

## 2023-11-16 DIAGNOSIS — K62.5 RECTAL BLEEDING: ICD-10-CM

## 2023-11-16 DIAGNOSIS — K52.9 COLITIS: ICD-10-CM

## 2023-11-16 DIAGNOSIS — R10.84 GENERALIZED POSTPRANDIAL ABDOMINAL PAIN: ICD-10-CM

## 2023-11-16 PROCEDURE — 93975 VASCULAR STUDY: CPT

## 2023-11-16 PROCEDURE — 93975 VASCULAR STUDY: CPT | Performed by: SURGERY

## 2023-11-16 NOTE — TELEPHONE ENCOUNTER
Patient called and states since on Amoxicillin has developed a yeast infection and would like something called to [facility] in [place].  Any questions can be reached at [phone]

## 2023-11-17 DIAGNOSIS — B37.31 CANDIDA VAGINITIS: Primary | ICD-10-CM

## 2023-11-17 RX ORDER — FLUCONAZOLE 150 MG/1
150 TABLET ORAL ONCE
Qty: 1 TABLET | Refills: 0 | Status: SHIPPED | OUTPATIENT
Start: 2023-11-17 | End: 2023-11-17

## 2023-12-08 ENCOUNTER — APPOINTMENT (OUTPATIENT)
Dept: LAB | Facility: MEDICAL CENTER | Age: 68
End: 2023-12-08
Payer: COMMERCIAL

## 2023-12-08 DIAGNOSIS — I10 PRIMARY HYPERTENSION: ICD-10-CM

## 2023-12-08 LAB
ALBUMIN SERPL BCP-MCNC: 4.3 G/DL (ref 3.5–5)
ALP SERPL-CCNC: 58 U/L (ref 34–104)
ALT SERPL W P-5'-P-CCNC: 14 U/L (ref 7–52)
ANION GAP SERPL CALCULATED.3IONS-SCNC: 7 MMOL/L
AST SERPL W P-5'-P-CCNC: 20 U/L (ref 13–39)
BILIRUB SERPL-MCNC: 0.33 MG/DL (ref 0.2–1)
BUN SERPL-MCNC: 9 MG/DL (ref 5–25)
CALCIUM SERPL-MCNC: 9.3 MG/DL (ref 8.4–10.2)
CHLORIDE SERPL-SCNC: 104 MMOL/L (ref 96–108)
CHOLEST SERPL-MCNC: 166 MG/DL
CO2 SERPL-SCNC: 31 MMOL/L (ref 21–32)
CREAT SERPL-MCNC: 0.55 MG/DL (ref 0.6–1.3)
GFR SERPL CREATININE-BSD FRML MDRD: 97 ML/MIN/1.73SQ M
GLUCOSE P FAST SERPL-MCNC: 88 MG/DL (ref 65–99)
HDLC SERPL-MCNC: 56 MG/DL
LDLC SERPL CALC-MCNC: 86 MG/DL (ref 0–100)
NONHDLC SERPL-MCNC: 110 MG/DL
POTASSIUM SERPL-SCNC: 3.7 MMOL/L (ref 3.5–5.3)
PROT SERPL-MCNC: 7.1 G/DL (ref 6.4–8.4)
SODIUM SERPL-SCNC: 142 MMOL/L (ref 135–147)
TRIGL SERPL-MCNC: 122 MG/DL

## 2023-12-08 PROCEDURE — 36415 COLL VENOUS BLD VENIPUNCTURE: CPT

## 2023-12-08 PROCEDURE — 80061 LIPID PANEL: CPT

## 2023-12-08 PROCEDURE — 80053 COMPREHEN METABOLIC PANEL: CPT

## 2023-12-11 ENCOUNTER — TELEPHONE (OUTPATIENT)
Dept: HEMATOLOGY ONCOLOGY | Facility: CLINIC | Age: 68
End: 2023-12-11

## 2023-12-11 NOTE — TELEPHONE ENCOUNTER
Appointment Schedule   Who are you speaking with? Patient   If it is not the patient, are they listed on an active communication consent form? N/A   Which provider is the appointment scheduled with? Dr. Javed Chery   At which location is the appointment scheduled for? [facility]   When is the appointment scheduled? Please list date and time 1/15/24 2:15pm   What is the reason for this appointment? 8 month follow up   Did patient voice understanding of the details of this appointment? Yes   Was the no show policy reviewed with patient? Yes       Patient would like to know if Dr. Javed Chery would still like to see her since her cyst did not grow. Patient scheduled appointment for now but would like a call back to discuss. [phone].

## 2023-12-14 ENCOUNTER — TELEPHONE (OUTPATIENT)
Age: 68
End: 2023-12-14

## 2023-12-27 ENCOUNTER — TELEPHONE (OUTPATIENT)
Age: 68
End: 2023-12-27

## 2023-12-27 DIAGNOSIS — L02.91 ABSCESS: Primary | ICD-10-CM

## 2023-12-27 RX ORDER — CEPHALEXIN 500 MG/1
500 CAPSULE ORAL EVERY 8 HOURS SCHEDULED
Qty: 30 CAPSULE | Refills: 0 | Status: SHIPPED | OUTPATIENT
Start: 2023-12-27 | End: 2024-01-06

## 2023-12-27 NOTE — TELEPHONE ENCOUNTER
Patient with recurrent boils states she has two in her groin area. She denies drainage, bleeding, and fever. She state in the past provider prescribed Cephalexin for her boils, she wants to know if this can be called in at this time.

## 2024-01-03 ENCOUNTER — NURSE TRIAGE (OUTPATIENT)
Age: 69
End: 2024-01-03

## 2024-01-03 NOTE — TELEPHONE ENCOUNTER
"Patient called in to report worsening cough with yellow-green mucus; fever 100.6 F last night; SOB, and wheezing. Using Trilegy, rescue inhaler, and nebulizer with some relief.  No OV available today; RN discussed OV with clerical staff for possible OV with no success. Patient advised to go to ER; patient refused and accepted OV with PCP for Thursday 1/4 at 10 AM.    Reason for Disposition   MILD difficulty breathing (e.g., minimal/no SOB at rest, SOB with walking, pulse <100) and still present when not coughing    Answer Assessment - Initial Assessment Questions  1. ONSET: \"When did the cough begin?\"       Before [place] 12/24/23  2. SEVERITY: \"How bad is the cough today?\"       Worsening; increased coughing with vomiting of mucus  3. SPUTUM: \"Describe the color of your sputum\" (none, dry cough; clear, white, yellow, green)      Clear to green-yellow  4. HEMOPTYSIS: \"Are you coughing up any blood?\" If so ask: \"How much?\" (flecks, streaks, tablespoons, etc.)      Denies  5. DIFFICULTY BREATHING: \"Are you having difficulty breathing?\" If Yes, ask: \"How bad is it?\" (e.g., mild, moderate, severe)     - MILD: No SOB at rest, mild SOB with walking, speaks normally in sentences, can lay down, no retractions, pulse < 100.     - MODERATE: SOB at rest, SOB with minimal exertion and prefers to sit, cannot lie down flat, speaks in phrases, mild retractions, audible wheezing, pulse 100-120.     - SEVERE: Very SOB at rest, speaks in single words, struggling to breathe, sitting hunched forward, retractions, pulse > 120       Shortness of breath with ambulation  6. FEVER: \"Do you have a fever?\" If Yes, ask: \"What is your temperature, how was it measured, and when did it start?\"      Last night 100.6 F forehead;   7. CARDIAC HISTORY: \"Do you have any history of heart disease?\" (e.g., heart attack, congestive heart failure)       Denies  8. LUNG HISTORY: \"Do you have any history of lung disease?\"  (e.g., pulmonary embolus, " "asthma, emphysema)      Current smoker, COPD with no oxygen at this time  9. PE RISK FACTORS: \"Do you have a history of blood clots?\" (or: recent major surgery, recent prolonged travel, bedridden)      Denies  10. OTHER SYMPTOMS: \"Do you have any other symptoms?\" (e.g., runny nose, wheezing, chest pain)        Fatigue, runny nose and wheezing occasional; chest muscles and back sore from coughting  11. PREGNANCY: \"Is there any chance you are pregnant?\" \"When was your last menstrual period?\"        Post menopausal  12. TRAVEL: \"Have you traveled out of the country in the last month?\" (e.g., travel history, exposures)        Denies    Protocols used: Cough-ADULT-[place]    "

## 2024-01-04 ENCOUNTER — OFFICE VISIT (OUTPATIENT)
Dept: PULMONOLOGY | Facility: CLINIC | Age: 69
End: 2024-01-04
Payer: COMMERCIAL

## 2024-01-04 ENCOUNTER — OFFICE VISIT (OUTPATIENT)
Dept: FAMILY MEDICINE CLINIC | Facility: CLINIC | Age: 69
End: 2024-01-04
Payer: COMMERCIAL

## 2024-01-04 VITALS
BODY MASS INDEX: 33.57 KG/M2 | TEMPERATURE: 98.2 F | SYSTOLIC BLOOD PRESSURE: 124 MMHG | DIASTOLIC BLOOD PRESSURE: 80 MMHG | HEART RATE: 84 BPM | OXYGEN SATURATION: 96 % | WEIGHT: 196.6 LBS | HEIGHT: 64 IN

## 2024-01-04 VITALS
HEART RATE: 86 BPM | SYSTOLIC BLOOD PRESSURE: 128 MMHG | WEIGHT: 196 LBS | TEMPERATURE: 98.9 F | DIASTOLIC BLOOD PRESSURE: 76 MMHG | OXYGEN SATURATION: 96 % | BODY MASS INDEX: 33.3 KG/M2

## 2024-01-04 DIAGNOSIS — F17.219 CIGARETTE NICOTINE DEPENDENCE WITH NICOTINE-INDUCED DISORDER: ICD-10-CM

## 2024-01-04 DIAGNOSIS — J44.1 COPD EXACERBATION (HCC): Primary | ICD-10-CM

## 2024-01-04 DIAGNOSIS — E66.9 OBESITY (BMI 30.0-34.9): ICD-10-CM

## 2024-01-04 DIAGNOSIS — J42 CHRONIC BRONCHITIS, UNSPECIFIED CHRONIC BRONCHITIS TYPE (HCC): Primary | ICD-10-CM

## 2024-01-04 DIAGNOSIS — R68.89 FLU-LIKE SYMPTOMS: ICD-10-CM

## 2024-01-04 LAB
SARS-COV-2 AG UPPER RESP QL IA: NEGATIVE
SL AMB POCT RAPID FLU A: NORMAL
SL AMB POCT RAPID FLU B: NORMAL
VALID CONTROL: NORMAL

## 2024-01-04 PROCEDURE — 87811 SARS-COV-2 COVID19 W/OPTIC: CPT | Performed by: FAMILY MEDICINE

## 2024-01-04 PROCEDURE — 87804 INFLUENZA ASSAY W/OPTIC: CPT | Performed by: FAMILY MEDICINE

## 2024-01-04 PROCEDURE — 99213 OFFICE O/P EST LOW 20 MIN: CPT | Performed by: FAMILY MEDICINE

## 2024-01-04 PROCEDURE — 99214 OFFICE O/P EST MOD 30 MIN: CPT | Performed by: INTERNAL MEDICINE

## 2024-01-04 RX ORDER — AZITHROMYCIN 250 MG/1
TABLET, FILM COATED ORAL
Qty: 6 TABLET | Refills: 0 | Status: SHIPPED | OUTPATIENT
Start: 2024-01-04 | End: 2024-01-08

## 2024-01-04 NOTE — PROGRESS NOTES
Name: Leanna Ruvalcaba      : 1955      MRN: [id number]  Encounter Provider: Matt Grider MD  Encounter Date: 2024   Encounter department: [facility]    Assessment & Plan     1. COPD exacerbation (HCC)  -     azithromycin (ZITHROMAX) 250 mg tablet; 2 tab today then 1 tab po qd x 4 d    2. Flu-like symptoms  -     POCT Rapid Covid Ag  -     POCT rapid flu A and B    Discussion:  I reviewed all with pt. Suspect bronchitis with COPD exacerbation as cause of her symptoms. Rapid flu and COVID tests were negative. Start Z-pack.  Continue present inhalers. Recheck Monday if not improving - earlier if worse.        Subjective     68-year-old female with a history of COPD developed a cough associate with postnasal drip and occasional rhinorrhea just before .  This remained fairly stable but not improving for the next week.  2 days ago, cough got a little bit worse and patient developed fever up to 100.6 associated with some chills.  Patient also noted slightly worse shortness of breath with exertion but no chest pains.  Patient notes that her back pain has worsened since she started coughing.  She has noted intermittent wheezing - she is compliant with Trelegy which has helped. Patient here for evaluation.      Review of Systems   Constitutional:  Positive for appetite change, chills, fatigue and fever.   HENT:  Positive for congestion. Negative for sinus pressure, sinus pain and sore throat.    Eyes: Negative.    Respiratory:  Positive for cough, shortness of breath and wheezing.    Cardiovascular: Negative.    Skin: Negative.        Past Medical History:   Diagnosis Date   • Acid reflux    • Back injury    • COPD (chronic obstructive pulmonary disease) (HCC)    • Fibromyalgia    • Hypertension    • Seasonal allergies      Past Surgical History:   Procedure Laterality Date   • COLONOSCOPY     • EYE SURGERY     • KNEE SURGERY     • [place] COLONOSCOPY FLX DX W/COLLJ SPEC  WHEN PFRMD N/A 05/09/2017    Procedure: EGD AND COLONOSCOPY;  Surgeon: Kimberly Zapata MD;  Location[facility];  Service: Gastroenterology     Family History   Problem Relation Age of Onset   • Pancreatic cancer Mother 72   • Coronary artery disease Father    • Diabetes Father    • Hypertension Father    • Heart disease Father    • Lung cancer Sister 37   • No Known Problems Daughter    • No Known Problems Maternal Grandmother    • Lung cancer Maternal Grandfather 77   • Diabetes Paternal Grandmother    • No Known Problems Paternal Grandfather    • Pancreatic cancer Brother    • Pancreatic cancer Brother    • No Known Problems Son    • No Known Problems Son      Social History     Socioeconomic History   • Marital status:      Spouse name: None   • Number of children: 3   • Years of education: less than high school   • Highest education level: None   Occupational History   • Occupation: unemployed   Tobacco Use   • Smoking status: Every Day     Current packs/day: 1.50     Average packs/day: 1.5 packs/day for 56.0 years (84.0 ttl pk-yrs)     Types: Cigarettes     Start date: 1968     Passive exposure: Past   • Smokeless tobacco: Never   Vaping Use   • Vaping status: Never Used   Substance and Sexual Activity   • Alcohol use: Never   • Drug use: No   • Sexual activity: Not Currently   Other Topics Concern   • None   Social History Narrative    Always uses seatbelt    Daily coffee consumption    Daily tea consumption    Dental care regularly    Exercises regularly    Multiple organ donor    No guns in the home    No living will    Denies pets/ animals in the home    Power of  in existence- denied         Social Determinants of Health     Financial Resource Strain: Low Risk  (7/12/2023)    Overall Financial Resource Strain (CARDIA)    • Difficulty of Paying Living Expenses: Not hard at all   Food Insecurity: Not on file   Transportation Needs: No Transportation Needs (7/12/2023)    PRAPARE -  Transportation    • Lack of Transportation (Medical): No    • Lack of Transportation (Non-Medical): No   Physical Activity: Not on file   Stress: Not on file   Social Connections: Not on file   Intimate Partner Violence: Not on file   Housing Stability: Not on file     Current Outpatient Medications on File Prior to Visit   Medication Sig   • acetaminophen (TYLENOL) 650 mg CR tablet Take by mouth every 8 (eight) hours as needed     • albuterol (2.5 mg/3 mL) 0.083 % nebulizer solution Take 3 mL (2.5 mg total) by nebulization every 6 (six) hours as needed for wheezing or shortness of breath   • albuterol (PROVENTIL HFA,VENTOLIN HFA) 90 mcg/act inhaler Inhale 2 puffs every 6 (six) hours as needed for wheezing   • ALPRAZolam (XANAX) 0.25 mg tablet Take 1 tablet (0.25 mg total) by mouth 3 (three) times a day as needed for anxiety   • amLODIPine (NORVASC) 5 mg tablet TAKE 1 TABLET (5 MG TOTAL) BY MOUTH DAILY.   • cephalexin (KEFLEX) 500 mg capsule Take 1 capsule (500 mg total) by mouth every 8 (eight) hours for 10 days   • cetirizine (ZyrTEC) 10 mg tablet Take 1 tablet (10 mg total) by mouth daily   • Cholecalciferol (VITAMIN D3 PO) Take by mouth   • Diclofenac Sodium (VOLTAREN) 1 % Apply 2 g topically 4 (four) times a day   • escitalopram (LEXAPRO) 10 mg tablet TAKE 1 TABLET BY MOUTH EVERY DAY   • lidocaine (Lidoderm) 5 % Apply 1 patch topically daily Remove & Discard patch within 12 hours or as directed by MD   • lisinopril (ZESTRIL) 20 mg tablet TAKE 1 TABLET BY MOUTH EVERY DAY   • MINOXIDIL, TOPICAL, 5 % SOLN Apply 1 application topically in the morning For hair   • montelukast (SINGULAIR) 10 mg tablet TAKE 1 TABLET BY MOUTH EVERYDAY AT BEDTIME   • nystatin (MYCOSTATIN) 500,000 units/5 mL suspension Take 5 mL (500,000 Units total) by mouth 4 (four) times a day   • Omega-3 Fatty Acids (FISH OIL PO) Take 1,000 mg by mouth   • pantoprazole (PROTONIX) 40 mg tablet TAKE 1 TABLET BY MOUTH TWICE A DAY   •  "promethazine-dextromethorphan (PHENERGAN-DM) 6.25-15 mg/5 mL oral syrup Take 5 mL by mouth 4 (four) times a day as needed for cough   • rosuvastatin (CRESTOR) 5 mg tablet TAKE 1 TABLET (5 MG TOTAL) BY MOUTH DAILY.   • Trelegy Ellipta 200-62.5-25 MCG/ACT AEPB inhaler INHALE 1 PUFF DAILY RINSE MOUTH AFTER USE.   • TURMERIC PO Take by mouth     Allergies   Allergen Reactions   • Augmentin [Amoxicillin-Pot Clavulanate] Vomiting   • Miconazole Itching and Swelling   • Wixela Inhub [Fluticasone-Salmeterol] Palpitations     Immunization History   Administered Date(s) Administered   • Tdap 02/28/2008       Objective     /80 (BP Location: Left arm, Patient Position: Sitting, Cuff Size: Large)   Pulse 84   Temp 98.2 °F (36.8 °C)   Ht 5' 4.33\" (1.634 m)   Wt 89.2 kg (196 lb 9.6 oz)   LMP  (LMP Unknown)   SpO2 96%   BMI 33.40 kg/m²     Physical Exam  Vitals reviewed.   HENT:      Head: Normocephalic.      Right Ear: Tympanic membrane, ear canal and external ear normal.      Left Ear: Tympanic membrane, ear canal and external ear normal.      Nose: Congestion (mild) present.      Mouth/Throat:      Mouth: Mucous membranes are moist.      Pharynx: No posterior oropharyngeal erythema.   Eyes:      Extraocular Movements: Extraocular movements intact.      Conjunctiva/sclera: Conjunctivae normal.      Pupils: Pupils are equal, round, and reactive to light.   Cardiovascular:      Rate and Rhythm: Normal rate and regular rhythm.      Pulses: Normal pulses.   Pulmonary:      Effort: Pulmonary effort is normal.      Breath sounds: No wheezing, rhonchi or rales.   Musculoskeletal:      Cervical back: No tenderness.      Right lower leg: No edema.      Left lower leg: No edema.   Lymphadenopathy:      Cervical: No cervical adenopathy.   Skin:     Capillary Refill: Capillary refill takes less than 2 seconds.   Neurological:      Mental Status: She is alert.       Matt Grider MD    "

## 2024-01-04 NOTE — PROGRESS NOTES
Pulmonary Follow Up Note   Leanna Ruvalcaba 68 y.o. female MRN: [id number]  1/4/2024    Assessment:    Chronic obstructive pulmonary disease (HCC)  Maintained well with Trelegy 200 QD and Albuterol PRN. Patient does not need albuterol. Last exacerbation was in 10/2023 needing oral steroid but no need for hospital admission.   Last PFT reviewed in 2020, with reactive bronchodilator response.   Discussed with stepping down trelegy to 100. Patient would like to keep the current dosage until the winter pass. She would like to try Trelegy 100 upon next refill of her medication     Cigarette nicotine dependence with nicotine-induced disorder  Leanna is still not interested in quitting smoking. She is smoking since age of 12 and maintains on 1pk/day. Currently cutting down because of recent fever but plans to go back smoking.   Up to date with her lung cancer screening. With last CT done in 8/2023.     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 Discussed smoking might be related to pancreatic cancer. Patient understood the risk and would not interested in quitting.   Detailed consult in smoking cessation and possible medication to quit smoking discussed.   Diagnoses and all orders for this visit:    No follow-ups on file.    History of Present Illness   HPI:  Leanna Ruvalcaba is a 68 y.o. female who presents for routine follow-up.  She has had no acute issues since her last appointment in 10/2022.  She has been noted to have a pancreatic cyst that she is due to discuss with her oncologist/surgery on 1/15. She had distended family hx of pancreatic cancer (mother and two brothers). She continues to smoke 1.5 packs per day and has no interest in quitting at the moment. She recently had fever (100.5F) and worsening cough that is resolving now. She went to see her PCP and was given z pack which she will  today. She is covid negative. She reports no other breathing issues.  She is taking  the Trelegy 200 one puff daily and essentially never needs to use any rescue medications. The only indication she might have albuterol to use one would be if she is cutting her grass, or doing something with significant environmental exposures to worsen her breathing.  We did discuss deescalating to the lower steroid dosing form of Trelegy to 100, but she would prefer to try it after winter time.   No other acute concerns.    Review of Systems   Respiratory:  Negative for cough, shortness of breath and wheezing.    All other systems reviewed and are negative.    Historical Information   Past Medical History:   Diagnosis Date    Acid reflux     Back injury     COPD (chronic obstructive pulmonary disease) (HCC)     Fibromyalgia     Hypertension     Seasonal allergies      Past Surgical History:   Procedure Laterality Date    COLONOSCOPY      EYE SURGERY      KNEE SURGERY  1998    [place] COLONOSCOPY FLX DX W/COLLJ SPEC WHEN PFRMD N/A 05/09/2017    Procedure: EGD AND COLONOSCOPY;  Surgeon: Kimberly Zapata MD;  Location[facility];  Service: Gastroenterology     Family History   Problem Relation Age of Onset    Pancreatic cancer Mother 72    Coronary artery disease Father     Diabetes Father     Hypertension Father     Heart disease Father     Lung cancer Sister 37    No Known Problems Daughter     No Known Problems Maternal Grandmother     Lung cancer Maternal Grandfather 77    Diabetes Paternal Grandmother     No Known Problems Paternal Grandfather     Pancreatic cancer Brother     Pancreatic cancer Brother     No Known Problems Son     No Known Problems Son        Meds/Allergies     Current Outpatient Medications:     acetaminophen (TYLENOL) 650 mg CR tablet, Take by mouth every 8 (eight) hours as needed  , Disp: , Rfl:     albuterol (2.5 mg/3 mL) 0.083 % nebulizer solution, Take 3 mL (2.5 mg total) by nebulization every 6 (six) hours as needed for wheezing or shortness of breath, Disp: 180 mL, Rfl: 5    albuterol  (PROVENTIL HFA,VENTOLIN HFA) 90 mcg/act inhaler, Inhale 2 puffs every 6 (six) hours as needed for wheezing, Disp: 18 g, Rfl: 5    ALPRAZolam (XANAX) 0.25 mg tablet, Take 1 tablet (0.25 mg total) by mouth 3 (three) times a day as needed for anxiety, Disp: 90 tablet, Rfl: 0    amLODIPine (NORVASC) 5 mg tablet, TAKE 1 TABLET (5 MG TOTAL) BY MOUTH DAILY., Disp: 90 tablet, Rfl: 1    cephalexin (KEFLEX) 500 mg capsule, Take 1 capsule (500 mg total) by mouth every 8 (eight) hours for 10 days, Disp: 30 capsule, Rfl: 0    cetirizine (ZyrTEC) 10 mg tablet, Take 1 tablet (10 mg total) by mouth daily, Disp: 90 tablet, Rfl: 1    Cholecalciferol (VITAMIN D3 PO), Take by mouth, Disp: , Rfl:     Diclofenac Sodium (VOLTAREN) 1 %, Apply 2 g topically 4 (four) times a day, Disp: 100 g, Rfl: 0    escitalopram (LEXAPRO) 10 mg tablet, TAKE 1 TABLET BY MOUTH EVERY DAY, Disp: 90 tablet, Rfl: 1    lidocaine (Lidoderm) 5 %, Apply 1 patch topically daily Remove & Discard patch within 12 hours or as directed by MD, Disp: 30 patch, Rfl: 1    lisinopril (ZESTRIL) 20 mg tablet, TAKE 1 TABLET BY MOUTH EVERY DAY, Disp: 90 tablet, Rfl: 1    MINOXIDIL, TOPICAL, 5 % SOLN, Apply 1 application topically in the morning For hair, Disp: , Rfl:     montelukast (SINGULAIR) 10 mg tablet, TAKE 1 TABLET BY MOUTH EVERYDAY AT BEDTIME, Disp: 90 tablet, Rfl: 2    nystatin (MYCOSTATIN) 500,000 units/5 mL suspension, Take 5 mL (500,000 Units total) by mouth 4 (four) times a day, Disp: 200 mL, Rfl: 0    Omega-3 Fatty Acids (FISH OIL PO), Take 1,000 mg by mouth, Disp: , Rfl:     pantoprazole (PROTONIX) 40 mg tablet, TAKE 1 TABLET BY MOUTH TWICE A DAY, Disp: 180 tablet, Rfl: 1    promethazine-dextromethorphan (PHENERGAN-DM) 6.25-15 mg/5 mL oral syrup, Take 5 mL by mouth 4 (four) times a day as needed for cough, Disp: 150 mL, Rfl: 0    rosuvastatin (CRESTOR) 5 mg tablet, TAKE 1 TABLET (5 MG TOTAL) BY MOUTH DAILY., Disp: 90 tablet, Rfl: 1    Trelegy Ellipta 200-62.5-25  MCG/ACT AEPB inhaler, INHALE 1 PUFF DAILY RINSE MOUTH AFTER USE., Disp: 60 each, Rfl: 5    TURMERIC PO, Take by mouth, Disp: , Rfl:     azithromycin (ZITHROMAX) 250 mg tablet, 2 tab today then 1 tab po qd x 4 d (Patient not taking: Reported on 1/4/2024), Disp: 6 tablet, Rfl: 0    Current Facility-Administered Medications:     bupivacaine (PF) (MARCAINE) 0.25 % injection 1 mL, 1 mL, Intra-articular, , Christiano Garcia, DPM, 1 mL at 05/11/23 1706    triamcinolone acetonide (KENALOG-40) 40 mg/mL injection 20 mg, 20 mg, Intra-articular, , Christiano Garcia, DPM, 20 mg at 05/11/23 1706  Allergies   Allergen Reactions    Augmentin [Amoxicillin-Pot Clavulanate] Vomiting    Miconazole Itching and Swelling    Wixela Inhub [Fluticasone-Salmeterol] Palpitations       Vitals: Blood pressure 128/76, pulse 86, temperature 98.9 °F (37.2 °C), weight 88.9 kg (196 lb), SpO2 96%, not currently breastfeeding. Body mass index is 33.3 kg/m². Oxygen Therapy  SpO2: 96 %    Physical Exam  Physical Exam  Vitals reviewed.   Constitutional:       General: She is not in acute distress.     Appearance: Normal appearance. She is well-developed. She is not ill-appearing.   HENT:      Head: Normocephalic and atraumatic.   Eyes:      General: No scleral icterus.     Conjunctiva/sclera: Conjunctivae normal.   Neck:      Vascular: No JVD.   Cardiovascular:      Rate and Rhythm: Normal rate and regular rhythm.      Heart sounds: Normal heart sounds. No murmur heard.     No friction rub. No gallop.   Pulmonary:      Effort: Pulmonary effort is normal. No respiratory distress.      Breath sounds: Normal breath sounds. No wheezing or rales.   Musculoskeletal:      Cervical back: Neck supple.      Right lower leg: No edema.      Left lower leg: No edema.   Skin:     General: Skin is warm and dry.      Findings: No rash.   Neurological:      General: No focal deficit present.      Mental Status: She is alert and oriented to person, place, and time. Mental  "status is at baseline.   Psychiatric:         Mood and Affect: Mood normal.         Behavior: Behavior normal.         Labs: I have personally reviewed pertinent lab results.  Lab Results   Component Value Date    WBC 11.83 (H) 07/06/2023    HGB 14.4 07/06/2023    HCT 43.0 07/06/2023    MCV 87 07/06/2023     07/06/2023     Lab Results   Component Value Date    GLUCOSE 86 10/08/2015    CALCIUM 9.3 12/08/2023     10/08/2015    K 3.7 12/08/2023    CO2 31 12/08/2023     12/08/2023    BUN 9 12/08/2023    CREATININE 0.55 (L) 12/08/2023     No results found for: \"IGE\"  Lab Results   Component Value Date    ALT 14 12/08/2023    AST 20 12/08/2023    ALKPHOS 58 12/08/2023    BILITOT 0.39 10/08/2015       Imaging and other studies: I have personally reviewed pertinent films in PACS    EKG, Pathology, and Other Studies: I have personally reviewed pertinent reports.      Yamilet Whitney MD  [facility]  "

## 2024-01-15 ENCOUNTER — OFFICE VISIT (OUTPATIENT)
Dept: SURGICAL ONCOLOGY | Facility: CLINIC | Age: 69
End: 2024-01-15
Payer: COMMERCIAL

## 2024-01-15 VITALS
WEIGHT: 197 LBS | BODY MASS INDEX: 33.63 KG/M2 | HEART RATE: 78 BPM | TEMPERATURE: 98 F | SYSTOLIC BLOOD PRESSURE: 128 MMHG | DIASTOLIC BLOOD PRESSURE: 70 MMHG | RESPIRATION RATE: 16 BRPM | HEIGHT: 64 IN | OXYGEN SATURATION: 96 %

## 2024-01-15 DIAGNOSIS — D49.0 IPMN (INTRADUCTAL PAPILLARY MUCINOUS NEOPLASM): Primary | ICD-10-CM

## 2024-01-15 PROCEDURE — 99214 OFFICE O/P EST MOD 30 MIN: CPT | Performed by: STUDENT IN AN ORGANIZED HEALTH CARE EDUCATION/TRAINING PROGRAM

## 2024-01-15 NOTE — PROGRESS NOTES
Surgical Oncology Consultation F/U    [address] [facility]  [facility]  [address] [facility] [address]    Patient:  Leanna Ruvalcaba  1955  [id number]    Primary Care provider:  Matt Grider MD  [address]. [address]  [place] [address]    Referring provider:  No referring provider defined for this encounter.    Diagnoses and all orders for this visit:    IPMN (intraductal papillary mucinous neoplasm)        No chief complaint on file.      History of Present Illness  :   This is a 68-year-old female who is here in f/u today for her pancreatic cyst.  Briefly, the patient underwent initial cross-sectional imaging in September 2022, rule revealing a 8 mm cyst at the neck of the pancreas.  This appeared to be a branch duct IPMN.  She underwent, EUS which was 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 given the prominence of the main duct of the pancreas.  There was no ductal dilation or other concerning features.  It was decided that she should undergo interval MRI, which was performed 11/2022.  This demonstrated stability of the cyst. We discussed her at  and she underwent genetics eval given her FH of panc cancer. This was negative. Since being seen 1/2023, she has undergone several CT and MR for abd pain, most recently an MR in Sep which showed an overall unchanged appearance of her IPMN. She is currently undergoing w/u for ischemic colitis after presenting fall 2023 with rectal bleeding. A CTA was performed and she is to f/u with them soon. This was negative for mesenteric disease. No further episodes of bleeding. At this time, the patient describes intermittent abdominal pain, bloating that is unchanged since last being seen.      Review of Systems  Complete ROS Surg Onc:   Constitutional: The patient denies new or recent history of general fatigue, no recent weight loss, no change in appetite.   Eyes: No complaints of visual problems, no scleral icterus.    ENT: No complaints of ear pain, no hoarseness, no difficulty swallowing,  no tinnitus and no new masses in head, oral cavity, or neck.   Cardiovascular: No complaints of chest pain, no palpitations, no ankle edema.   Respiratory: No complaints of shortness of breath, no cough.   Gastrointestinal: No complaints of jaundice, no bloody stools, no pale stools.   Genitourinary: No complaints of dysuria, no hematuria, no nocturia, no frequent urination, no urethral discharge.   Musculoskeletal: No complaints of weakness, paralysis, joint stiffness or arthralgias.  Integumentary: No complaints of rash, no new lesions.   Neurological: No complaints of convulsions, no seizures, no dizziness.   Hematologic/Lymphatic: No complaints of easy bruising.   Endocrine:  No hot or cold intolerance.  No polydipsia, polyphagia, or polyuria.  Allergy/immunology:  No environmental allergies.  No food allergies.  Not immunocompromised.      Patient Active Problem List   Diagnosis    Seasonal allergic rhinitis    Anxiety    Chronic obstructive pulmonary disease (HCC)    Mild episode of recurrent major depressive disorder (HCC)    Esophageal reflux    Fibromyalgia    Mixed hyperlipidemia    Hypertension    Degeneration of intervertebral disc    Lumbosacral radiculopathy at L5    Palpitations    Pulmonary nodule seen on imaging study    Family history of pancreatic cancer    Neuropathic pain    Neck pain    Cigarette nicotine dependence with nicotine-induced disorder    Colitis    Severe obesity (BMI 35.0-35.9 with comorbidity)     Snoring    Chronic idiopathic constipation    Gastroparesis    Prediabetes    Increased intraocular pressure    Primary insomnia    Candida vaginitis    Chronic frontal sinusitis    Mid back pain    Closed wedge compression fracture of T8 vertebra (HCC)    Therapeutic opioid induced constipation    IPMN (intraductal papillary mucinous neoplasm)    History of colon polyps    Gastroesophageal reflux disease without  esophagitis    Chronic pain of right ankle    Hyperglycemia    Sinus tarsi syndrome of right foot    Dysuria    Chronic left-sided low back pain without sciatica    Recurrent major depressive disorder, in partial remission (HCC)    Hematochezia    Generalized postprandial abdominal pain    Otalgia of right ear     Past Medical History:   Diagnosis Date    Acid reflux     Back injury     COPD (chronic obstructive pulmonary disease) (HCC)     Fibromyalgia     Hypertension     Seasonal allergies      Past Surgical History:   Procedure Laterality Date    COLONOSCOPY      EYE SURGERY      KNEE SURGERY  1998    AL COLONOSCOPY FLX DX W/COLLJ SPEC WHEN PFRMD N/A 05/09/2017    Procedure: EGD AND COLONOSCOPY;  Surgeon: Kimberly Zapata MD;  [facility];  Service: Gastroenterology     Family History   Problem Relation Age of Onset    Pancreatic cancer Mother 72    Coronary artery disease Father     Diabetes Father     Hypertension Father     Heart disease Father     Lung cancer Sister 37    No Known Problems Daughter     No Known Problems Maternal Grandmother     Lung cancer Maternal Grandfather 77    Diabetes Paternal Grandmother     No Known Problems Paternal Grandfather     Pancreatic cancer Brother     Pancreatic cancer Brother     No Known Problems Son     No Known Problems Son      Social History     Socioeconomic History    Marital status:      Spouse name: Not on file    Number of children: 3    Years of education: less than high school    Highest education level: Not on file   Occupational History    Occupation: unemployed   Tobacco Use    Smoking status: Every Day     Current packs/day: 1.50     Average packs/day: 1.5 packs/day for 56.0 years (84.1 ttl pk-yrs)     Types: Cigarettes     Start date: 1968     Passive exposure: Past    Smokeless tobacco: Never   Vaping Use    Vaping status: Never Used   Substance and Sexual Activity    Alcohol use: Never    Drug use: No    Sexual activity: Not Currently    Other Topics Concern    Not on file   Social History Narrative    Always uses seatbelt    Daily coffee consumption    Daily tea consumption    Dental care regularly    Exercises regularly    Multiple organ donor    No guns in the home    No living will    Denies pets/ animals in the home    Power of  in existence- denied         Social Determinants of Health     Financial Resource Strain: Low Risk  (7/12/2023)    Overall Financial Resource Strain (CARDIA)     Difficulty of Paying Living Expenses: Not hard at all   Food Insecurity: Not on file   Transportation Needs: No Transportation Needs (7/12/2023)    PRAPARE - Transportation     Lack of Transportation (Medical): No     Lack of Transportation (Non-Medical): No   Physical Activity: Not on file   Stress: Not on file   Social Connections: Not on file   Intimate Partner Violence: Not on file   Housing Stability: Not on file       Current Outpatient Medications:     acetaminophen (TYLENOL) 650 mg CR tablet, Take by mouth every 8 (eight) hours as needed  , Disp: , Rfl:     albuterol (2.5 mg/3 mL) 0.083 % nebulizer solution, Take 3 mL (2.5 mg total) by nebulization every 6 (six) hours as needed for wheezing or shortness of breath, Disp: 180 mL, Rfl: 5    albuterol (PROVENTIL HFA,VENTOLIN HFA) 90 mcg/act inhaler, Inhale 2 puffs every 6 (six) hours as needed for wheezing, Disp: 18 g, Rfl: 5    ALPRAZolam (XANAX) 0.25 mg tablet, Take 1 tablet (0.25 mg total) by mouth 3 (three) times a day as needed for anxiety, Disp: 90 tablet, Rfl: 0    amLODIPine (NORVASC) 5 mg tablet, TAKE 1 TABLET (5 MG TOTAL) BY MOUTH DAILY., Disp: 90 tablet, Rfl: 1    cetirizine (ZyrTEC) 10 mg tablet, Take 1 tablet (10 mg total) by mouth daily, Disp: 90 tablet, Rfl: 1    Cholecalciferol (VITAMIN D3 PO), Take by mouth, Disp: , Rfl:     Diclofenac Sodium (VOLTAREN) 1 %, Apply 2 g topically 4 (four) times a day, Disp: 100 g, Rfl: 0    escitalopram (LEXAPRO) 10 mg tablet, TAKE 1 TABLET BY MOUTH  EVERY DAY, Disp: 90 tablet, Rfl: 1    lidocaine (Lidoderm) 5 %, Apply 1 patch topically daily Remove & Discard patch within 12 hours or as directed by MD, Disp: 30 patch, Rfl: 1    lisinopril (ZESTRIL) 20 mg tablet, TAKE 1 TABLET BY MOUTH EVERY DAY, Disp: 90 tablet, Rfl: 1    MINOXIDIL, TOPICAL, 5 % SOLN, Apply 1 application topically in the morning For hair, Disp: , Rfl:     montelukast (SINGULAIR) 10 mg tablet, TAKE 1 TABLET BY MOUTH EVERYDAY AT BEDTIME, Disp: 90 tablet, Rfl: 2    nystatin (MYCOSTATIN) 500,000 units/5 mL suspension, Take 5 mL (500,000 Units total) by mouth 4 (four) times a day, Disp: 200 mL, Rfl: 0    Omega-3 Fatty Acids (FISH OIL PO), Take 1,000 mg by mouth, Disp: , Rfl:     pantoprazole (PROTONIX) 40 mg tablet, TAKE 1 TABLET BY MOUTH TWICE A DAY, Disp: 180 tablet, Rfl: 1    promethazine-dextromethorphan (PHENERGAN-DM) 6.25-15 mg/5 mL oral syrup, Take 5 mL by mouth 4 (four) times a day as needed for cough, Disp: 150 mL, Rfl: 0    rosuvastatin (CRESTOR) 5 mg tablet, TAKE 1 TABLET (5 MG TOTAL) BY MOUTH DAILY., Disp: 90 tablet, Rfl: 1    Trelegy Ellipta 200-62.5-25 MCG/ACT AEPB inhaler, INHALE 1 PUFF DAILY RINSE MOUTH AFTER USE., Disp: 60 each, Rfl: 5    TURMERIC PO, Take by mouth, Disp: , Rfl:     Current Facility-Administered Medications:     bupivacaine (PF) (MARCAINE) 0.25 % injection 1 mL, 1 mL, Intra-articular, , Christiano Madrid Garcia, DPM, 1 mL at 05/11/23 1706    triamcinolone acetonide (KENALOG-40) 40 mg/mL injection 20 mg, 20 mg, Intra-articular, , Christiano Madrid Garcia, DPM, 20 mg at 05/11/23 1706  Allergies   Allergen Reactions    Augmentin [Amoxicillin-Pot Clavulanate] Vomiting    Miconazole Itching and Swelling    Wixela Inhub [Fluticasone-Salmeterol] Palpitations       There were no vitals filed for this visit.      Physical Exam   General: Appears well, appears stated age  Skin: Warm, anicteric  HEENT: Normocephalic, atraumatic; sclera aniceteric, mucous membranes moist; cervical nodes without  adenopathy  Cardiopulmonary: RRR, Easy WOB, no BLE edema  Abd: Flat and soft, nontender, no masses appreciated, no hepatosplenomegaly  MSK: Symmetric, no cyanosis, no overt weakness  Lymphatic: No cervical, axillary or inguinal lymphadenopathy  Neuro: Affect appropriate, no gross motor abnormalities    Labs: Reviewed in [facility]    Imaging  MRI abdomen w wo contrast and mrcp    Result Date: 11/18/2022  Narrative: MRI OF THE ABDOMEN WITH AND WITHOUT CONTRAST WITH MRCP INDICATION: 66 years / Female  K86.2: Cyst of pancreas. COMPARISON: Comparison is made to prior studies, most recent is an MRI dated August 9, 2022. The rest, with TECHNIQUE:  Multiplanar/multisequence MRI of the abdomen with 3D MRCP was performed before and after administration of contrast. IV Contrast:  8 mL of Gadobutrol injection (SINGLE-DOSE) FINDINGS: LOWER CHEST:   Unremarkable LIVER: No concerning liver lesions.  Few nonenhancing hepatic cysts, with the largest measuring 1.4 cm in the left lateral segment.  Intrahepatic vessels are patent. . BILE DUCTS:  No intrahepatic or extrahepatic bile duct dilation. Common bile duct is normal in caliber.  No choledocholithiasis, biliary stricture or suspicious mass. GALLBLADDER:  Normal. PANCREAS:  8 mm cyst in the neck of the pancreas unchanged there is direct communication with the pancreatic duct (series 9 image 86) compatible with a branch duct type IPMN.  No pancreatic ductal dilatation.  No enhancing soft tissue components. ADRENAL GLANDS:  Mild left adrenal thickening unchanged. SPLEEN:  Normal. KIDNEYS/PROXIMAL URETERS:  No hydroureteronephrosis.  No suspicious renal mass. BOWEL:   No dilated loops of bowel. PERITONEUM/RETROPERITONEUM:  No ascites. LYMPH NODES:  No abdominal lymphadenopathy. VASCULAR STRUCTURES:  No aneurysm. ABDOMINAL WALL:  Unremarkable. OSSEOUS STRUCTURES:  Hemangioma in the L1 vertebral body unchanged.     Impression: Stable size of 8 mm cyst in the neck of the pancreas with  imaging characteristics compatible with a branch duct type IPMN.  No worrisome imaging features. Recommend repeat imaging every 2 years x5 for a total of 10 years, and if stable, no further imaging needed.  If the lesion does increase in size and or change in morphology, management can be reassessed at that time. Workstation performed: [id number]       I independently reviewed and interpreted the above laboratory and imaging data, including GI consult, EUS, MRI. Discussed with Dr Maxwell. Keara Vo. Reviewed genetics.       Discussion/Summary:   This is a 66-year-old female with an 8 mm cyst of the pancreas, unchanged over a significant period of time. Will see her in one year with repeat.

## 2024-01-22 DIAGNOSIS — J42 CHRONIC BRONCHITIS, UNSPECIFIED CHRONIC BRONCHITIS TYPE (HCC): ICD-10-CM

## 2024-01-22 DIAGNOSIS — M70.61 TROCHANTERIC BURSITIS OF RIGHT HIP: ICD-10-CM

## 2024-01-22 DIAGNOSIS — B37.0 THRUSH: ICD-10-CM

## 2024-01-23 DIAGNOSIS — M70.61 TROCHANTERIC BURSITIS OF RIGHT HIP: ICD-10-CM

## 2024-01-23 DIAGNOSIS — J42 CHRONIC BRONCHITIS, UNSPECIFIED CHRONIC BRONCHITIS TYPE (HCC): ICD-10-CM

## 2024-01-23 DIAGNOSIS — J30.9 ALLERGIC RHINITIS, UNSPECIFIED SEASONALITY, UNSPECIFIED TRIGGER: ICD-10-CM

## 2024-01-23 DIAGNOSIS — B37.0 THRUSH: ICD-10-CM

## 2024-01-23 DIAGNOSIS — E78.2 MIXED HYPERLIPIDEMIA: ICD-10-CM

## 2024-01-23 RX ORDER — MONTELUKAST SODIUM 10 MG/1
TABLET ORAL
Qty: 90 TABLET | Refills: 1 | Status: SHIPPED | OUTPATIENT
Start: 2024-01-23

## 2024-01-23 RX ORDER — DEXTROMETHORPHAN HYDROBROMIDE AND PROMETHAZINE HYDROCHLORIDE 15; 6.25 MG/5ML; MG/5ML
5 SYRUP ORAL 4 TIMES DAILY PRN
Qty: 150 ML | Refills: 0 | Status: SHIPPED | OUTPATIENT
Start: 2024-01-23

## 2024-01-23 RX ORDER — CETIRIZINE HYDROCHLORIDE 10 MG/1
10 TABLET ORAL DAILY
Qty: 90 TABLET | Refills: 1 | Status: SHIPPED | OUTPATIENT
Start: 2024-01-23

## 2024-01-23 RX ORDER — ROSUVASTATIN CALCIUM 5 MG/1
5 TABLET, COATED ORAL DAILY
Qty: 90 TABLET | Refills: 1 | Status: SHIPPED | OUTPATIENT
Start: 2024-01-23

## 2024-01-23 NOTE — TELEPHONE ENCOUNTER
Refill must be reviewed and completed by the office or provider. The refill is unable to be approved by the medication management team.    Off-protocol

## 2024-01-23 NOTE — TELEPHONE ENCOUNTER
Reason for call:   [x] Refill   [] Prior Auth  [] Other:     Office:   [x] PCP/Provider -   [] Specialty/Provider -     Medication: Trelegy Ellipta 200-62.5-25 MCG/ACT AEPB inhaler ,romethazine-dextromethorphan (PHENERGAN-DM) 6.25-15 mg/5 mL oral syrup  nystatin (MYCOSTATIN) 500,000 units/5 mL suspension ,Diclofenac Sodium (VOLTAREN) 1    [facility]     Does the patient have enough for 3 days?   [x] Yes   [] No - Send as HP to POD

## 2024-01-24 RX ORDER — DEXTROMETHORPHAN HYDROBROMIDE AND PROMETHAZINE HYDROCHLORIDE 15; 6.25 MG/5ML; MG/5ML
5 SYRUP ORAL 4 TIMES DAILY PRN
Qty: 150 ML | Refills: 0 | OUTPATIENT
Start: 2024-01-24

## 2024-01-24 RX ORDER — FLUTICASONE FUROATE, UMECLIDINIUM BROMIDE AND VILANTEROL TRIFENATATE 200; 62.5; 25 UG/1; UG/1; UG/1
1 POWDER RESPIRATORY (INHALATION) DAILY
Qty: 60 EACH | Refills: 5 | Status: SHIPPED | OUTPATIENT
Start: 2024-01-24 | End: 2024-02-23

## 2024-02-05 ENCOUNTER — OFFICE VISIT (OUTPATIENT)
Dept: FAMILY MEDICINE CLINIC | Facility: CLINIC | Age: 69
End: 2024-02-05
Payer: COMMERCIAL

## 2024-02-05 ENCOUNTER — TELEPHONE (OUTPATIENT)
Dept: FAMILY MEDICINE CLINIC | Facility: CLINIC | Age: 69
End: 2024-02-05

## 2024-02-05 ENCOUNTER — TELEPHONE (OUTPATIENT)
Age: 69
End: 2024-02-05

## 2024-02-05 VITALS
HEIGHT: 64 IN | OXYGEN SATURATION: 98 % | WEIGHT: 197.8 LBS | BODY MASS INDEX: 33.77 KG/M2 | SYSTOLIC BLOOD PRESSURE: 122 MMHG | DIASTOLIC BLOOD PRESSURE: 80 MMHG | HEART RATE: 66 BPM | TEMPERATURE: 97.5 F

## 2024-02-05 DIAGNOSIS — D49.0 IPMN (INTRADUCTAL PAPILLARY MUCINOUS NEOPLASM): ICD-10-CM

## 2024-02-05 DIAGNOSIS — I10 PRIMARY HYPERTENSION: ICD-10-CM

## 2024-02-05 DIAGNOSIS — J42 CHRONIC BRONCHITIS, UNSPECIFIED CHRONIC BRONCHITIS TYPE (HCC): ICD-10-CM

## 2024-02-05 DIAGNOSIS — E78.2 MIXED HYPERLIPIDEMIA: Primary | ICD-10-CM

## 2024-02-05 PROCEDURE — 99214 OFFICE O/P EST MOD 30 MIN: CPT | Performed by: FAMILY MEDICINE

## 2024-02-05 NOTE — TELEPHONE ENCOUNTER
Left message for pt to call office to schedule appt for fu in march on [facility]

## 2024-02-05 NOTE — PROGRESS NOTES
Name: Leanna Ruvalcaba      : 1955      MRN: [id number]  Encounter Provider: Matt Griedr MD  Encounter Date: 2024   Encounter department: [facility]    Assessment & Plan     1. Mixed hyperlipidemia  Assessment & Plan:  Check labs. Continue rosuvastatin. Adjust dose if not at goal. Recheck 3m    Orders:  -     Lipid panel; Future    2. Primary hypertension  Assessment & Plan:  Well controlled. Cont present treatment. Monitor labs. Recheck 6m      Orders:  -     CBC and differential; Future  -     Comprehensive metabolic panel; Future  -     Lipid panel; Future    3. Chronic bronchitis, unspecified chronic bronchitis type (HCC)  Assessment & Plan:  Counseled re; smoking cessation. Continue present inhalers. Attempt to decrease dose of Trelegy per Pulm. Recheck 6m    Orders:  -     fluticasone-umeclidinium-vilanterol 100-62.5-25 mcg/actuation inhaler; Inhale 1 puff daily Rinse mouth after use.    4. IPMN (intraductal papillary mucinous neoplasm)  Assessment & Plan:  Oncology ordering repeat MRI in Sept. Continue to monitor. Recheck 6m           Subjective     f/u multiple med issues  - joint aches are unchanged (primarily hands and feet)  - up to date with Pulm. They would like to drop her down on the Trelegy from 200 to 100. Still smoking. Has some chronic SOB with exertion - no worsening  - up to date with Onc who wants to repeat pancreas MRI in Sept.  Pt with chronic epigastric discomfort that has not changed   - just started ith some PND. No fever/chills, sinus pain or other symptoms  - pt denies CP, palpitations, lightheadedness or other CV symptoms with or without exertion      Review of Systems   Constitutional:  Positive for fatigue. Negative for chills and fever.   HENT:  Positive for congestion. Negative for ear discharge, ear pain, sinus pressure and sinus pain.    Eyes: Negative.    Respiratory:  Positive for cough (occasional) and shortness of breath (mild  with exertion - unchanged). Negative for wheezing.    Cardiovascular:  Positive for leg swelling (mild). Negative for chest pain and palpitations.   Gastrointestinal:  Positive for abdominal pain (intermittent, epigastric. unchanged). Negative for abdominal distention, anal bleeding, blood in stool, diarrhea and nausea.   Genitourinary: Negative.    Musculoskeletal:  Positive for arthralgias, back pain, myalgias and neck pain.   Skin: Negative.    Neurological:  Negative for dizziness, weakness, light-headedness, numbness and headaches.   Psychiatric/Behavioral: Negative.         Past Medical History:   Diagnosis Date   • Acid reflux    • Back injury    • COPD (chronic obstructive pulmonary disease) (HCC)    • Fibromyalgia    • Hypertension    • Seasonal allergies      Past Surgical History:   Procedure Laterality Date   • COLONOSCOPY     • EYE SURGERY     • KNEE SURGERY  1998   • IA COLONOSCOPY FLX DX W/COLLJ SPEC WHEN PFRMD N/A 05/09/2017    Procedure: EGD AND COLONOSCOPY;  Surgeon: Kimberly Zapata MD;  Location[facility];  Service: Gastroenterology     Family History   Problem Relation Age of Onset   • Pancreatic cancer Mother 72   • Coronary artery disease Father    • Diabetes Father    • Hypertension Father    • Heart disease Father    • Lung cancer Sister 37   • No Known Problems Daughter    • No Known Problems Maternal Grandmother    • Lung cancer Maternal Grandfather 77   • Diabetes Paternal Grandmother    • No Known Problems Paternal Grandfather    • Pancreatic cancer Brother    • Pancreatic cancer Brother    • No Known Problems Son    • No Known Problems Son      Social History     Socioeconomic History   • Marital status:      Spouse name: None   • Number of children: 3   • Years of education: less than high school   • Highest education level: None   Occupational History   • Occupation: unemployed   Tobacco Use   • Smoking status: Every Day     Current packs/day: 1.50     Average packs/day: 1.5  packs/day for 56.1 years (84.2 ttl pk-yrs)     Types: Cigarettes     Start date: 1968     Passive exposure: Past   • Smokeless tobacco: Never   Vaping Use   • Vaping status: Never Used   Substance and Sexual Activity   • Alcohol use: Never   • Drug use: No   • Sexual activity: Not Currently   Other Topics Concern   • None   Social History Narrative    Always uses seatbelt    Daily coffee consumption    Daily tea consumption    Dental care regularly    Exercises regularly    Multiple organ donor    No guns in the home    No living will    Denies pets/ animals in the home    Power of  in existence- denied         Social Determinants of Health     Financial Resource Strain: Low Risk  (7/12/2023)    Overall Financial Resource Strain (CARDIA)    • Difficulty of Paying Living Expenses: Not hard at all   Food Insecurity: Not on file   Transportation Needs: No Transportation Needs (7/12/2023)    PRAPARE - Transportation    • Lack of Transportation (Medical): No    • Lack of Transportation (Non-Medical): No   Physical Activity: Not on file   Stress: Not on file   Social Connections: Not on file   Intimate Partner Violence: Not on file   Housing Stability: Not on file     Current Outpatient Medications on File Prior to Visit   Medication Sig   • acetaminophen (TYLENOL) 650 mg CR tablet Take by mouth every 8 (eight) hours as needed     • albuterol (PROVENTIL HFA,VENTOLIN HFA) 90 mcg/act inhaler Inhale 2 puffs every 6 (six) hours as needed for wheezing   • ALPRAZolam (XANAX) 0.25 mg tablet Take 1 tablet (0.25 mg total) by mouth 3 (three) times a day as needed for anxiety   • amLODIPine (NORVASC) 5 mg tablet TAKE 1 TABLET (5 MG TOTAL) BY MOUTH DAILY.   • cetirizine (ZyrTEC) 10 mg tablet TAKE 1 TABLET BY MOUTH EVERY DAY   • Cholecalciferol (VITAMIN D3 PO) Take by mouth   • Diclofenac Sodium (VOLTAREN) 1 % Apply 2 g topically 4 (four) times a day   • escitalopram (LEXAPRO) 10 mg tablet TAKE 1 TABLET BY MOUTH EVERY DAY   •  "lidocaine (Lidoderm) 5 % Apply 1 patch topically daily Remove & Discard patch within 12 hours or as directed by MD (Patient taking differently: Apply 1 patch topically as needed Remove & Discard patch within 12 hours or as directed by MD)   • lisinopril (ZESTRIL) 20 mg tablet TAKE 1 TABLET BY MOUTH EVERY DAY   • MINOXIDIL, TOPICAL, 5 % SOLN Apply 1 application topically in the morning For hair   • montelukast (SINGULAIR) 10 mg tablet TAKE 1 TABLET BY MOUTH EVERYDAY AT BEDTIME   • nystatin (MYCOSTATIN) 500,000 units/5 mL suspension Take 5 mL (500,000 Units total) by mouth 4 (four) times a day   • Omega-3 Fatty Acids (FISH OIL PO) Take 1,000 mg by mouth   • pantoprazole (PROTONIX) 40 mg tablet TAKE 1 TABLET BY MOUTH TWICE A DAY   • promethazine-dextromethorphan (PHENERGAN-DM) 6.25-15 mg/5 mL oral syrup Take 5 mL by mouth 4 (four) times a day as needed for cough   • rosuvastatin (CRESTOR) 5 mg tablet TAKE 1 TABLET (5 MG TOTAL) BY MOUTH DAILY.   • TURMERIC-GINGER PO Take by mouth   • albuterol (2.5 mg/3 mL) 0.083 % nebulizer solution Take 3 mL (2.5 mg total) by nebulization every 6 (six) hours as needed for wheezing or shortness of breath (Patient not taking: Reported on 2/5/2024)   • TURMERIC PO Take by mouth     Allergies   Allergen Reactions   • Augmentin [Amoxicillin-Pot Clavulanate] Vomiting   • Miconazole Itching and Swelling   • Wixela Inhub [Fluticasone-Salmeterol] Palpitations     Immunization History   Administered Date(s) Administered   • Tdap 02/28/2008       Objective     /80 (BP Location: Left arm, Patient Position: Sitting, Cuff Size: Adult)   Pulse 66   Temp 97.5 °F (36.4 °C)   Ht 5' 3.5\" (1.613 m)   Wt 89.7 kg (197 lb 12.8 oz)   LMP  (LMP Unknown)   SpO2 98%   BMI 34.49 kg/m²     Physical Exam  Vitals reviewed.   HENT:      Head: Normocephalic.      Right Ear: Tympanic membrane, ear canal and external ear normal.      Left Ear: Tympanic membrane, ear canal and external ear normal.      " Nose: Congestion (slight) present.      Mouth/Throat:      Mouth: Mucous membranes are moist.   Eyes:      General: No scleral icterus.     Extraocular Movements: Extraocular movements intact.      Conjunctiva/sclera: Conjunctivae normal.      Pupils: Pupils are equal, round, and reactive to light.   Cardiovascular:      Rate and Rhythm: Normal rate and regular rhythm.   Pulmonary:      Effort: Pulmonary effort is normal.      Breath sounds: No wheezing or rales.   Abdominal:      General: There is no distension.      Palpations: There is no mass.      Tenderness: There is no abdominal tenderness (mild epigastric area. No G/R. no masses appreciated).   Musculoskeletal:         General: Tenderness (over mid spine) and deformity (mild OA changes in hands) present.      Cervical back: No tenderness.      Right lower leg: No edema.      Left lower leg: No edema.   Lymphadenopathy:      Cervical: Cervical adenopathy (R shotty, sl tender) present.   Skin:     Findings: No lesion.   Neurological:      General: No focal deficit present.      Mental Status: She is alert and oriented to person, place, and time.       Matt Grider MD

## 2024-02-07 DIAGNOSIS — K21.9 GASTROESOPHAGEAL REFLUX DISEASE: ICD-10-CM

## 2024-02-07 RX ORDER — PANTOPRAZOLE SODIUM 40 MG/1
TABLET, DELAYED RELEASE ORAL
Qty: 180 TABLET | Refills: 1 | Status: SHIPPED | OUTPATIENT
Start: 2024-02-07

## 2024-02-07 NOTE — ASSESSMENT & PLAN NOTE
Counseled re; smoking cessation. Continue present inhalers. Attempt to decrease dose of Trelegy per Pulm. Recheck 6m

## 2024-03-04 ENCOUNTER — TELEPHONE (OUTPATIENT)
Age: 69
End: 2024-03-04

## 2024-03-04 NOTE — TELEPHONE ENCOUNTER
Pt thinks she is having a flare up of diverticulitis.  It started last week with constipation and bloating and right sided abdominal pain.  She took a laxative yesterday and she had watery stools.  The pain is on her right side, near her hip that radiates around the front and the back.   She is very uncomfortable.  She is asking for an antibiotic.  [facility]  No available openings with provider today.

## 2024-03-05 ENCOUNTER — OFFICE VISIT (OUTPATIENT)
Dept: FAMILY MEDICINE CLINIC | Facility: CLINIC | Age: 69
End: 2024-03-05
Payer: COMMERCIAL

## 2024-03-05 VITALS
TEMPERATURE: 97.8 F | OXYGEN SATURATION: 99 % | SYSTOLIC BLOOD PRESSURE: 120 MMHG | WEIGHT: 196.2 LBS | HEIGHT: 64 IN | BODY MASS INDEX: 33.49 KG/M2 | DIASTOLIC BLOOD PRESSURE: 82 MMHG | HEART RATE: 86 BPM

## 2024-03-05 DIAGNOSIS — R10.31 RLQ ABDOMINAL PAIN: Primary | ICD-10-CM

## 2024-03-05 DIAGNOSIS — K59.09 CHRONIC CONSTIPATION: ICD-10-CM

## 2024-03-05 DIAGNOSIS — S76.211A INGUINAL STRAIN, RIGHT, INITIAL ENCOUNTER: ICD-10-CM

## 2024-03-05 PROBLEM — F33.41 RECURRENT MAJOR DEPRESSIVE DISORDER, IN PARTIAL REMISSION (HCC): Status: RESOLVED | Noted: 2023-06-04 | Resolved: 2024-03-05

## 2024-03-05 PROBLEM — E66.01 SEVERE OBESITY (BMI 35.0-35.9 WITH COMORBIDITY): Status: RESOLVED | Noted: 2019-11-21 | Resolved: 2024-03-05

## 2024-03-05 PROCEDURE — 99214 OFFICE O/P EST MOD 30 MIN: CPT | Performed by: FAMILY MEDICINE

## 2024-03-05 PROCEDURE — G2211 COMPLEX E/M VISIT ADD ON: HCPCS | Performed by: FAMILY MEDICINE

## 2024-03-05 RX ORDER — DOCUSATE SODIUM 250 MG
250 CAPSULE ORAL DAILY
Qty: 30 CAPSULE | Refills: 1 | Status: SHIPPED | OUTPATIENT
Start: 2024-03-05

## 2024-03-05 NOTE — PROGRESS NOTES
Name: Leanna Ruvalcaba      : 1955      MRN: [id number]  Encounter Provider: Matt Grider MD  Encounter Date: 3/5/2024   Encounter department: [facility]    Assessment & Plan     1. RLQ abdominal pain    2. Inguinal strain, right, initial encounter    3. Chronic constipation  -     docusate sodium (COLACE) 250 MG capsule; Take 1 capsule (250 mg total) by mouth daily       Discussion:  I reviewed all pt. Pain appears to be musculoskeletal.  No signs of acute abdomen.  ?related to straining from constipation?  I reviewed with pt.  Treat constipation with colace 100mg bid.  Avoid straining or other stress to abd.  Recheck 1w if not improving - earlier if worse      Subjective     68-year-old female presents with a 3-week history of various GI symptoms.  Patient notes that she is having difficulty urinating approximately 3 weeks ago.  After this, she developed some lower abdominal bloating but then also developed into upper abdominal bloating as well.  Urination improved though abdominal discomfort persisted.  After few weeks, pain started focusing in the right lower quadrant and right flank.  This was associated with some constipation.  She denies any blood in the urine or dysuria.  She had a partial bowel movement this morning that was yellow that did help with her abdominal discomfort.  Last colonoscopy was in  and showed some polyps but no masses.  She denies any fever, chills or blood in her stool.      Review of Systems   Constitutional: Negative.    HENT: Negative.     Respiratory: Negative.     Cardiovascular: Negative.    Gastrointestinal:  Positive for abdominal distention, abdominal pain and constipation. Negative for anal bleeding, blood in stool, diarrhea, nausea and vomiting.   Genitourinary: Negative.    Musculoskeletal:  Positive for back pain and myalgias.   Skin: Negative.        Past Medical History:   Diagnosis Date   • Acid reflux    • Back injury    •  COPD (chronic obstructive pulmonary disease) (HCC)    • Fibromyalgia    • Hypertension    • Seasonal allergies      Past Surgical History:   Procedure Laterality Date   • COLONOSCOPY     • EYE SURGERY     • KNEE SURGERY  1998   • [place] COLONOSCOPY FLX DX W/COLLJ SPEC WHEN PFRMD N/A 05/09/2017    Procedure: EGD AND COLONOSCOPY;  Surgeon: Kimberly Zapata MD;  Location: [facility];  Service: Gastroenterology     Family History   Problem Relation Age of Onset   • Pancreatic cancer Mother 72   • Coronary artery disease Father    • Diabetes Father    • Hypertension Father    • Heart disease Father    • Lung cancer Sister 37   • No Known Problems Daughter    • No Known Problems Maternal Grandmother    • Lung cancer Maternal Grandfather 77   • Diabetes Paternal Grandmother    • No Known Problems Paternal Grandfather    • Pancreatic cancer Brother    • Pancreatic cancer Brother    • No Known Problems Son    • No Known Problems Son      Social History     Socioeconomic History   • Marital status:      Spouse name: None   • Number of children: 3   • Years of education: less than high school   • Highest education level: None   Occupational History   • Occupation: unemployed   Tobacco Use   • Smoking status: Every Day     Current packs/day: 1.50     Average packs/day: 1.5 packs/day for 56.2 years (84.3 ttl pk-yrs)     Types: Cigarettes     Start date: 1968     Passive exposure: Past   • Smokeless tobacco: Never   Vaping Use   • Vaping status: Never Used   Substance and Sexual Activity   • Alcohol use: Never   • Drug use: No   • Sexual activity: Not Currently   Other Topics Concern   • None   Social History Narrative    Always uses seatbelt    Daily coffee consumption    Daily tea consumption    Dental care regularly    Exercises regularly    Multiple organ donor    No guns in the home    No living will    Denies pets/ animals in the home    Power of  in existence- denied         Social Determinants of Health      Financial Resource Strain: Low Risk  (7/12/2023)    Overall Financial Resource Strain (CARDIA)    • Difficulty of Paying Living Expenses: Not hard at all   Food Insecurity: Not on file   Transportation Needs: No Transportation Needs (7/12/2023)    PRAPARE - Transportation    • Lack of Transportation (Medical): No    • Lack of Transportation (Non-Medical): No   Physical Activity: Not on file   Stress: Not on file   Social Connections: Not on file   Intimate Partner Violence: Not on file   Housing Stability: Not on file     Current Outpatient Medications on File Prior to Visit   Medication Sig   • acetaminophen (TYLENOL) 650 mg CR tablet Take by mouth every 8 (eight) hours as needed     • albuterol (PROVENTIL HFA,VENTOLIN HFA) 90 mcg/act inhaler Inhale 2 puffs every 6 (six) hours as needed for wheezing   • ALPRAZolam (XANAX) 0.25 mg tablet Take 1 tablet (0.25 mg total) by mouth 3 (three) times a day as needed for anxiety   • amLODIPine (NORVASC) 5 mg tablet TAKE 1 TABLET (5 MG TOTAL) BY MOUTH DAILY.   • cetirizine (ZyrTEC) 10 mg tablet TAKE 1 TABLET BY MOUTH EVERY DAY   • Cholecalciferol (VITAMIN D3 PO) Take by mouth   • Diclofenac Sodium (VOLTAREN) 1 % Apply 2 g topically 4 (four) times a day   • escitalopram (LEXAPRO) 10 mg tablet TAKE 1 TABLET BY MOUTH EVERY DAY   • fluticasone-umeclidinium-vilanterol 100-62.5-25 mcg/actuation inhaler Inhale 1 puff daily Rinse mouth after use.   • lidocaine (Lidoderm) 5 % Apply 1 patch topically daily Remove & Discard patch within 12 hours or as directed by MD (Patient taking differently: Apply 1 patch topically as needed Remove & Discard patch within 12 hours or as directed by MD)   • lisinopril (ZESTRIL) 20 mg tablet TAKE 1 TABLET BY MOUTH EVERY DAY   • MINOXIDIL, TOPICAL, 5 % SOLN Apply 1 application topically in the morning For hair   • montelukast (SINGULAIR) 10 mg tablet TAKE 1 TABLET BY MOUTH EVERYDAY AT BEDTIME   • nystatin (MYCOSTATIN) 500,000 units/5 mL suspension  "Take 5 mL (500,000 Units total) by mouth 4 (four) times a day   • Omega-3 Fatty Acids (FISH OIL PO) Take 1,000 mg by mouth   • pantoprazole (PROTONIX) 40 mg tablet TAKE 1 TABLET BY MOUTH TWICE A DAY   • promethazine-dextromethorphan (PHENERGAN-DM) 6.25-15 mg/5 mL oral syrup Take 5 mL by mouth 4 (four) times a day as needed for cough   • rosuvastatin (CRESTOR) 5 mg tablet TAKE 1 TABLET (5 MG TOTAL) BY MOUTH DAILY.   • TURMERIC PO Take by mouth   • TURMERIC-GINGER PO Take by mouth   • albuterol (2.5 mg/3 mL) 0.083 % nebulizer solution Take 3 mL (2.5 mg total) by nebulization every 6 (six) hours as needed for wheezing or shortness of breath (Patient not taking: Reported on 2/5/2024)     Allergies   Allergen Reactions   • Augmentin [Amoxicillin-Pot Clavulanate] Vomiting   • Miconazole Itching and Swelling   • Wixela Inhub [Fluticasone-Salmeterol] Palpitations     Immunization History   Administered Date(s) Administered   • Tdap 02/28/2008       Objective     /82 (BP Location: Left arm, Patient Position: Sitting, Cuff Size: Large)   Pulse 86   Temp 97.8 °F (36.6 °C)   Ht 5' 3.5\" (1.613 m)   Wt 89 kg (196 lb 3.2 oz)   LMP  (LMP Unknown)   SpO2 99%   BMI 34.21 kg/m²     Physical Exam  Vitals reviewed.   HENT:      Head: Normocephalic.      Mouth/Throat:      Mouth: Mucous membranes are moist.   Eyes:      General: No scleral icterus.     Extraocular Movements: Extraocular movements intact.      Conjunctiva/sclera: Conjunctivae normal.      Pupils: Pupils are equal, round, and reactive to light.   Cardiovascular:      Rate and Rhythm: Normal rate and regular rhythm.   Pulmonary:      Effort: Pulmonary effort is normal.   Abdominal:      General: There is no distension.      Palpations: There is no mass.      Tenderness: There is abdominal tenderness. There is no right CVA tenderness or left CVA tenderness.      Hernia: No hernia is present.      Comments: (+) TTP over the RLQ and inguinal ligament. No G/R. Neg " heel strike   Musculoskeletal:      Cervical back: No tenderness.      Right lower leg: No edema.      Left lower leg: No edema.   Lymphadenopathy:      Cervical: No cervical adenopathy.   Skin:     General: Skin is warm.      Capillary Refill: Capillary refill takes less than 2 seconds.   Neurological:      General: No focal deficit present.      Mental Status: She is alert and oriented to person, place, and time.       Matt Grider MD

## 2024-03-08 NOTE — TELEPHONE ENCOUNTER
"      CARDIOLOGY    Sia Cosme MD    ENCOUNTER DATE:  03/08/2024    Brianne Mendoza / 42 y.o. / female        CHIEF COMPLAINT / REASON FOR OFFICE VISIT     Follow-up      HISTORY OF PRESENT ILLNESS       HPI    Brianne Mnedoza is a 42 y.o. female     This is a nice lady who is being followed in the [facility] in [place]. She was diagnosed with PVCs. She had a burden of up to 6.2% based on one tracing. She was having symptoms of palpitations and getting dizzy and lightheaded. She was placed on diltiazem 120 mg a day and did well on it. She became pregnant and it was recommended that she come off the diltiazem and switch to metoprolol. It sounds like she was evaluated for an ablation, but the electrophysiologist said the palpitations could actually get better during pregnancy and they did. Now she is not taking any medications.     She had an echo in February 2023 showed normal LV systolic function and no significant valve disease.    She says about every 2 weeks she has symptoms of palpitations.       REVIEW OF SYSTEMS     Review of Systems   Constitutional: Negative for chills, fever, weight gain and weight loss.   Cardiovascular:  Negative for leg swelling.   Respiratory:  Negative for cough, snoring and wheezing.    Hematologic/Lymphatic: Negative for bleeding problem. Does not bruise/bleed easily.   Skin:  Negative for color change.   Musculoskeletal:  Negative for falls, joint pain and myalgias.   Gastrointestinal:  Negative for melena.   Genitourinary:  Negative for hematuria.   Neurological:  Negative for excessive daytime sleepiness.   Psychiatric/Behavioral:  Negative for depression. The patient is not nervous/anxious.          VITAL SIGNS     Visit Vitals  /86   Pulse 91   Ht 152.4 cm (60\")   Wt 73.4 kg (161 lb 12.8 oz)   SpO2 98%   BMI 31.60 kg/m²         Wt Readings from Last 3 Encounters:   03/08/24 73.4 kg (161 lb 12.8 oz)   01/11/24 78 kg (172 lb)   06/12/23 84.4 kg (186 lb)     Body " Incruse inhaler is not covered by ins  Cost $380, was given samples,  [facility] [address] adv what to do  mass index is 31.6 kg/m².      PHYSICAL EXAMINATION     Constitutional:       General: Not in acute distress.  Neck:      Vascular: No carotid bruit or JVD.   Pulmonary:      Effort: Pulmonary effort is normal.      Breath sounds: Normal breath sounds.   Cardiovascular:      Normal rate. Regular rhythm.      Murmurs: There is no murmur.   Psychiatric:         Mood and Affect: Mood and affect normal.           REVIEWED DATA       ECG 12 Lead    Date/Time: 3/8/2024 10:26 AM  Performed by: Sia Cosme MD    Authorized by: Sia Cosme MD  Comparison: compared with previous ECG from 11/4/2022  Similar to previous ECG  Rhythm: sinus rhythm  BPM: 81  Conduction: conduction normal  ST Segments: ST segments normal  T Waves: T waves normal    Clinical impression: normal ECG                Lab Results   Component Value Date    GLUCOSE 92 02/10/2023    BUN 10 02/10/2023    CREATININE 0.77 02/10/2023    EGFRRESULT 110.4 09/27/2022    EGFR 99.5 02/10/2023    BCR 13.0 02/10/2023    K 4.3 02/10/2023    CO2 28.6 02/10/2023    CALCIUM 9.6 02/10/2023    PROTENTOTREF 7.0 09/27/2022    ALBUMIN 4.9 02/10/2023    BILITOT 0.4 02/10/2023    AST 20 02/10/2023    ALT 31 02/10/2023       ASSESSMENT & PLAN      Diagnosis Plan   1. Gastrointestinal hemorrhage with melena  Ambulatory Referral to Gastroenterology          1.  Occasional PVCs which are symptomatic.  She was previously on Diltiazem and then on metoprolol while she was pregnant.  Overall her palpitations are controlled with taking metoprolol daily.  I told her that on the day that she is having a lot of palpitations she could take an additional metoprolol but take her time with position changes in case her blood pressure gets low.     2.  Hyperlipidemia.  No recent lipids to review.     3.  Hypothyroid.  Followed by her primary care provider     4.  Anxiety.    5.  She has noticed some blood in her stool so I have placed a referral to GI.    Follow-up with Alessandra in 1  year.      Orders Placed This Encounter   Procedures    Ambulatory Referral to Gastroenterology     Referral Priority:   Routine     Referral Type:   Consultation     Referral Reason:   Specialty Services Required     Referred to Provider:   Hayde Ulrich MD     Requested Specialty:   Gastroenterology     Number of Visits Requested:   1    ECG 12 Lead     This order was created via procedure documentation     Order Specific Question:   Release to patient     Answer:   Routine Release [[id number]]           MEDICATIONS         Discharge Medications            Accurate as of March 8, 2024 10:26 AM. If you have any questions, ask your nurse or doctor.                Continue These Medications        Instructions Start Date   Ada 24 Fe 1-20 MG-MCG(24) per tablet  Generic drug: norethindrone-ethinyl estradiol-ferrous fumarate   1 tablet, Oral, Daily      levothyroxine 50 MCG tablet  Commonly known as: SYNTHROID, LEVOTHROID   50 mcg, Oral, Daily      metoprolol succinate XL 25 MG 24 hr tablet  Commonly known as: TOPROL-XL   25 mg, Oral, Daily      valACYclovir 500 MG tablet  Commonly known as: VALTREX   500 mg, Oral, As Needed             Stop These Medications      ibuprofen 800 MG tablet  Commonly known as: ADVIL,MOTRIN  Stopped by: Sia Cosem MD     Vitamin D (Cholecalciferol) 50 MCG (2000 UT) capsule  Stopped by: MD Sia Ybarra MD  03/08/24  10:26 EST    Part of this note may be an electronic transcription/translation of spoken language to printed text using the Dragon dictation system.

## 2024-03-13 DIAGNOSIS — L02.92 BOIL: Primary | ICD-10-CM

## 2024-03-13 RX ORDER — SULFAMETHOXAZOLE AND TRIMETHOPRIM 800; 160 MG/1; MG/1
1 TABLET ORAL 2 TIMES DAILY
Qty: 14 TABLET | Refills: 0 | Status: SHIPPED | OUTPATIENT
Start: 2024-03-13 | End: 2024-03-20

## 2024-03-18 NOTE — ASSESSMENT & PLAN NOTE
Not in exacerbation  We do not carry the patient's inhalers, and with her allergies we will instead change the patient duo nebs q 6 hours with albuterol nebs p r n  Q 4 hours shortness of breath or wheezing  O2 sat monitoring with supplemental O2 p r n    Continue Singulair at home dosing no

## 2024-04-22 ENCOUNTER — APPOINTMENT (OUTPATIENT)
Dept: LAB | Facility: MEDICAL CENTER | Age: 69
End: 2024-04-22
Payer: COMMERCIAL

## 2024-04-22 DIAGNOSIS — I10 PRIMARY HYPERTENSION: ICD-10-CM

## 2024-04-22 DIAGNOSIS — E78.2 MIXED HYPERLIPIDEMIA: ICD-10-CM

## 2024-04-22 LAB
BASOPHILS # BLD AUTO: 0.07 THOUSANDS/ÂΜL (ref 0–0.1)
BASOPHILS NFR BLD AUTO: 1 % (ref 0–1)
EOSINOPHIL # BLD AUTO: 0.3 THOUSAND/ÂΜL (ref 0–0.61)
EOSINOPHIL NFR BLD AUTO: 4 % (ref 0–6)
ERYTHROCYTE [DISTWIDTH] IN BLOOD BY AUTOMATED COUNT: 13.7 % (ref 11.6–15.1)
HCT VFR BLD AUTO: 45.6 % (ref 34.8–46.1)
HGB BLD-MCNC: 14.6 G/DL (ref 11.5–15.4)
IMM GRANULOCYTES # BLD AUTO: 0.03 THOUSAND/UL (ref 0–0.2)
IMM GRANULOCYTES NFR BLD AUTO: 0 % (ref 0–2)
LYMPHOCYTES # BLD AUTO: 2.08 THOUSANDS/ÂΜL (ref 0.6–4.47)
LYMPHOCYTES NFR BLD AUTO: 27 % (ref 14–44)
MCH RBC QN AUTO: 28.9 PG (ref 26.8–34.3)
MCHC RBC AUTO-ENTMCNC: 32 G/DL (ref 31.4–37.4)
MCV RBC AUTO: 90 FL (ref 82–98)
MONOCYTES # BLD AUTO: 0.82 THOUSAND/ÂΜL (ref 0.17–1.22)
MONOCYTES NFR BLD AUTO: 11 % (ref 4–12)
NEUTROPHILS # BLD AUTO: 4.36 THOUSANDS/ÂΜL (ref 1.85–7.62)
NEUTS SEG NFR BLD AUTO: 57 % (ref 43–75)
NRBC BLD AUTO-RTO: 0 /100 WBCS
PLATELET # BLD AUTO: 293 THOUSANDS/UL (ref 149–390)
PMV BLD AUTO: 9.9 FL (ref 8.9–12.7)
RBC # BLD AUTO: 5.05 MILLION/UL (ref 3.81–5.12)
WBC # BLD AUTO: 7.66 THOUSAND/UL (ref 4.31–10.16)

## 2024-04-22 PROCEDURE — 36415 COLL VENOUS BLD VENIPUNCTURE: CPT

## 2024-04-22 PROCEDURE — 80061 LIPID PANEL: CPT

## 2024-04-22 PROCEDURE — 85025 COMPLETE CBC W/AUTO DIFF WBC: CPT

## 2024-04-22 PROCEDURE — 80053 COMPREHEN METABOLIC PANEL: CPT

## 2024-04-23 LAB
ALBUMIN SERPL BCP-MCNC: 4.6 G/DL (ref 3.5–5)
ALP SERPL-CCNC: 56 U/L (ref 34–104)
ALT SERPL W P-5'-P-CCNC: 14 U/L (ref 7–52)
ANION GAP SERPL CALCULATED.3IONS-SCNC: 10 MMOL/L (ref 4–13)
AST SERPL W P-5'-P-CCNC: 17 U/L (ref 13–39)
BILIRUB SERPL-MCNC: 0.33 MG/DL (ref 0.2–1)
BUN SERPL-MCNC: 8 MG/DL (ref 5–25)
CALCIUM SERPL-MCNC: 9.7 MG/DL (ref 8.4–10.2)
CHLORIDE SERPL-SCNC: 98 MMOL/L (ref 96–108)
CHOLEST SERPL-MCNC: 174 MG/DL
CO2 SERPL-SCNC: 29 MMOL/L (ref 21–32)
CREAT SERPL-MCNC: 0.57 MG/DL (ref 0.6–1.3)
GFR SERPL CREATININE-BSD FRML MDRD: 95 ML/MIN/1.73SQ M
GLUCOSE P FAST SERPL-MCNC: 88 MG/DL (ref 65–99)
HDLC SERPL-MCNC: 64 MG/DL
LDLC SERPL CALC-MCNC: 79 MG/DL (ref 0–100)
NONHDLC SERPL-MCNC: 110 MG/DL
POTASSIUM SERPL-SCNC: 4 MMOL/L (ref 3.5–5.3)
PROT SERPL-MCNC: 7.6 G/DL (ref 6.4–8.4)
SODIUM SERPL-SCNC: 137 MMOL/L (ref 135–147)
TRIGL SERPL-MCNC: 155 MG/DL

## 2024-04-29 ENCOUNTER — TELEPHONE (OUTPATIENT)
Age: 69
End: 2024-04-29

## 2024-04-29 NOTE — TELEPHONE ENCOUNTER
PT said she left a message on her [facility] for Dr Coleman around 8am this morning concerning a possible sinus infection, and has not heard back from anyone yet. I did schedule PT on Wed 5/1 with Dr Coleman, since that was the only availabilty open, and she does not wish to see anyone else but Dr Coleman.    PT requesting a phone call if Dr Coleman would be able to see her sooner.    Please advise    Thank You

## 2024-04-30 ENCOUNTER — OFFICE VISIT (OUTPATIENT)
Dept: FAMILY MEDICINE CLINIC | Facility: CLINIC | Age: 69
End: 2024-04-30
Payer: COMMERCIAL

## 2024-04-30 DIAGNOSIS — J01.40 ACUTE NON-RECURRENT PANSINUSITIS: Primary | ICD-10-CM

## 2024-04-30 DIAGNOSIS — J30.9 ALLERGIC RHINITIS, UNSPECIFIED SEASONALITY, UNSPECIFIED TRIGGER: ICD-10-CM

## 2024-04-30 DIAGNOSIS — J42 CHRONIC BRONCHITIS, UNSPECIFIED CHRONIC BRONCHITIS TYPE (HCC): ICD-10-CM

## 2024-04-30 DIAGNOSIS — R68.89 FLU-LIKE SYMPTOMS: ICD-10-CM

## 2024-04-30 LAB
SARS-COV-2 AG UPPER RESP QL IA: NEGATIVE
VALID CONTROL: NORMAL

## 2024-04-30 PROCEDURE — G2211 COMPLEX E/M VISIT ADD ON: HCPCS | Performed by: FAMILY MEDICINE

## 2024-04-30 PROCEDURE — 99213 OFFICE O/P EST LOW 20 MIN: CPT | Performed by: FAMILY MEDICINE

## 2024-04-30 PROCEDURE — 87811 SARS-COV-2 COVID19 W/OPTIC: CPT | Performed by: FAMILY MEDICINE

## 2024-04-30 RX ORDER — AZITHROMYCIN 250 MG/1
TABLET, FILM COATED ORAL
Qty: 6 TABLET | Refills: 0 | Status: SHIPPED | OUTPATIENT
Start: 2024-04-30 | End: 2024-05-04

## 2024-04-30 RX ORDER — CETIRIZINE HYDROCHLORIDE 10 MG/1
10 TABLET ORAL DAILY
Qty: 90 TABLET | Refills: 1 | Status: SHIPPED | OUTPATIENT
Start: 2024-04-30

## 2024-04-30 NOTE — PROGRESS NOTES
Name: Leanna Ruvalcaba      : 1955      MRN: [id number]  Encounter Provider: Matt Grider MD  Encounter Date: 2024   Encounter department: [facility]    Assessment & Plan     1. Acute non-recurrent pansinusitis  -     azithromycin (ZITHROMAX) 250 mg tablet; 2 tab today then 1 tab po qd x 4 d    2. Chronic bronchitis, unspecified chronic bronchitis type (HCC)  -     fluticasone-umeclidinium-vilanterol 200-62.5-25 mcg/actuation AEPB inhaler; Inhale 1 puff daily Rinse mouth after use.    3. Allergic rhinitis, unspecified seasonality, unspecified trigger  -     cetirizine (ZyrTEC) 10 mg tablet; Take 1 tablet (10 mg total) by mouth daily    4. Flu-like symptoms  -     POCT Rapid Covid Ag    Discussion: I reviewed all patient.  Exam consistent with pansinusitis with exacerbation of her chronic bronchitis.  Rapid COVID neg.  She notes that breathing has been a little bit better since she decreased her Trelegy from 200 to 100.  Will increase Trelegy back to 200/62.5/25.  Start Zithromax as directed.  Refilled Zyrtec for her seasonal allergies.  Recheck Friday if not improving-earlier if worse.  Patient to call for problems or concerns in the interim       Subjective     68-year-old female who presents with 3-day history of worsening congestion now with pain and sinus pressure as well as bilateral ear pressure, productive cough, and chills.  No documented fever. (+) Wheeze. Mild increase in PRETTY.       Review of Systems   Constitutional:  Positive for activity change, chills and fatigue. Negative for fever.   HENT:  Positive for congestion, ear pain, sinus pressure and sinus pain. Negative for ear discharge, sore throat and trouble swallowing.    Eyes: Negative.    Respiratory:  Positive for cough, shortness of breath and wheezing.    Cardiovascular: Negative.    Skin: Negative.        Past Medical History:   Diagnosis Date   • Acid reflux    • Back injury    • COPD (chronic  obstructive pulmonary disease) (HCC)    • Fibromyalgia    • Hypertension    • Seasonal allergies      Past Surgical History:   Procedure Laterality Date   • COLONOSCOPY     • EYE SURGERY     • KNEE SURGERY  1998   • [place] COLONOSCOPY FLX DX W/COLLJ SPEC WHEN PFRMD N/A 05/09/2017    Procedure: EGD AND COLONOSCOPY;  Surgeon: Kimberly Zapata MD;  Location[facility];  Service: Gastroenterology     Family History   Problem Relation Age of Onset   • Pancreatic cancer Mother 72   • Coronary artery disease Father    • Diabetes Father    • Hypertension Father    • Heart disease Father    • Lung cancer Sister 37   • No Known Problems Daughter    • No Known Problems Maternal Grandmother    • Lung cancer Maternal Grandfather 77   • Diabetes Paternal Grandmother    • No Known Problems Paternal Grandfather    • Pancreatic cancer Brother    • Pancreatic cancer Brother    • No Known Problems Son    • No Known Problems Son      Social History     Socioeconomic History   • Marital status:      Spouse name: None   • Number of children: 3   • Years of education: less than high school   • Highest education level: None   Occupational History   • Occupation: unemployed   Tobacco Use   • Smoking status: Every Day     Current packs/day: 1.50     Average packs/day: 1.5 packs/day for 56.3 years (84.5 ttl pk-yrs)     Types: Cigarettes     Start date: 1968     Passive exposure: Past   • Smokeless tobacco: Never   Vaping Use   • Vaping status: Never Used   Substance and Sexual Activity   • Alcohol use: Never   • Drug use: No   • Sexual activity: Not Currently   Other Topics Concern   • None   Social History Narrative    Always uses seatbelt    Daily coffee consumption    Daily tea consumption    Dental care regularly    Exercises regularly    Multiple organ donor    No guns in the home    No living will    Denies pets/ animals in the home    Power of  in existence- denied         Social Determinants of Health     Financial Resource  Strain: Low Risk  (7/12/2023)    Overall Financial Resource Strain (CARDIA)    • Difficulty of Paying Living Expenses: Not hard at all   Food Insecurity: Not on file   Transportation Needs: No Transportation Needs (7/12/2023)    PRAPARE - Transportation    • Lack of Transportation (Medical): No    • Lack of Transportation (Non-Medical): No   Physical Activity: Not on file   Stress: Not on file   Social Connections: Not on file   Intimate Partner Violence: Not on file   Housing Stability: Not on file     Current Outpatient Medications on File Prior to Visit   Medication Sig   • acetaminophen (TYLENOL) 650 mg CR tablet Take by mouth every 8 (eight) hours as needed     • albuterol (PROVENTIL HFA,VENTOLIN HFA) 90 mcg/act inhaler Inhale 2 puffs every 6 (six) hours as needed for wheezing   • ALPRAZolam (XANAX) 0.25 mg tablet Take 1 tablet (0.25 mg total) by mouth 3 (three) times a day as needed for anxiety   • amLODIPine (NORVASC) 5 mg tablet TAKE 1 TABLET (5 MG TOTAL) BY MOUTH DAILY.   • Cholecalciferol (VITAMIN D3 PO) Take by mouth   • Diclofenac Sodium (VOLTAREN) 1 % Apply 2 g topically 4 (four) times a day   • docusate sodium (COLACE) 250 MG capsule Take 1 capsule (250 mg total) by mouth daily   • escitalopram (LEXAPRO) 10 mg tablet TAKE 1 TABLET BY MOUTH EVERY DAY   • lidocaine (Lidoderm) 5 % Apply 1 patch topically daily Remove & Discard patch within 12 hours or as directed by MD (Patient taking differently: Apply 1 patch topically as needed Remove & Discard patch within 12 hours or as directed by MD)   • lisinopril (ZESTRIL) 20 mg tablet TAKE 1 TABLET BY MOUTH EVERY DAY   • MINOXIDIL, TOPICAL, 5 % SOLN Apply 1 application topically in the morning For hair   • montelukast (SINGULAIR) 10 mg tablet TAKE 1 TABLET BY MOUTH EVERYDAY AT BEDTIME   • nystatin (MYCOSTATIN) 500,000 units/5 mL suspension Take 5 mL (500,000 Units total) by mouth 4 (four) times a day   • Omega-3 Fatty Acids (FISH OIL PO) Take 1,000 mg by mouth  "  • pantoprazole (PROTONIX) 40 mg tablet TAKE 1 TABLET BY MOUTH TWICE A DAY   • promethazine-dextromethorphan (PHENERGAN-DM) 6.25-15 mg/5 mL oral syrup Take 5 mL by mouth 4 (four) times a day as needed for cough   • rosuvastatin (CRESTOR) 5 mg tablet TAKE 1 TABLET (5 MG TOTAL) BY MOUTH DAILY.   • TURMERIC PO Take by mouth   • TURMERIC-GINGER PO Take by mouth   • [DISCONTINUED] cetirizine (ZyrTEC) 10 mg tablet TAKE 1 TABLET BY MOUTH EVERY DAY   • [DISCONTINUED] fluticasone-umeclidinium-vilanterol 100-62.5-25 mcg/actuation inhaler Inhale 1 puff daily Rinse mouth after use.   • albuterol (2.5 mg/3 mL) 0.083 % nebulizer solution Take 3 mL (2.5 mg total) by nebulization every 6 (six) hours as needed for wheezing or shortness of breath (Patient not taking: Reported on 2/5/2024)     Allergies   Allergen Reactions   • Augmentin [Amoxicillin-Pot Clavulanate] Vomiting   • Miconazole Itching and Swelling   • Wixela Inhub [Fluticasone-Salmeterol] Palpitations     Immunization History   Administered Date(s) Administered   • Tdap 02/28/2008       Objective     /70 (BP Location: Left arm, Patient Position: Sitting, Cuff Size: Adult)   Pulse 92   Temp 97.8 °F (36.6 °C)   Ht 5' 3.5\" (1.613 m)   Wt 91.6 kg (202 lb)   LMP  (LMP Unknown)   SpO2 98%   BMI 35.22 kg/m²     Physical Exam  Vitals reviewed.   Constitutional:       Comments: Mildly ill appearing female in NAD   HENT:      Head: Normocephalic and atraumatic.      Right Ear: Tympanic membrane, ear canal and external ear normal.      Left Ear: Tympanic membrane, ear canal and external ear normal.      Nose: Congestion present.      Comments: Maxillary and frontal sinuses TTP     Mouth/Throat:      Mouth: Mucous membranes are moist.   Eyes:      Extraocular Movements: Extraocular movements intact.      Conjunctiva/sclera: Conjunctivae normal.      Pupils: Pupils are equal, round, and reactive to light.   Cardiovascular:      Rate and Rhythm: Normal rate and regular " rhythm.   Pulmonary:      Breath sounds: Wheezing (scattered, bibasile, low pitched, expiratory) present. No rhonchi or rales.   Musculoskeletal:      Cervical back: Normal range of motion. No tenderness.   Lymphadenopathy:      Cervical: No cervical adenopathy.   Skin:     Capillary Refill: Capillary refill takes less than 2 seconds.   Neurological:      Mental Status: She is alert.       Matt Grider MD

## 2024-05-06 ENCOUNTER — OFFICE VISIT (OUTPATIENT)
Dept: FAMILY MEDICINE CLINIC | Facility: CLINIC | Age: 69
End: 2024-05-06
Payer: COMMERCIAL

## 2024-05-06 VITALS
BODY MASS INDEX: 33.97 KG/M2 | HEART RATE: 84 BPM | OXYGEN SATURATION: 98 % | DIASTOLIC BLOOD PRESSURE: 86 MMHG | TEMPERATURE: 97.6 F | SYSTOLIC BLOOD PRESSURE: 132 MMHG | HEIGHT: 64 IN | WEIGHT: 199 LBS

## 2024-05-06 VITALS
BODY MASS INDEX: 33.63 KG/M2 | SYSTOLIC BLOOD PRESSURE: 118 MMHG | HEART RATE: 92 BPM | DIASTOLIC BLOOD PRESSURE: 70 MMHG | TEMPERATURE: 97.8 F | WEIGHT: 197 LBS | OXYGEN SATURATION: 98 % | HEIGHT: 64 IN

## 2024-05-06 DIAGNOSIS — D49.0 IPMN (INTRADUCTAL PAPILLARY MUCINOUS NEOPLASM): ICD-10-CM

## 2024-05-06 DIAGNOSIS — J42 CHRONIC BRONCHITIS, UNSPECIFIED CHRONIC BRONCHITIS TYPE (HCC): ICD-10-CM

## 2024-05-06 DIAGNOSIS — M25.551 RIGHT HIP PAIN: ICD-10-CM

## 2024-05-06 DIAGNOSIS — I10 PRIMARY HYPERTENSION: Primary | ICD-10-CM

## 2024-05-06 PROCEDURE — G2211 COMPLEX E/M VISIT ADD ON: HCPCS | Performed by: FAMILY MEDICINE

## 2024-05-06 PROCEDURE — 99214 OFFICE O/P EST MOD 30 MIN: CPT | Performed by: FAMILY MEDICINE

## 2024-05-06 NOTE — PROGRESS NOTES
Name: Leanna Ruvalcaba      : 1955      MRN: [id number]  Encounter Provider: Matt Grider MD  Encounter Date: 2024   Encounter department: [facility]    Assessment & Plan     1. Primary hypertension  Assessment & Plan:  Well controlled. Cont present treatment. Monitor labs. Recheck 6m        2. Chronic bronchitis, unspecified chronic bronchitis type (HCC)  Assessment & Plan:  I reviewed with pt. Breathing not as good on Trelegy 100 as it was on 200mcg. Pt to restart Trelegy 200mcg in 3-4d. F/u with Pulm.  Recheck 6m      3. IPMN (intraductal papillary mucinous neoplasm)  Assessment & Plan:  Due for repeat MRI in Sept. F/u with GI. Recheck 6m      4. Right hip pain  Assessment & Plan:  Suspect hip arthritis.  Check XR. Consider ortho eval. Recheck after XR results are back.     Orders:  -     XR hip/pelv 2-3 vws right if performed; Future; Expected date: 2024         Subjective     f/u multiple med issues  - still with ear congestion and some PND, but overall she feels better  - pt denies worsening cough or SOB.  Pt finds that the Trelegy 100 does not work as well as the 200 - to restart the 200 in 3-4d  - denies CP, palpitations, lightheadedness or other CV symptoms with or without exertion  - has some moles she would like checked  - no new Gi or  complaints. Still has intermittent epigastric discomfort after eating.  - has persistent R hip pain, worse with certain movements.   - no other new concerns         Review of Systems   Constitutional: Negative.    HENT:  Positive for congestion. Negative for ear discharge, ear pain, sinus pressure and sinus pain.    Eyes: Negative.    Respiratory: Negative.     Cardiovascular: Negative.    Gastrointestinal: Negative.    Genitourinary: Negative.    Musculoskeletal:  Positive for arthralgias, back pain and myalgias. Negative for joint swelling.   Skin: Negative.    Neurological:  Negative for dizziness, weakness,  light-headedness and numbness.       Past Medical History:   Diagnosis Date   • Acid reflux    • Back injury    • COPD (chronic obstructive pulmonary disease) (HCC)    • Fibromyalgia    • Hypertension    • Seasonal allergies      Past Surgical History:   Procedure Laterality Date   • COLONOSCOPY     • EYE SURGERY     • KNEE SURGERY  1998   • [place] COLONOSCOPY FLX DX W/COLLJ SPEC WHEN PFRMD N/A 05/09/2017    Procedure: EGD AND COLONOSCOPY;  Surgeon: Kimberly Zapata MD;  Location: [facility];  Service: Gastroenterology     Family History   Problem Relation Age of Onset   • Pancreatic cancer Mother 72   • Coronary artery disease Father    • Diabetes Father    • Hypertension Father    • Heart disease Father    • Lung cancer Sister 37   • No Known Problems Daughter    • No Known Problems Maternal Grandmother    • Lung cancer Maternal Grandfather 77   • Diabetes Paternal Grandmother    • No Known Problems Paternal Grandfather    • Pancreatic cancer Brother    • Pancreatic cancer Brother    • No Known Problems Son    • No Known Problems Son      Social History     Socioeconomic History   • Marital status:      Spouse name: None   • Number of children: 3   • Years of education: less than high school   • Highest education level: None   Occupational History   • Occupation: unemployed   Tobacco Use   • Smoking status: Every Day     Current packs/day: 1.50     Average packs/day: 1.5 packs/day for 56.4 years (84.5 ttl pk-yrs)     Types: Cigarettes     Start date: 1968     Passive exposure: Past   • Smokeless tobacco: Never   Vaping Use   • Vaping status: Never Used   Substance and Sexual Activity   • Alcohol use: Never   • Drug use: No   • Sexual activity: Not Currently   Other Topics Concern   • None   Social History Narrative    Always uses seatbelt    Daily coffee consumption    Daily tea consumption    Dental care regularly    Exercises regularly    Multiple organ donor    No guns in the home    No living will    Denies  pets/ animals in the home    Power of  in existence- denied         Social Determinants of Health     Financial Resource Strain: Low Risk  (7/12/2023)    Overall Financial Resource Strain (CARDIA)    • Difficulty of Paying Living Expenses: Not hard at all   Food Insecurity: Not on file   Transportation Needs: No Transportation Needs (7/12/2023)    PRAPARE - Transportation    • Lack of Transportation (Medical): No    • Lack of Transportation (Non-Medical): No   Physical Activity: Not on file   Stress: Not on file   Social Connections: Not on file   Intimate Partner Violence: Not on file   Housing Stability: Not on file     Current Outpatient Medications on File Prior to Visit   Medication Sig   • acetaminophen (TYLENOL) 650 mg CR tablet Take by mouth every 8 (eight) hours as needed     • albuterol (PROVENTIL HFA,VENTOLIN HFA) 90 mcg/act inhaler Inhale 2 puffs every 6 (six) hours as needed for wheezing   • ALPRAZolam (XANAX) 0.25 mg tablet Take 1 tablet (0.25 mg total) by mouth 3 (three) times a day as needed for anxiety   • amLODIPine (NORVASC) 5 mg tablet TAKE 1 TABLET (5 MG TOTAL) BY MOUTH DAILY.   • cetirizine (ZyrTEC) 10 mg tablet Take 1 tablet (10 mg total) by mouth daily   • Cholecalciferol (VITAMIN D3 PO) Take by mouth   • Diclofenac Sodium (VOLTAREN) 1 % Apply 2 g topically 4 (four) times a day   • docusate sodium (COLACE) 250 MG capsule Take 1 capsule (250 mg total) by mouth daily   • escitalopram (LEXAPRO) 10 mg tablet TAKE 1 TABLET BY MOUTH EVERY DAY   • fluticasone-umeclidinium-vilanterol 200-62.5-25 mcg/actuation AEPB inhaler Inhale 1 puff daily Rinse mouth after use.   • lidocaine (Lidoderm) 5 % Apply 1 patch topically daily Remove & Discard patch within 12 hours or as directed by MD (Patient taking differently: Apply 1 patch topically as needed Remove & Discard patch within 12 hours or as directed by MD)   • lisinopril (ZESTRIL) 20 mg tablet TAKE 1 TABLET BY MOUTH EVERY DAY   • MINOXIDIL,  "TOPICAL, 5 % SOLN Apply 1 application topically in the morning For hair   • montelukast (SINGULAIR) 10 mg tablet TAKE 1 TABLET BY MOUTH EVERYDAY AT BEDTIME   • nystatin (MYCOSTATIN) 500,000 units/5 mL suspension Take 5 mL (500,000 Units total) by mouth 4 (four) times a day   • Omega-3 Fatty Acids (FISH OIL PO) Take 1,000 mg by mouth   • pantoprazole (PROTONIX) 40 mg tablet TAKE 1 TABLET BY MOUTH TWICE A DAY   • promethazine-dextromethorphan (PHENERGAN-DM) 6.25-15 mg/5 mL oral syrup Take 5 mL by mouth 4 (four) times a day as needed for cough   • rosuvastatin (CRESTOR) 5 mg tablet TAKE 1 TABLET (5 MG TOTAL) BY MOUTH DAILY.   • TURMERIC PO Take by mouth   • TURMERIC-GINGER PO Take by mouth   • albuterol (2.5 mg/3 mL) 0.083 % nebulizer solution Take 3 mL (2.5 mg total) by nebulization every 6 (six) hours as needed for wheezing or shortness of breath (Patient not taking: Reported on 2/5/2024)     Allergies   Allergen Reactions   • Augmentin [Amoxicillin-Pot Clavulanate] Vomiting   • Miconazole Itching and Swelling   • Wixela Inhub [Fluticasone-Salmeterol] Palpitations     Immunization History   Administered Date(s) Administered   • Tdap 02/28/2008       Objective     /86 (BP Location: Left arm, Patient Position: Sitting, Cuff Size: Adult)   Pulse 84   Temp 97.6 °F (36.4 °C)   Ht 5' 3.5\" (1.613 m)   Wt 90.3 kg (199 lb)   LMP  (LMP Unknown)   SpO2 98%   BMI 34.70 kg/m²     Physical Exam  Vitals reviewed.   HENT:      Head: Normocephalic.      Right Ear: Tympanic membrane, ear canal and external ear normal.      Left Ear: Tympanic membrane, ear canal and external ear normal.      Nose: Congestion present.      Mouth/Throat:      Mouth: Mucous membranes are moist.   Eyes:      Extraocular Movements: Extraocular movements intact.      Conjunctiva/sclera: Conjunctivae normal.      Pupils: Pupils are equal, round, and reactive to light.   Cardiovascular:      Rate and Rhythm: Normal rate and regular rhythm. "   Pulmonary:      Effort: Pulmonary effort is normal.      Breath sounds: No wheezing or rales.   Abdominal:      General: There is no distension.      Palpations: There is no mass.      Tenderness: There is no abdominal tenderness.   Musculoskeletal:         General: Tenderness (increased discomfort with abduction and interior rotation) present. No swelling.      Cervical back: No tenderness.      Right lower leg: Edema (trace) present.      Left lower leg: Edema (trace) present.   Lymphadenopathy:      Cervical: No cervical adenopathy.   Skin:     General: Skin is warm.      Capillary Refill: Capillary refill takes less than 2 seconds.   Neurological:      Mental Status: She is alert.      Sensory: No sensory deficit.      Motor: No weakness.      Gait: Gait abnormal (mildly antalgic appearing gait.).      Deep Tendon Reflexes: Reflexes normal.   Psychiatric:         Mood and Affect: Mood normal.       Matt Grider MD

## 2024-05-09 PROBLEM — M25.551 RIGHT HIP PAIN: Status: ACTIVE | Noted: 2024-05-09

## 2024-05-09 NOTE — ASSESSMENT & PLAN NOTE
I reviewed with pt. Breathing not as good on Trelegy 100 as it was on 200mcg. Pt to restart Trelegy 200mcg in 3-4d. F/u with Pulm.  Recheck 6m

## 2024-05-14 ENCOUNTER — APPOINTMENT (OUTPATIENT)
Dept: RADIOLOGY | Facility: MEDICAL CENTER | Age: 69
End: 2024-05-14
Payer: COMMERCIAL

## 2024-05-14 DIAGNOSIS — B37.0 THRUSH: ICD-10-CM

## 2024-05-14 DIAGNOSIS — M25.551 RIGHT HIP PAIN: ICD-10-CM

## 2024-05-14 PROCEDURE — 73502 X-RAY EXAM HIP UNI 2-3 VIEWS: CPT

## 2024-05-22 DIAGNOSIS — I10 PRIMARY HYPERTENSION: ICD-10-CM

## 2024-05-22 DIAGNOSIS — F33.0 DEPRESSION, MAJOR, RECURRENT, MILD (HCC): ICD-10-CM

## 2024-05-22 DIAGNOSIS — I10 ESSENTIAL HYPERTENSION: ICD-10-CM

## 2024-05-23 RX ORDER — LISINOPRIL 20 MG/1
TABLET ORAL
Qty: 90 TABLET | Refills: 1 | Status: SHIPPED | OUTPATIENT
Start: 2024-05-23 | End: 2024-05-25

## 2024-05-23 RX ORDER — ESCITALOPRAM OXALATE 10 MG/1
TABLET ORAL
Qty: 90 TABLET | Refills: 1 | Status: SHIPPED | OUTPATIENT
Start: 2024-05-23 | End: 2024-05-25

## 2024-05-23 RX ORDER — AMLODIPINE BESYLATE 5 MG/1
5 TABLET ORAL DAILY
Qty: 90 TABLET | Refills: 1 | Status: SHIPPED | OUTPATIENT
Start: 2024-05-23 | End: 2024-05-25

## 2024-05-24 DIAGNOSIS — F33.0 DEPRESSION, MAJOR, RECURRENT, MILD (HCC): ICD-10-CM

## 2024-05-24 DIAGNOSIS — I10 PRIMARY HYPERTENSION: ICD-10-CM

## 2024-05-24 DIAGNOSIS — I10 ESSENTIAL HYPERTENSION: ICD-10-CM

## 2024-05-25 RX ORDER — LISINOPRIL 20 MG/1
TABLET ORAL
Qty: 90 TABLET | Refills: 1 | Status: SHIPPED | OUTPATIENT
Start: 2024-05-25

## 2024-05-25 RX ORDER — ESCITALOPRAM OXALATE 10 MG/1
TABLET ORAL
Qty: 90 TABLET | Refills: 1 | Status: SHIPPED | OUTPATIENT
Start: 2024-05-25

## 2024-05-25 RX ORDER — AMLODIPINE BESYLATE 5 MG/1
5 TABLET ORAL DAILY
Qty: 90 TABLET | Refills: 1 | Status: SHIPPED | OUTPATIENT
Start: 2024-05-25

## 2024-06-17 ENCOUNTER — NURSE TRIAGE (OUTPATIENT)
Age: 69
End: 2024-06-17

## 2024-06-17 NOTE — TELEPHONE ENCOUNTER
Pt states she started with dull pain behind her right eye, did radiate to entire head and was stabbing. Pain was like it always is when she gets the migraines. She also had aura and light sensitivity. She took Tylenol and migraine went away. The speech concern started after the pain resolved. She explains the episode as knowing what she wanted to say but could not form the proper sentence. The words were scrambled. No slurring. No episodes since.

## 2024-06-17 NOTE — TELEPHONE ENCOUNTER
"Please advise  Patient c/o an episode on Saturday . Started with a right sided Headache behind the right eye, which says she gets occasionally but this was different in that she had a loss for words that lasted about 10 minutes. She took two baby ASA and Tylenol. Symptoms resolved quickly. Episode lasted about one hour. Declines ED at this time. Rather have her PCP's advisement first.    Reason for Disposition   SEVERE headache, sudden-onset (i.e., reaching maximum intensity within seconds to 1 hour)    Answer Assessment - Initial Assessment Questions  1. LOCATION: \"Where does it hurt?\"       On Saturday episode happened , 6/10 headache, behind the right eye ,   2. ONSET: \"When did the headache start?\" (Minutes, hours or days)       Saturday   3. PATTERN: \"Does the pain come and go, or has it been constant since it started?\"      Constant , one hour   4. SEVERITY: \"How bad is the pain?\" and \"What does it keep you from doing?\"  (e.g., Scale 1-10; mild, moderate, or severe)    - MILD (1-3): doesn't interfere with normal activities     - MODERATE (4-7): interferes with normal activities or awakens from sleep     - SEVERE (8-10): excruciating pain, unable to do any normal activities         Moderate   5. RECURRENT SYMPTOM: \"Have you ever had headaches before?\" If Yes, ask: \"When was the last time?\" and \"What happened that time?\"       Yes, gets them about once or twice a month   6. CAUSE: \"What do you think is causing the headache?\"      unknown  7. MIGRAINE: \"Have you been diagnosed with migraine headaches?\" If Yes, ask: \"Is this headache similar?\"       NO  8. HEAD INJURY: \"Has there been any recent injury to the head?\"       Denies   9. OTHER SYMPTOMS: \"Do you have any other symptoms?\" (fever, stiff neck, eye pain, sore throat, cold symptoms)      Loss of words for 10 minutes , blurry vision right eye  10. PREGNANCY: \"Is there any chance you are pregnant?\" \"When was your last menstrual period?\"        " N/a    Protocols used: Headache-ADULT-[place]

## 2024-06-17 NOTE — TELEPHONE ENCOUNTER
Regarding: Speech problem  ----- Message from Kiah ESTEVES sent at 6/17/2024  7:31 AM EDT -----  Leanna Ruvalcaba   to [facility] (supporting Matt Grider MD)         6/17/24  7:12 AM  on Saturday early evening I experienced a problem with my words for about 10 minutes after having a severe migraine headache.  it truly concerned me and and my son and he wanted to take me to the emergency room but  I said no that I would message you today .  Leanna

## 2024-06-19 ENCOUNTER — TELEPHONE (OUTPATIENT)
Dept: FAMILY MEDICINE CLINIC | Facility: CLINIC | Age: 69
End: 2024-06-19

## 2024-06-19 ENCOUNTER — TELEPHONE (OUTPATIENT)
Dept: CARDIOLOGY CLINIC | Facility: CLINIC | Age: 69
End: 2024-06-19

## 2024-06-19 ENCOUNTER — OFFICE VISIT (OUTPATIENT)
Dept: FAMILY MEDICINE CLINIC | Facility: CLINIC | Age: 69
End: 2024-06-19
Payer: COMMERCIAL

## 2024-06-19 VITALS
HEART RATE: 92 BPM | WEIGHT: 194.4 LBS | BODY MASS INDEX: 33.19 KG/M2 | DIASTOLIC BLOOD PRESSURE: 72 MMHG | HEIGHT: 64 IN | SYSTOLIC BLOOD PRESSURE: 120 MMHG | OXYGEN SATURATION: 98 % | TEMPERATURE: 96.2 F

## 2024-06-19 DIAGNOSIS — J42 CHRONIC BRONCHITIS, UNSPECIFIED CHRONIC BRONCHITIS TYPE (HCC): ICD-10-CM

## 2024-06-19 DIAGNOSIS — R73.03 PREDIABETES: ICD-10-CM

## 2024-06-19 DIAGNOSIS — K59.09 CHRONIC CONSTIPATION: ICD-10-CM

## 2024-06-19 DIAGNOSIS — R47.01 APHASIA: Primary | ICD-10-CM

## 2024-06-19 DIAGNOSIS — R26.89 IMBALANCE: ICD-10-CM

## 2024-06-19 DIAGNOSIS — I10 PRIMARY HYPERTENSION: ICD-10-CM

## 2024-06-19 DIAGNOSIS — G45.9 TIA (TRANSIENT ISCHEMIC ATTACK): ICD-10-CM

## 2024-06-19 DIAGNOSIS — E78.2 MIXED HYPERLIPIDEMIA: ICD-10-CM

## 2024-06-19 PROCEDURE — 99215 OFFICE O/P EST HI 40 MIN: CPT | Performed by: FAMILY MEDICINE

## 2024-06-19 PROCEDURE — G2211 COMPLEX E/M VISIT ADD ON: HCPCS | Performed by: FAMILY MEDICINE

## 2024-06-19 RX ORDER — MONTELUKAST SODIUM 10 MG/1
TABLET ORAL
Qty: 90 TABLET | Refills: 1 | Status: SHIPPED | OUTPATIENT
Start: 2024-06-19

## 2024-06-19 RX ORDER — DIPHENHYDRAMINE HCL 50 MG
1 CAPSULE ORAL DAILY
Qty: 30 CAPSULE | Refills: 5 | Status: SHIPPED | OUTPATIENT
Start: 2024-06-19

## 2024-06-19 NOTE — TELEPHONE ENCOUNTER
Family practice calling to setup extended holter monitor. Our office cannot schedule as pt is not seen by cardiology.

## 2024-06-19 NOTE — TELEPHONE ENCOUNTER
Patient said you discussed a halter monitor at her visit, but she doesn't see anything in her chart.

## 2024-06-19 NOTE — PROGRESS NOTES
Ambulatory Visit  Name: Leanna Ruvalcaba      : 1955      MRN: [id number]  Encounter Provider: Matt Grider MD  Encounter Date: 2024   Encounter department: [facility]    Assessment & Plan   1. Aphasia  -     MRI brain IAC wo and w contrast; Future; Expected date: 2024  -     CTA head and neck w wo contrast; Future; Expected date: 2024  -     AMB extended holter monitor; Future; Expected date: 2024  -     Echo complete w/ contrast if indicated; Future; Expected date: 2024  2. Imbalance  -     MRI brain IAC wo and w contrast; Future; Expected date: 2024  -     CTA head and neck w wo contrast; Future; Expected date: 2024  -     AMB extended holter monitor; Future; Expected date: 2024  -     Echo complete w/ contrast if indicated; Future; Expected date: 2024  3. TIA (transient ischemic attack)  -     MRI brain IAC wo and w contrast; Future; Expected date: 2024  -     CTA head and neck w wo contrast; Future; Expected date: 2024  -     AMB extended holter monitor; Future; Expected date: 2024  -     Echo complete w/ contrast if indicated; Future; Expected date: 2024  4. Chronic bronchitis, unspecified chronic bronchitis type (HCC)  5. Primary hypertension  6. Mixed hyperlipidemia  7. Prediabetes     Discussion: I reviewed all with patient.  Experienced a brief episode of aphasia after what appeard to be a migraine.  Symptoms resolved without sequela (except for persistent HA). Exam without focal findings other than some wheeze, as well as imbalance on neurologic testing.  Symptoms may have represented a migraine variant however patient is at higher risk for stroke given her history of smoking, hypertension, hyperlipidemia, prediabetes.  Patient also has a history of large vessel inflammation (aortitis).  Pt has also been asymptomatic since starting ASA.  Given her risks, I believe that the patient should  "have imaging done to r/o CVA or other cause.  Will check MRI of the brain and IAC with and without contrast, as well as a CTA.  PT should also have an echo and some sort of cardiac monitoring (prefer a Zio patch, but will do 48h holter if not available).  Continue present meds.  Urged smoking cessation.  Pt agrees to go to ED if symptoms return or other neurologic/CV symptoms occur.  Recheck 3-4w - earlier if testing is abnormal.  Pt to call for problems or concerns in the interim        History of Present Illness     68-year-old female with a history of hypertension, COPD and previous history of aortitis was in normal state of health until this past Saturday when she had what she described as a typical migraine) visual aura followed by right sided headache).  Patient treated symptoms with Tylenol which is typically effective for her headaches.  Headache resolved without incident however while talking with her son, she developed aphasia (jumbled words).  The symptoms lasted 10 minutes.  They then resolved without sequela.  Son wanted to take her to the emergency room but she refused.  She was here for evaluation  -Patient notes that she has had increased \"whole head\" pressure over the last week or 2.  She also notes some mild positional imbalance for 1 to 2 days prior to this episode.  Since the episode, patient still has this whole head pressure but denies any other neurologic symptoms.  She also denies any palpitations or lightheadedness.  There is also been no asymmetric weakness or numbness.  -Patient is still smoking.  She took 2 baby aspirin's after the episode, and has been taking 1 a day since      Review of Systems   Constitutional: Negative.  Negative for chills and fever.   HENT: Negative.     Eyes:  Positive for visual disturbance (preceding HA, but not now). Negative for discharge and itching.   Respiratory:  Positive for cough, shortness of breath and wheezing.    Cardiovascular: Negative.  "   Gastrointestinal: Negative.    Endocrine: Negative.    Genitourinary: Negative.    Musculoskeletal:  Positive for arthralgias, back pain and myalgias. Negative for joint swelling, neck pain and neck stiffness.   Skin: Negative.    Neurological:  Positive for dizziness (prior to episode but has since resolved) and headaches. Negative for weakness, light-headedness and numbness.   Psychiatric/Behavioral: Negative.       Past Medical History:   Diagnosis Date   • Acid reflux    • Back injury    • COPD (chronic obstructive pulmonary disease) (HCC)    • Fibromyalgia    • Hypertension    • Seasonal allergies      Past Surgical History:   Procedure Laterality Date   • COLONOSCOPY     • EYE SURGERY     • KNEE SURGERY  1998   • [place] COLONOSCOPY FLX DX W/COLLJ SPEC WHEN PFRMD N/A 05/09/2017    Procedure: EGD AND COLONOSCOPY;  Surgeon: Kimberly Zapata MD;  Location: [facility];  Service: Gastroenterology     Family History   Problem Relation Age of Onset   • Pancreatic cancer Mother 72   • Coronary artery disease Father    • Diabetes Father    • Hypertension Father    • Heart disease Father    • Lung cancer Sister 37   • No Known Problems Daughter    • No Known Problems Maternal Grandmother    • Lung cancer Maternal Grandfather 77   • Diabetes Paternal Grandmother    • No Known Problems Paternal Grandfather    • Pancreatic cancer Brother    • Pancreatic cancer Brother    • No Known Problems Son    • No Known Problems Son      Social History     Tobacco Use   • Smoking status: Every Day     Current packs/day: 1.50     Average packs/day: 1.5 packs/day for 56.5 years (84.7 ttl pk-yrs)     Types: Cigarettes     Start date: 1968     Passive exposure: Past   • Smokeless tobacco: Never   Vaping Use   • Vaping status: Never Used   Substance and Sexual Activity   • Alcohol use: Never   • Drug use: No   • Sexual activity: Not Currently     Current Outpatient Medications on File Prior to Visit   Medication Sig   • acetaminophen (TYLENOL)  650 mg CR tablet Take by mouth every 8 (eight) hours as needed     • albuterol (PROVENTIL HFA,VENTOLIN HFA) 90 mcg/act inhaler Inhale 2 puffs every 6 (six) hours as needed for wheezing   • ALPRAZolam (XANAX) 0.25 mg tablet Take 1 tablet (0.25 mg total) by mouth 3 (three) times a day as needed for anxiety   • amLODIPine (NORVASC) 5 mg tablet TAKE 1 TABLET (5 MG TOTAL) BY MOUTH DAILY.   • cetirizine (ZyrTEC) 10 mg tablet Take 1 tablet (10 mg total) by mouth daily   • Cholecalciferol (VITAMIN D3 PO) Take by mouth   • Diclofenac Sodium (VOLTAREN) 1 % Apply 2 g topically 4 (four) times a day   • escitalopram (LEXAPRO) 10 mg tablet TAKE 1 TABLET BY MOUTH EVERY DAY   • fluticasone-umeclidinium-vilanterol 200-62.5-25 mcg/actuation AEPB inhaler Inhale 1 puff daily Rinse mouth after use.   • lidocaine (Lidoderm) 5 % Apply 1 patch topically daily Remove & Discard patch within 12 hours or as directed by MD (Patient taking differently: Apply 1 patch topically as needed Remove & Discard patch within 12 hours or as directed by MD)   • lisinopril (ZESTRIL) 20 mg tablet TAKE 1 TABLET BY MOUTH EVERY DAY   • MINOXIDIL, TOPICAL, 5 % SOLN Apply 1 application topically in the morning For hair   • nystatin (MYCOSTATIN) 500,000 units/5 mL suspension Take 5 mL (500,000 Units total) by mouth 4 (four) times a day   • Omega-3 Fatty Acids (FISH OIL PO) Take 1,000 mg by mouth   • pantoprazole (PROTONIX) 40 mg tablet TAKE 1 TABLET BY MOUTH TWICE A DAY   • promethazine-dextromethorphan (PHENERGAN-DM) 6.25-15 mg/5 mL oral syrup Take 5 mL by mouth 4 (four) times a day as needed for cough   • rosuvastatin (CRESTOR) 5 mg tablet TAKE 1 TABLET (5 MG TOTAL) BY MOUTH DAILY.   • TURMERIC PO Take by mouth   • TURMERIC-GINGER PO Take by mouth   • albuterol (2.5 mg/3 mL) 0.083 % nebulizer solution Take 3 mL (2.5 mg total) by nebulization every 6 (six) hours as needed for wheezing or shortness of breath (Patient not taking: Reported on 2/5/2024)     Allergies  "  Allergen Reactions   • Augmentin [Amoxicillin-Pot Clavulanate] Vomiting   • Miconazole Itching and Swelling   • Wixela Inhub [Fluticasone-Salmeterol] Palpitations     Immunization History   Administered Date(s) Administered   • Tdap 02/28/2008     Objective     /72 (BP Location: Left arm, Patient Position: Sitting, Cuff Size: Large)   Pulse 92   Temp (!) 96.2 °F (35.7 °C)   Ht 5' 3.5\" (1.613 m)   Wt 88.2 kg (194 lb 6.4 oz)   LMP  (LMP Unknown)   SpO2 98%   BMI 33.90 kg/m²     Physical Exam  Vitals reviewed.   Constitutional:       Appearance: Normal appearance.   HENT:      Head: Normocephalic.      Comments: No gross facial asymmetry     Right Ear: Tympanic membrane, ear canal and external ear normal.      Left Ear: Tympanic membrane, ear canal and external ear normal.      Nose: No congestion.      Mouth/Throat:      Mouth: Mucous membranes are moist.   Eyes:      Extraocular Movements: Extraocular movements intact.      Conjunctiva/sclera: Conjunctivae normal.      Pupils: Pupils are equal, round, and reactive to light.   Cardiovascular:      Rate and Rhythm: Normal rate and regular rhythm.      Pulses: Normal pulses.   Pulmonary:      Effort: Pulmonary effort is normal.      Breath sounds: Wheezing (faint, scattered) present. No rales.   Abdominal:      General: There is no distension.      Palpations: There is no mass.      Tenderness: There is no abdominal tenderness.   Musculoskeletal:         General: Tenderness (along paraspinal muscles. No neck rigidity) present.      Cervical back: No tenderness.      Right lower leg: No edema.      Left lower leg: No edema.   Lymphadenopathy:      Cervical: No cervical adenopathy.   Skin:     General: Skin is warm.      Capillary Refill: Capillary refill takes less than 2 seconds.   Neurological:      Mental Status: She is alert.      Cranial Nerves: No cranial nerve deficit.      Sensory: No sensory deficit.      Motor: No weakness.      Coordination: " Coordination abnormal (mild imbalance with Romberg).      Gait: Gait normal.      Deep Tendon Reflexes: Reflexes normal.   Psychiatric:         Mood and Affect: Mood normal.       Administrative Statements

## 2024-06-19 NOTE — TELEPHONE ENCOUNTER
You told patient you wanted her back in 3 to 4 weeks.  She has an AWV scheduled in 4 weeks, but she thinks you still want her in 3 weeks as well.  Should I make an appt. For 3 weeks or is the AWV in 4 weeks sufficient?

## 2024-06-20 ENCOUNTER — TELEPHONE (OUTPATIENT)
Dept: HEMATOLOGY ONCOLOGY | Facility: CLINIC | Age: 69
End: 2024-06-20

## 2024-06-20 NOTE — TELEPHONE ENCOUNTER
Spoke to patient changing follow up with Dr Lau to Tues 1/21 at 9:45 am and patient ok with location/time

## 2024-06-24 DIAGNOSIS — B37.0 THRUSH: ICD-10-CM

## 2024-06-25 DIAGNOSIS — B37.0 THRUSH: ICD-10-CM

## 2024-06-26 ENCOUNTER — HOSPITAL ENCOUNTER (OUTPATIENT)
Dept: CT IMAGING | Facility: CLINIC | Age: 69
Discharge: HOME | End: 2024-06-26
Payer: COMMERCIAL

## 2024-06-26 DIAGNOSIS — G45.9 TIA (TRANSIENT ISCHEMIC ATTACK): ICD-10-CM

## 2024-06-26 DIAGNOSIS — R47.01 APHASIA: ICD-10-CM

## 2024-06-26 DIAGNOSIS — R26.89 IMBALANCE: ICD-10-CM

## 2024-06-26 PROCEDURE — 70496 CT ANGIOGRAPHY HEAD: CPT

## 2024-06-26 PROCEDURE — 70498 CT ANGIOGRAPHY NECK: CPT

## 2024-06-26 RX ORDER — CLOTRIMAZOLE 10 MG/1
10 LOZENGE ORAL; TOPICAL 4 TIMES DAILY
Qty: 28 TROCHE | Refills: 1 | Status: SHIPPED | OUTPATIENT
Start: 2024-06-26

## 2024-06-26 RX ADMIN — IOHEXOL 100 ML: 350 INJECTION, SOLUTION INTRAVENOUS at 09:42

## 2024-06-27 ENCOUNTER — HOSPITAL ENCOUNTER (OUTPATIENT)
Dept: MRI IMAGING | Facility: HOSPITAL | Age: 69
End: 2024-06-27
Payer: COMMERCIAL

## 2024-06-27 DIAGNOSIS — R26.89 IMBALANCE: ICD-10-CM

## 2024-06-27 DIAGNOSIS — G45.9 TIA (TRANSIENT ISCHEMIC ATTACK): ICD-10-CM

## 2024-06-27 DIAGNOSIS — R47.01 APHASIA: ICD-10-CM

## 2024-06-27 PROCEDURE — A9585 GADOBUTROL INJECTION: HCPCS | Performed by: FAMILY MEDICINE

## 2024-06-27 PROCEDURE — 70553 MRI BRAIN STEM W/O & W/DYE: CPT

## 2024-06-27 RX ORDER — GADOBUTROL 604.72 MG/ML
8 INJECTION INTRAVENOUS
Status: COMPLETED | OUTPATIENT
Start: 2024-06-27 | End: 2024-06-27

## 2024-06-27 RX ADMIN — GADOBUTROL 8 ML: 604.72 INJECTION INTRAVENOUS at 17:36

## 2024-07-03 ENCOUNTER — HOSPITAL ENCOUNTER (OUTPATIENT)
Dept: NON INVASIVE DIAGNOSTICS | Facility: HOSPITAL | Age: 69
Discharge: HOME | End: 2024-07-03
Payer: COMMERCIAL

## 2024-07-03 DIAGNOSIS — R26.89 IMBALANCE: ICD-10-CM

## 2024-07-03 DIAGNOSIS — G45.9 TIA (TRANSIENT ISCHEMIC ATTACK): ICD-10-CM

## 2024-07-03 DIAGNOSIS — R47.01 APHASIA: ICD-10-CM

## 2024-07-03 PROCEDURE — 93226 XTRNL ECG REC<48 HR SCAN A/R: CPT

## 2024-07-03 PROCEDURE — 93225 XTRNL ECG REC<48 HRS REC: CPT

## 2024-07-05 ENCOUNTER — HOSPITAL ENCOUNTER (OUTPATIENT)
Dept: NON INVASIVE DIAGNOSTICS | Facility: CLINIC | Age: 69
Discharge: HOME | End: 2024-07-05
Payer: COMMERCIAL

## 2024-07-05 ENCOUNTER — TELEPHONE (OUTPATIENT)
Age: 69
End: 2024-07-05

## 2024-07-05 VITALS
SYSTOLIC BLOOD PRESSURE: 120 MMHG | BODY MASS INDEX: 34.38 KG/M2 | WEIGHT: 194 LBS | HEART RATE: 92 BPM | HEIGHT: 63 IN | DIASTOLIC BLOOD PRESSURE: 72 MMHG

## 2024-07-05 DIAGNOSIS — R26.89 IMBALANCE: ICD-10-CM

## 2024-07-05 DIAGNOSIS — I65.22 INTERNAL CAROTID ARTERY STENOSIS, LEFT: Primary | ICD-10-CM

## 2024-07-05 DIAGNOSIS — G45.9 TIA (TRANSIENT ISCHEMIC ATTACK): ICD-10-CM

## 2024-07-05 DIAGNOSIS — R47.01 APHASIA: ICD-10-CM

## 2024-07-05 LAB
AORTIC ROOT: 2.9 CM
APICAL FOUR CHAMBER EJECTION FRACTION: 63 %
BSA FOR ECHO PROCEDURE: 1.91 M2
E WAVE DECELERATION TIME: 237 MS
E/A RATIO: 0.66
FRACTIONAL SHORTENING: 31 (ref 28–44)
INTERVENTRICULAR SEPTUM IN DIASTOLE (PARASTERNAL SHORT AXIS VIEW): 0.9 CM
INTERVENTRICULAR SEPTUM: 0.9 CM (ref 0.6–1.1)
LAAS-AP2: 17.5 CM2
LAAS-AP4: 11.9 CM2
LEFT ATRIUM SIZE: 3 CM
LEFT ATRIUM VOLUME (MOD BIPLANE): 37 ML
LEFT ATRIUM VOLUME INDEX (MOD BIPLANE): 19.3 ML/M2
LEFT INTERNAL DIMENSION IN SYSTOLE: 2.4 CM (ref 2.1–4)
LEFT VENTRICULAR INTERNAL DIMENSION IN DIASTOLE: 3.5 CM (ref 3.5–6)
LEFT VENTRICULAR POSTERIOR WALL IN END DIASTOLE: 0.9 CM
LEFT VENTRICULAR STROKE VOLUME: 29 ML
LVSV (TEICH): 29 ML
MV E'TISSUE VEL-SEP: 9 CM/S
MV PEAK A VEL: 0.98 M/S
MV PEAK E VEL: 65 CM/S
MV STENOSIS PRESSURE HALF TIME: 69 MS
MV VALVE AREA P 1/2 METHOD: 3.19
RIGHT ATRIUM AREA SYSTOLE A4C: 9.1 CM2
RIGHT VENTRICLE ID DIMENSION: 3.1 CM
SL CV LEFT ATRIUM LENGTH A2C: 5.4 CM
SL CV LV EF: 58
SL CV PED ECHO LEFT VENTRICLE DIASTOLIC VOLUME (MOD BIPLANE) 2D: 50 ML
SL CV PED ECHO LEFT VENTRICLE SYSTOLIC VOLUME (MOD BIPLANE) 2D: 21 ML
TRICUSPID ANNULAR PLANE SYSTOLIC EXCURSION: 2.3 CM

## 2024-07-05 PROCEDURE — 93306 TTE W/DOPPLER COMPLETE: CPT | Performed by: INTERNAL MEDICINE

## 2024-07-05 PROCEDURE — 93306 TTE W/DOPPLER COMPLETE: CPT

## 2024-07-05 NOTE — TELEPHONE ENCOUNTER
Internal carotid artery stenosis 70%- 80% , left. REF BY PCP. CTA 06/26/24, MRI 06/27/24. PER [facility] schedule within 2 weeks. Did sched for 08/23/24

## 2024-07-08 PROCEDURE — 93227 XTRNL ECG REC<48 HR R&I: CPT | Performed by: INTERNAL MEDICINE

## 2024-07-12 DIAGNOSIS — F41.9 ANXIETY: ICD-10-CM

## 2024-07-12 DIAGNOSIS — M70.61 TROCHANTERIC BURSITIS OF RIGHT HIP: ICD-10-CM

## 2024-07-12 DIAGNOSIS — E78.2 MIXED HYPERLIPIDEMIA: ICD-10-CM

## 2024-07-12 RX ORDER — ROSUVASTATIN CALCIUM 5 MG/1
5 TABLET, COATED ORAL DAILY
Qty: 100 TABLET | Refills: 1 | Status: SHIPPED | OUTPATIENT
Start: 2024-07-12

## 2024-07-15 RX ORDER — ALPRAZOLAM 0.25 MG/1
0.25 TABLET ORAL 3 TIMES DAILY PRN
Qty: 90 TABLET | Refills: 0 | Status: SHIPPED | OUTPATIENT
Start: 2024-07-15

## 2024-07-15 NOTE — TELEPHONE ENCOUNTER
1 [id number] 10/23/2023 10/23/2023 ALPRAZolam (Tablet) 90.0 30 0.25 MG NA AYLEEN MADDEN POGODZINSKI [facility] Medicare 0 / 0 [place]

## 2024-07-16 ENCOUNTER — TELEPHONE (OUTPATIENT)
Dept: FAMILY MEDICINE CLINIC | Facility: CLINIC | Age: 69
End: 2024-07-16

## 2024-07-16 ENCOUNTER — OFFICE VISIT (OUTPATIENT)
Dept: FAMILY MEDICINE CLINIC | Facility: CLINIC | Age: 69
End: 2024-07-16
Payer: COMMERCIAL

## 2024-07-16 ENCOUNTER — TELEPHONE (OUTPATIENT)
Age: 69
End: 2024-07-16

## 2024-07-16 VITALS
BODY MASS INDEX: 34.54 KG/M2 | WEIGHT: 195 LBS | HEART RATE: 85 BPM | OXYGEN SATURATION: 98 % | SYSTOLIC BLOOD PRESSURE: 136 MMHG | TEMPERATURE: 98 F | DIASTOLIC BLOOD PRESSURE: 78 MMHG

## 2024-07-16 DIAGNOSIS — I67.1 ANTERIOR COMMUNICATING ARTERY ANEURYSM: ICD-10-CM

## 2024-07-16 DIAGNOSIS — I65.22 LEFT CAROTID STENOSIS: ICD-10-CM

## 2024-07-16 DIAGNOSIS — Z00.00 MEDICARE ANNUAL WELLNESS VISIT, SUBSEQUENT: Primary | ICD-10-CM

## 2024-07-16 DIAGNOSIS — R73.03 PREDIABETES: ICD-10-CM

## 2024-07-16 DIAGNOSIS — D49.0 IPMN (INTRADUCTAL PAPILLARY MUCINOUS NEOPLASM): ICD-10-CM

## 2024-07-16 DIAGNOSIS — J42 CHRONIC BRONCHITIS, UNSPECIFIED CHRONIC BRONCHITIS TYPE (HCC): ICD-10-CM

## 2024-07-16 DIAGNOSIS — I10 PRIMARY HYPERTENSION: ICD-10-CM

## 2024-07-16 DIAGNOSIS — I67.1 ANTERIOR COMMUNICATING ARTERY ANEURYSM: Primary | ICD-10-CM

## 2024-07-16 PROCEDURE — G0439 PPPS, SUBSEQ VISIT: HCPCS | Performed by: FAMILY MEDICINE

## 2024-07-16 PROCEDURE — 99214 OFFICE O/P EST MOD 30 MIN: CPT | Performed by: FAMILY MEDICINE

## 2024-07-16 PROCEDURE — G0444 DEPRESSION SCREEN ANNUAL: HCPCS | Performed by: FAMILY MEDICINE

## 2024-07-16 NOTE — PROGRESS NOTES
Ambulatory Visit  Name: Leanna Ruvalcaba      : 1955      MRN: [id number]  Encounter Provider: Matt Grider MD  Encounter Date: 2024   Encounter department: [facility]    Assessment & Plan   1. Medicare annual wellness visit, subsequent  2. Primary hypertension  Assessment & Plan:  BP stable, though sl higher than goal. Urged diet compliance - limit salt intake.  Recheck 4-6w   3. Chronic bronchitis, unspecified chronic bronchitis type (HCC)  Assessment & Plan:  Still with scattered wheeze. Urged smoking cessation.  Continue Trelegy and rescue inhaler. Recheck 4-6w  4. IPMN (intraductal papillary mucinous neoplasm)  Assessment & Plan:  For repeat abd MRI in Sept  5. Prediabetes  Assessment & Plan:  Monitor labs. Urged diet control and weight loss. Recheck 4-6w  6. Anterior communicating artery aneurysm  Assessment & Plan:  Asymptomatic.  Incidental finding during work up for TIA.  Will refer to neurosurgery for further eval.  Monitor BP and urged smoking cessation. To ED if severe HA occurs. Recheck 4-6w  7. Left carotid stenosis  Assessment & Plan:  ? Related to TIA that manifested as expressive aphasia. Awaiting vasc surgery eval.  Continue ASA (pt has been asymptomatic since starting med). Urged smoking cessation.  Recheck 4-6w     Preventive health issues were discussed with patient, and age appropriate screening tests were ordered as noted in patient's After Visit Summary. Personalized health advice and appropriate referrals for health education or preventive services given if needed, as noted in patient's After Visit Summary.    History of Present Illness     f/u multiple med issues and AWV  - no further neuro symptoms. Reviewed MRI and CTA results.  Patient found to have a 70 to 80% left common and internal carotid artery stenosis that may have been the source of her TIA which presented as expressive aphasia.  Patient also had an incidental finding of a 3 mm  anterior communicating artery aneurysm.  Patient was referred to vascular surgery for the carotid issue and was initially given an Aug 23 appointment.  After discussion with the neurosurgeon, patient was supposed to be moved up in their schedule-if she has not heard from the office yet.  She has not had a referral yet for the aneurysm.  -Patient also notes a 1 week history of right posterior leg pain.  She states that she was getting off her couch when she got a sudden sharp pain in the popliteal fossa area.  Since then, she has had pain in the upper calf as well as swelling around the posterior lateral knee.  She denies any distal swelling.  Pain is worse with ambulation and standing.  -Patient still smokes approximately 1-1 and half packs a day.  Patient has been avoiding the hot humid weather and staying indoors.  She denies any worsening shortness of breath.  -Patient still has some upper abd discomfort and low chest discomfort. Discomfort is intermittent, occasionally worse with eating, but can occur spontaneously. No change in Bms.   - AWV done       Patient Care Team:  Matt Grider MD as PCP - General  Matt Grider MD as PCP - [facility] ([facility]  MD Meagan Bright MD Chatargy Kaza, MD as Endoscopist  Ann Lau MD (Surgical Oncology)  Zeke Maxwell MD (Gastroenterology)    Review of Systems   Constitutional:  Positive for activity change and fatigue. Negative for chills and fever.   HENT:  Positive for congestion (mild, chronic). Negative for ear discharge, ear pain, sinus pressure and sinus pain.    Eyes: Negative.    Respiratory:  Positive for shortness of breath. Negative for chest tightness.    Cardiovascular:  Negative for chest pain, palpitations and leg swelling.   Gastrointestinal:  Positive for abdominal pain. Negative for abdominal distention, constipation, diarrhea, nausea and vomiting.   Genitourinary: Negative.    Musculoskeletal:   Positive for arthralgias, back pain, joint swelling and myalgias.   Skin: Negative.    Neurological:  Positive for speech difficulty (none since TIA episode). Negative for dizziness, weakness, light-headedness and headaches.   Hematological: Negative.    Psychiatric/Behavioral:  Positive for dysphoric mood (occ down mood). Negative for suicidal ideas. The patient is not nervous/anxious.      Medical History Reviewed by provider this encounter:  Tobacco  Allergies  Meds  Problems  Med Hx  Surg Hx  Fam Hx       Annual Wellness Visit Questionnaire   Leanna is here for her Subsequent Wellness visit. Last Medicare Wellness visit information reviewed, patient interviewed and updates made to the record today.      Health Risk Assessment:   Patient rates overall health as fair. Patient feels that their physical health rating is slightly worse. Patient is very satisfied with their life. Eyesight was rated as same. Hearing was rated as same. Patient feels that their emotional and mental health rating is same. Patients states they are never, rarely angry. Patient states they are often unusually tired/fatigued. Pain experienced in the last 7 days has been a lot. Patient's pain rating has been 5/10. Patient states that she has experienced no weight loss or gain in last 6 months.     Depression Screening:   PHQ-2 Score: 0      Fall Risk Screening:   In the past year, patient has experienced: no history of falling in past year      Urinary Incontinence Screening:   Patient has leaked urine accidently in the last six months. Occ urge and stress episodes    Home Safety:  Patient does not have trouble with stairs inside or outside of their home. Patient has working smoke alarms and has working carbon monoxide detector. Home safety hazards include: none.     Nutrition:   Current diet is Regular.     Medications:   Patient is currently taking over-the-counter supplements. OTC medications include: see medication list. Patient is  able to manage medications.     Activities of Daily Living (ADLs)/Instrumental Activities of Daily Living (IADLs):   Walk and transfer into and out of bed and chair?: Yes  Dress and groom yourself?: Yes    Bathe or shower yourself?: Yes    Feed yourself? Yes  Do your laundry/housekeeping?: Yes  Manage your money, pay your bills and track your expenses?: Yes  Make your own meals?: Yes    Do your own shopping?: Yes    Previous Hospitalizations:   Any hospitalizations or ED visits within the last 12 months?: No      Advance Care Planning:   Living will: No    Durable POA for healthcare: No    Advanced directive: No    Advanced directive counseling given: Yes      Cognitive Screening:   Provider or family/friend/caregiver concerned regarding cognition?: No    PREVENTIVE SCREENINGS      Cardiovascular Screening:    General: Screening Not Indicated and History Lipid Disorder      Diabetes Screening:     General: Screening Current      Colorectal Cancer Screening:     General: Screening Current      Breast Cancer Screening:     General: Screening Current      Cervical Cancer Screening:    General: Screening Not Indicated      Osteoporosis Screening:    General: Risks and Benefits Discussed    Due for: DXA Axial      Abdominal Aortic Aneurysm (AAA) Screening:        General: Screening Not Indicated      Lung Cancer Screening:     General: Screening Current      Hepatitis C Screening:    General: Screening Current    Screening, Brief Intervention, and Referral to Treatment (SBIRT)    Screening  Typical number of drinks in a day: 0  Typical number of drinks in a week: 0  Interpretation: Low risk drinking behavior.    AUDIT-C Screenin) How often did you have a drink containing alcohol in the past year? never  2) How many drinks did you have on a typical day when you were drinking in the past year? 0  3) How often did you have 6 or more drinks on one occasion in the past year? never    AUDIT-C Score: 0  Interpretation:  Score 0-2 (female): Negative screen for alcohol misuse    Single Item Drug Screening:  How often have you used an illegal drug (including marijuana) or a prescription medication for non-medical reasons in the past year? never    Single Item Drug Screen Score: 0  Interpretation: Negative screen for possible drug use disorder    Annual Depression Screening  Time spent screening and evaluating the patient for depression during today's encounter was 5 minutes.    Other Counseling Topics:   Calcium and vitamin D intake and regular weightbearing exercise.     Social Determinants of Health     Financial Resource Strain: Low Risk  (7/12/2023)    Overall Financial Resource Strain (CARDIA)    • Difficulty of Paying Living Expenses: Not hard at all   Food Insecurity: No Food Insecurity (7/16/2024)    Hunger Vital Sign    • Worried About Running Out of Food in the Last Year: Never true    • Ran Out of Food in the Last Year: Never true   Transportation Needs: No Transportation Needs (7/16/2024)    PRAPARE - Transportation    • Lack of Transportation (Medical): No    • Lack of Transportation (Non-Medical): No   Housing Stability: Low Risk  (7/16/2024)    Housing Stability Vital Sign    • Unable to Pay for Housing in the Last Year: No    • Number of Times Moved in the Last Year: 1    • Homeless in the Last Year: No   Utilities: Not At Risk (7/16/2024)    [facility] Utilities    • Threatened with loss of utilities: No     No results found.    Objective     /78 (BP Location: Left arm, Patient Position: Sitting, Cuff Size: Large)   Pulse 85   Temp 98 °F (36.7 °C)   Wt 88.5 kg (195 lb)   LMP  (LMP Unknown)   SpO2 98%   BMI 34.54 kg/m²     Physical Exam  Vitals reviewed.   Constitutional:       Appearance: Normal appearance.   HENT:      Head: Normocephalic.      Comments: No gross facial asymmetry     Right Ear: Tympanic membrane, ear canal and external ear normal.      Left Ear: Tympanic membrane, ear canal and external ear  normal.      Nose: No congestion.      Mouth/Throat:      Mouth: Mucous membranes are moist.   Eyes:      Extraocular Movements: Extraocular movements intact.      Conjunctiva/sclera: Conjunctivae normal.      Pupils: Pupils are equal, round, and reactive to light.   Neck:      Vascular: No carotid bruit.   Cardiovascular:      Rate and Rhythm: Normal rate and regular rhythm.      Pulses: Normal pulses.   Pulmonary:      Effort: Pulmonary effort is normal.      Breath sounds: Wheezing (scattered, faint) present. No rhonchi or rales.   Abdominal:      General: There is no distension.      Palpations: There is no mass.      Tenderness: There is abdominal tenderness (mild epigastric. No G/R. No masses). There is no right CVA tenderness or left CVA tenderness.   Musculoskeletal:         General: Swelling (sl knee effusion) and tenderness (R popliteal fossa, around small Bakers cyst. Sm knee effusion) present.      Cervical back: No tenderness.      Right lower leg: No edema.      Left lower leg: No edema.   Lymphadenopathy:      Cervical: No cervical adenopathy.   Skin:     General: Skin is warm.      Capillary Refill: Capillary refill takes less than 2 seconds.   Neurological:      Mental Status: She is alert.      Cranial Nerves: No cranial nerve deficit.      Sensory: No sensory deficit.      Motor: No weakness.      Coordination: Coordination abnormal (mild imbalance with Romberg).      Gait: Gait abnormal (mildly antalgic appearing gait).      Deep Tendon Reflexes: Reflexes normal.   Psychiatric:         Mood and Affect: Mood normal.      Comments: PHQ-2/9 Depression Screening    Little interest or pleasure in doing things: 0 - not at all  Feeling down, depressed, or hopeless: 0 - not at all  PHQ-2 Score: 0  PHQ-2 Interpretation: Negative depression screen

## 2024-07-16 NOTE — TELEPHONE ENCOUNTER
"Hello,    The following message was sent via e-mail to the leadership team:     Please advise if you can help facilitate the following overbook request:    Patient Name: Leanna Ruvalcaba     Patient MRN: : [id number]                           Call back  [phone]       Insurance: Marcela  Rep/[facility]     Department:Vascular    Speciality:     Reason for overbook request: STAT REFERRAL    Comments (Write \"N/a\" if no comments): na    Requested doctor and location: Any surgeon-Anshu RICKETTS Naz    Date of current appointment: 08/23/24      Thank you.        "

## 2024-07-16 NOTE — TELEPHONE ENCOUNTER
Spoke with Claritza at the [facility].  Per Dr Paz's message on 7/8, patient is to be seen within 2 weeks by any VS due to symptomatic Carotid Stenosis.  Per patient she has still not heard anything from the [facility].      Call was facilitated on behalf of the patient to get the appointment scheduled. I was advised that patient has been added to high status wait list as well as an overbook list and escalated as high as possible at this time.

## 2024-07-16 NOTE — PATIENT INSTRUCTIONS
Medicare Preventive Visit Patient Instructions  Thank you for completing your Welcome to Medicare Visit or Medicare Annual Wellness Visit today. Your next wellness visit will be due in one year (7/17/2025).  The screening/preventive services that you may require over the next 5-10 years are detailed below. Some tests may not apply to you based off risk factors and/or age. Screening tests ordered at today's visit but not completed yet may show as past due. Also, please note that scanned in results may not display below.  Preventive Screenings:  Service Recommendations Previous Testing/Comments   Colorectal Cancer Screening  * Colonoscopy    * Fecal Occult Blood Test (FOBT)/Fecal Immunochemical Test (FIT)  * Fecal DNA/Cologuard Test  * Flexible Sigmoidoscopy Age: 45-75 years old   Colonoscopy: every 10 years (may be performed more frequently if at higher risk)  OR  FOBT/FIT: every 1 year  OR  Cologuard: every 3 years  OR  Sigmoidoscopy: every 5 years  Screening may be recommended earlier than age 45 if at higher risk for colorectal cancer. Also, an individualized decision between you and your healthcare provider will decide whether screening between the ages of 76-85 would be appropriate. Colonoscopy: 06/30/2022  FOBT/FIT: Not on file  Cologuard: Not on file  Sigmoidoscopy: Not on file    Screening Current     Breast Cancer Screening Age: 40+ years old  Frequency: every 1-2 years  Not required if history of left and right mastectomy Mammogram: 08/24/2023    Screening Current   Cervical Cancer Screening Between the ages of 21-29, pap smear recommended once every 3 years.   Between the ages of 30-65, can perform pap smear with HPV co-testing every 5 years.   Recommendations may differ for women with a history of total hysterectomy, cervical cancer, or abnormal pap smears in past. Pap Smear: 02/11/2019    Screening Not Indicated   Hepatitis C Screening Once for adults born between 1945 and 1965  More frequently in  patients at high risk for Hepatitis C Hep C Antibody: 05/01/2021    Screening Current   Diabetes Screening 1-2 times per year if you're at risk for diabetes or have pre-diabetes Fasting glucose: 88 mg/dL (4/22/2024)  A1C: 5.6 % (4/18/2023)  Screening Current   Cholesterol Screening Once every 5 years if you don't have a lipid disorder. May order more often based on risk factors. Lipid panel: 04/22/2024    Screening Not Indicated  History Lipid Disorder     Other Preventive Screenings Covered by Medicare:  Abdominal Aortic Aneurysm (AAA) Screening: covered once if your at risk. You're considered to be at risk if you have a family history of AAA.  Lung Cancer Screening: covers low dose CT scan once per year if you meet all of the following conditions: (1) Age 55-77; (2) No signs or symptoms of lung cancer; (3) Current smoker or have quit smoking within the last 15 years; (4) You have a tobacco smoking history of at least 20 pack years (packs per day multiplied by number of years you smoked); (5) You get a written order from a healthcare provider.  Glaucoma Screening: covered annually if you're considered high risk: (1) You have diabetes OR (2) Family history of glaucoma OR (3)  aged 50 and older OR (4)  American aged 65 and older  Osteoporosis Screening: covered every 2 years if you meet one of the following conditions: (1) You're estrogen deficient and at risk for osteoporosis based off medical history and other findings; (2) Have a vertebral abnormality; (3) On glucocorticoid therapy for more than 3 months; (4) Have primary hyperparathyroidism; (5) On osteoporosis medications and need to assess response to drug therapy.   Last bone density test (DXA Scan): 09/10/2020.  HIV Screening: covered annually if you're between the age of 15-65. Also covered annually if you are younger than 15 and older than 65 with risk factors for HIV infection. For pregnant patients, it is covered up to 3 times per  pregnancy.    Immunizations:  Immunization Recommendations   Influenza Vaccine Annual influenza vaccination during flu season is recommended for all persons aged >= 6 months who do not have contraindications   Pneumococcal Vaccine   * Pneumococcal conjugate vaccine = PCV13 (Prevnar 13), PCV15 (Vaxneuvance), PCV20 (Prevnar 20)  * Pneumococcal polysaccharide vaccine = PPSV23 (Pneumovax) Adults 19-65 yo with certain risk factors or if 65+ yo  If never received any pneumonia vaccine: recommend Prevnar 20 (PCV20)  Give PCV20 if previously received 1 dose of PCV13 or PPSV23   Hepatitis B Vaccine 3 dose series if at intermediate or high risk (ex: diabetes, end stage renal disease, liver disease)   Respiratory syncytial virus (RSV) Vaccine - COVERED BY MEDICARE PART D  * RSVPreF3 (Arexvy) CDC recommends that adults 60 years of age and older may receive a single dose of RSV vaccine using shared clinical decision-making (SCDM)   Tetanus (Td) Vaccine - COST NOT COVERED BY MEDICARE PART B Following completion of primary series, a booster dose should be given every 10 years to maintain immunity against tetanus. Td may also be given as tetanus wound prophylaxis.   Tdap Vaccine - COST NOT COVERED BY MEDICARE PART B Recommended at least once for all adults. For pregnant patients, recommended with each pregnancy.   Shingles Vaccine (Shingrix) - COST NOT COVERED BY MEDICARE PART B  2 shot series recommended in those 19 years and older who have or will have weakened immune systems or those 50 years and older     Health Maintenance Due:      Topic Date Due   • Cervical Cancer Screening  02/11/2022   • DXA SCAN  09/10/2023   • Lung Cancer Screening  08/24/2024   • Breast Cancer Screening: Mammogram  08/24/2024   • Colorectal Cancer Screening  06/29/2027   • Hepatitis C Screening  Completed     Immunizations Due:      Topic Date Due   • Pneumococcal Vaccine: 65+ Years (1 of 2 - PCV) Never done   • Hepatitis A Vaccine (1 of 2 - Risk  2-dose series) Never done   • COVID-19 Vaccine (1 - 2023-24 season) Never done     Advance Directives   What are advance directives?  Advance directives are legal documents that state your wishes and plans for medical care. These plans are made ahead of time in case you lose your ability to make decisions for yourself. Advance directives can apply to any medical decision, such as the treatments you want, and if you want to donate organs.   What are the types of advance directives?  There are many types of advance directives, and each state has rules about how to use them. You may choose a combination of any of the following:  Living will:  This is a written record of the treatment you want. You can also choose which treatments you do not want, which to limit, and which to stop at a certain time. This includes surgery, medicine, IV fluid, and tube feedings.   Durable power of  for healthcare (DPAHC):  This is a written record that states who you want to make healthcare choices for you when you are unable to make them for yourself. This person, called a proxy, is usually a family member or a friend. You may choose more than 1 proxy.  Do not resuscitate (DNR) order:  A DNR order is used in case your heart stops beating or you stop breathing. It is a request not to have certain forms of treatment, such as CPR. A DNR order may be included in other types of advance directives.  Medical directive:  This covers the care that you want if you are in a coma, near death, or unable to make decisions for yourself. You can list the treatments you want for each condition. Treatment may include pain medicine, surgery, blood transfusions, dialysis, IV or tube feedings, and a ventilator (breathing machine).  Values history:  This document has questions about your views, beliefs, and how you feel and think about life. This information can help others choose the care that you would choose.  Why are advance directives important?   An advance directive helps you control your care. Although spoken wishes may be used, it is better to have your wishes written down. Spoken wishes can be misunderstood, or not followed. Treatments may be given even if you do not want them. An advance directive may make it easier for your family to make difficult choices about your care.   Urinary Incontinence   Urinary incontinence (UI)  is when you lose control of your bladder. UI develops because your bladder cannot store or empty urine properly. The 3 most common types of UI are stress incontinence, urge incontinence, or both.  Medicines:   May be given to help strengthen your bladder control. Report any side effects of medication to your healthcare provider.  Do pelvic muscle exercises often:  Your pelvic muscles help you stop urinating. Squeeze these muscles tight for 5 seconds, then relax for 5 seconds. Gradually work up to squeezing for 10 seconds. Do 3 sets of 15 repetitions a day, or as directed. This will help strengthen your pelvic muscles and improve bladder control.  Train your bladder:  Go to the bathroom at set times, such as every 2 hours, even if you do not feel the urge to go. You can also try to hold your urine when you feel the urge to go. For example, hold your urine for 5 minutes when you feel the urge to go. As that becomes easier, hold your urine for 10 minutes.   Self-care:   Keep a UI record.  Write down how often you leak urine and how much you leak. Make a note of what you were doing when you leaked urine.  Drink liquids as directed. You may need to limit the amount of liquid you drink to help control your urine leakage. Do not drink any liquid right before you go to bed. Limit or do not have drinks that contain caffeine or alcohol.   Prevent constipation.  Eat a variety of high-fiber foods. Good examples are high-fiber cereals, beans, vegetables, and whole-grain breads. Walking is the best way to trigger your intestines to have a bowel  movement.  Exercise regularly and maintain a healthy weight.  Weight loss and exercise will decrease pressure on your bladder and help you control your leakage.   Use a catheter as directed  to help empty your bladder. A catheter is a tiny, plastic tube that is put into your bladder to drain your urine.   Go to behavior therapy as directed.  Behavior therapy may be used to help you learn to control your urge to urinate.    Cigarette Smoking and Your Health   Risks to your health if you smoke:  Nicotine and other chemicals found in tobacco damage every cell in your body. Even if you are a light smoker, you have an increased risk for cancer, heart disease, and lung disease. If you are pregnant or have diabetes, smoking increases your risk for complications.   Benefits to your health if you stop smoking:   You decrease respiratory symptoms such as coughing, wheezing, and shortness of breath.   You reduce your risk for cancers of the lung, mouth, throat, kidney, bladder, pancreas, stomach, and cervix. If you already have cancer, you increase the benefits of chemotherapy. You also reduce your risk for cancer returning or a second cancer from developing.   You reduce your risk for heart disease, blood clots, heart attack, and stroke.   You reduce your risk for lung infections, and diseases such as pneumonia, asthma, chronic bronchitis, and emphysema.  Your circulation improves. More oxygen can be delivered to your body. If you have diabetes, you lower your risk for complications, such as kidney, artery, and eye diseases. You also lower your risk for nerve damage. Nerve damage can lead to amputations, poor vision, and blindness.  You improve your body's ability to heal and to fight infections.  For more information and support to stop smoking:   [employer]  Phone: 6- 918 - 475-1281  Web Address: www.[employer].gov  Weight Management   Why it is important to manage your weight:  Being overweight increases your risk of  health conditions such as heart disease, high blood pressure, type 2 diabetes, and certain types of cancer. It can also increase your risk for osteoarthritis, sleep apnea, and other respiratory problems. Aim for a slow, steady weight loss. Even a small amount of weight loss can lower your risk of health problems.  How to lose weight safely:  A safe and healthy way to lose weight is to eat fewer calories and get regular exercise. You can lose up about 1 pound a week by decreasing the number of calories you eat by 500 calories each day.   Healthy meal plan for weight management:  A healthy meal plan includes a variety of foods, contains fewer calories, and helps you stay healthy. A healthy meal plan includes the following:  Eat whole-grain foods more often.  A healthy meal plan should contain fiber. Fiber is the part of grains, fruits, and vegetables that is not broken down by your body. Whole-grain foods are healthy and provide extra fiber in your diet. Some examples of whole-grain foods are whole-wheat breads and pastas, oatmeal, brown rice, and bulgur.  Eat a variety of vegetables every day.  Include dark, leafy greens such as spinach, kale, tanmay greens, and mustard greens. Eat yellow and orange vegetables such as carrots, sweet potatoes, and winter squash.   Eat a variety of fruits every day.  Choose fresh or canned fruit (canned in its own juice or light syrup) instead of juice. Fruit juice has very little or no fiber.  Eat low-fat dairy foods.  Drink fat-free (skim) milk or 1% milk. Eat fat-free yogurt and low-fat cottage cheese. Try low-fat cheeses such as mozzarella and other reduced-fat cheeses.  Choose meat and other protein foods that are low in fat.  Choose beans or other legumes such as split peas or lentils. Choose fish, skinless poultry (chicken or turkey), or lean cuts of red meat (beef or pork). Before you cook meat or poultry, cut off any visible fat.   Use less fat and oil.  Try baking foods  instead of frying them. Add less fat, such as margarine, sour cream, regular salad dressing and mayonnaise to foods. Eat fewer high-fat foods. Some examples of high-fat foods include french fries, doughnuts, ice cream, and cakes.  Eat fewer sweets.  Limit foods and drinks that are high in sugar. This includes candy, cookies, regular soda, and sweetened drinks.  Exercise:  Exercise at least 30 minutes per day on most days of the week. Some examples of exercise include walking, biking, dancing, and swimming. You can also fit in more physical activity by taking the stairs instead of the elevator or parking farther away from stores. Ask your healthcare provider about the best exercise plan for you.      © Copyright [employer] 2018 Information is for End User's use only and may not be sold, redistributed or otherwise used for commercial purposes. All illustrations and images included in CareNotes® are the copyrighted property of [employer] or [employer]

## 2024-07-18 NOTE — ASSESSMENT & PLAN NOTE
? Related to TIA that manifested as expressive aphasia. Awaiting vasc surgery eval.  Continue ASA (pt has been asymptomatic since starting med). Urged smoking cessation.  Recheck 4-6w

## 2024-07-18 NOTE — ASSESSMENT & PLAN NOTE
Asymptomatic.  Incidental finding during work up for TIA.  Will refer to neurosurgery for further eval.  Monitor BP and urged smoking cessation. To ED if severe HA occurs. Recheck 4-6w

## 2024-07-18 NOTE — ASSESSMENT & PLAN NOTE
Still with scattered wheeze. Urged smoking cessation.  Continue Trelegy and rescue inhaler. Recheck 4-6w

## 2024-07-19 ENCOUNTER — TELEPHONE (OUTPATIENT)
Age: 69
End: 2024-07-19

## 2024-07-19 NOTE — TELEPHONE ENCOUNTER
Internal carotid artery stenosis 70%- 80% , left. REF BY PCP. CTA 06/26/24, MRI 06/27/24. PER [facility] schedule within 2 weeks- Patient is reaching out regarding a sooner appt please dont text -call only.

## 2024-07-23 ENCOUNTER — HOSPITAL ENCOUNTER (EMERGENCY)
Facility: HOSPITAL | Age: 69
Discharge: HOME | End: 2024-07-23
Attending: EMERGENCY MEDICINE
Payer: COMMERCIAL

## 2024-07-23 ENCOUNTER — APPOINTMENT (EMERGENCY)
Dept: VASCULAR ULTRASOUND | Facility: HOSPITAL | Age: 69
End: 2024-07-23
Payer: COMMERCIAL

## 2024-07-23 ENCOUNTER — APPOINTMENT (EMERGENCY)
Dept: RADIOLOGY | Facility: HOSPITAL | Age: 69
End: 2024-07-23
Payer: COMMERCIAL

## 2024-07-23 VITALS
TEMPERATURE: 98.4 F | HEART RATE: 73 BPM | OXYGEN SATURATION: 95 % | DIASTOLIC BLOOD PRESSURE: 74 MMHG | RESPIRATION RATE: 18 BRPM | SYSTOLIC BLOOD PRESSURE: 179 MMHG

## 2024-07-23 DIAGNOSIS — M25.569 KNEE PAIN: Primary | ICD-10-CM

## 2024-07-23 PROCEDURE — 93971 EXTREMITY STUDY: CPT

## 2024-07-23 PROCEDURE — 73564 X-RAY EXAM KNEE 4 OR MORE: CPT

## 2024-07-23 PROCEDURE — 99284 EMERGENCY DEPT VISIT MOD MDM: CPT

## 2024-07-23 PROCEDURE — 99285 EMERGENCY DEPT VISIT HI MDM: CPT

## 2024-07-23 PROCEDURE — 93971 EXTREMITY STUDY: CPT | Performed by: SURGERY

## 2024-07-23 RX ORDER — OXYCODONE HYDROCHLORIDE 5 MG/1
5 TABLET ORAL ONCE
Status: COMPLETED | OUTPATIENT
Start: 2024-07-23 | End: 2024-07-23

## 2024-07-23 RX ADMIN — OXYCODONE HYDROCHLORIDE 5 MG: 5 TABLET ORAL at 08:29

## 2024-07-23 NOTE — ED PROVIDER NOTES
"History  Chief Complaint   Patient presents with    Leg Pain     Right leg pain behind knee cap x 1 week. Referred to ER from primary care provider to evaluate      67 y/o female patient presents to the ER for evaluation of joint pain. Patient stated that she has been having right posterior knee pain for the last couple of days. She was getting of the sofa last week when she felt like something \"popped\" off the back of her knee. She reports that she had this feeling before and was found to have a mensicus tear. She had surgery in her right knee to fix the mensicus tear many years ago. Pain is constant and worse with range of motion. She was seen at her primary care provider and told to come to the ER for evaluation. No noted erythema, ecchymosis, abrasions, open wounds. No recent trauma/injury. Took OTC medications without relief.       History provided by:  Patient      Prior to Admission Medications   Prescriptions Last Dose Informant Patient Reported? Taking?   ALPRAZolam (XANAX) 0.25 mg tablet   No No   Sig: Take 1 tablet (0.25 mg total) by mouth 3 (three) times a day as needed for anxiety   Cholecalciferol (VITAMIN D3 PO)  Self Yes No   Sig: Take by mouth   Diclofenac Sodium (VOLTAREN) 1 %   No No   Sig: Apply 2 g topically 4 (four) times a day   MINOXIDIL, TOPICAL, 5 % SOLN  Self Yes No   Sig: Apply 1 application topically in the morning For hair   Omega-3 Fatty Acids (FISH OIL PO)  Self Yes No   Sig: Take 1,000 mg by mouth   TURMERIC PO  Self Yes No   Sig: Take by mouth   TURMERIC-GINGER PO   Yes No   Sig: Take by mouth   Patient not taking: Reported on 7/16/2024   acetaminophen (TYLENOL) 650 mg CR tablet  Self Yes No   Sig: Take by mouth every 8 (eight) hours as needed     albuterol (2.5 mg/3 mL) 0.083 % nebulizer solution  Self No No   Sig: Take 3 mL (2.5 mg total) by nebulization every 6 (six) hours as needed for wheezing or shortness of breath   Patient not taking: Reported on 2/5/2024   albuterol " (PROVENTIL HFA,VENTOLIN HFA) 90 mcg/act inhaler  Self No No   Sig: Inhale 2 puffs every 6 (six) hours as needed for wheezing   amLODIPine (NORVASC) 5 mg tablet   No No   Sig: TAKE 1 TABLET (5 MG TOTAL) BY MOUTH DAILY.   cetirizine (ZyrTEC) 10 mg tablet   No No   Sig: Take 1 tablet (10 mg total) by mouth daily   clotrimazole (MYCELEX) 10 mg evette   No No   Sig: Take 1 tablet (10 mg total) by mouth 4 (four) times a day   docusate sodium ([employer] Stool Softener) 250 MG capsule   No No   Sig: TAKE 1 CAPSULE BY MOUTH DAILY.   escitalopram (LEXAPRO) 10 mg tablet   No No   Sig: TAKE 1 TABLET BY MOUTH EVERY DAY   fluticasone-umeclidinium-vilanterol 200-62.5-25 mcg/actuation AEPB inhaler   No No   Sig: Inhale 1 puff daily Rinse mouth after use.   lidocaine (Lidoderm) 5 %  Self No No   Sig: Apply 1 patch topically daily Remove & Discard patch within 12 hours or as directed by MD   Patient taking differently: Apply 1 patch topically as needed Remove & Discard patch within 12 hours or as directed by MD   lisinopril (ZESTRIL) 20 mg tablet   No No   Sig: TAKE 1 TABLET BY MOUTH EVERY DAY   montelukast (SINGULAIR) 10 mg tablet   No No   Sig: TAKE 1 TABLET BY MOUTH EVERYDAY AT BEDTIME   pantoprazole (PROTONIX) 40 mg tablet   No No   Sig: TAKE 1 TABLET BY MOUTH TWICE A DAY   promethazine-dextromethorphan (PHENERGAN-DM) 6.25-15 mg/5 mL oral syrup   No No   Sig: Take 5 mL by mouth 4 (four) times a day as needed for cough   rosuvastatin (CRESTOR) 5 mg tablet   No No   Sig: TAKE 1 TABLET (5 MG TOTAL) BY MOUTH DAILY.      Facility-Administered Medications Last Administration Doses Remaining   bupivacaine (PF) (MARCAINE) 0.25 % injection 1 mL 5/11/2023  5:06 PM    triamcinolone acetonide (KENALOG-40) 40 mg/mL injection 20 mg 5/11/2023  5:06 PM           Past Medical History:   Diagnosis Date    Acid reflux     Back injury     COPD (chronic obstructive pulmonary disease) (HCC)     Fibromyalgia     Hypertension     Seasonal allergies         Past Surgical History:   Procedure Laterality Date    COLONOSCOPY      EYE SURGERY      KNEE SURGERY  1998    MO COLONOSCOPY FLX DX W/COLLJ SPEC WHEN PFRMD N/A 05/09/2017    Procedure: EGD AND COLONOSCOPY;  Surgeon: Kimberly Zapata MD;  Location: [facility];  Service: Gastroenterology       Family History   Problem Relation Age of Onset    Pancreatic cancer Mother 72    Coronary artery disease Father     Diabetes Father     Hypertension Father     Heart disease Father     Lung cancer Sister 37    No Known Problems Daughter     No Known Problems Maternal Grandmother     Lung cancer Maternal Grandfather 77    Diabetes Paternal Grandmother     No Known Problems Paternal Grandfather     Pancreatic cancer Brother     Pancreatic cancer Brother     No Known Problems Son     No Known Problems Son      I have reviewed and agree with the history as documented.    E-Cigarette/Vaping    E-Cigarette Use Never User      E-Cigarette/Vaping Substances     Social History     Tobacco Use    Smoking status: Every Day     Current packs/day: 1.50     Average packs/day: 1.5 packs/day for 56.6 years (84.8 ttl pk-yrs)     Types: Cigarettes     Start date: 1968     Passive exposure: Current    Smokeless tobacco: Never   Vaping Use    Vaping status: Never Used   Substance Use Topics    Alcohol use: Never    Drug use: No       Review of Systems   Constitutional:  Negative for chills and fever.   HENT:  Negative for ear pain and sore throat.    Eyes:  Negative for pain and visual disturbance.   Respiratory:  Negative for cough and shortness of breath.    Cardiovascular:  Negative for chest pain and palpitations.   Gastrointestinal:  Negative for abdominal pain and vomiting.   Genitourinary:  Negative for dysuria and hematuria.   Musculoskeletal:  Positive for arthralgias (right leg pain with swelling). Negative for back pain.   Skin:  Negative for color change and rash.   Neurological:  Negative for seizures and syncope.   All other  systems reviewed and are negative.      Physical Exam  Physical Exam  Vitals and nursing note reviewed.   Constitutional:       General: She is not in acute distress.     Appearance: She is well-developed.   HENT:      Head: Normocephalic and atraumatic.      Right Ear: External ear normal.      Left Ear: External ear normal.   Eyes:      Conjunctiva/sclera: Conjunctivae normal.   Cardiovascular:      Rate and Rhythm: Normal rate and regular rhythm.      Pulses: Normal pulses.      Heart sounds: Normal heart sounds. No murmur heard.  Pulmonary:      Effort: Pulmonary effort is normal. No respiratory distress.      Breath sounds: Normal breath sounds.   Abdominal:      Palpations: Abdomen is soft.      Tenderness: There is no abdominal tenderness.   Musculoskeletal:         General: No swelling.      Cervical back: Neck supple.      Right knee: Swelling present. Decreased range of motion. Tenderness present. Normal pulse.   Skin:     General: Skin is warm and dry.      Capillary Refill: Capillary refill takes less than 2 seconds.   Neurological:      Mental Status: She is alert and oriented to person, place, and time. Mental status is at baseline.   Psychiatric:         Mood and Affect: Mood normal.         Vital Signs  ED Triage Vitals   Temperature Pulse Respirations Blood Pressure SpO2   07/23/24 0754 07/23/24 0754 07/23/24 0754 07/23/24 0754 07/23/24 0754   98.4 °F (36.9 °C) 89 20 (!) 208/91 97 %      Temp Source Heart Rate Source Patient Position - Orthostatic VS BP Location FiO2 (%)   07/23/24 0754 07/23/24 0754 07/23/24 0754 07/23/24 0754 --   Oral Monitor Sitting Left arm       Pain Score       07/23/24 0829       8           Vitals:    07/23/24 0754   BP: (!) 208/91   Pulse: 89   Patient Position - Orthostatic VS: Sitting         Visual Acuity      ED Medications  Medications   oxyCODONE (ROXICODONE) IR tablet 5 mg (5 mg Oral Given 7/23/24 0829)       Diagnostic Studies  Results Reviewed       None                    XR knee 4+ vw right injury   Final Result by Reba Maxwell MD (07/23 0836)   No acute displaced fracture   Computerized Assisted Algorithm (CAA) may have been used to analyze all applicable images.         Workstation performed: [id number]         VAS lower limb venous duplex study, unilateral/limited    (Results Pending)              Procedures  Procedures         ED Course  ED Course as of 07/23/24 0903   Tue Jul 23, 2024   0841 XR knee 4+ vw right injury  No acute displaced fracture   0856 Negative for DVT                                               Medical Decision Making  This patient presents with joint pain.  Given history, exam and workup patient likely has arthritis. I have low suspicion for fracture, dislocation, significant ligamentous injury, septic arthritis, gout or new autoimmune arthropathy. Vascular duplex resulted negative for Baker's cyst, or DVT. Advised to follow-up with orthopedics.  Multi modal regimen given for pain relief.  Return to the ER symptoms worsens or questions or concerns arise at home.      Amount and/or Complexity of Data Reviewed  Radiology: ordered. Decision-making details documented in ED Course.    Risk  Prescription drug management.                 Disposition  Final diagnoses:   None     ED Disposition       ED Disposition   Discharge    Condition   Stable    Date/Time   Tue Jul 23, 2024  9:00 AM    Comment   Leanna Ruvalcaba discharge to home/self care.                   Follow-up Information       Follow up With Specialties Details Why Contact Info Additional Information    Matt Grider MD Family Medicine   [address] [place] [address].  [address]  [facility] [address]  [phone]       Critical access hospital Emergency Department Emergency Medicine   [address] [facility] [id number] [phone] Critical access hospital Emergency Department, [address] [place], [place], [address]    [facility]  Physical Therapy   [phone]            Patient's Medications   Discharge Prescriptions    No medications on file       No discharge procedures on file.    PDMP Review         Value Time User    PDMP Reviewed  Yes 7/15/2024 12:56 PM Matt Grider MD            ED Provider  Electronically Signed by             MIRNA Hernandez  07/23/24 0903

## 2024-07-23 NOTE — DISCHARGE INSTRUCTIONS
Dr Lola Dyer pt called office requesting refill on Escitalopram 5 mg  Rx is listed as historical, unsure if pt is suppose to be taking medication? Tylenol/Motrin  Rest, Ice, Elevation, Compression  Follow up with physical therapy  Return to ER if symptoms worsen

## 2024-07-25 ENCOUNTER — OFFICE VISIT (OUTPATIENT)
Dept: OBGYN CLINIC | Facility: CLINIC | Age: 69
End: 2024-07-25
Payer: COMMERCIAL

## 2024-07-25 VITALS
BODY MASS INDEX: 34.73 KG/M2 | HEART RATE: 88 BPM | SYSTOLIC BLOOD PRESSURE: 165 MMHG | HEIGHT: 63 IN | DIASTOLIC BLOOD PRESSURE: 75 MMHG | WEIGHT: 196 LBS

## 2024-07-25 DIAGNOSIS — M17.11 PRIMARY OSTEOARTHRITIS OF RIGHT KNEE: Primary | ICD-10-CM

## 2024-07-25 DIAGNOSIS — S86.911A KNEE STRAIN, RIGHT, INITIAL ENCOUNTER: ICD-10-CM

## 2024-07-25 PROCEDURE — 99203 OFFICE O/P NEW LOW 30 MIN: CPT | Performed by: ORTHOPAEDIC SURGERY

## 2024-07-25 RX ORDER — COVID-19 ANTIGEN TEST
1 KIT MISCELLANEOUS AS NEEDED
COMMUNITY

## 2024-07-25 RX ORDER — IBUPROFEN 200 MG
400 TABLET ORAL EVERY 6 HOURS PRN
COMMUNITY

## 2024-07-25 NOTE — PATIENT INSTRUCTIONS
Discussed conservative treatment with patient at length  Weight bearing as tolerated right lower extremity  Begin physical therapy as directed   Maintain hinged knee brace as directed   Over the counter analgesics as needed / directed   Ice / heat as directed   Follow up 1 month

## 2024-07-25 NOTE — PROGRESS NOTES
"Orthopaedics Office Visit - 1st Patient Visit    ASSESSMENT/PLAN:    Assessment:   Right knee effusion  Right knee strain  Right knee osteoarthritis   HX of multiple meniscus tears       Plan:   Discussed conservative treatment with patient at length  Weight bearing as tolerated right lower extremity  Begin physical therapy as directed   Maintain hinged knee brace as directed   Over the counter analgesics as needed / directed   Ice / heat as directed   Follow up 1 month       To Do Next Visit:  Evaluate right knee pain,   possible aspiration  /  injection     _____________________________________________________  CHIEF COMPLAINT:  Chief Complaint   Patient presents with    Right Knee - Pain, Swelling     2 weeks ago got up off couch and felt something \"pull\" and has been painful ever since.          SUBJECTIVE:  Leanna Ruvalcaba is a 68 y.o. female who presents to the office for a initial evaluation of her right knee.  Patient states that her symptoms have been ongoing past 2 weeks in duration.  Patient states that she was standing up from a seated position and felt a pull in the posterior aspect of her leg followed by pain.  Patient states that her knee became progressively more swollen over the next few days.  Patient was seen and evaluated by her primary care doctor who recommended ice and elevation, compression of the leg.  Patient reported to the ER on 7/23/2024 secondary to increased pain.  An x-ray was taken of the right knee showing degenerative changes in the knee with no acute fractures.  Patient states that she continues to have pain on the posterior medial and medial aspect of the knee and becomes worse with standing from seated position, range of motion of the knee.  Patient does admit to having similar symptoms in the right knee approximately 25 years ago and was diagnosed with a meniscus tear which was treated operatively.  Patient has been taking Motrin as needed for pain which provides mildly of " symptoms.  Patient has been applying compression dressings to the area which has provided minimal relief of symptoms.  Patient denies any popping locking or clicking in the knee.  Patient offers no other complaints at this time      PAST MEDICAL HISTORY:  Past Medical History:   Diagnosis Date    Acid reflux     Back injury     COPD (chronic obstructive pulmonary disease) (HCC)     Fibromyalgia     Hypertension     Seasonal allergies        PAST SURGICAL HISTORY:  Past Surgical History:   Procedure Laterality Date    COLONOSCOPY      EYE SURGERY      KNEE SURGERY  1998    RI COLONOSCOPY FLX DX W/COLLJ SPEC WHEN PFRMD N/A 05/09/2017    Procedure: EGD AND COLONOSCOPY;  Surgeon: Kimberly Zapata MD;  Location: [facility];  Service: Gastroenterology       FAMILY HISTORY:  Family History   Problem Relation Age of Onset    Pancreatic cancer Mother 72    Coronary artery disease Father     Diabetes Father     Hypertension Father     Heart disease Father     Lung cancer Sister 37    No Known Problems Daughter     No Known Problems Maternal Grandmother     Lung cancer Maternal Grandfather 77    Diabetes Paternal Grandmother     No Known Problems Paternal Grandfather     Pancreatic cancer Brother     Pancreatic cancer Brother     No Known Problems Son     No Known Problems Son        SOCIAL HISTORY:  Social History     Tobacco Use    Smoking status: Every Day     Current packs/day: 1.50     Average packs/day: 1.5 packs/day for 56.6 years (84.8 ttl pk-yrs)     Types: Cigarettes     Start date: 1968     Passive exposure: Current    Smokeless tobacco: Never   Vaping Use    Vaping status: Never Used   Substance Use Topics    Alcohol use: Never    Drug use: No       MEDICATIONS:    Current Outpatient Medications:     acetaminophen (TYLENOL) 650 mg CR tablet, Take by mouth every 8 (eight) hours as needed  , Disp: , Rfl:     albuterol (PROVENTIL HFA,VENTOLIN HFA) 90 mcg/act inhaler, Inhale 2 puffs every 6 (six) hours as needed for  wheezing, Disp: 18 g, Rfl: 5    ALPRAZolam (XANAX) 0.25 mg tablet, Take 1 tablet (0.25 mg total) by mouth 3 (three) times a day as needed for anxiety, Disp: 90 tablet, Rfl: 0    amLODIPine (NORVASC) 5 mg tablet, TAKE 1 TABLET (5 MG TOTAL) BY MOUTH DAILY., Disp: 90 tablet, Rfl: 1    cetirizine (ZyrTEC) 10 mg tablet, Take 1 tablet (10 mg total) by mouth daily, Disp: 90 tablet, Rfl: 1    Cholecalciferol (VITAMIN D3 PO), Take by mouth, Disp: , Rfl:     Diclofenac Sodium (VOLTAREN) 1 %, Apply 2 g topically 4 (four) times a day, Disp: 100 g, Rfl: 0    docusate sodium ([employer] Stool Softener) 250 MG capsule, TAKE 1 CAPSULE BY MOUTH DAILY., Disp: 30 capsule, Rfl: 5    escitalopram (LEXAPRO) 10 mg tablet, TAKE 1 TABLET BY MOUTH EVERY DAY, Disp: 90 tablet, Rfl: 1    fluticasone-umeclidinium-vilanterol 200-62.5-25 mcg/actuation AEPB inhaler, Inhale 1 puff daily Rinse mouth after use., Disp: 3 each, Rfl: 3    ibuprofen (MOTRIN) 200 mg tablet, Take 400 mg by mouth every 6 (six) hours as needed for mild pain, Disp: , Rfl:     lidocaine (Lidoderm) 5 %, Apply 1 patch topically daily Remove & Discard patch within 12 hours or as directed by MD (Patient taking differently: Apply 1 patch topically as needed Remove & Discard patch within 12 hours or as directed by MD), Disp: 30 patch, Rfl: 1    lisinopril (ZESTRIL) 20 mg tablet, TAKE 1 TABLET BY MOUTH EVERY DAY, Disp: 90 tablet, Rfl: 1    MINOXIDIL, TOPICAL, 5 % SOLN, Apply 1 application topically in the morning For hair, Disp: , Rfl:     montelukast (SINGULAIR) 10 mg tablet, TAKE 1 TABLET BY MOUTH EVERYDAY AT BEDTIME, Disp: 90 tablet, Rfl: 1    Naproxen Sodium ([employer]S Naproxen Sodium) 220 MG CAPS, Take 1 capsule by mouth if needed (for pain), Disp: , Rfl:     Omega-3 Fatty Acids (FISH OIL PO), Take 1,000 mg by mouth, Disp: , Rfl:     pantoprazole (PROTONIX) 40 mg tablet, TAKE 1 TABLET BY MOUTH TWICE A DAY, Disp: 180 tablet, Rfl: 1    promethazine-dextromethorphan (PHENERGAN-DM) 6.25-15 mg/5  "mL oral syrup, Take 5 mL by mouth 4 (four) times a day as needed for cough, Disp: 150 mL, Rfl: 0    rosuvastatin (CRESTOR) 5 mg tablet, TAKE 1 TABLET (5 MG TOTAL) BY MOUTH DAILY., Disp: 100 tablet, Rfl: 1    TURMERIC PO, Take by mouth, Disp: , Rfl:     albuterol (2.5 mg/3 mL) 0.083 % nebulizer solution, Take 3 mL (2.5 mg total) by nebulization every 6 (six) hours as needed for wheezing or shortness of breath (Patient not taking: Reported on 2/5/2024), Disp: 180 mL, Rfl: 5    clotrimazole (MYCELEX) 10 mg leonardo, Take 1 tablet (10 mg total) by mouth 4 (four) times a day (Patient not taking: Reported on 7/25/2024), Disp: 28 Leonardo, Rfl: 1    TURMERIC-GINGER PO, Take by mouth (Patient not taking: Reported on 7/25/2024), Disp: , Rfl:     Current Facility-Administered Medications:     bupivacaine (PF) (MARCAINE) 0.25 % injection 1 mL, 1 mL, Intra-articular, , Christiano Madrid Garcia, DPM, 1 mL at 05/11/23 1706    triamcinolone acetonide (KENALOG-40) 40 mg/mL injection 20 mg, 20 mg, Intra-articular, , Christiano Madrid Garcia, DPM, 20 mg at 05/11/23 1706    ALLERGIES:  Allergies   Allergen Reactions    Augmentin [Amoxicillin-Pot Clavulanate] Vomiting    Miconazole Itching and Swelling    Wixela Inhub [Fluticasone-Salmeterol] Palpitations       REVIEW OF SYSTEMS:  MSK: right knee pain   Neuro: WNL   Pertinent items are otherwise noted in HPI.  A comprehensive review of systems was otherwise negative.    LABS:  HgA1c:   Lab Results   Component Value Date    HGBA1C 5.6 04/18/2023     BMP:   Lab Results   Component Value Date    GLUCOSE 86 10/08/2015    CALCIUM 9.7 04/22/2024     10/08/2015    K 4.0 04/22/2024    CO2 29 04/22/2024    CL 98 04/22/2024    BUN 8 04/22/2024    CREATININE 0.57 (L) 04/22/2024     CBC: No components found for: \"CBC\"    _____________________________________________________  PHYSICAL EXAMINATION:  Vital signs: /75   Pulse 88   Ht 5' 3\" (1.6 m)   Wt 88.9 kg (196 lb)   LMP  (LMP Unknown)   BMI 34.72 " "kg/m²   General: No acute distress, awake and alert  Psychiatric: Mood and affect appear appropriate  HEENT: Trachea Midline, No torticollis, no apparent facial trauma  Cardiovascular: No audible murmurs; Extremities appear perfused  Pulmonary: No audible wheezing or stridor  Skin: No open lesions; see further details (if any) below      MUSCULOSKELETAL EXAMINATION:  Right knee examination:  Patient sitting comfortably in the office in no apparent distress   Moderate joint effusion present with no erythema or ecchymosis noted  Tenderness palpation noted over the medial and posterior medial joint line.  No other bony or soft tissue tenderness to palpation noted at this time.  0 to 100 degrees of range of motion of the right knee appreciated  NV intact    _____________________________________________________  STUDIES REVIEWED:  I personally reviewed the images and interpretation is as follows:  XR knee 4+ vw right injury   Small knee effusion seen  Mild medial compartment, lateral compartment and patellofemoral arthritis  Chondrocalcinosis        PROCEDURES PERFORMED:  Procedures                  Matt Fernandez PA-C - assisting  Erasmo Leach MD                          Portions of the record may have been created with voice recognition software.  Occasional wrong word or \"sound a like\" substitutions may have occurred due to the inherent limitations of voice recognition software.  Read the chart carefully and recognize, using context, where substitutions have occurred.    "

## 2024-07-29 ENCOUNTER — TELEPHONE (OUTPATIENT)
Dept: VASCULAR SURGERY | Facility: CLINIC | Age: 69
End: 2024-07-29

## 2024-07-29 ENCOUNTER — OFFICE VISIT (OUTPATIENT)
Dept: VASCULAR SURGERY | Facility: CLINIC | Age: 69
End: 2024-07-29
Payer: COMMERCIAL

## 2024-07-29 ENCOUNTER — TELEPHONE (OUTPATIENT)
Age: 69
End: 2024-07-29

## 2024-07-29 VITALS
HEIGHT: 63 IN | WEIGHT: 196 LBS | HEART RATE: 92 BPM | SYSTOLIC BLOOD PRESSURE: 164 MMHG | DIASTOLIC BLOOD PRESSURE: 96 MMHG | BODY MASS INDEX: 34.73 KG/M2

## 2024-07-29 DIAGNOSIS — I65.22 CAROTID ARTERY STENOSIS, SYMPTOMATIC, LEFT: ICD-10-CM

## 2024-07-29 DIAGNOSIS — I65.22 CAROTID STENOSIS, ASYMPTOMATIC, LEFT: Primary | ICD-10-CM

## 2024-07-29 DIAGNOSIS — E78.2 MIXED HYPERLIPIDEMIA: ICD-10-CM

## 2024-07-29 PROCEDURE — 99205 OFFICE O/P NEW HI 60 MIN: CPT | Performed by: SURGERY

## 2024-07-29 PROCEDURE — 1160F RVW MEDS BY RX/DR IN RCRD: CPT | Performed by: SURGERY

## 2024-07-29 PROCEDURE — 1159F MED LIST DOCD IN RCRD: CPT | Performed by: SURGERY

## 2024-07-29 RX ORDER — CLINDAMYCIN PHOSPHATE 900 MG/50ML
900 INJECTION, SOLUTION INTRAVENOUS ONCE
OUTPATIENT
Start: 2024-07-29 | End: 2024-07-29

## 2024-07-29 RX ORDER — ROSUVASTATIN CALCIUM 5 MG/1
10 TABLET, COATED ORAL DAILY
Qty: 100 TABLET | Refills: 1 | Status: SHIPPED | OUTPATIENT
Start: 2024-07-29

## 2024-07-29 RX ORDER — ASPIRIN 81 MG/1
81 TABLET, CHEWABLE ORAL DAILY
Qty: 30 TABLET | Refills: 3 | Status: SHIPPED | OUTPATIENT
Start: 2024-07-29

## 2024-07-29 RX ORDER — CHLORHEXIDINE GLUCONATE ORAL RINSE 1.2 MG/ML
15 SOLUTION DENTAL ONCE
OUTPATIENT
Start: 2024-07-29 | End: 2024-07-29

## 2024-07-29 NOTE — ASSESSMENT & PLAN NOTE
TIA in mid July.  Recommend carotid endarterectomy.  Recommend smoking cessation.  Increased Crestor dose from 5 mg to 10 mg  for moderate intensity therapy in her age bracket.  She has been taking aspirin since the incident however I have added to her medical record for clarity.  Cardiac evaluation prior with history of stress test and arrhythmia present.  Recent echocardiogram performed.

## 2024-07-29 NOTE — TELEPHONE ENCOUNTER
REMINDER: Under Reason For Call, comments MUST be formatted as:   (Surgeon's Initials) / (Procedure)      Special Instructions / FYI: SPOKE WITH SIENA IN SCHEDULING POD TO SCHEDULE CARDIAC CLEARANCE, SHE WILL SEND MESSAGE TO OFFICE FOR SOONER APPOINTMENT AND THEN CONTACT PATIENT.    Consent: I certify that patient has signed, printed, timed, and dated their surgery consent.  I certify that the patient's LEGAL NAME and DATE OF BIRTH are written in the upper left corner on BOTH sides of the consent.  I certify that BOTH sides of the completed surgery consent have been scanned into the patient's Epic chart by myself on 7/29/2024.  Yes, I have LABELED the consent in Epic as Consent for Vascular Procedure.     For Surgical Clearances     Levels   1-3   ROUTE this encounter to [facility] Clearance Pool (AND)   [facility] Surgery Coordinator Pool     Level   4   ROUTE this encounter to [facility] Surgery Coordinator Pool       HYDRATION CLEARANCES   ONLY ROUTE TO  [facility] Clearance Pool       Yes, I have ROUTED this encounter to [facility] Surgery Coordinator and/or [facility] Clearance Pool.

## 2024-07-29 NOTE — PATIENT INSTRUCTIONS
1. Carotid stenosis, asymptomatic, left  Assessment & Plan:  TIA in mid July.  Recommend carotid endarterectomy.  Recommend smoking cessation.  Increased Crestor dose from 5 mg to 10 mg  for moderate intensity therapy in her age bracket.  She has been taking aspirin since the incident however I have added to her medical record for clarity.  Cardiac evaluation prior with history of stress test and arrhythmia present.  Recent echocardiogram performed.  Orders:  -     Ambulatory referral to Cardiology; Future  -     aspirin 81 mg chewable tablet; Chew 1 tablet (81 mg total) daily  2. Mixed hyperlipidemia  -     rosuvastatin (CRESTOR) 5 mg tablet; Take 2 tablets (10 mg total) by mouth daily  3. Carotid artery stenosis, symptomatic, left  Assessment & Plan:  TIA in mid July.  Recommend carotid endarterectomy.  Recommend smoking cessation.  Increased Crestor dose from 5 mg to 10 mg  for moderate intensity therapy in her age bracket.  She has been taking aspirin since the incident however I have added to her medical record for clarity.  Cardiac evaluation prior with history of stress test and arrhythmia present.  Recent echocardiogram performed.  Orders:  -     Case request operating room: ENDARTERECTOMY ARTERY CAROTID; Standing  -     Case request operating room: ENDARTERECTOMY ARTERY CAROTID

## 2024-07-29 NOTE — PROGRESS NOTES
Ambulatory Visit  Name: Leanna Ruvalcaba      : 1955      MRN: [id number]  Encounter Provider: Kevin Paz MD  Encounter Date: 2024   Encounter department: [facility] [place]    Assessment & Plan   1. Carotid stenosis, asymptomatic, left  Assessment & Plan:  TIA in mid July.  Recommend carotid endarterectomy.  Recommend smoking cessation.  Increased Crestor dose from 5 mg to 10 mg  for moderate intensity therapy in her age bracket.  She has been taking aspirin since the incident however I have added to her medical record for clarity.  Cardiac evaluation prior with history of stress test and arrhythmia present.  Recent echocardiogram performed.  Orders:  -     Ambulatory referral to Cardiology; Future  -     aspirin 81 mg chewable tablet; Chew 1 tablet (81 mg total) daily  2. Mixed hyperlipidemia  -     rosuvastatin (CRESTOR) 5 mg tablet; Take 2 tablets (10 mg total) by mouth daily  3. Carotid artery stenosis, symptomatic, left  Assessment & Plan:  TIA in mid July.  Recommend carotid endarterectomy.  Recommend smoking cessation.  Increased Crestor dose from 5 mg to 10 mg  for moderate intensity therapy in her age bracket.  She has been taking aspirin since the incident however I have added to her medical record for clarity.  Cardiac evaluation prior with history of stress test and arrhythmia present.  Recent echocardiogram performed.      History of Present Illness     Leanna Ruvalcaba is a 68 y.o. female who presents with a history of a transient neurologic event in mid .  She had an incident where she was discussing hanging pictures with her son.  She was trying to articulate where she wanted the pictures to be hung however the words that came out made no sense.  She said this was very short-lived and returned to normal with no other symptoms.  This happened on Saturday.  She then reached out to her primary care doctor on Monday who appropriately sent her for CTA and MRI  "evaluations.  The MRI did not reveal any areas of infarction however it would appear likely that she still experienced a transient ischemic attack.  The CTA revealed moderate to high-grade left carotid artery stenosis.  The patient is right-handed.  She still smokes 1 to 2 packs/day.  We thoroughly discussed quitting.  She is on a daily aspirin, I have added it to her medical list today however she has been taking it from Dr. Grider's recommendation since this TIA.  We discussed her risk of a recurrent episode and at this time she has decided to proceed with left carotid endarterectomy.  She has had a fairly thorough cardiac evaluation in the past to include an echocardiogram with an ejection fraction of 58% and no aortic valve issues or pulmonary hypertension identified.  I agree with her that her formal evaluation will likely be helpful in assessing any other gaps in her evaluation.  She did have a history of a stress test with an arrhythmia that I would prefer was evaluated before the induction of general anesthesia.    New patient, presents to review CTA of h/n.    Review of Systems   Constitutional: Negative.    HENT: Negative.     Eyes: Negative.    Respiratory: Negative.     Cardiovascular: Negative.    Gastrointestinal: Negative.    Endocrine: Negative.    Genitourinary: Negative.    Musculoskeletal: Negative.    Skin: Negative.    Allergic/Immunologic: Negative.    Neurological: Negative.    Hematological: Negative.    Psychiatric/Behavioral: Negative.         Objective     /96 (BP Location: Left arm, Patient Position: Sitting, Cuff Size: Standard)   Pulse 92   Ht 5' 3\" (1.6 m)   Wt 88.9 kg (196 lb)   LMP  (LMP Unknown)   BMI 34.72 kg/m²     Physical Exam  Vitals and nursing note reviewed.   Constitutional:       General: She is not in acute distress.     Appearance: She is well-developed.   HENT:      Head: Normocephalic and atraumatic.   Eyes:      Conjunctiva/sclera: Conjunctivae normal. "   Cardiovascular:      Rate and Rhythm: Normal rate and regular rhythm.      Heart sounds: No murmur heard.  Pulmonary:      Effort: Pulmonary effort is normal. No respiratory distress.      Breath sounds: Normal breath sounds.   Abdominal:      Palpations: Abdomen is soft.      Tenderness: There is no abdominal tenderness.   Musculoskeletal:         General: No swelling.      Cervical back: Neck supple.   Skin:     General: Skin is warm and dry.      Capillary Refill: Capillary refill takes less than 2 seconds.   Neurological:      Mental Status: She is alert.   Psychiatric:         Mood and Affect: Mood normal.       Administrative Statements       Tobacco use is a significant patient-modifiable risk factor for this patient’s vascular disease with multiple vascular comorbidities, and a significant risk factor for failure of and complications from any endovascular or surgical interventions.    I explained to the patient the effects of smoking including peripheral artery disease, coronary artery disease, cerebrovascular disease as well as cancer and chronic obstructive pulmonary disease. I asked the patient to stop smoking immediately. It is never too late to quit, and many studies show significant health benefits as well as economical savings after smoking cessation. I offered to the patient nicotine replacement therapy as well as referral to the smoking cessation program and access to the quit line 7-445-GBPCUMH or ambulatory referral to our network smoking cessation program.    Based on our conversation, this patient does not appear ready to quit    And declined my offer of nicotine replacement or tobacco cessation medications    The patient did not set a quit date. I will continue to  follow up on this issue at our next scheduled visit.     I spent approximately 10 minutes on tobacco cessation counseling with this patient.    Operative Scheduling Information:    Hospital:  [facility]    Physician:  Me    Surgery: L  CEA    Urgency:  Standard    Level:  Level 3: Outpatients to be scheduled for elective surgery than can be delayed up to 4 weeks without reasonable expectation of detriment to patient    Case Length:  3.5 hours    Post-op Bed:  ICU    OR Table:  Standard    Equipment Needs:  Vascular technologist    Medication Instructions:  Aspirin:   Continue (do not hold)    Hydration:  No

## 2024-07-29 NOTE — LETTER
2024     Matt Grider MD  [address]  [address]  [facility] [address]    Patient: Leanna Ruvalcaba   YOB: 1955   Date of Visit: 2024       Dear Dr. Grider:    Thank you for referring Leanna Ruvalcaba to me for evaluation. Below are my notes for this consultation.    If you have questions, please do not hesitate to call me. I look forward to following your patient along with you.         Sincerely,        Kevin Paz MD        CC: Leanna Ruvalcaba    Kevin Paz MD  2024  9:38 AM  Signed  Ambulatory Visit  Name: Leanna Ruvalcaba      : 1955      MRN: [id number]  Encounter Provider: Kevin Paz MD  Encounter Date: 2024   Encounter department: [facility] [place]    Assessment & Plan  1. Carotid stenosis, asymptomatic, left  Assessment & Plan:  TIA in mid July.  Recommend carotid endarterectomy.  Recommend smoking cessation.  Increased Crestor dose from 5 mg to 10 mg  for moderate intensity therapy in her age bracket.  She has been taking aspirin since the incident however I have added to her medical record for clarity.  Cardiac evaluation prior with history of stress test and arrhythmia present.  Recent echocardiogram performed.  Orders:  -     Ambulatory referral to Cardiology; Future  -     aspirin 81 mg chewable tablet; Chew 1 tablet (81 mg total) daily  2. Mixed hyperlipidemia  -     rosuvastatin (CRESTOR) 5 mg tablet; Take 2 tablets (10 mg total) by mouth daily  3. Carotid artery stenosis, symptomatic, left  Assessment & Plan:  TIA in mid July.  Recommend carotid endarterectomy.  Recommend smoking cessation.  Increased Crestor dose from 5 mg to 10 mg  for moderate intensity therapy in her age bracket.  She has been taking aspirin since the incident however I have added to her medical record for clarity.  Cardiac evaluation prior with history of stress test and arrhythmia present.  Recent  echocardiogram performed.      History of Present Illness    Leanna Ruvalcaba is a 68 y.o. female who presents with a history of a transient neurologic event in mid June.  She had an incident where she was discussing hanging pictures with her son.  She was trying to articulate where she wanted the pictures to be hung however the words that came out made no sense.  She said this was very short-lived and returned to normal with no other symptoms.  This happened on Saturday.  She then reached out to her primary care doctor on Monday who appropriately sent her for CTA and MRI evaluations.  The MRI did not reveal any areas of infarction however it would appear likely that she still experienced a transient ischemic attack.  The CTA revealed moderate to high-grade left carotid artery stenosis.  The patient is right-handed.  She still smokes 1 to 2 packs/day.  We thoroughly discussed quitting.  She is on a daily aspirin, I have added it to her medical list today however she has been taking it from Dr. Grider's recommendation since this TIA.  We discussed her risk of a recurrent episode and at this time she has decided to proceed with left carotid endarterectomy.  She has had a fairly thorough cardiac evaluation in the past to include an echocardiogram with an ejection fraction of 58% and no aortic valve issues or pulmonary hypertension identified.  I agree with her that her formal evaluation will likely be helpful in assessing any other gaps in her evaluation.  She did have a history of a stress test with an arrhythmia that I would prefer was evaluated before the induction of general anesthesia.    New patient, presents to review CTA of h/n.    Review of Systems   Constitutional: Negative.    HENT: Negative.     Eyes: Negative.    Respiratory: Negative.     Cardiovascular: Negative.    Gastrointestinal: Negative.    Endocrine: Negative.    Genitourinary: Negative.    Musculoskeletal: Negative.    Skin: Negative.   "  Allergic/Immunologic: Negative.    Neurological: Negative.    Hematological: Negative.    Psychiatric/Behavioral: Negative.         Objective    /96 (BP Location: Left arm, Patient Position: Sitting, Cuff Size: Standard)   Pulse 92   Ht 5' 3\" (1.6 m)   Wt 88.9 kg (196 lb)   LMP  (LMP Unknown)   BMI 34.72 kg/m²     Physical Exam  Vitals and nursing note reviewed.   Constitutional:       General: She is not in acute distress.     Appearance: She is well-developed.   HENT:      Head: Normocephalic and atraumatic.   Eyes:      Conjunctiva/sclera: Conjunctivae normal.   Cardiovascular:      Rate and Rhythm: Normal rate and regular rhythm.      Heart sounds: No murmur heard.  Pulmonary:      Effort: Pulmonary effort is normal. No respiratory distress.      Breath sounds: Normal breath sounds.   Abdominal:      Palpations: Abdomen is soft.      Tenderness: There is no abdominal tenderness.   Musculoskeletal:         General: No swelling.      Cervical back: Neck supple.   Skin:     General: Skin is warm and dry.      Capillary Refill: Capillary refill takes less than 2 seconds.   Neurological:      Mental Status: She is alert.   Psychiatric:         Mood and Affect: Mood normal.       Administrative Statements      Tobacco use is a significant patient-modifiable risk factor for this patient’s vascular disease with multiple vascular comorbidities, and a significant risk factor for failure of and complications from any endovascular or surgical interventions.    I explained to the patient the effects of smoking including peripheral artery disease, coronary artery disease, cerebrovascular disease as well as cancer and chronic obstructive pulmonary disease. I asked the patient to stop smoking immediately. It is never too late to quit, and many studies show significant health benefits as well as economical savings after smoking cessation. I offered to the patient nicotine replacement therapy as well as referral to " the smoking cessation program and access to the quit line 7-366-CCYDFOE or ambulatory referral to our network smoking cessation program.    Based on our conversation, this patient does not appear ready to quit    And declined my offer of nicotine replacement or tobacco cessation medications    The patient did not set a quit date. I will continue to  follow up on this issue at our next scheduled visit.     I spent approximately 10 minutes on tobacco cessation counseling with this patient.    Operative Scheduling Information:    Hospital:  [facility]    Physician:  Me    Surgery: L CEA    Urgency:  Standard    Level:  Level 3: Outpatients to be scheduled for elective surgery than can be delayed up to 4 weeks without reasonable expectation of detriment to patient    Case Length:  3.5 hours    Post-op Bed:  ICU    OR Table:  Standard    Equipment Needs:  Vascular technologist    Medication Instructions:  Aspirin:   Continue (do not hold)    Hydration:  No

## 2024-07-29 NOTE — TELEPHONE ENCOUNTER
"Hello,    The following message was sent via e-mail to the leadership team:     Please advise if you can help facilitate the following overbook request:    Patient Name: Leanna Ruvalcaba    Patient MRN: [id number]     Call back #: [phone]    Insurance: [employer] medicare    Department:Cardiology    Speciality: General Cardiology    Reason for overbook request: CARDIAC CLEARANCE PRIOR TO PROCEDURE (14-30 DAYS PRIOR TO PROCEDURE)    Comments (Write \"N/a\" if no comments): New patient cardiac clearance, no date for surgery (Carotid stenosis, asymptomatic, left) yet until clearance is completed. It will be Patient wants to stay close to home. First appointment I found at [facility] is Oct and Elise from [facility] stated that is too far out. I also checked [facility] and [facility]. I also checked Rebecca Hernandez and her first available was Oct. 22nd.     Requested doctor and location: [facility] with any provider    Date of current appointment: Elise stated none of the available appointments were within an appropriate time frame      Thank you.      "

## 2024-07-30 ENCOUNTER — TELEPHONE (OUTPATIENT)
Dept: CARDIOLOGY CLINIC | Facility: CLINIC | Age: 69
End: 2024-07-30

## 2024-07-30 NOTE — TELEPHONE ENCOUNTER
Trisha Stafford on 2024         [facility] [place]  [address]  [facility] [place] [facility] [address]  Phone#  [phone]  Fax#  [phone]  24     Re: Cardiology  Clearance     Patient Name: Leanna Ruvalcaba   Patient : 1955   Patient MRN: [id number]   Patient Phone: [phone]      Dr. Clark ,     Dr. Kevin Paz MD is requesting clearance for above patient, prior to proceeding with carotid endarterectomy (CEA) / patch angioplasty .  We have tentatively scheduled patient's procedure for 24 at the [facility], pending clearance.  Patient will be given general anesthesia.     We spoke with your office today regarding clearance request.   ARABELLA has scheduled patient's clearance appointment for 24 at 1:20 PM in your [place] office.     Please fax your recommendations, including any medication recommendations, to [phone], attention nursing.  Or route your recommendations to [facility] pool [facility] Clearance Pool [[id number]].      Please reach out with any questions or concerns.     Sincerely,     [facility]

## 2024-07-31 ENCOUNTER — EVALUATION (OUTPATIENT)
Dept: PHYSICAL THERAPY | Facility: MEDICAL CENTER | Age: 69
End: 2024-07-31
Payer: COMMERCIAL

## 2024-07-31 ENCOUNTER — NURSE TRIAGE (OUTPATIENT)
Age: 69
End: 2024-07-31

## 2024-07-31 DIAGNOSIS — M17.11 PRIMARY OSTEOARTHRITIS OF RIGHT KNEE: ICD-10-CM

## 2024-07-31 DIAGNOSIS — S86.911D KNEE STRAIN, RIGHT, SUBSEQUENT ENCOUNTER: ICD-10-CM

## 2024-07-31 PROCEDURE — 97112 NEUROMUSCULAR REEDUCATION: CPT | Performed by: PHYSICAL THERAPIST

## 2024-07-31 PROCEDURE — 97161 PT EVAL LOW COMPLEX 20 MIN: CPT | Performed by: PHYSICAL THERAPIST

## 2024-07-31 NOTE — TELEPHONE ENCOUNTER
New heart palpitations. Agree to go to ED if they re-occur or if she has chest pain, weakness, lightheadedness, etc.     Rosuvastatin does not typically give palpitations. Is she taking it at night. In the meantime, she can go back to 5. I want her to discuss this with cardiology when she sees them in 2 weeks. They can increase or change her statin to achieve goal LDL.

## 2024-07-31 NOTE — TELEPHONE ENCOUNTER
Called and s/w pt. Gave recommendations from Kitty Alonso PA-C. Pt verbalized understanding, she does have upcoming cardiology apt and will discuss events with them.

## 2024-07-31 NOTE — TELEPHONE ENCOUNTER
"Regarding: Possible medication side effect  ----- Message from Merline ABBASI sent at 7/31/2024  1:05 PM EDT -----  Patient reports having heart flutters and a sharp \"needle-like\" pain in her stomach and left breast intermittently since yesterday. She increased her Crestor Monday per Dr. Paz's request, and is concerned this is a side effect of the medication.    "

## 2024-07-31 NOTE — PROGRESS NOTES
PT Evaluation     Today's date: 2024  Patient name: Leanna Ruvalcaba  : 1955  MRN: [id number]  Referring provider: Matt Fernandez PA*  Dx:   Encounter Diagnosis     ICD-10-CM    1. Primary osteoarthritis of right knee  M17.11 Ambulatory Referral to Physical Therapy      2. Knee strain, right, subsequent encounter  S86.911D Ambulatory Referral to Physical Therapy          Start Time: 805  Stop Time: 845  Total time in clinic (min): 40 minutes    Assessment  Impairments: abnormal gait, abnormal muscle firing, abnormal or restricted ROM, activity intolerance, impaired physical strength, lacks appropriate home exercise program, pain with function, poor posture , participation limitations and activity limitations  Symptom irritability: moderate    Assessment details: Pt is a 68 y.o. female who presents to OP PT with increased R knee pain, decreased R knee ROM, decreased LE strength, impaired gait mechanics and decreased activity tolerance. These impairments limit the patient from participating in prolonged walking, decreased tolerance to completing stairs and decreased ability to participate in the community.  I believe this patient is a good candidate for and will benefit from skilled physical therapy for manual therapy to the R knee, ROM exercises, strengthening exercises and mechanics training to improve limitations and assist the patient to return to PLOF.  Thank you for the opportunity to participate in Leanna Ruvalcaba's care.    Positive Prognostic Indicators: desire to improve    Negative Prognostic Indicators: chronicity of symptoms       Understanding of Dx/Px/POC: good     Prognosis: good    Goals  STGs: 4 weeks  1) Pt will have SPR decrease of 2 units at worst  2) pt will have improved R knee flexion AROM to 110*  3) pt will have improved foto score of 10 points    LTGs: 8 weeks  1) pt will be independent with HEP by D/C  2) pt will be independent with symptom management by D/C  3) pt will  subjectively report improved tolerance to performing STS transfers with improved symptoms by at least 50% in order to demonstrate improved activity tolerance by DC.       Plan  Patient would benefit from: skilled physical therapy  Planned modality interventions: cryotherapy and thermotherapy: hydrocollator packs    Planned therapy interventions: joint mobilization, manual therapy, neuromuscular re-education, patient/caregiver education, strengthening, stretching, therapeutic activities, therapeutic exercise, home exercise program, gait training, functional ROM exercises and balance    Frequency: 1-2x week  Duration in weeks: 12  Plan of Care beginning date: 7/31/2024  Plan of Care expiration date: 10/23/2024  Treatment plan discussed with: patient        Subjective Evaluation    History of Present Illness  Mechanism of injury: DOO: a month ago  PASTORA:      Subjective Comments: pt reports that she was getting up off the couch and felt a pull with increased pain.  Pt had X-rays completed with no findings.  She saw orthopedic who recommended PT.  Reports Hx of meniscus tear in same knee 25 years.  Reports that she does not know if this was a repair or a clean out, but reports that she was NON-WB following.  Was told that she has a lot of arthritis. Reports that first movement is painful, will improve with mobility, and will hurt with prolonged activity. Denies N&T. Denies falls. Will occasionally use a walker or cane with prolonged walking.    Pain   Rest: 4/10   Best: 4/10   Worst: 10/10    Relieving Factors: ice, elevation    Exacerbating Factors: getting up first thing in the morning, getting in/out of tub.  Bending knee    Sleeping:here and there will wake her up at night.     Home Set-up: no stairs, has increased pain with bending her knee     ADLs: increased difficulty due to having to bend her knee    Work/Hobbies: house work    Previous Treatment: offered injection but did not want it at this time.     Goals:   decrease pain, get more comfortable sitting/getting up from sitting, putting pants and shoes on easily.                Objective     Palpation     Right   Tenderness of the vastus medialis.     Tenderness     Right Knee   Tenderness in the medial joint line.     Active Range of Motion   Left Knee   Flexion: 135 degrees   Extension: 0 degrees     Right Knee   Flexion: 88 degrees with pain  Extension: 0 degrees     Passive Range of Motion   Left Hip   Flexion: 100 degrees   Abduction: 35 degrees   External rotation (90/90): 30 degrees   Internal rotation (90/90): 15 degrees     Right Hip   Flexion: 90 degrees   Abduction: 35 degrees   External rotation (90/90): 30 degrees   Internal rotation (90/90): 15 degrees     Right Knee   Flexion: 98 degrees with pain  Extension: 0 degrees     Strength/Myotome Testing     Left Hip   Planes of Motion   Flexion: 4  Abduction: 4  Adduction: 4+    Right Hip   Planes of Motion   Flexion: 3+  Abduction: 4  Adduction: 4+    Left Knee   Flexion: 4+  Extension: 5    Right Knee   Flexion: 3+  Extension: 4+    Left Ankle/Foot   Dorsiflexion: 5  Plantar flexion: 5    Right Ankle/Foot   Dorsiflexion: 5  Plantar flexion: 5    Tests     Right Knee   Positive bounce home.   Negative anterior drawer, posterior drawer, valgus stress test at 0 degrees and valgus stress test at 30 degrees.     Ambulation     Ambulation: Level Surfaces   Ambulation without assistive device: independent    Ambulation: Stairs   Ascend stairs: independent  Pattern: reciprocal  Railings: two rails  Descend stairs: independent  Pattern: reciprocal  Railings: two rails    Additional Stairs Ambulation Details  Heavy use of BHR    Observational Gait   Gait: antalgic and asymmetric   Decreased walking speed and stride length.              Eval/Re-Eval POC Expires Auth #/ Referral # Total Visits Start Date Expiration Date Extension Info Visits Limitation   7/31 10/23                                                              1 2 3 4 5 6   7/31        7 8 9 10 11 12           13 14 15 16 17 18           19 20 21 22 23 24           25 26 27 28 29 30               Precautions:   Patient Active Problem List   Diagnosis    Seasonal allergic rhinitis    Anxiety    Chronic obstructive pulmonary disease (HCC)    Esophageal reflux    Fibromyalgia    Mixed hyperlipidemia    Hypertension    Degeneration of intervertebral disc    Lumbosacral radiculopathy at L5    Palpitations    Pulmonary nodule seen on imaging study    Family history of pancreatic cancer    Neuropathic pain    Neck pain    Cigarette nicotine dependence with nicotine-induced disorder    Colitis    Snoring    Chronic idiopathic constipation    Gastroparesis    Prediabetes    Increased intraocular pressure    Primary insomnia    Candida vaginitis    Chronic frontal sinusitis    Mid back pain    Closed wedge compression fracture of T8 vertebra (HCC)    Therapeutic opioid induced constipation    IPMN (intraductal papillary mucinous neoplasm)    History of colon polyps    Gastroesophageal reflux disease without esophagitis    Chronic pain of right ankle    Hyperglycemia    Sinus tarsi syndrome of right foot    Dysuria    Chronic left-sided low back pain without sciatica    Hematochezia    Generalized postprandial abdominal pain    Otalgia of right ear    Right hip pain    Anterior communicating artery aneurysm    Carotid artery stenosis, symptomatic, left    Primary osteoarthritis of right knee    Knee strain, right, initial encounter     Past Medical History:   Diagnosis Date    Acid reflux     Back injury     COPD (chronic obstructive pulmonary disease) (HCC)     Fibromyalgia     Hypertension     Seasonal allergies      Past Surgical History:   Procedure Laterality Date    COLONOSCOPY      EYE SURGERY      KNEE SURGERY  1998    [place] COLONOSCOPY FLX DX W/COLLJ SPEC WHEN PFRMD N/A 05/09/2017    Procedure: EGD AND COLONOSCOPY;  Surgeon: Kimberly Zapata MD;  Location: [facility];   "Service: Gastroenterology           Manuals 7/31            R knee PROM                                                    Neuro Re-Ed             QS 5\" x10            SAQ             LAQ x10            SLR                                                    Ther Ex             bike             Heel slides x10            HSS             Calf stretch                                                                 Ther Activity                                       Gait Training                                       Modalities                                            "

## 2024-07-31 NOTE — TELEPHONE ENCOUNTER
"Pt was last seen on 7/29/2024 by Dr. Paz. At that visit, he increased her Crestor dose from 5mg to 10mg.     Received call from pt stating she took the Crestor 10 mg on Monday night and woke up Tuesday with heart palpitations and a sharp \"needle like\" pain in her stomach and L breast . She stated she did not take the Crestor last night and today she is feeling better. Denies any pain however stated she does still have mild heart palpitations that comes and go but are very infrequent compared to yesterday. Denies chest pain nor sob today. Denies fever, chills, nor dizziness. Her heart rate per at home monitor is 87.     Pt is wondering if her s/s are from the increased dose of Crestor and if she should go back to the 5mg. Please review and advise.    Advised pt if her heart palpitations were to come back / get worse, she should be evaluated in the ED. She voiced understanding.       Answer Assessment - Initial Assessment Questions  1. DESCRIPTION: \"Please describe your heart rate or heartbeat that you are having\" (e.g., fast/slow, regular/irregular, skipped or extra beats, \"palpitations\")      Pt stated she increased her Crestor from 5mg from 10mg on Monday and then yesterday started with heart palpitations and   2. ONSET: \"When did it start?\" (Minutes, hours or days)       Yesterday but today is much better  3. DURATION: \"How long does it last\" (e.g., seconds, minutes, hours)      Comes and goes and states when it comes, it lasts a couple of seconds   4. PATTERN \"Does it come and go, or has it been constant since it started?\"  \"Does it get worse with exertion?\"   \"Are you feeling it now?\"      Comes and goes    6. HEART RATE: \"Can you tell me your heart rate?\" \"How many beats in 15 seconds?\"  (Note: not all patients can do this)        87  7. RECURRENT SYMPTOM: \"Have you ever had this before?\" If Yes, ask: \"When was the last time?\" and \"What happened that time?\"       Yes but not as frequently as yesterday  8. " "CAUSE: \"What do you think is causing the palpitations?\"      Unsure - but maybe from the increase in Crestor dose   9. CARDIAC HISTORY: \"Do you have any history of heart disease?\" (e.g., heart attack, angina, bypass surgery, angioplasty, arrhythmia)       denies  10. OTHER SYMPTOMS: \"Do you have any other symptoms?\" (e.g., dizziness, chest pain, sweating, difficulty breathing)        Was having sob yesterday but not today. Yesterday also felt very anxious and was a little anxious today so she took a xanax today and is feeling better. Denies dizziness. Denies chest pain.    Protocols used: Heart Rate and Heartbeat Questions-ADULT-[place]    "

## 2024-08-02 ENCOUNTER — CONSULT (OUTPATIENT)
Dept: NEUROSURGERY | Facility: CLINIC | Age: 69
End: 2024-08-02
Payer: COMMERCIAL

## 2024-08-02 VITALS
DIASTOLIC BLOOD PRESSURE: 80 MMHG | HEIGHT: 63 IN | WEIGHT: 196 LBS | HEART RATE: 81 BPM | BODY MASS INDEX: 34.73 KG/M2 | SYSTOLIC BLOOD PRESSURE: 130 MMHG | RESPIRATION RATE: 17 BRPM | TEMPERATURE: 97.8 F | OXYGEN SATURATION: 95 %

## 2024-08-02 DIAGNOSIS — Z72.0 TOBACCO ABUSE: Primary | ICD-10-CM

## 2024-08-02 DIAGNOSIS — I67.1 ANTERIOR COMMUNICATING ARTERY ANEURYSM: ICD-10-CM

## 2024-08-02 PROCEDURE — 99205 OFFICE O/P NEW HI 60 MIN: CPT | Performed by: RADIOLOGY

## 2024-08-02 RX ORDER — NICOTINE 21 MG/24HR
1 PATCH, TRANSDERMAL 24 HOURS TRANSDERMAL EVERY 24 HOURS
Qty: 28 PATCH | Refills: 1 | Status: SHIPPED | OUTPATIENT
Start: 2024-08-02

## 2024-08-02 NOTE — PROGRESS NOTES
Assessment/Plan:     Diagnoses and all orders for this visit:    Anterior communicating artery aneurysm  -     Ambulatory Referral to Neurosurgery        Discussion Summary:   Leanna has at least a 4 mm ACOM aneurysm.  I have recommended potential treatment for this lesion.  However she has upcoming CEA therefore she is not ready for angiography. She will return in 3 months for further discussion following completion of her surgery and postoperative recovery.       Chief Complaint: Consult      Patient ID: Leanna Ruvalcaba is a 68 y.o. female    HPI    Ms. Ruvalcaba presents for evaluation of an incidental cerebral aneurysm.  Patient recently diagnosed with an anterior communicating artery aneurysm as part of a workup for suspected stroke.  She is a longstanding smoker.  No family history of aneurysms or unexplained sudden death.    Review of Systems   Constitutional:  Positive for fatigue.   HENT: Negative.     Eyes:  Positive for visual disturbance (blurry vision at times).   Respiratory: Negative.     Cardiovascular: Negative.    Gastrointestinal:  Positive for constipation.   Endocrine: Negative.    Genitourinary: Negative.    Musculoskeletal:  Negative for gait problem.   Allergic/Immunologic: Negative.    Neurological:  Positive for speech difficulty (jumbled sentence forgeful with words), numbness (fingers) and headaches (pressure on crown of head). Negative for dizziness and seizures.   Hematological:  Bruises/bleeds easily (medication).   Psychiatric/Behavioral: Negative.         I have personally reviewed the MA's review of systems and made changes as necessary.    The following portions of the patient's history were reviewed and updated as appropriate: allergies, current medications, past family history, past medical history, past social history, past surgical history, and problem list.      Active Ambulatory Problems     Diagnosis Date Noted    Seasonal allergic rhinitis 12/20/2012    Anxiety 02/21/2013    Chronic  obstructive pulmonary disease (HCC) 04/17/2013    Esophageal reflux 04/17/2013    Fibromyalgia 03/14/2013    Mixed hyperlipidemia 12/08/2015    Hypertension 03/22/2013    Degeneration of intervertebral disc 03/14/2013    Lumbosacral radiculopathy at L5 01/06/2017    Palpitations 08/05/2014    Pulmonary nodule seen on imaging study 04/17/2013    Family history of pancreatic cancer 08/28/2018    Neuropathic pain 01/09/2019    Neck pain 07/11/2019    Cigarette nicotine dependence with nicotine-induced disorder 10/02/2019    Colitis 10/12/2019    Snoring 11/21/2019    Chronic idiopathic constipation 12/16/2019    Gastroparesis 12/16/2019    Prediabetes 04/10/2020    Increased intraocular pressure 10/31/2021    Primary insomnia 11/24/2021    Sandhya vaginitis 01/19/2022    Chronic frontal sinusitis 03/23/2022    Mid back pain 04/28/2022    Closed wedge compression fracture of T8 vertebra ([facility]) 05/10/2022    Therapeutic opioid induced constipation 05/20/2022    IPMN (intraductal papillary mucinous neoplasm) 09/05/2022    History of colon polyps 09/28/2022    Gastroesophageal reflux disease without esophagitis 09/28/2022    Chronic pain of right ankle 04/19/2023    Hyperglycemia 04/19/2023    Sinus tarsi syndrome of right foot 04/26/2023    Dysuria 06/04/2023    Chronic left-sided low back pain without sciatica 06/04/2023    Hematochezia 07/13/2023    Generalized postprandial abdominal pain 09/22/2023    Otalgia of right ear 11/15/2023    Right hip pain 05/09/2024    Anterior communicating artery aneurysm 07/16/2024    Carotid artery stenosis, symptomatic, left 07/16/2024    Primary osteoarthritis of right knee 07/25/2024    Knee strain, right, initial encounter 07/25/2024     Resolved Ambulatory Problems     Diagnosis Date Noted    Cataract 08/22/2014    Compression fracture of thoracic vertebra, non-traumatic (HCC) 08/30/2016    Mild episode of recurrent major depressive disorder (HCC) 03/14/2013    Edema 07/13/2017     Dyspnea on exertion 05/04/2018    Early satiety 08/28/2018    Abdominal distension 08/28/2018    Epigastric pain 08/28/2018    Oral thrush 10/11/2019    Severe obesity (BMI 35.0-35.9 with comorbidity) ([facility]) 11/21/2019    Lower extremity edema 11/21/2019    Dermatitis 09/14/2020    Chest pain 02/08/2021    Aortitis ([facility]) 10/31/2021    Pain of both eyes 02/25/2022    Continuous opioid dependence ([facility]) 04/27/2022    RUQ pain 05/10/2022    Greater trochanteric bursitis of right hip 06/02/2022    Plantar fasciitis 09/05/2022    Constipation 09/28/2022    Acute pain of right knee 12/18/2022    Acute non-recurrent ethmoidal sinusitis 01/20/2023    Hyponatremia 02/21/2023    Herpes zoster without complication 02/21/2023    Synovitis 04/26/2023    Recurrent major depressive disorder, in partial remission ([facility]) 06/04/2023     Past Medical History:   Diagnosis Date    Acid reflux     Back injury     COPD (chronic obstructive pulmonary disease) ([facility])     Seasonal allergies        Past Surgical History:   Procedure Laterality Date    COLONOSCOPY      EYE SURGERY      KNEE SURGERY  1998    [place] COLONOSCOPY FLX DX W/COLLJ SPEC WHEN PFRMD N/A 05/09/2017    Procedure: EGD AND COLONOSCOPY;  Surgeon: Kimberly Zapata MD;  Location: [facility];  Service: Gastroenterology       Current Outpatient Medications   Medication Sig Dispense Refill    acetaminophen (TYLENOL) 650 mg CR tablet Take by mouth every 8 (eight) hours as needed        albuterol (2.5 mg/3 mL) 0.083 % nebulizer solution Take 3 mL (2.5 mg total) by nebulization every 6 (six) hours as needed for wheezing or shortness of breath 180 mL 5    albuterol (PROVENTIL HFA,VENTOLIN HFA) 90 mcg/act inhaler Inhale 2 puffs every 6 (six) hours as needed for wheezing 18 g 5    amLODIPine (NORVASC) 5 mg tablet TAKE 1 TABLET (5 MG TOTAL) BY MOUTH DAILY. 90 tablet 1    aspirin 81 mg chewable tablet Chew 1 tablet (81 mg total) daily 30 tablet 3    cetirizine (ZyrTEC) 10 mg tablet Take 1 tablet  (10 mg total) by mouth daily 90 tablet 1    Cholecalciferol (VITAMIN D3 PO) Take by mouth      clotrimazole (MYCELEX) 10 mg leonardo Take 1 tablet (10 mg total) by mouth 4 (four) times a day 28 Leonardo 1    Diclofenac Sodium (VOLTAREN) 1 % Apply 2 g topically 4 (four) times a day 100 g 0    docusate sodium ([employer] Stool Softener) 250 MG capsule TAKE 1 CAPSULE BY MOUTH DAILY. 30 capsule 5    escitalopram (LEXAPRO) 10 mg tablet TAKE 1 TABLET BY MOUTH EVERY DAY 90 tablet 1    fluticasone-umeclidinium-vilanterol 200-62.5-25 mcg/actuation AEPB inhaler Inhale 1 puff daily Rinse mouth after use. 3 each 3    ibuprofen (MOTRIN) 200 mg tablet Take 400 mg by mouth every 6 (six) hours as needed for mild pain      lidocaine (Lidoderm) 5 % Apply 1 patch topically daily Remove & Discard patch within 12 hours or as directed by MD (Patient taking differently: Apply 1 patch topically as needed Remove & Discard patch within 12 hours or as directed by MD) 30 patch 1    lisinopril (ZESTRIL) 20 mg tablet TAKE 1 TABLET BY MOUTH EVERY DAY 90 tablet 1    MINOXIDIL, TOPICAL, 5 % SOLN Apply 1 application topically in the morning For hair      montelukast (SINGULAIR) 10 mg tablet TAKE 1 TABLET BY MOUTH EVERYDAY AT BEDTIME 90 tablet 1    Naproxen Sodium ([employer] Naproxen Sodium) 220 MG CAPS Take 1 capsule by mouth if needed (for pain)      nicotine (NICODERM CQ) 21 mg/24 hr TD 24 hr patch Place 1 patch on the skin over 24 hours every 24 hours 28 patch 1    Omega-3 Fatty Acids (FISH OIL PO) Take 1,000 mg by mouth      pantoprazole (PROTONIX) 40 mg tablet TAKE 1 TABLET BY MOUTH TWICE A  tablet 1    promethazine-dextromethorphan (PHENERGAN-DM) 6.25-15 mg/5 mL oral syrup Take 5 mL by mouth 4 (four) times a day as needed for cough 150 mL 0    rosuvastatin (CRESTOR) 5 mg tablet Take 2 tablets (10 mg total) by mouth daily 100 tablet 1    TURMERIC PO Take by mouth      TURMERIC-GINGER PO Take by mouth      ALPRAZolam (XANAX) 0.25 mg tablet Take 1 tablet  (0.25 mg total) by mouth 3 (three) times a day as needed for anxiety 90 tablet 0     Current Facility-Administered Medications   Medication Dose Route Frequency Provider Last Rate Last Admin    bupivacaine (PF) (MARCAINE) 0.25 % injection 1 mL  1 mL Intra-articular  Christiano Garcia DPM   1 mL at 05/11/23 1706    triamcinolone acetonide (KENALOG-40) 40 mg/mL injection 20 mg  20 mg Intra-articular  RAMYA MonacoM   20 mg at 05/11/23 1706       Vitals:    08/02/24 1424   Resp: 17         Objective:    Physical Exam  Neurologic Exam    Results/Data:  I have reviewed the results and images from the CTA in detail with the patient.

## 2024-08-06 ENCOUNTER — OFFICE VISIT (OUTPATIENT)
Dept: PHYSICAL THERAPY | Facility: MEDICAL CENTER | Age: 69
End: 2024-08-06
Payer: COMMERCIAL

## 2024-08-06 DIAGNOSIS — S86.911D KNEE STRAIN, RIGHT, SUBSEQUENT ENCOUNTER: ICD-10-CM

## 2024-08-06 DIAGNOSIS — M17.11 PRIMARY OSTEOARTHRITIS OF RIGHT KNEE: Primary | ICD-10-CM

## 2024-08-06 PROCEDURE — 97140 MANUAL THERAPY 1/> REGIONS: CPT | Performed by: PHYSICAL THERAPIST

## 2024-08-06 PROCEDURE — 97110 THERAPEUTIC EXERCISES: CPT | Performed by: PHYSICAL THERAPIST

## 2024-08-06 PROCEDURE — 97112 NEUROMUSCULAR REEDUCATION: CPT | Performed by: PHYSICAL THERAPIST

## 2024-08-06 NOTE — PROGRESS NOTES
Daily Note     Today's date: 2024  Patient name: Leanna Ruvalcaba  : 1955  MRN: [id number]  Referring provider: Matt Fernandez PA*  Dx:   Encounter Diagnosis     ICD-10-CM    1. Primary osteoarthritis of right knee  M17.11       2. Knee strain, right, subsequent encounter  S86.911D           Start Time: 0845  Stop Time: 09  Total time in clinic (min): 45 minutes    Subjective: pt reports that she had a very goo day yesterday with no pain.  Reports that she completed a lot of activity that day without her brace.  Today she notes increased pain in R knee and in the R hip as well.       Objective: See treatment diary below      Assessment: Tolerated treatment well. PROM completed with good tolerance.  Gentle progression of activity was completed in order to not exacerbate symptoms.  Pt was cautioned about activity levels and encouraged to take breaks throughout the day, even if she is feeling good in order to prevent increases in symptoms as seen today.  Pt was also encouraged to continue with her knee brace in order to increase support around the R knee.  We will continue to progress as tolerated. Patient would benefit from continued PT      Plan: Continue per plan of care.      Eval/Re-Eval POC Expires Auth #/ Referral # Total Visits Start Date Expiration Date Extension Info Visits Limitation   7/31 10/23                                                             1 2 3 4 5 6          7 8 9 10 11 12           13 14 15 16 17 18           19 20 21 22 23 24           25 26 27 28 29 30               Precautions:   Patient Active Problem List   Diagnosis    Seasonal allergic rhinitis    Anxiety    Chronic obstructive pulmonary disease (HCC)    Esophageal reflux    Fibromyalgia    Mixed hyperlipidemia    Hypertension    Degeneration of intervertebral disc    Lumbosacral radiculopathy at L5    Palpitations    Pulmonary nodule seen on imaging study    Family history of pancreatic cancer    Neuropathic  "pain    Neck pain    Cigarette nicotine dependence with nicotine-induced disorder    Colitis    Snoring    Chronic idiopathic constipation    Gastroparesis    Prediabetes    Increased intraocular pressure    Primary insomnia    Candida vaginitis    Chronic frontal sinusitis    Mid back pain    Closed wedge compression fracture of T8 vertebra (HCC)    Therapeutic opioid induced constipation    IPMN (intraductal papillary mucinous neoplasm)    History of colon polyps    Gastroesophageal reflux disease without esophagitis    Chronic pain of right ankle    Hyperglycemia    Sinus tarsi syndrome of right foot    Dysuria    Chronic left-sided low back pain without sciatica    Hematochezia    Generalized postprandial abdominal pain    Otalgia of right ear    Right hip pain    Anterior communicating artery aneurysm    Carotid artery stenosis, symptomatic, left    Primary osteoarthritis of right knee    Knee strain, right, initial encounter     Past Medical History:   Diagnosis Date    Acid reflux     Back injury     COPD (chronic obstructive pulmonary disease) (HCC)     Fibromyalgia     Hypertension     Seasonal allergies      Past Surgical History:   Procedure Laterality Date    COLONOSCOPY      EYE SURGERY      KNEE SURGERY  1998    [place] COLONOSCOPY FLX DX W/COLLJ SPEC WHEN PFRMD N/A 05/09/2017    Procedure: EGD AND COLONOSCOPY;  Surgeon: Kimberly Zapata MD;  Location: [facility];  Service: Gastroenterology           Manuals 7/31 8/6           R knee PROM  RK to tolerance                                                  Neuro Re-Ed             QS 5\" x10 5\" x20           SAQ  2x10           LAQ x10 2x10           SLR             Supine hip abd  Gtb 2x10           Supine hip add  5\" x10                        Ther Ex             bike  5'           Heel slides x10 2x10           HSS             Calf stretch  30\"x3           HS Curls  X20 ea. Alt.            HR  2x10                                      Ther Activity               "                         Gait Training                                       Modalities

## 2024-08-08 ENCOUNTER — OFFICE VISIT (OUTPATIENT)
Dept: PHYSICAL THERAPY | Facility: MEDICAL CENTER | Age: 69
End: 2024-08-08
Payer: COMMERCIAL

## 2024-08-08 DIAGNOSIS — M17.11 PRIMARY OSTEOARTHRITIS OF RIGHT KNEE: Primary | ICD-10-CM

## 2024-08-08 DIAGNOSIS — S86.911D KNEE STRAIN, RIGHT, SUBSEQUENT ENCOUNTER: ICD-10-CM

## 2024-08-08 PROCEDURE — 97110 THERAPEUTIC EXERCISES: CPT | Performed by: PHYSICAL THERAPIST

## 2024-08-08 NOTE — PROGRESS NOTES
Daily Note     Today's date: 2024  Patient name: Leanna Ruvalcaba  : 1955  MRN: [id number]  Referring provider: Matt Fernandez PA*  Dx:   Encounter Diagnosis     ICD-10-CM    1. Primary osteoarthritis of right knee  M17.11       2. Knee strain, right, subsequent encounter  S86.911D           Start Time: 1004  Stop Time: 1036  Total time in clinic (min): 32 minutes    Subjective: pt reports that she was really sore following PT last time.  Reports that she took it easy yesterday.  Reports improved symptoms today but notes increased stiffness this morning.      Objective: See treatment diary below      Assessment: Tolerated treatment well. Secondary to increased knee pain, reduction of exercise intensity was completed this session.  Table exercises completed for comfort. We will continue to progress as tolerated. Patient would benefit from continued PT      Plan: Continue per plan of care.      Eval/Re-Eval POC Expires Auth #/ Referral # Total Visits Start Date Expiration Date Extension Info Visits Limitation   7/31 10/23                                                             1 2 3 4 5 6         7 8 9 10 11 12           13 14 15 16 17 18           19 20 21 22 23 24           25 26 27 28 29 30               Precautions:   Patient Active Problem List   Diagnosis    Seasonal allergic rhinitis    Anxiety    Chronic obstructive pulmonary disease (HCC)    Esophageal reflux    Fibromyalgia    Mixed hyperlipidemia    Hypertension    Degeneration of intervertebral disc    Lumbosacral radiculopathy at L5    Palpitations    Pulmonary nodule seen on imaging study    Family history of pancreatic cancer    Neuropathic pain    Neck pain    Cigarette nicotine dependence with nicotine-induced disorder    Colitis    Snoring    Chronic idiopathic constipation    Gastroparesis    Prediabetes    Increased intraocular pressure    Primary insomnia    Candida vaginitis    Chronic frontal sinusitis    Mid back  "pain    Closed wedge compression fracture of T8 vertebra (HCC)    Therapeutic opioid induced constipation    IPMN (intraductal papillary mucinous neoplasm)    History of colon polyps    Gastroesophageal reflux disease without esophagitis    Chronic pain of right ankle    Hyperglycemia    Sinus tarsi syndrome of right foot    Dysuria    Chronic left-sided low back pain without sciatica    Hematochezia    Generalized postprandial abdominal pain    Otalgia of right ear    Right hip pain    Anterior communicating artery aneurysm    Carotid artery stenosis, symptomatic, left    Primary osteoarthritis of right knee    Knee strain, right, initial encounter     Past Medical History:   Diagnosis Date    Acid reflux     Back injury     COPD (chronic obstructive pulmonary disease) (HCC)     Fibromyalgia     Hypertension     Seasonal allergies      Past Surgical History:   Procedure Laterality Date    COLONOSCOPY      EYE SURGERY      KNEE SURGERY  1998    [place] COLONOSCOPY FLX DX W/COLLJ SPEC WHEN PFRMD N/A 05/09/2017    Procedure: EGD AND COLONOSCOPY;  Surgeon: Kimberly Zapata MD;  Location: [facility];  Service: Gastroenterology           Manuals 7/31 8/6 8/8          R knee PROM  RK to tolerance RK                                                 Neuro Re-Ed             QS 5\" x10 5\" x20 5\" x20          SAQ  2x10 2x10          LAQ x10 2x10 2x10          SLR             Supine hip abd  Gtb 2x10 Gtb 2x10          Supine hip add  5\" x10                        Ther Ex             bike  5' 7'          Heel slides x10 2x10 2x10          HSS             Calf stretch  30\"x3 30\"x3          HS Curls  X20 ea. Alt.            HR  2x10                                      Ther Activity                                       Gait Training                                       Modalities                                              "

## 2024-08-12 ENCOUNTER — TELEPHONE (OUTPATIENT)
Age: 69
End: 2024-08-12

## 2024-08-12 DIAGNOSIS — M17.11 PRIMARY OSTEOARTHRITIS OF RIGHT KNEE: Primary | ICD-10-CM

## 2024-08-12 RX ORDER — METHYLPREDNISOLONE 4 MG
TABLET, DOSE PACK ORAL
Qty: 1 EACH | Refills: 0 | Status: SHIPPED | OUTPATIENT
Start: 2024-08-12

## 2024-08-12 NOTE — TELEPHONE ENCOUNTER
"Called and spoke w/pt and she has OA of R knee that is getting worse and affecting her whole leg.  She is now having a hard time sitting and getting up from chair and OOB in the AM.  R knee is swollen, warm but not red.  She is doing PT and HEP.  Pain is 10/10 taking tylenol arthritis and advil, wearing brace, ice/heat, elevating. She \"chicken out\" on drainage and injection at last ov when offered.  She is ready now.  She is wanting to know if it is possible to get an earlier appt?  Please review and advise.   "

## 2024-08-12 NOTE — TELEPHONE ENCOUNTER
Caller: Patient- Leanna Ruvalcaba     Doctor: Dr Leach    Reason for call: Patient is calling in stating at she is currently scheduled to be seen in the office on 8/22 and wanted to let the Dr aware that it is affecting her whole leg. She stated that it is still swollen, very weak as it is affecting her whole leg as it is really painful for her. Patient stated that she is just having a hard time getting around as well, please advise on what further she can do until she is able to be seen.     Call back#: [phone]

## 2024-08-12 NOTE — TELEPHONE ENCOUNTER
Called and spoke w/pt and relayed Dr Leach's msg.  Advised to take medrol w/food and not to take Advil while on medrol.  Can continue to take Tylenol.  If she gets worse and cannot walk then she needs to go to ED for evaluation as Dr Leach is not able to see her until 8/22/24 at 1PM arrival 1245.  Pt states she understands.  CB if needed.

## 2024-08-13 ENCOUNTER — TELEPHONE (OUTPATIENT)
Dept: OBGYN CLINIC | Facility: HOSPITAL | Age: 69
End: 2024-08-13

## 2024-08-13 NOTE — TELEPHONE ENCOUNTER
Caller: Patient    Doctor: Hany    Reason for call: You had prescribed methylprednisolone for patient and she took first 6 last night. After she took her dose this morning she said her heart is racing and her heart rate is up. She said it is definitely helping but concerned about his heart rate.Please call patient. Thank you.    Call back#: [phone]

## 2024-08-13 NOTE — TELEPHONE ENCOUNTER
Spoke with patient at length.  Patient notes a heart rate of approximately 120 with activities.   Discussed with patient that this can be a side effect from the medication.   Discussed concerning signs including shortness of breath, chest tightness, chest pain with patient at length.  Patient will continue medication at this time.  Will discontinue medication and report to emergency room if symptoms worsen.  All questions and concerns answered at this time.

## 2024-08-14 ENCOUNTER — OFFICE VISIT (OUTPATIENT)
Dept: PHYSICAL THERAPY | Facility: MEDICAL CENTER | Age: 69
End: 2024-08-14
Payer: COMMERCIAL

## 2024-08-14 DIAGNOSIS — M17.11 PRIMARY OSTEOARTHRITIS OF RIGHT KNEE: Primary | ICD-10-CM

## 2024-08-14 DIAGNOSIS — S86.911D KNEE STRAIN, RIGHT, SUBSEQUENT ENCOUNTER: ICD-10-CM

## 2024-08-14 PROCEDURE — 97110 THERAPEUTIC EXERCISES: CPT | Performed by: PHYSICAL THERAPIST

## 2024-08-14 PROCEDURE — 97112 NEUROMUSCULAR REEDUCATION: CPT | Performed by: PHYSICAL THERAPIST

## 2024-08-14 NOTE — PROGRESS NOTES
Daily Note     Today's date: 2024  Patient name: Leanna Ruvalcaba  : 1955  MRN: [id number]  Referring provider: Matt Fernandez PA*  Dx:   Encounter Diagnosis     ICD-10-CM    1. Primary osteoarthritis of right knee  M17.11       2. Knee strain, right, subsequent encounter  S86.911D           Start Time: 0932  Stop Time: 1008  Total time in clinic (min): 36 minutes    Subjective: pt reports that she started prednisone with significant improvement in knee symptoms.  She reports improved function as well.       Objective: See treatment diary below      Assessment: Tolerated treatment well. Continuation of table exercises completed with good tolerance.  Pt noted increased knee pain with WB exercises and HS curls. HSS was completed and tolerated well. Pt was educated about arthritis and typical presentation.  Pt was also educated about ice versus heat and appropriate times for use of both.  We will continue to monitor and progress as tolerated.  Patient would benefit from continued PT      Plan: Continue per plan of care.      Eval/Re-Eval POC Expires Auth #/ Referral # Total Visits Start Date Expiration Date Extension Info Visits Limitation   7/31 10/23                                                             1 2 3 4 5 6        7 8 9 10 11 12           13 14 15 16 17 18           19 20 21 22 23 24           25 26 27 28 29 30               Precautions:   Patient Active Problem List   Diagnosis    Seasonal allergic rhinitis    Anxiety    Chronic obstructive pulmonary disease (HCC)    Esophageal reflux    Fibromyalgia    Mixed hyperlipidemia    Hypertension    Degeneration of intervertebral disc    Lumbosacral radiculopathy at L5    Palpitations    Pulmonary nodule seen on imaging study    Family history of pancreatic cancer    Neuropathic pain    Neck pain    Cigarette nicotine dependence with nicotine-induced disorder    Colitis    Snoring    Chronic idiopathic constipation     "Gastroparesis    Prediabetes    Increased intraocular pressure    Primary insomnia    Candida vaginitis    Chronic frontal sinusitis    Mid back pain    Closed wedge compression fracture of T8 vertebra (HCC)    Therapeutic opioid induced constipation    IPMN (intraductal papillary mucinous neoplasm)    History of colon polyps    Gastroesophageal reflux disease without esophagitis    Chronic pain of right ankle    Hyperglycemia    Sinus tarsi syndrome of right foot    Dysuria    Chronic left-sided low back pain without sciatica    Hematochezia    Generalized postprandial abdominal pain    Otalgia of right ear    Right hip pain    Anterior communicating artery aneurysm    Carotid artery stenosis, symptomatic, left    Primary osteoarthritis of right knee    Knee strain, right, initial encounter     Past Medical History:   Diagnosis Date    Acid reflux     Back injury     COPD (chronic obstructive pulmonary disease) (HCC)     Fibromyalgia     Hypertension     Seasonal allergies      Past Surgical History:   Procedure Laterality Date    COLONOSCOPY      EYE SURGERY      KNEE SURGERY  1998    [place] COLONOSCOPY FLX DX W/COLLJ SPEC WHEN PFRMD N/A 05/09/2017    Procedure: EGD AND COLONOSCOPY;  Surgeon: Kimberly Zapata MD;  Location: [facility];  Service: Gastroenterology           Manuals 7/31 8/6 8/8 8/14         R knee PROM  RK to tolerance RK RK                                                Neuro Re-Ed             QS 5\" x10 5\" x20 5\" x20 5\" 2x10         SAQ  2x10 2x10 2x10 ea.         LAQ x10 2x10 2x10 2x10         SLR    x10         Supine hip abd  Gtb 2x10 Gtb 2x10 Gtb 2x10         Supine hip add  5\" x10                        Ther Ex             bike  5' 7' 5'         Heel slides x10 2x10 2x10 2x10         HSS    10\"x5 w/ strap         Calf stretch  30\"x3 30\"x3 30\"x3         HS Curls  X20 ea. Alt.   x20         HR  2x10   x20         Side stepping    Rtb 4 laps at table         Standing hip abd    x10 ea.         Standing " hip ext    x10 ea.                                   Ther Activity                                       Gait Training                                       Modalities

## 2024-08-16 ENCOUNTER — OFFICE VISIT (OUTPATIENT)
Dept: PHYSICAL THERAPY | Facility: MEDICAL CENTER | Age: 69
End: 2024-08-16
Payer: COMMERCIAL

## 2024-08-16 DIAGNOSIS — S86.911D KNEE STRAIN, RIGHT, SUBSEQUENT ENCOUNTER: ICD-10-CM

## 2024-08-16 DIAGNOSIS — M17.11 PRIMARY OSTEOARTHRITIS OF RIGHT KNEE: Primary | ICD-10-CM

## 2024-08-16 PROCEDURE — 97112 NEUROMUSCULAR REEDUCATION: CPT | Performed by: PHYSICAL THERAPIST

## 2024-08-16 PROCEDURE — 97110 THERAPEUTIC EXERCISES: CPT | Performed by: PHYSICAL THERAPIST

## 2024-08-16 NOTE — PROGRESS NOTES
Daily Note     Today's date: 2024  Patient name: Leanna Ruvalcaba  : 1955  MRN: [id number]  Referring provider: Matt Fernandez PA*  Dx:   Encounter Diagnosis     ICD-10-CM    1. Primary osteoarthritis of right knee  M17.11       2. Knee strain, right, subsequent encounter  S86.911D           Start Time: 0935  Stop Time: 1007  Total time in clinic (min): 32 minutes    Subjective: pt repots that she is beginning to feel some soreness in her R knee again.  It is not too bad but noticeable.       Objective: See treatment diary below      Assessment: Tolerated treatment well.  Pt presents today without hinged knee brace. Pt reports that she does not feel she needs it.  Pt was educated about the benefits the brace provides and importance for symptoms management. Table exercises continued with good tolerance minimal soreness reported.  We will continue to monitor and progress as tolerated.  Patient would benefit from continued PT      Plan: Continue per plan of care.      Eval/Re-Eval POC Expires Auth #/ Referral # Total Visits Start Date Expiration Date Extension Info Visits Limitation   7/31 10/23                                                             1 2 3 4 5 6    8 8/8 814 16    7 8 9 10 11 12           13 14 15 16 17 18           19 20 21 22 23 24           25 26 27 28 29 30               Precautions:   Patient Active Problem List   Diagnosis    Seasonal allergic rhinitis    Anxiety    Chronic obstructive pulmonary disease (HCC)    Esophageal reflux    Fibromyalgia    Mixed hyperlipidemia    Hypertension    Degeneration of intervertebral disc    Lumbosacral radiculopathy at L5    Palpitations    Pulmonary nodule seen on imaging study    Family history of pancreatic cancer    Neuropathic pain    Neck pain    Cigarette nicotine dependence with nicotine-induced disorder    Colitis    Snoring    Chronic idiopathic constipation    Gastroparesis    Prediabetes    Increased intraocular pressure  "   Primary insomnia    Candida vaginitis    Chronic frontal sinusitis    Mid back pain    Closed wedge compression fracture of T8 vertebra (HCC)    Therapeutic opioid induced constipation    IPMN (intraductal papillary mucinous neoplasm)    History of colon polyps    Gastroesophageal reflux disease without esophagitis    Chronic pain of right ankle    Hyperglycemia    Sinus tarsi syndrome of right foot    Dysuria    Chronic left-sided low back pain without sciatica    Hematochezia    Generalized postprandial abdominal pain    Otalgia of right ear    Right hip pain    Anterior communicating artery aneurysm    Carotid artery stenosis, symptomatic, left    Primary osteoarthritis of right knee    Knee strain, right, initial encounter     Past Medical History:   Diagnosis Date    Acid reflux     Back injury     COPD (chronic obstructive pulmonary disease) (HCC)     Fibromyalgia     Hypertension     Seasonal allergies      Past Surgical History:   Procedure Laterality Date    COLONOSCOPY      EYE SURGERY      KNEE SURGERY  1998    [place] COLONOSCOPY FLX DX W/COLLJ SPEC WHEN PFRMD N/A 05/09/2017    Procedure: EGD AND COLONOSCOPY;  Surgeon: Kimberly Zapata MD;  Location: [facility];  Service: Gastroenterology           Manuals 7/31 8/6 8/8 8/14 8/16 FOTO       R knee PROM  RK to tolerance RK RK                                                Neuro Re-Ed             QS 5\" x10 5\" x20 5\" x20 5\" 2x10 5\" 2x10        SAQ  2x10 2x10 2x10 ea. 2x10 ea.        LAQ x10 2x10 2x10 2x10 2x10 ea.        SLR    x10 x10        Supine hip abd  Gtb 2x10 Gtb 2x10 Gtb 2x10 Gtb 2x10        Supine hip add  5\" x10   5\" x20                     Ther Ex             bike  5' 7' 5' 5'        Heel slides x10 2x10 2x10 2x10 2x10        HSS    10\"x5 w/ strap 10\"x5 w/ strap        Calf stretch  30\"x3 30\"x3 30\"x3 30\"x3        HS Curls  X20 ea. Alt.   x20         HR  2x10   x20 x20        Side stepping    Rtb 4 laps at table         Standing hip abd    x10 ea. X10 " ea.        Standing hip ext    x10 ea. X10 ea.                                  Ther Activity                                       Gait Training                                       Modalities

## 2024-08-17 DIAGNOSIS — I10 ESSENTIAL HYPERTENSION: ICD-10-CM

## 2024-08-17 RX ORDER — LISINOPRIL 20 MG/1
TABLET ORAL
Qty: 90 TABLET | Refills: 1 | Status: SHIPPED | OUTPATIENT
Start: 2024-08-17

## 2024-08-18 ENCOUNTER — APPOINTMENT (EMERGENCY)
Dept: CT IMAGING | Facility: HOSPITAL | Age: 69
End: 2024-08-18
Payer: COMMERCIAL

## 2024-08-18 ENCOUNTER — APPOINTMENT (EMERGENCY)
Dept: VASCULAR ULTRASOUND | Facility: HOSPITAL | Age: 69
End: 2024-08-18
Payer: COMMERCIAL

## 2024-08-18 ENCOUNTER — HOSPITAL ENCOUNTER (EMERGENCY)
Facility: HOSPITAL | Age: 69
Discharge: HOME | End: 2024-08-18
Attending: EMERGENCY MEDICINE
Payer: COMMERCIAL

## 2024-08-18 VITALS
DIASTOLIC BLOOD PRESSURE: 65 MMHG | HEART RATE: 78 BPM | OXYGEN SATURATION: 94 % | SYSTOLIC BLOOD PRESSURE: 147 MMHG | WEIGHT: 196 LBS | TEMPERATURE: 97.8 F | BODY MASS INDEX: 34.72 KG/M2 | RESPIRATION RATE: 18 BRPM

## 2024-08-18 DIAGNOSIS — M25.461 EFFUSION OF RIGHT KNEE: ICD-10-CM

## 2024-08-18 DIAGNOSIS — M79.604 RIGHT LEG PAIN: Primary | ICD-10-CM

## 2024-08-18 PROCEDURE — 72131 CT LUMBAR SPINE W/O DYE: CPT

## 2024-08-18 PROCEDURE — 73700 CT LOWER EXTREMITY W/O DYE: CPT

## 2024-08-18 PROCEDURE — 93971 EXTREMITY STUDY: CPT

## 2024-08-18 PROCEDURE — 99285 EMERGENCY DEPT VISIT HI MDM: CPT

## 2024-08-18 PROCEDURE — 99285 EMERGENCY DEPT VISIT HI MDM: CPT | Performed by: EMERGENCY MEDICINE

## 2024-08-18 PROCEDURE — 93971 EXTREMITY STUDY: CPT | Performed by: SURGERY

## 2024-08-18 RX ORDER — METHOCARBAMOL 500 MG/1
500 TABLET, FILM COATED ORAL ONCE
Status: COMPLETED | OUTPATIENT
Start: 2024-08-18 | End: 2024-08-18

## 2024-08-18 RX ORDER — PREDNISONE 10 MG/1
TABLET ORAL
Qty: 36 TABLET | Refills: 0 | Status: SHIPPED | OUTPATIENT
Start: 2024-08-18 | End: 2024-08-26 | Stop reason: ALTCHOICE

## 2024-08-18 RX ORDER — METHOCARBAMOL 500 MG/1
500 TABLET, FILM COATED ORAL 2 TIMES DAILY
Qty: 20 TABLET | Refills: 0 | Status: SHIPPED | OUTPATIENT
Start: 2024-08-18 | End: 2024-09-05 | Stop reason: SDUPTHER

## 2024-08-18 RX ORDER — NAPROXEN 250 MG/1
500 TABLET ORAL ONCE
Status: COMPLETED | OUTPATIENT
Start: 2024-08-18 | End: 2024-08-18

## 2024-08-18 RX ORDER — NAPROXEN 500 MG/1
500 TABLET ORAL 2 TIMES DAILY WITH MEALS
Qty: 30 TABLET | Refills: 0 | Status: SHIPPED | OUTPATIENT
Start: 2024-08-18 | End: 2024-08-27 | Stop reason: SDUPTHER

## 2024-08-18 RX ORDER — HYDROCODONE BITARTRATE AND ACETAMINOPHEN 5; 325 MG/1; MG/1
1 TABLET ORAL ONCE
Status: COMPLETED | OUTPATIENT
Start: 2024-08-18 | End: 2024-08-18

## 2024-08-18 RX ORDER — OXYCODONE AND ACETAMINOPHEN 5; 325 MG/1; MG/1
1 TABLET ORAL ONCE
Status: DISCONTINUED | OUTPATIENT
Start: 2024-08-18 | End: 2024-08-18 | Stop reason: HOSPADM

## 2024-08-18 RX ORDER — PREDNISONE 20 MG/1
60 TABLET ORAL ONCE
Status: COMPLETED | OUTPATIENT
Start: 2024-08-18 | End: 2024-08-18

## 2024-08-18 RX ADMIN — NAPROXEN 500 MG: 250 TABLET ORAL at 09:47

## 2024-08-18 RX ADMIN — METHOCARBAMOL TABLETS 500 MG: 500 TABLET, COATED ORAL at 09:47

## 2024-08-18 RX ADMIN — HYDROCODONE BITARTRATE AND ACETAMINOPHEN 1 TABLET: 5; 325 TABLET ORAL at 13:25

## 2024-08-18 RX ADMIN — PREDNISONE 60 MG: 20 TABLET ORAL at 13:25

## 2024-08-18 NOTE — DISCHARGE INSTRUCTIONS
Please use an Ace wrap around the knee to help with the effusion.  Please take the course of steroids as prescribed.  Please use the pain meds if needed for further pain control.  Weight-bear as tolerated.  Follow-up with orthopedics as discussed.  Return if any worsening condition such as redness, worsening pain, fever, numbness, or any other concerning symptoms.

## 2024-08-18 NOTE — ED PROVIDER NOTES
History  Chief Complaint   Patient presents with    Leg Pain     Patient co right leg pain and weakness that started approx 1 month ago. Patient reports she was dealing with right knee issues and was seen by Ortho who put her in a knee brace and PT. However symptoms worsen over the past week was given a steroid and was feeling better but once medication was completed pain and weakness returned. Denies recent injuries.     HPI    Prior to Admission Medications   Prescriptions Last Dose Informant Patient Reported? Taking?   ALPRAZolam (XANAX) 0.25 mg tablet  Self No No   Sig: Take 1 tablet (0.25 mg total) by mouth 3 (three) times a day as needed for anxiety   Cholecalciferol (VITAMIN D3 PO)  Self Yes No   Sig: Take by mouth   Diclofenac Sodium (VOLTAREN) 1 %  Self No No   Sig: Apply 2 g topically 4 (four) times a day   MINOXIDIL, TOPICAL, 5 % SOLN  Self Yes No   Sig: Apply 1 application topically in the morning For hair   Naproxen Sodium ([employer]S Naproxen Sodium) 220 MG CAPS  Self Yes No   Sig: Take 1 capsule by mouth if needed (for pain)   Omega-3 Fatty Acids (FISH OIL PO)  Self Yes No   Sig: Take 1,000 mg by mouth   TURMERIC PO  Self Yes No   Sig: Take by mouth   TURMERIC-GINGER PO  Self Yes No   Sig: Take by mouth   acetaminophen (TYLENOL) 650 mg CR tablet  Self Yes No   Sig: Take by mouth every 8 (eight) hours as needed     albuterol (2.5 mg/3 mL) 0.083 % nebulizer solution  Self No No   Sig: Take 3 mL (2.5 mg total) by nebulization every 6 (six) hours as needed for wheezing or shortness of breath   albuterol (PROVENTIL HFA,VENTOLIN HFA) 90 mcg/act inhaler  Self No No   Sig: Inhale 2 puffs every 6 (six) hours as needed for wheezing   amLODIPine (NORVASC) 5 mg tablet  Self No No   Sig: TAKE 1 TABLET (5 MG TOTAL) BY MOUTH DAILY.   aspirin 81 mg chewable tablet   No No   Sig: Chew 1 tablet (81 mg total) daily   cetirizine (ZyrTEC) 10 mg tablet  Self No No   Sig: Take 1 tablet (10 mg total) by mouth daily   clotrimazole  (MYCELEX) 10 mg evette  Self No No   Sig: Take 1 tablet (10 mg total) by mouth 4 (four) times a day   docusate sodium ([employer]S Stool Softener) 250 MG capsule  Self No No   Sig: TAKE 1 CAPSULE BY MOUTH DAILY.   escitalopram (LEXAPRO) 10 mg tablet  Self No No   Sig: TAKE 1 TABLET BY MOUTH EVERY DAY   fluticasone-umeclidinium-vilanterol 200-62.5-25 mcg/actuation AEPB inhaler  Self No No   Sig: Inhale 1 puff daily Rinse mouth after use.   ibuprofen (MOTRIN) 200 mg tablet  Self Yes No   Sig: Take 400 mg by mouth every 6 (six) hours as needed for mild pain   lidocaine (Lidoderm) 5 %  Self No No   Sig: Apply 1 patch topically daily Remove & Discard patch within 12 hours or as directed by MD   Patient taking differently: Apply 1 patch topically as needed Remove & Discard patch within 12 hours or as directed by MD   lisinopril (ZESTRIL) 20 mg tablet   No No   Sig: TAKE 1 TABLET BY MOUTH EVERY DAY   methylPREDNISolone 4 MG tablet therapy pack   No No   Sig: Use as directed on package   montelukast (SINGULAIR) 10 mg tablet  Self No No   Sig: TAKE 1 TABLET BY MOUTH EVERYDAY AT BEDTIME   nicotine (NICODERM CQ) 21 mg/24 hr TD 24 hr patch   No No   Sig: Place 1 patch on the skin over 24 hours every 24 hours   pantoprazole (PROTONIX) 40 mg tablet  Self No No   Sig: TAKE 1 TABLET BY MOUTH TWICE A DAY   promethazine-dextromethorphan (PHENERGAN-DM) 6.25-15 mg/5 mL oral syrup  Self No No   Sig: Take 5 mL by mouth 4 (four) times a day as needed for cough   rosuvastatin (CRESTOR) 5 mg tablet   No No   Sig: Take 2 tablets (10 mg total) by mouth daily      Facility-Administered Medications Last Administration Doses Remaining   bupivacaine (PF) (MARCAINE) 0.25 % injection 1 mL 5/11/2023  5:06 PM    triamcinolone acetonide (KENALOG-40) 40 mg/mL injection 20 mg 5/11/2023  5:06 PM           Past Medical History:   Diagnosis Date    Acid reflux     Back injury     COPD (chronic obstructive pulmonary disease) (HCC)     Fibromyalgia     Hypertension      Seasonal allergies        Past Surgical History:   Procedure Laterality Date    COLONOSCOPY      EYE SURGERY      KNEE SURGERY  1998    ND COLONOSCOPY FLX DX W/COLLJ SPEC WHEN PFRMD N/A 05/09/2017    Procedure: EGD AND COLONOSCOPY;  Surgeon: Kimberly Zapata MD;  Location: [facility];  Service: Gastroenterology       Family History   Problem Relation Age of Onset    Pancreatic cancer Mother 72    Coronary artery disease Father     Diabetes Father     Hypertension Father     Heart disease Father     Lung cancer Sister 37    No Known Problems Daughter     No Known Problems Maternal Grandmother     Lung cancer Maternal Grandfather 77    Diabetes Paternal Grandmother     No Known Problems Paternal Grandfather     Pancreatic cancer Brother     Pancreatic cancer Brother     No Known Problems Son     No Known Problems Son      I have reviewed and agree with the history as documented.    E-Cigarette/Vaping    E-Cigarette Use Never User      E-Cigarette/Vaping Substances     Social History     Tobacco Use    Smoking status: Every Day     Current packs/day: 1.50     Average packs/day: 1.5 packs/day for 56.6 years (84.9 ttl pk-yrs)     Types: Cigarettes     Start date: 1968     Passive exposure: Current    Smokeless tobacco: Never   Vaping Use    Vaping status: Never Used   Substance Use Topics    Alcohol use: Never    Drug use: No       Review of Systems    Physical Exam  Physical Exam    Vital Signs  ED Triage Vitals [08/18/24 0920]   Temperature Pulse Respirations Blood Pressure SpO2   97.8 °F (36.6 °C) 93 16 (!) 174/79 96 %      Temp Source Heart Rate Source Patient Position - Orthostatic VS BP Location FiO2 (%)   Temporal Monitor Sitting Left arm --      Pain Score       --           Vitals:    08/18/24 0920   BP: (!) 174/79   Pulse: 93   Patient Position - Orthostatic VS: Sitting         Visual Acuity      ED Medications  Medications - No data to display    Diagnostic Studies  Results Reviewed       None                    No orders to display              Procedures  Procedures         ED Course  ED Course as of 08/18/24 1252   Sun Aug 18, 2024   1252 Fluid effusion.  Joint medical decision making regarding tapping the joint versus conservative treatment.  Patient has an appointment with orthopedics in 4 days.  We will apply Ace wrap, provide steroids, pain control, and referral to orthopedics.  Will continue to advise patient only weight-bear as tolerated. She has crutches                   Identification of Seniors at Risk      Flowsheet Row Most Recent Value   (ISAR) Identification of Seniors at Risk    Before the illness or injury that brought you to the Emergency, did you need someone to help you on a regular basis? 0 Filed at: 08/18/2024 0924   In the last 24 hours, have you needed more help than usual? 0 Filed at: 08/18/2024 0924   Have you been hospitalized for one or more nights during the past 6 months? 0 Filed at: 08/18/2024 0924   In general, do you see well? 0 Filed at: 08/18/2024 0924   In general, do you have serious problems with your memory? 0 Filed at: 08/18/2024 0924   Do you take more than three different medications every day? 1 Filed at: 08/18/2024 0924   ISAR Score 1 Filed at: 08/18/2024 0924                        SBIRT 20yo+      Flowsheet Row Most Recent Value   Initial Alcohol Screen: US AUDIT-C     1. How often do you have a drink containing alcohol? 0 Filed at: 08/18/2024 0924   2. How many drinks containing alcohol do you have on a typical day you are drinking?  0 Filed at: 08/18/2024 0924   3b. FEMALE Any Age, or MALE 65+: How often do you have 4 or more drinks on one occassion? 0 Filed at: 08/18/2024 0924   Audit-C Score 0 Filed at: 08/18/2024 0924   COLT: How many times in the past year have you...    Used an illegal drug or used a prescription medication for non-medical reasons? Never Filed at: 08/18/2024 0924                      Medical Decision Making  Amount and/or Complexity of Data  Reviewed  Radiology: ordered.    Risk  Prescription drug management.                 Disposition  Final diagnoses:   None     ED Disposition       None          Follow-up Information    None         Patient's Medications   Discharge Prescriptions    No medications on file       No discharge procedures on file.    PDMP Review         Value Time User    PDMP Reviewed  Yes 7/15/2024 12:56 PM Matt Grider MD            ED Provider  Electronically Signed by           Recent Value   Initial Alcohol Screen: US AUDIT-C     1. How often do you have a drink containing alcohol? 0 Filed at: 08/18/2024 0924   2. How many drinks containing alcohol do you have on a typical day you are drinking?  0 Filed at: 08/18/2024 0924   3b. FEMALE Any Age, or MALE 65+: How often do you have 4 or more drinks on one occassion? 0 Filed at: 08/18/2024 0924   Audit-C Score 0 Filed at: 08/18/2024 0924   COLT: How many times in the past year have you...    Used an illegal drug or used a prescription medication for non-medical reasons? Never Filed at: 08/18/2024 0924                      Medical Decision Making  R knee pain.  Xray - no fx.  Fluid effusion.  Joint medical decision making regarding tapping the joint versus conservative treatment.  Patient has an appointment with orthopedics in 4 days.  We will apply Ace wrap, provide steroids, pain control, and referral to orthopedics.  Will continue to advise patient only weight-bear as tolerated. She has crutches    Amount and/or Complexity of Data Reviewed  Radiology: ordered.    Risk  Prescription drug management.                 Disposition  Final diagnoses:   Right leg pain   Effusion of right knee     Time reflects when diagnosis was documented in both MDM as applicable and the Disposition within this note       Time User Action Codes Description Comment    8/18/2024 12:53 PM Cindy Moyer Add [M79.604] Right leg pain     8/18/2024 12:53 PM Cindy Moyer Add [M25.461] Effusion of right knee           ED Disposition       ED Disposition   Discharge    Condition   Stable    Date/Time   Sun Aug 18, 2024 1253    Comment   Leanna Ruvalcaba discharge to home/self care.                   Follow-up Information       Follow up With Specialties Details Why Contact Info Additional Information    [facility]   [address]  [facility] [address] [phone]   [facility] [place], [address], [place], [place], [address] [phone]    [facility] Emergency [facility]  If symptoms worsen [address] [id number] [phone] [facility], [address] [place], [place], [address]            Discharge Medication List as of 8/18/2024 12:56 PM        START taking these medications    Details   methocarbamol (ROBAXIN) 500 mg tablet Take 1 tablet (500 mg total) by mouth 2 (two) times a day For back pain radiating down the leg, Starting Sun 8/18/2024, Normal      naproxen (Naprosyn) 500 mg tablet Take 1 tablet (500 mg total) by mouth 2 (two) times a day with meals, Starting Sun 8/18/2024, Normal      predniSONE 10 mg tablet Multiple Dosages:Starting Sun 8/18/2024, Until Tue 8/20/2024 at 2359, THEN Starting Wed 8/21/2024, Until Fri 8/23/2024 at 2359, THEN Starting Sat 8/24/2024, Until Sun 8/25/2024 at 2359, THEN Starting Mon 8/26/2024, Until Tue 8/27/2024 at 2359Take 6  tablets (60 mg total) by mouth daily for 3 days, THEN 4 tablets (40 mg total) daily for 3 days, THEN 2 tablets (20 mg total) daily for 2 days, THEN 1 tablet (10 mg total) daily for 2 days., Normal           CONTINUE these medications which have NOT CHANGED    Details   acetaminophen (TYLENOL) 650 mg CR tablet Take by mouth every 8 (eight) hours as needed  , Historical Med      albuterol (2.5 mg/3 mL) 0.083 % nebulizer solution Take 3 mL (2.5 mg total) by nebulization every 6 (six) hours as needed for wheezing or shortness of breath, Starting u 1/19/2023, Normal      albuterol (PROVENTIL HFA,VENTOLIN HFA) 90 mcg/act inhaler Inhale 2 puffs every 6 (six) hours as needed for wheezing, Starting u 1/5/2023, Normal      ALPRAZolam (XANAX) 0.25 mg tablet Take 1 tablet (0.25 mg total) by mouth 3 (three) times a day as needed for anxiety, Starting Mon  7/15/2024, Normal      amLODIPine (NORVASC) 5 mg tablet TAKE 1 TABLET (5 MG TOTAL) BY MOUTH DAILY., Starting Sat 5/25/2024, Normal      aspirin 81 mg chewable tablet Chew 1 tablet (81 mg total) daily, Starting Mon 7/29/2024, Normal      cetirizine (ZyrTEC) 10 mg tablet Take 1 tablet (10 mg total) by mouth daily, Starting Tue 4/30/2024, Normal      Cholecalciferol (VITAMIN D3 PO) Take by mouth, Historical Med      clotrimazole (MYCELEX) 10 mg evette Take 1 tablet (10 mg total) by mouth 4 (four) times a day, Starting Wed 6/26/2024, Normal      Diclofenac Sodium (VOLTAREN) 1 % Apply 2 g topically 4 (four) times a day, Starting Fri 7/12/2024, Normal      docusate sodium ([employer]S Stool Softener) 250 MG capsule TAKE 1 CAPSULE BY MOUTH DAILY., Starting Wed 6/19/2024, Normal      escitalopram (LEXAPRO) 10 mg tablet TAKE 1 TABLET BY MOUTH EVERY DAY, Normal      fluticasone-umeclidinium-vilanterol 200-62.5-25 mcg/actuation AEPB inhaler Inhale 1 puff daily Rinse mouth after use., Starting Tue 7/16/2024, Normal      ibuprofen (MOTRIN) 200 mg tablet Take 400 mg by mouth every 6 (six) hours as needed for mild pain, Historical Med      lisinopril (ZESTRIL) 20 mg tablet TAKE 1 TABLET BY MOUTH EVERY DAY, Normal      MINOXIDIL, TOPICAL, 5 % SOLN Apply 1 application topically in the morning For hair, Historical Med      montelukast (SINGULAIR) 10 mg tablet TAKE 1 TABLET BY MOUTH EVERYDAY AT BEDTIME, Normal      Omega-3 Fatty Acids (FISH OIL PO) Take 1,000 mg by mouth, Historical Med      pantoprazole (PROTONIX) 40 mg tablet TAKE 1 TABLET BY MOUTH TWICE A DAY, Normal      rosuvastatin (CRESTOR) 5 mg tablet Take 2 tablets (10 mg total) by mouth daily, Starting Mon 7/29/2024, Normal      TURMERIC PO Take by mouth, Historical Med      TURMERIC-GINGER PO Take by mouth, Historical Med      lidocaine (Lidoderm) 5 % Apply 1 patch topically daily Remove & Discard patch within 12 hours or as directed by MD, Starting Wed 4/27/2022, Normal       methylPREDNISolone 4 MG tablet therapy pack Use as directed on package, Normal      Naproxen Sodium (CVS Naproxen Sodium) 220 MG CAPS Take 1 capsule by mouth if needed (for pain), Historical Med      nicotine (NICODERM CQ) 21 mg/24 hr TD 24 hr patch Place 1 patch on the skin over 24 hours every 24 hours, Starting Fri 8/2/2024, Normal      promethazine-dextromethorphan (PHENERGAN-DM) 6.25-15 mg/5 mL oral syrup Take 5 mL by mouth 4 (four) times a day as needed for cough, Starting Tue 1/23/2024, Normal             No discharge procedures on file.    PDMP Review         Value Time User    PDMP Reviewed  Yes 7/15/2024 12:56 PM Matt Grider MD            ED Provider  Electronically Signed by             Cindy Moyer DO  08/27/24 0437

## 2024-08-19 ENCOUNTER — OFFICE VISIT (OUTPATIENT)
Dept: CARDIOLOGY CLINIC | Facility: MEDICAL CENTER | Age: 69
End: 2024-08-19
Payer: COMMERCIAL

## 2024-08-19 VITALS
BODY MASS INDEX: 35.08 KG/M2 | HEART RATE: 95 BPM | WEIGHT: 198 LBS | SYSTOLIC BLOOD PRESSURE: 136 MMHG | HEIGHT: 63 IN | DIASTOLIC BLOOD PRESSURE: 80 MMHG | OXYGEN SATURATION: 95 %

## 2024-08-19 DIAGNOSIS — R00.2 PALPITATIONS: ICD-10-CM

## 2024-08-19 DIAGNOSIS — I10 PRIMARY HYPERTENSION: ICD-10-CM

## 2024-08-19 DIAGNOSIS — I65.22 CAROTID ARTERY STENOSIS, SYMPTOMATIC, LEFT: ICD-10-CM

## 2024-08-19 DIAGNOSIS — Z01.810 PREOPERATIVE CARDIOVASCULAR EXAMINATION: Primary | ICD-10-CM

## 2024-08-19 DIAGNOSIS — I65.22 CAROTID STENOSIS, ASYMPTOMATIC, LEFT: ICD-10-CM

## 2024-08-19 PROCEDURE — 99204 OFFICE O/P NEW MOD 45 MIN: CPT | Performed by: INTERNAL MEDICINE

## 2024-08-19 PROCEDURE — 93000 ELECTROCARDIOGRAM COMPLETE: CPT | Performed by: INTERNAL MEDICINE

## 2024-08-19 NOTE — PROGRESS NOTES
"[facility] [place]  [address]  [address]  [facility] [address]  Phone#  [phone]  Fax#  [phone]  [facility] Cardiology Office Consultation             NAME: Leanna Ruvalcaba  AGE: 68 y.o. SEX: female   : 1955   MRN: [id number]    DATE: 2024  TIME: 1:27 PM    Cardiology Problem list:  Left carotid stenosis with TIA  : Echo-EF 58%, grade 1 DD  : Holter-rare PVCs, no A-fib  CAD: Mild coronary calcification on CT chest  : Stress echo-no ischemia  Dyslipidemia with prediabetes        Assessment and recommendations    Preoperative cardiovascular examination  RCRI risk factors: \"high-risk\" surgery (vascular) and history of cerebrovascular disease  RCI RISK CLASS III (2 risk factors, risk of major cardiac compl. appr. 3.6%)  No Cardiac Contraindication for Planned Surgical Procedures  Continue current cardiac medication in the meenakshi-operative period.       Carotid artery stenosis, symptomatic, left  Carotid endarterectomy is planned.  Continue aspirin and statin therapy.    Palpitations  Stress echo in  showed no ischemia.  Recent echo in  was personally reviewed, EF normal with no valvular abnormalities.  Recent Holter reviewed which showed only rare PVCs with less than 0.5% ectopic burden and no A-fib.  Zio patch event monitor planned to correlate symptoms of palpitations with any cardiac rhythm abnormality due to persistent palpitations (while on steroids), to rule out AF, given prior TIA.      Primary hypertension  BP Readings from Last 3 Encounters:   24 147/65   24 130/80   24 164/96   Continue current medications.  Lifestyle modification.  Close blood pressure monitoring.          Chief Complaint   Patient presents with    Consult     Referred to by Vascular to have Cardiac Clearance for surgery       HPI:    Leanna Ruvalcaba is a 68 y.o. female who has been referred here for assessment of preoperative cardiovascular risk prior to " upcoming carotid endarterectomy surgery.  She has a history of TIA in July 2024.  During that time her echo showed preserved EF and grade 1 diastolic dysfunction.  Echo personally reviewed.  Her Holter showed rare PVCs and no A-fib.  She had a previous stress echo in 8/23 which showed no ischemia.  She has a history of dyslipidemia for which she is on statins.  She has had a history of palpitations.  She also reports a history of tachycardia.  On review of recent Holter, her average heart rate was 81.  Overall ectopic burden was less than 0.5%.  I reviewed her previous CT chest which showed only mild coronary calcifications.  She has a anterior communicating artery aneurysm for which she follows up with neurosurgery.  EKG today shows sinus rhythm with no abnormalities.        Exercise Capacity:  Able to walk 4 blocks without symptoms?: Yes,   Able to walk 2 flights without symptoms?: No, has limiting knee pain    Personal history of venous thromboembolic disease? Yes    Assessment of Cardiac Contraindications:  No history of severe angina or MI in the last 6 weeks. No recent PCI.  No recent decompensated heart failure   No recent serious arrhythmias   No known severe heart valve disease including severe aortic stenosis or symptomatic mitral stenosis    Past history, family history, social history, current medications, vital signs, recent lab and imaging studies and  prior cardiology studies reviewed independently on this visit.    Allergies   Allergen Reactions    Augmentin [Amoxicillin-Pot Clavulanate] Vomiting    Miconazole Itching and Swelling    Wixela Inhub [Fluticasone-Salmeterol] Palpitations       Current Outpatient Medications:     acetaminophen (TYLENOL) 650 mg CR tablet, Take by mouth every 8 (eight) hours as needed  , Disp: , Rfl:     albuterol (2.5 mg/3 mL) 0.083 % nebulizer solution, Take 3 mL (2.5 mg total) by nebulization every 6 (six) hours as needed for wheezing or shortness of breath, Disp: 180  mL, Rfl: 5    albuterol (PROVENTIL HFA,VENTOLIN HFA) 90 mcg/act inhaler, Inhale 2 puffs every 6 (six) hours as needed for wheezing, Disp: 18 g, Rfl: 5    ALPRAZolam (XANAX) 0.25 mg tablet, Take 1 tablet (0.25 mg total) by mouth 3 (three) times a day as needed for anxiety, Disp: 90 tablet, Rfl: 0    amLODIPine (NORVASC) 5 mg tablet, TAKE 1 TABLET (5 MG TOTAL) BY MOUTH DAILY., Disp: 90 tablet, Rfl: 1    aspirin 81 mg chewable tablet, Chew 1 tablet (81 mg total) daily, Disp: 30 tablet, Rfl: 3    cetirizine (ZyrTEC) 10 mg tablet, Take 1 tablet (10 mg total) by mouth daily, Disp: 90 tablet, Rfl: 1    Cholecalciferol (VITAMIN D3 PO), Take by mouth, Disp: , Rfl:     clotrimazole (MYCELEX) 10 mg leonardo, Take 1 tablet (10 mg total) by mouth 4 (four) times a day, Disp: 28 Leonardo, Rfl: 1    Diclofenac Sodium (VOLTAREN) 1 %, Apply 2 g topically 4 (four) times a day, Disp: 100 g, Rfl: 0    docusate sodium ([employer] Stool Softener) 250 MG capsule, TAKE 1 CAPSULE BY MOUTH DAILY., Disp: 30 capsule, Rfl: 5    escitalopram (LEXAPRO) 10 mg tablet, TAKE 1 TABLET BY MOUTH EVERY DAY, Disp: 90 tablet, Rfl: 1    fluticasone-umeclidinium-vilanterol 200-62.5-25 mcg/actuation AEPB inhaler, Inhale 1 puff daily Rinse mouth after use., Disp: 3 each, Rfl: 3    ibuprofen (MOTRIN) 200 mg tablet, Take 400 mg by mouth every 6 (six) hours as needed for mild pain, Disp: , Rfl:     lidocaine (Lidoderm) 5 %, Apply 1 patch topically daily Remove & Discard patch within 12 hours or as directed by MD (Patient taking differently: Apply 1 patch topically as needed Remove & Discard patch within 12 hours or as directed by MD), Disp: 30 patch, Rfl: 1    lisinopril (ZESTRIL) 20 mg tablet, TAKE 1 TABLET BY MOUTH EVERY DAY, Disp: 90 tablet, Rfl: 1    methocarbamol (ROBAXIN) 500 mg tablet, Take 1 tablet (500 mg total) by mouth 2 (two) times a day For back pain radiating down the leg, Disp: 20 tablet, Rfl: 0    methylPREDNISolone 4 MG tablet therapy pack, Use as directed  on package, Disp: 1 each, Rfl: 0    MINOXIDIL, TOPICAL, 5 % SOLN, Apply 1 application topically in the morning For hair, Disp: , Rfl:     montelukast (SINGULAIR) 10 mg tablet, TAKE 1 TABLET BY MOUTH EVERYDAY AT BEDTIME, Disp: 90 tablet, Rfl: 1    naproxen (Naprosyn) 500 mg tablet, Take 1 tablet (500 mg total) by mouth 2 (two) times a day with meals, Disp: 30 tablet, Rfl: 0    Naproxen Sodium ([employer]S Naproxen Sodium) 220 MG CAPS, Take 1 capsule by mouth if needed (for pain), Disp: , Rfl:     nicotine (NICODERM CQ) 21 mg/24 hr TD 24 hr patch, Place 1 patch on the skin over 24 hours every 24 hours, Disp: 28 patch, Rfl: 1    Omega-3 Fatty Acids (FISH OIL PO), Take 1,000 mg by mouth, Disp: , Rfl:     pantoprazole (PROTONIX) 40 mg tablet, TAKE 1 TABLET BY MOUTH TWICE A DAY, Disp: 180 tablet, Rfl: 1    predniSONE 10 mg tablet, Take 6 tablets (60 mg total) by mouth daily for 3 days, THEN 4 tablets (40 mg total) daily for 3 days, THEN 2 tablets (20 mg total) daily for 2 days, THEN 1 tablet (10 mg total) daily for 2 days., Disp: 36 tablet, Rfl: 0    promethazine-dextromethorphan (PHENERGAN-DM) 6.25-15 mg/5 mL oral syrup, Take 5 mL by mouth 4 (four) times a day as needed for cough, Disp: 150 mL, Rfl: 0    rosuvastatin (CRESTOR) 5 mg tablet, Take 2 tablets (10 mg total) by mouth daily, Disp: 100 tablet, Rfl: 1    TURMERIC PO, Take by mouth, Disp: , Rfl:     TURMERIC-GINGER PO, Take by mouth, Disp: , Rfl:     Current Facility-Administered Medications:     bupivacaine (PF) (MARCAINE) 0.25 % injection 1 mL, 1 mL, Intra-articular, , Christiano Garcia DPAYLEEN, 1 mL at 05/11/23 1706    triamcinolone acetonide (KENALOG-40) 40 mg/mL injection 20 mg, 20 mg, Intra-articular, , Christiano Garcia DPM, 20 mg at 05/11/23 9788    Past Medical History:   Diagnosis Date    Acid reflux     Back injury     COPD (chronic obstructive pulmonary disease) (HCC)     Fibromyalgia     Hypertension     Seasonal allergies      Past Surgical History:   Procedure  Laterality Date    COLONOSCOPY      EYE SURGERY      KNEE SURGERY  1998    MN COLONOSCOPY FLX DX W/COLLJ SPEC WHEN PFRMD N/A 05/09/2017    Procedure: EGD AND COLONOSCOPY;  Surgeon: Kimberly Zapata MD;  Location: [facility];  Service: Gastroenterology     Family History   Problem Relation Age of Onset    Pancreatic cancer Mother 72    Coronary artery disease Father     Diabetes Father     Hypertension Father     Heart disease Father     Lung cancer Sister 37    No Known Problems Daughter     No Known Problems Maternal Grandmother     Lung cancer Maternal Grandfather 77    Diabetes Paternal Grandmother     No Known Problems Paternal Grandfather     Pancreatic cancer Brother     Pancreatic cancer Brother     No Known Problems Son     No Known Problems Son      Social History   reports that she has been smoking cigarettes. She started smoking about 56 years ago. She has a 84.9 pack-year smoking history. She has been exposed to tobacco smoke. She has never used smokeless tobacco. She reports that she does not drink alcohol and does not use drugs.     Review of Systems   Constitutional:  Negative for fatigue.   HENT: Negative.     Eyes: Negative.    Respiratory:  Negative for shortness of breath.    Cardiovascular:  Negative for chest pain, palpitations and leg swelling.   Gastrointestinal:  Positive for abdominal pain.   Endocrine: Negative.    Musculoskeletal:  Positive for arthralgias, gait problem and joint swelling.   Skin: Negative.    Neurological:  Negative for syncope.   Hematological: Negative.    All other systems reviewed and are negative.      Objective:     Vitals:    08/19/24 1312   BP: 136/80   Pulse: 95   SpO2: 95%     Physical Exam  Vitals reviewed.   Constitutional:       General: She is not in acute distress.  HENT:      Head: Normocephalic.   Neck:      Vascular: Carotid bruit present.   Cardiovascular:      Rate and Rhythm: Normal rate and regular rhythm.      Heart sounds: S1 normal and S2 normal.  "Murmur heard.      Systolic murmur is present with a grade of 2/6.   Pulmonary:      Breath sounds: No wheezing or rales.   Musculoskeletal:         General: Swelling present.   Skin:     General: Skin is warm.   Neurological:      Mental Status: She is alert. Mental status is at baseline.   Psychiatric:         Mood and Affect: Mood normal.         Pertinent Laboratory/Diagnostic Studies:    Laboratory studies reviewed personally by Abad Clark MD  BMP:   Lab Results   Component Value Date    SODIUM 137 04/22/2024    K 4.0 04/22/2024    CL 98 04/22/2024    CO2 29 04/22/2024    BUN 8 04/22/2024    CREATININE 0.57 (L) 04/22/2024    GLUC 123 07/06/2023    CALCIUM 9.7 04/22/2024     CBC:  Lab Results   Component Value Date    WBC 7.66 04/22/2024    HGB 14.6 04/22/2024    HCT 45.6 04/22/2024    MCV 90 04/22/2024     04/22/2024     Lipid Profile:   Lab Results   Component Value Date    LDLCALC 79 04/22/2024     Other labs:  Lab Results   Component Value Date    NRX0ZVYFDUDH 2.350 04/18/2023     Lab Results   Component Value Date    ALT 14 04/22/2024    AST 17 04/22/2024         Imaging Studies:     Pertinent cardiac studies and imaging studies were personally reviewed and results summarized on this visit.    EKG reviewed personally.  Normal sinus rhythm, no significant abnormalities        Abad Clark MD, [facility]    Portions of the record may have been created with voice recognition software.  Occasional wrong word or \"sound a like\" substitutions may have occurred due to the inherent limitations of voice recognition software.  Read the chart carefully and recognize, using context, where substitutions have occurred.  "

## 2024-08-19 NOTE — ASSESSMENT & PLAN NOTE
BP Readings from Last 3 Encounters:   08/19/24 136/80   08/18/24 147/65   08/02/24 130/80   Continue current medications.  Lifestyle modification.  Close blood pressure monitoring.

## 2024-08-19 NOTE — PATIENT INSTRUCTIONS
Zio patch event monitor planned to correlate symptoms of palpitations with any cardiac rhythm abnormality.    Follow-up with vascular surgery for carotid artery surgery as scheduled.  Continue present medications.

## 2024-08-19 NOTE — ASSESSMENT & PLAN NOTE
"RCRI risk factors: \"high-risk\" surgery (vascular) and history of cerebrovascular disease  RCI RISK CLASS III (2 risk factors, risk of major cardiac compl. appr. 3.6%)  No Cardiac Contraindication for Planned Surgical Procedures  Continue current cardiac medication in the meenakshi-operative period.     "

## 2024-08-19 NOTE — ASSESSMENT & PLAN NOTE
Stress echo in 2023 showed no ischemia.  Recent echo in 7/24 was personally reviewed, EF normal with no valvular abnormalities.  Recent Holter reviewed which showed only rare PVCs with less than 0.5% ectopic burden and no A-fib.  Zio patch event monitor planned to correlate symptoms of palpitations with any cardiac rhythm abnormality due to persistent palpitations (while on steroids), to rule out AF, given prior TIA.

## 2024-08-19 NOTE — LETTER
Cardiology Pre Operative Clearance      PRE OPERATIVE CARDIAC RISK ASSESSMENT    08/19/24    Leanna Ruvalcaba  1955  [id number]      Type of Surgery: Carotid revascularization    Surgeon: Dr. Paz    No Cardiac Contraindication for Planned Surgical Procedures    Anticoagulation: not applicable    Electronically Signed:   Abad Clark MD, [facility]  Cardiologist  [facility]

## 2024-08-20 ENCOUNTER — TELEPHONE (OUTPATIENT)
Dept: SURGICAL ONCOLOGY | Facility: CLINIC | Age: 69
End: 2024-08-20

## 2024-08-20 NOTE — TELEPHONE ENCOUNTER
Patient called back stating she called her insurance company and insurance company stated they need more information from the doctor why MRI is needed so MRI can be approved.       Patient will like a call back as she will like to keep MRI appointment does not want to cancel MRI.       Thank you!

## 2024-08-20 NOTE — TELEPHONE ENCOUNTER
"Spoke to patient to cancel MRI for 9/11- Dr Lua patient  Told patient below the quote from Dr Judi Galdamez   \"Please see message below from finance dep regarding MRI auth: \"mri was denied from insurance since the pt had an mri done in 2023 the insurance said it needs to be at least 2 years since the last testing was done for them to approve it\"   Please call pt and cancel pts MRI scheduled for 9/11/24. Dr Lau can still see her for a 1yr f/u appt in Jan as prev scheduled. We will reorder imaging at that time. Thank you!     Patient was not happy insurance denied this request and she is going to call insurance on her own and see what she can do.  She did not want me to cancel 9/11 MRI and I told her I would call her back in a few days and cancel it then. As needed.   Patient wanted to know why she had to wait to see Dr Lau in Jan 2025 if she had MRI in 9/24. I mentioned timing does matter on imaging and appointments.    I will call patient 8/22/24 to cancel MRI ( unless Denice/Dr Lau advises otherwise)  "

## 2024-08-21 ENCOUNTER — APPOINTMENT (OUTPATIENT)
Dept: PHYSICAL THERAPY | Facility: MEDICAL CENTER | Age: 69
End: 2024-08-21
Payer: COMMERCIAL

## 2024-08-22 ENCOUNTER — OFFICE VISIT (OUTPATIENT)
Dept: OBGYN CLINIC | Facility: CLINIC | Age: 69
End: 2024-08-22
Payer: COMMERCIAL

## 2024-08-22 ENCOUNTER — TELEPHONE (OUTPATIENT)
Dept: SURGICAL ONCOLOGY | Facility: CLINIC | Age: 69
End: 2024-08-22

## 2024-08-22 VITALS
DIASTOLIC BLOOD PRESSURE: 77 MMHG | BODY MASS INDEX: 34.91 KG/M2 | SYSTOLIC BLOOD PRESSURE: 170 MMHG | WEIGHT: 197 LBS | HEART RATE: 79 BPM | HEIGHT: 63 IN

## 2024-08-22 DIAGNOSIS — M17.11 PRIMARY OSTEOARTHRITIS OF RIGHT KNEE: Primary | ICD-10-CM

## 2024-08-22 DIAGNOSIS — M25.461 EFFUSION OF RIGHT KNEE: ICD-10-CM

## 2024-08-22 PROCEDURE — 99213 OFFICE O/P EST LOW 20 MIN: CPT | Performed by: ORTHOPAEDIC SURGERY

## 2024-08-22 PROCEDURE — 20610 DRAIN/INJ JOINT/BURSA W/O US: CPT | Performed by: ORTHOPAEDIC SURGERY

## 2024-08-22 RX ORDER — LIDOCAINE HYDROCHLORIDE 10 MG/ML
10 INJECTION, SOLUTION INFILTRATION; PERINEURAL
Status: COMPLETED | OUTPATIENT
Start: 2024-08-22 | End: 2024-08-22

## 2024-08-22 RX ORDER — METHYLPREDNISOLONE ACETATE 40 MG/ML
2 INJECTION, SUSPENSION INTRA-ARTICULAR; INTRALESIONAL; INTRAMUSCULAR; SOFT TISSUE
Status: COMPLETED | OUTPATIENT
Start: 2024-08-22 | End: 2024-08-22

## 2024-08-22 RX ORDER — LIDOCAINE HYDROCHLORIDE 20 MG/ML
8 INJECTION, SOLUTION INFILTRATION; PERINEURAL
Status: COMPLETED | OUTPATIENT
Start: 2024-08-22 | End: 2024-08-22

## 2024-08-22 RX ORDER — BUPIVACAINE HYDROCHLORIDE 2.5 MG/ML
2 INJECTION, SOLUTION INFILTRATION; PERINEURAL
Status: COMPLETED | OUTPATIENT
Start: 2024-08-22 | End: 2024-08-22

## 2024-08-22 RX ADMIN — METHYLPREDNISOLONE ACETATE 2 ML: 40 INJECTION, SUSPENSION INTRA-ARTICULAR; INTRALESIONAL; INTRAMUSCULAR; SOFT TISSUE at 13:00

## 2024-08-22 RX ADMIN — LIDOCAINE HYDROCHLORIDE 8 ML: 20 INJECTION, SOLUTION INFILTRATION; PERINEURAL at 13:00

## 2024-08-22 RX ADMIN — BUPIVACAINE HYDROCHLORIDE 2 ML: 2.5 INJECTION, SOLUTION INFILTRATION; PERINEURAL at 13:00

## 2024-08-22 RX ADMIN — LIDOCAINE HYDROCHLORIDE 10 ML: 10 INJECTION, SOLUTION INFILTRATION; PERINEURAL at 13:00

## 2024-08-22 NOTE — PROGRESS NOTES
Orthopaedics Office Visit - Follow up Evaluation for Right knee Patient Visit    ASSESSMENT/PLAN:    Assessment:   Right knee effusion  Right knee primary osteoarthritis       Plan:   Patient was educated in office today about her condition, an aspiration and cortisone injection was recommended and performed.   -18 cc of bloody fluid was obtained.   -After aspiration and cortisone injection the knee was placed in an ace wrap.   -Advised to continue and finish her prednisone taper, continue Robaxin and Naproxen as needed  - Follow up 6 weeks      To Do Next Visit:  Re-evaluate right knee     _____________________________________________________  CHIEF COMPLAINT:  Chief Complaint   Patient presents with    Right Knee - Follow-up         SUBJECTIVE:  Leanna Ruvalcaba is a 68 y.o. female who presents for a follow up evaluation of her right knee. She has known OA of her right knee. She was seen in the office one month ago on 7/25/24 an effusion was present at the time. A cortisone injection and aspiration was offered at last office visit but was denied out of fear.  She chose to take NSAIDS, and go to formal PT instead. On 8/12/24 she called the office with complaints of increased pain was given methyl prednisone 4 mg, felt it gave her significant relief until 8/18/24 when the swelling and pain returned. She was seen at the ED that day and was given a rx for prednisone 10 mg, naproxen 500 mg and methocarbamol 500 mg which is compliant with taking. She is on day 4 of prednisone and feels it is giving her relief but fears the pain and swelling will return when she finishes the medication.     PAST MEDICAL HISTORY:  Past Medical History:   Diagnosis Date    Acid reflux     Back injury     COPD (chronic obstructive pulmonary disease) (HCC)     Fibromyalgia     Hypertension     Seasonal allergies        PAST SURGICAL HISTORY:  Past Surgical History:   Procedure Laterality Date    COLONOSCOPY      EYE SURGERY      KNEE SURGERY   1998    [place] COLONOSCOPY FLX DX W/COLLJ SPEC WHEN PFRMD N/A 05/09/2017    Procedure: EGD AND COLONOSCOPY;  Surgeon: Kimberly Zapata MD;  Location: [facility];  Service: Gastroenterology       FAMILY HISTORY:  Family History   Problem Relation Age of Onset    Pancreatic cancer Mother 72    Coronary artery disease Father     Diabetes Father     Hypertension Father     Heart disease Father     Lung cancer Sister 37    No Known Problems Daughter     No Known Problems Maternal Grandmother     Lung cancer Maternal Grandfather 77    Diabetes Paternal Grandmother     No Known Problems Paternal Grandfather     Pancreatic cancer Brother     Pancreatic cancer Brother     No Known Problems Son     No Known Problems Son        SOCIAL HISTORY:  Social History     Tobacco Use    Smoking status: Every Day     Current packs/day: 1.50     Average packs/day: 1.5 packs/day for 56.6 years (85.0 ttl pk-yrs)     Types: Cigarettes     Start date: 1968     Passive exposure: Current    Smokeless tobacco: Never   Vaping Use    Vaping status: Never Used   Substance Use Topics    Alcohol use: Never    Drug use: No       MEDICATIONS:    Current Outpatient Medications:     acetaminophen (TYLENOL) 650 mg CR tablet, Take by mouth every 8 (eight) hours as needed  , Disp: , Rfl:     albuterol (2.5 mg/3 mL) 0.083 % nebulizer solution, Take 3 mL (2.5 mg total) by nebulization every 6 (six) hours as needed for wheezing or shortness of breath, Disp: 180 mL, Rfl: 5    albuterol (PROVENTIL HFA,VENTOLIN HFA) 90 mcg/act inhaler, Inhale 2 puffs every 6 (six) hours as needed for wheezing, Disp: 18 g, Rfl: 5    ALPRAZolam (XANAX) 0.25 mg tablet, Take 1 tablet (0.25 mg total) by mouth 3 (three) times a day as needed for anxiety, Disp: 90 tablet, Rfl: 0    amLODIPine (NORVASC) 5 mg tablet, TAKE 1 TABLET (5 MG TOTAL) BY MOUTH DAILY., Disp: 90 tablet, Rfl: 1    aspirin 81 mg chewable tablet, Chew 1 tablet (81 mg total) daily, Disp: 30 tablet, Rfl: 3    cetirizine  (ZyrTEC) 10 mg tablet, Take 1 tablet (10 mg total) by mouth daily, Disp: 90 tablet, Rfl: 1    Cholecalciferol (VITAMIN D3 PO), Take by mouth, Disp: , Rfl:     clotrimazole (MYCELEX) 10 mg leonardo, Take 1 tablet (10 mg total) by mouth 4 (four) times a day, Disp: 28 Leonardo, Rfl: 1    Diclofenac Sodium (VOLTAREN) 1 %, Apply 2 g topically 4 (four) times a day, Disp: 100 g, Rfl: 0    docusate sodium ([employer] Stool Softener) 250 MG capsule, TAKE 1 CAPSULE BY MOUTH DAILY., Disp: 30 capsule, Rfl: 5    escitalopram (LEXAPRO) 10 mg tablet, TAKE 1 TABLET BY MOUTH EVERY DAY, Disp: 90 tablet, Rfl: 1    fluticasone-umeclidinium-vilanterol 200-62.5-25 mcg/actuation AEPB inhaler, Inhale 1 puff daily Rinse mouth after use., Disp: 3 each, Rfl: 3    ibuprofen (MOTRIN) 200 mg tablet, Take 400 mg by mouth every 6 (six) hours as needed for mild pain, Disp: , Rfl:     lidocaine (Lidoderm) 5 %, Apply 1 patch topically daily Remove & Discard patch within 12 hours or as directed by MD (Patient taking differently: Apply 1 patch topically as needed Remove & Discard patch within 12 hours or as directed by MD), Disp: 30 patch, Rfl: 1    lisinopril (ZESTRIL) 20 mg tablet, TAKE 1 TABLET BY MOUTH EVERY DAY, Disp: 90 tablet, Rfl: 1    methocarbamol (ROBAXIN) 500 mg tablet, Take 1 tablet (500 mg total) by mouth 2 (two) times a day For back pain radiating down the leg, Disp: 20 tablet, Rfl: 0    MINOXIDIL, TOPICAL, 5 % SOLN, Apply 1 application topically in the morning For hair, Disp: , Rfl:     montelukast (SINGULAIR) 10 mg tablet, TAKE 1 TABLET BY MOUTH EVERYDAY AT BEDTIME, Disp: 90 tablet, Rfl: 1    naproxen (Naprosyn) 500 mg tablet, Take 1 tablet (500 mg total) by mouth 2 (two) times a day with meals, Disp: 30 tablet, Rfl: 0    Naproxen Sodium ([employer]S Naproxen Sodium) 220 MG CAPS, Take 1 capsule by mouth if needed (for pain), Disp: , Rfl:     nicotine (NICODERM CQ) 21 mg/24 hr TD 24 hr patch, Place 1 patch on the skin over 24 hours every 24 hours, Disp:  28 patch, Rfl: 1    Omega-3 Fatty Acids (FISH OIL PO), Take 1,000 mg by mouth, Disp: , Rfl:     pantoprazole (PROTONIX) 40 mg tablet, TAKE 1 TABLET BY MOUTH TWICE A DAY, Disp: 180 tablet, Rfl: 1    predniSONE 10 mg tablet, Take 6 tablets (60 mg total) by mouth daily for 3 days, THEN 4 tablets (40 mg total) daily for 3 days, THEN 2 tablets (20 mg total) daily for 2 days, THEN 1 tablet (10 mg total) daily for 2 days., Disp: 36 tablet, Rfl: 0    promethazine-dextromethorphan (PHENERGAN-DM) 6.25-15 mg/5 mL oral syrup, Take 5 mL by mouth 4 (four) times a day as needed for cough, Disp: 150 mL, Rfl: 0    rosuvastatin (CRESTOR) 5 mg tablet, Take 2 tablets (10 mg total) by mouth daily, Disp: 100 tablet, Rfl: 1    TURMERIC PO, Take by mouth, Disp: , Rfl:     TURMERIC-GINGER PO, Take by mouth, Disp: , Rfl:     methylPREDNISolone 4 MG tablet therapy pack, Use as directed on package (Patient not taking: Reported on 8/22/2024), Disp: 1 each, Rfl: 0    Current Facility-Administered Medications:     bupivacaine (PF) (MARCAINE) 0.25 % injection 1 mL, 1 mL, Intra-articular, , Christiano Garcia, DPM, 1 mL at 05/11/23 1706    triamcinolone acetonide (KENALOG-40) 40 mg/mL injection 20 mg, 20 mg, Intra-articular, , Christiano Garcia, DPM, 20 mg at 05/11/23 1706    ALLERGIES:  Allergies   Allergen Reactions    Augmentin [Amoxicillin-Pot Clavulanate] Vomiting    Miconazole Itching and Swelling    Wixela Inhub [Fluticasone-Salmeterol] Palpitations       REVIEW OF SYSTEMS:  MSK: Right knee   Neuro: WNL  Pertinent items are otherwise noted in HPI.  A comprehensive review of systems was otherwise negative.    LABS:  HgA1c:   Lab Results   Component Value Date    HGBA1C 5.6 04/18/2023     BMP:   Lab Results   Component Value Date    GLUCOSE 86 10/08/2015    CALCIUM 9.7 04/22/2024     10/08/2015    K 4.0 04/22/2024    CO2 29 04/22/2024    CL 98 04/22/2024    BUN 8 04/22/2024    CREATININE 0.57 (L) 04/22/2024     CBC: No components found for:  "\"CBC\"    _____________________________________________________  PHYSICAL EXAMINATION:  Vital signs: /77   Pulse 79   Ht 5' 3\" (1.6 m)   Wt 89.4 kg (197 lb)   LMP  (LMP Unknown)   BMI 34.90 kg/m²   General: No acute distress, awake and alert  Psychiatric: Mood and affect appear appropriate  HEENT: Trachea Midline, No torticollis, no apparent facial trauma  Cardiovascular: No audible murmurs; Extremities appear perfused  Pulmonary: No audible wheezing or stridor  Skin: No open lesions; see further details (if any) below    MUSCULOSKELETAL EXAMINATION:  Extremities:    Right Knee  Range of motion from 2 to 135.    There is mild effusion.    There is tenderness over the medial joint line.     The patient is able to perform a straight leg raise.      The patient is neurovascular intact distally.       _____________________________________________________  STUDIES REVIEWED:  none      PROCEDURES PERFORMED:  Large joint arthrocentesis: R knee  Universal Protocol:  Consent: Verbal consent obtained.  Risks and benefits: risks, benefits and alternatives were discussed  Consent given by: patient  Time out: Immediately prior to procedure a \"time out\" was called to verify the correct patient, procedure, equipment, support staff and site/side marked as required.  Patient understanding: patient states understanding of the procedure being performed  Site marked: the operative site was marked  Supporting Documentation  Indications: pain and joint swelling   Procedure Details  Location: knee - R knee  Preparation: Patient was prepped and draped in the usual sterile fashion  Needle size: 22 G  Ultrasound guidance: no  Approach: anterolateral  Medications administered: 2 mL bupivacaine 0.25 %; 2 mL methylPREDNISolone acetate 40 mg/mL; 8 mL lidocaine 2 %; 10 mL lidocaine 1 %    Aspirate amount: 18 mL  Aspirate: bloody  Patient tolerance: patient tolerated the procedure well with no immediate complications  Dressing:  Sterile " dressing applied        Scribe Attestation      I,:  Mahsa Brower am acting as a scribe while in the presence of the attending physician.:       I,:  Erasmo Leach MD personally performed the services described in this documentation    as scribed in my presence.:

## 2024-08-22 NOTE — TELEPHONE ENCOUNTER
Left message for patient that the MRI to be cancelled for 9/11/24 due to insurance denied and patient still has follow up in 1/25.  We can reschedule MRI 2025,  Patient has hopeline number    MRI NOT cancelled 9/11/24 - Error in calling patient.KP    Left another message NOT cancelled- location of appointment and time left

## 2024-08-23 ENCOUNTER — APPOINTMENT (OUTPATIENT)
Dept: PHYSICAL THERAPY | Facility: MEDICAL CENTER | Age: 69
End: 2024-08-23
Payer: COMMERCIAL

## 2024-08-26 ENCOUNTER — OFFICE VISIT (OUTPATIENT)
Dept: FAMILY MEDICINE CLINIC | Facility: CLINIC | Age: 69
End: 2024-08-26
Payer: COMMERCIAL

## 2024-08-26 VITALS
WEIGHT: 201 LBS | HEIGHT: 63 IN | TEMPERATURE: 96.7 F | HEART RATE: 77 BPM | DIASTOLIC BLOOD PRESSURE: 84 MMHG | SYSTOLIC BLOOD PRESSURE: 120 MMHG | BODY MASS INDEX: 35.61 KG/M2 | OXYGEN SATURATION: 95 %

## 2024-08-26 DIAGNOSIS — I10 PRIMARY HYPERTENSION: Primary | ICD-10-CM

## 2024-08-26 DIAGNOSIS — D49.0 IPMN (INTRADUCTAL PAPILLARY MUCINOUS NEOPLASM): ICD-10-CM

## 2024-08-26 DIAGNOSIS — I67.1 ANTERIOR COMMUNICATING ARTERY ANEURYSM: ICD-10-CM

## 2024-08-26 DIAGNOSIS — I65.22 CAROTID ARTERY STENOSIS, SYMPTOMATIC, LEFT: ICD-10-CM

## 2024-08-26 DIAGNOSIS — M25.461 EFFUSION OF RIGHT KNEE: ICD-10-CM

## 2024-08-26 DIAGNOSIS — M17.11 PRIMARY OSTEOARTHRITIS OF RIGHT KNEE: ICD-10-CM

## 2024-08-26 PROCEDURE — G2211 COMPLEX E/M VISIT ADD ON: HCPCS | Performed by: FAMILY MEDICINE

## 2024-08-26 PROCEDURE — 99214 OFFICE O/P EST MOD 30 MIN: CPT | Performed by: FAMILY MEDICINE

## 2024-08-26 NOTE — PROGRESS NOTES
Ambulatory Visit  Name: Leanna Ruvalcaba      : 1955      MRN: [id number]  Encounter Provider: Matt Grider MD  Encounter Date: 2024   Encounter department: [facility]    Assessment & Plan   1. Primary hypertension  Assessment & Plan:  Blood pressure well-controlled.  Continue present treatment.  Recheck 3 months  2. Effusion of right knee  -     naproxen (Naprosyn) 500 mg tablet; Take 1 tablet (500 mg total) by mouth 2 (two) times a day with meals  3. Carotid artery stenosis, symptomatic, left  Assessment & Plan:  Patient for carotid endarterectomy in October.  Continue present medications.  To ER if TIA symptoms return  4. Anterior communicating artery aneurysm  Assessment & Plan:  Patient has been seen by neurosurgery.  Decision to wait until patient has carotid surgery was made.  Continue to monitor.  Recheck 3 months  5. IPMN (intraductal papillary mucinous neoplasm)  Assessment & Plan:  Patient trying to have her MRI moved up.  I explained that even if the lesion slightly larger, it should not keep her from having her carotid endarterectomy.  Patient states that we will discuss this after the MRI if the lesion has changed in size.  Recheck after MRI  6. Primary osteoarthritis of right knee  Assessment & Plan:  With recent exacerbation, improved after injection followed by steroid course.  Patient with significant side effects to the steroids including insomnia and increased anxiety.  Because she is doing well, we will have her stop the prednisone.  Can restart naproxen twice daily as needed.  Avoid exacerbating positions and activities.  Recheck 2 weeks if pain starts to return         History of Present Illness     f/u multiple med issues  - pt was seen by vascular surgery who has scheduled pt for L CEA in October.  Pt has not had any further TIA symptoms since last visit  - pt trying to get her MRI of the abd done prior to her CEA. She states that she may not  want to have anything done if there is evidence of a pancreatic neoplasm  - pt recently seen by Cardio who has ordered a 2 week cardiac monitor due to symptoms. Pt with occ lightheadedness, but denies any other new Cardio symptoms.   - pt was seen by neurosurgery re; the anterior communicating aneurysm. They did not want to address this until the carotid surgery was done. Pt denies worsening HA, change in vision, change in thought or other symptoms.   - pt with recent onset of R lower leg pain.  Was seen in ED, then by Ortho.  She states that she had an injection, followed by a course of oral methylprednisolone.  She states that her knee feels much better although she has been having difficulty sleeping and increased anxiety on the oral prednisone.      Review of Systems   Constitutional:  Positive for fatigue. Negative for chills and fever.   HENT:  Positive for congestion (mild, chronic). Negative for ear discharge, ear pain, sinus pressure and sinus pain.    Eyes: Negative.    Respiratory:  Positive for cough (occasional), shortness of breath (with exertion) and wheezing (occasional).    Cardiovascular:  Negative for chest pain, palpitations and leg swelling.   Gastrointestinal:  Positive for abdominal pain (chronic, intermittent, epigastric). Negative for abdominal distention, constipation, diarrhea, nausea and vomiting.   Genitourinary: Negative.    Musculoskeletal:  Positive for arthralgias (R knee better s/p injection and steroid course), back pain and myalgias. Negative for joint swelling.   Skin: Negative.    Neurological:  Negative for dizziness, speech difficulty (none since TIA episode), weakness, light-headedness and headaches.   Hematological: Negative.    Psychiatric/Behavioral:  Positive for dysphoric mood (occ down mood). Negative for suicidal ideas. The patient is nervous/anxious (mild re: her upcoming surgery).      Past Medical History:   Diagnosis Date   • Acid reflux    • Back injury    • COPD  (chronic obstructive pulmonary disease) (HCC)    • Fibromyalgia    • Hypertension    • Seasonal allergies      Past Surgical History:   Procedure Laterality Date   • COLONOSCOPY     • EYE SURGERY     • KNEE SURGERY  1998   • [place] COLONOSCOPY FLX DX W/COLLJ SPEC WHEN PFRMD N/A 05/09/2017    Procedure: EGD AND COLONOSCOPY;  Surgeon: Kimberly Zapata MD;  Location: [facility];  Service: Gastroenterology     Family History   Problem Relation Age of Onset   • Pancreatic cancer Mother 72   • Coronary artery disease Father    • Diabetes Father    • Hypertension Father    • Heart disease Father    • Lung cancer Sister 37   • No Known Problems Daughter    • No Known Problems Maternal Grandmother    • Lung cancer Maternal Grandfather 77   • Diabetes Paternal Grandmother    • No Known Problems Paternal Grandfather    • Pancreatic cancer Brother    • Pancreatic cancer Brother    • No Known Problems Son    • No Known Problems Son      Social History     Tobacco Use   • Smoking status: Every Day     Current packs/day: 1.50     Average packs/day: 1.5 packs/day for 56.7 years (85.0 ttl pk-yrs)     Types: Cigarettes     Start date: 1968     Passive exposure: Current   • Smokeless tobacco: Never   Vaping Use   • Vaping status: Never Used   Substance and Sexual Activity   • Alcohol use: Never   • Drug use: No   • Sexual activity: Not Currently     Current Outpatient Medications on File Prior to Visit   Medication Sig   • ALPRAZolam (XANAX) 0.25 mg tablet Take 1 tablet (0.25 mg total) by mouth 3 (three) times a day as needed for anxiety   • amLODIPine (NORVASC) 5 mg tablet TAKE 1 TABLET (5 MG TOTAL) BY MOUTH DAILY.   • aspirin 81 mg chewable tablet Chew 1 tablet (81 mg total) daily   • cetirizine (ZyrTEC) 10 mg tablet Take 1 tablet (10 mg total) by mouth daily   • Cholecalciferol (VITAMIN D3 PO) Take by mouth   • clotrimazole (MYCELEX) 10 mg evette Take 1 tablet (10 mg total) by mouth 4 (four) times a day   • Diclofenac Sodium (VOLTAREN) 1  % Apply 2 g topically 4 (four) times a day   • docusate sodium ([employer]S Stool Softener) 250 MG capsule TAKE 1 CAPSULE BY MOUTH DAILY.   • escitalopram (LEXAPRO) 10 mg tablet TAKE 1 TABLET BY MOUTH EVERY DAY   • fluticasone-umeclidinium-vilanterol 200-62.5-25 mcg/actuation AEPB inhaler Inhale 1 puff daily Rinse mouth after use.   • ibuprofen (MOTRIN) 200 mg tablet Take 400 mg by mouth every 6 (six) hours as needed for mild pain   • lisinopril (ZESTRIL) 20 mg tablet TAKE 1 TABLET BY MOUTH EVERY DAY   • methocarbamol (ROBAXIN) 500 mg tablet Take 1 tablet (500 mg total) by mouth 2 (two) times a day For back pain radiating down the leg   • MINOXIDIL, TOPICAL, 5 % SOLN Apply 1 application topically in the morning For hair   • montelukast (SINGULAIR) 10 mg tablet TAKE 1 TABLET BY MOUTH EVERYDAY AT BEDTIME   • Omega-3 Fatty Acids (FISH OIL PO) Take 1,000 mg by mouth   • pantoprazole (PROTONIX) 40 mg tablet TAKE 1 TABLET BY MOUTH TWICE A DAY   • rosuvastatin (CRESTOR) 5 mg tablet Take 2 tablets (10 mg total) by mouth daily   • TURMERIC PO Take by mouth   • TURMERIC-GINGER PO Take by mouth   • acetaminophen (TYLENOL) 650 mg CR tablet Take by mouth every 8 (eight) hours as needed   (Patient not taking: Reported on 8/26/2024)   • albuterol (2.5 mg/3 mL) 0.083 % nebulizer solution Take 3 mL (2.5 mg total) by nebulization every 6 (six) hours as needed for wheezing or shortness of breath (Patient not taking: Reported on 8/26/2024)   • albuterol (PROVENTIL HFA,VENTOLIN HFA) 90 mcg/act inhaler Inhale 2 puffs every 6 (six) hours as needed for wheezing (Patient not taking: Reported on 8/26/2024)     Allergies   Allergen Reactions   • Augmentin [Amoxicillin-Pot Clavulanate] Vomiting   • Miconazole Itching and Swelling   • Wixela Inhub [Fluticasone-Salmeterol] Palpitations     Immunization History   Administered Date(s) Administered   • Tdap 02/28/2008     Objective     /84 (BP Location: Left arm, Patient Position: Sitting, Cuff Size:  "Large)   Pulse 77   Temp (!) 96.7 °F (35.9 °C)   Ht 5' 3\" (1.6 m)   Wt 91.2 kg (201 lb)   LMP  (LMP Unknown)   SpO2 95%   BMI 35.61 kg/m²     Physical Exam  Vitals reviewed.   HENT:      Head: Normocephalic.      Mouth/Throat:      Mouth: Mucous membranes are moist.   Eyes:      Extraocular Movements: Extraocular movements intact.      Conjunctiva/sclera: Conjunctivae normal.      Pupils: Pupils are equal, round, and reactive to light.   Cardiovascular:      Rate and Rhythm: Normal rate and regular rhythm.   Pulmonary:      Effort: Pulmonary effort is normal.      Breath sounds: No wheezing or rales.      Comments: Sl decreased breath sounds bilat  Abdominal:      General: There is no distension.      Palpations: There is no mass.      Tenderness: There is abdominal tenderness (mild epigastric).   Musculoskeletal:         General: Swelling (trace R knee effusion) and tenderness (mild, R knee joint line) present.      Cervical back: No tenderness.      Right lower leg: No edema.      Left lower leg: No edema.   Lymphadenopathy:      Cervical: No cervical adenopathy.   Skin:     General: Skin is warm.      Capillary Refill: Capillary refill takes less than 2 seconds.   Neurological:      General: No focal deficit present.      Mental Status: She is alert and oriented to person, place, and time.         "

## 2024-08-27 RX ORDER — NAPROXEN 500 MG/1
500 TABLET ORAL 2 TIMES DAILY WITH MEALS
Qty: 30 TABLET | Refills: 0 | Status: SHIPPED | OUTPATIENT
Start: 2024-08-27

## 2024-08-28 NOTE — ASSESSMENT & PLAN NOTE
Patient trying to have her MRI moved up.  I explained that even if the lesion slightly larger, it should not keep her from having her carotid endarterectomy.  Patient states that we will discuss this after the MRI if the lesion has changed in size.  Recheck after MRI

## 2024-08-28 NOTE — ASSESSMENT & PLAN NOTE
With recent exacerbation, improved after injection followed by steroid course.  Patient with significant side effects to the steroids including insomnia and increased anxiety.  Because she is doing well, we will have her stop the prednisone.  Can restart naproxen twice daily as needed.  Avoid exacerbating positions and activities.  Recheck 2 weeks if pain starts to return

## 2024-08-28 NOTE — ASSESSMENT & PLAN NOTE
Patient has been seen by neurosurgery.  Decision to wait until patient has carotid surgery was made.  Continue to monitor.  Recheck 3 months

## 2024-08-28 NOTE — ASSESSMENT & PLAN NOTE
Patient for carotid endarterectomy in October.  Continue present medications.  To ER if TIA symptoms return

## 2024-08-29 ENCOUNTER — APPOINTMENT (OUTPATIENT)
Dept: LAB | Facility: MEDICAL CENTER | Age: 69
End: 2024-08-29
Payer: COMMERCIAL

## 2024-08-29 ENCOUNTER — TELEPHONE (OUTPATIENT)
Age: 69
End: 2024-08-29

## 2024-08-29 ENCOUNTER — EVALUATION (OUTPATIENT)
Dept: PHYSICAL THERAPY | Facility: MEDICAL CENTER | Age: 69
End: 2024-08-29
Payer: COMMERCIAL

## 2024-08-29 ENCOUNTER — CLINICAL SUPPORT (OUTPATIENT)
Dept: FAMILY MEDICINE CLINIC | Facility: CLINIC | Age: 69
End: 2024-08-29

## 2024-08-29 DIAGNOSIS — D49.0 IPMN (INTRADUCTAL PAPILLARY MUCINOUS NEOPLASM): ICD-10-CM

## 2024-08-29 DIAGNOSIS — R53.83 FATIGUE, UNSPECIFIED TYPE: Primary | ICD-10-CM

## 2024-08-29 DIAGNOSIS — D49.0 IPMN (INTRADUCTAL PAPILLARY MUCINOUS NEOPLASM): Primary | ICD-10-CM

## 2024-08-29 DIAGNOSIS — M17.11 PRIMARY OSTEOARTHRITIS OF RIGHT KNEE: Primary | ICD-10-CM

## 2024-08-29 DIAGNOSIS — S86.911D KNEE STRAIN, RIGHT, SUBSEQUENT ENCOUNTER: ICD-10-CM

## 2024-08-29 LAB
ALBUMIN SERPL BCG-MCNC: 3.7 G/DL (ref 3.5–5)
ALP SERPL-CCNC: 58 U/L (ref 34–104)
ALT SERPL W P-5'-P-CCNC: 22 U/L (ref 7–52)
ANION GAP SERPL CALCULATED.3IONS-SCNC: 9 MMOL/L (ref 4–13)
AST SERPL W P-5'-P-CCNC: 14 U/L (ref 13–39)
BASOPHILS # BLD AUTO: 0.04 THOUSANDS/ÂΜL (ref 0–0.1)
BASOPHILS NFR BLD AUTO: 0 % (ref 0–1)
BILIRUB SERPL-MCNC: 0.49 MG/DL (ref 0.2–1)
BUN SERPL-MCNC: 12 MG/DL (ref 5–25)
CALCIUM SERPL-MCNC: 9 MG/DL (ref 8.4–10.2)
CHLORIDE SERPL-SCNC: 98 MMOL/L (ref 96–108)
CO2 SERPL-SCNC: 29 MMOL/L (ref 21–32)
CREAT SERPL-MCNC: 0.55 MG/DL (ref 0.6–1.3)
EOSINOPHIL # BLD AUTO: 0.12 THOUSAND/ÂΜL (ref 0–0.61)
EOSINOPHIL NFR BLD AUTO: 1 % (ref 0–6)
ERYTHROCYTE [DISTWIDTH] IN BLOOD BY AUTOMATED COUNT: 13.6 % (ref 11.6–15.1)
GFR SERPL CREATININE-BSD FRML MDRD: 96 ML/MIN/1.73SQ M
GLUCOSE SERPL-MCNC: 82 MG/DL (ref 65–140)
HCT VFR BLD AUTO: 42.2 % (ref 34.8–46.1)
HGB BLD-MCNC: 13.3 G/DL (ref 11.5–15.4)
IMM GRANULOCYTES # BLD AUTO: 0.15 THOUSAND/UL (ref 0–0.2)
IMM GRANULOCYTES NFR BLD AUTO: 1 % (ref 0–2)
LYMPHOCYTES # BLD AUTO: 1.86 THOUSANDS/ÂΜL (ref 0.6–4.47)
LYMPHOCYTES NFR BLD AUTO: 13 % (ref 14–44)
MCH RBC QN AUTO: 29.1 PG (ref 26.8–34.3)
MCHC RBC AUTO-ENTMCNC: 31.5 G/DL (ref 31.4–37.4)
MCV RBC AUTO: 92 FL (ref 82–98)
MONOCYTES # BLD AUTO: 1.55 THOUSAND/ÂΜL (ref 0.17–1.22)
MONOCYTES NFR BLD AUTO: 11 % (ref 4–12)
NEUTROPHILS # BLD AUTO: 10.24 THOUSANDS/ÂΜL (ref 1.85–7.62)
NEUTS SEG NFR BLD AUTO: 74 % (ref 43–75)
NRBC BLD AUTO-RTO: 0 /100 WBCS
PLATELET # BLD AUTO: 256 THOUSANDS/UL (ref 149–390)
PMV BLD AUTO: 9.6 FL (ref 8.9–12.7)
POTASSIUM SERPL-SCNC: 3.7 MMOL/L (ref 3.5–5.3)
PROT SERPL-MCNC: 6.4 G/DL (ref 6.4–8.4)
RBC # BLD AUTO: 4.57 MILLION/UL (ref 3.81–5.12)
SARS-COV-2 AG UPPER RESP QL IA: NEGATIVE
SODIUM SERPL-SCNC: 136 MMOL/L (ref 135–147)
VALID CONTROL: NORMAL
WBC # BLD AUTO: 13.96 THOUSAND/UL (ref 4.31–10.16)

## 2024-08-29 PROCEDURE — 97110 THERAPEUTIC EXERCISES: CPT | Performed by: PHYSICAL THERAPIST

## 2024-08-29 PROCEDURE — 97112 NEUROMUSCULAR REEDUCATION: CPT | Performed by: PHYSICAL THERAPIST

## 2024-08-29 PROCEDURE — 80053 COMPREHEN METABOLIC PANEL: CPT

## 2024-08-29 PROCEDURE — 97140 MANUAL THERAPY 1/> REGIONS: CPT | Performed by: PHYSICAL THERAPIST

## 2024-08-29 PROCEDURE — 36415 COLL VENOUS BLD VENIPUNCTURE: CPT

## 2024-08-29 PROCEDURE — 85025 COMPLETE CBC W/AUTO DIFF WBC: CPT

## 2024-08-29 NOTE — PROGRESS NOTES
PT Re-Evaluation     Today's date: 2024  Patient name: Leanna Ruvalcaba  : 1955  MRN: [id number]  Referring provider: Matt Fernandez PA*  Dx:   Encounter Diagnosis     ICD-10-CM    1. Primary osteoarthritis of right knee  M17.11       2. Knee strain, right, subsequent encounter  S86.911D           Start Time: 1145  Stop Time: 1228  Total time in clinic (min): 43 minutes    Assessment  Impairments: abnormal gait, abnormal muscle firing, abnormal or restricted ROM, activity intolerance, impaired physical strength, lacks appropriate home exercise program, pain with function, poor posture , participation limitations and activity limitations  Symptom irritability: moderate    Assessment details: Pt is a 68 y.o. female who has completed 5 PT sessions to this date.  The patient has made improvements in decrease in reported SPR, improved R knee ROM, and slight improvement in LE strength.  However, the patient continues to have increased difficulty with continued periods of pain in the R knee and decreased activity tolerance.  Pt reports that she has had steroid dose packs and injections.  Due to this, she is feeling ill.  Pt has followed up with PCP for symptoms.  Pt and therapist agree to continue OP PT services but to be mindful of symptom presentation and will adjust POC accordingly.  I believe this patient will continue to benefit from skilled PT for manual therapy to the R knee, R knee ROM exercises, LE strengthening and mechanics training to continue improving upon limitations and assist the patient to improve QOL.          Understanding of Dx/Px/POC: good     Prognosis: good    Goals  STGs: 4 weeks  1) Pt will have SPR decrease of 2 units at worst- met  2) pt will have improved R knee flexion AROM to 110*- part met  3) pt will have improved foto score of 10 points- not met    LTGs: 8 weeks  1) pt will be independent with HEP by D/C- part met  2) pt will be independent with symptom management by D/C-  part met  3) pt will subjectively report improved tolerance to performing STS transfers with improved symptoms by at least 50% in order to demonstrate improved activity tolerance by DC.- not met       Plan  Patient would benefit from: skilled physical therapy  Planned modality interventions: cryotherapy and thermotherapy: hydrocollator packs    Planned therapy interventions: joint mobilization, manual therapy, neuromuscular re-education, patient/caregiver education, strengthening, stretching, therapeutic activities, therapeutic exercise, home exercise program, gait training, functional ROM exercises and balance    Frequency: 1-2x week  Duration in weeks: 12  Plan of Care beginning date: 7/31/2024  Plan of Care expiration date: 10/23/2024  Treatment plan discussed with: patient        Subjective Evaluation    History of Present Illness  Mechanism of injury: DOO: a month ago  PASTORA:      Subjective Comments: pt reports that since she has began PT she notes no difference.  Notes her swelling has decreased, but she continues to note increased pain and also notes weakness. Pt reports that she notes finishing steroids she has decreased strength but cannot stay on steroids for long. Pt reports that since stopping steroids her functional ability has gotten worse every day.  She has returned to using a cane (while on steroids not using cane). Pt does report increased R ankle discomfort as well.     Pain   Rest: 2/10   Best: 0/10- while on steroids   Worst: 5-6/10          Objective     Palpation     Right   Tenderness of the vastus medialis.     Tenderness     Right Knee   Tenderness in the medial joint line.     Active Range of Motion   Left Knee   Flexion: 135 degrees   Extension: 0 degrees     Right Knee   Flexion: 100 degrees with pain  Extension: 0 degrees     Passive Range of Motion   Left Hip   Flexion: 100 degrees   Abduction: 35 degrees   External rotation (90/90): 30 degrees   Internal rotation (90/90): 15 degrees      Right Hip   Flexion: 90 degrees   Abduction: 35 degrees   External rotation (90/90): 30 degrees   Internal rotation (90/90): 15 degrees     Right Knee   Flexion: 106 degrees with pain  Extension: 0 degrees     Strength/Myotome Testing     Left Hip   Planes of Motion   Flexion: 4  Abduction: 4+  Adduction: 4+    Right Hip   Planes of Motion   Flexion: 4  Abduction: 4+  Adduction: 4+    Left Knee   Flexion: 5  Extension: 5    Right Knee   Flexion: 4  Extension: 4+    Left Ankle/Foot   Dorsiflexion: 5  Plantar flexion: 5    Right Ankle/Foot   Dorsiflexion: 5  Plantar flexion: 5    Tests     Right Knee   Positive bounce home.   Negative anterior drawer, posterior drawer, valgus stress test at 0 degrees and valgus stress test at 30 degrees.     Ambulation     Ambulation: Level Surfaces   Ambulation without assistive device: independent    Ambulation: Stairs   Ascend stairs: independent  Pattern: reciprocal  Railings: two rails  Descend stairs: independent  Pattern: reciprocal  Railings: two rails    Additional Stairs Ambulation Details  Heavy use of BHR    Observational Gait   Gait: antalgic and asymmetric   Decreased walking speed and stride length.              Eval/Re-Eval POC Expires Auth #/ Referral # Total Visits Start Date Expiration Date Extension Info Visits Limitation   7/31 10/23                                                             1 2 3 4 5 6   7/31 8/6 8/8 8/14 8/16 8/29 RE   7 8 9 10 11 12           13 14 15 16 17 18           19 20 21 22 23 24           25 26 27 28 29 30               Precautions:   Patient Active Problem List   Diagnosis    Seasonal allergic rhinitis    Anxiety    Chronic obstructive pulmonary disease (HCC)    Esophageal reflux    Fibromyalgia    Mixed hyperlipidemia    Hypertension    Degeneration of intervertebral disc    Lumbosacral radiculopathy at L5    Palpitations    Pulmonary nodule seen on imaging study    Family history of pancreatic cancer    Neuropathic pain    Neck  "pain    Cigarette nicotine dependence with nicotine-induced disorder    Colitis    Snoring    Chronic idiopathic constipation    Gastroparesis    Prediabetes    Increased intraocular pressure    Primary insomnia    Candida vaginitis    Chronic frontal sinusitis    Mid back pain    Closed wedge compression fracture of T8 vertebra (HCC)    Therapeutic opioid induced constipation    IPMN (intraductal papillary mucinous neoplasm)    History of colon polyps    Gastroesophageal reflux disease without esophagitis    Chronic pain of right ankle    Hyperglycemia    Sinus tarsi syndrome of right foot    Dysuria    Chronic left-sided low back pain without sciatica    Hematochezia    Generalized postprandial abdominal pain    Otalgia of right ear    Right hip pain    Anterior communicating artery aneurysm    Carotid artery stenosis, symptomatic, left    Primary osteoarthritis of right knee    Knee strain, right, initial encounter     Past Medical History:   Diagnosis Date    Acid reflux     Back injury     COPD (chronic obstructive pulmonary disease) (HCC)     Fibromyalgia     Hypertension     Seasonal allergies      Past Surgical History:   Procedure Laterality Date    COLONOSCOPY      EYE SURGERY      KNEE SURGERY  1998    ME COLONOSCOPY FLX DX W/COLLJ SPEC WHEN PFRMD N/A 05/09/2017    Procedure: EGD AND COLONOSCOPY;  Surgeon: Kimberly Zapata MD;  Location: [facility];  Service: Gastroenterology           Manuals 7/31 8/6 8/8 8/14 8/16 8/29       R knee PROM  RK to tolerance RK RK                                                Neuro Re-Ed             QS 5\" x10 5\" x20 5\" x20 5\" 2x10 5\" 2x10 5\" x10       SAQ  2x10 2x10 2x10 ea. 2x10 ea. 2x10 ea.       LAQ x10 2x10 2x10 2x10 2x10 ea.        SLR    x10 x10 x10       Supine hip abd  Gtb 2x10 Gtb 2x10 Gtb 2x10 Gtb 2x10 Gtb 2x10       Supine hip add  5\" x10   5\" x20 5\" x20                    Ther Ex             bike  5' 7' 5' 5' 5'       Heel slides x10 2x10 2x10 2x10 2x10 x10     " "  HSS    10\"x5 w/ strap 10\"x5 w/ strap        Calf stretch  30\"x3 30\"x3 30\"x3 30\"x3 30\"x3       HS Curls  X20 ea. Alt.   x20  np       HR  2x10   x20 x20 np       Side stepping    Rtb 4 laps at table  np       Standing hip abd    x10 ea. X10 ea. np       Standing hip ext    x10 ea. X10 ea. np                                 Ther Activity                                       Gait Training                                       Modalities                                              "

## 2024-08-29 NOTE — TELEPHONE ENCOUNTER
Patient verbalized understanding. States she is not feeling well now. Symptoms started on Monday when she was in office, feeling weak, fatigue, chills, a lot of mucus, and head feels clogged. Denies fever SOB or wheezing. Felt like she had Covid. Asking if you could call a Covid test into the pharmacy or if she can come in office. Please advise.

## 2024-08-29 NOTE — TELEPHONE ENCOUNTER
Patient called would like orders for blood work to be placed. Does go to a [facility] lab.

## 2024-08-29 NOTE — TELEPHONE ENCOUNTER
Patient Leanna [phone] contacting office after last office visit on 8/19/2024 with Dr. Clark.    Dr. Clark requested a Zio monitor however, patient still has not received it.     Patient mentioned she would like to have the monitor prior to her upcoming surgery which is scheduled for October 9th.     Thank you.

## 2024-08-30 DIAGNOSIS — R05.8 PRODUCTIVE COUGH: Primary | ICD-10-CM

## 2024-08-30 RX ORDER — AZITHROMYCIN 250 MG/1
TABLET, FILM COATED ORAL
Qty: 6 TABLET | Refills: 0 | Status: SHIPPED | OUTPATIENT
Start: 2024-08-30 | End: 2024-09-03

## 2024-09-04 ENCOUNTER — APPOINTMENT (OUTPATIENT)
Dept: RADIOLOGY | Facility: MEDICAL CENTER | Age: 69
End: 2024-09-04
Payer: COMMERCIAL

## 2024-09-04 ENCOUNTER — OFFICE VISIT (OUTPATIENT)
Dept: FAMILY MEDICINE CLINIC | Facility: CLINIC | Age: 69
End: 2024-09-04
Payer: COMMERCIAL

## 2024-09-04 ENCOUNTER — NURSE TRIAGE (OUTPATIENT)
Age: 69
End: 2024-09-04

## 2024-09-04 VITALS
BODY MASS INDEX: 35.08 KG/M2 | TEMPERATURE: 97.9 F | HEART RATE: 90 BPM | OXYGEN SATURATION: 96 % | DIASTOLIC BLOOD PRESSURE: 84 MMHG | SYSTOLIC BLOOD PRESSURE: 136 MMHG | HEIGHT: 63 IN | WEIGHT: 198 LBS

## 2024-09-04 DIAGNOSIS — R05.8 PRODUCTIVE COUGH: ICD-10-CM

## 2024-09-04 DIAGNOSIS — J01.00 ACUTE NON-RECURRENT MAXILLARY SINUSITIS: Primary | ICD-10-CM

## 2024-09-04 DIAGNOSIS — K52.1 ANTIBIOTIC-ASSOCIATED DIARRHEA: Primary | ICD-10-CM

## 2024-09-04 DIAGNOSIS — T36.95XA ANTIBIOTIC-ASSOCIATED DIARRHEA: Primary | ICD-10-CM

## 2024-09-04 PROCEDURE — 71046 X-RAY EXAM CHEST 2 VIEWS: CPT

## 2024-09-04 PROCEDURE — 99214 OFFICE O/P EST MOD 30 MIN: CPT | Performed by: NURSE PRACTITIONER

## 2024-09-04 PROCEDURE — G2211 COMPLEX E/M VISIT ADD ON: HCPCS | Performed by: NURSE PRACTITIONER

## 2024-09-04 RX ORDER — DEXTROMETHORPHAN HYDROBROMIDE AND PROMETHAZINE HYDROCHLORIDE 15; 6.25 MG/5ML; MG/5ML
5 SYRUP ORAL 4 TIMES DAILY PRN
Qty: 115 ML | Refills: 0 | Status: SHIPPED | OUTPATIENT
Start: 2024-09-04 | End: 2024-09-13

## 2024-09-04 RX ORDER — CEFDINIR 300 MG/1
300 CAPSULE ORAL EVERY 12 HOURS SCHEDULED
Qty: 14 CAPSULE | Refills: 0 | Status: SHIPPED | OUTPATIENT
Start: 2024-09-04 | End: 2024-09-11

## 2024-09-04 NOTE — PROGRESS NOTES
Ambulatory Visit  Name: Leanna Ruvalcaba      : 1955      MRN: [id number]  Encounter Provider: MINRA Crisostomo  Encounter Date: 2024   Encounter department: [facility]    Assessment & Plan   1. Acute non-recurrent maxillary sinusitis  -     cefdinir (OMNICEF) 300 mg capsule; Take 1 capsule (300 mg total) by mouth every 12 (twelve) hours for 7 days  -     promethazine-dextromethorphan (PHENERGAN-DM) 6.25-15 mg/5 mL oral syrup; Take 5 mL by mouth 4 (four) times a day as needed for cough    Depression Screening and Follow-up Plan: Patient was screened for depression during today's encounter. They screened negative with a PHQ-2 score of 0.        History of Present Illness     68 y.o.female presenting with fatigue, weakness, low grade fever, over all body aches and coughing up thick green mucus at times. He was prescribed a course of azithromycin last week and took a COVID test which was negative. She had diarrhea this morning but that has resolved but now she feels bloated.       Review of Systems   Constitutional:  Positive for chills, fatigue and fever.   HENT:  Positive for congestion, ear pain, postnasal drip, sinus pressure, sinus pain and sore throat.    Respiratory:  Positive for cough. Negative for chest tightness, shortness of breath and wheezing.    Cardiovascular: Negative.    Gastrointestinal: Negative.    Musculoskeletal:  Positive for myalgias.   Neurological:  Positive for weakness and headaches. Negative for dizziness and light-headedness.   Psychiatric/Behavioral: Negative.       Past Medical History:   Diagnosis Date   • Acid reflux    • Back injury    • COPD (chronic obstructive pulmonary disease) (HCC)    • Fibromyalgia    • Hypertension    • Seasonal allergies      Past Surgical History:   Procedure Laterality Date   • COLONOSCOPY     • EYE SURGERY     • KNEE SURGERY     • CO COLONOSCOPY FLX DX W/COLLJ SPEC WHEN PFRMD N/A 2017    Procedure: EGD AND  COLONOSCOPY;  Surgeon: Kimberly Zapata MD;  Location: [facility];  Service: Gastroenterology     Family History   Problem Relation Age of Onset   • Pancreatic cancer Mother 72   • Coronary artery disease Father    • Diabetes Father    • Hypertension Father    • Heart disease Father    • Lung cancer Sister 37   • No Known Problems Daughter    • No Known Problems Maternal Grandmother    • Lung cancer Maternal Grandfather 77   • Diabetes Paternal Grandmother    • No Known Problems Paternal Grandfather    • Pancreatic cancer Brother    • Pancreatic cancer Brother    • No Known Problems Son    • No Known Problems Son      Social History     Tobacco Use   • Smoking status: Every Day     Current packs/day: 1.50     Average packs/day: 1.5 packs/day for 56.7 years (85.0 ttl pk-yrs)     Types: Cigarettes     Start date: 1968     Passive exposure: Current   • Smokeless tobacco: Never   Vaping Use   • Vaping status: Never Used   Substance and Sexual Activity   • Alcohol use: Never   • Drug use: No   • Sexual activity: Not Currently     Current Outpatient Medications on File Prior to Visit   Medication Sig   • acetaminophen (TYLENOL) 650 mg CR tablet Take by mouth every 8 (eight) hours as needed   • albuterol (PROVENTIL HFA,VENTOLIN HFA) 90 mcg/act inhaler Inhale 2 puffs every 6 (six) hours as needed for wheezing   • ALPRAZolam (XANAX) 0.25 mg tablet Take 1 tablet (0.25 mg total) by mouth 3 (three) times a day as needed for anxiety   • amLODIPine (NORVASC) 5 mg tablet TAKE 1 TABLET (5 MG TOTAL) BY MOUTH DAILY.   • aspirin 81 mg chewable tablet Chew 1 tablet (81 mg total) daily   • cetirizine (ZyrTEC) 10 mg tablet Take 1 tablet (10 mg total) by mouth daily   • Cholecalciferol (VITAMIN D3 PO) Take by mouth   • Diclofenac Sodium (VOLTAREN) 1 % Apply 2 g topically 4 (four) times a day   • docusate sodium ([employer] Stool Softener) 250 MG capsule TAKE 1 CAPSULE BY MOUTH DAILY.   • escitalopram (LEXAPRO) 10 mg tablet TAKE 1 TABLET BY MOUTH  EVERY DAY   • fluticasone-umeclidinium-vilanterol 200-62.5-25 mcg/actuation AEPB inhaler Inhale 1 puff daily Rinse mouth after use.   • ibuprofen (MOTRIN) 200 mg tablet Take 400 mg by mouth every 6 (six) hours as needed for mild pain   • lisinopril (ZESTRIL) 20 mg tablet TAKE 1 TABLET BY MOUTH EVERY DAY   • methocarbamol (ROBAXIN) 500 mg tablet Take 1 tablet (500 mg total) by mouth 2 (two) times a day For back pain radiating down the leg   • MINOXIDIL, TOPICAL, 5 % SOLN Apply 1 application topically in the morning For hair   • montelukast (SINGULAIR) 10 mg tablet TAKE 1 TABLET BY MOUTH EVERYDAY AT BEDTIME   • naproxen (Naprosyn) 500 mg tablet Take 1 tablet (500 mg total) by mouth 2 (two) times a day with meals   • Omega-3 Fatty Acids (FISH OIL PO) Take 1,000 mg by mouth   • pantoprazole (PROTONIX) 40 mg tablet TAKE 1 TABLET BY MOUTH TWICE A DAY   • rosuvastatin (CRESTOR) 5 mg tablet Take 2 tablets (10 mg total) by mouth daily   • TURMERIC PO Take by mouth   • albuterol (2.5 mg/3 mL) 0.083 % nebulizer solution Take 3 mL (2.5 mg total) by nebulization every 6 (six) hours as needed for wheezing or shortness of breath (Patient not taking: Reported on 2024)   • [] azithromycin (ZITHROMAX) 250 mg tablet 2 tab today then 1 tab po qd x 4 d (Patient not taking: Reported on 2024)   • clotrimazole (MYCELEX) 10 mg evette Take 1 tablet (10 mg total) by mouth 4 (four) times a day (Patient not taking: Reported on 2024)   • [DISCONTINUED] TURMERIC-GINGER PO Take by mouth (Patient not taking: Reported on 2024)     Allergies   Allergen Reactions   • Augmentin [Amoxicillin-Pot Clavulanate] Vomiting   • Miconazole Itching and Swelling   • Wixela Inhub [Fluticasone-Salmeterol] Palpitations     Immunization History   Administered Date(s) Administered   • Tdap 2008     Objective     /84 (BP Location: Right arm, Patient Position: Sitting, Cuff Size: Large)   Pulse 90   Temp 97.9 °F (36.6 °C)   Ht 5'  "3\" (1.6 m)   Wt 89.8 kg (198 lb)   LMP  (LMP Unknown)   SpO2 96%   BMI 35.07 kg/m² (Reviewed)    Physical Exam  Vitals reviewed.   Constitutional:       General: She is not in acute distress.     Appearance: She is not ill-appearing.   HENT:      Head: Normocephalic and atraumatic.      Right Ear: Tympanic membrane, ear canal and external ear normal.      Left Ear: Tympanic membrane, ear canal and external ear normal.      Nose: Nasal tenderness, mucosal edema and congestion present.      Right Sinus: Maxillary sinus tenderness present.      Left Sinus: Maxillary sinus tenderness present.      Mouth/Throat:      Lips: Pink.      Mouth: Mucous membranes are moist.      Pharynx: Oropharynx is clear. Posterior oropharyngeal erythema present.   Eyes:      Extraocular Movements: Extraocular movements intact.      Conjunctiva/sclera: Conjunctivae normal.      Pupils: Pupils are equal, round, and reactive to light.   Cardiovascular:      Rate and Rhythm: Normal rate and regular rhythm.      Heart sounds: Normal heart sounds.   Pulmonary:      Effort: Pulmonary effort is normal.      Breath sounds: Normal breath sounds.   Skin:     General: Skin is warm and dry.      Capillary Refill: Capillary refill takes less than 2 seconds.   Neurological:      Mental Status: She is alert and oriented to person, place, and time.   Psychiatric:         Mood and Affect: Mood normal.         Behavior: Behavior normal.         "

## 2024-09-04 NOTE — TELEPHONE ENCOUNTER
"Patient called in on 8/30 for URI symptoms and was given course of antibiotic.  She has finished the antibiotic, but remains symptomatic.  She is coughing with green sputum production.  She is more SOB with exertion.  This am she still has low grade temp of 99.  She feels very fatigued and endorses body aches.  COVID testing has been negative.  She also states now she has watery brown diarrhea as well.  Denies blood.  Mucus and foul odor present.  Was not on pro-biotics while on antibiotic.  Denies nausea or vomiting.  Patient has appointment this afternoon for evaluation.      Reason for Disposition   MODERATE weakness (i.e., interferes with work, school, normal activities) and persists > 3 days    Answer Assessment - Initial Assessment Questions  1. DESCRIPTION: \"Describe how you are feeling.\"      Feeling fatigued and started with diarrhea.  Green mucus. Body aches.   2. SEVERITY: \"How bad is it?\"  \"Can you stand and walk?\"    - MILD - Feels weak or tired, but does not interfere with work, school or normal activities    - MODERATE - Able to stand and walk; weakness interferes with work, school, or normal activities    - SEVERE - Unable to stand or walk      Moderate  3. ONSET:  \"When did the weakness begin?\"      8/30  4. CAUSE: \"What do you think is causing the weakness?\"      URI, S/P antibiotic  5. MEDICINES: \"Have you recently started a new medicine or had a change in the amount of a medicine?\"      Finished antibiotic and tylenol   6. OTHER SYMPTOMS: \"Do you have any other symptoms?\" (e.g., chest pain, fever, cough, SOB, vomiting, diarrhea, bleeding, other areas of pain)      Low grade fever this am. SOB with activity. Diarrhea.  3 times today.  Brown water stool with mucus and bad odor.    7. PREGNANCY: \"Is there any chance you are pregnant?\" \"When was your last menstrual period?\"      N/A    Protocols used: Weakness (Generalized) and Fatigue-ADULT-[place]    "

## 2024-09-05 ENCOUNTER — OFFICE VISIT (OUTPATIENT)
Dept: OBGYN CLINIC | Facility: CLINIC | Age: 69
End: 2024-09-05
Payer: COMMERCIAL

## 2024-09-05 VITALS
HEART RATE: 84 BPM | SYSTOLIC BLOOD PRESSURE: 174 MMHG | DIASTOLIC BLOOD PRESSURE: 71 MMHG | HEIGHT: 63 IN | WEIGHT: 198 LBS | BODY MASS INDEX: 35.08 KG/M2

## 2024-09-05 DIAGNOSIS — S83.281A TEAR OF LATERAL MENISCUS OF RIGHT KNEE, CURRENT, UNSPECIFIED TEAR TYPE, INITIAL ENCOUNTER: Primary | ICD-10-CM

## 2024-09-05 DIAGNOSIS — S86.911A KNEE STRAIN, RIGHT, INITIAL ENCOUNTER: ICD-10-CM

## 2024-09-05 DIAGNOSIS — M79.604 RIGHT LEG PAIN: ICD-10-CM

## 2024-09-05 PROCEDURE — 3077F SYST BP >= 140 MM HG: CPT | Performed by: ORTHOPAEDIC SURGERY

## 2024-09-05 PROCEDURE — 1160F RVW MEDS BY RX/DR IN RCRD: CPT | Performed by: ORTHOPAEDIC SURGERY

## 2024-09-05 PROCEDURE — 99213 OFFICE O/P EST LOW 20 MIN: CPT | Performed by: ORTHOPAEDIC SURGERY

## 2024-09-05 PROCEDURE — 3078F DIAST BP <80 MM HG: CPT | Performed by: ORTHOPAEDIC SURGERY

## 2024-09-05 PROCEDURE — 1159F MED LIST DOCD IN RCRD: CPT | Performed by: ORTHOPAEDIC SURGERY

## 2024-09-05 RX ORDER — METHOCARBAMOL 500 MG/1
500 TABLET, FILM COATED ORAL 2 TIMES DAILY
Qty: 20 TABLET | Refills: 0 | Status: SHIPPED | OUTPATIENT
Start: 2024-09-05

## 2024-09-05 NOTE — PROGRESS NOTES
Orthopaedics Office Visit - Follow Up Patient Visit    ASSESSMENT/PLAN:    Assessment:   Right knee effusion  Right knee primary osteoarthritis   No improvement with IA cortisone injection       Plan:   Pt has knee pain refractory to multiple nonop modalities and IA cortisone injection  Concern for intra-articular pathology  Will order MRI of R knee to evaluate for further source of pain      To Do Next Visit:  Mri review    _____________________________________________________  CHIEF COMPLAINT:  Chief Complaint   Patient presents with    Right Knee - Follow-up         SUBJECTIVE:  Leanna Ruvalcaba is a 68 y.o. female who presents for evaluation of her right knee.  Patient states he status post aspiration cortisone right knee.  Patient states that she continues to have diffuse knee pain predominantly in the posterior aspect and becomes worse with range of motion of the knee and weightbearing.  Patient has been maintaining a knee brace, attending physical therapy which has provided no relief.  Patient denies any numbness or tingling her leg.  Patient states that she does have pain extending the posterior aspect of the lower leg. Patient has had multiple rounds of oral steroids which provided minimal relief. Patient offers no other complaints at this time.        PAST MEDICAL HISTORY:  Past Medical History:   Diagnosis Date    Acid reflux     Back injury     COPD (chronic obstructive pulmonary disease) (HCC)     Fibromyalgia     Hypertension     Seasonal allergies        PAST SURGICAL HISTORY:  Past Surgical History:   Procedure Laterality Date    COLONOSCOPY      EYE SURGERY      KNEE SURGERY  1998    [facility] COLONOSCOPY FLX DX W/COLLJ SPEC WHEN PFRMD N/A 05/09/2017    Procedure: EGD AND COLONOSCOPY;  Surgeon: Kimberly Zapata MD;  Location: [facility];  Service: Gastroenterology       FAMILY HISTORY:  Family History   Problem Relation Age of Onset    Pancreatic cancer Mother 72    Coronary artery disease Father     Diabetes  Father     Hypertension Father     Heart disease Father     Lung cancer Sister 37    No Known Problems Daughter     No Known Problems Maternal Grandmother     Lung cancer Maternal Grandfather 77    Diabetes Paternal Grandmother     No Known Problems Paternal Grandfather     Pancreatic cancer Brother     Pancreatic cancer Brother     No Known Problems Son     No Known Problems Son        SOCIAL HISTORY:  Social History     Tobacco Use    Smoking status: Every Day     Current packs/day: 1.50     Average packs/day: 1.5 packs/day for 56.7 years (85.0 ttl pk-yrs)     Types: Cigarettes     Start date: 1968     Passive exposure: Current    Smokeless tobacco: Never   Vaping Use    Vaping status: Never Used   Substance Use Topics    Alcohol use: Never    Drug use: No       MEDICATIONS:    Current Outpatient Medications:     acetaminophen (TYLENOL) 650 mg CR tablet, Take by mouth every 8 (eight) hours as needed, Disp: , Rfl:     albuterol (PROVENTIL HFA,VENTOLIN HFA) 90 mcg/act inhaler, Inhale 2 puffs every 6 (six) hours as needed for wheezing, Disp: 18 g, Rfl: 5    ALPRAZolam (XANAX) 0.25 mg tablet, Take 1 tablet (0.25 mg total) by mouth 3 (three) times a day as needed for anxiety, Disp: 90 tablet, Rfl: 0    amLODIPine (NORVASC) 5 mg tablet, TAKE 1 TABLET (5 MG TOTAL) BY MOUTH DAILY., Disp: 90 tablet, Rfl: 1    aspirin 81 mg chewable tablet, Chew 1 tablet (81 mg total) daily, Disp: 30 tablet, Rfl: 3    cefdinir (OMNICEF) 300 mg capsule, Take 1 capsule (300 mg total) by mouth every 12 (twelve) hours for 7 days, Disp: 14 capsule, Rfl: 0    cetirizine (ZyrTEC) 10 mg tablet, Take 1 tablet (10 mg total) by mouth daily, Disp: 90 tablet, Rfl: 1    Cholecalciferol (VITAMIN D3 PO), Take by mouth, Disp: , Rfl:     Diclofenac Sodium (VOLTAREN) 1 %, Apply 2 g topically 4 (four) times a day, Disp: 100 g, Rfl: 0    docusate sodium ([employer] Stool Softener) 250 MG capsule, TAKE 1 CAPSULE BY MOUTH DAILY., Disp: 30 capsule, Rfl: 5    escitalopram  (LEXAPRO) 10 mg tablet, TAKE 1 TABLET BY MOUTH EVERY DAY, Disp: 90 tablet, Rfl: 1    fluticasone-umeclidinium-vilanterol 200-62.5-25 mcg/actuation AEPB inhaler, Inhale 1 puff daily Rinse mouth after use., Disp: 3 each, Rfl: 3    ibuprofen (MOTRIN) 200 mg tablet, Take 400 mg by mouth every 6 (six) hours as needed for mild pain, Disp: , Rfl:     lisinopril (ZESTRIL) 20 mg tablet, TAKE 1 TABLET BY MOUTH EVERY DAY, Disp: 90 tablet, Rfl: 1    methocarbamol (ROBAXIN) 500 mg tablet, Take 1 tablet (500 mg total) by mouth 2 (two) times a day For back pain radiating down the leg, Disp: 20 tablet, Rfl: 0    MINOXIDIL, TOPICAL, 5 % SOLN, Apply 1 application topically in the morning For hair, Disp: , Rfl:     montelukast (SINGULAIR) 10 mg tablet, TAKE 1 TABLET BY MOUTH EVERYDAY AT BEDTIME, Disp: 90 tablet, Rfl: 1    naproxen (Naprosyn) 500 mg tablet, Take 1 tablet (500 mg total) by mouth 2 (two) times a day with meals, Disp: 30 tablet, Rfl: 0    Omega-3 Fatty Acids (FISH OIL PO), Take 1,000 mg by mouth, Disp: , Rfl:     pantoprazole (PROTONIX) 40 mg tablet, TAKE 1 TABLET BY MOUTH TWICE A DAY, Disp: 180 tablet, Rfl: 1    promethazine-dextromethorphan (PHENERGAN-DM) 6.25-15 mg/5 mL oral syrup, Take 5 mL by mouth 4 (four) times a day as needed for cough, Disp: 115 mL, Rfl: 0    rosuvastatin (CRESTOR) 5 mg tablet, Take 2 tablets (10 mg total) by mouth daily, Disp: 100 tablet, Rfl: 1    TURMERIC PO, Take by mouth, Disp: , Rfl:     albuterol (2.5 mg/3 mL) 0.083 % nebulizer solution, Take 3 mL (2.5 mg total) by nebulization every 6 (six) hours as needed for wheezing or shortness of breath (Patient not taking: Reported on 8/26/2024), Disp: 180 mL, Rfl: 5    clotrimazole (MYCELEX) 10 mg leonardo, Take 1 tablet (10 mg total) by mouth 4 (four) times a day (Patient not taking: Reported on 9/4/2024), Disp: 28 Leonardo, Rfl: 1    Current Facility-Administered Medications:     bupivacaine (PF) (MARCAINE) 0.25 % injection 1 mL, 1 mL,  "Intra-articular, , Christiano Garcia DPM, 1 mL at 05/11/23 1706    triamcinolone acetonide (KENALOG-40) 40 mg/mL injection 20 mg, 20 mg, Intra-articular, , Christiano Garcia DPM, 20 mg at 05/11/23 1706    ALLERGIES:  Allergies   Allergen Reactions    Augmentin [Amoxicillin-Pot Clavulanate] Vomiting    Miconazole Itching and Swelling    Wixela Inhub [Fluticasone-Salmeterol] Palpitations       REVIEW OF SYSTEMS:  MSK: right knee pain   Neuro: WNL   Pertinent items are otherwise noted in HPI.  A comprehensive review of systems was otherwise negative.    LABS:  HgA1c:   Lab Results   Component Value Date    HGBA1C 5.6 04/18/2023     BMP:   Lab Results   Component Value Date    GLUCOSE 86 10/08/2015    CALCIUM 9.0 08/29/2024     10/08/2015    K 3.7 08/29/2024    CO2 29 08/29/2024    CL 98 08/29/2024    BUN 12 08/29/2024    CREATININE 0.55 (L) 08/29/2024     CBC: No components found for: \"CBC\"    _____________________________________________________  PHYSICAL EXAMINATION:  Vital signs: Ht 5' 3\" (1.6 m)   Wt 89.8 kg (198 lb)   LMP  (LMP Unknown)   BMI 35.07 kg/m²   General: No acute distress, awake and alert  Psychiatric: Mood and affect appear appropriate  HEENT: Trachea Midline, No torticollis, no apparent facial trauma  Cardiovascular: No audible murmurs; Extremities appear perfused  Pulmonary: No audible wheezing or stridor  Skin: No open lesions; see further details (if any) below      MUSCULOSKELETAL EXAMINATION:  Right knee examination:  Patient sitting comfortably in the office in no apparent distress   Small effusion present in the right knee,  no erythema or ecchymosis noted   0 to approximately 90 degrees of range of motion of the knee appreciated limited by pain  Special tests:  Negative valgus varus stress test.  Equivocal lateral Mino's examination  NV intact    _____________________________________________________  STUDIES REVIEWED:  I personally reviewed the images obtained in office today and " "my independent interpretation is as follows:  N/A         PROCEDURES PERFORMED:  No procedures were performed at this time.               Matt Fernandez PA-C - assisting  Erasmo Leach MD                          Portions of the record may have been created with voice recognition software.  Occasional wrong word or \"sound a like\" substitutions may have occurred due to the inherent limitations of voice recognition software.  Read the chart carefully and recognize, using context, where substitutions have occurred.    "

## 2024-09-10 ENCOUNTER — TELEPHONE (OUTPATIENT)
Dept: OBGYN CLINIC | Facility: CLINIC | Age: 69
End: 2024-09-10

## 2024-09-10 ENCOUNTER — TELEPHONE (OUTPATIENT)
Dept: SURGICAL ONCOLOGY | Facility: CLINIC | Age: 69
End: 2024-09-10

## 2024-09-10 ENCOUNTER — TELEPHONE (OUTPATIENT)
Age: 69
End: 2024-09-10

## 2024-09-10 NOTE — TELEPHONE ENCOUNTER
PA for robaxin 500 mg  APPROVED     Date(s) approved 11/2024-12/31/2024        Patient advised by          [x]I Do Now I Don'thart Message  [x]Phone call   []LMOM  []L/M to call office as no active Communication consent on file  []Unable to leave detailed message as VM not approved on Communication consent       Pharmacy advised by    []Fax  [x]Phone call    Approval letter scanned into [employer] No waiting on fa form for approval

## 2024-09-10 NOTE — TELEPHONE ENCOUNTER
Called and spoke to patient. Patient states she cancelled MRI 9/11/24, insurance will not be covered. Patient agreed, after seeing Dr. Lau MRI can be ordered at visit.

## 2024-09-10 NOTE — TELEPHONE ENCOUNTER
Received fax from [employer] -- additional information is required for approval of Methocarbamol. Information must be received no later then 9/9/24 3:38AM     Clinical reviewer  can be reached at [phone]

## 2024-09-12 ENCOUNTER — APPOINTMENT (OUTPATIENT)
Dept: LAB | Facility: MEDICAL CENTER | Age: 69
End: 2024-09-12
Payer: COMMERCIAL

## 2024-09-12 ENCOUNTER — NURSE TRIAGE (OUTPATIENT)
Age: 69
End: 2024-09-12

## 2024-09-12 ENCOUNTER — PATIENT MESSAGE (OUTPATIENT)
Dept: FAMILY MEDICINE CLINIC | Facility: CLINIC | Age: 69
End: 2024-09-12

## 2024-09-12 DIAGNOSIS — T36.95XA ANTIBIOTIC-ASSOCIATED DIARRHEA: ICD-10-CM

## 2024-09-12 DIAGNOSIS — K52.1 ANTIBIOTIC-ASSOCIATED DIARRHEA: ICD-10-CM

## 2024-09-12 PROCEDURE — 87493 C DIFF AMPLIFIED PROBE: CPT

## 2024-09-12 NOTE — PATIENT COMMUNICATION
Called patient to set up an appt- she ONLY wants to see Dr. Coleman, spoke to clerical, no avails until mid Nov, and patient is scheduled for appt on Oct. 23 for a 7 week follow up.  She is not sure she can wait that long, please advise if can be accommodated sooner and let her know. Thanks!

## 2024-09-12 NOTE — TELEPHONE ENCOUNTER
"Patient called in to report worsening weakness and pain in right leg from knee up to hip and left arm from elbow up to shoulder for the past 2-3 weeks. 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  Taking Naproxen; ankle and feet swelling; resolved with elevation of feet.  Patient needs to be seen and requested OV with PCP. OV scheduled while RN was attempting 11:30 slot. Patient declined OV with other provider. Please review with PCP and see if he will overbook or will speak with patient for care advice. Please follow up with patient.    Reason for Disposition   SEVERE pain (e.g., excruciating, unable to walk)    Answer Assessment - Initial Assessment Questions  1. LOCATION and RADIATION: \"Where is the pain located?\"       Pain in right knee  2. QUALITY: \"What does the pain feel like?\"  (e.g., sharp, dull, aching, burning)      Pain and weakness in right leg knee to hip; left arm from elbow to shoulder  3. SEVERITY: \"How bad is the pain?\" \"What does it keep you from doing?\"   (Scale 1-10; or mild, moderate, severe)    -  MILD (1-3): doesn't interfere with normal activities     -  MODERATE (4-7): interferes with normal activities (e.g., work or school) or awakens from sleep, limping     -  SEVERE (8-10): excruciating pain, unable to do any normal activities, unable to walk      Right leg pain 3/10; 10/10 when awakens or sits down; Left arm pain when lifting 5-6/10  4. ONSET: \"When did the pain start?\" \"Does it come and go, or is it there all the time?\"      2-3 weeks  5. RECURRENT: \"Have you had this pain before?\" If Yes, ask: \"When, and what happened then?\"      Denies  6. SETTING: " "\"Has there been any recent work, exercise or other activity that involved that part of the body?\"       Does housework; needs assistance   7. AGGRAVATING FACTORS: \"What makes the knee pain worse?\" (e.g., walking, climbing stairs, running)      Sciatica; weakness from top of right knee up to knee  8. ASSOCIATED SYMPTOMS: \"Is there any swelling or redness of the knee?\"      Swelling in knee has had drainage and cortisone injection; no swelling in left elbow  9. OTHER SYMPTOMS: \"Do you have any other symptoms?\" (e.g., chest pain, difficulty breathing, fever, calf pain)      Left arm weakness and pain when lifting started 2-3 weeks ago and more noticeable  10. PREGNANCY: \"Is there any chance you are pregnant?\" \"When was your last menstrual period?\"        Post menopusal    Protocols used: Knee Pain-ADULT-[place]    "

## 2024-09-13 ENCOUNTER — TELEPHONE (OUTPATIENT)
Age: 69
End: 2024-09-13

## 2024-09-13 ENCOUNTER — OFFICE VISIT (OUTPATIENT)
Dept: FAMILY MEDICINE CLINIC | Facility: CLINIC | Age: 69
End: 2024-09-13
Payer: COMMERCIAL

## 2024-09-13 VITALS
DIASTOLIC BLOOD PRESSURE: 70 MMHG | TEMPERATURE: 97 F | SYSTOLIC BLOOD PRESSURE: 118 MMHG | BODY MASS INDEX: 33.49 KG/M2 | OXYGEN SATURATION: 96 % | HEIGHT: 63 IN | WEIGHT: 189 LBS | HEART RATE: 86 BPM

## 2024-09-13 DIAGNOSIS — D49.0 IPMN (INTRADUCTAL PAPILLARY MUCINOUS NEOPLASM): ICD-10-CM

## 2024-09-13 DIAGNOSIS — M25.512 ACUTE PAIN OF LEFT SHOULDER: ICD-10-CM

## 2024-09-13 DIAGNOSIS — I65.22 CAROTID ARTERY STENOSIS, SYMPTOMATIC, LEFT: ICD-10-CM

## 2024-09-13 DIAGNOSIS — M79.604 RIGHT LEG PAIN: Primary | ICD-10-CM

## 2024-09-13 DIAGNOSIS — I10 PRIMARY HYPERTENSION: ICD-10-CM

## 2024-09-13 DIAGNOSIS — M79.604 RIGHT LEG PAIN: ICD-10-CM

## 2024-09-13 DIAGNOSIS — S46.012A ROTATOR CUFF STRAIN, LEFT, INITIAL ENCOUNTER: ICD-10-CM

## 2024-09-13 DIAGNOSIS — R68.89 FLU-LIKE SYMPTOMS: Primary | ICD-10-CM

## 2024-09-13 DIAGNOSIS — M25.561 RECURRENT PAIN OF RIGHT KNEE: ICD-10-CM

## 2024-09-13 LAB
C DIFF TOX GENS STL QL NAA+PROBE: NEGATIVE
SARS-COV-2 AG UPPER RESP QL IA: NEGATIVE
VALID CONTROL: NORMAL

## 2024-09-13 PROCEDURE — 87811 SARS-COV-2 COVID19 W/OPTIC: CPT | Performed by: FAMILY MEDICINE

## 2024-09-13 PROCEDURE — G2211 COMPLEX E/M VISIT ADD ON: HCPCS | Performed by: FAMILY MEDICINE

## 2024-09-13 PROCEDURE — 99214 OFFICE O/P EST MOD 30 MIN: CPT | Performed by: FAMILY MEDICINE

## 2024-09-13 RX ORDER — HYDROCODONE BITARTRATE AND ACETAMINOPHEN 5; 325 MG/1; MG/1
1 TABLET ORAL EVERY 6 HOURS PRN
Qty: 60 TABLET | Refills: 0 | Status: SHIPPED | OUTPATIENT
Start: 2024-09-13

## 2024-09-13 NOTE — PROGRESS NOTES
Ambulatory Visit  Name: Leanna Ruvalcaba      : 1955      MRN: [id number]  Encounter Provider: Matt Grider MD  Encounter Date: 2024   Encounter department: [facility]    Assessment & Plan  Flu-like symptoms  COVID neg.  C diff pending.  Continues conservative care. Recheck 1w if not resolving  Orders:    POCT Rapid Covid Ag    Primary hypertension  Well controlled. Cont present treatment. Monitor labs. Recheck 6m           IPMN (intraductal papillary mucinous neoplasm)  Pt trying to move MRI up - states that if it has grown, she may not want to have CEA done.  F/u with GI.          Carotid artery stenosis, symptomatic, left  For surgery 10/9.  F/u with vascular surgery         Acute pain of left shoulder  I reviewed with pt. Appears to have irritated her RC - infraspinatus muscle is TTP. ?started after pulling herself up after going to the bathroom. Pt did not want injection. Would not recommend steroid course so close to potential surgery. Will watch for now. Ice, rest. Recheck 2w if not improving       Right leg pain  ?multifactorial with low back, knee and lateral hip playing a role.  For MRI of the knee. F/u with ortho.             History of Present Illness     f/u multiple med issues  -Patient has been suffering from diarrhea since Monday.  Patient states that she has up to 7 loose, brown, watery bowel movements a day.  She denies any fever or chills with this.  She has been having scattered body aches but that is chronic.  She was exposed to her son who has COVID.  Patient has mild nasal congestion and occasionally productive cough.  She is still smoking.  -Patient has pain and weakness in her right leg.  Pains can be centered around her knee but the whole leg can be involved.  She has seen orthopedist and has an MRI scheduled.  Patient has failed physical therapy.  Patient notes that her low back is starting to hurt more this week as well.  -Patient also notes  some left upper arm pain over the last 2 weeks.  Seems to worsen with movement of the shoulder.  Pain is in the triceps area.  She denies any trauma.  Pt denies CP, palp, lightheadedness or other CV symptoms with or without exertion        Review of Systems   Constitutional:  Positive for activity change, appetite change and fatigue. Negative for chills and fever.   HENT:  Positive for congestion. Negative for rhinorrhea, sinus pressure, sinus pain and sore throat.    Eyes: Negative.    Respiratory:  Positive for cough (occasional).    Cardiovascular:  Negative for chest pain, palpitations and leg swelling.   Gastrointestinal:  Positive for diarrhea. Negative for abdominal distention, abdominal pain, nausea and vomiting.   Genitourinary: Negative.    Musculoskeletal:  Positive for arthralgias, gait problem and myalgias.   Skin: Negative.    Neurological:  Negative for dizziness, weakness, light-headedness, numbness and headaches.     Past Medical History:   Diagnosis Date    Acid reflux     Back injury     COPD (chronic obstructive pulmonary disease) (HCC)     Fibromyalgia     Hypertension     Seasonal allergies      Past Surgical History:   Procedure Laterality Date    COLONOSCOPY      EYE SURGERY      KNEE SURGERY  1998    AR COLONOSCOPY FLX DX W/COLLJ SPEC WHEN PFRMD N/A 05/09/2017    Procedure: EGD AND COLONOSCOPY;  Surgeon: Kimberly Zapata MD;  Location: [facility];  Service: Gastroenterology     Family History   Problem Relation Age of Onset    Pancreatic cancer Mother 72    Coronary artery disease Father     Diabetes Father     Hypertension Father     Heart disease Father     Lung cancer Sister 37    No Known Problems Daughter     No Known Problems Maternal Grandmother     Lung cancer Maternal Grandfather 77    Diabetes Paternal Grandmother     No Known Problems Paternal Grandfather     Pancreatic cancer Brother     Pancreatic cancer Brother     No Known Problems Son     No Known Problems Son      Social  History     Tobacco Use    Smoking status: Every Day     Current packs/day: 1.50     Average packs/day: 1.5 packs/day for 56.7 years (85.0 ttl pk-yrs)     Types: Cigarettes     Start date: 1968     Passive exposure: Current    Smokeless tobacco: Never   Vaping Use    Vaping status: Never Used   Substance and Sexual Activity    Alcohol use: Never    Drug use: No    Sexual activity: Not Currently     Current Outpatient Medications on File Prior to Visit   Medication Sig    acetaminophen (TYLENOL) 650 mg CR tablet Take by mouth every 8 (eight) hours as needed    albuterol (PROVENTIL HFA,VENTOLIN HFA) 90 mcg/act inhaler Inhale 2 puffs every 6 (six) hours as needed for wheezing    ALPRAZolam (XANAX) 0.25 mg tablet Take 1 tablet (0.25 mg total) by mouth 3 (three) times a day as needed for anxiety    amLODIPine (NORVASC) 5 mg tablet TAKE 1 TABLET (5 MG TOTAL) BY MOUTH DAILY.    aspirin 81 mg chewable tablet Chew 1 tablet (81 mg total) daily    cetirizine (ZyrTEC) 10 mg tablet Take 1 tablet (10 mg total) by mouth daily    Cholecalciferol (VITAMIN D3 PO) Take by mouth    Diclofenac Sodium (VOLTAREN) 1 % Apply 2 g topically 4 (four) times a day    docusate sodium ([employer]S Stool Softener) 250 MG capsule TAKE 1 CAPSULE BY MOUTH DAILY.    escitalopram (LEXAPRO) 10 mg tablet TAKE 1 TABLET BY MOUTH EVERY DAY    fluticasone-umeclidinium-vilanterol 200-62.5-25 mcg/actuation AEPB inhaler Inhale 1 puff daily Rinse mouth after use.    ibuprofen (MOTRIN) 200 mg tablet Take 400 mg by mouth every 6 (six) hours as needed for mild pain    lisinopril (ZESTRIL) 20 mg tablet TAKE 1 TABLET BY MOUTH EVERY DAY    methocarbamol (ROBAXIN) 500 mg tablet Take 1 tablet (500 mg total) by mouth 2 (two) times a day For back pain radiating down the leg    MINOXIDIL, TOPICAL, 5 % SOLN Apply 1 application topically in the morning For hair    montelukast (SINGULAIR) 10 mg tablet TAKE 1 TABLET BY MOUTH EVERYDAY AT BEDTIME    Omega-3 Fatty Acids (FISH OIL PO) Take  "1,000 mg by mouth    pantoprazole (PROTONIX) 40 mg tablet TAKE 1 TABLET BY MOUTH TWICE A DAY    rosuvastatin (CRESTOR) 5 mg tablet Take 2 tablets (10 mg total) by mouth daily    TURMERIC PO Take by mouth    [DISCONTINUED] promethazine-dextromethorphan (PHENERGAN-DM) 6.25-15 mg/5 mL oral syrup Take 5 mL by mouth 4 (four) times a day as needed for cough    albuterol (2.5 mg/3 mL) 0.083 % nebulizer solution Take 3 mL (2.5 mg total) by nebulization every 6 (six) hours as needed for wheezing or shortness of breath (Patient not taking: Reported on 8/26/2024)    [DISCONTINUED] clotrimazole (MYCELEX) 10 mg evette Take 1 tablet (10 mg total) by mouth 4 (four) times a day (Patient not taking: Reported on 9/4/2024)    [DISCONTINUED] naproxen (Naprosyn) 500 mg tablet Take 1 tablet (500 mg total) by mouth 2 (two) times a day with meals     Allergies   Allergen Reactions    Augmentin [Amoxicillin-Pot Clavulanate] Vomiting    Miconazole Itching and Swelling    Wixela Inhub [Fluticasone-Salmeterol] Palpitations     Immunization History   Administered Date(s) Administered    Tdap 02/28/2008     Objective     /70 (BP Location: Left arm, Patient Position: Sitting, Cuff Size: Adult)   Pulse 86   Temp (!) 97 °F (36.1 °C)   Ht 5' 3\" (1.6 m)   Wt 85.7 kg (189 lb)   LMP  (LMP Unknown)   SpO2 96%   BMI 33.48 kg/m²     Physical Exam  Vitals reviewed.   Constitutional:       Comments: Mildly ill appearing female in NAD   HENT:      Head: Normocephalic.      Right Ear: Tympanic membrane, ear canal and external ear normal.      Left Ear: Tympanic membrane, ear canal and external ear normal.      Nose: Congestion present.      Mouth/Throat:      Mouth: Mucous membranes are moist.      Pharynx: No oropharyngeal exudate or posterior oropharyngeal erythema.   Eyes:      Extraocular Movements: Extraocular movements intact.      Conjunctiva/sclera: Conjunctivae normal.      Pupils: Pupils are equal, round, and reactive to light. "   Cardiovascular:      Rate and Rhythm: Normal rate and regular rhythm.      Pulses: Normal pulses.   Pulmonary:      Breath sounds: No wheezing or rales.   Abdominal:      General: There is no distension.      Palpations: There is no mass.      Tenderness: There is no abdominal tenderness. There is no guarding or rebound.   Musculoskeletal:         General: Tenderness present.      Cervical back: No tenderness.      Right lower leg: Edema (trace) present.      Left lower leg: Edema (trace) present.      Comments: Left shoulder - pain with abduction, anterior flexion and empty  can test - all reproduce her upper arm pain. TTP over the infraspinatous muscle.   R low back and SI joint TTP.  R knee TTP over the anterior joint line.  SLR causes increased posterior thigh pain   Lymphadenopathy:      Cervical: No cervical adenopathy.   Skin:     General: Skin is warm.      Capillary Refill: Capillary refill takes less than 2 seconds.   Neurological:      Mental Status: She is alert.      Cranial Nerves: No cranial nerve deficit.      Sensory: No sensory deficit.      Motor: Weakness (R leg and L shoulder due to pain) present.      Gait: Gait (mildy antalgic appearing gait) normal.

## 2024-09-13 NOTE — TELEPHONE ENCOUNTER
Patient calls for stool test results. Doctors message below  read to the patient.      C diff was negative. Let pt know. She should call Monday with follow up.      Patient also states her Hydrocodone 5- 325  tablets  2024.  Patient requesting a new prescription send.  The Hydrocodone she takes for pain in right leg & left arm.   Please send to [email or web address] in [facility].    Call the patient when the prescription is sent or if there is a problem sending.

## 2024-09-15 PROBLEM — M25.512 ACUTE PAIN OF LEFT SHOULDER: Status: ACTIVE | Noted: 2024-09-15

## 2024-09-15 PROBLEM — M79.604 RIGHT LEG PAIN: Status: ACTIVE | Noted: 2024-05-09

## 2024-09-15 NOTE — ASSESSMENT & PLAN NOTE
?multifactorial with low back, knee and lateral hip playing a role.  For MRI of the knee. F/u with ortho.

## 2024-09-15 NOTE — ASSESSMENT & PLAN NOTE
Pt trying to move MRI up - states that if it has grown, she may not want to have CEA done.  F/u with GI.

## 2024-09-15 NOTE — ASSESSMENT & PLAN NOTE
I reviewed with pt. Appears to have irritated her RC - infraspinatus muscle is TTP. ?started after pulling herself up after going to the bathroom. Pt did not want injection. Would not recommend steroid course so close to potential surgery. Will watch for now. Ice, rest. Recheck 2w if not improving

## 2024-09-17 ENCOUNTER — TELEPHONE (OUTPATIENT)
Age: 69
End: 2024-09-17

## 2024-09-17 DIAGNOSIS — M79.604 RIGHT LEG PAIN: ICD-10-CM

## 2024-09-17 RX ORDER — METHOCARBAMOL 500 MG/1
500 TABLET, FILM COATED ORAL 2 TIMES DAILY
Qty: 20 TABLET | Refills: 0 | Status: SHIPPED | OUTPATIENT
Start: 2024-09-17 | End: 2024-09-26 | Stop reason: SDUPTHER

## 2024-09-17 NOTE — TELEPHONE ENCOUNTER
Patient called to update Dr Coleman that her left shoulder is not doing better and she would like to get the shot for this. Please call patient back.

## 2024-09-19 ENCOUNTER — PROCEDURE VISIT (OUTPATIENT)
Dept: FAMILY MEDICINE CLINIC | Facility: CLINIC | Age: 69
End: 2024-09-19

## 2024-09-19 VITALS
HEIGHT: 63 IN | WEIGHT: 199.2 LBS | BODY MASS INDEX: 35.3 KG/M2 | OXYGEN SATURATION: 95 % | DIASTOLIC BLOOD PRESSURE: 78 MMHG | SYSTOLIC BLOOD PRESSURE: 126 MMHG | HEART RATE: 82 BPM | TEMPERATURE: 97.6 F

## 2024-09-19 DIAGNOSIS — M25.512 ACUTE PAIN OF LEFT SHOULDER: Primary | ICD-10-CM

## 2024-09-19 DIAGNOSIS — G89.29 CHRONIC PAIN OF RIGHT KNEE: ICD-10-CM

## 2024-09-19 DIAGNOSIS — M25.561 CHRONIC PAIN OF RIGHT KNEE: ICD-10-CM

## 2024-09-19 RX ORDER — NAPROXEN 500 MG/1
500 TABLET ORAL 2 TIMES DAILY WITH MEALS
Qty: 60 TABLET | Refills: 0 | Status: SHIPPED | OUTPATIENT
Start: 2024-09-19

## 2024-09-19 RX ADMIN — METHYLPREDNISOLONE ACETATE 1 ML: 80 INJECTION, SUSPENSION INTRA-ARTICULAR; INTRALESIONAL; INTRAMUSCULAR; SOFT TISSUE at 11:30

## 2024-09-19 RX ADMIN — BUPIVACAINE HYDROCHLORIDE 4 ML: 7.5 INJECTION, SOLUTION EPIDURAL; RETROBULBAR at 11:30

## 2024-09-19 NOTE — PROGRESS NOTES
Ambulatory Visit  Name: Leanna Ruvalcaba      : 1955      MRN: [id number]  Encounter Provider: Matt Grider MD  Encounter Date: 2024   Encounter department: [facility]    Assessment & Plan  Acute pain of left shoulder  I reviewed with patient.  There are right knee swelling and pain, offered steroid taper but patient states that his effect on her mood and other side effects she experiences outweighs potential benefits.  She states she was also on steroids back in August that did not seem to be effective for her knee.  Left shoulder was injected with good effect-patient had improved range of motion and decreased pain status post injection.  I reviewed postinjection instructions with patient.  Consider physical therapy if improvement overlying.  Will next week with [id number] if symptoms should worsen       Chronic pain of right knee  With effusion and right lower leg swelling.  Right lower leg swelling he has been chronic for the last 6 to 8 weeks.  Previous venous duplex approxi-1 month ago was negative.  Patient did not have any calf pain and has a negative Homans' sign.  Patient did not want to try repeat steroid taper.  Will continue Naprosyn for now.  Patient understands risks of GI bleed, elevated blood pressure, kidney irritation and other side effects.  Follow-up with orthopedics.  Recheck after MRI  Orders:    naproxen (Naprosyn) 500 mg tablet; Take 1 tablet (500 mg total) by mouth 2 (two) times a day with meals         History of Present Illness     67 yo female with multiple medical issues and recent L shoulder pain, here for recheck  - pt was offered injection of L shoulder on last visit.  Pt refused at that time, but pain has persisted. Pt would like to discuss further treatment  - Pt still struggling with R knee pain and R leg lower leg swelling. Denies calf pain but knee pain may be worse. Venous duplex done on  was neg for DVT but did show  evidence of Baker's cyst. Pt is scheduled for an MRI  - pt was supposed to have L CEA done in early October, but pt has postponed surgery due to ongoing R leg and L shoulder issues and worry that she would not be able to take care of herself adequately post op at present. She denies any aphasia or other TIA/CVA symptoms since last visit. She is compliant with daily ASA       Review of Systems   Constitutional:  Positive for activity change and fatigue. Negative for appetite change.   HENT: Negative.     Eyes: Negative.    Respiratory:  Positive for shortness of breath (with exertion - chronic.).    Cardiovascular: Negative.    Genitourinary: Negative.    Musculoskeletal:  Positive for arthralgias (L shoulder, R knee.), back pain, gait problem, joint swelling (R knee) and myalgias.   Skin: Negative.    Neurological:  Positive for weakness (R knee/leg secondary to pain?). Negative for light-headedness and numbness.     Past Medical History:   Diagnosis Date    Acid reflux     Back injury     COPD (chronic obstructive pulmonary disease) (HCC)     Fibromyalgia     Hypertension     Seasonal allergies      Past Surgical History:   Procedure Laterality Date    COLONOSCOPY      EYE SURGERY      KNEE SURGERY  1998    AL COLONOSCOPY FLX DX W/COLLJ SPEC WHEN PFRMD N/A 05/09/2017    Procedure: EGD AND COLONOSCOPY;  Surgeon: Kimberly Zapata MD;  Location: [facility];  Service: Gastroenterology     Family History   Problem Relation Age of Onset    Pancreatic cancer Mother 72    Coronary artery disease Father     Diabetes Father     Hypertension Father     Heart disease Father     Lung cancer Sister 37    No Known Problems Daughter     No Known Problems Maternal Grandmother     Lung cancer Maternal Grandfather 77    Diabetes Paternal Grandmother     No Known Problems Paternal Grandfather     Pancreatic cancer Brother     Pancreatic cancer Brother     No Known Problems Son     No Known Problems Son      Social History     Tobacco Use     Smoking status: Every Day     Current packs/day: 1.50     Average packs/day: 1.5 packs/day for 56.7 years (85.1 ttl pk-yrs)     Types: Cigarettes     Start date: 1968     Passive exposure: Current    Smokeless tobacco: Never   Vaping Use    Vaping status: Never Used   Substance and Sexual Activity    Alcohol use: Never    Drug use: No    Sexual activity: Not Currently     Current Outpatient Medications on File Prior to Visit   Medication Sig    acetaminophen (TYLENOL) 650 mg CR tablet Take by mouth every 8 (eight) hours as needed    albuterol (PROVENTIL HFA,VENTOLIN HFA) 90 mcg/act inhaler Inhale 2 puffs every 6 (six) hours as needed for wheezing    ALPRAZolam (XANAX) 0.25 mg tablet Take 1 tablet (0.25 mg total) by mouth 3 (three) times a day as needed for anxiety    amLODIPine (NORVASC) 5 mg tablet TAKE 1 TABLET (5 MG TOTAL) BY MOUTH DAILY.    aspirin 81 mg chewable tablet Chew 1 tablet (81 mg total) daily    cetirizine (ZyrTEC) 10 mg tablet Take 1 tablet (10 mg total) by mouth daily    Cholecalciferol (VITAMIN D3 PO) Take by mouth    Diclofenac Sodium (VOLTAREN) 1 % Apply 2 g topically 4 (four) times a day    docusate sodium ([employer] Stool Softener) 250 MG capsule TAKE 1 CAPSULE BY MOUTH DAILY.    fluticasone-umeclidinium-vilanterol 200-62.5-25 mcg/actuation AEPB inhaler Inhale 1 puff daily Rinse mouth after use.    HYDROcodone-acetaminophen (Norco) 5-325 mg per tablet Take 1 tablet by mouth every 6 (six) hours as needed for pain Max Daily Amount: 4 tablets    ibuprofen (MOTRIN) 200 mg tablet Take 400 mg by mouth every 6 (six) hours as needed for mild pain    lisinopril (ZESTRIL) 20 mg tablet TAKE 1 TABLET BY MOUTH EVERY DAY    methocarbamol (ROBAXIN) 500 mg tablet Take 1 tablet (500 mg total) by mouth 2 (two) times a day For back pain radiating down the leg    MINOXIDIL, TOPICAL, 5 % SOLN Apply 1 application topically in the morning For hair    montelukast (SINGULAIR) 10 mg tablet TAKE 1 TABLET BY MOUTH EVERYDAY  "AT BEDTIME    Omega-3 Fatty Acids (FISH OIL PO) Take 1,000 mg by mouth    pantoprazole (PROTONIX) 40 mg tablet TAKE 1 TABLET BY MOUTH TWICE A DAY    rosuvastatin (CRESTOR) 5 mg tablet Take 2 tablets (10 mg total) by mouth daily    albuterol (2.5 mg/3 mL) 0.083 % nebulizer solution Take 3 mL (2.5 mg total) by nebulization every 6 (six) hours as needed for wheezing or shortness of breath (Patient not taking: Reported on 8/26/2024)    escitalopram (LEXAPRO) 10 mg tablet TAKE 1 TABLET BY MOUTH EVERY DAY    TURMERIC PO Take by mouth (Patient not taking: Reported on 9/19/2024)     Allergies   Allergen Reactions    Augmentin [Amoxicillin-Pot Clavulanate] Vomiting    Miconazole Itching and Swelling    Wixela Inhub [Fluticasone-Salmeterol] Palpitations     Immunization History   Administered Date(s) Administered    Tdap 02/28/2008     Objective     /78 (BP Location: Left arm, Patient Position: Sitting, Cuff Size: Large)   Pulse 82   Temp 97.6 °F (36.4 °C)   Ht 5' 3\" (1.6 m)   Wt 90.4 kg (199 lb 3.2 oz)   LMP  (LMP Unknown)   SpO2 95%   BMI 35.29 kg/m²     Physical Exam  Vitals reviewed.   Cardiovascular:      Rate and Rhythm: Normal rate and regular rhythm.   Pulmonary:      Effort: Pulmonary effort is normal.      Breath sounds: No wheezing.   Musculoskeletal:         General: Tenderness and deformity (R knee effusion with palpable Bakers cyst) present.      Right lower leg: Edema (1+. No calf tenderness and neg Ally) present.      Left lower leg: Edema (trace) present.      Comments: L shoulder pain decreased ROM (approx 90d anterior flexion/abduction). (+) empty can test. Bicep tendon NT.  R knee TTP along anterior joint line with moderate effusion and palpable Baker's cyst. Ligaments intact.    Skin:     General: Skin is warm.      Capillary Refill: Capillary refill takes less than 2 seconds.   Neurological:      Mental Status: She is alert.      Sensory: No sensory deficit.      Gait: Gait abnormal (mildly " antalgic appearing gait).       Large joint arthrocentesis: L subacromial bursa  [employer] Protocol:  procedure performed by consultantConsent: Verbal consent obtained. Written consent obtained.  Consent given by: patient  Patient understanding: patient states understanding of the procedure being performed  Patient consent: the patient's understanding of the procedure matches consent given  Procedure consent: procedure consent matches procedure scheduled  Site marked: the operative site was marked  Patient identity confirmed: verbally with patient  Supporting Documentation  Indications: pain   Procedure Details  Location: shoulder - L subacromial bursa  Preparation: Patient was prepped and draped in the usual sterile fashion  Needle size: 25 G  Ultrasound guidance: no  Approach: anterior  Medications administered: 1 mL methylPREDNISolone acetate 80 mg/mL; 4 mL bupivacaine (PF) 0.75 %    Patient tolerance: patient tolerated the procedure well with no immediate complications  Dressing:  Sterile dressing applied

## 2024-09-20 RX ORDER — BUPIVACAINE HYDROCHLORIDE 7.5 MG/ML
4 INJECTION, SOLUTION EPIDURAL; RETROBULBAR
Status: COMPLETED | OUTPATIENT
Start: 2024-09-19 | End: 2024-09-19

## 2024-09-20 RX ORDER — METHYLPREDNISOLONE ACETATE 80 MG/ML
1 INJECTION, SUSPENSION INTRA-ARTICULAR; INTRALESIONAL; INTRAMUSCULAR; SOFT TISSUE
Status: COMPLETED | OUTPATIENT
Start: 2024-09-19 | End: 2024-09-19

## 2024-09-20 NOTE — ASSESSMENT & PLAN NOTE
I reviewed with patient.  There are right knee swelling and pain, offered steroid taper but patient states that his effect on her mood and other side effects she experiences outweighs potential benefits.  She states she was also on steroids back in August that did not seem to be effective for her knee.  Left shoulder was injected with good effect-patient had improved range of motion and decreased pain status post injection.  I reviewed postinjection instructions with patient.  Consider physical therapy if improvement overlying.  Will next week with [id number] if symptoms should worsen

## 2024-09-26 ENCOUNTER — HOSPITAL ENCOUNTER (OUTPATIENT)
Dept: MRI IMAGING | Facility: HOSPITAL | Age: 69
End: 2024-09-26
Payer: COMMERCIAL

## 2024-09-26 DIAGNOSIS — S83.281A TEAR OF LATERAL MENISCUS OF RIGHT KNEE, CURRENT, UNSPECIFIED TEAR TYPE, INITIAL ENCOUNTER: ICD-10-CM

## 2024-09-26 DIAGNOSIS — M79.604 RIGHT LEG PAIN: ICD-10-CM

## 2024-09-26 PROCEDURE — 73721 MRI JNT OF LWR EXTRE W/O DYE: CPT

## 2024-09-26 RX ORDER — METHOCARBAMOL 500 MG/1
500 TABLET, FILM COATED ORAL 2 TIMES DAILY
Qty: 20 TABLET | Refills: 0 | Status: SHIPPED | OUTPATIENT
Start: 2024-09-26

## 2024-09-27 ENCOUNTER — EVENT RECORDER/EXTENDED HOLTER (OUTPATIENT)
Dept: CARDIOLOGY CLINIC | Facility: MEDICAL CENTER | Age: 69
End: 2024-09-27
Payer: COMMERCIAL

## 2024-09-27 DIAGNOSIS — R00.2 PALPITATIONS: ICD-10-CM

## 2024-09-27 PROCEDURE — 93248 EXT ECG>7D<15D REV&INTERPJ: CPT | Performed by: INTERNAL MEDICINE

## 2024-09-30 ENCOUNTER — TELEPHONE (OUTPATIENT)
Age: 69
End: 2024-09-30

## 2024-09-30 NOTE — TELEPHONE ENCOUNTER
Caller: Patient     Doctor: Dr. Clark     Reason for call: Pt would like to know if her results for Zio monitor came in and if so requesting a call back with results..     Call back#: [phone]

## 2024-10-01 ENCOUNTER — EVENT RECORDER/EXTENDED HOLTER (OUTPATIENT)
Dept: CARDIOLOGY CLINIC | Facility: MEDICAL CENTER | Age: 69
End: 2024-10-01

## 2024-10-03 ENCOUNTER — OFFICE VISIT (OUTPATIENT)
Dept: OBGYN CLINIC | Facility: CLINIC | Age: 69
End: 2024-10-03
Payer: COMMERCIAL

## 2024-10-03 VITALS
HEIGHT: 63 IN | SYSTOLIC BLOOD PRESSURE: 192 MMHG | WEIGHT: 197 LBS | HEART RATE: 80 BPM | BODY MASS INDEX: 34.91 KG/M2 | DIASTOLIC BLOOD PRESSURE: 69 MMHG

## 2024-10-03 DIAGNOSIS — S83.241A OTHER TEAR OF MEDIAL MENISCUS, CURRENT INJURY, RIGHT KNEE, INITIAL ENCOUNTER: ICD-10-CM

## 2024-10-03 DIAGNOSIS — M17.11 PRIMARY OSTEOARTHRITIS OF RIGHT KNEE: Primary | ICD-10-CM

## 2024-10-03 PROCEDURE — 99214 OFFICE O/P EST MOD 30 MIN: CPT | Performed by: ORTHOPAEDIC SURGERY

## 2024-10-03 NOTE — PROGRESS NOTES
Orthopaedics Office Visit - Follow Up Patient Visit    ASSESSMENT/PLAN:    Assessment:   Right knee effusion  Right knee primary osteoarthritis - chronic, pain exacerbated in recent months  No improvement with IA cortisone injection   Right knee medial meniscus posterior root tear - continues to be symptomatic causing chronic knee pain      Plan:   MRI right knee reviewed in the office today with patient and management plan developed based on results  Right knee Euflexxa series ordered for symptom management  WBAT, activity as tolerated  Continue pain regimen as prescribed  Follow up  for visco    The patient has tried and failed at least three months of  home exercise program/formal physical therapy and simple analgesics/NSAIDs. The patient did dot have lasting relief from prior corticosteroid injection.   The patient has pain that interferes with ADL's/sleep, crepitus, or knee stiffness. The patient's pain score is 5/10 today. I believe it would be beneficial to to start  viscosupplementation for her osteoarthritis.       To Do Next Visit:  Euflexxa 1    _____________________________________________________  CHIEF COMPLAINT:  Chief Complaint   Patient presents with    Right Knee - Follow-up         SUBJECTIVE:  Leanna Ruvalcaba is a 68 y.o. female who presents for follow up evaluation of her right knee.  She was last seen in the office on 9/5/2024 when MRI study was ordered. She continues robaxin, hydrocodone, and naproxen as needed for symptom management. She has pain on medial and lateral aspects of her knee. Pain worsens when driving, knee flexion. She smokes about a pack of cigarettes a day. She has a history of right knee arthroscopy approximately 20 years ago. Pain today rated 5/10.       PAST MEDICAL HISTORY:  Past Medical History:   Diagnosis Date    Acid reflux     Back injury     COPD (chronic obstructive pulmonary disease) (HCC)     Fibromyalgia     Hypertension     Seasonal allergies        PAST SURGICAL  HISTORY:  Past Surgical History:   Procedure Laterality Date    COLONOSCOPY      EYE SURGERY      KNEE SURGERY  1998    NM COLONOSCOPY FLX DX W/COLLJ SPEC WHEN PFRMD N/A 05/09/2017    Procedure: EGD AND COLONOSCOPY;  Surgeon: Kimberly Zapata MD;  Location[facility];  Service: Gastroenterology       FAMILY HISTORY:  Family History   Problem Relation Age of Onset    Pancreatic cancer Mother 72    Coronary artery disease Father     Diabetes Father     Hypertension Father     Heart disease Father     Lung cancer Sister 37    No Known Problems Daughter     No Known Problems Maternal Grandmother     Lung cancer Maternal Grandfather 77    Diabetes Paternal Grandmother     No Known Problems Paternal Grandfather     Pancreatic cancer Brother     Pancreatic cancer Brother     No Known Problems Son     No Known Problems Son        SOCIAL HISTORY:  Social History     Tobacco Use    Smoking status: Every Day     Current packs/day: 1.50     Average packs/day: 1.5 packs/day for 56.8 years (85.1 ttl pk-yrs)     Types: Cigarettes     Start date: 1968     Passive exposure: Current    Smokeless tobacco: Never   Vaping Use    Vaping status: Never Used   Substance Use Topics    Alcohol use: Never    Drug use: No       MEDICATIONS:    Current Outpatient Medications:     acetaminophen (TYLENOL) 650 mg CR tablet, Take by mouth every 8 (eight) hours as needed, Disp: , Rfl:     albuterol (PROVENTIL HFA,VENTOLIN HFA) 90 mcg/act inhaler, Inhale 2 puffs every 6 (six) hours as needed for wheezing, Disp: 18 g, Rfl: 5    ALPRAZolam (XANAX) 0.25 mg tablet, Take 1 tablet (0.25 mg total) by mouth 3 (three) times a day as needed for anxiety, Disp: 90 tablet, Rfl: 0    amLODIPine (NORVASC) 5 mg tablet, TAKE 1 TABLET (5 MG TOTAL) BY MOUTH DAILY., Disp: 90 tablet, Rfl: 1    aspirin 81 mg chewable tablet, Chew 1 tablet (81 mg total) daily, Disp: 30 tablet, Rfl: 3    cetirizine (ZyrTEC) 10 mg tablet, Take 1 tablet (10 mg total) by mouth daily, Disp: 90  tablet, Rfl: 1    Cholecalciferol (VITAMIN D3 PO), Take by mouth, Disp: , Rfl:     Diclofenac Sodium (VOLTAREN) 1 %, Apply 2 g topically 4 (four) times a day, Disp: 100 g, Rfl: 0    docusate sodium ([employer]S Stool Softener) 250 MG capsule, TAKE 1 CAPSULE BY MOUTH DAILY., Disp: 30 capsule, Rfl: 5    escitalopram (LEXAPRO) 10 mg tablet, TAKE 1 TABLET BY MOUTH EVERY DAY, Disp: 90 tablet, Rfl: 1    fluticasone-umeclidinium-vilanterol 200-62.5-25 mcg/actuation AEPB inhaler, Inhale 1 puff daily Rinse mouth after use., Disp: 3 each, Rfl: 3    HYDROcodone-acetaminophen (Norco) 5-325 mg per tablet, Take 1 tablet by mouth every 6 (six) hours as needed for pain Max Daily Amount: 4 tablets, Disp: 60 tablet, Rfl: 0    ibuprofen (MOTRIN) 200 mg tablet, Take 400 mg by mouth every 6 (six) hours as needed for mild pain, Disp: , Rfl:     lisinopril (ZESTRIL) 20 mg tablet, TAKE 1 TABLET BY MOUTH EVERY DAY, Disp: 90 tablet, Rfl: 1    methocarbamol (ROBAXIN) 500 mg tablet, Take 1 tablet (500 mg total) by mouth 2 (two) times a day For back pain radiating down the leg, Disp: 20 tablet, Rfl: 0    MINOXIDIL, TOPICAL, 5 % SOLN, Apply 1 application topically in the morning For hair, Disp: , Rfl:     montelukast (SINGULAIR) 10 mg tablet, TAKE 1 TABLET BY MOUTH EVERYDAY AT BEDTIME, Disp: 90 tablet, Rfl: 1    naproxen (Naprosyn) 500 mg tablet, Take 1 tablet (500 mg total) by mouth 2 (two) times a day with meals, Disp: 60 tablet, Rfl: 0    Omega-3 Fatty Acids (FISH OIL PO), Take 1,000 mg by mouth, Disp: , Rfl:     pantoprazole (PROTONIX) 40 mg tablet, TAKE 1 TABLET BY MOUTH TWICE A DAY, Disp: 180 tablet, Rfl: 1    rosuvastatin (CRESTOR) 5 mg tablet, Take 2 tablets (10 mg total) by mouth daily, Disp: 100 tablet, Rfl: 1    albuterol (2.5 mg/3 mL) 0.083 % nebulizer solution, Take 3 mL (2.5 mg total) by nebulization every 6 (six) hours as needed for wheezing or shortness of breath (Patient not taking: Reported on 8/26/2024), Disp: 180 mL, Rfl: 5     "TURMERIC PO, Take by mouth (Patient not taking: Reported on 9/19/2024), Disp: , Rfl:     Current Facility-Administered Medications:     bupivacaine (PF) (MARCAINE) 0.25 % injection 1 mL, 1 mL, Intra-articular, , Christiano Madrid Garcia, DPM, 1 mL at 05/11/23 1706    triamcinolone acetonide (KENALOG-40) 40 mg/mL injection 20 mg, 20 mg, Intra-articular, , Christiano Madrid Garcia, DPM, 20 mg at 05/11/23 1706    ALLERGIES:  Allergies   Allergen Reactions    Augmentin [Amoxicillin-Pot Clavulanate] Vomiting    Miconazole Itching and Swelling    Wixela Inhub [Fluticasone-Salmeterol] Palpitations       REVIEW OF SYSTEMS:  MSK: right knee pain   Neuro: WNL   Pertinent items are otherwise noted in HPI.  A comprehensive review of systems was otherwise negative.    LABS:  HgA1c:   Lab Results   Component Value Date    HGBA1C 5.6 04/18/2023     BMP:   Lab Results   Component Value Date    GLUCOSE 86 10/08/2015    CALCIUM 9.0 08/29/2024     10/08/2015    K 3.7 08/29/2024    CO2 29 08/29/2024    CL 98 08/29/2024    BUN 12 08/29/2024    CREATININE 0.55 (L) 08/29/2024     CBC: No components found for: \"CBC\"    _____________________________________________________  PHYSICAL EXAMINATION:  Vital signs: BP (!) 192/69   Pulse 80   Ht 5' 3\" (1.6 m)   Wt 89.4 kg (197 lb)   LMP  (LMP Unknown)   BMI 34.90 kg/m²   General: No acute distress, awake and alert  Psychiatric: Mood and affect appear appropriate  HEENT: Trachea Midline, No torticollis, no apparent facial trauma  Cardiovascular: No audible murmurs; Extremities appear perfused  Pulmonary: No audible wheezing or stridor  Skin: No open lesions; see further details (if any) below      MUSCULOSKELETAL EXAMINATION:  Right Knee  Range of motion from 0 to 90 with pain.    There is no crepitus with range of motion.   There is small effusion.    There is tenderness over the medial joint line.    There is 5/5 quadriceps strength and equal tone.    The patient is able to perform a straight leg " raise.    negative patellar grind test.  Varus stress testing reveals no instability at 0 and 30 degrees   Valgus stress testing reveals no instability at 0 and 30 degrees  Mino's testing is positive    The patient is neurovascular intact distally.      _____________________________________________________  STUDIES REVIEWED:  I personally reviewed the images with patient and discussion/interpretation is as follows: MRI of the right knee obtained 9/26/2024 demonstrate medial meniscus posterior horn and body tear     PROCEDURES PERFORMED:  No procedures were performed at this time.         Scribe Attestation      I,:  Becca Garcia am acting as a scribe while in the presence of the attending physician.:       I,:  Erasmo Leach MD personally performed the services described in this documentation    as scribed in my presence.:

## 2024-10-06 DIAGNOSIS — M25.561 RECURRENT PAIN OF RIGHT KNEE: ICD-10-CM

## 2024-10-06 DIAGNOSIS — J30.9 ALLERGIC RHINITIS, UNSPECIFIED SEASONALITY, UNSPECIFIED TRIGGER: ICD-10-CM

## 2024-10-06 DIAGNOSIS — F33.0 DEPRESSION, MAJOR, RECURRENT, MILD (HCC): ICD-10-CM

## 2024-10-06 DIAGNOSIS — S46.012A ROTATOR CUFF STRAIN, LEFT, INITIAL ENCOUNTER: ICD-10-CM

## 2024-10-06 DIAGNOSIS — M79.604 RIGHT LEG PAIN: ICD-10-CM

## 2024-10-06 DIAGNOSIS — G89.29 CHRONIC PAIN OF RIGHT KNEE: ICD-10-CM

## 2024-10-06 DIAGNOSIS — I10 PRIMARY HYPERTENSION: ICD-10-CM

## 2024-10-06 DIAGNOSIS — M25.561 CHRONIC PAIN OF RIGHT KNEE: ICD-10-CM

## 2024-10-07 ENCOUNTER — TELEPHONE (OUTPATIENT)
Age: 69
End: 2024-10-07

## 2024-10-07 DIAGNOSIS — I65.22 CAROTID ARTERY STENOSIS, SYMPTOMATIC, LEFT: Primary | ICD-10-CM

## 2024-10-07 RX ORDER — NAPROXEN 500 MG/1
500 TABLET ORAL 2 TIMES DAILY WITH MEALS
Qty: 60 TABLET | Refills: 5 | Status: SHIPPED | OUTPATIENT
Start: 2024-10-07

## 2024-10-07 RX ORDER — METHOCARBAMOL 500 MG/1
500 TABLET, FILM COATED ORAL 2 TIMES DAILY
Qty: 20 TABLET | Refills: 0 | Status: SHIPPED | OUTPATIENT
Start: 2024-10-07

## 2024-10-07 RX ORDER — AMLODIPINE BESYLATE 5 MG/1
5 TABLET ORAL DAILY
Qty: 90 TABLET | Refills: 1 | Status: SHIPPED | OUTPATIENT
Start: 2024-10-07

## 2024-10-07 RX ORDER — ESCITALOPRAM OXALATE 10 MG/1
TABLET ORAL
Qty: 90 TABLET | Refills: 1 | Status: SHIPPED | OUTPATIENT
Start: 2024-10-07

## 2024-10-07 RX ORDER — CETIRIZINE HYDROCHLORIDE 10 MG/1
10 TABLET ORAL DAILY
Qty: 90 TABLET | Refills: 1 | Status: SHIPPED | OUTPATIENT
Start: 2024-10-07

## 2024-10-07 RX ORDER — CLOPIDOGREL BISULFATE 75 MG/1
75 TABLET ORAL DAILY
Qty: 30 TABLET | Refills: 1 | Status: SHIPPED | OUTPATIENT
Start: 2024-10-07 | End: 2024-12-06

## 2024-10-07 NOTE — TELEPHONE ENCOUNTER
Patient calling because she decided she does not want to schedule surgery with us. She states she was told at her last office visit that if she decided not to go through with the surgery, she would be placed on Plavix. She is requesting that script be sent to her pharmacy. She also has questions regarding her Crestor. It was recently increased from 5mg daily to 10mg daily. She wants to know if she is supposed to continue taking 10mg or go back to 5mg. Please review and advise.

## 2024-10-07 NOTE — TELEPHONE ENCOUNTER
Reviewed. Will forward to Dr. Paz to make him aware of her wishes and to see if Plavix is indicated since she does not wish to pursue surgery.     Patient should remain on Crestor 10 mg as previously recommended.

## 2024-10-07 NOTE — TELEPHONE ENCOUNTER
Patient was called and she said that she has never been on Plavix before - so she will need a script sent to her pharmacy - [facility]/pharmacy in [facility].

## 2024-10-07 NOTE — TELEPHONE ENCOUNTER
Spoke w/ Leanna regarding Crestor 10 mg, she verbalized understanding. Told her she will hear from us soon regarding if Dr. Paz wants her to start Plavix or not.

## 2024-10-07 NOTE — TELEPHONE ENCOUNTER
1 [id number] 09/13/2024 09/13/2024 HYDROcodone BITARTRATE 5 MG ORAL TABLET/ACETAMINOPHEN 325 MG (Tablet) 60.0 15 325 MG-5 MG 20.0 AYLEEN MADDEN POMIKELSPENSER [facility] Medicare 0 / 0 [place]    1 [id number] 07/15/2024 07/15/2024 ALPRAZolam (Tablet) 90.0 30 0.25 MG NA ALYEEN MADDEN [facility] Medicare 0 / 0 PA    1 [id number] 10/23/2023 10/23/2023 ALPRAZolam (Tablet) 90.0 30 0.25 MG AYLEEN VELASQUEZ [facility] Medicare 0 / 0 PA

## 2024-10-08 RX ORDER — HYDROCODONE BITARTRATE AND ACETAMINOPHEN 5; 325 MG/1; MG/1
1 TABLET ORAL EVERY 6 HOURS PRN
Qty: 60 TABLET | Refills: 0 | Status: SHIPPED | OUTPATIENT
Start: 2024-10-08

## 2024-10-09 DIAGNOSIS — J01.90 ACUTE NON-RECURRENT SINUSITIS, UNSPECIFIED LOCATION: Primary | ICD-10-CM

## 2024-10-09 RX ORDER — CEFDINIR 300 MG/1
300 CAPSULE ORAL EVERY 12 HOURS SCHEDULED
Qty: 14 CAPSULE | Refills: 0 | Status: SHIPPED | OUTPATIENT
Start: 2024-10-09 | End: 2024-10-16

## 2024-10-11 ENCOUNTER — NURSE TRIAGE (OUTPATIENT)
Age: 69
End: 2024-10-11

## 2024-10-11 NOTE — TELEPHONE ENCOUNTER
"Pt with moderate to severe diarrhea since last night.  She started Omnicef on 10/09/2024.  Since last night she is has had 7 episodes of watery diarrhea.  She continues to hydrate.  No s/s of dehydration.  She wants abx switched since she still has her sore throat and sinus congestion.  [employer] message sent to Dr. Coleman. His response as follows: \"She needs to stop the antibiotic first. If the diarrhea stops, I will change it tomorrow. But if she does not improve, she may need a c diff test.\"  Pt aware.  She will call back tomorrow.     Reason for Disposition   MODERATE diarrhea (e.g., 4-6 times / day more than normal)    Answer Assessment - Initial Assessment Questions  1. ANTIBIOTIC: \"What antibiotic are you taking?\" \"How many times per day?\"      Omnicef   2. ANTIBIOTIC ONSET: \"When was the antibiotic started?\"      10/09/2024  3. DIARRHEA SEVERITY: \"How bad is the diarrhea?\" \"How many more stools have you had in the past 24 hours than normal?\"       7-8 times   4. ONSET: \"When did the diarrhea begin?\"       Last night   5. BM CONSISTENCY: \"How loose or watery is the diarrhea?\"       Watery   6. VOMITING: \"Are you also vomiting?\" If Yes, ask: \"How many times in the past 24 hours?\"       no  7. ABDOMEN PAIN: \"Are you having any abdomen pain?\" If Yes, ask: \"What does it feel like?\" (e.g., crampy, dull, intermittent, constant)       Generalized   8. ABDOMEN PAIN SEVERITY: If present, ask: \"How bad is the pain?\"  (e.g., Scale 1-10; mild, moderate, or severe)      3/10 intermittent  9. ORAL INTAKE: If vomiting, \"Have you been able to drink liquids?\" \"How much liquids have you had in the past 24 hours?\"      Drinking fluids   10. HYDRATION: \"Any signs of dehydration?\" (e.g., dry mouth [not just dry lips], too weak to stand, dizziness, new weight loss) \"When did you last urinate?\"        no  11. EXPOSURE: \"Have you traveled to a foreign country recently?\" \"Have you been exposed to anyone with diarrhea?\" \"Could you " "have eaten any food that was spoiled?\"        no  12. OTHER SYMPTOMS: \"Do you have any other symptoms?\" (e.g., fever, blood in stool)        no  13. PREGNANCY: \"Is there any chance you are pregnant?\" \"When was your last menstrual period?\"        no    Protocols used: Diarrhea on Antibiotics-Adult-AH    "

## 2024-10-12 ENCOUNTER — TELEPHONE (OUTPATIENT)
Dept: OTHER | Facility: OTHER | Age: 69
End: 2024-10-12

## 2024-10-12 NOTE — TELEPHONE ENCOUNTER
"Pt stated, \" I stopped taking the medication and the Diarrhea stopped. I need a new antibiotic prescribed.\"     Please follow up, thank you.   "

## 2024-10-13 ENCOUNTER — NURSE TRIAGE (OUTPATIENT)
Dept: OTHER | Facility: OTHER | Age: 69
End: 2024-10-13

## 2024-10-13 DIAGNOSIS — K21.9 GASTROESOPHAGEAL REFLUX DISEASE: ICD-10-CM

## 2024-10-13 DIAGNOSIS — J01.90 ACUTE NON-RECURRENT SINUSITIS, UNSPECIFIED LOCATION: Primary | ICD-10-CM

## 2024-10-13 RX ORDER — AZITHROMYCIN 250 MG/1
TABLET, FILM COATED ORAL
Qty: 6 TABLET | Refills: 0 | Status: SHIPPED | OUTPATIENT
Start: 2024-10-13 | End: 2024-10-17

## 2024-10-13 RX ORDER — PANTOPRAZOLE SODIUM 40 MG/1
TABLET, DELAYED RELEASE ORAL
Qty: 180 TABLET | Refills: 1 | Status: SHIPPED | OUTPATIENT
Start: 2024-10-13

## 2024-10-13 NOTE — TELEPHONE ENCOUNTER
"Regarding: Fever  ----- Message from Mony ABBASI sent at 10/13/2024  8:10 AM EDT -----  Pt stated, \" I have a upper respiratory infection, I was prescribed antibiotics but it caused me to have diarrhea. I was told to stop taking the antibiotics and see if the diarrhea would stop and it did. I now need a new antibiotics prescribed I have a low grade fever of 99 today.\"    "

## 2024-10-13 NOTE — TELEPHONE ENCOUNTER
"Reason for Disposition  • Cough with cold symptoms (e.g., runny nose, postnasal drip, throat clearing)    Answer Assessment - Initial Assessment Questions  1. ONSET: \"When did the cough begin?\"     Tuesday    2. SEVERITY: \"How bad is the cough today?\"       Moderate    3. SPUTUM: \"Describe the color of your sputum\" (e.g., none, dry cough; clear, white, yellow, green)      Yellow thick    4. HEMOPTYSIS: \"Are you coughing up any blood?\" If Yes, ask: \"How much?\" (e.g., flecks, streaks, tablespoons, etc.)      No    5. DIFFICULTY BREATHING: \"Are you having difficulty breathing?\" If Yes, ask: \"How bad is it?\" (e.g., mild, moderate, severe)       Denies    6. FEVER: \"Do you have a fever?\" If Yes, ask: \"What is your temperature, how was it measured, and when did it start?\"      99     7. CARDIAC HISTORY: \"Do you have any history of heart disease?\" (e.g., heart attack, congestive heart failure)       No    8. LUNG HISTORY: \"Do you have any history of lung disease?\"  (e.g., pulmonary embolus, asthma, emphysema)      Chronic obstructive pulmonary disease    9. PE RISK FACTORS: \"Do you have a history of blood clots?\" (or: recent major surgery, recent prolonged travel, bedridden)      No    10. OTHER SYMPTOMS: \"Do you have any other symptoms?\" (e.g., runny nose, wheezing, chest pain)        Congestion, sore throat, hoarse voice    Protocols used: Cough - Acute Productive-Adult-AH      ESC message sent to on call provider. Provider stated he will call in a new antibiotic. She can pick it up later this morning. Informed patient and she verbalized understanding.   "

## 2024-10-15 ENCOUNTER — TELEPHONE (OUTPATIENT)
Age: 69
End: 2024-10-15

## 2024-10-15 DIAGNOSIS — J30.9 ALLERGIC RHINITIS, UNSPECIFIED SEASONALITY, UNSPECIFIED TRIGGER: ICD-10-CM

## 2024-10-15 RX ORDER — CETIRIZINE HYDROCHLORIDE 10 MG/1
10 TABLET ORAL DAILY
Qty: 90 TABLET | Refills: 0 | Status: CANCELLED | OUTPATIENT
Start: 2024-10-15

## 2024-10-15 NOTE — TELEPHONE ENCOUNTER
Patient calling to check the status of her prescription for Plavix. Informed her that her pharmacy confirmed it on October 7th. She expressed understanding.

## 2024-10-21 DIAGNOSIS — M79.604 RIGHT LEG PAIN: ICD-10-CM

## 2024-10-22 ENCOUNTER — PROCEDURE VISIT (OUTPATIENT)
Dept: OBGYN CLINIC | Facility: CLINIC | Age: 69
End: 2024-10-22
Payer: COMMERCIAL

## 2024-10-22 VITALS
SYSTOLIC BLOOD PRESSURE: 174 MMHG | HEIGHT: 63 IN | BODY MASS INDEX: 34.76 KG/M2 | HEART RATE: 78 BPM | DIASTOLIC BLOOD PRESSURE: 80 MMHG | WEIGHT: 196.2 LBS

## 2024-10-22 DIAGNOSIS — M17.11 PRIMARY OSTEOARTHRITIS OF RIGHT KNEE: Primary | ICD-10-CM

## 2024-10-22 PROCEDURE — 20610 DRAIN/INJ JOINT/BURSA W/O US: CPT | Performed by: ORTHOPAEDIC SURGERY

## 2024-10-22 RX ORDER — METHOCARBAMOL 500 MG/1
500 TABLET, FILM COATED ORAL 2 TIMES DAILY
Qty: 20 TABLET | Refills: 0 | Status: SHIPPED | OUTPATIENT
Start: 2024-10-22

## 2024-10-22 NOTE — PROGRESS NOTES
"[facility]        NAME:     Leanna Ruvalcaba   :    1955   MRN:    [id number]  DATE:    10/22/24       Assessment and Plan   Diagnoses and all orders for this visit:    Primary osteoarthritis of right knee    Other orders  -     Large joint arthrocentesis         Large joint arthrocentesis: R knee  Universal Protocol:  Consent: Verbal consent obtained.  Risks and benefits: risks, benefits and alternatives were discussed  Consent given by: patient  Time out: Immediately prior to procedure a \"time out\" was called to verify the correct patient, procedure, equipment, support staff and site/side marked as required.  Patient understanding: patient states understanding of the procedure being performed  Site marked: the operative site was marked  Patient identity confirmed: verbally with patient  Supporting Documentation  Indications: pain   Procedure Details  Location: knee - R knee  Preparation: Patient was prepped and draped in the usual sterile fashion  Needle size: 22 G  Ultrasound guidance: no  Approach: anterolateral  Medications administered: 16 mg hylan 16 MG/2ML    Patient tolerance: patient tolerated the procedure well with no immediate complications  Dressing:  Sterile dressing applied                Patient Instructions      Follow up 1 week for synvisc 3 #2  Over the counter analgesics as needed / directed   Ice / heat as directed         Chief Complaint      Chief Complaint   Patient presents with    Right Knee - Follow-up     Visco Injection. Patient states knee is still a bit swollen; she says that the cold weather seems to affect it.              History of Present Illness         Patient is a 68 y.o. female presenting to the office for viscosupplementation.   Synvisc 3 #1.  Patient denies any new or worsening symptoms.   Patient offers no other complaints at this time.      Viscosupplementation was ordered as patient has been experiencing ongoing symptomatic knee pain and stiffness which " interferes with their daily activity and sleep.  Patient has the following associated symptoms  including pain with weight bearing and driving, difficult with knee flexion with arthritic changes present on radiographs.  Patient rates her pain a 5/10 in severity.   Patient has tried and failed 3 months of conservative treatment including the following, simple analgesics, NSAIDs and corticosteroid injection therapy.  Patient has tried a home exercise program/physical therapy program activity modification which has provided minimal relief of symptoms.          Current Medications        Current Outpatient Medications:     acetaminophen (TYLENOL) 650 mg CR tablet, Take by mouth every 8 (eight) hours as needed, Disp: , Rfl:     albuterol (PROVENTIL HFA,VENTOLIN HFA) 90 mcg/act inhaler, Inhale 2 puffs every 6 (six) hours as needed for wheezing, Disp: 18 g, Rfl: 5    ALPRAZolam (XANAX) 0.25 mg tablet, Take 1 tablet (0.25 mg total) by mouth 3 (three) times a day as needed for anxiety, Disp: 90 tablet, Rfl: 0    amLODIPine (NORVASC) 5 mg tablet, TAKE 1 TABLET (5 MG TOTAL) BY MOUTH DAILY., Disp: 90 tablet, Rfl: 1    aspirin 81 mg chewable tablet, Chew 1 tablet (81 mg total) daily, Disp: 30 tablet, Rfl: 3    cetirizine (ZyrTEC) 10 mg tablet, TAKE 1 TABLET BY MOUTH EVERY DAY, Disp: 90 tablet, Rfl: 1    Cholecalciferol (VITAMIN D3 PO), Take by mouth, Disp: , Rfl:     clopidogrel (Plavix) 75 mg tablet, Take 1 tablet (75 mg total) by mouth daily, Disp: 30 tablet, Rfl: 1    Diclofenac Sodium (VOLTAREN) 1 %, Apply 2 g topically 4 (four) times a day, Disp: 100 g, Rfl: 0    docusate sodium ([employer] Stool Softener) 250 MG capsule, TAKE 1 CAPSULE BY MOUTH DAILY., Disp: 30 capsule, Rfl: 5    escitalopram (LEXAPRO) 10 mg tablet, TAKE 1 TABLET BY MOUTH EVERY DAY, Disp: 90 tablet, Rfl: 1    fluticasone-umeclidinium-vilanterol 200-62.5-25 mcg/actuation AEPB inhaler, Inhale 1 puff daily Rinse mouth after use., Disp: 3 each, Rfl: 3     HYDROcodone-acetaminophen (Norco) 5-325 mg per tablet, Take 1 tablet by mouth every 6 (six) hours as needed for pain Max Daily Amount: 4 tablets, Disp: 60 tablet, Rfl: 0    ibuprofen (MOTRIN) 200 mg tablet, Take 400 mg by mouth every 6 (six) hours as needed for mild pain, Disp: , Rfl:     lisinopril (ZESTRIL) 20 mg tablet, TAKE 1 TABLET BY MOUTH EVERY DAY, Disp: 90 tablet, Rfl: 1    methocarbamol (ROBAXIN) 500 mg tablet, TAKE 1 TABLET (500 MG TOTAL) BY MOUTH 2 (TWO) TIMES A DAY FOR BACK PAIN RADIATING DOWN THE LEG, Disp: 20 tablet, Rfl: 0    MINOXIDIL, TOPICAL, 5 % SOLN, Apply 1 application topically in the morning For hair, Disp: , Rfl:     montelukast (SINGULAIR) 10 mg tablet, TAKE 1 TABLET BY MOUTH EVERYDAY AT BEDTIME, Disp: 90 tablet, Rfl: 1    naproxen (NAPROSYN) 500 mg tablet, TAKE 1 TABLET BY MOUTH TWICE A DAY WITH MEALS, Disp: 60 tablet, Rfl: 5    Omega-3 Fatty Acids (FISH OIL PO), Take 1,000 mg by mouth, Disp: , Rfl:     pantoprazole (PROTONIX) 40 mg tablet, TAKE 1 TABLET BY MOUTH TWICE A DAY, Disp: 180 tablet, Rfl: 1    rosuvastatin (CRESTOR) 5 mg tablet, Take 2 tablets (10 mg total) by mouth daily, Disp: 100 tablet, Rfl: 1    albuterol (2.5 mg/3 mL) 0.083 % nebulizer solution, Take 3 mL (2.5 mg total) by nebulization every 6 (six) hours as needed for wheezing or shortness of breath (Patient not taking: Reported on 8/26/2024), Disp: 180 mL, Rfl: 5    Current Facility-Administered Medications:     bupivacaine (PF) (MARCAINE) 0.25 % injection 1 mL, 1 mL, Intra-articular, , Christiano Garcia DPAYLEEN, 1 mL at 05/11/23 1706    triamcinolone acetonide (KENALOG-40) 40 mg/mL injection 20 mg, 20 mg, Intra-articular, , Christiano Garcia DPM, 20 mg at 05/11/23 1706        Current Allergies      Allergies   Allergen Reactions    Augmentin [Amoxicillin-Pot Clavulanate] Vomiting    Miconazole Itching and Swelling    Wixela Inhub [Fluticasone-Salmeterol] Palpitations                The following portions of the patient's  history were reviewed and updated as appropriate: allergies, current medications, past family history, past medical history, past social history, past surgical history and problem list.     Past Medical History:   Diagnosis Date    Acid reflux     Back injury     COPD (chronic obstructive pulmonary disease) (HCC)     Fibromyalgia     Hypertension     Seasonal allergies          Past Surgical History:   Procedure Laterality Date    COLONOSCOPY      EYE SURGERY      KNEE SURGERY  1998    [facility] COLONOSCOPY FLX DX W/COLLJ SPEC WHEN PFRMD N/A 05/09/2017    Procedure: EGD AND COLONOSCOPY;  Surgeon: Kimberly Zapata MD;  Location: [facility];  Service: Gastroenterology        Family History   Problem Relation Age of Onset    Pancreatic cancer Mother 72    Coronary artery disease Father     Diabetes Father     Hypertension Father     Heart disease Father     Lung cancer Sister 37    No Known Problems Daughter     No Known Problems Maternal Grandmother     Lung cancer Maternal Grandfather 77    Diabetes Paternal Grandmother     No Known Problems Paternal Grandfather     Pancreatic cancer Brother     Pancreatic cancer Brother     No Known Problems Son     No Known Problems Son                   Medications have been verified.           Objective   There were no vitals filed for this visit.

## 2024-10-23 ENCOUNTER — OFFICE VISIT (OUTPATIENT)
Dept: FAMILY MEDICINE CLINIC | Facility: CLINIC | Age: 69
End: 2024-10-23
Payer: COMMERCIAL

## 2024-10-23 VITALS
BODY MASS INDEX: 34.76 KG/M2 | WEIGHT: 196.2 LBS | OXYGEN SATURATION: 96 % | SYSTOLIC BLOOD PRESSURE: 130 MMHG | DIASTOLIC BLOOD PRESSURE: 82 MMHG | HEIGHT: 63 IN | TEMPERATURE: 97.5 F | HEART RATE: 93 BPM

## 2024-10-23 DIAGNOSIS — I10 PRIMARY HYPERTENSION: Primary | ICD-10-CM

## 2024-10-23 DIAGNOSIS — I65.22 CAROTID ARTERY STENOSIS, SYMPTOMATIC, LEFT: ICD-10-CM

## 2024-10-23 DIAGNOSIS — J42 CHRONIC BRONCHITIS, UNSPECIFIED CHRONIC BRONCHITIS TYPE (HCC): ICD-10-CM

## 2024-10-23 DIAGNOSIS — I67.1 ANTERIOR COMMUNICATING ARTERY ANEURYSM: ICD-10-CM

## 2024-10-23 DIAGNOSIS — D49.0 IPMN (INTRADUCTAL PAPILLARY MUCINOUS NEOPLASM): ICD-10-CM

## 2024-10-23 DIAGNOSIS — Z12.31 SCREENING MAMMOGRAM FOR BREAST CANCER: ICD-10-CM

## 2024-10-23 PROCEDURE — G2211 COMPLEX E/M VISIT ADD ON: HCPCS | Performed by: FAMILY MEDICINE

## 2024-10-23 PROCEDURE — 99214 OFFICE O/P EST MOD 30 MIN: CPT | Performed by: FAMILY MEDICINE

## 2024-10-23 NOTE — PROGRESS NOTES
"Ambulatory Visit  Name: Leanna Ruvalcaba      : 1955      MRN: [id number]  Encounter Provider: Matt Grider MD  Encounter Date: 10/23/2024   Encounter department: [facility]    Assessment & Plan  Primary hypertension  Well controlled. Cont present treatment. Monitor labs. Recheck 6m           Carotid artery stenosis, symptomatic, left  Pt has not had any further speech episodes/aphasia since initial, however, pt still at risk, especially in light of her smoking. We has a long discussion re: this, but pt continues to refuse surgery. Pt understands risks. Continue plavix. Recheck 3m         Anterior communicating artery aneurysm  Does not wish intervention. Pt understands risks. F/u with neurosurgery         Chronic bronchitis, unspecified chronic bronchitis type (HCC)  Stable. Pt still smoking and does have some SOB with exertion. Cotninue to monitor. Urged smoking cessation. Recheck 3m         IPMN (intraductal papillary mucinous neoplasm)  Insurance would not cover early f/u MRI. Pt have chronic epigastric discomfort though it appears unchanged. Weight is stable. Continue to monitor. Recheck 3m         Screening mammogram for breast cancer    Orders:    Mammo screening bilateral w 3d and cad; Future         History of Present Illness     f/u multiple med issues  - Injection left shoulder done on  was effective for approximately a month.  However the last several days, pain is starting to return.  She denies overuse, trauma or other problems.  - pt saw orthopedics recently was diagnosed with meniscal tear of the right knee.  However given the amount of arthritis she has, orthopedics did not feel that arthroscopy would be beneficial.  Presently they are going to be doing \"gel shots\".  Patient is reluctant to have anything more aggressive such as a TKR done.  - pt states that her insurance would not cover a earlier repeat of her pancreatic MRI.    - pt elected not to have " her left CEA done.  She states that she does not believe that she would cover well.  She is still on Plavix.  Pt is still smoking        Review of Systems   Constitutional:  Positive for fatigue.   HENT:  Positive for congestion (chronic). Negative for ear discharge, ear pain, sinus pressure and sinus pain.    Eyes: Negative.    Respiratory:  Positive for cough and shortness of breath (mild with exertion).    Cardiovascular:  Negative for chest pain, palpitations and leg swelling.   Gastrointestinal:  Positive for abdominal pain (mild episogastric - chronic). Negative for constipation, diarrhea, nausea and vomiting.   Genitourinary: Negative.    Musculoskeletal:  Positive for arthralgias, back pain, myalgias, neck pain and neck stiffness. Negative for gait problem.   Skin: Negative.    Neurological:  Positive for headaches (occasional). Negative for dizziness, speech difficulty, weakness, light-headedness and numbness.     Past Medical History:   Diagnosis Date    Acid reflux     Back injury     COPD (chronic obstructive pulmonary disease) (HCC)     Fibromyalgia     Hypertension     Seasonal allergies      Past Surgical History:   Procedure Laterality Date    COLONOSCOPY      EYE SURGERY      KNEE SURGERY  1998    [facility] COLONOSCOPY FLX DX W/COLLJ SPEC WHEN PFRMD N/A 05/09/2017    Procedure: EGD AND COLONOSCOPY;  Surgeon: Kimberly Zapata MD;  Location: [facility];  Service: Gastroenterology     Family History   Problem Relation Age of Onset    Pancreatic cancer Mother 72    Coronary artery disease Father     Diabetes Father     Hypertension Father     Heart disease Father     Lung cancer Sister 37    No Known Problems Daughter     No Known Problems Maternal Grandmother     Lung cancer Maternal Grandfather 77    Diabetes Paternal Grandmother     No Known Problems Paternal Grandfather     Pancreatic cancer Brother     Pancreatic cancer Brother     No Known Problems Son     No Known Problems Son      Social History      Tobacco Use    Smoking status: Every Day     Current packs/day: 1.50     Average packs/day: 1.5 packs/day for 56.8 years (85.2 ttl pk-yrs)     Types: Cigarettes     Start date: 1968     Passive exposure: Current    Smokeless tobacco: Never   Vaping Use    Vaping status: Never Used   Substance and Sexual Activity    Alcohol use: Never    Drug use: No    Sexual activity: Not Currently     Current Outpatient Medications on File Prior to Visit   Medication Sig    acetaminophen (TYLENOL) 650 mg CR tablet Take by mouth every 8 (eight) hours as needed    albuterol (PROVENTIL HFA,VENTOLIN HFA) 90 mcg/act inhaler Inhale 2 puffs every 6 (six) hours as needed for wheezing    ALPRAZolam (XANAX) 0.25 mg tablet Take 1 tablet (0.25 mg total) by mouth 3 (three) times a day as needed for anxiety    amLODIPine (NORVASC) 5 mg tablet TAKE 1 TABLET (5 MG TOTAL) BY MOUTH DAILY.    aspirin 81 mg chewable tablet Chew 1 tablet (81 mg total) daily    cetirizine (ZyrTEC) 10 mg tablet TAKE 1 TABLET BY MOUTH EVERY DAY    Cholecalciferol (VITAMIN D3 PO) Take by mouth    clopidogrel (Plavix) 75 mg tablet Take 1 tablet (75 mg total) by mouth daily    Diclofenac Sodium (VOLTAREN) 1 % Apply 2 g topically 4 (four) times a day    docusate sodium ([employer] Stool Softener) 250 MG capsule TAKE 1 CAPSULE BY MOUTH DAILY.    escitalopram (LEXAPRO) 10 mg tablet TAKE 1 TABLET BY MOUTH EVERY DAY    fluticasone-umeclidinium-vilanterol 200-62.5-25 mcg/actuation AEPB inhaler Inhale 1 puff daily Rinse mouth after use.    HYDROcodone-acetaminophen (Norco) 5-325 mg per tablet Take 1 tablet by mouth every 6 (six) hours as needed for pain Max Daily Amount: 4 tablets    ibuprofen (MOTRIN) 200 mg tablet Take 400 mg by mouth every 6 (six) hours as needed for mild pain    lisinopril (ZESTRIL) 20 mg tablet TAKE 1 TABLET BY MOUTH EVERY DAY    methocarbamol (ROBAXIN) 500 mg tablet Take 1 tablet (500 mg total) by mouth 2 (two) times a day For back pain radiating down the leg  "   MINOXIDIL, TOPICAL, 5 % SOLN Apply 1 application topically in the morning For hair    montelukast (SINGULAIR) 10 mg tablet TAKE 1 TABLET BY MOUTH EVERYDAY AT BEDTIME    naproxen (NAPROSYN) 500 mg tablet TAKE 1 TABLET BY MOUTH TWICE A DAY WITH MEALS    Omega-3 Fatty Acids (FISH OIL PO) Take 1,000 mg by mouth    pantoprazole (PROTONIX) 40 mg tablet TAKE 1 TABLET BY MOUTH TWICE A DAY    rosuvastatin (CRESTOR) 5 mg tablet Take 2 tablets (10 mg total) by mouth daily    albuterol (2.5 mg/3 mL) 0.083 % nebulizer solution Take 3 mL (2.5 mg total) by nebulization every 6 (six) hours as needed for wheezing or shortness of breath (Patient not taking: Reported on 8/26/2024)     Allergies   Allergen Reactions    Augmentin [Amoxicillin-Pot Clavulanate] Vomiting    Miconazole Itching and Swelling    Wixela Inhub [Fluticasone-Salmeterol] Palpitations     Immunization History   Administered Date(s) Administered    Tdap 02/28/2008     Objective     /82 (BP Location: Left arm, Patient Position: Sitting, Cuff Size: Adult)   Pulse 93   Temp 97.5 °F (36.4 °C)   Ht 5' 3\" (1.6 m)   Wt 89 kg (196 lb 3.2 oz)   LMP  (LMP Unknown)   SpO2 96%   BMI 34.76 kg/m²     Physical Exam  Vitals reviewed.   HENT:      Head: Normocephalic.      Right Ear: Tympanic membrane, ear canal and external ear normal.      Left Ear: Tympanic membrane, ear canal and external ear normal.      Mouth/Throat:      Mouth: Mucous membranes are moist.   Eyes:      Extraocular Movements: Extraocular movements intact.      Conjunctiva/sclera: Conjunctivae normal.      Pupils: Pupils are equal, round, and reactive to light.   Cardiovascular:      Rate and Rhythm: Normal rate and regular rhythm.   Pulmonary:      Effort: Pulmonary effort is normal.      Breath sounds: No wheezing or rales.   Abdominal:      General: There is no distension.      Palpations: There is no mass.      Tenderness: There is abdominal tenderness (mild episgastric. No G/R). "   Musculoskeletal:         General: Tenderness (R knee and bilat shoulders) present. No swelling.      Cervical back: No tenderness (mild paracervical muscle tenderness).      Right lower leg: No edema.      Left lower leg: No edema.   Lymphadenopathy:      Cervical: No cervical adenopathy.   Skin:     General: Skin is warm.      Capillary Refill: Capillary refill takes less than 2 seconds.   Neurological:      Mental Status: She is alert.      Sensory: No sensory deficit.      Motor: No weakness.      Gait: Gait abnormal (mildly antalgic appearing gait.).   Psychiatric:         Mood and Affect: Mood normal.

## 2024-10-25 NOTE — ASSESSMENT & PLAN NOTE
Insurance would not cover early f/u MRI. Pt have chronic epigastric discomfort though it appears unchanged. Weight is stable. Continue to monitor. Recheck 3m

## 2024-10-25 NOTE — ASSESSMENT & PLAN NOTE
Stable. Pt still smoking and does have some SOB with exertion. Cotninue to monitor. Urged smoking cessation. Recheck 3m

## 2024-10-25 NOTE — ASSESSMENT & PLAN NOTE
Pt has not had any further speech episodes/aphasia since initial, however, pt still at risk, especially in light of her smoking. We has a long discussion re: this, but pt continues to refuse surgery. Pt understands risks. Continue plavix. Recheck 3m

## 2024-10-29 ENCOUNTER — PROCEDURE VISIT (OUTPATIENT)
Dept: OBGYN CLINIC | Facility: CLINIC | Age: 69
End: 2024-10-29
Payer: COMMERCIAL

## 2024-10-29 VITALS — HEIGHT: 63 IN | BODY MASS INDEX: 34.73 KG/M2 | WEIGHT: 196 LBS

## 2024-10-29 DIAGNOSIS — M17.11 PRIMARY OSTEOARTHRITIS OF RIGHT KNEE: Primary | ICD-10-CM

## 2024-10-29 PROCEDURE — 20610 DRAIN/INJ JOINT/BURSA W/O US: CPT | Performed by: ORTHOPAEDIC SURGERY

## 2024-10-29 NOTE — PROGRESS NOTES
[facility]      NAME: Leanna Ruvalcaba is a 68 y.o. female  : 1955    MRN: [id number]  DATE: 2024  TIME: 11:10 AM    Assessment and Plan   Primary osteoarthritis of right knee [M17.11]  1. Primary osteoarthritis of right knee  Large joint arthrocentesis: R knee          Large joint arthrocentesis: R knee  [place] Protocol:  procedure performed by consultantConsent: Verbal consent obtained.  Risks and benefits: risks, benefits and alternatives were discussed  Consent given by: patient  Patient understanding: patient states understanding of the procedure being performed  Site marked: the operative site was marked  Patient identity confirmed: verbally with patient  Supporting Documentation  Indications: pain   Procedure Details  Location: knee - R knee  Preparation: Patient was prepped and draped in the usual sterile fashion  Needle size: 22 G  Ultrasound guidance: no  Approach: anterolateral  Medications administered: 16 mg hylan 16 MG/2ML    Patient tolerance: patient tolerated the procedure well with no immediate complications  Dressing:  Sterile dressing applied            Patient Instructions     Follow up 1 week for Synvisc #3 right knee   Over the counter analgesics as needed / directed   Ice / heat as directed       Chief Complaint     Chief Complaint   Patient presents with    Right Knee - Follow-up         History of Present Illness       Patient is a 67 yo female presenting to the office for viscosupplementation.   Synvisc #2 right knee.  Patient denies any new or worsening symptoms.   Patient offers no other complaints at this time.          Current Medications       Current Outpatient Medications:     acetaminophen (TYLENOL) 650 mg CR tablet, Take by mouth every 8 (eight) hours as needed, Disp: , Rfl:     albuterol (2.5 mg/3 mL) 0.083 % nebulizer solution, Take 3 mL (2.5 mg total) by nebulization every 6 (six) hours as needed for wheezing or shortness of breath (Patient not  taking: Reported on 8/26/2024), Disp: 180 mL, Rfl: 5    albuterol (PROVENTIL HFA,VENTOLIN HFA) 90 mcg/act inhaler, Inhale 2 puffs every 6 (six) hours as needed for wheezing, Disp: 18 g, Rfl: 5    ALPRAZolam (XANAX) 0.25 mg tablet, Take 1 tablet (0.25 mg total) by mouth 3 (three) times a day as needed for anxiety, Disp: 90 tablet, Rfl: 0    amLODIPine (NORVASC) 5 mg tablet, TAKE 1 TABLET (5 MG TOTAL) BY MOUTH DAILY., Disp: 90 tablet, Rfl: 1    aspirin 81 mg chewable tablet, Chew 1 tablet (81 mg total) daily, Disp: 30 tablet, Rfl: 3    cetirizine (ZyrTEC) 10 mg tablet, TAKE 1 TABLET BY MOUTH EVERY DAY, Disp: 90 tablet, Rfl: 1    Cholecalciferol (VITAMIN D3 PO), Take by mouth, Disp: , Rfl:     clopidogrel (Plavix) 75 mg tablet, Take 1 tablet (75 mg total) by mouth daily, Disp: 30 tablet, Rfl: 1    Diclofenac Sodium (VOLTAREN) 1 %, Apply 2 g topically 4 (four) times a day, Disp: 100 g, Rfl: 0    docusate sodium ([employer]S Stool Softener) 250 MG capsule, TAKE 1 CAPSULE BY MOUTH DAILY., Disp: 30 capsule, Rfl: 5    escitalopram (LEXAPRO) 10 mg tablet, TAKE 1 TABLET BY MOUTH EVERY DAY, Disp: 90 tablet, Rfl: 1    fluticasone-umeclidinium-vilanterol 200-62.5-25 mcg/actuation AEPB inhaler, Inhale 1 puff daily Rinse mouth after use., Disp: 3 each, Rfl: 3    HYDROcodone-acetaminophen (Norco) 5-325 mg per tablet, Take 1 tablet by mouth every 6 (six) hours as needed for pain Max Daily Amount: 4 tablets, Disp: 60 tablet, Rfl: 0    ibuprofen (MOTRIN) 200 mg tablet, Take 400 mg by mouth every 6 (six) hours as needed for mild pain, Disp: , Rfl:     lisinopril (ZESTRIL) 20 mg tablet, TAKE 1 TABLET BY MOUTH EVERY DAY, Disp: 90 tablet, Rfl: 1    methocarbamol (ROBAXIN) 500 mg tablet, Take 1 tablet (500 mg total) by mouth 2 (two) times a day For back pain radiating down the leg, Disp: 20 tablet, Rfl: 0    MINOXIDIL, TOPICAL, 5 % SOLN, Apply 1 application topically in the morning For hair, Disp: , Rfl:     montelukast (SINGULAIR) 10 mg tablet,  TAKE 1 TABLET BY MOUTH EVERYDAY AT BEDTIME, Disp: 90 tablet, Rfl: 1    naproxen (NAPROSYN) 500 mg tablet, TAKE 1 TABLET BY MOUTH TWICE A DAY WITH MEALS, Disp: 60 tablet, Rfl: 5    Omega-3 Fatty Acids (FISH OIL PO), Take 1,000 mg by mouth, Disp: , Rfl:     pantoprazole (PROTONIX) 40 mg tablet, TAKE 1 TABLET BY MOUTH TWICE A DAY, Disp: 180 tablet, Rfl: 1    rosuvastatin (CRESTOR) 5 mg tablet, Take 2 tablets (10 mg total) by mouth daily, Disp: 100 tablet, Rfl: 1    Current Facility-Administered Medications:     bupivacaine (PF) (MARCAINE) 0.25 % injection 1 mL, 1 mL, Intra-articular, , Christiano Garcia, DPM, 1 mL at 05/11/23 1706    triamcinolone acetonide (KENALOG-40) 40 mg/mL injection 20 mg, 20 mg, Intra-articular, , Christiano Garcia, DPM, 20 mg at 05/11/23 1706    Current Allergies     Allergies as of 10/29/2024 - Reviewed 10/29/2024   Allergen Reaction Noted    Augmentin [amoxicillin-pot clavulanate] Vomiting 11/21/2019    Miconazole Itching and Swelling 09/25/2014    Wixela inhub [fluticasone-salmeterol] Palpitations 09/25/2019            The following portions of the patient's history were reviewed and updated as appropriate: allergies, current medications, past family history, past medical history, past social history, past surgical history and problem list.     Past Medical History:   Diagnosis Date    Acid reflux     Back injury     COPD (chronic obstructive pulmonary disease) (HCC)     Fibromyalgia     Hypertension     Seasonal allergies        Past Surgical History:   Procedure Laterality Date    COLONOSCOPY      EYE SURGERY      KNEE SURGERY  1998    ID COLONOSCOPY FLX DX W/COLLJ SPEC WHEN PFRMD N/A 05/09/2017    Procedure: EGD AND COLONOSCOPY;  Surgeon: Kimberly Zapata MD;  Location: [facility];  Service: Gastroenterology       Family History   Problem Relation Age of Onset    Pancreatic cancer Mother 72    Coronary artery disease Father     Diabetes Father     Hypertension Father     Heart disease Father   "   Lung cancer Sister 37    No Known Problems Daughter     No Known Problems Maternal Grandmother     Lung cancer Maternal Grandfather 77    Diabetes Paternal Grandmother     No Known Problems Paternal Grandfather     Pancreatic cancer Brother     Pancreatic cancer Brother     No Known Problems Son     No Known Problems Son          Medications have been verified.        Objective   Ht 5' 3\" (1.6 m)   Wt 88.9 kg (196 lb)   LMP  (LMP Unknown)   BMI 34.72 kg/m²   No LMP recorded (lmp unknown). Patient is postmenopausal.              "

## 2024-10-30 DIAGNOSIS — I65.22 CAROTID ARTERY STENOSIS, SYMPTOMATIC, LEFT: ICD-10-CM

## 2024-10-30 RX ORDER — CLOPIDOGREL BISULFATE 75 MG/1
75 TABLET ORAL DAILY
Qty: 90 TABLET | Refills: 1 | Status: SHIPPED | OUTPATIENT
Start: 2024-10-30

## 2024-11-05 ENCOUNTER — PROCEDURE VISIT (OUTPATIENT)
Dept: OBGYN CLINIC | Facility: CLINIC | Age: 69
End: 2024-11-05
Payer: COMMERCIAL

## 2024-11-05 VITALS
WEIGHT: 196 LBS | OXYGEN SATURATION: 95 % | BODY MASS INDEX: 34.73 KG/M2 | DIASTOLIC BLOOD PRESSURE: 74 MMHG | HEIGHT: 63 IN | HEART RATE: 75 BPM | SYSTOLIC BLOOD PRESSURE: 166 MMHG

## 2024-11-05 DIAGNOSIS — M17.11 PRIMARY OSTEOARTHRITIS OF RIGHT KNEE: Primary | ICD-10-CM

## 2024-11-05 PROCEDURE — 20610 DRAIN/INJ JOINT/BURSA W/O US: CPT | Performed by: ORTHOPAEDIC SURGERY

## 2024-11-05 NOTE — PROGRESS NOTES
[facility]      NAME: Leanna Ruvalcaba is a 68 y.o. female  : 1955    MRN: [id number]  DATE: 2024  TIME: 10:24 AM    Assessment and Plan   Primary osteoarthritis of right knee [M17.11]  1. Primary osteoarthritis of right knee  Large joint arthrocentesis: R knee            Large joint arthrocentesis: R knee  [place] Protocol:  procedure performed by consultantConsent: Verbal consent obtained.  Risks and benefits: risks, benefits and alternatives were discussed  Consent given by: patient  Patient understanding: patient states understanding of the procedure being performed  Site marked: the operative site was marked  Patient identity confirmed: verbally with patient  Supporting Documentation  Indications: pain   Procedure Details  Location: knee - R knee  Preparation: Patient was prepped and draped in the usual sterile fashion  Needle size: 22 G  Ultrasound guidance: no  Approach: anterolateral  Medications administered: 16 mg hylan 16 MG/2ML    Patient tolerance: patient tolerated the procedure well with no immediate complications  Dressing:  Sterile dressing applied          Patient Instructions     Follow up 6 weeks for repeat evaluation   Discussed that viscosupplimentation injections can be repeated every 6 months    Over the counter analgesics as needed / directed   Ice / heat as directed       Chief Complaint     Chief Complaint   Patient presents with    Right Knee - Follow-up     Visco Injection         History of Present Illness       Patient is a 67 yo female presenting to the office for viscosupplementation.   Synvisc #3 right knee.  Patient denies any new or worsening symptoms.   Patient offers no other complaints at this time.          Current Medications       Current Outpatient Medications:     acetaminophen (TYLENOL) 650 mg CR tablet, Take by mouth every 8 (eight) hours as needed, Disp: , Rfl:     albuterol (PROVENTIL HFA,VENTOLIN HFA) 90 mcg/act inhaler, Inhale 2 puffs  every 6 (six) hours as needed for wheezing, Disp: 18 g, Rfl: 5    ALPRAZolam (XANAX) 0.25 mg tablet, Take 1 tablet (0.25 mg total) by mouth 3 (three) times a day as needed for anxiety, Disp: 90 tablet, Rfl: 0    amLODIPine (NORVASC) 5 mg tablet, TAKE 1 TABLET (5 MG TOTAL) BY MOUTH DAILY., Disp: 90 tablet, Rfl: 1    aspirin 81 mg chewable tablet, Chew 1 tablet (81 mg total) daily, Disp: 30 tablet, Rfl: 3    cetirizine (ZyrTEC) 10 mg tablet, TAKE 1 TABLET BY MOUTH EVERY DAY, Disp: 90 tablet, Rfl: 1    Cholecalciferol (VITAMIN D3 PO), Take by mouth, Disp: , Rfl:     clopidogrel (PLAVIX) 75 mg tablet, TAKE 1 TABLET BY MOUTH EVERY DAY, Disp: 90 tablet, Rfl: 1    Diclofenac Sodium (VOLTAREN) 1 %, Apply 2 g topically 4 (four) times a day, Disp: 100 g, Rfl: 0    docusate sodium ([employer]S Stool Softener) 250 MG capsule, TAKE 1 CAPSULE BY MOUTH DAILY., Disp: 30 capsule, Rfl: 5    escitalopram (LEXAPRO) 10 mg tablet, TAKE 1 TABLET BY MOUTH EVERY DAY, Disp: 90 tablet, Rfl: 1    fluticasone-umeclidinium-vilanterol 200-62.5-25 mcg/actuation AEPB inhaler, Inhale 1 puff daily Rinse mouth after use., Disp: 3 each, Rfl: 3    HYDROcodone-acetaminophen (Norco) 5-325 mg per tablet, Take 1 tablet by mouth every 6 (six) hours as needed for pain Max Daily Amount: 4 tablets, Disp: 60 tablet, Rfl: 0    ibuprofen (MOTRIN) 200 mg tablet, Take 400 mg by mouth every 6 (six) hours as needed for mild pain, Disp: , Rfl:     lisinopril (ZESTRIL) 20 mg tablet, TAKE 1 TABLET BY MOUTH EVERY DAY, Disp: 90 tablet, Rfl: 1    methocarbamol (ROBAXIN) 500 mg tablet, Take 1 tablet (500 mg total) by mouth 2 (two) times a day For back pain radiating down the leg, Disp: 20 tablet, Rfl: 0    MINOXIDIL, TOPICAL, 5 % SOLN, Apply 1 application topically in the morning For hair, Disp: , Rfl:     montelukast (SINGULAIR) 10 mg tablet, TAKE 1 TABLET BY MOUTH EVERYDAY AT BEDTIME, Disp: 90 tablet, Rfl: 1    naproxen (NAPROSYN) 500 mg tablet, TAKE 1 TABLET BY MOUTH TWICE A DAY  WITH MEALS, Disp: 60 tablet, Rfl: 5    Omega-3 Fatty Acids (FISH OIL PO), Take 1,000 mg by mouth, Disp: , Rfl:     pantoprazole (PROTONIX) 40 mg tablet, TAKE 1 TABLET BY MOUTH TWICE A DAY, Disp: 180 tablet, Rfl: 1    rosuvastatin (CRESTOR) 5 mg tablet, Take 2 tablets (10 mg total) by mouth daily, Disp: 100 tablet, Rfl: 1    albuterol (2.5 mg/3 mL) 0.083 % nebulizer solution, Take 3 mL (2.5 mg total) by nebulization every 6 (six) hours as needed for wheezing or shortness of breath (Patient not taking: Reported on 8/26/2024), Disp: 180 mL, Rfl: 5    Current Facility-Administered Medications:     bupivacaine (PF) (MARCAINE) 0.25 % injection 1 mL, 1 mL, Intra-articular, , Christiano Gracia, DPM, 1 mL at 05/11/23 1706    triamcinolone acetonide (KENALOG-40) 40 mg/mL injection 20 mg, 20 mg, Intra-articular, , Christiano Garcia, DPM, 20 mg at 05/11/23 1706    Current Allergies     Allergies as of 11/05/2024 - Reviewed 11/05/2024   Allergen Reaction Noted    Augmentin [amoxicillin-pot clavulanate] Vomiting 11/21/2019    Miconazole Itching and Swelling 09/25/2014    Wixela inhub [fluticasone-salmeterol] Palpitations 09/25/2019            The following portions of the patient's history were reviewed and updated as appropriate: allergies, current medications, past family history, past medical history, past social history, past surgical history and problem list.     Past Medical History:   Diagnosis Date    Acid reflux     Back injury     COPD (chronic obstructive pulmonary disease) (HCC)     Fibromyalgia     Hypertension     Seasonal allergies        Past Surgical History:   Procedure Laterality Date    COLONOSCOPY      EYE SURGERY      KNEE SURGERY  1998    OK COLONOSCOPY FLX DX W/COLLJ SPEC WHEN PFRMD N/A 05/09/2017    Procedure: EGD AND COLONOSCOPY;  Surgeon: Kimberly Zapata MD;  Location: [facility];  Service: Gastroenterology       Family History   Problem Relation Age of Onset    Pancreatic cancer Mother 72    Coronary  "artery disease Father     Diabetes Father     Hypertension Father     Heart disease Father     Lung cancer Sister 37    No Known Problems Daughter     No Known Problems Maternal Grandmother     Lung cancer Maternal Grandfather 77    Diabetes Paternal Grandmother     No Known Problems Paternal Grandfather     Pancreatic cancer Brother     Pancreatic cancer Brother     No Known Problems Son     No Known Problems Son          Medications have been verified.        Objective   /74   Pulse 75   Ht 5' 3\" (1.6 m)   Wt 88.9 kg (196 lb)   LMP  (LMP Unknown)   SpO2 95%   BMI 34.72 kg/m²   No LMP recorded (lmp unknown). Patient is postmenopausal.              "

## 2024-11-14 ENCOUNTER — PATIENT MESSAGE (OUTPATIENT)
Dept: FAMILY MEDICINE CLINIC | Facility: CLINIC | Age: 69
End: 2024-11-14

## 2024-11-14 DIAGNOSIS — S46.012A ROTATOR CUFF STRAIN, LEFT, INITIAL ENCOUNTER: ICD-10-CM

## 2024-11-14 DIAGNOSIS — M79.604 RIGHT LEG PAIN: ICD-10-CM

## 2024-11-14 DIAGNOSIS — M25.561 RECURRENT PAIN OF RIGHT KNEE: ICD-10-CM

## 2024-11-15 DIAGNOSIS — M79.604 RIGHT LEG PAIN: ICD-10-CM

## 2024-11-15 RX ORDER — HYDROCODONE BITARTRATE AND ACETAMINOPHEN 5; 325 MG/1; MG/1
1 TABLET ORAL EVERY 6 HOURS PRN
Qty: 60 TABLET | Refills: 0 | Status: SHIPPED | OUTPATIENT
Start: 2024-11-15

## 2024-11-15 NOTE — TELEPHONE ENCOUNTER
1 [id number] 10/08/2024 10/08/2024 HYDROcodone BITARTRATE 5 MG ORAL TABLET/ACETAMINOPHEN 325 MG (Tablet) 60.0 15 325 MG-5 MG 20.0 AYLEEN MADDEN [facility] Medicare 0 / 0 PA    1 [id number] 09/13/2024 09/13/2024 HYDROcodone BITARTRATE 5 MG ORAL TABLET/ACETAMINOPHEN 325 MG (Tablet) 60.0 15 325 MG-5 MG 20.0 [facility] 0 / 0 PA    1 [id number] 07/15/2024 07/15/2024 ALPRAZolam (Tablet) 90.0 30 0.25 MG NA AYLEEN MADDEN [facility] Medicare 0 / 0

## 2024-11-17 RX ORDER — METHOCARBAMOL 500 MG/1
500 TABLET, FILM COATED ORAL 2 TIMES DAILY
Qty: 20 TABLET | Refills: 0 | Status: SHIPPED | OUTPATIENT
Start: 2024-11-17

## 2024-11-20 ENCOUNTER — APPOINTMENT (OUTPATIENT)
Dept: RADIOLOGY | Facility: MEDICAL CENTER | Age: 69
End: 2024-11-20
Payer: COMMERCIAL

## 2024-11-20 ENCOUNTER — OFFICE VISIT (OUTPATIENT)
Dept: FAMILY MEDICINE CLINIC | Facility: CLINIC | Age: 69
End: 2024-11-20
Payer: COMMERCIAL

## 2024-11-20 VITALS
BODY MASS INDEX: 34.94 KG/M2 | SYSTOLIC BLOOD PRESSURE: 130 MMHG | HEIGHT: 63 IN | DIASTOLIC BLOOD PRESSURE: 78 MMHG | HEART RATE: 85 BPM | OXYGEN SATURATION: 97 % | WEIGHT: 197.2 LBS

## 2024-11-20 DIAGNOSIS — G89.29 CHRONIC LEFT SHOULDER PAIN: ICD-10-CM

## 2024-11-20 DIAGNOSIS — M25.512 CHRONIC LEFT SHOULDER PAIN: ICD-10-CM

## 2024-11-20 DIAGNOSIS — M70.61 TROCHANTERIC BURSITIS OF RIGHT HIP: Primary | ICD-10-CM

## 2024-11-20 DIAGNOSIS — R22.41 LOCALIZED SWELLING OF RIGHT LOWER LEG: ICD-10-CM

## 2024-11-20 PROCEDURE — 99214 OFFICE O/P EST MOD 30 MIN: CPT | Performed by: FAMILY MEDICINE

## 2024-11-20 PROCEDURE — 73030 X-RAY EXAM OF SHOULDER: CPT

## 2024-11-20 PROCEDURE — G2211 COMPLEX E/M VISIT ADD ON: HCPCS | Performed by: FAMILY MEDICINE

## 2024-11-20 NOTE — PROGRESS NOTES
Name: Leanna Ruvalcaba      : 1955      MRN: [id number]  Encounter Provider: Matt Grider MD  Encounter Date: 2024   Encounter department: [facility]    Assessment & Plan  Trochanteric bursitis of right hip  I reviewed with patient.  Already using naproxen 5 mg twice daily.  We could try adding Voltaren gel topically 3-4 times a day for 2 to 3 weeks.  Patient to call if this is not helping.  Would consider injection if not improving  Orders:    Diclofenac Sodium (VOLTAREN) 1 %; Apply 2 g topically 4 (four) times a day    Chronic left shoulder pain  I explained to patient that she is too early to have another injection in her shoulder.  I am concerned that she could have no significant rotator cuff issues though I cannot rule out arthritis as well.  Patient refuses physical therapy due to reports.  She had in the past.  Will check x-rays.  Refer to orthopedics for second opinion.    Orders:    Ambulatory Referral to Orthopedic Surgery; Future    XR shoulder 2+ vw left; Future    Localized swelling of right lower leg  Anterior leg below the knee.  No bruising or other abnormality appreciated.  Unclear cause.  No evidence of DVT.  Recommend ice, rest and other conservative measures for now.  Recheck 1 to 2 weeks if not improving-earlier if worse            History of Present Illness     Patient here for a couple of concerns  -Patient with recurrent left shoulder pain.  She underwent injection approximately 2 months ago which was effective for approximately 4 weeks.  Now she has pain with movement again.  She would like to have another injection.  Refusing to go to physical therapy-previous experience resulted in worsening of leg pain when she was evaluated for low back with radiculopathy.  She denies any weakness in her hand or wrist.  There is no numbness as well.  -Patient has also been experiencing pain in the lateral aspect of her right hip.  This seems to have come  and gone over the last several years.  She has chronic issues with her low back and is not sure if it is related at present.  She denies any distal weakness or numbness  -Patient notes some localized tender swelling in her right lower leg as well.  It is over the shin and does not involve the calf.  Palpation as well as certain movements will make it worse.  There is no discoloration, bruising or swelling of the ankle or foot distally.  She denies any numbness.  There is no worsening knee pain.      Review of Systems   Constitutional: Negative.    Respiratory: Negative.     Cardiovascular:  Negative for chest pain and palpitations.   Gastrointestinal: Negative.    Genitourinary: Negative.    Musculoskeletal:  Positive for arthralgias, back pain, gait problem, joint swelling and myalgias.   Skin: Negative.    Neurological:  Negative for weakness and numbness.     Past Medical History:   Diagnosis Date    Acid reflux     Back injury     COPD (chronic obstructive pulmonary disease) (HCC)     Fibromyalgia     Hypertension     Seasonal allergies      Past Surgical History:   Procedure Laterality Date    COLONOSCOPY      EYE SURGERY      KNEE SURGERY  1998    MS COLONOSCOPY FLX DX W/COLLJ SPEC WHEN PFRMD N/A 05/09/2017    Procedure: EGD AND COLONOSCOPY;  Surgeon: Kimberly Zapata MD;  [facility];  Service: Gastroenterology     Family History   Problem Relation Age of Onset    Pancreatic cancer Mother 72    Coronary artery disease Father     Diabetes Father     Hypertension Father     Heart disease Father     Lung cancer Sister 37    No Known Problems Daughter     No Known Problems Maternal Grandmother     Lung cancer Maternal Grandfather 77    Diabetes Paternal Grandmother     No Known Problems Paternal Grandfather     Pancreatic cancer Brother     Pancreatic cancer Brother     No Known Problems Son     No Known Problems Son      Social History     Tobacco Use    Smoking status: Every Day     Current packs/day:  1.50     Average packs/day: 1.5 packs/day for 56.9 years (85.3 ttl pk-yrs)     Types: Cigarettes     Start date: 1968     Passive exposure: Current    Smokeless tobacco: Never   Vaping Use    Vaping status: Never Used   Substance and Sexual Activity    Alcohol use: Never    Drug use: No    Sexual activity: Not Currently     Current Outpatient Medications on File Prior to Visit   Medication Sig    acetaminophen (TYLENOL) 650 mg CR tablet Take by mouth every 8 (eight) hours as needed    albuterol (PROVENTIL HFA,VENTOLIN HFA) 90 mcg/act inhaler Inhale 2 puffs every 6 (six) hours as needed for wheezing    ALPRAZolam (XANAX) 0.25 mg tablet Take 1 tablet (0.25 mg total) by mouth 3 (three) times a day as needed for anxiety    amLODIPine (NORVASC) 5 mg tablet TAKE 1 TABLET (5 MG TOTAL) BY MOUTH DAILY.    aspirin 81 mg chewable tablet Chew 1 tablet (81 mg total) daily    cetirizine (ZyrTEC) 10 mg tablet TAKE 1 TABLET BY MOUTH EVERY DAY    Cholecalciferol (VITAMIN D3 PO) Take by mouth    clopidogrel (PLAVIX) 75 mg tablet TAKE 1 TABLET BY MOUTH EVERY DAY    docusate sodium ([employer]S Stool Softener) 250 MG capsule TAKE 1 CAPSULE BY MOUTH DAILY.    escitalopram (LEXAPRO) 10 mg tablet TAKE 1 TABLET BY MOUTH EVERY DAY    fluticasone-umeclidinium-vilanterol 200-62.5-25 mcg/actuation AEPB inhaler Inhale 1 puff daily Rinse mouth after use.    HYDROcodone-acetaminophen (Norco) 5-325 mg per tablet Take 1 tablet by mouth every 6 (six) hours as needed for pain Max Daily Amount: 4 tablets    ibuprofen (MOTRIN) 200 mg tablet Take 400 mg by mouth every 6 (six) hours as needed for mild pain    lisinopril (ZESTRIL) 20 mg tablet TAKE 1 TABLET BY MOUTH EVERY DAY    methocarbamol (ROBAXIN) 500 mg tablet Take 1 tablet (500 mg total) by mouth 2 (two) times a day For back pain radiating down the leg    MINOXIDIL, TOPICAL, 5 % SOLN Apply 1 application topically in the morning For hair    montelukast (SINGULAIR) 10 mg tablet TAKE 1 TABLET BY MOUTH  "EVERYDAY AT BEDTIME    naproxen (NAPROSYN) 500 mg tablet TAKE 1 TABLET BY MOUTH TWICE A DAY WITH MEALS    Omega-3 Fatty Acids (FISH OIL PO) Take 1,000 mg by mouth    pantoprazole (PROTONIX) 40 mg tablet TAKE 1 TABLET BY MOUTH TWICE A DAY    rosuvastatin (CRESTOR) 5 mg tablet Take 2 tablets (10 mg total) by mouth daily    albuterol (2.5 mg/3 mL) 0.083 % nebulizer solution Take 3 mL (2.5 mg total) by nebulization every 6 (six) hours as needed for wheezing or shortness of breath (Patient not taking: Reported on 8/26/2024)     Allergies   Allergen Reactions    Augmentin [Amoxicillin-Pot Clavulanate] Vomiting    Miconazole Itching and Swelling    Wixela Inhub [Fluticasone-Salmeterol] Palpitations     Immunization History   Administered Date(s) Administered    Tdap 02/28/2008     Objective   /78 (BP Location: Left arm, Patient Position: Sitting, Cuff Size: Adult)   Pulse 85   Ht 5' 3\" (1.6 m)   Wt 89.4 kg (197 lb 3.2 oz)   LMP  (LMP Unknown)   SpO2 97%   BMI 34.93 kg/m²     Physical Exam  Vitals reviewed.   Neck:      Comments: No shoulder pain with movement  Cardiovascular:      Rate and Rhythm: Normal rate and regular rhythm.      Pulses: Normal pulses.   Pulmonary:      Effort: Pulmonary effort is normal.      Breath sounds: Normal breath sounds.   Musculoskeletal:         General: Swelling, tenderness and deformity present.      Left shoulder: Effusion (?mild subacromial) and tenderness (anterior/superiorhumeral head and AC joint) present. No swelling or deformity. Decreased range of motion (anterior flexion, abduction, internal rotation). Normal strength. Normal pulse.      Cervical back: Normal range of motion. No tenderness.      Right lower leg: Tenderness (R  trochanteric bursa. Also at sl swollen area of superior shin. No dicoloration or calf pain.  Neg katerina) present. No deformity. No edema.      Left lower leg: No edema.   Lymphadenopathy:      Cervical: No cervical adenopathy.   Skin:     General: " Skin is warm.      Capillary Refill: Capillary refill takes less than 2 seconds.   Neurological:      Sensory: No sensory deficit.      Motor: No weakness.      Gait: Gait abnormal (mildly antalgic appearing gait.).

## 2024-11-25 ENCOUNTER — RESULTS FOLLOW-UP (OUTPATIENT)
Dept: FAMILY MEDICINE CLINIC | Facility: CLINIC | Age: 69
End: 2024-11-25

## 2024-12-11 ENCOUNTER — OFFICE VISIT (OUTPATIENT)
Dept: OBGYN CLINIC | Facility: MEDICAL CENTER | Age: 69
End: 2024-12-11
Payer: COMMERCIAL

## 2024-12-11 VITALS
HEIGHT: 63 IN | SYSTOLIC BLOOD PRESSURE: 185 MMHG | BODY MASS INDEX: 34.91 KG/M2 | HEART RATE: 84 BPM | DIASTOLIC BLOOD PRESSURE: 80 MMHG | WEIGHT: 197 LBS

## 2024-12-11 DIAGNOSIS — G89.29 CHRONIC LEFT SHOULDER PAIN: Primary | ICD-10-CM

## 2024-12-11 DIAGNOSIS — M25.512 CHRONIC LEFT SHOULDER PAIN: Primary | ICD-10-CM

## 2024-12-11 PROCEDURE — 99204 OFFICE O/P NEW MOD 45 MIN: CPT | Performed by: PHYSICAL MEDICINE & REHABILITATION

## 2024-12-11 PROCEDURE — 20610 DRAIN/INJ JOINT/BURSA W/O US: CPT | Performed by: PHYSICAL MEDICINE & REHABILITATION

## 2024-12-11 RX ORDER — ROPIVACAINE HYDROCHLORIDE 5 MG/ML
10 INJECTION, SOLUTION EPIDURAL; INFILTRATION; PERINEURAL
Status: COMPLETED | OUTPATIENT
Start: 2024-12-11 | End: 2024-12-11

## 2024-12-11 RX ORDER — TRIAMCINOLONE ACETONIDE 40 MG/ML
80 INJECTION, SUSPENSION INTRA-ARTICULAR; INTRAMUSCULAR
Status: COMPLETED | OUTPATIENT
Start: 2024-12-11 | End: 2024-12-11

## 2024-12-11 RX ADMIN — ROPIVACAINE HYDROCHLORIDE 10 ML: 5 INJECTION, SOLUTION EPIDURAL; INFILTRATION; PERINEURAL at 15:00

## 2024-12-11 RX ADMIN — TRIAMCINOLONE ACETONIDE 80 MG: 40 INJECTION, SUSPENSION INTRA-ARTICULAR; INTRAMUSCULAR at 15:00

## 2024-12-11 NOTE — PROGRESS NOTES
1. Chronic left shoulder pain      Orders Placed This Encounter   Procedures    Large joint arthrocentesis: L subacromial bursa     No orders of the defined types were placed in this encounter.      Impression:  Left shoulder pain likely secondary to rotator cuff arthropathy and mild glenohumeral joint osteoarthritis.  Leanna previously had a subacromial space steroid injection that helped for about 4-5 weeks.  We discussed different treatment options and decided to proceed with a repeat subacromial injection.  We will have Leanna return in about 5-6 weeks if she is still symptomatic.  If so, would consider ultrasound-guided glenohumeral joint (most likely) versus bicep tendon injection..    Imaging Studies (I personally reviewed images in PACS and report):  Left shoulder x-rays most recent to this encounter reviewed.  These images show any focus of calcification near the greater tuberosity.  There is mild glenohumeral and AC joint osteoarthritis.  The humeral head is high riding which could represent a chronic and complete rotator cuff tear.    No follow-ups on file.    Patient is in agreement with the above plan.    HPI:  Leanna Ruvalcaba is a 68 y.o. female  who presents for evaluation of   Chief Complaint   Patient presents with    Left Shoulder - Pain     Pt presents with complaints of chronic left shoulder pain and decreased ROM- no known fall/injury.        Onset/Mechanism: Chronic pain for the past few months without an injury.  Location: Through the whole shoulder.  Radiation: Denies.  Provocative: Sleeping on that side and lifting the arm.  Severity: Painful.  Associated Symptoms: Denies.  Treatment so far: Subacromial steroid injection with Dr Grider.    Following history reviewed and updated:  Past Medical History:   Diagnosis Date    Acid reflux     Back injury     COPD (chronic obstructive pulmonary disease) (HCC)     Fibromyalgia     Hypertension     Seasonal allergies      Past Surgical History:  "  Procedure Laterality Date    COLONOSCOPY      EYE SURGERY      KNEE SURGERY  1998    [place] COLONOSCOPY FLX DX W/COLLJ SPEC WHEN PFRMD N/A 05/09/2017    Procedure: EGD AND COLONOSCOPY;  Surgeon: Kimberly Zapata MD;  Location: [facility];  Service: Gastroenterology     Social History   Social History     Substance and Sexual Activity   Alcohol Use Never     Social History     Substance and Sexual Activity   Drug Use No     Social History     Tobacco Use   Smoking Status Every Day    Current packs/day: 1.50    Average packs/day: 1.5 packs/day for 56.9 years (85.4 ttl pk-yrs)    Types: Cigarettes    Start date: 1968    Passive exposure: Current   Smokeless Tobacco Never     Family History   Problem Relation Age of Onset    Pancreatic cancer Mother 72    Coronary artery disease Father     Diabetes Father     Hypertension Father     Heart disease Father     Lung cancer Sister 37    No Known Problems Daughter     No Known Problems Maternal Grandmother     Lung cancer Maternal Grandfather 77    Diabetes Paternal Grandmother     No Known Problems Paternal Grandfather     Pancreatic cancer Brother     Pancreatic cancer Brother     No Known Problems Son     No Known Problems Son      Allergies   Allergen Reactions    Augmentin [Amoxicillin-Pot Clavulanate] Vomiting    Miconazole Itching and Swelling    Wixela Inhub [Fluticasone-Salmeterol] Palpitations        Constitutional:  BP (!) 185/80   Pulse 84   Ht 5' 3\" (1.6 m)   Wt 89.4 kg (197 lb)   LMP  (LMP Unknown)   BMI 34.90 kg/m²    General: NAD.  Eyes: Anicteric sclerae.  Neck: Supple.  Lungs: Unlabored breathing.  Cardiovascular: No lower extremity edema.  Skin: Intact without erythema.  Neurologic: Sensation intact to light touch.  Psychiatric: Mood and affect are appropriate.    Left Shoulder Exam     Tenderness   The patient is experiencing tenderness in the acromion and biceps tendon.    Range of Motion   Active abduction:  120 abnormal   External rotation:  70 abnormal "   Forward flexion:  130     Tests   Apprehension: positive  Castillo test: positive  Impingement: positive  Sulcus: absent    Other   Erythema: absent  Scars: absent  Sensation: normal  Pulse: present              Large joint arthrocentesis: L subacromial bursa  [place] Protocol:  procedure performed by consultantConsent: Verbal consent obtained. Written consent not obtained.  Risks and benefits: risks, benefits and alternatives were discussed  Consent given by: patient  Timeout called at: 12/11/2024 3:01 PM.  Patient understanding: patient states understanding of the procedure being performed  Site marked: the operative site was marked  Patient identity confirmed: verbally with patient  Supporting Documentation  Indications: pain   Procedure Details  Location: shoulder - L subacromial bursa  Needle gauge: 21G 2''  Ultrasound guidance: no  Approach: posterolateral  Medications administered: 80 mg triamcinolone acetonide 40 mg/mL; 10 mL ropivacaine 0.5 %    Patient tolerance: patient tolerated the procedure well with no immediate complications  Dressing:  Sterile dressing applied    There was little to no resistance encountered during the injection.    Risks of this procedure include:    - Risk of bleeding since a needle is involved.  - Risk of infection (1/10,000 chance as per recent studies).  Signs/symptoms were discussed and they would prompt an urgent evaluation at an emergency department.  - Risk of pigmentation or skin dimpling in the skin (2-3% chance as per recent studies) from the steroid.  - Risk of increased pain from steroid flare (1% chance as per recent studies) that typically lasts 24-48 hours.  - Risk of increased blood sugars from the steroid medication that can last for a few weeks.  If the patient is a diabetic or pre-diabetic, they were encouraged to closely monitor their blood sugars and discuss with PCP if elevated more than usual or if having symptoms.    The benefits outweigh the risks and  so the procedure was completed.

## 2024-12-12 DIAGNOSIS — M79.604 RIGHT LEG PAIN: ICD-10-CM

## 2024-12-13 ENCOUNTER — HOSPITAL ENCOUNTER (OUTPATIENT)
Dept: RADIOLOGY | Facility: MEDICAL CENTER | Age: 69
Discharge: HOME | End: 2024-12-13
Payer: COMMERCIAL

## 2024-12-13 VITALS — WEIGHT: 197 LBS | BODY MASS INDEX: 34.91 KG/M2 | HEIGHT: 63 IN

## 2024-12-13 DIAGNOSIS — Z12.31 SCREENING MAMMOGRAM FOR BREAST CANCER: ICD-10-CM

## 2024-12-13 DIAGNOSIS — Z12.31 ENCOUNTER FOR SCREENING MAMMOGRAM FOR MALIGNANT NEOPLASM OF BREAST: ICD-10-CM

## 2024-12-13 PROCEDURE — 77067 SCR MAMMO BI INCL CAD: CPT

## 2024-12-13 PROCEDURE — 77063 BREAST TOMOSYNTHESIS BI: CPT

## 2024-12-13 RX ORDER — METHOCARBAMOL 500 MG/1
500 TABLET, FILM COATED ORAL 2 TIMES DAILY
Qty: 20 TABLET | Refills: 0 | Status: SHIPPED | OUTPATIENT
Start: 2024-12-13

## 2024-12-15 ENCOUNTER — RESULTS FOLLOW-UP (OUTPATIENT)
Dept: FAMILY MEDICINE CLINIC | Facility: CLINIC | Age: 69
End: 2024-12-15

## 2024-12-19 NOTE — TELEPHONE ENCOUNTER
Please complete your  blood work in 8 weeks (mid February)  Monitor your blood pressure and heart rate and keep a diary of your readings   Continue heart healthy diet by limiting sodium, saturated fat and added sugars.  Stay active and hydrated  Bring complete list of medications to your follow-up appointment.   Please call the office if you have any questions    We do not have any Incruse samples, pt aware and she will ck w/ her rx plan for lower cost alternatives  In meantime she wanted you to ck if you have samples of anything else we can give in place of Incruse  We have Asmanex, Tudorza & Spiriva Respimat -Please advise

## 2024-12-27 DIAGNOSIS — E78.2 MIXED HYPERLIPIDEMIA: ICD-10-CM

## 2024-12-27 DIAGNOSIS — J42 CHRONIC BRONCHITIS, UNSPECIFIED CHRONIC BRONCHITIS TYPE (HCC): ICD-10-CM

## 2024-12-27 RX ORDER — MONTELUKAST SODIUM 10 MG/1
10 TABLET ORAL
Qty: 90 TABLET | Refills: 1 | Status: SHIPPED | OUTPATIENT
Start: 2024-12-27

## 2024-12-27 RX ORDER — ROSUVASTATIN CALCIUM 5 MG/1
5 TABLET, COATED ORAL DAILY
Qty: 100 TABLET | Refills: 1 | Status: SHIPPED | OUTPATIENT
Start: 2024-12-27

## 2025-01-10 ENCOUNTER — TELEPHONE (OUTPATIENT)
Dept: SURGICAL ONCOLOGY | Facility: CLINIC | Age: 70
End: 2025-01-10

## 2025-01-10 DIAGNOSIS — D49.0 IPMN (INTRADUCTAL PAPILLARY MUCINOUS NEOPLASM): Primary | ICD-10-CM

## 2025-01-10 NOTE — TELEPHONE ENCOUNTER
Called patient and scheduled MRI on 2/10/25 and rescheduled follow up with Dr. Lau to 2/18/25 at [place]. Patient will be going today to have blood work done prior to MRI.

## 2025-01-13 ENCOUNTER — APPOINTMENT (OUTPATIENT)
Dept: LAB | Facility: MEDICAL CENTER | Age: 70
End: 2025-01-13
Payer: COMMERCIAL

## 2025-01-13 DIAGNOSIS — D49.0 IPMN (INTRADUCTAL PAPILLARY MUCINOUS NEOPLASM): ICD-10-CM

## 2025-01-13 LAB
BUN SERPL-MCNC: 13 MG/DL (ref 5–25)
CREAT SERPL-MCNC: 0.53 MG/DL (ref 0.6–1.3)
GFR SERPL CREATININE-BSD FRML MDRD: 97 ML/MIN/1.73SQ M

## 2025-01-13 PROCEDURE — 36415 COLL VENOUS BLD VENIPUNCTURE: CPT

## 2025-01-13 PROCEDURE — 82565 ASSAY OF CREATININE: CPT

## 2025-01-13 PROCEDURE — 84520 ASSAY OF UREA NITROGEN: CPT

## 2025-01-23 ENCOUNTER — OFFICE VISIT (OUTPATIENT)
Dept: FAMILY MEDICINE CLINIC | Facility: CLINIC | Age: 70
End: 2025-01-23
Payer: COMMERCIAL

## 2025-01-23 VITALS
OXYGEN SATURATION: 98 % | WEIGHT: 196.3 LBS | SYSTOLIC BLOOD PRESSURE: 110 MMHG | HEIGHT: 63 IN | DIASTOLIC BLOOD PRESSURE: 70 MMHG | HEART RATE: 71 BPM | BODY MASS INDEX: 34.78 KG/M2 | TEMPERATURE: 97.2 F

## 2025-01-23 DIAGNOSIS — J01.40 ACUTE NON-RECURRENT PANSINUSITIS: Primary | ICD-10-CM

## 2025-01-23 DIAGNOSIS — D49.0 IPMN (INTRADUCTAL PAPILLARY MUCINOUS NEOPLASM): ICD-10-CM

## 2025-01-23 DIAGNOSIS — I65.22 CAROTID ARTERY STENOSIS, SYMPTOMATIC, LEFT: ICD-10-CM

## 2025-01-23 DIAGNOSIS — I10 PRIMARY HYPERTENSION: ICD-10-CM

## 2025-01-23 DIAGNOSIS — K59.00 CONSTIPATION, UNSPECIFIED CONSTIPATION TYPE: ICD-10-CM

## 2025-01-23 PROCEDURE — 99214 OFFICE O/P EST MOD 30 MIN: CPT | Performed by: FAMILY MEDICINE

## 2025-01-23 RX ORDER — CEFDINIR 300 MG/1
300 CAPSULE ORAL EVERY 12 HOURS SCHEDULED
Qty: 14 CAPSULE | Refills: 0 | Status: SHIPPED | OUTPATIENT
Start: 2025-01-23 | End: 2025-01-30

## 2025-01-23 RX ORDER — POLYETHYLENE GLYCOL 3350 17 G/17G
17 POWDER, FOR SOLUTION ORAL DAILY
Qty: 510 G | Refills: 2 | Status: SHIPPED | OUTPATIENT
Start: 2025-01-23

## 2025-01-23 NOTE — PROGRESS NOTES
Name: Leanna Ruvalcaba      : 1955      MRN: [id number]  Encounter Provider: Matt Grider MD  Encounter Date: 2025   Encounter department: [facility]    Assessment & Plan  Acute non-recurrent pansinusitis  I reviewed with pt. Start cefdinir 300mg bid x 7d. Continue conservative measures. Recheck 1w if not imrpoved  Orders:  •  cefdinir (OMNICEF) 300 mg capsule; Take 1 capsule (300 mg total) by mouth every 12 (twelve) hours for 7 days    Constipation, unspecified constipation type  I reviewed with pt. REC: daily bowel regiment to prevent constipation rather than treating prn.  Start Miralax as directed qd. Increase fiber in diet. Recheck 2-3w if not improving  Orders:  •  polyethylene glycol (MIRALAX) 17 g packet; Take 17 g by mouth daily  •  TSH, 3rd generation with Free T4 reflex; Future    Primary hypertension  Well controlled. Cont present treatment. Monitor labs. Recheck 6m    Orders:  •  Comprehensive metabolic panel; Future  •  CBC and differential; Future  •  Lipid panel; Future    IPMN (intraductal papillary mucinous neoplasm)  For repeat MRI.  F/u with GI       Carotid artery stenosis, symptomatic, left  No recurrent symptoms since last visit.  I reviewed with pt. She is not interested in CEA at present. Pt undestands risks.  Strongly recommend smoking cessation.  Continue present meds. Recheck 3m            History of Present Illness     f/u multiple med issues  - pt has been fighting off mild URI symptoms for the last 2 weeks.  This recently evolved into right ear pain, some sinus pressure and cough.  Cough is productive but patient denies any shortness of breath.  She is still smoking.  - pt notes some swelling of her right MCPs, volar primarily.  Can result in pain with flexion and difficulty with extension.  Symptoms are going off for last 2 to 4 weeks, and can occur anytime during the day.  Does not seem to be worse in the morning.  - right knee and left  shoulder are doing much better since injections at orthopedics.  Patient had viscosupplementation in the right knee as well as steroids in the left shoulder.  She has been doing well since.  - pt scheduled for MRI of the pancreas on February 10.  She has a follow-up appointment with GI on February 18.   - pt denies CP, palpitations, lightheadedness or other CV symptoms with or without exertion  - pt has not had any recurrence of her aphasia.  Patient still on Plavix.  She did not have her carotid endarterectomy done yet.  - had been having issues with constipation that resolved today.       Review of Systems   Constitutional:  Positive for activity change and fatigue. Negative for appetite change, chills and fever.   HENT:  Positive for congestion, sinus pressure, sinus pain and sore throat. Negative for ear discharge and ear pain.    Eyes: Negative.    Respiratory:  Positive for cough and shortness of breath. Negative for wheezing.    Cardiovascular: Negative.    Gastrointestinal:  Positive for abdominal pain and nausea (occasional). Negative for abdominal distention, constipation, diarrhea and vomiting.   Genitourinary: Negative.    Musculoskeletal:  Positive for arthralgias, back pain and myalgias.   Skin: Negative.    Neurological:  Positive for numbness. Negative for dizziness, weakness, light-headedness and headaches.   Psychiatric/Behavioral: Negative.       Past Medical History:   Diagnosis Date   • Acid reflux    • Back injury    • COPD (chronic obstructive pulmonary disease) (HCC)    • Fibromyalgia    • Hypertension    • Seasonal allergies      Past Surgical History:   Procedure Laterality Date   • COLONOSCOPY     • EYE SURGERY     • KNEE SURGERY  1998   • [place] COLONOSCOPY FLX DX W/COLLJ SPEC WHEN PFRMD N/A 05/09/2017    Procedure: EGD AND COLONOSCOPY;  Surgeon: Kimberly Zapata MD;  Location: [facility];  Service: Gastroenterology     Family History   Problem Relation Age of Onset   • Pancreatic cancer Mother  72   • Coronary artery disease Father    • Diabetes Father    • Hypertension Father    • Heart disease Father    • Lung cancer Sister 37   • No Known Problems Daughter    • No Known Problems Maternal Grandmother    • Lung cancer Maternal Grandfather 77   • Diabetes Paternal Grandmother    • No Known Problems Paternal Grandfather    • Pancreatic cancer Brother    • Pancreatic cancer Brother    • No Known Problems Son    • No Known Problems Son      Social History     Tobacco Use   • Smoking status: Every Day     Current packs/day: 1.50     Average packs/day: 1.5 packs/day for 57.1 years (85.6 ttl pk-yrs)     Types: Cigarettes     Start date: 1968     Passive exposure: Current   • Smokeless tobacco: Never   Vaping Use   • Vaping status: Never Used   Substance and Sexual Activity   • Alcohol use: Never   • Drug use: No   • Sexual activity: Not Currently     Current Outpatient Medications on File Prior to Visit   Medication Sig   • acetaminophen (TYLENOL) 650 mg CR tablet Take by mouth every 8 (eight) hours as needed   • albuterol (PROVENTIL HFA,VENTOLIN HFA) 90 mcg/act inhaler Inhale 2 puffs every 6 (six) hours as needed for wheezing   • ALPRAZolam (XANAX) 0.25 mg tablet Take 1 tablet (0.25 mg total) by mouth 3 (three) times a day as needed for anxiety   • amLODIPine (NORVASC) 5 mg tablet TAKE 1 TABLET (5 MG TOTAL) BY MOUTH DAILY.   • aspirin 81 mg chewable tablet Chew 1 tablet (81 mg total) daily   • cetirizine (ZyrTEC) 10 mg tablet TAKE 1 TABLET BY MOUTH EVERY DAY   • Cholecalciferol (VITAMIN D3 PO) Take by mouth   • clopidogrel (PLAVIX) 75 mg tablet TAKE 1 TABLET BY MOUTH EVERY DAY   • Diclofenac Sodium (VOLTAREN) 1 % Apply 2 g topically 4 (four) times a day   • escitalopram (LEXAPRO) 10 mg tablet TAKE 1 TABLET BY MOUTH EVERY DAY   • fluticasone-umeclidinium-vilanterol 200-62.5-25 mcg/actuation AEPB inhaler Inhale 1 puff daily Rinse mouth after use.   • HYDROcodone-acetaminophen (Norco) 5-325 mg per tablet Take 1  "tablet by mouth every 6 (six) hours as needed for pain Max Daily Amount: 4 tablets   • ibuprofen (MOTRIN) 200 mg tablet Take 400 mg by mouth every 6 (six) hours as needed for mild pain   • lisinopril (ZESTRIL) 20 mg tablet TAKE 1 TABLET BY MOUTH EVERY DAY   • methocarbamol (ROBAXIN) 500 mg tablet Take 1 tablet (500 mg total) by mouth 2 (two) times a day For back pain radiating down the leg   • MINOXIDIL, TOPICAL, 5 % SOLN Apply 1 application topically in the morning For hair   • montelukast (SINGULAIR) 10 mg tablet TAKE 1 TABLET BY MOUTH EVERYDAY AT BEDTIME   • naproxen (NAPROSYN) 500 mg tablet TAKE 1 TABLET BY MOUTH TWICE A DAY WITH MEALS   • Omega-3 Fatty Acids (FISH OIL PO) Take 1,000 mg by mouth   • pantoprazole (PROTONIX) 40 mg tablet TAKE 1 TABLET BY MOUTH TWICE A DAY   • rosuvastatin (CRESTOR) 5 mg tablet TAKE 1 TABLET (5 MG TOTAL) BY MOUTH DAILY.     Allergies   Allergen Reactions   • Augmentin [Amoxicillin-Pot Clavulanate] Vomiting   • Miconazole Itching and Swelling   • Wixela Inhub [Fluticasone-Salmeterol] Palpitations     Immunization History   Administered Date(s) Administered   • Tdap 02/28/2008     Objective   /70   Pulse 71   Temp (!) 97.2 °F (36.2 °C)   Ht 5' 3\" (1.6 m)   Wt 89 kg (196 lb 4.8 oz)   LMP  (LMP Unknown)   SpO2 98%   BMI 34.77 kg/m²     Physical Exam  Vitals reviewed.   HENT:      Head: Normocephalic.      Right Ear: Tympanic membrane, ear canal and external ear normal.      Left Ear: Tympanic membrane, ear canal and external ear normal.      Nose: Congestion present.      Comments: Sinuses TTP     Mouth/Throat:      Mouth: Mucous membranes are moist.   Eyes:      Extraocular Movements: Extraocular movements intact.      Conjunctiva/sclera: Conjunctivae normal.      Pupils: Pupils are equal, round, and reactive to light.   Cardiovascular:      Rate and Rhythm: Normal rate and regular rhythm.   Pulmonary:      Effort: Pulmonary effort is normal.   Abdominal:      General: " There is no distension.      Palpations: There is no mass.      Tenderness: There is no abdominal tenderness.   Musculoskeletal:         General: Deformity present.      Cervical back: No tenderness.      Right lower leg: Edema present.      Left lower leg: Edema present.   Lymphadenopathy:      Cervical: No cervical adenopathy.   Skin:     General: Skin is warm.   Neurological:      General: No focal deficit present.      Mental Status: She is alert and oriented to person, place, and time.

## 2025-01-23 NOTE — ASSESSMENT & PLAN NOTE
Well controlled. Cont present treatment. Monitor labs. Recheck 6m    Orders:  •  Comprehensive metabolic panel; Future  •  CBC and differential; Future  •  Lipid panel; Future

## 2025-01-24 ENCOUNTER — TELEPHONE (OUTPATIENT)
Age: 70
End: 2025-01-24

## 2025-01-24 NOTE — ASSESSMENT & PLAN NOTE
No recurrent symptoms since last visit.  I reviewed with pt. She is not interested in CEA at present. Pt undestands risks.  Strongly recommend smoking cessation.  Continue present meds. Recheck 3m

## 2025-01-24 NOTE — TELEPHONE ENCOUNTER
Pt calling.  She wants to know if she can start taking Hair skin and nail vitamins.  The active ingredient is Biotin 6,000mg. She wants to make sure this is safe with her other medications. Please advise.

## 2025-01-29 DIAGNOSIS — I10 PRIMARY HYPERTENSION: ICD-10-CM

## 2025-01-29 DIAGNOSIS — I10 ESSENTIAL HYPERTENSION: ICD-10-CM

## 2025-01-29 DIAGNOSIS — K21.9 GASTROESOPHAGEAL REFLUX DISEASE: ICD-10-CM

## 2025-01-29 RX ORDER — AMLODIPINE BESYLATE 5 MG/1
5 TABLET ORAL DAILY
Qty: 90 TABLET | Refills: 1 | Status: SHIPPED | OUTPATIENT
Start: 2025-01-29

## 2025-01-29 RX ORDER — PANTOPRAZOLE SODIUM 40 MG/1
40 TABLET, DELAYED RELEASE ORAL 2 TIMES DAILY
Qty: 180 TABLET | Refills: 1 | Status: SHIPPED | OUTPATIENT
Start: 2025-01-29

## 2025-01-29 RX ORDER — LISINOPRIL 20 MG/1
20 TABLET ORAL DAILY
Qty: 90 TABLET | Refills: 1 | Status: SHIPPED | OUTPATIENT
Start: 2025-01-29

## 2025-02-05 ENCOUNTER — APPOINTMENT (OUTPATIENT)
Dept: LAB | Facility: MEDICAL CENTER | Age: 70
End: 2025-02-05
Payer: COMMERCIAL

## 2025-02-05 DIAGNOSIS — I10 PRIMARY HYPERTENSION: ICD-10-CM

## 2025-02-05 DIAGNOSIS — K59.00 CONSTIPATION, UNSPECIFIED CONSTIPATION TYPE: ICD-10-CM

## 2025-02-05 LAB
ALBUMIN SERPL BCG-MCNC: 4.4 G/DL (ref 3.5–5)
ALP SERPL-CCNC: 69 U/L (ref 34–104)
ALT SERPL W P-5'-P-CCNC: 20 U/L (ref 7–52)
ANION GAP SERPL CALCULATED.3IONS-SCNC: 12 MMOL/L (ref 4–13)
AST SERPL W P-5'-P-CCNC: 18 U/L (ref 13–39)
BASOPHILS # BLD AUTO: 0.07 THOUSANDS/ΜL (ref 0–0.1)
BASOPHILS NFR BLD AUTO: 1 % (ref 0–1)
BILIRUB SERPL-MCNC: 0.39 MG/DL (ref 0.2–1)
BUN SERPL-MCNC: 10 MG/DL (ref 5–25)
CALCIUM SERPL-MCNC: 9.8 MG/DL (ref 8.4–10.2)
CHLORIDE SERPL-SCNC: 100 MMOL/L (ref 96–108)
CHOLEST SERPL-MCNC: 181 MG/DL (ref ?–200)
CO2 SERPL-SCNC: 28 MMOL/L (ref 21–32)
CREAT SERPL-MCNC: 0.54 MG/DL (ref 0.6–1.3)
EOSINOPHIL # BLD AUTO: 0.12 THOUSAND/ΜL (ref 0–0.61)
EOSINOPHIL NFR BLD AUTO: 1 % (ref 0–6)
ERYTHROCYTE [DISTWIDTH] IN BLOOD BY AUTOMATED COUNT: 13.5 % (ref 11.6–15.1)
GFR SERPL CREATININE-BSD FRML MDRD: 96 ML/MIN/1.73SQ M
GLUCOSE P FAST SERPL-MCNC: 104 MG/DL (ref 65–99)
HCT VFR BLD AUTO: 46 % (ref 34.8–46.1)
HDLC SERPL-MCNC: 70 MG/DL
HGB BLD-MCNC: 14.9 G/DL (ref 11.5–15.4)
IMM GRANULOCYTES # BLD AUTO: 0.06 THOUSAND/UL (ref 0–0.2)
IMM GRANULOCYTES NFR BLD AUTO: 1 % (ref 0–2)
LDLC SERPL CALC-MCNC: 84 MG/DL (ref 0–100)
LYMPHOCYTES # BLD AUTO: 1.9 THOUSANDS/ΜL (ref 0.6–4.47)
LYMPHOCYTES NFR BLD AUTO: 20 % (ref 14–44)
MCH RBC QN AUTO: 28.8 PG (ref 26.8–34.3)
MCHC RBC AUTO-ENTMCNC: 32.4 G/DL (ref 31.4–37.4)
MCV RBC AUTO: 89 FL (ref 82–98)
MONOCYTES # BLD AUTO: 0.86 THOUSAND/ΜL (ref 0.17–1.22)
MONOCYTES NFR BLD AUTO: 9 % (ref 4–12)
NEUTROPHILS # BLD AUTO: 6.54 THOUSANDS/ΜL (ref 1.85–7.62)
NEUTS SEG NFR BLD AUTO: 68 % (ref 43–75)
NONHDLC SERPL-MCNC: 111 MG/DL
NRBC BLD AUTO-RTO: 0 /100 WBCS
PLATELET # BLD AUTO: 285 THOUSANDS/UL (ref 149–390)
PMV BLD AUTO: 9.6 FL (ref 8.9–12.7)
POTASSIUM SERPL-SCNC: 4.5 MMOL/L (ref 3.5–5.3)
PROT SERPL-MCNC: 6.9 G/DL (ref 6.4–8.4)
RBC # BLD AUTO: 5.18 MILLION/UL (ref 3.81–5.12)
SODIUM SERPL-SCNC: 140 MMOL/L (ref 135–147)
TRIGL SERPL-MCNC: 134 MG/DL (ref ?–150)
TSH SERPL DL<=0.05 MIU/L-ACNC: 3.5 UIU/ML (ref 0.45–4.5)
WBC # BLD AUTO: 9.55 THOUSAND/UL (ref 4.31–10.16)

## 2025-02-05 PROCEDURE — 36415 COLL VENOUS BLD VENIPUNCTURE: CPT

## 2025-02-05 PROCEDURE — 80061 LIPID PANEL: CPT

## 2025-02-05 PROCEDURE — 80053 COMPREHEN METABOLIC PANEL: CPT

## 2025-02-05 PROCEDURE — 84443 ASSAY THYROID STIM HORMONE: CPT

## 2025-02-05 PROCEDURE — 85025 COMPLETE CBC W/AUTO DIFF WBC: CPT

## 2025-02-06 ENCOUNTER — RESULTS FOLLOW-UP (OUTPATIENT)
Dept: FAMILY MEDICINE CLINIC | Facility: CLINIC | Age: 70
End: 2025-02-06

## 2025-02-10 ENCOUNTER — HOSPITAL ENCOUNTER (OUTPATIENT)
Dept: MRI IMAGING | Facility: HOSPITAL | Age: 70
Discharge: HOME | End: 2025-02-10
Attending: STUDENT IN AN ORGANIZED HEALTH CARE EDUCATION/TRAINING PROGRAM
Payer: COMMERCIAL

## 2025-02-10 DIAGNOSIS — D49.0 IPMN (INTRADUCTAL PAPILLARY MUCINOUS NEOPLASM): ICD-10-CM

## 2025-02-10 PROCEDURE — 74183 MRI ABD W/O CNTR FLWD CNTR: CPT

## 2025-02-10 PROCEDURE — A9585 GADOBUTROL INJECTION: HCPCS | Performed by: STUDENT IN AN ORGANIZED HEALTH CARE EDUCATION/TRAINING PROGRAM

## 2025-02-10 RX ORDER — GADOBUTROL 604.72 MG/ML
8 INJECTION INTRAVENOUS
Status: COMPLETED | OUTPATIENT
Start: 2025-02-10 | End: 2025-02-10

## 2025-02-10 RX ADMIN — GADOBUTROL 8 ML: 604.72 INJECTION INTRAVENOUS at 14:23

## 2025-02-11 ENCOUNTER — OFFICE VISIT (OUTPATIENT)
Dept: OBGYN CLINIC | Facility: CLINIC | Age: 70
End: 2025-02-11
Payer: COMMERCIAL

## 2025-02-11 VITALS — HEIGHT: 63 IN | WEIGHT: 199 LBS | BODY MASS INDEX: 35.26 KG/M2

## 2025-02-11 DIAGNOSIS — M25.461 EFFUSION OF RIGHT KNEE: ICD-10-CM

## 2025-02-11 DIAGNOSIS — M17.11 PRIMARY OSTEOARTHRITIS OF RIGHT KNEE: Primary | ICD-10-CM

## 2025-02-11 PROCEDURE — 99213 OFFICE O/P EST LOW 20 MIN: CPT | Performed by: ORTHOPAEDIC SURGERY

## 2025-02-11 PROCEDURE — 20610 DRAIN/INJ JOINT/BURSA W/O US: CPT | Performed by: ORTHOPAEDIC SURGERY

## 2025-02-11 RX ORDER — BUPIVACAINE HYDROCHLORIDE 2.5 MG/ML
2 INJECTION, SOLUTION INFILTRATION; PERINEURAL
Status: COMPLETED | OUTPATIENT
Start: 2025-02-11 | End: 2025-02-11

## 2025-02-11 RX ORDER — METHYLPREDNISOLONE ACETATE 40 MG/ML
2 INJECTION, SUSPENSION INTRA-ARTICULAR; INTRALESIONAL; INTRAMUSCULAR; SOFT TISSUE
Status: COMPLETED | OUTPATIENT
Start: 2025-02-11 | End: 2025-02-11

## 2025-02-11 RX ORDER — LIDOCAINE HYDROCHLORIDE 10 MG/ML
8 INJECTION, SOLUTION INFILTRATION; PERINEURAL
Status: COMPLETED | OUTPATIENT
Start: 2025-02-11 | End: 2025-02-11

## 2025-02-11 RX ADMIN — BUPIVACAINE HYDROCHLORIDE 2 ML: 2.5 INJECTION, SOLUTION INFILTRATION; PERINEURAL at 10:00

## 2025-02-11 RX ADMIN — METHYLPREDNISOLONE ACETATE 2 ML: 40 INJECTION, SUSPENSION INTRA-ARTICULAR; INTRALESIONAL; INTRAMUSCULAR; SOFT TISSUE at 10:00

## 2025-02-11 RX ADMIN — LIDOCAINE HYDROCHLORIDE 8 ML: 10 INJECTION, SOLUTION INFILTRATION; PERINEURAL at 10:00

## 2025-02-11 NOTE — ASSESSMENT & PLAN NOTE
Discussed conservative treatment and surgical intervention with patient at length  Weight bearing as tolerated right lower  Offered patient cortisone injection and knee aspiration for the right knee. Patient accepted and tolerated well.   Discussed that cortisone injections can be repeated every 3 months as needed   Over the counter analgesics as needed / directed   Ice / heat as directed   Follow up 3 months or PRN

## 2025-02-11 NOTE — PROGRESS NOTES
Name: Leanna Ruvalcaba      : 1955       MRN: [id number]   Encounter Provider: Erasmo Leach MD   Encounter Date: 25  Encounter department: [facility] [place]         Assessment & Plan  Primary osteoarthritis of right knee  Discussed conservative treatment and surgical intervention with patient at length  Weight bearing as tolerated right lower  Offered patient cortisone injection and knee aspiration for the right knee. Patient accepted and tolerated well.   Discussed that cortisone injections can be repeated every 3 months as needed   Over the counter analgesics as needed / directed   Ice / heat as directed   Follow up 3 months or PRN           Effusion of right knee  See OA of right knee for plan            To Do Next Visit:  Evaluate right knee pain     _____________________________________________________  CHIEF COMPLAINT:  Chief Complaint   Patient presents with    Right Knee - Follow-up, Swelling     Patient states the knee was doing great, but it has started to bother her again. She takes Tylenol PRN for the pain with some relief.         SUBJECTIVE:  Leanna Ruvalcaba is a 69 y.o. female who presents to the office for a follow-up evaluation of her right knee patient's status post  viscosupplementation of the right knee.  Patient began to experience increased pain and swelling states that her knee was doing well until approximately 1 week ago.  Patient states that she in the right knee with no trauma.  Patient denies any numbness or tingling the leg.  Patient has been taking Tylenol as needed for pain.  Patient offers no other complaints at this time.    PAST MEDICAL HISTORY:  Past Medical History:   Diagnosis Date    Acid reflux     Back injury     COPD (chronic obstructive pulmonary disease) (HCC)     Fibromyalgia     Hypertension     Seasonal allergies        PAST SURGICAL HISTORY:  Past Surgical History:   Procedure Laterality Date    COLONOSCOPY      EYE SURGERY       KNEE SURGERY  1998    OR COLONOSCOPY FLX DX W/COLLJ SPEC WHEN PFRMD N/A 05/09/2017    Procedure: EGD AND COLONOSCOPY;  Surgeon: Kimberly Zapata MD;  [facility];  Service: Gastroenterology       FAMILY HISTORY:  Family History   Problem Relation Age of Onset    Pancreatic cancer Mother 72    Coronary artery disease Father     Diabetes Father     Hypertension Father     Heart disease Father     Lung cancer Sister 37    No Known Problems Daughter     No Known Problems Maternal Grandmother     Lung cancer Maternal Grandfather 77    Diabetes Paternal Grandmother     No Known Problems Paternal Grandfather     Pancreatic cancer Brother     Pancreatic cancer Brother     No Known Problems Son     No Known Problems Son        SOCIAL HISTORY:  Social History     Tobacco Use    Smoking status: Every Day     Current packs/day: 1.50     Average packs/day: 1.5 packs/day for 57.1 years (85.7 ttl pk-yrs)     Types: Cigarettes     Start date: 1968     Passive exposure: Current    Smokeless tobacco: Never   Vaping Use    Vaping status: Never Used   Substance Use Topics    Alcohol use: Never    Drug use: No       MEDICATIONS:    Current Outpatient Medications:     acetaminophen (TYLENOL) 650 mg CR tablet, Take by mouth every 8 (eight) hours as needed, Disp: , Rfl:     albuterol (PROVENTIL HFA,VENTOLIN HFA) 90 mcg/act inhaler, Inhale 2 puffs every 6 (six) hours as needed for wheezing, Disp: 18 g, Rfl: 5    ALPRAZolam (XANAX) 0.25 mg tablet, Take 1 tablet (0.25 mg total) by mouth 3 (three) times a day as needed for anxiety, Disp: 90 tablet, Rfl: 0    amLODIPine (NORVASC) 5 mg tablet, TAKE 1 TABLET (5 MG TOTAL) BY MOUTH DAILY., Disp: 90 tablet, Rfl: 1    aspirin 81 mg chewable tablet, Chew 1 tablet (81 mg total) daily, Disp: 30 tablet, Rfl: 3    cetirizine (ZyrTEC) 10 mg tablet, TAKE 1 TABLET BY MOUTH EVERY DAY, Disp: 90 tablet, Rfl: 1    Cholecalciferol (VITAMIN D3 PO), Take by mouth, Disp: , Rfl:     clopidogrel (PLAVIX) 75  mg tablet, TAKE 1 TABLET BY MOUTH EVERY DAY, Disp: 90 tablet, Rfl: 1    Diclofenac Sodium (VOLTAREN) 1 %, Apply 2 g topically 4 (four) times a day, Disp: 100 g, Rfl: 3    escitalopram (LEXAPRO) 10 mg tablet, TAKE 1 TABLET BY MOUTH EVERY DAY, Disp: 90 tablet, Rfl: 1    fluticasone-umeclidinium-vilanterol 200-62.5-25 mcg/actuation AEPB inhaler, Inhale 1 puff daily Rinse mouth after use., Disp: 3 each, Rfl: 3    HYDROcodone-acetaminophen (Norco) 5-325 mg per tablet, Take 1 tablet by mouth every 6 (six) hours as needed for pain Max Daily Amount: 4 tablets, Disp: 60 tablet, Rfl: 0    ibuprofen (MOTRIN) 200 mg tablet, Take 400 mg by mouth every 6 (six) hours as needed for mild pain, Disp: , Rfl:     lisinopril (ZESTRIL) 20 mg tablet, TAKE 1 TABLET BY MOUTH EVERY DAY, Disp: 90 tablet, Rfl: 1    methocarbamol (ROBAXIN) 500 mg tablet, Take 1 tablet (500 mg total) by mouth 2 (two) times a day For back pain radiating down the leg, Disp: 20 tablet, Rfl: 0    MINOXIDIL, TOPICAL, 5 % SOLN, Apply 1 application topically in the morning For hair, Disp: , Rfl:     montelukast (SINGULAIR) 10 mg tablet, TAKE 1 TABLET BY MOUTH EVERYDAY AT BEDTIME, Disp: 90 tablet, Rfl: 1    naproxen (NAPROSYN) 500 mg tablet, TAKE 1 TABLET BY MOUTH TWICE A DAY WITH MEALS, Disp: 60 tablet, Rfl: 5    Omega-3 Fatty Acids (FISH OIL PO), Take 1,000 mg by mouth, Disp: , Rfl:     pantoprazole (PROTONIX) 40 mg tablet, TAKE 1 TABLET BY MOUTH TWICE A DAY, Disp: 180 tablet, Rfl: 1    polyethylene glycol (MIRALAX) 17 g packet, Take 17 g by mouth daily, Disp: 510 g, Rfl: 2    rosuvastatin (CRESTOR) 5 mg tablet, TAKE 1 TABLET (5 MG TOTAL) BY MOUTH DAILY., Disp: 100 tablet, Rfl: 1    Current Facility-Administered Medications:     bupivacaine (PF) (MARCAINE) 0.25 % injection 1 mL, 1 mL, Intra-articular, , Christiano Garcia DPM, 1 mL at 05/11/23 1706    triamcinolone acetonide (KENALOG-40) 40 mg/mL injection 20 mg, 20 mg, Intra-articular, , Christiano Garcia DPM, 20 mg  "at 05/11/23 1706    ALLERGIES:  Allergies   Allergen Reactions    Augmentin [Amoxicillin-Pot Clavulanate] Vomiting    Miconazole Itching and Swelling    Wixela Inhub [Fluticasone-Salmeterol] Palpitations       LABS:  HgA1c:   Lab Results   Component Value Date    HGBA1C 5.6 04/18/2023     BMP:   Lab Results   Component Value Date    GLUCOSE 86 10/08/2015    CALCIUM 9.8 02/05/2025     10/08/2015    K 4.5 02/05/2025    CO2 28 02/05/2025     02/05/2025    BUN 10 02/05/2025    CREATININE 0.54 (L) 02/05/2025     CBC: No components found for: \"CBC\"    _____________________________________________________  PHYSICAL EXAMINATION:  Vital signs: Ht 5' 3\" (1.6 m)   Wt 90.3 kg (199 lb)   LMP  (LMP Unknown)   BMI 35.25 kg/m²   General: No acute distress, awake and alert  Psychiatric: Mood and affect appear appropriate  HEENT: Trachea Midline, No torticollis, no apparent facial trauma  Cardiovascular: No audible murmurs; Extremities appear perfused  Pulmonary: No audible wheezing or stridor  Skin: No open lesions; see further details (if any) below      MUSCULOSKELETAL EXAMINATION:  Right knee examination:  Patient sitting comfortably in the office in no apparent distress   Moderate-sized joint effusion present in the with no erythema or ecchymosis appreciated  And healthy appearing medial joint line.  No other bony or soft tissue tenderness to palpation noted at this time.  0-90 degrees of ROM of the knee noted limited by pain  NV intact    _____________________________________________________  STUDIES REVIEWED:  I personally reviewed the images obtained in office today and my independent interpretation is as follows:  N/A       PROCEDURES PERFORMED:  Large joint arthrocentesis: R knee  [place] Protocol:  procedure performed by consultantConsent: Verbal consent obtained.  Risks and benefits: risks, benefits and alternatives were discussed  Consent given by: patient  Patient understanding: patient states " understanding of the procedure being performed  Site marked: the operative site was marked  Patient identity confirmed: verbally with patient  Supporting Documentation  Indications: pain   Procedure Details  Location: knee - R knee  Preparation: Patient was prepped and draped in the usual sterile fashion  Needle size: 22 G  Ultrasound guidance: no  Approach: superior  Medications administered: 2 mL bupivacaine 0.25 %; 2 mL methylPREDNISolone acetate 40 mg/mL; 8 mL lidocaine 1 %    Aspirate amount: 55 mL  Aspirate: yellow and serous  Patient tolerance: patient tolerated the procedure well with no immediate complications  Dressing:  Sterile dressing applied                  Matt Fernandez PA-C - assisting  Erasmo Leach MD

## 2025-02-18 ENCOUNTER — OFFICE VISIT (OUTPATIENT)
Dept: SURGICAL ONCOLOGY | Facility: CLINIC | Age: 70
End: 2025-02-18
Payer: COMMERCIAL

## 2025-02-18 VITALS
WEIGHT: 200 LBS | DIASTOLIC BLOOD PRESSURE: 80 MMHG | BODY MASS INDEX: 35.44 KG/M2 | HEIGHT: 63 IN | OXYGEN SATURATION: 95 % | RESPIRATION RATE: 15 BRPM | TEMPERATURE: 98 F | HEART RATE: 83 BPM | SYSTOLIC BLOOD PRESSURE: 142 MMHG

## 2025-02-18 DIAGNOSIS — D49.0 IPMN (INTRADUCTAL PAPILLARY MUCINOUS NEOPLASM): Primary | ICD-10-CM

## 2025-02-18 PROCEDURE — 99214 OFFICE O/P EST MOD 30 MIN: CPT | Performed by: STUDENT IN AN ORGANIZED HEALTH CARE EDUCATION/TRAINING PROGRAM

## 2025-02-18 NOTE — PROGRESS NOTES
Surgical Oncology Consultation F/U    [address] [facility]  [facility]  [address] [facility] [id number]    Patient:  Leanna Ruvalcaba  1955  [id number]    Primary Care provider:  Matt Grider MD  [address] [facility]  [place] [address]    ASSESSMENT AND PLAN    1. IPMN (intraductal papillary mucinous neoplasm)  Assessment & Plan:  Stable. Will need MR MRCP in 2 years and will see her at that time.   Orders:  -     MRI abdomen w wo contrast and mrcp; Future; Expected date: 02/18/2027      Chief Complaint   Patient presents with    Follow-up       History of Present Illness  :   This is a 68-year-old female who is here in f/u today for her pancreatic cyst.  Briefly, the patient underwent initial cross-sectional imaging in September 2022, revealing a 8 mm cyst at the neck of the pancreas.  This appeared to be a branch duct IPMN.  She underwent, EUS which was 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 given the prominence of the main duct of the pancreas.  There was no ductal dilation or other concerning features.  We discussed her at  and she underwent genetics eval given her FH of panc cancer. This was negative. At this time, the patient describes ongoing intermittent abdominal pain, bloating after PO intake that is unchanged since last being seen. She saw them in Sep 2023 and hasn't seen them since. Is currently struggling with carotid blockage and knee pain. Is still smoking and not interested in quitting.       Review of Systems  Complete ROS Surg Onc:   Constitutional: The patient denies new or recent history of general fatigue, no recent weight loss, no change in appetite.   Eyes: No complaints of visual problems, no scleral icterus.   ENT: No complaints of ear pain, no hoarseness, no difficulty swallowing,  no tinnitus and no new masses in head, oral cavity, or neck.   Cardiovascular: No complaints of chest pain, no palpitations, no ankle edema.    Respiratory: No complaints of shortness of breath, no cough.   Gastrointestinal: No complaints of jaundice, no bloody stools, no pale stools.   Genitourinary: No complaints of dysuria, no hematuria, no nocturia, no frequent urination, no urethral discharge.   Musculoskeletal: No complaints of weakness, paralysis, joint stiffness or arthralgias.  Integumentary: No complaints of rash, no new lesions.   Neurological: No complaints of convulsions, no seizures, no dizziness.   Hematologic/Lymphatic: No complaints of easy bruising.   Endocrine:  No hot or cold intolerance.  No polydipsia, polyphagia, or polyuria.  Allergy/immunology:  No environmental allergies.  No food allergies.  Not immunocompromised.      Patient Active Problem List   Diagnosis    Seasonal allergic rhinitis    Anxiety    Chronic obstructive pulmonary disease (HCC)    Esophageal reflux    Fibromyalgia    Mixed hyperlipidemia    Primary hypertension    Degeneration of intervertebral disc    Lumbosacral radiculopathy at L5    Palpitations    Pulmonary nodule seen on imaging study    Family history of pancreatic cancer    Neuropathic pain    Neck pain    Cigarette nicotine dependence with nicotine-induced disorder    Colitis    Snoring    Chronic idiopathic constipation    Gastroparesis    Prediabetes    Increased intraocular pressure    Primary insomnia    Candida vaginitis    Chronic frontal sinusitis    Mid back pain    Closed wedge compression fracture of T8 vertebra (HCC)    Therapeutic opioid induced constipation    IPMN (intraductal papillary mucinous neoplasm)    History of colon polyps    Gastroesophageal reflux disease without esophagitis    Chronic pain of right ankle    Hyperglycemia    Sinus tarsi syndrome of right foot    Dysuria    Chronic left-sided low back pain without sciatica    Hematochezia    Generalized postprandial abdominal pain    Otalgia of right ear    Right leg pain    Anterior communicating artery aneurysm    Carotid  artery stenosis, symptomatic, left    Primary osteoarthritis of right knee    Knee strain, right, initial encounter    Preoperative cardiovascular examination    Acute pain of left shoulder    Other tear of medial meniscus, current injury, right knee, initial encounter     Past Medical History:   Diagnosis Date    Acid reflux     Back injury     COPD (chronic obstructive pulmonary disease) (HCC)     Fibromyalgia     Hypertension     Seasonal allergies      Past Surgical History:   Procedure Laterality Date    COLONOSCOPY      EYE SURGERY      KNEE SURGERY  1998    SC COLONOSCOPY FLX DX W/COLLJ SPEC WHEN PFRMD N/A 05/09/2017    Procedure: EGD AND COLONOSCOPY;  Surgeon: Kimberly Zapata MD;  Location: [facility];  Service: Gastroenterology     Family History   Problem Relation Age of Onset    Pancreatic cancer Mother 72    Coronary artery disease Father     Diabetes Father     Hypertension Father     Heart disease Father     Lung cancer Sister 37    No Known Problems Daughter     No Known Problems Maternal Grandmother     Lung cancer Maternal Grandfather 77    Diabetes Paternal Grandmother     No Known Problems Paternal Grandfather     Pancreatic cancer Brother     Pancreatic cancer Brother     No Known Problems Son     No Known Problems Son      Social History     Socioeconomic History    Marital status:      Spouse name: Not on file    Number of children: 3    Years of education: less than high school    Highest education level: Not on file   Occupational History    Occupation: unemployed   Tobacco Use    Smoking status: Every Day     Current packs/day: 1.50     Average packs/day: 1.5 packs/day for 57.1 years (85.7 ttl pk-yrs)     Types: Cigarettes     Start date: 1968     Passive exposure: Current    Smokeless tobacco: Never   Vaping Use    Vaping status: Never Used   Substance and Sexual Activity    Alcohol use: Never    Drug use: No    Sexual activity: Not Currently   Other Topics Concern    Not on file    Social History Narrative    Always uses seatbelt    Daily coffee consumption    Daily tea consumption    Dental care regularly    Exercises regularly    Multiple organ donor    No guns in the home    No living will    Denies pets/ animals in the home    Power of  in existence- denied         Social Drivers of Health     Financial Resource Strain: Low Risk  (7/12/2023)    Overall Financial Resource Strain (CARDIA)     Difficulty of Paying Living Expenses: Not hard at all   Food Insecurity: No Food Insecurity (7/16/2024)    Nursing - Inadequate Food Risk Classification     Worried About Running Out of Food in the Last Year: Never true     Ran Out of Food in the Last Year: Never true     Ran Out of Food in the Last Year: Not on file   Transportation Needs: No Transportation Needs (7/16/2024)    PRAPARE - Transportation     Lack of Transportation (Medical): No     Lack of Transportation (Non-Medical): No   Physical Activity: Not on file   Stress: Not on file   Social Connections: Not on file   Intimate Partner Violence: Not on file   Housing Stability: Low Risk  (7/16/2024)    Housing Stability Vital Sign     Unable to Pay for Housing in the Last Year: No     Number of Times Moved in the Last Year: 1     Homeless in the Last Year: No       Current Outpatient Medications:     acetaminophen (TYLENOL) 650 mg CR tablet, Take by mouth every 8 (eight) hours as needed, Disp: , Rfl:     albuterol (PROVENTIL HFA,VENTOLIN HFA) 90 mcg/act inhaler, Inhale 2 puffs every 6 (six) hours as needed for wheezing, Disp: 18 g, Rfl: 5    ALPRAZolam (XANAX) 0.25 mg tablet, Take 1 tablet (0.25 mg total) by mouth 3 (three) times a day as needed for anxiety, Disp: 90 tablet, Rfl: 0    amLODIPine (NORVASC) 5 mg tablet, TAKE 1 TABLET (5 MG TOTAL) BY MOUTH DAILY., Disp: 90 tablet, Rfl: 1    aspirin 81 mg chewable tablet, Chew 1 tablet (81 mg total) daily, Disp: 30 tablet, Rfl: 3    cetirizine (ZyrTEC) 10 mg tablet, TAKE 1 TABLET BY MOUTH  EVERY DAY, Disp: 90 tablet, Rfl: 1    Cholecalciferol (VITAMIN D3 PO), Take by mouth, Disp: , Rfl:     clopidogrel (PLAVIX) 75 mg tablet, TAKE 1 TABLET BY MOUTH EVERY DAY, Disp: 90 tablet, Rfl: 1    Diclofenac Sodium (VOLTAREN) 1 %, Apply 2 g topically 4 (four) times a day, Disp: 100 g, Rfl: 3    escitalopram (LEXAPRO) 10 mg tablet, TAKE 1 TABLET BY MOUTH EVERY DAY, Disp: 90 tablet, Rfl: 1    fluticasone-umeclidinium-vilanterol 200-62.5-25 mcg/actuation AEPB inhaler, Inhale 1 puff daily Rinse mouth after use., Disp: 3 each, Rfl: 3    HYDROcodone-acetaminophen (Norco) 5-325 mg per tablet, Take 1 tablet by mouth every 6 (six) hours as needed for pain Max Daily Amount: 4 tablets, Disp: 60 tablet, Rfl: 0    ibuprofen (MOTRIN) 200 mg tablet, Take 400 mg by mouth every 6 (six) hours as needed for mild pain, Disp: , Rfl:     lisinopril (ZESTRIL) 20 mg tablet, TAKE 1 TABLET BY MOUTH EVERY DAY, Disp: 90 tablet, Rfl: 1    methocarbamol (ROBAXIN) 500 mg tablet, Take 1 tablet (500 mg total) by mouth 2 (two) times a day For back pain radiating down the leg, Disp: 20 tablet, Rfl: 0    MINOXIDIL, TOPICAL, 5 % SOLN, Apply 1 application topically in the morning For hair, Disp: , Rfl:     montelukast (SINGULAIR) 10 mg tablet, TAKE 1 TABLET BY MOUTH EVERYDAY AT BEDTIME, Disp: 90 tablet, Rfl: 1    naproxen (NAPROSYN) 500 mg tablet, TAKE 1 TABLET BY MOUTH TWICE A DAY WITH MEALS, Disp: 60 tablet, Rfl: 5    Omega-3 Fatty Acids (FISH OIL PO), Take 1,000 mg by mouth, Disp: , Rfl:     pantoprazole (PROTONIX) 40 mg tablet, TAKE 1 TABLET BY MOUTH TWICE A DAY, Disp: 180 tablet, Rfl: 1    polyethylene glycol (MIRALAX) 17 g packet, Take 17 g by mouth daily, Disp: 510 g, Rfl: 2    rosuvastatin (CRESTOR) 5 mg tablet, TAKE 1 TABLET (5 MG TOTAL) BY MOUTH DAILY., Disp: 100 tablet, Rfl: 1    Current Facility-Administered Medications:     bupivacaine (PF) (MARCAINE) 0.25 % injection 1 mL, 1 mL, Intra-articular, , Christiano Garcia DPM, 1 mL at 05/11/23  1706    triamcinolone acetonide (KENALOG-40) 40 mg/mL injection 20 mg, 20 mg, Intra-articular, , Christiano Madrid Garcia, DPM, 20 mg at 05/11/23 1706  Allergies   Allergen Reactions    Augmentin [Amoxicillin-Pot Clavulanate] Vomiting    Miconazole Itching and Swelling    Wixela Inhub [Fluticasone-Salmeterol] Palpitations       Vitals:    02/18/25 0959   BP: 142/80   Pulse: 83   Resp: 15   Temp: 98 °F (36.7 °C)   SpO2: 95%         Physical Exam   General: Appears well, appears stated age  Skin: Warm, anicteric  HEENT: Normocephalic, atraumatic; sclera aniceteric, mucous membranes moist; cervical nodes without adenopathy  Cardiopulmonary: RRR, Easy WOB, no BLE edema  Abd: Flat and soft, nontender, no masses appreciated, no hepatosplenomegaly  MSK: Symmetric, no cyanosis, no overt weakness  Lymphatic: No cervical, axillary or inguinal lymphadenopathy  Neuro: Affect appropriate, no gross motor abnormalities    Labs: Reviewed in [facility]    Imaging  MRI abdomen w wo contrast and mrcp    Result Date: 11/18/2022  Narrative: MRI OF THE ABDOMEN WITH AND WITHOUT CONTRAST WITH MRCP INDICATION: 66 years / Female  K86.2: Cyst of pancreas. COMPARISON: Comparison is made to prior studies, most recent is an MRI dated August 9, 2022. The rest, with TECHNIQUE:  Multiplanar/multisequence MRI of the abdomen with 3D MRCP was performed before and after administration of contrast. IV Contrast:  8 mL of Gadobutrol injection (SINGLE-DOSE) FINDINGS: LOWER CHEST:   Unremarkable LIVER: No concerning liver lesions.  Few nonenhancing hepatic cysts, with the largest measuring 1.4 cm in the left lateral segment.  Intrahepatic vessels are patent. . BILE DUCTS:  No intrahepatic or extrahepatic bile duct dilation. Common bile duct is normal in caliber.  No choledocholithiasis, biliary stricture or suspicious mass. GALLBLADDER:  Normal. PANCREAS:  8 mm cyst in the neck of the pancreas unchanged there is direct communication with the pancreatic duct (series 9  image 86) compatible with a branch duct type IPMN.  No pancreatic ductal dilatation.  No enhancing soft tissue components. ADRENAL GLANDS:  Mild left adrenal thickening unchanged. SPLEEN:  Normal. KIDNEYS/PROXIMAL URETERS:  No hydroureteronephrosis.  No suspicious renal mass. BOWEL:   No dilated loops of bowel. PERITONEUM/RETROPERITONEUM:  No ascites. LYMPH NODES:  No abdominal lymphadenopathy. VASCULAR STRUCTURES:  No aneurysm. ABDOMINAL WALL:  Unremarkable. OSSEOUS STRUCTURES:  Hemangioma in the L1 vertebral body unchanged.     Impression: Stable size of 8 mm cyst in the neck of the pancreas with imaging characteristics compatible with a branch duct type IPMN.  No worrisome imaging features. Recommend repeat imaging every 2 years x5 for a total of 10 years, and if stable, no further imaging needed.  If the lesion does increase in size and or change in morphology, management can be reassessed at that time. Workstation performed: [id number]       I independently reviewed and interpreted the above laboratory and imaging data, including GI consult, EUS, MRI past and present.

## 2025-02-28 ENCOUNTER — APPOINTMENT (OUTPATIENT)
Dept: LAB | Facility: MEDICAL CENTER | Age: 70
End: 2025-02-28
Payer: COMMERCIAL

## 2025-02-28 ENCOUNTER — TELEPHONE (OUTPATIENT)
Age: 70
End: 2025-02-28

## 2025-02-28 DIAGNOSIS — R30.0 DYSURIA: ICD-10-CM

## 2025-02-28 DIAGNOSIS — R30.0 DYSURIA: Primary | ICD-10-CM

## 2025-02-28 LAB
BACTERIA UR QL AUTO: NORMAL /HPF
BILIRUB UR QL STRIP: NEGATIVE
CLARITY UR: CLEAR
COLOR UR: COLORLESS
GLUCOSE UR STRIP-MCNC: NEGATIVE MG/DL
HGB UR QL STRIP.AUTO: NEGATIVE
KETONES UR STRIP-MCNC: NEGATIVE MG/DL
LEUKOCYTE ESTERASE UR QL STRIP: NEGATIVE
NITRITE UR QL STRIP: NEGATIVE
NON-SQ EPI CELLS URNS QL MICRO: NORMAL /HPF
PH UR STRIP.AUTO: 6.5 [PH]
PROT UR STRIP-MCNC: NEGATIVE MG/DL
RBC #/AREA URNS AUTO: NORMAL /HPF
SP GR UR STRIP.AUTO: 1.01 (ref 1–1.03)
UROBILINOGEN UR STRIP-ACNC: <2 MG/DL
WBC #/AREA URNS AUTO: NORMAL /HPF

## 2025-02-28 PROCEDURE — 81001 URINALYSIS AUTO W/SCOPE: CPT

## 2025-02-28 PROCEDURE — 87086 URINE CULTURE/COLONY COUNT: CPT

## 2025-02-28 NOTE — TELEPHONE ENCOUNTER
Patient is requesting the provider to put an order in for a urine test.    She believes she has a UTI for about a week. Symptoms are abdominal pain upper and lower, no discomfort when she urinates but has discomfort after she urinates, with urgency    Please advise and call patient back

## 2025-03-01 ENCOUNTER — NURSE TRIAGE (OUTPATIENT)
Dept: OTHER | Facility: OTHER | Age: 70
End: 2025-03-01

## 2025-03-01 LAB — BACTERIA UR CULT: NORMAL

## 2025-03-01 NOTE — TELEPHONE ENCOUNTER
"Patient had UA and culture done yesterday was calling in because she thought it resulted (she saw the UA). She states her symptoms are worsening. She has bladder pressure and some pain in her side. Denies fevers, occasional temp of 99, is taking Tylenol. States she has frequency and urgency, and burning after she urinates. She states she has had a terrible yeast infection in the past and she feels almost swollen when she sits but has no visible discharge or itching, this burns. She states her urine does have a foul odor. She is very uncomfortable and asking if there is anything she can do until the culture results.       Per on-call provider patient should be seen. She refused UC because they've told her previous we do not treat those types of problems at [place] and she refused ER. Appointment schedule for Monday. Patient would like appointment with her PCP if at all possible. She will go to ER if symptoms worsen or are severe. Reviewed with patient.    Reason for Disposition   Side (flank) or lower back pain present    Answer Assessment - Initial Assessment Questions  1. SYMPTOM: \"What's the main symptom you're concerned about?\" (e.g., frequency, incontinence)      Pressure over bladder, burning after urination, frequency, urgency, swollen, \"I feel lousy\"  2. ONSET: \"When did the  s/s start?\"      A week and worsening  3. PAIN: \"Is there any pain?\" If Yes, ask: \"How bad is it?\" (Scale: 1-10; mild, moderate, severe)      \"Uncomfortable, discomfort, burning pain\"  4. CAUSE: \"What do you think is causing the symptoms?\"    UTI?  5. OTHER SYMPTOMS: \"Do you have any other symptoms?\" (e.g., blood in urine, fever, flank pain, pain with urination)      Pain at her side and into lower back.        Fever 99 at most.    Protocols used: Urinary Symptoms-Adult-    "

## 2025-03-03 ENCOUNTER — OFFICE VISIT (OUTPATIENT)
Dept: FAMILY MEDICINE CLINIC | Facility: CLINIC | Age: 70
End: 2025-03-03
Payer: COMMERCIAL

## 2025-03-03 ENCOUNTER — RESULTS FOLLOW-UP (OUTPATIENT)
Dept: FAMILY MEDICINE CLINIC | Facility: CLINIC | Age: 70
End: 2025-03-03

## 2025-03-03 VITALS
HEIGHT: 63 IN | BODY MASS INDEX: 35.26 KG/M2 | DIASTOLIC BLOOD PRESSURE: 70 MMHG | WEIGHT: 199 LBS | SYSTOLIC BLOOD PRESSURE: 128 MMHG | OXYGEN SATURATION: 95 % | TEMPERATURE: 98.3 F | HEART RATE: 90 BPM

## 2025-03-03 DIAGNOSIS — N76.0 BACTERIAL VAGINOSIS: Primary | ICD-10-CM

## 2025-03-03 DIAGNOSIS — N94.9 PAIN OF FEMALE GENITALIA: ICD-10-CM

## 2025-03-03 DIAGNOSIS — R39.9 URINARY SYMPTOM OR SIGN: ICD-10-CM

## 2025-03-03 DIAGNOSIS — B96.89 BACTERIAL VAGINOSIS: Primary | ICD-10-CM

## 2025-03-03 LAB
SL AMB  POCT GLUCOSE, UA: NEGATIVE
SL AMB LEUKOCYTE ESTERASE,UA: 1
SL AMB POCT BILIRUBIN,UA: NEGATIVE
SL AMB POCT BLOOD,UA: NEGATIVE
SL AMB POCT COLOR,UA: CLEAR
SL AMB POCT KETONES,UA: NEGATIVE
SL AMB POCT NITRITE,UA: NEGATIVE
SL AMB POCT PH,UA: 5
SL AMB POCT SPECIFIC GRAVITY,UA: 1
SL AMB POCT URINE PROTEIN: NEGATIVE
SL AMB POCT UROBILINOGEN: 0.2

## 2025-03-03 PROCEDURE — 99214 OFFICE O/P EST MOD 30 MIN: CPT | Performed by: FAMILY MEDICINE

## 2025-03-03 PROCEDURE — 87070 CULTURE OTHR SPECIMN AEROBIC: CPT | Performed by: FAMILY MEDICINE

## 2025-03-03 PROCEDURE — G2211 COMPLEX E/M VISIT ADD ON: HCPCS | Performed by: FAMILY MEDICINE

## 2025-03-03 PROCEDURE — 81002 URINALYSIS NONAUTO W/O SCOPE: CPT | Performed by: FAMILY MEDICINE

## 2025-03-03 RX ORDER — DOXYCYCLINE HYCLATE 100 MG
100 TABLET ORAL 2 TIMES DAILY
Qty: 14 TABLET | Refills: 0 | Status: SHIPPED | OUTPATIENT
Start: 2025-03-03 | End: 2025-03-10 | Stop reason: ALTCHOICE

## 2025-03-03 RX ORDER — PHENAZOPYRIDINE HYDROCHLORIDE 200 MG/1
200 TABLET, FILM COATED ORAL
Qty: 9 TABLET | Refills: 0 | Status: SHIPPED | OUTPATIENT
Start: 2025-03-03 | End: 2025-03-10 | Stop reason: ALTCHOICE

## 2025-03-05 ENCOUNTER — TELEPHONE (OUTPATIENT)
Age: 70
End: 2025-03-05

## 2025-03-05 DIAGNOSIS — J42 CHRONIC BRONCHITIS, UNSPECIFIED CHRONIC BRONCHITIS TYPE (HCC): ICD-10-CM

## 2025-03-05 RX ORDER — ALBUTEROL SULFATE 90 UG/1
2 INHALANT RESPIRATORY (INHALATION) EVERY 6 HOURS PRN
Qty: 18 G | Refills: 0 | Status: SHIPPED | OUTPATIENT
Start: 2025-03-05

## 2025-03-05 NOTE — PROGRESS NOTES
:  Assessment & Plan  Bacterial vaginosis    Orders:    doxycycline hyclate (VIBRA-TABS) 100 mg tablet; Take 1 tablet (100 mg total) by mouth 2 (two) times a day for 7 days    Pain of female genitalia    Orders:    Genital Comprehensive Culture    Urinary symptom or sign  Will await urine culture results.  Orders:    POCT urine dip    phenazopyridine (PYRIDIUM) 200 mg tablet; Take 1 tablet (200 mg total) by mouth 3 (three) times a day with meals        History of Present Illness     Leanna Ruvalcaba is a 69 y.o. female   The patient presents complaining of swelling and mild discomfort of the external vaginal area.  She has a irritated discharge but no bleeding or itching.  She denies any colored vaginal discharge.  She has some dysuria but her urine dip only had +1 leukocytes today so urine dip was sent off.  The patient states she cannot tolerate topical agents to the vaginal area because of reactions in the past so what ever is used to treat her needs to be oral.      Review of Systems   Constitutional:  Negative for appetite change, chills and fever.   HENT:  Negative for ear pain, facial swelling, rhinorrhea, sinus pain, sore throat and trouble swallowing.    Eyes:  Negative for discharge and redness.   Respiratory:  Negative for chest tightness, shortness of breath and wheezing.    Cardiovascular:  Negative for chest pain and palpitations.   Gastrointestinal:  Negative for abdominal pain, diarrhea, nausea and vomiting.   Endocrine: Negative for polyuria.   Genitourinary:  Negative for dysuria and urgency.        Positive tenderness and swelling of the external vaginal area positive dysuria without hematuria or flank pain without any lesions.  No discolored vaginal discharge or itching.     Musculoskeletal:  Negative for arthralgias and back pain.   Skin:  Negative for rash.   Neurological:  Negative for dizziness, weakness and headaches.   Hematological:  Negative for adenopathy.   Psychiatric/Behavioral:  Negative  "for behavioral problems, confusion and sleep disturbance.    All other systems reviewed and are negative.    Objective   /70 (BP Location: Left arm, Patient Position: Sitting, Cuff Size: Large)   Pulse 90   Temp 98.3 °F (36.8 °C) (Temporal)   Ht 5' 3\" (1.6 m)   Wt 90.3 kg (199 lb)   LMP  (LMP Unknown)   SpO2 95%   BMI 35.25 kg/m²      Physical Exam  Vitals and nursing note reviewed.   Constitutional:       General: She is not in acute distress.     Appearance: Normal appearance. She is well-developed. She is not ill-appearing or diaphoretic.   HENT:      Head: Normocephalic and atraumatic.      Right Ear: Tympanic membrane, ear canal and external ear normal.      Left Ear: Tympanic membrane, ear canal and external ear normal.      Nose: Nose normal. No congestion or rhinorrhea.      Mouth/Throat:      Mouth: Mucous membranes are moist.      Pharynx: Oropharynx is clear. No oropharyngeal exudate or posterior oropharyngeal erythema.   Eyes:      General: No scleral icterus.        Right eye: No discharge.         Left eye: No discharge.      Extraocular Movements: Extraocular movements intact.      Conjunctiva/sclera: Conjunctivae normal.      Pupils: Pupils are equal, round, and reactive to light.   Neck:      Thyroid: No thyromegaly.      Vascular: No carotid bruit or JVD.      Trachea: No tracheal deviation.   Cardiovascular:      Rate and Rhythm: Normal rate and regular rhythm.      Pulses: Normal pulses.      Heart sounds: Normal heart sounds. No murmur heard.  Pulmonary:      Effort: Pulmonary effort is normal. No respiratory distress.      Breath sounds: Normal breath sounds. No stridor. No wheezing, rhonchi or rales.   Abdominal:      General: Abdomen is flat. Bowel sounds are normal. There is no distension.      Palpations: Abdomen is soft. There is no mass.      Tenderness: There is no abdominal tenderness. There is no guarding or rebound.   Genitourinary:     General: Normal vulva.      Vagina: " No vaginal discharge.      Comments: Mild erythema and edema of the vulvar area  Musculoskeletal:         General: No swelling, tenderness or deformity. Normal range of motion.      Cervical back: Normal range of motion and neck supple. No rigidity.      Right lower leg: No edema.      Left lower leg: No edema.   Lymphadenopathy:      Cervical: No cervical adenopathy.   Skin:     General: Skin is warm and dry.      Capillary Refill: Capillary refill takes less than 2 seconds.      Coloration: Skin is not jaundiced.      Findings: No bruising, erythema or rash.   Neurological:      General: No focal deficit present.      Mental Status: She is alert and oriented to person, place, and time.      Cranial Nerves: No cranial nerve deficit.      Sensory: No sensory deficit.      Motor: No abnormal muscle tone.      Coordination: Coordination normal.      Gait: Gait normal.      Deep Tendon Reflexes: Reflexes are normal and symmetric. Reflexes normal.   Psychiatric:         Mood and Affect: Mood normal.         Behavior: Behavior normal.         Thought Content: Thought content normal.         Judgment: Judgment normal.     Will await urine culture results.

## 2025-03-05 NOTE — TELEPHONE ENCOUNTER
Prescription sent, but patient is overdue for follow up and lung cancer screening.  Cc'ing clerical staff to reach out to schedule follow up with myself or an AP, first available.

## 2025-03-06 NOTE — TELEPHONE ENCOUNTER
Patient called back about her results.    She is requesting a call back tonight.  She states she is still not feeling well.      Please advise and call patient about results.

## 2025-03-06 NOTE — TELEPHONE ENCOUNTER
Patient called in to follow up on lab results. Advised provider has not yet reviewed results. Patient stated the medication she was given has not helped and she is still not feeling well.  Please advise, thank you

## 2025-03-07 ENCOUNTER — RESULTS FOLLOW-UP (OUTPATIENT)
Dept: FAMILY MEDICINE CLINIC | Facility: CLINIC | Age: 70
End: 2025-03-07

## 2025-03-07 DIAGNOSIS — B37.31 VAGINAL YEAST INFECTION: Primary | ICD-10-CM

## 2025-03-07 LAB — BACTERIA GENITAL AEROBE CULT: NORMAL

## 2025-03-07 RX ORDER — FLUCONAZOLE 150 MG/1
150 TABLET ORAL ONCE
Qty: 1 TABLET | Refills: 0 | Status: SHIPPED | OUTPATIENT
Start: 2025-03-07 | End: 2025-03-07

## 2025-03-07 NOTE — TELEPHONE ENCOUNTER
The patient called back regarding her Test results     The patient report that she is not feeling well and would like to know her test results   The patient request to be transfer to the office   We try to connect the patient with the office no one answer the call.     Please contact the patient Today

## 2025-03-07 NOTE — TELEPHONE ENCOUNTER
Patient called for results, advised culture result is just preliminary will call when final and reviewed states has a eye DR bush at 2 pm and would like a call on cell phone [phone]. Also would like to know if she can get a refill on her Fluconazole states feels very wet down there and may have a yeast infection. Would like sent to [facility] in [place]

## 2025-03-10 ENCOUNTER — OFFICE VISIT (OUTPATIENT)
Dept: OBGYN CLINIC | Facility: CLINIC | Age: 70
End: 2025-03-10
Payer: COMMERCIAL

## 2025-03-10 VITALS
WEIGHT: 201 LBS | HEIGHT: 64 IN | DIASTOLIC BLOOD PRESSURE: 92 MMHG | BODY MASS INDEX: 34.31 KG/M2 | SYSTOLIC BLOOD PRESSURE: 140 MMHG

## 2025-03-10 DIAGNOSIS — R10.2 PELVIC PRESSURE IN FEMALE: Primary | ICD-10-CM

## 2025-03-10 DIAGNOSIS — R39.9 UTI SYMPTOMS: ICD-10-CM

## 2025-03-10 DIAGNOSIS — N89.8 VAGINAL IRRITATION: ICD-10-CM

## 2025-03-10 PROBLEM — B37.31 CANDIDA VAGINITIS: Status: RESOLVED | Noted: 2022-01-19 | Resolved: 2025-03-10

## 2025-03-10 PROBLEM — M54.9 MID BACK PAIN: Status: RESOLVED | Noted: 2022-04-28 | Resolved: 2025-03-10

## 2025-03-10 PROBLEM — R30.0 DYSURIA: Status: RESOLVED | Noted: 2023-06-04 | Resolved: 2025-03-10

## 2025-03-10 PROBLEM — M25.512 ACUTE PAIN OF LEFT SHOULDER: Status: RESOLVED | Noted: 2024-09-15 | Resolved: 2025-03-10

## 2025-03-10 PROBLEM — M79.604 RIGHT LEG PAIN: Status: RESOLVED | Noted: 2024-05-09 | Resolved: 2025-03-10

## 2025-03-10 PROBLEM — M54.2 NECK PAIN: Status: RESOLVED | Noted: 2019-07-11 | Resolved: 2025-03-10

## 2025-03-10 LAB
SL AMB  POCT GLUCOSE, UA: NORMAL
SL AMB LEUKOCYTE ESTERASE,UA: NORMAL
SL AMB POCT BILIRUBIN,UA: NORMAL
SL AMB POCT BLOOD,UA: NORMAL
SL AMB POCT CLARITY,UA: NORMAL
SL AMB POCT COLOR,UA: NORMAL
SL AMB POCT KETONES,UA: NORMAL
SL AMB POCT NITRITE,UA: NORMAL
SL AMB POCT PH,UA: NORMAL
SL AMB POCT SPECIFIC GRAVITY,UA: NORMAL
SL AMB POCT URINE PROTEIN: NORMAL
SL AMB POCT UROBILINOGEN: NORMAL

## 2025-03-10 PROCEDURE — 81002 URINALYSIS NONAUTO W/O SCOPE: CPT | Performed by: NURSE PRACTITIONER

## 2025-03-10 PROCEDURE — 87086 URINE CULTURE/COLONY COUNT: CPT | Performed by: NURSE PRACTITIONER

## 2025-03-10 PROCEDURE — 99203 OFFICE O/P NEW LOW 30 MIN: CPT | Performed by: NURSE PRACTITIONER

## 2025-03-10 RX ORDER — FLUCONAZOLE 150 MG/1
150 TABLET ORAL ONCE
Qty: 1 TABLET | Refills: 0 | Status: SHIPPED | OUTPATIENT
Start: 2025-03-10 | End: 2025-03-13

## 2025-03-10 NOTE — PATIENT INSTRUCTIONS
Patient Education     Pelvic Pain   About this topic   Pelvic pain is pain below the belly button and above the legs. It may be acute and last a few hours or a few days or much longer. If the pain lasts longer than 3 months it is chronic pelvic pain.  Your pain may be sharp, dull, or cramping. It may be there all the time or may come and go. Sometimes, the pain builds up over a short time. You may feel the pain throughout this area of your body or in one specific area. It may be mild, moderate, or severe.  Pain can cause upset stomach and throwing up. When you are in pain you may not feel hungry. You may feel nervous. Pain may be a warning sign that something is wrong inside the body. Acute pelvic pain may be caused by a very serious health problem.  How your pelvic pain is treated is based on many things. Some of them are the kind of pain, how bad it is, and what is causing the pain. Treatment may include drugs or surgery.  What are the causes?   Problems in the belly or bowels like:  Blocked bowel  Appendicitis  Diverticulitis  Hernia  Hard stools  Irritable bowel syndrome  Peritonitis  Problems in the urinary tract like:  Urinary tract infection  Kidney or bladder stones  Narrowing of the urethra  Other causes like:  Muscle strain or spasm  Herniated disc  Bone problems  Hernias  Pelvic abscess  Pelvic inflammatory disease  Sexually transmitted disease  Nerve problems  Adhesions after surgery  IUD is in the wrong place  Problems with internal reproductive organs like:  Miscarriage or ectopic pregnancy  Ovarian cyst breaks open  Vaginal infection  Pelvic inflammatory disease or sexually transmitted disease  Problems with monthly periods  Ovarian or testicular torsion  Endometriosis or fibroids  Ovulation pain  Prostate problems  Pain after a permanent birth control surgery  What are the main signs?   Tenderness in lower belly  Fever  Upset stomach and throwing up  Dizziness  Sweating  Feeling anxious  How does the  doctor diagnose this health problem?   Your doctor will take your history and do an exam. If you have a vagina, this will likely include a pelvic exam with a tool called a speculum. Your doctor will ask about your pain to help find where it is located. The doctor may want to do some tests to find the cause of your pain. The doctor may order:  Lab tests  Pelvic exam  Ultrasound  X-ray  CT or MRI scan  Intravenous pyelography  Barium enema  Laparoscopy  How does the doctor treat this health problem?   Treatment is based on what is causing your pain. This may include changes in your diet or physical or sexual activity.  Are there other health problems to treat?   Depending on what is causing the pain, there may be other problems to treat.  What drugs may be needed?   The doctor may order drugs to:  Help with pain  Fight an infection  Balance hormones  Relax muscles  Lower stress and anxiety or help with depression  What can be done to prevent this health problem?   There is nothing you can do to prevent some of these problems.  Always practice safe sex by using condoms.  Manage digestive problems like constipation.  Last Reviewed Date   2021-09-14  Consumer Information Use and Disclaimer   This generalized information is a limited summary of diagnosis, treatment, and/or medication information. It is not meant to be comprehensive and should be used as a tool to help the user understand and/or assess potential diagnostic and treatment options. It does NOT include all information about conditions, treatments, medications, side effects, or risks that may apply to a specific patient. It is not intended to be medical advice or a substitute for the medical advice, diagnosis, or treatment of a health care provider based on the health care provider's examination and assessment of a patient’s specific and unique circumstances. Patients must speak with a health care provider for complete information about their health, medical  questions, and treatment options, including any risks or benefits regarding use of medications. This information does not endorse any treatments or medications as safe, effective, or approved for treating a specific patient. [employer] and its affiliates disclaim any warranty or liability relating to this information or the use thereof. The use of this information is governed by the Terms of Use, available at https://www.BBE.com/en/know/clinical-effectiveness-terms   Copyright   Copyright © 2024 [employer], Inc[employer] and its affiliates and/or licensors. All rights reserved.

## 2025-03-10 NOTE — PROGRESS NOTES
"Name: Leanna Ruvalcaba      : 1955      MRN: [id number]  Encounter Provider: MIRNA Aguillon  Encounter Date: 3/10/2025   Encounter department: [facility]  :  Assessment & Plan  Pelvic pressure in female    Orders:    US pelvis complete w transvaginal; Future    UTI symptoms    Orders:    POCT urine dip    Urine culture    Vaginal irritation    Orders:    fluconazole (DIFLUCAN) 150 mg tablet; Take 1 tablet (150 mg total) by mouth once for 1 dose Once        History of Present Illness   \"Has burning after urination and pelvic pressure. Has been like this for about three weeks\"      Leanna Ruvalcaba is a 69 y.o. 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  Her urine culture and urinalysis were negative in 2025.  She states her symptoms continued and was started on antibiotics by her PCP which she completed yesterday.  She also took 1 Diflucan on Friday which helped with her vaginal irritation.  She has not been sexually active in years, .  She denies fever.  She does have pelvic pressure for the past 3 weeks.  She denies postmenopausal bleeding.  She denies vaginal itching, discharge or odor.  She did have a high fasting glucose on her labs recently but does not believe she is prediabetic.  History obtained from: patient    Past Medical History:   Diagnosis Date    Acid reflux     Back injury     COPD (chronic obstructive pulmonary disease) (HCC)     Fibromyalgia     Hypertension     Seasonal allergies      Past Surgical History:   Procedure Laterality Date    COLONOSCOPY      EYE SURGERY      KNEE SURGERY      MT COLONOSCOPY FLX DX W/COLLJ SPEC WHEN PFRMD N/A 2017    Procedure: EGD AND COLONOSCOPY;  Surgeon: Kimberly Zapata MD;  Location: [facility];  Service: Gastroenterology       Current Outpatient Medications:     acetaminophen (TYLENOL) 650 mg CR tablet, Take by mouth every 8 (eight) hours as needed, Disp: , Rfl:     " albuterol (PROVENTIL HFA,VENTOLIN HFA) 90 mcg/act inhaler, Inhale 2 puffs every 6 (six) hours as needed for wheezing, Disp: 18 g, Rfl: 0    ALPRAZolam (XANAX) 0.25 mg tablet, Take 1 tablet (0.25 mg total) by mouth 3 (three) times a day as needed for anxiety, Disp: 90 tablet, Rfl: 0    amLODIPine (NORVASC) 5 mg tablet, TAKE 1 TABLET (5 MG TOTAL) BY MOUTH DAILY., Disp: 90 tablet, Rfl: 1    cetirizine (ZyrTEC) 10 mg tablet, TAKE 1 TABLET BY MOUTH EVERY DAY, Disp: 90 tablet, Rfl: 1    Cholecalciferol (VITAMIN D3 PO), Take by mouth, Disp: , Rfl:     clopidogrel (PLAVIX) 75 mg tablet, TAKE 1 TABLET BY MOUTH EVERY DAY, Disp: 90 tablet, Rfl: 1    Diclofenac Sodium (VOLTAREN) 1 %, Apply 2 g topically 4 (four) times a day, Disp: 100 g, Rfl: 3    escitalopram (LEXAPRO) 10 mg tablet, TAKE 1 TABLET BY MOUTH EVERY DAY, Disp: 90 tablet, Rfl: 1    fluconazole (DIFLUCAN) 150 mg tablet, Take 1 tablet (150 mg total) by mouth once for 1 dose Once, Disp: 1 tablet, Rfl: 0    HYDROcodone-acetaminophen (Norco) 5-325 mg per tablet, Take 1 tablet by mouth every 6 (six) hours as needed for pain Max Daily Amount: 4 tablets, Disp: 60 tablet, Rfl: 0    lisinopril (ZESTRIL) 20 mg tablet, TAKE 1 TABLET BY MOUTH EVERY DAY, Disp: 90 tablet, Rfl: 1    methocarbamol (ROBAXIN) 500 mg tablet, Take 1 tablet (500 mg total) by mouth 2 (two) times a day For back pain radiating down the leg, Disp: 20 tablet, Rfl: 0    MINOXIDIL, TOPICAL, 5 % SOLN, Apply 1 application topically in the morning For hair, Disp: , Rfl:     montelukast (SINGULAIR) 10 mg tablet, TAKE 1 TABLET BY MOUTH EVERYDAY AT BEDTIME, Disp: 90 tablet, Rfl: 1    naproxen (NAPROSYN) 500 mg tablet, TAKE 1 TABLET BY MOUTH TWICE A DAY WITH MEALS, Disp: 60 tablet, Rfl: 5    Omega-3 Fatty Acids (FISH OIL PO), Take 1,000 mg by mouth, Disp: , Rfl:     pantoprazole (PROTONIX) 40 mg tablet, TAKE 1 TABLET BY MOUTH TWICE A DAY, Disp: 180 tablet, Rfl: 1    polyethylene glycol (MIRALAX) 17 g packet, Take 17 g  "by mouth daily, Disp: 510 g, Rfl: 2    rosuvastatin (CRESTOR) 5 mg tablet, TAKE 1 TABLET (5 MG TOTAL) BY MOUTH DAILY., Disp: 100 tablet, Rfl: 1    aspirin 81 mg chewable tablet, Chew 1 tablet (81 mg total) daily (Patient not taking: Reported on 3/10/2025), Disp: 30 tablet, Rfl: 3    fluticasone-umeclidinium-vilanterol 200-62.5-25 mcg/actuation AEPB inhaler, Inhale 1 puff daily Rinse mouth after use. (Patient not taking: Reported on 3/10/2025), Disp: 3 each, Rfl: 3    Current Facility-Administered Medications:     bupivacaine (PF) (MARCAINE) 0.25 % injection 1 mL, 1 mL, Intra-articular, , Christiano Madrid Garcia, DPM, 1 mL at 05/11/23 1706    triamcinolone acetonide (KENALOG-40) 40 mg/mL injection 20 mg, 20 mg, Intra-articular, , Christiano Madrid Garcia, DPM, 20 mg at 05/11/23 1706    Review of Systems   Constitutional:  Negative for chills, fatigue, fever and unexpected weight change.   Respiratory:  Negative for shortness of breath.    Gastrointestinal:  Negative for anal bleeding, blood in stool, constipation and diarrhea.   Genitourinary:  Negative for difficulty urinating, dysuria and hematuria.   Neurological:  Negative for weakness.   Psychiatric/Behavioral:  Negative for agitation, behavioral problems and confusion.           Objective   /92 (BP Location: Left arm, Patient Position: Sitting, Cuff Size: Standard)   Ht 5' 4\" (1.626 m)   Wt 91.2 kg (201 lb)   LMP  (LMP Unknown)   BMI 34.50 kg/m²      Physical Exam  Constitutional:       General: She is not in acute distress.     Appearance: She is well-developed.   HENT:      Head: Normocephalic.   Pulmonary:      Effort: Pulmonary effort is normal.   Abdominal:      Palpations: Abdomen is soft.   Genitourinary:     Labia:         Right: No rash, tenderness, lesion or injury.         Left: No rash, tenderness, lesion or injury.       Vagina: No signs of injury and foreign body. No vaginal discharge, erythema, tenderness or bleeding.      Uterus: Not deviated, not " enlarged, not fixed and not tender.       Adnexa:         Right: No mass, tenderness or fullness.          Left: No mass, tenderness or fullness.        Comments: MILD vaginal atrophy noted  Musculoskeletal:      Cervical back: Normal range of motion and neck supple.

## 2025-03-11 LAB — BACTERIA UR CULT: NORMAL

## 2025-03-12 ENCOUNTER — RESULTS FOLLOW-UP (OUTPATIENT)
Age: 70
End: 2025-03-12

## 2025-03-13 ENCOUNTER — HOSPITAL ENCOUNTER (OUTPATIENT)
Dept: RADIOLOGY | Facility: MEDICAL CENTER | Age: 70
Discharge: HOME | End: 2025-03-13
Payer: COMMERCIAL

## 2025-03-13 DIAGNOSIS — R10.2 PELVIC PRESSURE IN FEMALE: ICD-10-CM

## 2025-03-13 PROCEDURE — 76856 US EXAM PELVIC COMPLETE: CPT

## 2025-03-13 PROCEDURE — 76830 TRANSVAGINAL US NON-OB: CPT

## 2025-03-19 ENCOUNTER — TELEPHONE (OUTPATIENT)
Age: 70
End: 2025-03-19

## 2025-03-19 DIAGNOSIS — S46.012A ROTATOR CUFF STRAIN, LEFT, INITIAL ENCOUNTER: ICD-10-CM

## 2025-03-19 DIAGNOSIS — M79.604 RIGHT LEG PAIN: ICD-10-CM

## 2025-03-19 DIAGNOSIS — M25.561 RECURRENT PAIN OF RIGHT KNEE: ICD-10-CM

## 2025-03-19 NOTE — TELEPHONE ENCOUNTER
Patient called for US results, states she should have report back by now.   Patient had US 3/13, Reviewed radiologist has not yet released report, discussed provider will review and she will be notified. Patient verbalzied understanding.

## 2025-03-20 RX ORDER — HYDROCODONE BITARTRATE AND ACETAMINOPHEN 5; 325 MG/1; MG/1
1 TABLET ORAL EVERY 6 HOURS PRN
Qty: 60 TABLET | Refills: 0 | Status: SHIPPED | OUTPATIENT
Start: 2025-03-20

## 2025-03-20 NOTE — TELEPHONE ENCOUNTER
1 [id number] 11/15/2024 11/15/2024 HYDROcodone BITARTRATE 5 MG ORAL TABLET/ACETAMINOPHEN 325 MG (Tablet) 60.0 15 325 MG-5 MG 20.0 AYLEEN MADDEN [facility] Medicare 0 / 0 PA    1 [id number] 10/08/2024 10/08/2024 HYDROcodone BITARTRATE 5 MG ORAL TABLET/ACETAMINOPHEN 325 MG (Tablet) 60.0 15 325 MG-5 MG 20.0 AYLEEN MADDEN [facility] Medicare 0 / 0 PA    1 [id number] 09/13/2024 09/13/2024 HYDROcodone BITARTRATE 5 MG ORAL TABLET/ACETAMINOPHEN 325 MG (Tablet) 60.0 15 325 MG-5 MG 20.0 AYLEEN MADDEN [facility] Medicare 0 / 0 PA    1 [id number] 07/15/2024 07/15/2024 ALPRAZolam (Tablet) 90.0 30 0.25 MG NA AYLEEN MADDEN [facility] Med

## 2025-03-27 ENCOUNTER — OFFICE VISIT (OUTPATIENT)
Dept: PULMONOLOGY | Facility: CLINIC | Age: 70
End: 2025-03-27
Payer: COMMERCIAL

## 2025-03-27 VITALS
WEIGHT: 201.4 LBS | DIASTOLIC BLOOD PRESSURE: 80 MMHG | BODY MASS INDEX: 34.57 KG/M2 | HEART RATE: 93 BPM | TEMPERATURE: 97.6 F | OXYGEN SATURATION: 95 % | SYSTOLIC BLOOD PRESSURE: 130 MMHG

## 2025-03-27 DIAGNOSIS — F17.219 CIGARETTE NICOTINE DEPENDENCE WITH NICOTINE-INDUCED DISORDER: ICD-10-CM

## 2025-03-27 DIAGNOSIS — J42 CHRONIC BRONCHITIS, UNSPECIFIED CHRONIC BRONCHITIS TYPE (HCC): Primary | ICD-10-CM

## 2025-03-27 PROCEDURE — 99214 OFFICE O/P EST MOD 30 MIN: CPT | Performed by: NURSE PRACTITIONER

## 2025-03-27 RX ORDER — ALBUTEROL SULFATE 0.83 MG/ML
2.5 SOLUTION RESPIRATORY (INHALATION) EVERY 6 HOURS PRN
Qty: 180 ML | Refills: 5 | Status: SHIPPED | OUTPATIENT
Start: 2025-03-27

## 2025-03-27 RX ORDER — TIOTROPIUM BROMIDE INHALATION SPRAY 3.12 UG/1
2 SPRAY, METERED RESPIRATORY (INHALATION) DAILY
Qty: 12 G | Refills: 0 | Status: SHIPPED | OUTPATIENT
Start: 2025-03-27

## 2025-03-27 NOTE — PROGRESS NOTES
"Progress Note - Pulmonary   Leanna Ruvalcaba 69 y.o. female MRN: [id number]  Unit/Bed#:  Encounter: [id number]    Assessment/Plan:    1.  COPD without acute exacerbation        -3/2020-FEV1 59%-no noted obstruction upon PFTs        -Given macular degeneration will attempt her on a LABA/LAMA (Spiriva Respimat 2.5 mcg 2 puffs daily)        -If LABA/LAMA ineffective we will have to discuss with her ophthalmologist if lower dose Trelegy would be okay        -Patient to start using her nebulizer every morning then taking her Spiriva        -I have reordered albuterol nebulizer, nebulizer/supplies    2.  Tobacco abuse        -1PPD        -Appears in precontemplation stage        -Educated and encouraged tobacco cessation        -Currently cutting down her tobacco use        -Will order lung screening        Chief Complaint:    \"I am worried about my eyes\"    HPI:    Patient presents to the office today at her respiratory baseline.  Patient does report that her macular degeneration has significantly worsened in which she is following an ophthalmologist.  There is some concern with her being on high-dose Trelegy and her degeneration.  I have ordered her a sample of Spiriva Respimat and encouraged her to start taking her nebulizer minimum in the a.m. and at least 1 more time throughout the day.  Patient reports she is cut down from 1 and half packs a day to 1 pack/day.    Objective:    Vitals: Blood pressure 130/80, pulse 93, temperature 97.6 °F (36.4 °C), weight 91.4 kg (201 lb 6.4 oz), SpO2 95%, not currently breastfeeding.,Body mass index is 34.57 kg/m².    [unfilled]    Invasive Devices       None                   ROS  WNL    Physical Exam:     Physical Exam  Constitutional:       General: She is not in acute distress.     Appearance: Normal appearance. She is normal weight. She is not ill-appearing.   HENT:      Head: Normocephalic and atraumatic.      Nose: Nose normal. No congestion or rhinorrhea.      Mouth/Throat:      " Mouth: Mucous membranes are dry.      Pharynx: Oropharynx is clear. No oropharyngeal exudate or posterior oropharyngeal erythema.   Cardiovascular:      Pulses: Normal pulses.      Heart sounds: Normal heart sounds. No murmur heard.     No friction rub. No gallop.   Pulmonary:      Effort: Pulmonary effort is normal. No respiratory distress.      Breath sounds: No stridor. No wheezing, rhonchi or rales.      Comments: Some upper airway diminished aeration  Chest:      Chest wall: No tenderness.   Abdominal:      General: Abdomen is flat. Bowel sounds are normal. There is no distension.      Palpations: Abdomen is soft. There is no mass.   Musculoskeletal:         General: No swelling or tenderness. Normal range of motion.      Cervical back: Normal range of motion. No rigidity or tenderness.   Skin:     General: Skin is warm and dry.      Coloration: Skin is not jaundiced or pale.   Neurological:      General: No focal deficit present.      Mental Status: She is alert and oriented to person, place, and time. Mental status is at baseline.   Psychiatric:         Mood and Affect: Mood normal.         Behavior: Behavior normal.             Imaging and other studies: Results Review Statement: I personally reviewed the following image studies/reports in PACS and discussed pertinent findings with Radiology: chest xray. My interpretation of the radiology images/reports is:  .    IMPRESSION:        No acute cardiopulmonary disease.  No change from 7/6/2023      Interpretation Summary  Show Result Comparison     Left Ventricle: Left ventricular cavity size is normal. Wall thickness is normal. The left ventricular ejection fraction is 58%. Systolic function is normal. Wall motion is normal. Diastolic function is mildly abnormal, consistent with grade I (abnormal) relaxation.    Aorta: The aortic root exhibited mild fibrocalcific change.     Findings    Left Ventricle Left ventricular cavity size is normal. Wall thickness is  normal. The left ventricular ejection fraction is 58%. Systolic function is normal. Wall motion is normal. Diastolic function is mildly abnormal, consistent with grade I (abnormal) relaxation.   Right Ventricle Right ventricular cavity size is normal. Systolic function is normal. Wall thickness is normal.   Left Atrium The atrium is normal in size.   Right Atrium The atrium is normal in size.   Aortic Valve The aortic valve is trileaflet. The leaflets are not thickened. The leaflets are not calcified. The leaflets exhibit normal mobility. There is no evidence of regurgitation. The aortic valve has no significant stenosis.   Mitral Valve Mitral valve structure is normal. There is mild.  There is trace regurgitation. There is no evidence of stenosis.   Tricuspid Valve Tricuspid valve structure is normal. There is trace regurgitation. There is no evidence of stenosis.   Pulmonic Valve Pulmonic valve structure is normal. There is no evidence of regurgitation. There is no evidence of stenosis.   Ascending Aorta The aortic root exhibited mild fibrocalcific change.   IVC/SVC The inferior vena cava is normal in size.   Pericardium There is no pericardial effusion. The pericardium is normal in appearance.

## 2025-03-29 LAB
DME PARACHUTE DELIVERY DATE ACTUAL: NORMAL
DME PARACHUTE DELIVERY DATE REQUESTED: NORMAL
DME PARACHUTE ITEM DESCRIPTION: NORMAL
DME PARACHUTE ORDER STATUS: NORMAL
DME PARACHUTE SUPPLIER NAME: NORMAL
DME PARACHUTE SUPPLIER PHONE: NORMAL

## 2025-04-01 ENCOUNTER — CLINICAL SUPPORT (OUTPATIENT)
Dept: FAMILY MEDICINE CLINIC | Facility: CLINIC | Age: 70
End: 2025-04-01
Payer: COMMERCIAL

## 2025-04-01 ENCOUNTER — TELEPHONE (OUTPATIENT)
Age: 70
End: 2025-04-01

## 2025-04-01 ENCOUNTER — NURSE TRIAGE (OUTPATIENT)
Age: 70
End: 2025-04-01

## 2025-04-01 ENCOUNTER — OFFICE VISIT (OUTPATIENT)
Dept: FAMILY MEDICINE CLINIC | Facility: CLINIC | Age: 70
End: 2025-04-01
Payer: COMMERCIAL

## 2025-04-01 VITALS
TEMPERATURE: 97.2 F | SYSTOLIC BLOOD PRESSURE: 120 MMHG | HEART RATE: 87 BPM | HEIGHT: 64 IN | WEIGHT: 200 LBS | BODY MASS INDEX: 34.15 KG/M2 | DIASTOLIC BLOOD PRESSURE: 72 MMHG | OXYGEN SATURATION: 98 %

## 2025-04-01 DIAGNOSIS — R68.89 FLU-LIKE SYMPTOMS: Primary | ICD-10-CM

## 2025-04-01 DIAGNOSIS — R05.8 PRODUCTIVE COUGH: Primary | ICD-10-CM

## 2025-04-01 DIAGNOSIS — F11.20 OPIOID DEPENDENCE, UNCOMPLICATED (HCC): ICD-10-CM

## 2025-04-01 DIAGNOSIS — R05.8 PRODUCTIVE COUGH: ICD-10-CM

## 2025-04-01 LAB
DME PARACHUTE DELIVERY DATE ACTUAL: NORMAL
DME PARACHUTE DELIVERY DATE REQUESTED: NORMAL
DME PARACHUTE ITEM DESCRIPTION: NORMAL
DME PARACHUTE ORDER STATUS: NORMAL
DME PARACHUTE SUPPLIER NAME: NORMAL
DME PARACHUTE SUPPLIER PHONE: NORMAL
SARS-COV-2 AG UPPER RESP QL IA: NEGATIVE
SL AMB POCT RAPID FLU A: NORMAL
SL AMB POCT RAPID FLU B: NORMAL
VALID CONTROL: NORMAL

## 2025-04-01 PROCEDURE — 87804 INFLUENZA ASSAY W/OPTIC: CPT

## 2025-04-01 PROCEDURE — 87811 SARS-COV-2 COVID19 W/OPTIC: CPT

## 2025-04-01 PROCEDURE — 99213 OFFICE O/P EST LOW 20 MIN: CPT | Performed by: FAMILY MEDICINE

## 2025-04-01 RX ORDER — AZITHROMYCIN 250 MG/1
TABLET, FILM COATED ORAL
Qty: 6 TABLET | Refills: 0 | Status: SHIPPED | OUTPATIENT
Start: 2025-04-01 | End: 2025-04-01 | Stop reason: CLARIF

## 2025-04-01 RX ORDER — AZITHROMYCIN 250 MG/1
TABLET, FILM COATED ORAL
Qty: 6 TABLET | Refills: 0 | Status: SHIPPED | OUTPATIENT
Start: 2025-04-01 | End: 2025-04-05

## 2025-04-01 RX ORDER — AZITHROMYCIN 250 MG/1
TABLET, FILM COATED ORAL
Qty: 6 TABLET | Refills: 0 | Status: SHIPPED | OUTPATIENT
Start: 2025-04-01 | End: 2025-04-01 | Stop reason: SDUPTHER

## 2025-04-01 NOTE — TELEPHONE ENCOUNTER
Pt called in stating that on Sunday she started with a consistent productive cough, chest congestion, nasal congestion and body aches. Pt states that it has continued to get worse. Pt is asking for an antibiotic. Please advise. Pt asking for a callback.

## 2025-04-01 NOTE — TELEPHONE ENCOUNTER
Regarding: medication problem  ----- Message from Valentino PADILLA sent at 4/1/2025  2:44 PM EDT -----  Patient called in stating pharmacy could not dispense medication as it has been cancelled. Medication was resent but patient stated they cannot dispense since first one was cancelled.   Medication - azithromycin (ZITHROMAX) 250 mg tablet  Please resend to [facility] thank you

## 2025-04-01 NOTE — TELEPHONE ENCOUNTER
"Reason for Disposition  • Prescription prescribed recently is not at pharmacy and triager has access to patient's EMR and prescription is recorded in the EMR    Answer Assessment - Initial Assessment Questions  1. NAME of MEDICINE: \"What medicine(s) are you calling about?\"      Azithromycin 250 mg  2. QUESTION: \"What is your question?\" (e.g., double dose of medicine, side effect)      Medication to be resent  3. PRESCRIBER: \"Who prescribed the medicine?\" Reason: if prescribed by specialist, call should be referred to that group.      Dr. Grider    Protocols used: Medication Question Call-Adult-[place]    "

## 2025-04-01 NOTE — PROGRESS NOTES
Name: Leanna Ruvalcaba      : 1955      MRN: [id number]  Encounter Provider: Matt Grider MD  Encounter Date: 2025   Encounter department: [facility]    Assessment & Plan  Productive cough  I reviewed with pt. Increase albuterol to q6h over the next few days. Continue Spiriva. Start z-pack.   Recheck Friday if not improving - earlier if worse  Orders:  •  azithromycin (ZITHROMAX) 250 mg tablet; 2 tab today then 1 tab po qd x 4 d    Opioid dependence, uncomplicated (HCC)              History of Present Illness     70 yo female with hx of COPD was in normal state of health until  when she developed congestion, chills, sinus pressure and productive cough with increased wheeze but no worsening SOB. Pt still smokes. She was recently changed from Trelegy to Spiriva with albuterol due to ophth changes related to steroid use. Pt denies worsening body aches.       Review of Systems   Constitutional:  Positive for activity change, chills and fatigue. Negative for appetite change and fever.   HENT:  Positive for congestion, sinus pressure and sinus pain. Negative for ear discharge, ear pain, facial swelling and sore throat.    Eyes: Negative.    Respiratory:  Positive for cough and wheezing.    Cardiovascular: Negative.      Past Medical History:   Diagnosis Date   • Acid reflux    • Back injury    • COPD (chronic obstructive pulmonary disease) (HCC)    • Fibromyalgia    • Hypertension    • Seasonal allergies      Past Surgical History:   Procedure Laterality Date   • COLONOSCOPY     • EYE SURGERY     • KNEE SURGERY     • [place] COLONOSCOPY FLX DX W/COLLJ SPEC WHEN PFRMD N/A 2017    Procedure: EGD AND COLONOSCOPY;  Surgeon: Kimberly Zapata MD;  Location: [facility];  Service: Gastroenterology     Family History   Problem Relation Age of Onset   • Pancreatic cancer Mother 72   • Coronary artery disease Father    • Diabetes Father    • Hypertension Father    • Heart disease  Father    • Lung cancer Sister 37   • No Known Problems Daughter    • No Known Problems Maternal Grandmother    • Lung cancer Maternal Grandfather 77   • Diabetes Paternal Grandmother    • No Known Problems Paternal Grandfather    • Pancreatic cancer Brother    • Pancreatic cancer Brother    • No Known Problems Son    • No Known Problems Son      Social History     Tobacco Use   • Smoking status: Every Day     Current packs/day: 1.50     Average packs/day: 1.5 packs/day for 57.2 years (85.9 ttl pk-yrs)     Types: Cigarettes     Start date: 1968     Passive exposure: Current   • Smokeless tobacco: Never   Vaping Use   • Vaping status: Never Used   Substance and Sexual Activity   • Alcohol use: Never   • Drug use: No   • Sexual activity: Not Currently     Current Outpatient Medications on File Prior to Visit   Medication Sig   • acetaminophen (TYLENOL) 650 mg CR tablet Take by mouth every 8 (eight) hours as needed   • albuterol (2.5 mg/3 mL) 0.083 % nebulizer solution Take 3 mL (2.5 mg total) by nebulization every 6 (six) hours as needed for wheezing or shortness of breath   • albuterol (PROVENTIL HFA,VENTOLIN HFA) 90 mcg/act inhaler Inhale 2 puffs every 6 (six) hours as needed for wheezing   • ALPRAZolam (XANAX) 0.25 mg tablet Take 1 tablet (0.25 mg total) by mouth 3 (three) times a day as needed for anxiety   • amLODIPine (NORVASC) 5 mg tablet TAKE 1 TABLET (5 MG TOTAL) BY MOUTH DAILY.   • aspirin 81 mg chewable tablet Chew 1 tablet (81 mg total) daily (Patient not taking: Reported on 3/10/2025)   • cetirizine (ZyrTEC) 10 mg tablet TAKE 1 TABLET BY MOUTH EVERY DAY   • Cholecalciferol (VITAMIN D3 PO) Take by mouth   • clopidogrel (PLAVIX) 75 mg tablet TAKE 1 TABLET BY MOUTH EVERY DAY   • Diclofenac Sodium (VOLTAREN) 1 % Apply 2 g topically 4 (four) times a day   • escitalopram (LEXAPRO) 10 mg tablet TAKE 1 TABLET BY MOUTH EVERY DAY   • fluticasone-umeclidinium-vilanterol 200-62.5-25 mcg/actuation AEPB inhaler Inhale  "1 puff daily Rinse mouth after use. (Patient not taking: Reported on 3/10/2025)   • HYDROcodone-acetaminophen (Norco) 5-325 mg per tablet Take 1 tablet by mouth every 6 (six) hours as needed for pain Max Daily Amount: 4 tablets   • lisinopril (ZESTRIL) 20 mg tablet TAKE 1 TABLET BY MOUTH EVERY DAY   • methocarbamol (ROBAXIN) 500 mg tablet Take 1 tablet (500 mg total) by mouth 2 (two) times a day For back pain radiating down the leg   • MINOXIDIL, TOPICAL, 5 % SOLN Apply 1 application topically in the morning For hair   • montelukast (SINGULAIR) 10 mg tablet TAKE 1 TABLET BY MOUTH EVERYDAY AT BEDTIME   • naproxen (NAPROSYN) 500 mg tablet TAKE 1 TABLET BY MOUTH TWICE A DAY WITH MEALS   • Omega-3 Fatty Acids (FISH OIL PO) Take 1,000 mg by mouth   • pantoprazole (PROTONIX) 40 mg tablet TAKE 1 TABLET BY MOUTH TWICE A DAY   • polyethylene glycol (MIRALAX) 17 g packet Take 17 g by mouth daily   • rosuvastatin (CRESTOR) 5 mg tablet TAKE 1 TABLET (5 MG TOTAL) BY MOUTH DAILY.   • tiotropium (Spiriva Respimat) 2.5 MCG/ACT AERS inhaler Inhale 2 puffs daily   • [DISCONTINUED] azithromycin (ZITHROMAX) 250 mg tablet 2 tab today then 1 tab po qd x 4 d     Allergies   Allergen Reactions   • Augmentin [Amoxicillin-Pot Clavulanate] Vomiting   • Miconazole Itching and Swelling   • Wixela Inhub [Fluticasone-Salmeterol] Palpitations     Immunization History   Administered Date(s) Administered   • Tdap 02/28/2008     Objective   /72   Pulse 87   Temp (!) 97.2 °F (36.2 °C)   Ht 5' 4\" (1.626 m)   Wt 90.7 kg (200 lb)   LMP  (LMP Unknown)   SpO2 98%   BMI 34.33 kg/m²     Physical Exam  Vitals reviewed.   HENT:      Head: Normocephalic.      Right Ear: Tympanic membrane, ear canal and external ear normal.      Left Ear: Tympanic membrane, ear canal and external ear normal.      Nose: Congestion present.      Comments: Sinuses NT to palp     Mouth/Throat:      Mouth: Mucous membranes are moist.      Pharynx: No oropharyngeal " exudate or posterior oropharyngeal erythema.   Eyes:      Extraocular Movements: Extraocular movements intact.      Conjunctiva/sclera: Conjunctivae normal.      Pupils: Pupils are equal, round, and reactive to light.   Cardiovascular:      Rate and Rhythm: Normal rate and regular rhythm.   Pulmonary:      Effort: Pulmonary effort is normal.      Breath sounds: Wheezing (scattered) present. No rhonchi or rales.   Musculoskeletal:      Cervical back: No tenderness.   Lymphadenopathy:      Cervical: No cervical adenopathy.   Skin:     Capillary Refill: Capillary refill takes less than 2 seconds.   Neurological:      Mental Status: She is alert.

## 2025-04-07 ENCOUNTER — HOSPITAL ENCOUNTER (OUTPATIENT)
Dept: RADIOLOGY | Facility: MEDICAL CENTER | Age: 70
Discharge: HOME | End: 2025-04-07

## 2025-04-07 DIAGNOSIS — F17.219 CIGARETTE NICOTINE DEPENDENCE WITH NICOTINE-INDUCED DISORDER: ICD-10-CM

## 2025-04-10 ENCOUNTER — RESULTS FOLLOW-UP (OUTPATIENT)
Dept: OTHER | Facility: HOSPITAL | Age: 70
End: 2025-04-10

## 2025-04-10 ENCOUNTER — NURSE TRIAGE (OUTPATIENT)
Age: 70
End: 2025-04-10

## 2025-04-10 DIAGNOSIS — R91.1 LUNG NODULE: Primary | ICD-10-CM

## 2025-04-10 DIAGNOSIS — J01.90 ACUTE NON-RECURRENT SINUSITIS, UNSPECIFIED LOCATION: Primary | ICD-10-CM

## 2025-04-10 RX ORDER — AMOXICILLIN 875 MG/1
875 TABLET, COATED ORAL 2 TIMES DAILY
Qty: 20 TABLET | Refills: 0 | Status: SHIPPED | OUTPATIENT
Start: 2025-04-10 | End: 2025-04-20

## 2025-04-10 NOTE — TELEPHONE ENCOUNTER
FOLLOW UP: Today    REASON FOR CONVERSATION: Sinusitis    SYMPTOMS: Continuous cough with phlegm, wheezing, SOB on exertion.      OTHER: Patient finished Zpack. Patient asked for Amoxicillin to be called in. Please follow up.      DISPOSITION: Discuss With PCP and Callback by Nurse Today (overriding See Today or Tomorrow in Office)

## 2025-04-10 NOTE — TELEPHONE ENCOUNTER
"Reason for Disposition  • Sinus congestion (pressure, fullness) present > 10 days    Answer Assessment - Initial Assessment Questions  1. LOCATION: \"Where does it hurt?\"       Forehead   2. ONSET: \"When did the sinus pain start?\"  (e.g., hours, days)       2 weeks ago   3. SEVERITY: \"How bad is the pain?\"   (Scale 0-10; or none, mild, moderate or severe)      Pressure   5. NASAL CONGESTION: \"Is the nose blocked?\" If Yes, ask: \"Can you open it or must you breathe through your mouth?\"      Sometimes   6. NASAL DISCHARGE: \"Do you have discharge from your nose?\" If so ask, \"What color?\"      Clear   7. FEVER: \"Do you have a fever?\" If Yes, ask: \"What is it, how was it measured, and when did it start?\"       No   8. OTHER SYMPTOMS: \"Do you have any other symptoms?\" (e.g., sore throat, cough, earache, difficulty breathing)      Cough persisted with yellow and clear phlegm, SOB on exertion, wheezing.   9. PREGNANCY: \"Is there any chance you are pregnant?\" \"When was your last menstrual period?\"      N/A    Protocols used: Sinus Pain or Congestion-Adult-[place]    "

## 2025-04-10 NOTE — TELEPHONE ENCOUNTER
Can you please call and let patient know chest CT is stable there are few scattered tiny nodules however nothing to be concerned about we will repeat the CAT scan in 1 year

## 2025-04-11 ENCOUNTER — OFFICE VISIT (OUTPATIENT)
Dept: FAMILY MEDICINE CLINIC | Facility: CLINIC | Age: 70
End: 2025-04-11
Payer: COMMERCIAL

## 2025-04-11 VITALS
OXYGEN SATURATION: 98 % | HEART RATE: 72 BPM | DIASTOLIC BLOOD PRESSURE: 82 MMHG | SYSTOLIC BLOOD PRESSURE: 120 MMHG | BODY MASS INDEX: 33.97 KG/M2 | HEIGHT: 64 IN | WEIGHT: 199 LBS | TEMPERATURE: 97.2 F

## 2025-04-11 DIAGNOSIS — R05.8 PRODUCTIVE COUGH: Primary | ICD-10-CM

## 2025-04-11 DIAGNOSIS — J42 CHRONIC BRONCHITIS, UNSPECIFIED CHRONIC BRONCHITIS TYPE (HCC): ICD-10-CM

## 2025-04-11 PROCEDURE — 99213 OFFICE O/P EST LOW 20 MIN: CPT | Performed by: FAMILY MEDICINE

## 2025-04-11 NOTE — PROGRESS NOTES
Name: Leanna Ruvalcaba      : 1955      MRN: [id number]  Encounter Provider: Matt Grider MD  Encounter Date: 2025   Encounter department: [facility]    Assessment & Plan  Productive cough  I reviewed with patient.  Restart Trelegy at a slightly lower dose- 100-60 2.5-25 mcg.  Continue rescue inhaler.  Recheck 1 week if not improved-earlier if worse  Orders:  •  fluticasone-umeclidinium-vilanterol 100-62.5-25 mcg/actuation inhaler; Inhale 1 puff daily Rinse mouth after use.    Chronic bronchitis, unspecified chronic bronchitis type (HCC)  I reviewed with patient.  Restart Trelegy at a slightly lower dose- 100-60 2.5-25 mcg.  Continue rescue inhaler.  Recheck 1 week if not improved-earlier if worse  Orders:  •  fluticasone-umeclidinium-vilanterol 100-62.5-25 mcg/actuation inhaler; Inhale 1 puff daily Rinse mouth after use.         History of Present Illness     69-year-old female with a history of COPD here for follow-up of worsening shortness of breath and cough.  Patient recently diagnosed with with type MD and was told to avoid steroids.  Patient had been stable on Trelegy but was switched to Spiriva with albuterol by her pulmonologist due to this recommendation.  Ever since the change, patient notes that her cough and her shortness of breath has worsened.  She would like to go back on the Trelegy.  Previous dose was the 200 mcg/62.5/25.  She also notes some increased nasal congestion as well as sinus pain.  She was recently started on amoxicillin yesterday.  She denies any fever or chills.      Review of Systems   Constitutional:  Positive for activity change and fatigue. Negative for appetite change.   HENT: Negative.     Eyes: Negative.    Respiratory:  Positive for shortness of breath.    Cardiovascular: Negative.    Musculoskeletal: Negative.      Past Medical History:   Diagnosis Date   • Acid reflux    • Back injury    • COPD (chronic obstructive pulmonary  disease) (HCC)    • Fibromyalgia    • Hypertension    • Seasonal allergies      Past Surgical History:   Procedure Laterality Date   • COLONOSCOPY     • EYE SURGERY     • KNEE SURGERY  1998   • [place] COLONOSCOPY FLX DX W/COLLJ SPEC WHEN PFRMD N/A 05/09/2017    Procedure: EGD AND COLONOSCOPY;  Surgeon: Kimberly Zapata MD;  Location: [facility];  Service: Gastroenterology     Family History   Problem Relation Age of Onset   • Pancreatic cancer Mother 72   • Coronary artery disease Father    • Diabetes Father    • Hypertension Father    • Heart disease Father    • Lung cancer Sister 37   • No Known Problems Daughter    • No Known Problems Maternal Grandmother    • Lung cancer Maternal Grandfather 77   • Diabetes Paternal Grandmother    • No Known Problems Paternal Grandfather    • Pancreatic cancer Brother    • Pancreatic cancer Brother    • No Known Problems Son    • No Known Problems Son      Social History     Tobacco Use   • Smoking status: Every Day     Current packs/day: 1.50     Average packs/day: 1.5 packs/day for 57.3 years (85.9 ttl pk-yrs)     Types: Cigarettes     Start date: 1968     Passive exposure: Current   • Smokeless tobacco: Never   Vaping Use   • Vaping status: Never Used   Substance and Sexual Activity   • Alcohol use: Never   • Drug use: No   • Sexual activity: Not Currently     Current Outpatient Medications on File Prior to Visit   Medication Sig   • acetaminophen (TYLENOL) 650 mg CR tablet Take by mouth every 8 (eight) hours as needed   • albuterol (2.5 mg/3 mL) 0.083 % nebulizer solution Take 3 mL (2.5 mg total) by nebulization every 6 (six) hours as needed for wheezing or shortness of breath   • albuterol (PROVENTIL HFA,VENTOLIN HFA) 90 mcg/act inhaler Inhale 2 puffs every 6 (six) hours as needed for wheezing   • ALPRAZolam (XANAX) 0.25 mg tablet Take 1 tablet (0.25 mg total) by mouth 3 (three) times a day as needed for anxiety   • amLODIPine (NORVASC) 5 mg tablet TAKE 1 TABLET (5 MG TOTAL) BY  "MOUTH DAILY.   • amoxicillin (AMOXIL) 875 mg tablet Take 1 tablet (875 mg total) by mouth 2 (two) times a day for 10 days   • cetirizine (ZyrTEC) 10 mg tablet TAKE 1 TABLET BY MOUTH EVERY DAY   • Cholecalciferol (VITAMIN D3 PO) Take by mouth   • clopidogrel (PLAVIX) 75 mg tablet TAKE 1 TABLET BY MOUTH EVERY DAY   • Diclofenac Sodium (VOLTAREN) 1 % Apply 2 g topically 4 (four) times a day   • escitalopram (LEXAPRO) 10 mg tablet TAKE 1 TABLET BY MOUTH EVERY DAY   • HYDROcodone-acetaminophen (Norco) 5-325 mg per tablet Take 1 tablet by mouth every 6 (six) hours as needed for pain Max Daily Amount: 4 tablets   • lisinopril (ZESTRIL) 20 mg tablet TAKE 1 TABLET BY MOUTH EVERY DAY   • methocarbamol (ROBAXIN) 500 mg tablet Take 1 tablet (500 mg total) by mouth 2 (two) times a day For back pain radiating down the leg   • MINOXIDIL, TOPICAL, 5 % SOLN Apply 1 application topically in the morning For hair   • montelukast (SINGULAIR) 10 mg tablet TAKE 1 TABLET BY MOUTH EVERYDAY AT BEDTIME   • naproxen (NAPROSYN) 500 mg tablet TAKE 1 TABLET BY MOUTH TWICE A DAY WITH MEALS   • Omega-3 Fatty Acids (FISH OIL PO) Take 1,000 mg by mouth   • pantoprazole (PROTONIX) 40 mg tablet TAKE 1 TABLET BY MOUTH TWICE A DAY   • polyethylene glycol (MIRALAX) 17 g packet Take 17 g by mouth daily   • rosuvastatin (CRESTOR) 5 mg tablet TAKE 1 TABLET (5 MG TOTAL) BY MOUTH DAILY.     Allergies   Allergen Reactions   • Augmentin [Amoxicillin-Pot Clavulanate] Vomiting   • Miconazole Itching and Swelling   • Wixela Inhub [Fluticasone-Salmeterol] Palpitations     Immunization History   Administered Date(s) Administered   • Tdap 02/28/2008     Objective   /82   Pulse 72   Temp (!) 97.2 °F (36.2 °C)   Ht 5' 4\" (1.626 m)   Wt 90.3 kg (199 lb)   LMP  (LMP Unknown)   SpO2 98%   BMI 34.16 kg/m²     Physical Exam  Vitals reviewed.   Constitutional:       Appearance: Normal appearance.   HENT:      Head: Normocephalic.      Right Ear: Tympanic " membrane, ear canal and external ear normal.      Left Ear: Tympanic membrane, ear canal and external ear normal.      Nose: No congestion.      Mouth/Throat:      Mouth: Mucous membranes are moist.   Eyes:      Extraocular Movements: Extraocular movements intact.      Conjunctiva/sclera: Conjunctivae normal.      Pupils: Pupils are equal, round, and reactive to light.   Cardiovascular:      Rate and Rhythm: Normal rate and regular rhythm.      Pulses: Normal pulses.   Pulmonary:      Effort: Pulmonary effort is normal.      Breath sounds: Wheezing (faint,scattered) present. No rales.   Musculoskeletal:      Cervical back: No tenderness.   Lymphadenopathy:      Cervical: No cervical adenopathy.   Neurological:      Mental Status: She is alert.

## 2025-04-11 NOTE — ASSESSMENT & PLAN NOTE
I reviewed with patient.  Restart Trelegy at a slightly lower dose- 100-60 2.5-25 mcg.  Continue rescue inhaler.  Recheck 1 week if not improved-earlier if worse  Orders:  •  fluticasone-umeclidinium-vilanterol 100-62.5-25 mcg/actuation inhaler; Inhale 1 puff daily Rinse mouth after use.

## 2025-04-15 ENCOUNTER — TELEPHONE (OUTPATIENT)
Dept: FAMILY MEDICINE CLINIC | Facility: CLINIC | Age: 70
End: 2025-04-15

## 2025-04-15 DIAGNOSIS — R05.8 PRODUCTIVE COUGH: Primary | ICD-10-CM

## 2025-04-15 RX ORDER — PREDNISONE 20 MG/1
40 TABLET ORAL DAILY
Qty: 10 TABLET | Refills: 0 | Status: SHIPPED | OUTPATIENT
Start: 2025-04-15 | End: 2025-04-20

## 2025-04-15 NOTE — TELEPHONE ENCOUNTER
Leanna called, saw you last week. She was to call with an update. States breathing is still rough, nasal congestion worse, coughing is worse. Currently on Trellegy. She is requesting possible steroid. She also said that she has a call into her eye doctor. She was reading the pamphlet for her eye drops and it says not to use in patient's with COPD as it could exacerbate the symptoms. She is waiting on their call back as well. She did confirm that she is taking the Amoxicillin but it is not making any difference.

## 2025-04-15 NOTE — TELEPHONE ENCOUNTER
Patient returned call to question dose for   predniSONE 20 mg tablet order sent in today. RN confirms 20 mg tablet for patient to take 2 tablets for total 40 mg daily for 5 days. Patient verbalized understanding.

## 2025-04-18 DIAGNOSIS — R05.8 PRODUCTIVE COUGH: Primary | ICD-10-CM

## 2025-04-18 RX ORDER — AZITHROMYCIN 250 MG/1
TABLET, FILM COATED ORAL
Qty: 6 TABLET | Refills: 0 | Status: SHIPPED | OUTPATIENT
Start: 2025-04-18 | End: 2025-04-22

## 2025-04-18 NOTE — TELEPHONE ENCOUNTER
Pt called to report she is still experiencing wheezing, a lot of coughing, and a temperature of 99.5. I told her I would send a message to Dr. Coleman and we would call her at some point today with further instruction.

## 2025-04-24 ENCOUNTER — APPOINTMENT (OUTPATIENT)
Dept: LAB | Facility: MEDICAL CENTER | Age: 70
End: 2025-04-24

## 2025-04-24 ENCOUNTER — OFFICE VISIT (OUTPATIENT)
Dept: FAMILY MEDICINE CLINIC | Facility: CLINIC | Age: 70
End: 2025-04-24
Payer: COMMERCIAL

## 2025-04-24 VITALS
BODY MASS INDEX: 33.8 KG/M2 | HEART RATE: 91 BPM | SYSTOLIC BLOOD PRESSURE: 150 MMHG | OXYGEN SATURATION: 95 % | WEIGHT: 198 LBS | TEMPERATURE: 97 F | HEIGHT: 64 IN | DIASTOLIC BLOOD PRESSURE: 90 MMHG

## 2025-04-24 DIAGNOSIS — R19.7 DIARRHEA OF PRESUMED INFECTIOUS ORIGIN: ICD-10-CM

## 2025-04-24 DIAGNOSIS — J01.40 ACUTE NON-RECURRENT PANSINUSITIS: Primary | ICD-10-CM

## 2025-04-24 PROCEDURE — 99214 OFFICE O/P EST MOD 30 MIN: CPT | Performed by: FAMILY MEDICINE

## 2025-04-24 RX ORDER — CEFDINIR 300 MG/1
300 CAPSULE ORAL EVERY 12 HOURS SCHEDULED
Qty: 14 CAPSULE | Refills: 0 | Status: SHIPPED | OUTPATIENT
Start: 2025-04-24 | End: 2025-05-01

## 2025-04-24 RX ORDER — FLUTICASONE FUROATE, UMECLIDINIUM BROMIDE AND VILANTEROL TRIFENATATE 200; 62.5; 25 UG/1; UG/1; UG/1
1 POWDER RESPIRATORY (INHALATION) DAILY
COMMUNITY

## 2025-04-24 RX ORDER — FLUTICASONE PROPIONATE 50 MCG
2 SPRAY, SUSPENSION (ML) NASAL DAILY
Qty: 11.1 ML | Refills: 1 | Status: SHIPPED | OUTPATIENT
Start: 2025-04-24

## 2025-04-24 NOTE — PROGRESS NOTES
"Name: Leanna Ruvalcaba      : 1955      MRN: [id number]  Encounter Provider: Matt Grider MD  Encounter Date: 2025   Encounter department: [facility]    Assessment & Plan  Acute non-recurrent pansinusitis  I reviewed with pt.  Start cefdinir and fluticasone nasal.  Recheck 1w if not improving - consider ENT eval if not improving  Orders:  •  cefdinir (OMNICEF) 300 mg capsule; Take 1 capsule (300 mg total) by mouth every 12 (twelve) hours for 7 days  •  fluticasone (FLONASE) 50 mcg/act nasal spray; 2 sprays into each nostril daily 2 sprays bilat qd    Diarrhea of presumed infectious origin  Unclear cause. Check C.Diff due to recent abx exposure. Avoid foods that \"trigger\" symptoms.  OK Imodium.  Recheck 1-2w if not improving - earlier if worse  Orders:  •  Clostridioides difficile toxin by PCR with EIA; Future         History of Present Illness     f/u multiple med issues  - \"I feel lousy\".  Patient has been fighting URI symptoms including sinus congestion for the last 3 to 4 weeks.  She is finished 3 courses of antibiotics as well as a course of steroids but still feels frontal and maxillary sinus congestion.  She also still has a cough though she states that she is breathing a little bit better.  -Patient notes a 1 week history of diarrhea since taking her last set of antibiotics.  Patient also notes decreased appetite though this could be related to postnasal drip.  Patient notes that she has been nauseous and has vomited up phlegm a couple times in the last week or 2.  -Patient has been actively trying to cut back on smoking.  She is presently down to 1/2 pack a day.  However she notes increased fatigue and worsening mood as she cut back on her smoking.      Review of Systems   Constitutional:  Positive for activity change, appetite change, chills and fatigue. Negative for fever.   HENT:  Positive for congestion, sinus pressure and sinus pain. Negative for ear " discharge, ear pain and sore throat.    Eyes: Negative.    Respiratory:  Positive for cough. Negative for shortness of breath and wheezing.    Cardiovascular: Negative.    Gastrointestinal: Negative.    Musculoskeletal:  Positive for arthralgias, back pain and myalgias.   Skin: Negative.    Neurological:  Negative for dizziness, weakness, light-headedness, numbness and headaches.     Past Medical History:   Diagnosis Date   • Acid reflux    • Back injury    • COPD (chronic obstructive pulmonary disease) (HCC)    • Fibromyalgia    • Hypertension    • Seasonal allergies      Past Surgical History:   Procedure Laterality Date   • COLONOSCOPY     • EYE SURGERY     • KNEE SURGERY  1998   • [place] COLONOSCOPY FLX DX W/COLLJ SPEC WHEN PFRMD N/A 05/09/2017    Procedure: EGD AND COLONOSCOPY;  Surgeon: Kimberly Zapata MD;  [facility]: [facility];  Service: Gastroenterology     Family History   Problem Relation Age of Onset   • Pancreatic cancer Mother 72   • Coronary artery disease Father    • Diabetes Father    • Hypertension Father    • Heart disease Father    • Lung cancer Sister 37   • No Known Problems Daughter    • No Known Problems Maternal Grandmother    • Lung cancer Maternal Grandfather 77   • Diabetes Paternal Grandmother    • No Known Problems Paternal Grandfather    • Pancreatic cancer Brother    • Pancreatic cancer Brother    • No Known Problems Son    • No Known Problems Son      Social History     Tobacco Use   • Smoking status: Every Day     Current packs/day: 1.50     Average packs/day: 1.5 packs/day for 57.3 years (86.0 ttl pk-yrs)     Types: Cigarettes     Start date: 1968     Passive exposure: Current   • Smokeless tobacco: Never   Vaping Use   • Vaping status: Never Used   Substance and Sexual Activity   • Alcohol use: Never   • Drug use: No   • Sexual activity: Not Currently     Current Outpatient Medications on File Prior to Visit   Medication Sig   • acetaminophen (TYLENOL) 650 mg CR tablet Take by mouth  "every 8 (eight) hours as needed   • albuterol (2.5 mg/3 mL) 0.083 % nebulizer solution Take 3 mL (2.5 mg total) by nebulization every 6 (six) hours as needed for wheezing or shortness of breath   • amLODIPine (NORVASC) 5 mg tablet TAKE 1 TABLET (5 MG TOTAL) BY MOUTH DAILY.   • Cholecalciferol (VITAMIN D3 PO) Take by mouth   • clopidogrel (PLAVIX) 75 mg tablet TAKE 1 TABLET BY MOUTH EVERY DAY   • Diclofenac Sodium (VOLTAREN) 1 % Apply 2 g topically 4 (four) times a day   • escitalopram (LEXAPRO) 10 mg tablet TAKE 1 TABLET BY MOUTH EVERY DAY   • fluticasone-umeclidinium-vilanterol (Trelegy Ellipta) 200-62.5-25 mcg/actuation AEPB inhaler Inhale 1 puff daily Rinse mouth after use.   • HYDROcodone-acetaminophen (Norco) 5-325 mg per tablet Take 1 tablet by mouth every 6 (six) hours as needed for pain Max Daily Amount: 4 tablets   • lisinopril (ZESTRIL) 20 mg tablet TAKE 1 TABLET BY MOUTH EVERY DAY   • methocarbamol (ROBAXIN) 500 mg tablet Take 1 tablet (500 mg total) by mouth 2 (two) times a day For back pain radiating down the leg   • MINOXIDIL, TOPICAL, 5 % SOLN Apply 1 application topically in the morning For hair   • montelukast (SINGULAIR) 10 mg tablet TAKE 1 TABLET BY MOUTH EVERYDAY AT BEDTIME   • naproxen (NAPROSYN) 500 mg tablet TAKE 1 TABLET BY MOUTH TWICE A DAY WITH MEALS   • Omega-3 Fatty Acids (FISH OIL PO) Take 1,000 mg by mouth   • pantoprazole (PROTONIX) 40 mg tablet TAKE 1 TABLET BY MOUTH TWICE A DAY   • polyethylene glycol (MIRALAX) 17 g packet Take 17 g by mouth daily   • rosuvastatin (CRESTOR) 5 mg tablet TAKE 1 TABLET (5 MG TOTAL) BY MOUTH DAILY.     Allergies   Allergen Reactions   • Augmentin [Amoxicillin-Pot Clavulanate] Vomiting   • Miconazole Itching and Swelling   • Wixela Inhub [Fluticasone-Salmeterol] Palpitations     Immunization History   Administered Date(s) Administered   • Tdap 02/28/2008     Objective   /90   Pulse 91   Temp (!) 97 °F (36.1 °C)   Ht 5' 4\" (1.626 m)   Wt 89.8 kg " (198 lb)   LMP  (LMP Unknown)   SpO2 95%   BMI 33.99 kg/m²     Physical Exam  Vitals reviewed.   Constitutional:       Comments: Mildly ill appearing female in NAD   HENT:      Head: Normocephalic.      Right Ear: Tympanic membrane, ear canal and external ear normal.      Left Ear: Tympanic membrane, ear canal and external ear normal.      Nose: Congestion present.      Comments: Frontal and maxillary sinuses TTP     Mouth/Throat:      Mouth: Mucous membranes are moist.      Pharynx: No oropharyngeal exudate or posterior oropharyngeal erythema.   Eyes:      General: No scleral icterus.     Extraocular Movements: Extraocular movements intact.      Conjunctiva/sclera: Conjunctivae normal.      Pupils: Pupils are equal, round, and reactive to light.   Cardiovascular:      Rate and Rhythm: Normal rate and regular rhythm.      Pulses: Normal pulses.   Pulmonary:      Effort: Pulmonary effort is normal.      Breath sounds: Normal breath sounds. No rales.   Abdominal:      General: There is no distension.      Palpations: There is no mass.      Tenderness: There is no abdominal tenderness.      Comments: Sl increased BS   Musculoskeletal:      Cervical back: No tenderness.      Right lower leg: No edema.      Left lower leg: No edema.   Lymphadenopathy:      Cervical: No cervical adenopathy.   Skin:     General: Skin is warm.      Capillary Refill: Capillary refill takes less than 2 seconds.   Neurological:      Mental Status: She is alert.

## 2025-04-25 ENCOUNTER — APPOINTMENT (OUTPATIENT)
Dept: LAB | Facility: MEDICAL CENTER | Age: 70
End: 2025-04-25
Attending: FAMILY MEDICINE
Payer: COMMERCIAL

## 2025-04-25 DIAGNOSIS — J42 CHRONIC BRONCHITIS, UNSPECIFIED CHRONIC BRONCHITIS TYPE (HCC): ICD-10-CM

## 2025-04-25 DIAGNOSIS — F41.9 ANXIETY: ICD-10-CM

## 2025-04-25 PROCEDURE — 87493 C DIFF AMPLIFIED PROBE: CPT

## 2025-04-25 RX ORDER — ALPRAZOLAM 0.25 MG
0.25 TABLET ORAL 3 TIMES DAILY PRN
Qty: 90 TABLET | Refills: 0 | Status: SHIPPED | OUTPATIENT
Start: 2025-04-25

## 2025-04-25 RX ORDER — ALBUTEROL SULFATE 0.83 MG/ML
2.5 SOLUTION RESPIRATORY (INHALATION) EVERY 6 HOURS PRN
Qty: 180 ML | Refills: 0 | OUTPATIENT
Start: 2025-04-25

## 2025-04-25 RX ORDER — ALBUTEROL SULFATE 90 UG/1
2 INHALANT RESPIRATORY (INHALATION) EVERY 6 HOURS PRN
Qty: 18 G | Refills: 0 | Status: SHIPPED | OUTPATIENT
Start: 2025-04-25

## 2025-04-25 NOTE — TELEPHONE ENCOUNTER
Requested medication(s) are due for refill today: No  Patient has already received a courtesy refill: No  Other reason request has been forwarded to provider: please advise, shane rose

## 2025-04-25 NOTE — TELEPHONE ENCOUNTER
1 [id number] 03/20/2025 03/20/2025 HYDROcodone BITARTRATE 5 MG ORAL TABLET/ACETAMINOPHEN 325 MG (Tablet) 60.0 15 325 MG-5 MG 20.0 JODI BROWNE [facility] Medicare 0 / 0 PA    1 [id number] 11/15/2024 11/15/2024 HYDROcodone BITARTRATE 5 MG ORAL TABLET/ACETAMINOPHEN 325 MG (Tablet) 60.0 15 325 MG-5 MG 20.0 AYLEEN MADDEN [facility] Medicare 0 / 0 PA    1 [id number] 10/08/2024 10/08/2024 HYDROcodone BITARTRATE 5 MG ORAL TABLET/ACETAMINOPHEN 325 MG (Tablet) 60.0 15 325 MG-5 MG 20.0 AYLEEN MADDEN [facility] Medicare 0 / 0 PA    1 [id number] 09/13/2024 09/13/2024 HYDROcodone BITARTRATE 5 MG ORAL TABLET/ACETAMINOPHEN 325 MG (Tablet) 60.0 15 325 MG-5 MG 20.0 AYLEEN MADDEN [facility] Medicare 0 / 0 PA    1 [id number] 07/15/2024 07/15/2024 ALPRAZolam (Tablet) 90.0 30 0.25 MG NA AYLEEN MADDEN [facility] P

## 2025-04-26 LAB — C DIFF TOX GENS STL QL NAA+PROBE: NEGATIVE

## 2025-04-28 ENCOUNTER — RESULTS FOLLOW-UP (OUTPATIENT)
Dept: FAMILY MEDICINE CLINIC | Facility: CLINIC | Age: 70
End: 2025-04-28

## 2025-04-28 ENCOUNTER — PATIENT MESSAGE (OUTPATIENT)
Dept: FAMILY MEDICINE CLINIC | Facility: CLINIC | Age: 70
End: 2025-04-28

## 2025-04-29 DIAGNOSIS — R19.7 DIARRHEA OF PRESUMED INFECTIOUS ORIGIN: Primary | ICD-10-CM

## 2025-04-29 NOTE — PATIENT COMMUNICATION
Patient informed  Dr. DIOR notes regarding stool result and blood work. she stated that she would like someone from the office to call  her back. She stated that she has questions regarding the stool sample

## 2025-04-30 ENCOUNTER — OFFICE VISIT (OUTPATIENT)
Dept: OBGYN CLINIC | Facility: MEDICAL CENTER | Age: 70
End: 2025-04-30
Payer: COMMERCIAL

## 2025-04-30 ENCOUNTER — OFFICE VISIT (OUTPATIENT)
Dept: CARDIOLOGY CLINIC | Facility: MEDICAL CENTER | Age: 70
End: 2025-04-30
Payer: COMMERCIAL

## 2025-04-30 ENCOUNTER — APPOINTMENT (OUTPATIENT)
Dept: LAB | Facility: MEDICAL CENTER | Age: 70
End: 2025-04-30
Attending: FAMILY MEDICINE
Payer: COMMERCIAL

## 2025-04-30 VITALS
SYSTOLIC BLOOD PRESSURE: 142 MMHG | OXYGEN SATURATION: 96 % | DIASTOLIC BLOOD PRESSURE: 80 MMHG | BODY MASS INDEX: 33.97 KG/M2 | WEIGHT: 199 LBS | HEIGHT: 64 IN | HEART RATE: 95 BPM

## 2025-04-30 VITALS — BODY MASS INDEX: 33.97 KG/M2 | WEIGHT: 199 LBS | HEIGHT: 64 IN

## 2025-04-30 DIAGNOSIS — R00.2 PALPITATIONS: ICD-10-CM

## 2025-04-30 DIAGNOSIS — I10 PRIMARY HYPERTENSION: Primary | ICD-10-CM

## 2025-04-30 DIAGNOSIS — J42 CHRONIC BRONCHITIS, UNSPECIFIED CHRONIC BRONCHITIS TYPE (HCC): ICD-10-CM

## 2025-04-30 DIAGNOSIS — G89.29 CHRONIC LEFT SHOULDER PAIN: Primary | ICD-10-CM

## 2025-04-30 DIAGNOSIS — R19.7 DIARRHEA OF PRESUMED INFECTIOUS ORIGIN: ICD-10-CM

## 2025-04-30 DIAGNOSIS — M25.512 CHRONIC LEFT SHOULDER PAIN: Primary | ICD-10-CM

## 2025-04-30 DIAGNOSIS — R19.7 DIARRHEA OF PRESUMED INFECTIOUS ORIGIN: Primary | ICD-10-CM

## 2025-04-30 DIAGNOSIS — I65.22 CAROTID ARTERY STENOSIS, SYMPTOMATIC, LEFT: ICD-10-CM

## 2025-04-30 LAB
ALBUMIN SERPL BCG-MCNC: 4.4 G/DL (ref 3.5–5)
ALP SERPL-CCNC: 53 U/L (ref 34–104)
ALT SERPL W P-5'-P-CCNC: 16 U/L (ref 7–52)
ANION GAP SERPL CALCULATED.3IONS-SCNC: 8 MMOL/L (ref 4–13)
AST SERPL W P-5'-P-CCNC: 15 U/L (ref 13–39)
BASOPHILS # BLD AUTO: 0.04 THOUSANDS/ÂΜL (ref 0–0.1)
BASOPHILS NFR BLD AUTO: 0 % (ref 0–1)
BILIRUB SERPL-MCNC: 0.34 MG/DL (ref 0.2–1)
BUN SERPL-MCNC: 10 MG/DL (ref 5–25)
CALCIUM SERPL-MCNC: 9.5 MG/DL (ref 8.4–10.2)
CHLORIDE SERPL-SCNC: 99 MMOL/L (ref 96–108)
CO2 SERPL-SCNC: 28 MMOL/L (ref 21–32)
CREAT SERPL-MCNC: 0.51 MG/DL (ref 0.6–1.3)
CRP SERPL QL: 4.3 MG/L
EOSINOPHIL # BLD AUTO: 0.1 THOUSAND/ÂΜL (ref 0–0.61)
EOSINOPHIL NFR BLD AUTO: 1 % (ref 0–6)
ERYTHROCYTE [DISTWIDTH] IN BLOOD BY AUTOMATED COUNT: 13.1 % (ref 11.6–15.1)
GFR SERPL CREATININE-BSD FRML MDRD: 98 ML/MIN/1.73SQ M
GLUCOSE SERPL-MCNC: 105 MG/DL (ref 65–140)
HCT VFR BLD AUTO: 42.2 % (ref 34.8–46.1)
HGB BLD-MCNC: 13.8 G/DL (ref 11.5–15.4)
IMM GRANULOCYTES # BLD AUTO: 0.09 THOUSAND/UL (ref 0–0.2)
IMM GRANULOCYTES NFR BLD AUTO: 1 % (ref 0–2)
LYMPHOCYTES # BLD AUTO: 1.73 THOUSANDS/ÂΜL (ref 0.6–4.47)
LYMPHOCYTES NFR BLD AUTO: 18 % (ref 14–44)
MCH RBC QN AUTO: 29.1 PG (ref 26.8–34.3)
MCHC RBC AUTO-ENTMCNC: 32.7 G/DL (ref 31.4–37.4)
MCV RBC AUTO: 89 FL (ref 82–98)
MONOCYTES # BLD AUTO: 0.94 THOUSAND/ÂΜL (ref 0.17–1.22)
MONOCYTES NFR BLD AUTO: 10 % (ref 4–12)
NEUTROPHILS # BLD AUTO: 6.74 THOUSANDS/ÂΜL (ref 1.85–7.62)
NEUTS SEG NFR BLD AUTO: 70 % (ref 43–75)
NRBC BLD AUTO-RTO: 0 /100 WBCS
PLATELET # BLD AUTO: 271 THOUSANDS/UL (ref 149–390)
PMV BLD AUTO: 9.6 FL (ref 8.9–12.7)
POTASSIUM SERPL-SCNC: 3.8 MMOL/L (ref 3.5–5.3)
PROT SERPL-MCNC: 7.2 G/DL (ref 6.4–8.4)
RBC # BLD AUTO: 4.75 MILLION/UL (ref 3.81–5.12)
SODIUM SERPL-SCNC: 135 MMOL/L (ref 135–147)
WBC # BLD AUTO: 9.64 THOUSAND/UL (ref 4.31–10.16)

## 2025-04-30 PROCEDURE — 85025 COMPLETE CBC W/AUTO DIFF WBC: CPT

## 2025-04-30 PROCEDURE — 20610 DRAIN/INJ JOINT/BURSA W/O US: CPT | Performed by: PHYSICAL MEDICINE & REHABILITATION

## 2025-04-30 PROCEDURE — 80053 COMPREHEN METABOLIC PANEL: CPT

## 2025-04-30 PROCEDURE — 99214 OFFICE O/P EST MOD 30 MIN: CPT | Performed by: INTERNAL MEDICINE

## 2025-04-30 PROCEDURE — 36415 COLL VENOUS BLD VENIPUNCTURE: CPT

## 2025-04-30 PROCEDURE — 99213 OFFICE O/P EST LOW 20 MIN: CPT | Performed by: PHYSICAL MEDICINE & REHABILITATION

## 2025-04-30 PROCEDURE — 86140 C-REACTIVE PROTEIN: CPT

## 2025-04-30 RX ORDER — TRIAMCINOLONE ACETONIDE 40 MG/ML
80 INJECTION, SUSPENSION INTRA-ARTICULAR; INTRAMUSCULAR
Status: COMPLETED | OUTPATIENT
Start: 2025-04-30 | End: 2025-04-30

## 2025-04-30 RX ORDER — ROPIVACAINE HYDROCHLORIDE 5 MG/ML
10 INJECTION, SOLUTION EPIDURAL; INFILTRATION; PERINEURAL
Status: COMPLETED | OUTPATIENT
Start: 2025-04-30 | End: 2025-04-30

## 2025-04-30 RX ADMIN — ROPIVACAINE HYDROCHLORIDE 10 ML: 5 INJECTION, SOLUTION EPIDURAL; INFILTRATION; PERINEURAL at 13:30

## 2025-04-30 RX ADMIN — TRIAMCINOLONE ACETONIDE 80 MG: 40 INJECTION, SUSPENSION INTRA-ARTICULAR; INTRAMUSCULAR at 13:30

## 2025-04-30 NOTE — ASSESSMENT & PLAN NOTE
BP historically well-controlled though slightly elevated today.  Continue lifestyle modification.   Medical therapy reviewed.  Tobacco cessation.  Close blood pressure monitoring.

## 2025-04-30 NOTE — ASSESSMENT & PLAN NOTE
Carotid endarterectomy was planned last year but she declined to have carotid intervention due to concerns of operative risk.  Prior TIA in 2024.  No recurrent neurological symptoms.  Currently on low intensity statins and antiplatelet therapy.  LDL is improved.  Still smoking.  Continue aspirin and statin therapy.  Encouraged avoidance of tobacco, to lower stroke risk.  Vascular surgery follow-up.

## 2025-04-30 NOTE — ASSESSMENT & PLAN NOTE
Reports cough and wheezing all last month.  Wheezing better on exam.  Continue bronchodilators.

## 2025-04-30 NOTE — PROGRESS NOTES
1. Chronic left shoulder pain  Large joint arthrocentesis: L subacromial bursa        Orders Placed This Encounter   Procedures   • Large joint arthrocentesis: L subacromial bursa        Impression:  Patient is here in follow up of left shoulder pain likely secondary to rotator cuff arthropathy and mild glenohumeral joint osteoarthritis.  Treatment has included subacromial space steroid injection which she repeated today.  I will see her back if needed.    Imaging Studies (I personally reviewed images in PACS and report):  Left shoulder x-rays most recent to this encounter reviewed.  These images show any focus of calcification near the greater tuberosity.  There is mild glenohumeral and AC joint osteoarthritis.  The humeral head is high riding which could represent a chronic and complete rotator cuff tear.       No follow-ups on file.    Patient is in agreement with the above plan.    HPI:  Leanna Ruvalcaba is a 69 y.o. female  who presents in follow up.  Here for   Chief Complaint   Patient presents with   • Left Shoulder - Pain, Follow-up     Requesting repeat CSI - pain returned within the last month       Since last visit: See above.    Following history reviewed and updated:  Past Medical History:   Diagnosis Date   • Acid reflux    • Back injury    • COPD (chronic obstructive pulmonary disease) (HCC)    • Fibromyalgia    • Hypertension    • Seasonal allergies      Past Surgical History:   Procedure Laterality Date   • COLONOSCOPY     • EYE SURGERY     • KNEE SURGERY  1998   • [place] COLONOSCOPY FLX DX W/COLLJ SPEC WHEN PFRMD N/A 05/09/2017    Procedure: EGD AND COLONOSCOPY;  Surgeon: Kimberly Zapata MD;  Location[facility];  Service: Gastroenterology     Social History   Social History     Substance and Sexual Activity   Alcohol Use Never     Social History     Substance and Sexual Activity   Drug Use No     Social History     Tobacco Use   Smoking Status Every Day   • Current packs/day: 1.50   • Average packs/day:  "1.5 packs/day for 57.3 years (86.0 ttl pk-yrs)   • Types: Cigarettes   • Start date: 1968   • Passive exposure: Current   Smokeless Tobacco Never     Family History   Problem Relation Age of Onset   • Pancreatic cancer Mother 72   • Coronary artery disease Father    • Diabetes Father    • Hypertension Father    • Heart disease Father    • Lung cancer Sister 37   • No Known Problems Daughter    • No Known Problems Maternal Grandmother    • Lung cancer Maternal Grandfather 77   • Diabetes Paternal Grandmother    • No Known Problems Paternal Grandfather    • Pancreatic cancer Brother    • Pancreatic cancer Brother    • No Known Problems Son    • No Known Problems Son      Allergies   Allergen Reactions   • Augmentin [Amoxicillin-Pot Clavulanate] Vomiting   • Miconazole Itching and Swelling   • Wixela Inhub [Fluticasone-Salmeterol] Palpitations        Constitutional:  Ht 5' 4.02\" (1.626 m)   Wt 90.3 kg (199 lb)   LMP  (LMP Unknown)   BMI 34.14 kg/m²    General: NAD.  Eyes: Clear sclerae.  ENT: No inflammation, lesion, or mass of lips.  No tracheal deviation.  Musculoskeletal: As mentioned below.  Integumentary: No visible rashes or skin lesions.  Pulmonary/Chest: Effort normal. No respiratory distress.   Neuro: CN's grossly intact, REDMOND.  Psych: Normal affect and judgement.  Vascular: WWP.    Left Shoulder Exam     Tenderness   The patient is experiencing tenderness in the acromion.    Tests   Castillo test: positive  Impingement: positive    Other   Erythema: absent  Scars: absent  Sensation: normal  Pulse: present            Large joint arthrocentesis: L subacromial bursa  Lyman Protocol:  procedure performed by consultantConsent: Verbal consent obtained. Written consent not obtained.  Risks and benefits: risks, benefits and alternatives were discussed  Consent given by: patient  Timeout called at: 4/30/2025 1:31 PM.  Patient understanding: patient states understanding of the procedure being performed  Site " marked: the operative site was marked  Patient identity confirmed: verbally with patient  Supporting Documentation  Indications: pain     Is this a Visco injection? NoProcedure Details  Location: shoulder - L subacromial bursa  Needle gauge: 21G 2''  Ultrasound guidance: no  Approach: posterolateral  Medications administered: 80 mg triamcinolone acetonide 40 mg/mL; 10 mL ropivacaine 0.5 %    Patient tolerance: patient tolerated the procedure well with no immediate complications  Dressing:  Sterile dressing applied    There was little to no resistance encountered during the injection.    Risks of this procedure include:    - Risk of bleeding since a needle is involved.  - Risk of infection (1/10,000 chance as per recent studies).  Signs/symptoms were discussed and they would prompt an urgent evaluation at an emergency department.  - Risk of pigmentation or skin dimpling in the skin (2-3% chance as per recent studies) from the steroid.  - Risk of increased pain from steroid flare (1% chance as per recent studies) that typically lasts 24-48 hours.  - Risk of increased blood sugars from the steroid medication that can last for a few weeks.  If the patient is a diabetic or pre-diabetic, they were encouraged to closely monitor their blood sugars and discuss with PCP if elevated more than usual or if having symptoms.    The benefits outweigh the risks and so the procedure was completed.

## 2025-04-30 NOTE — PROGRESS NOTES
[facility] [place] [address]  [address]  [facility] [address]  Phone#  [phone]  Fax#  [phone]  [facility]'s Cardiology Office Follow-up Visit             NAME: Leanna Ruvalcaba  AGE: 69 y.o. SEX: female   : 1955   MRN: [id number]    DATE: 2025  TIME: 9:34 AM    Cardiology Problem list:  Left carotid stenosis with TIA  : Echo-EF 58%, grade 1 DD  : Holter-rare PVCs, no A-fib  10/24: Zio-brief supraventricular runs, symptoms correlated with sinus rhythm  CAD: Mild coronary calcification on CT chest  : Stress echo-no ischemia  Dyslipidemia with prediabetes      Assessment & Plan  Primary hypertension    BP historically well-controlled though slightly elevated today.  Continue lifestyle modification.   Medical therapy reviewed.  Tobacco cessation.  Close blood pressure monitoring.         Palpitations  Rare occurrence.  Symptoms controlled now.  No AF history.  Recent Zio patch reviewed: Brief supraventricular runs but her symptoms correlated with sinus rhythm.  No A-fib seen.  Findings shared with the patient.       Carotid artery stenosis, symptomatic, left  Carotid endarterectomy was planned last year but she declined to have carotid intervention due to concerns of operative risk.  Prior TIA in .  No recurrent neurological symptoms.  Currently on low intensity statins and antiplatelet therapy.  LDL is improved.  Still smoking.  Continue aspirin and statin therapy.  Encouraged avoidance of tobacco, to lower stroke risk.  Vascular surgery follow-up.           Chronic bronchitis, unspecified chronic bronchitis type (HCC)  Reports cough and wheezing all last month.  Wheezing better on exam.  Continue bronchodilators.                  HPI:    Leanna Ruvalcaba is a 69 y.o.-year-old female who presents to the cardiology clinic for follow up for the above-listed problems.  Last seen in  for preop evaluation prior to carotid surgery.  Prior imaging studies  reviewed.  Current medical therapy reviewed.  Recent labs reviewed.  TSH normal, LFTs normal, triglycerides 134, HDL and LDL normal.  No interim cardiac hospitalizations.  Functionally limited by osteoarthritis.  She declined carotid surgery due to concerns of risks.  She also has shoulder arthritis.  For the last 1 month she has been sick with wheezing and cough.  This is now improving.  No chest pain reported.  Palpitations are infrequent.    Past history, family history, social history, current medications, vital signs, recent lab and imaging studies and  prior cardiology studies reviewed independently on this visit.    Allergies   Allergen Reactions    Augmentin [Amoxicillin-Pot Clavulanate] Vomiting    Miconazole Itching and Swelling    Wixela Inhub [Fluticasone-Salmeterol] Palpitations       Current Outpatient Medications:     acetaminophen (TYLENOL) 650 mg CR tablet, Take by mouth every 8 (eight) hours as needed, Disp: , Rfl:     albuterol (2.5 mg/3 mL) 0.083 % nebulizer solution, Take 3 mL (2.5 mg total) by nebulization every 6 (six) hours as needed for wheezing or shortness of breath, Disp: 180 mL, Rfl: 5    albuterol (PROVENTIL HFA,VENTOLIN HFA) 90 mcg/act inhaler, Inhale 2 puffs every 6 (six) hours as needed for wheezing, Disp: 18 g, Rfl: 0    ALPRAZolam (XANAX) 0.25 mg tablet, Take 1 tablet (0.25 mg total) by mouth 3 (three) times a day as needed for anxiety, Disp: 90 tablet, Rfl: 0    amLODIPine (NORVASC) 5 mg tablet, TAKE 1 TABLET (5 MG TOTAL) BY MOUTH DAILY., Disp: 90 tablet, Rfl: 1    cefdinir (OMNICEF) 300 mg capsule, Take 1 capsule (300 mg total) by mouth every 12 (twelve) hours for 7 days, Disp: 14 capsule, Rfl: 0    Cholecalciferol (VITAMIN D3 PO), Take by mouth, Disp: , Rfl:     clopidogrel (PLAVIX) 75 mg tablet, TAKE 1 TABLET BY MOUTH EVERY DAY, Disp: 90 tablet, Rfl: 1    Diclofenac Sodium (VOLTAREN) 1 %, Apply 2 g topically 4 (four) times a day, Disp: 100 g, Rfl: 3    escitalopram (LEXAPRO) 10  mg tablet, TAKE 1 TABLET BY MOUTH EVERY DAY, Disp: 90 tablet, Rfl: 1    fluticasone (FLONASE) 50 mcg/act nasal spray, 2 sprays into each nostril daily 2 sprays bilat qd, Disp: 11.1 mL, Rfl: 1    fluticasone-umeclidinium-vilanterol (Trelegy Ellipta) 200-62.5-25 mcg/actuation AEPB inhaler, Inhale 1 puff daily Rinse mouth after use., Disp: , Rfl:     HYDROcodone-acetaminophen (Norco) 5-325 mg per tablet, Take 1 tablet by mouth every 6 (six) hours as needed for pain Max Daily Amount: 4 tablets, Disp: 60 tablet, Rfl: 0    lisinopril (ZESTRIL) 20 mg tablet, TAKE 1 TABLET BY MOUTH EVERY DAY, Disp: 90 tablet, Rfl: 1    methocarbamol (ROBAXIN) 500 mg tablet, Take 1 tablet (500 mg total) by mouth 2 (two) times a day For back pain radiating down the leg, Disp: 20 tablet, Rfl: 0    MINOXIDIL, TOPICAL, 5 % SOLN, Apply 1 application topically in the morning For hair, Disp: , Rfl:     montelukast (SINGULAIR) 10 mg tablet, TAKE 1 TABLET BY MOUTH EVERYDAY AT BEDTIME, Disp: 90 tablet, Rfl: 1    naproxen (NAPROSYN) 500 mg tablet, TAKE 1 TABLET BY MOUTH TWICE A DAY WITH MEALS, Disp: 60 tablet, Rfl: 5    Omega-3 Fatty Acids (FISH OIL PO), Take 1,000 mg by mouth, Disp: , Rfl:     pantoprazole (PROTONIX) 40 mg tablet, TAKE 1 TABLET BY MOUTH TWICE A DAY, Disp: 180 tablet, Rfl: 1    polyethylene glycol (MIRALAX) 17 g packet, Take 17 g by mouth daily, Disp: 510 g, Rfl: 2    rosuvastatin (CRESTOR) 5 mg tablet, TAKE 1 TABLET (5 MG TOTAL) BY MOUTH DAILY., Disp: 100 tablet, Rfl: 1    Current Facility-Administered Medications:     bupivacaine (PF) (MARCAINE) 0.25 % injection 1 mL, 1 mL, Intra-articular, , Christiano Garcia, SHUBHAM, 1 mL at 05/11/23 1706    triamcinolone acetonide (KENALOG-40) 40 mg/mL injection 20 mg, 20 mg, Intra-articular, , Christiano Garcia, DPM, 20 mg at 05/11/23 1707    Review of Systems   Constitutional:  Positive for fatigue.   Respiratory:  Positive for shortness of breath and wheezing.    Cardiovascular:  Positive for  palpitations. Negative for chest pain and leg swelling.   Gastrointestinal:  Positive for abdominal pain.   Musculoskeletal:  Positive for arthralgias.   Skin: Negative.    Psychiatric/Behavioral: Negative.         Objective:     Vitals:    04/30/25 0926   BP: 142/80   Pulse: 95   SpO2: 96%     Wt Readings from Last 3 Encounters:   04/30/25 90.3 kg (199 lb)   04/24/25 89.8 kg (198 lb)   04/11/25 90.3 kg (199 lb)     Pulse Readings from Last 3 Encounters:   04/30/25 95   04/24/25 91   04/11/25 72     BP Readings from Last 3 Encounters:   04/30/25 142/80   04/24/25 150/90   04/11/25 120/82     Physical Exam  Vitals reviewed.   Constitutional:       General: She is not in acute distress.  HENT:      Head: Normocephalic.   Neck:      Vascular: Carotid bruit present.   Cardiovascular:      Rate and Rhythm: Normal rate and regular rhythm.      Heart sounds: Murmur heard.   Pulmonary:      Breath sounds: Decreased air movement present. Decreased breath sounds and wheezing present. No rales.   Musculoskeletal:         General: No swelling.   Skin:     General: Skin is warm.   Neurological:      Mental Status: She is alert.   Psychiatric:         Mood and Affect: Mood normal.         Pertinent Laboratory/Diagnostic Studies:    Laboratory studies reviewed personally by Abad Clark MD    BMP:   Lab Results   Component Value Date    SODIUM 140 02/05/2025    K 4.5 02/05/2025     02/05/2025    CO2 28 02/05/2025    BUN 10 02/05/2025    CREATININE 0.54 (L) 02/05/2025    GLUC 82 08/29/2024    CALCIUM 9.8 02/05/2025     CBC:  Lab Results   Component Value Date    WBC 9.55 02/05/2025    HGB 14.9 02/05/2025    HCT 46.0 02/05/2025    MCV 89 02/05/2025     02/05/2025     Lipid Profile:   Lab Results   Component Value Date    HDL 70 02/05/2025     Lab Results   Component Value Date    LDLCALC 84 02/05/2025     Lab Results   Component Value Date    TRIG 134 02/05/2025      Other labs:  Lab Results   Component Value Date     "CVX1MXSCKCFV 3.497 02/05/2025     Lab Results   Component Value Date    ALT 20 02/05/2025    AST 18 02/05/2025     Lab Results   Component Value Date    HGBA1C 5.6 04/18/2023         Imaging Studies:     Pertinent imaging studies and cardiac studies were independently reviewed on this visit and findings summarized.    I have spent a total time of 33  minutes in caring for this patient on the day of the visit/encounter including reviewing and entering of medical history, physical examination, diagnostic results, instructions for management, patient education, risk factor reduction, documenting in the medical record, sending communications to other providers, reviewing/ordering tests, medicine, procedures etc.    Abad Clark MD, [facility]    Portions of the record may have been created with voice recognition software.  Occasional wrong word or \"sound alike\" substitutions may have occurred due to the inherent limitations of voice recognition software.  Read the chart carefully and recognize, using context, where substitutions have occurred. Please reach out to me directly for any clarifications.  "

## 2025-04-30 NOTE — ASSESSMENT & PLAN NOTE
Rare occurrence.  Symptoms controlled now.  No AF history.  Recent Zio patch reviewed: Brief supraventricular runs but her symptoms correlated with sinus rhythm.  No A-fib seen.  Findings shared with the patient.

## 2025-05-01 ENCOUNTER — RESULTS FOLLOW-UP (OUTPATIENT)
Dept: FAMILY MEDICINE CLINIC | Facility: CLINIC | Age: 70
End: 2025-05-01

## 2025-05-12 DIAGNOSIS — I65.22 CAROTID ARTERY STENOSIS, SYMPTOMATIC, LEFT: ICD-10-CM

## 2025-05-13 RX ORDER — CLOPIDOGREL BISULFATE 75 MG/1
75 TABLET ORAL DAILY
Qty: 90 TABLET | Refills: 1 | Status: SHIPPED | OUTPATIENT
Start: 2025-05-13

## 2025-05-15 DIAGNOSIS — J42 CHRONIC BRONCHITIS, UNSPECIFIED CHRONIC BRONCHITIS TYPE (HCC): ICD-10-CM

## 2025-05-16 DIAGNOSIS — J01.90 ACUTE NON-RECURRENT SINUSITIS, UNSPECIFIED LOCATION: Primary | ICD-10-CM

## 2025-05-16 DIAGNOSIS — J01.40 ACUTE NON-RECURRENT PANSINUSITIS: ICD-10-CM

## 2025-05-16 RX ORDER — AZITHROMYCIN 250 MG/1
TABLET, FILM COATED ORAL
Qty: 6 TABLET | Refills: 0 | Status: SHIPPED | OUTPATIENT
Start: 2025-05-16 | End: 2025-05-20

## 2025-05-18 RX ORDER — FLUTICASONE PROPIONATE 50 MCG
SPRAY, SUSPENSION (ML) NASAL
Qty: 48 ML | Refills: 1 | Status: SHIPPED | OUTPATIENT
Start: 2025-05-18

## 2025-05-19 RX ORDER — ALBUTEROL SULFATE 90 UG/1
INHALANT RESPIRATORY (INHALATION)
Qty: 8 G | Refills: 4 | Status: SHIPPED | OUTPATIENT
Start: 2025-05-19

## 2025-05-22 DIAGNOSIS — F33.0 DEPRESSION, MAJOR, RECURRENT, MILD (HCC): ICD-10-CM

## 2025-05-22 RX ORDER — ESCITALOPRAM OXALATE 10 MG/1
10 TABLET ORAL DAILY
Qty: 90 TABLET | Refills: 1 | Status: SHIPPED | OUTPATIENT
Start: 2025-05-22

## 2025-05-26 DIAGNOSIS — J42 CHRONIC BRONCHITIS, UNSPECIFIED CHRONIC BRONCHITIS TYPE (HCC): Primary | ICD-10-CM

## 2025-05-27 NOTE — TELEPHONE ENCOUNTER
Medication passed protocol.     Medication: amLODIPine (NORVASC) 2.5 MG tablet passed protocol.   Last office visit date: 7/10/24  Next appointment scheduled?: Yes      Patient called with an update as instructed to do by PCP . Aware of results. States diarrhea is slowing down. Still gets nausea and abdominal discomfort after eating, wheezing and coughing has improved quite a bit. Still with tiredness, can sleep all day.

## 2025-05-28 RX ORDER — FLUTICASONE FUROATE, UMECLIDINIUM BROMIDE AND VILANTEROL TRIFENATATE 200; 62.5; 25 UG/1; UG/1; UG/1
1 POWDER RESPIRATORY (INHALATION) DAILY
Qty: 180 BLISTER | Refills: 1 | Status: SHIPPED | OUTPATIENT
Start: 2025-05-28

## 2025-05-28 NOTE — TELEPHONE ENCOUNTER
Patient called to check the status of her refil request for Trelegy Ellipta inhaler. She was advised that her request on 05/26/25 is pending approval. Patient verbalized understanding and is in agreement.     Does the patient have enough for 3 days?   [x] Yes   [] No - Send as HP to POD     Dispense quantity was changed to 90-day supply + 1 refill, per patient request.

## 2025-06-19 ENCOUNTER — TELEPHONE (OUTPATIENT)
Dept: FAMILY MEDICINE CLINIC | Facility: CLINIC | Age: 70
End: 2025-06-19

## 2025-06-19 NOTE — TELEPHONE ENCOUNTER
Pt called to report that she is not feeling well and she believes it is because of the eye drops and injections she has been getting in her eyes    Pt states she feels like it is affecting her breathing and that she has had to use her nebulizer. Pt also reports that she has been very tired and has trouble staying awake, even when sitting down watching TV. Pt also expresses that she would like her BP checked  Pt is requesting to be seen by any provider if PCP is not available. Please advise

## 2025-06-20 ENCOUNTER — OFFICE VISIT (OUTPATIENT)
Dept: FAMILY MEDICINE CLINIC | Facility: CLINIC | Age: 70
End: 2025-06-20
Payer: COMMERCIAL

## 2025-06-20 VITALS
HEART RATE: 93 BPM | TEMPERATURE: 98.9 F | SYSTOLIC BLOOD PRESSURE: 140 MMHG | DIASTOLIC BLOOD PRESSURE: 82 MMHG | OXYGEN SATURATION: 95 % | WEIGHT: 195.6 LBS | HEIGHT: 64 IN | BODY MASS INDEX: 33.39 KG/M2

## 2025-06-20 DIAGNOSIS — J01.90 ACUTE SINUSITIS, RECURRENCE NOT SPECIFIED, UNSPECIFIED LOCATION: Primary | ICD-10-CM

## 2025-06-20 DIAGNOSIS — Z72.0 TOBACCO ABUSE: ICD-10-CM

## 2025-06-20 PROCEDURE — 99214 OFFICE O/P EST MOD 30 MIN: CPT | Performed by: FAMILY MEDICINE

## 2025-06-20 RX ORDER — BIMATOPROST 0.1 MG/ML
1 SOLUTION/ DROPS OPHTHALMIC
COMMUNITY
Start: 2025-06-06

## 2025-06-20 RX ORDER — PREDNISONE 10 MG/1
TABLET ORAL
Qty: 26 TABLET | Refills: 0 | Status: SHIPPED | OUTPATIENT
Start: 2025-06-20

## 2025-06-20 RX ORDER — BROMPHENIRAMINE MALEATE, PSEUDOEPHEDRINE HYDROCHLORIDE, AND DEXTROMETHORPHAN HYDROBROMIDE 2; 30; 10 MG/5ML; MG/5ML; MG/5ML
5 SYRUP ORAL 4 TIMES DAILY PRN
Qty: 180 ML | Refills: 0 | Status: SHIPPED | OUTPATIENT
Start: 2025-06-20

## 2025-06-20 RX ORDER — DORZOLAMIDE HCL 20 MG/ML
SOLUTION/ DROPS OPHTHALMIC
COMMUNITY
Start: 2025-05-25

## 2025-06-20 RX ORDER — CEFUROXIME AXETIL 500 MG/1
500 TABLET ORAL EVERY 12 HOURS SCHEDULED
Qty: 20 TABLET | Refills: 0 | Status: SHIPPED | OUTPATIENT
Start: 2025-06-20 | End: 2025-06-30

## 2025-06-30 NOTE — PROGRESS NOTES
Patient ID: Leanna Ruvalcaba is a 69 y.o. female.    Diagnoses and all orders for this visit:    Acute sinusitis, recurrence not specified, unspecified location  -     brompheniramine-pseudoephedrine-DM 30-2-10 MG/5ML syrup; Take 5 mL by mouth 4 (four) times a day as needed for congestion or cough  -     predniSONE 10 mg tablet; 3 tabs po bid x2 days, then 2 tabs po bid x2 days, then 1 tab bid x2 days, then 1 daily until done.  -     cefuroxime (CEFTIN) 500 mg tablet; Take 1 tablet (500 mg total) by mouth every 12 (twelve) hours for 10 days  The patient is to take Mucinex twice daily    Tobacco abuse  -     nicotine (NICODERM CQ) 7 mg/24hr TD 24 hr patch; Place 1 patch on the skin every 24 hours over 24 hours    Other orders  -     Lumigan 0.01 % ophthalmic drops; Administer 1 drop to both eyes  -     dorzolamide (TRUSOPT) 2 % ophthalmic solution; INSTILL 1 DROP INTO LEFT EYE 3 TIMES DAILY    Reviewed with patient plan to treat with above plan.    Patient instructed to call in 72 hours if not feeling better or if symptoms worsen       HPI: 69 y.o.female presenting with symptoms of sinus pain,pressure, nasal congestion, pnd dry cough , ear and throat pain.  She would like to quit smoking she is currently smoking a half a pack per day and is requesting a NicoDerm CQ patch.  Symptoms have been going on for the past 4 days.    SUBJECTIVE    Family History[1]  Social History     Socioeconomic History    Marital status:      Spouse name: Not on file    Number of children: 3    Years of education: less than high school    Highest education level: Not on file   Occupational History    Occupation: unemployed   Tobacco Use    Smoking status: Every Day     Current packs/day: 1.50     Average packs/day: 1.5 packs/day for 57.5 years (86.2 ttl pk-yrs)     Types: Cigarettes     Start date: 1968     Passive exposure: Current    Smokeless tobacco: Never   Vaping Use    Vaping status: Never Used   Substance and Sexual Activity     Alcohol use: Never    Drug use: No    Sexual activity: Not Currently   Other Topics Concern    Not on file   Social History Narrative    Always uses seatbelt    Daily coffee consumption    Daily tea consumption    Dental care regularly    Exercises regularly    Multiple organ donor    No guns in the home    No living will    Denies pets/ animals in the home    Power of  in existence- denied         Social Drivers of Health     Financial Resource Strain: Low Risk  (7/12/2023)    Overall Financial Resource Strain (CARDIA)     Difficulty of Paying Living Expenses: Not hard at all   Food Insecurity: No Food Insecurity (7/16/2024)    Nursing - Inadequate Food Risk Classification     Worried About Running Out of Food in the Last Year: Never true     Ran Out of Food in the Last Year: Never true     Ran Out of Food in the Last Year: Not on file   Transportation Needs: No Transportation Needs (7/16/2024)    PRAPARE - Transportation     Lack of Transportation (Medical): No     Lack of Transportation (Non-Medical): No   Physical Activity: Not on file   Stress: Not on file   Social Connections: Not on file   Intimate Partner Violence: Not on file   Housing Stability: Low Risk  (7/16/2024)    Housing Stability Vital Sign     Unable to Pay for Housing in the Last Year: No     Number of Times Moved in the Last Year: 1     Homeless in the Last Year: No     Past Medical History[2]  Past Surgical History[3]  Allergies   Allergen Reactions    Augmentin [Amoxicillin-Pot Clavulanate] Vomiting    Miconazole Itching and Swelling    Wixela Inhub [Fluticasone-Salmeterol] Palpitations     Current Medications[4]    Review of Systems  Constitutional:     Denies fever, chills, fatigue, weakness ,weight loss, weight gain      ENT: Denies earache, loss of hearing, nosebleed, nasal discharge,but complains of nasal congestion, sore throat,hoarseness and sinus pain and pressure    Pulmonary: Denies shortness of breath ,cough , dyspnea on  "exertionon, orthopnea ,+ PND   Cardiovascular:  Denies bradycardia , tachycardia ,palpations, lower extremity, edema leg, claudication  Breast:  Denies new or changing breast lumps,  nipple discharge, nipple changes,  Abdomen:  Denies abdominal pain , anorexia ,indigestion, nausea ,vomiting, constipation , diarrhea  Musculoskeletal: Denies myalgias, arthralgias, joint swelling, joint stiffness ,limb pain, limb swelling  Lymph:+ swollen glands  Gu: no dysuria or urinary frequency  Skin: Denies skin rash, skin lesion, skin wound, itching,dry skin  Neuro: Denies headache, numbness, tingling, confusion, loss of consciousness, dizziness ,vertigo  Psychiatric: Denies feelings of depression, suicidal ideation, anxiety, sleep disturbances    OBJECTIVE  /82   Pulse 93   Temp 98.9 °F (37.2 °C)   Ht 5' 4.02\" (1.626 m)   Wt 88.7 kg (195 lb 9.6 oz)   LMP  (LMP Unknown)   SpO2 95%   BMI 33.55 kg/m²   Constitutional:   NAD, well appearing and well nourished      ENT:   Conjunctiva and lids: no injection, edema, or discharge    Pupils and iris: MARQUISE bilaterally   External inspection of ears and nose: normal without deformities or discharge.      Otoscopic exam: Canals patent ; tm are dull, with with erythem and effusions  ENasal mucosa, septum and turbinates: Turbinae injection with discharge   Oropharynx:  Moist mucosa, normal tongue and tonsils without lesions.Erythema and injection  of post pharynx with pnd      Pulmonary:Respiratory effort normal rate and rhythm, no increased work of breathing. Auscultation of lungs:  Clear bilaterally with no adventitious breath sounds       Cardiovascular: regular rate and rhythm, S1 and S2, no murmur, no edema and/or varicosities of LE      Abdomen: Soft and non-distended    Positive bowel sounds    No heptomegaly or splenomegaly    Lymphatic: Anterior  cervical lymphadenopathy         Muscskeletal:  Gait and station: Normal gait     Digits and nails normal without clubbing or " cyanosis     Inspection/palpation of joints, bones, and muscles:  No joint tenderness, swelling, full active and passive range of motion      Gu: no suprabubic tenderness, CVA tenderness or urethral discharge  Skin: Normal skin turgor and no rashes    Neuro:    Normal reflexes   Psych:   alert and oriented to person, place and time  normal mood and affect          The patient is to take Mucinex twice daily       [1]   Family History  Problem Relation Name Age of Onset    Pancreatic cancer Mother  72    Coronary artery disease Father      Diabetes Father      Hypertension Father      Heart disease Father      Lung cancer Sister  37    No Known Problems Daughter      No Known Problems Maternal Grandmother      Lung cancer Maternal Grandfather  77    Diabetes Paternal Grandmother      No Known Problems Paternal Grandfather      Pancreatic cancer Brother      Pancreatic cancer Brother      No Known Problems Son      No Known Problems Son     [2]   Past Medical History:  Diagnosis Date    Acid reflux     Back injury     COPD (chronic obstructive pulmonary disease) (HCC)     Fibromyalgia     Hypertension     Seasonal allergies    [3]   Past Surgical History:  Procedure Laterality Date    COLONOSCOPY      EYE SURGERY      KNEE SURGERY  1998    TX COLONOSCOPY FLX DX W/COLLJ SPEC WHEN PFRMD N/A 05/09/2017    Procedure: EGD AND COLONOSCOPY;  Surgeon: Kimberly Zapata MD;  Location: [facility];  Service: Gastroenterology   [4]   Current Outpatient Medications:     acetaminophen (TYLENOL) 650 mg CR tablet, Take by mouth every 8 (eight) hours as needed, Disp: , Rfl:     albuterol (2.5 mg/3 mL) 0.083 % nebulizer solution, Take 3 mL (2.5 mg total) by nebulization every 6 (six) hours as needed for wheezing or shortness of breath, Disp: 180 mL, Rfl: 5    albuterol (PROVENTIL HFA,VENTOLIN HFA) 90 mcg/act inhaler, INHALE 2 PUFFS EVERY 6 HOURS AS NEEDED FOR WHEEZING, Disp: 8 g, Rfl: 4    ALPRAZolam (XANAX) 0.25 mg tablet, Take 1 tablet  (0.25 mg total) by mouth 3 (three) times a day as needed for anxiety, Disp: 90 tablet, Rfl: 0    amLODIPine (NORVASC) 5 mg tablet, TAKE 1 TABLET (5 MG TOTAL) BY MOUTH DAILY., Disp: 90 tablet, Rfl: 1    brompheniramine-pseudoephedrine-DM 30-2-10 MG/5ML syrup, Take 5 mL by mouth 4 (four) times a day as needed for congestion or cough, Disp: 180 mL, Rfl: 0    cefuroxime (CEFTIN) 500 mg tablet, Take 1 tablet (500 mg total) by mouth every 12 (twelve) hours for 10 days, Disp: 20 tablet, Rfl: 0    Cholecalciferol (VITAMIN D3 PO), Take by mouth, Disp: , Rfl:     clopidogrel (PLAVIX) 75 mg tablet, Take 1 tablet (75 mg total) by mouth daily, Disp: 90 tablet, Rfl: 1    Diclofenac Sodium (VOLTAREN) 1 %, Apply 2 g topically 4 (four) times a day, Disp: 100 g, Rfl: 3    dorzolamide (TRUSOPT) 2 % ophthalmic solution, INSTILL 1 DROP INTO LEFT EYE 3 TIMES DAILY, Disp: , Rfl:     escitalopram (LEXAPRO) 10 mg tablet, TAKE 1 TABLET BY MOUTH EVERY DAY, Disp: 90 tablet, Rfl: 1    fluticasone (FLONASE) 50 mcg/act nasal spray, SQUIRT 2 SPRAYS INTO EACH NOSTRIL DAILY, Disp: 48 mL, Rfl: 1    fluticasone-umeclidinium-vilanterol (Trelegy Ellipta) 200-62.5-25 mcg/actuation AEPB inhaler, Inhale 1 puff daily Rinse mouth after use., Disp: 180 blister, Rfl: 1    HYDROcodone-acetaminophen (Norco) 5-325 mg per tablet, Take 1 tablet by mouth every 6 (six) hours as needed for pain Max Daily Amount: 4 tablets, Disp: 60 tablet, Rfl: 0    lisinopril (ZESTRIL) 20 mg tablet, TAKE 1 TABLET BY MOUTH EVERY DAY, Disp: 90 tablet, Rfl: 1    Lumigan 0.01 % ophthalmic drops, Administer 1 drop to both eyes, Disp: , Rfl:     methocarbamol (ROBAXIN) 500 mg tablet, Take 1 tablet (500 mg total) by mouth 2 (two) times a day For back pain radiating down the leg, Disp: 20 tablet, Rfl: 0    MINOXIDIL, TOPICAL, 5 % SOLN, Apply 1 application. topically in the morning For hair, Disp: , Rfl:     montelukast (SINGULAIR) 10 mg tablet, TAKE 1 TABLET BY MOUTH EVERYDAY AT BEDTIME,  Disp: 90 tablet, Rfl: 1    naproxen (NAPROSYN) 500 mg tablet, TAKE 1 TABLET BY MOUTH TWICE A DAY WITH MEALS, Disp: 60 tablet, Rfl: 5    nicotine (NICODERM CQ) 7 mg/24hr TD 24 hr patch, Place 1 patch on the skin every 24 hours over 24 hours, Disp: 28 patch, Rfl: 0    Omega-3 Fatty Acids (FISH OIL PO), Take 1,000 mg by mouth, Disp: , Rfl:     pantoprazole (PROTONIX) 40 mg tablet, TAKE 1 TABLET BY MOUTH TWICE A DAY, Disp: 180 tablet, Rfl: 1    polyethylene glycol (MIRALAX) 17 g packet, Take 17 g by mouth daily, Disp: 510 g, Rfl: 2    predniSONE 10 mg tablet, 3 tabs po bid x2 days, then 2 tabs po bid x2 days, then 1 tab bid x2 days, then 1 daily until done., Disp: 26 tablet, Rfl: 0    rosuvastatin (CRESTOR) 5 mg tablet, TAKE 1 TABLET (5 MG TOTAL) BY MOUTH DAILY., Disp: 100 tablet, Rfl: 1    Current Facility-Administered Medications:     bupivacaine (PF) (MARCAINE) 0.25 % injection 1 mL, 1 mL, Intra-articular, , Christiano Garcia, DPM, 1 mL at 05/11/23 1706    triamcinolone acetonide (KENALOG-40) 40 mg/mL injection 20 mg, 20 mg, Intra-articular, , RAMYA MonacoM, 20 mg at 05/11/23 1706

## 2025-07-10 ENCOUNTER — RA CDI HCC (OUTPATIENT)
Dept: OTHER | Facility: HOSPITAL | Age: 70
End: 2025-07-10

## 2025-07-11 ENCOUNTER — DOCUMENTATION (OUTPATIENT)
Dept: ADMINISTRATIVE | Facility: OTHER | Age: 70
End: 2025-07-11

## 2025-07-11 NOTE — PROGRESS NOTES
HCC coding opportunities    H35.3220     Chart Reviewed number of suggestions sent to Provider: 1     Patients Insurance     Medicare Insurance: [employer]

## 2025-07-14 NOTE — PROGRESS NOTES
07/14/25 8:49 AM    A non-HM document was received and has been uploaded to the patient chart.    Thank you.  Erasmo Sneed MA  [facility]

## 2025-07-17 ENCOUNTER — OFFICE VISIT (OUTPATIENT)
Dept: FAMILY MEDICINE CLINIC | Facility: CLINIC | Age: 70
End: 2025-07-17
Payer: COMMERCIAL

## 2025-07-17 VITALS
HEART RATE: 79 BPM | WEIGHT: 198.87 LBS | DIASTOLIC BLOOD PRESSURE: 80 MMHG | SYSTOLIC BLOOD PRESSURE: 120 MMHG | TEMPERATURE: 96.9 F | BODY MASS INDEX: 33.95 KG/M2 | OXYGEN SATURATION: 98 % | HEIGHT: 64 IN

## 2025-07-17 DIAGNOSIS — R73.03 PREDIABETES: ICD-10-CM

## 2025-07-17 DIAGNOSIS — E78.2 MIXED HYPERLIPIDEMIA: ICD-10-CM

## 2025-07-17 DIAGNOSIS — I10 PRIMARY HYPERTENSION: ICD-10-CM

## 2025-07-17 DIAGNOSIS — J42 CHRONIC BRONCHITIS, UNSPECIFIED CHRONIC BRONCHITIS TYPE (HCC): ICD-10-CM

## 2025-07-17 DIAGNOSIS — I67.1 ANTERIOR COMMUNICATING ARTERY ANEURYSM: ICD-10-CM

## 2025-07-17 DIAGNOSIS — I65.22 CAROTID ARTERY STENOSIS, SYMPTOMATIC, LEFT: ICD-10-CM

## 2025-07-17 DIAGNOSIS — Z00.00 MEDICARE ANNUAL WELLNESS VISIT, SUBSEQUENT: Primary | ICD-10-CM

## 2025-07-17 DIAGNOSIS — F43.21 GRIEF: ICD-10-CM

## 2025-07-17 DIAGNOSIS — H35.3221 EXUDATIVE AGE-RELATED MACULAR DEGENERATION, LEFT EYE, WITH ACTIVE CHOROIDAL NEOVASCULARIZATION (HCC): ICD-10-CM

## 2025-07-17 DIAGNOSIS — D49.0 IPMN (INTRADUCTAL PAPILLARY MUCINOUS NEOPLASM): ICD-10-CM

## 2025-07-17 DIAGNOSIS — M54.17 LUMBOSACRAL RADICULOPATHY AT L5: ICD-10-CM

## 2025-07-17 DIAGNOSIS — Z78.0 ASYMPTOMATIC POSTMENOPAUSAL STATE: ICD-10-CM

## 2025-07-17 PROCEDURE — G0439 PPPS, SUBSEQ VISIT: HCPCS | Performed by: FAMILY MEDICINE

## 2025-07-17 PROCEDURE — 99215 OFFICE O/P EST HI 40 MIN: CPT | Performed by: FAMILY MEDICINE

## 2025-07-17 NOTE — PROGRESS NOTES
Name: Leanna Ruvalcaba      : 1955      MRN: [id number]  Encounter Provider: Matt Grider MD  Encounter Date: 2025   Encounter department: [facility]  :  Assessment & Plan  Medicare annual wellness visit, subsequent         Grief  Pt seems to be doing well. Does not wish any further treatment/intervention at present.  PHQ-2 = 2.  Continue to monitor       Exudative age-related macular degeneration, left eye, with active choroidal neovascularization (HCC)  Up to date with ophth. Continue present care  Anterior communicating artery aneurysm  Does not wish to pursue treatment. Pt understands risks. Recheck 6m       Carotid artery stenosis, symptomatic, left  With brief TIA symptoms.  CEA scheduled for 10/9/24 cancelled by pt. I discussed with pt. Still smoking - urged cessation.  Continue present care. Recheck 6m. Check carotid study in the interim  Orders:    VAS carotid complete study; Future    Primary hypertension  Well controlled. Cont present treatment. Monitor labs. Recheck 6m       Chronic bronchitis, unspecified chronic bronchitis type (HCC)  I reviewed with pt.  Still smoking - urged cessation.  Continue present inhalers. Avoid hot, humid weather.  Recheck 6m       IPMN (intraductal papillary mucinous neoplasm)  Unchanged on MRI done in Feb.  Repeat MRI in 2y       Lumbosacral radiculopathy at L5  Pain unchanged. Avoid exacerbating positions/activities.  Recheck 6m       Mixed hyperlipidemia  LDL = 84 in Feb.  Continue rosuvastatin 5mg qd.  Recheck 6m       Prediabetes  I reviewed with pt. Continue to monitor diet.  Recheck 6m       Asymptomatic postmenopausal state    Orders:    DXA bone density spine hip and pelvis; Future       Preventive health issues were discussed with patient, and age appropriate screening tests were ordered as noted in patient's After Visit Summary. Personalized health advice and appropriate referrals for health education or preventive  "services given if needed, as noted in patient's After Visit Summary.    I have spent a total time of 60 minutes in caring for this patient on the day of the visit/encounter including Diagnostic results, Prognosis, Risks and benefits of tx options, Instructions for management, Patient and family education, Importance of tx compliance, Risk factor reductions, Impressions, Counseling / Coordination of care, Documenting in the medical record, Reviewing/placing orders in the medical record (including tests, medications, and/or procedures), and Obtaining or reviewing history  .    History of Present Illness     f/u multiple med issues and AWV  - pt grieving - lost her dtr in May to an MVA. Has some \"down days\" but states that she typically doing OK.   - breathing \"rough\" due to hot/humid weather. Pt compliant with medications. Pt is still smoking (had decreased priot to losing her dtr but now back to 1+ppd).   - pt with occ chest discomfort - seems to be associated with abd discomfort. Not associated with exertion  - still with intermittent abd bloating and diarrhea.  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  Getting monthly injections in the L eye (last one 7/11)  - AWV done       Patient Care Team:  Matt Grider MD as PCP - General  Matt Grider MD as PCP - [facility]CP-[facility] ([facility])  MD Meagan Bright MD Chatargy Kaza, MD as Endoscopist  Ann Lau MD (Surgical Oncology)  Zeke Maxwell MD (Gastroenterology)    Review of Systems   Constitutional:  Positive for fatigue. Negative for activity change and appetite change.   HENT: Negative.  Negative for congestion, ear discharge, ear pain, sinus pressure and sinus pain.    Eyes:  Positive for visual disturbance. Negative for photophobia, pain, discharge, redness and itching.   Respiratory:  Positive for cough, shortness of " breath (mild with exertion) and wheezing.    Cardiovascular:  Negative for chest pain, palpitations and leg swelling.   Gastrointestinal:  Positive for abdominal pain (chronic, intermittent). Negative for constipation, diarrhea, nausea and vomiting.   Endocrine: Negative.    Genitourinary: Negative.    Musculoskeletal:  Positive for arthralgias, back pain, myalgias, neck pain and neck stiffness. Negative for gait problem.   Skin: Negative.    Neurological:  Positive for headaches (occasional). Negative for dizziness, speech difficulty, weakness, light-headedness and numbness.   Psychiatric/Behavioral:  Positive for dysphoric mood (intermittent). Negative for sleep disturbance. The patient is not nervous/anxious.      Medical History Reviewed by provider this encounter:  Tobacco  Allergies  Meds  Problems  Med Hx  Surg Hx  Fam Hx       Annual Wellness Visit Questionnaire   Leanna is here for her Subsequent Wellness visit. Last Medicare Wellness visit information reviewed, patient interviewed and updates made to the record today.      Health Risk Assessment:   Patient rates overall health as fair. Patient feels that their physical health rating is same. Patient is satisfied with their life. Eyesight was rated as much worse. Hearing was rated as same. Patient feels that their emotional and mental health rating is same. Patients states they are never, rarely angry. Patient states they are sometimes unusually tired/fatigued. Pain experienced in the last 7 days has been none. Patient states that she has experienced no weight loss or gain in last 6 months.     Depression Screening:   PHQ-2 Score: 2      Fall Risk Screening:   In the past year, patient has experienced: no history of falling in past year      Urinary Incontinence Screening:   Patient has leaked urine accidently in the last six months.     Home Safety:  Patient does not have trouble with stairs inside or outside of their home. Patient has working smoke  alarms and has working carbon monoxide detector. Home safety hazards include: none.     Nutrition:   Current diet is Regular.     Medications:   Patient is currently taking over-the-counter supplements. OTC medications include: see medication list. Patient is able to manage medications.     Activities of Daily Living (ADLs)/Instrumental Activities of Daily Living (IADLs):   Walk and transfer into and out of bed and chair?: Yes  Dress and groom yourself?: Yes    Bathe or shower yourself?: Yes    Feed yourself? Yes  Do your laundry/housekeeping?: Yes  Manage your money, pay your bills and track your expenses?: Yes  Make your own meals?: Yes    Do your own shopping?: Yes    Previous Hospitalizations:   Any hospitalizations or ED visits within the last 12 months?: No      Advance Care Planning:   Living will: No    Durable POA for healthcare: No    Advanced directive: No    Advanced directive counseling given: Yes      Cognitive Screening:   Provider or family/friend/caregiver concerned regarding cognition?: No    Preventive Screenings      Cardiovascular Screening:    General: Screening Not Indicated and History Lipid Disorder      Diabetes Screening:     General: Screening Current      Colorectal Cancer Screening:     General: Screening Current      Breast Cancer Screening:     General: Screening Current      Cervical Cancer Screening:    General: Screening Not Indicated      Osteoporosis Screening:    General: Risks and Benefits Discussed    Due for: DXA Appendicular      Abdominal Aortic Aneurysm (AAA) Screening:        General: Screening Not Indicated      Lung Cancer Screening:     General: Screening Current      Hepatitis C Screening:    General: Screening Current    Immunizations:  - Immunizations due: Prevnar 20 and Zoster (Shingrix)    Screening, Brief Intervention, and Referral to Treatment (SBIRT)     Screening      AUDIT-C Screenin) How often did you have a drink containing alcohol in the past year?  never  2) How many drinks did you have on a typical day when you were drinking in the past year? 0  3) How often did you have 6 or more drinks on one occasion in the past year? never    AUDIT-C Score: 0  Interpretation: Score 0-2 (female): Negative screen for alcohol misuse    Single Item Drug Screening:  How often have you used an illegal drug (including marijuana) or a prescription medication for non-medical reasons in the past year? never    Single Item Drug Screen Score: 0  Interpretation: Negative screen for possible drug use disorder    Time Spent  Time spent screening/evaluating the patient for alcohol misuse: 1 minutes.     Annual Depression Screening  Time spent screening and evaluating the patient for depression during today's encounter was 1 minutes.    Review of Current Opioid Use  Opioid Risk Tool (ORT) Score: 1  Opioid Risk Tool (ORT) Interpretation: Score 0-3: Low risk for opioid misuse    Other Counseling Topics:   Calcium and vitamin D intake and regular weightbearing exercise.     Social Drivers of Health     Financial Resource Strain: Low Risk  (7/12/2023)    Overall Financial Resource Strain (CARDIA)     Difficulty of Paying Living Expenses: Not hard at all   Food Insecurity: No Food Insecurity (7/17/2025)    Nursing - Inadequate Food Risk Classification     Worried About Running Out of Food in the Last Year: Never true     Ran Out of Food in the Last Year: Never true   Transportation Needs: No Transportation Needs (7/17/2025)    PRAPARE - Transportation     Lack of Transportation (Medical): No     Lack of Transportation (Non-Medical): No   Housing Stability: Low Risk  (7/17/2025)    Housing Stability Vital Sign     Unable to Pay for Housing in the Last Year: No     Number of Times Moved in the Last Year: 0     Homeless in the Last Year: No   Utilities: Not At Risk (7/17/2025)    [facility] Utilities     Threatened with loss of utilities: No     No results found.    Objective   /80   Pulse 79    "Temp (!) 96.9 °F (36.1 °C)   Ht 5' 4\" (1.626 m)   Wt 90.2 kg (198 lb 13.9 oz)   LMP  (LMP Unknown)   SpO2 98%   BMI 34.14 kg/m²     Physical Exam  Vitals reviewed.   Constitutional:       Appearance: Normal appearance.   HENT:      Head: Normocephalic.      Right Ear: Tympanic membrane, ear canal and external ear normal.      Left Ear: Tympanic membrane, ear canal and external ear normal.      Nose: Nose normal.      Mouth/Throat:      Mouth: Mucous membranes are moist.     Eyes:      Extraocular Movements: Extraocular movements intact.      Conjunctiva/sclera: Conjunctivae normal.      Pupils: Pupils are equal, round, and reactive to light.       Cardiovascular:      Rate and Rhythm: Normal rate and regular rhythm.      Pulses: Normal pulses.   Pulmonary:      Effort: Pulmonary effort is normal.      Breath sounds: No wheezing or rales.      Comments: Sl decreased air movement throughout  Abdominal:      General: There is no distension.      Palpations: There is no mass.      Tenderness: There is no abdominal tenderness (mild epigastric and LUQ. No G/R). There is no guarding or rebound.     Musculoskeletal:         General: Tenderness (low lumber spine and paraspinal muscles) present. No swelling or deformity.      Cervical back: Normal range of motion. No tenderness.      Right lower leg: No edema.      Left lower leg: No edema.   Lymphadenopathy:      Cervical: No cervical adenopathy.     Skin:     General: Skin is warm.      Capillary Refill: Capillary refill takes less than 2 seconds.     Neurological:      General: No focal deficit present.      Mental Status: She is alert and oriented to person, place, and time.     Psychiatric:         Mood and Affect: Mood normal.         Behavior: Behavior normal.         Thought Content: Thought content normal.         Judgment: Judgment normal.      Comments: PHQ-2/9 Depression Screening    Little interest or pleasure in doing things: 1 - several days  Feeling down, " depressed, or hopeless: 1 - several days  PHQ-2 Score: 2  PHQ-2 Interpretation: Negative depression screen

## 2025-07-22 NOTE — ASSESSMENT & PLAN NOTE
With brief TIA symptoms.  CEA scheduled for 10/9/24 cancelled by pt. I discussed with pt. Still smoking - urged cessation.  Continue present care. Recheck 6m. Check carotid study in the interim  Orders:    VAS carotid complete study; Future

## 2025-07-22 NOTE — ASSESSMENT & PLAN NOTE
I reviewed with pt.  Still smoking - urged cessation.  Continue present inhalers. Avoid hot, humid weather.  Recheck 6m

## 2025-07-29 ENCOUNTER — HOSPITAL ENCOUNTER (OUTPATIENT)
Dept: RADIOLOGY | Facility: MEDICAL CENTER | Age: 70
Discharge: HOME | End: 2025-07-29
Attending: FAMILY MEDICINE
Payer: COMMERCIAL

## 2025-07-29 DIAGNOSIS — Z78.0 ASYMPTOMATIC POSTMENOPAUSAL STATE: ICD-10-CM

## 2025-07-29 PROCEDURE — 77080 DXA BONE DENSITY AXIAL: CPT

## 2025-08-07 DIAGNOSIS — J42 CHRONIC BRONCHITIS, UNSPECIFIED CHRONIC BRONCHITIS TYPE (HCC): ICD-10-CM

## 2025-08-07 DIAGNOSIS — I10 PRIMARY HYPERTENSION: ICD-10-CM

## 2025-08-07 DIAGNOSIS — K21.9 GASTROESOPHAGEAL REFLUX DISEASE: ICD-10-CM

## 2025-08-08 RX ORDER — MONTELUKAST SODIUM 10 MG/1
10 TABLET ORAL
Qty: 90 TABLET | Refills: 1 | Status: SHIPPED | OUTPATIENT
Start: 2025-08-08

## 2025-08-08 RX ORDER — PANTOPRAZOLE SODIUM 40 MG/1
40 TABLET, DELAYED RELEASE ORAL 2 TIMES DAILY
Qty: 180 TABLET | Refills: 1 | Status: SHIPPED | OUTPATIENT
Start: 2025-08-08

## 2025-08-08 RX ORDER — AMLODIPINE BESYLATE 5 MG/1
5 TABLET ORAL DAILY
Qty: 90 TABLET | Refills: 1 | Status: SHIPPED | OUTPATIENT
Start: 2025-08-08